# Patient Record
Sex: FEMALE | Race: WHITE | NOT HISPANIC OR LATINO | Employment: OTHER | URBAN - METROPOLITAN AREA
[De-identification: names, ages, dates, MRNs, and addresses within clinical notes are randomized per-mention and may not be internally consistent; named-entity substitution may affect disease eponyms.]

---

## 2018-02-25 ENCOUNTER — TRANSFERRED RECORDS (OUTPATIENT)
Dept: HEALTH INFORMATION MANAGEMENT | Facility: CLINIC | Age: 28
End: 2018-02-25

## 2018-02-26 ENCOUNTER — ANESTHESIA EVENT (OUTPATIENT)
Dept: SURGERY | Facility: CLINIC | Age: 28
End: 2018-02-26
Payer: COMMERCIAL

## 2018-02-26 ENCOUNTER — HOSPITAL ENCOUNTER (EMERGENCY)
Facility: CLINIC | Age: 28
Discharge: HOME | End: 2018-02-26
Attending: EMERGENCY MEDICINE | Admitting: SPECIALIST
Payer: COMMERCIAL

## 2018-02-26 ENCOUNTER — ANESTHESIA (OUTPATIENT)
Dept: SURGERY | Facility: CLINIC | Age: 28
End: 2018-02-26
Payer: COMMERCIAL

## 2018-02-26 ENCOUNTER — APPOINTMENT (OUTPATIENT)
Dept: ULTRASOUND IMAGING | Facility: CLINIC | Age: 28
End: 2018-02-26
Attending: EMERGENCY MEDICINE
Payer: COMMERCIAL

## 2018-02-26 VITALS
HEART RATE: 119 BPM | RESPIRATION RATE: 17 BRPM | OXYGEN SATURATION: 97 % | TEMPERATURE: 98.1 F | SYSTOLIC BLOOD PRESSURE: 140 MMHG | DIASTOLIC BLOOD PRESSURE: 99 MMHG | WEIGHT: 170 LBS

## 2018-02-26 DIAGNOSIS — G89.18 POST-OP PAIN: Primary | ICD-10-CM

## 2018-02-26 DIAGNOSIS — N76.4 LEFT GENITAL LABIAL ABSCESS: ICD-10-CM

## 2018-02-26 LAB
ANION GAP SERPL CALCULATED.3IONS-SCNC: 8 MMOL/L (ref 3–14)
B-HCG SERPL-ACNC: <1 IU/L (ref 0–5)
BASOPHILS # BLD AUTO: 0 10E9/L (ref 0–0.2)
BASOPHILS NFR BLD AUTO: 0.1 %
BUN SERPL-MCNC: 8 MG/DL (ref 7–30)
CALCIUM SERPL-MCNC: 9 MG/DL (ref 8.5–10.1)
CHLORIDE SERPL-SCNC: 103 MMOL/L (ref 94–109)
CO2 SERPL-SCNC: 27 MMOL/L (ref 20–32)
CREAT SERPL-MCNC: 0.77 MG/DL (ref 0.52–1.04)
DIFFERENTIAL METHOD BLD: ABNORMAL
EOSINOPHIL # BLD AUTO: 0.2 10E9/L (ref 0–0.7)
EOSINOPHIL NFR BLD AUTO: 1.4 %
ERYTHROCYTE [DISTWIDTH] IN BLOOD BY AUTOMATED COUNT: 13.8 % (ref 10–15)
GFR SERPL CREATININE-BSD FRML MDRD: 88 ML/MIN/1.7M2
GLUCOSE SERPL-MCNC: 108 MG/DL (ref 70–99)
GRAM STN SPEC: ABNORMAL
HCT VFR BLD AUTO: 40.1 % (ref 35–47)
HGB BLD-MCNC: 12.8 G/DL (ref 11.7–15.7)
IMM GRANULOCYTES # BLD: 0 10E9/L (ref 0–0.4)
IMM GRANULOCYTES NFR BLD: 0.2 %
LYMPHOCYTES # BLD AUTO: 1.3 10E9/L (ref 0.8–5.3)
LYMPHOCYTES NFR BLD AUTO: 7.9 %
MCH RBC QN AUTO: 29.2 PG (ref 26.5–33)
MCHC RBC AUTO-ENTMCNC: 31.9 G/DL (ref 31.5–36.5)
MCV RBC AUTO: 91 FL (ref 78–100)
MONOCYTES # BLD AUTO: 1 10E9/L (ref 0–1.3)
MONOCYTES NFR BLD AUTO: 6.4 %
NEUTROPHILS # BLD AUTO: 13.7 10E9/L (ref 1.6–8.3)
NEUTROPHILS NFR BLD AUTO: 84 %
PLATELET # BLD AUTO: 308 10E9/L (ref 150–450)
POTASSIUM SERPL-SCNC: 4 MMOL/L (ref 3.4–5.3)
RBC # BLD AUTO: 4.39 10E12/L (ref 3.8–5.2)
SODIUM SERPL-SCNC: 138 MMOL/L (ref 133–144)
SPECIMEN SOURCE: ABNORMAL
WBC # BLD AUTO: 16.3 10E9/L (ref 4–11)

## 2018-02-26 PROCEDURE — 36000058 ZZH SURGERY LEVEL 3 EA 15 ADDTL MIN: Performed by: SPECIALIST

## 2018-02-26 PROCEDURE — 45990 SURG DX EXAM ANORECTAL: CPT | Performed by: SPECIALIST

## 2018-02-26 PROCEDURE — 84702 CHORIONIC GONADOTROPIN TEST: CPT | Performed by: EMERGENCY MEDICINE

## 2018-02-26 PROCEDURE — 10060 I&D ABSCESS SIMPLE/SINGLE: CPT | Performed by: SPECIALIST

## 2018-02-26 PROCEDURE — 96361 HYDRATE IV INFUSION ADD-ON: CPT | Performed by: EMERGENCY MEDICINE

## 2018-02-26 PROCEDURE — 40000306 ZZH STATISTIC PRE PROC ASSESS II: Performed by: SPECIALIST

## 2018-02-26 PROCEDURE — 25000132 ZZH RX MED GY IP 250 OP 250 PS 637: Performed by: NURSE ANESTHETIST, CERTIFIED REGISTERED

## 2018-02-26 PROCEDURE — 96374 THER/PROPH/DIAG INJ IV PUSH: CPT | Performed by: EMERGENCY MEDICINE

## 2018-02-26 PROCEDURE — 87070 CULTURE OTHR SPECIMN AEROBIC: CPT | Performed by: SPECIALIST

## 2018-02-26 PROCEDURE — 25000132 ZZH RX MED GY IP 250 OP 250 PS 637: Performed by: SPECIALIST

## 2018-02-26 PROCEDURE — 71000015 ZZH RECOVERY PHASE 1 LEVEL 2 EA ADDTL HR: Performed by: SPECIALIST

## 2018-02-26 PROCEDURE — 25000128 H RX IP 250 OP 636: Performed by: EMERGENCY MEDICINE

## 2018-02-26 PROCEDURE — 87075 CULTR BACTERIA EXCEPT BLOOD: CPT | Performed by: SPECIALIST

## 2018-02-26 PROCEDURE — 25000128 H RX IP 250 OP 636: Performed by: NURSE ANESTHETIST, CERTIFIED REGISTERED

## 2018-02-26 PROCEDURE — 87076 CULTURE ANAEROBE IDENT EACH: CPT | Performed by: SPECIALIST

## 2018-02-26 PROCEDURE — 85025 COMPLETE CBC W/AUTO DIFF WBC: CPT | Performed by: EMERGENCY MEDICINE

## 2018-02-26 PROCEDURE — 25000125 ZZHC RX 250: Performed by: NURSE ANESTHETIST, CERTIFIED REGISTERED

## 2018-02-26 PROCEDURE — 37000009 ZZH ANESTHESIA TECHNICAL FEE, EACH ADDTL 15 MIN: Performed by: SPECIALIST

## 2018-02-26 PROCEDURE — 99285 EMERGENCY DEPT VISIT HI MDM: CPT | Mod: 25 | Performed by: EMERGENCY MEDICINE

## 2018-02-26 PROCEDURE — 37000008 ZZH ANESTHESIA TECHNICAL FEE, 1ST 30 MIN: Performed by: SPECIALIST

## 2018-02-26 PROCEDURE — 36000056 ZZH SURGERY LEVEL 3 1ST 30 MIN: Performed by: SPECIALIST

## 2018-02-26 PROCEDURE — 71000027 ZZH RECOVERY PHASE 2 EACH 15 MINS: Performed by: SPECIALIST

## 2018-02-26 PROCEDURE — 87185 SC STD ENZYME DETCJ PER NZM: CPT | Performed by: SPECIALIST

## 2018-02-26 PROCEDURE — 99253 IP/OBS CNSLTJ NEW/EST LOW 45: CPT | Mod: 57 | Performed by: SPECIALIST

## 2018-02-26 PROCEDURE — 87205 SMEAR GRAM STAIN: CPT | Performed by: SPECIALIST

## 2018-02-26 PROCEDURE — 25000125 ZZHC RX 250: Performed by: SPECIALIST

## 2018-02-26 PROCEDURE — 80048 BASIC METABOLIC PNL TOTAL CA: CPT | Performed by: EMERGENCY MEDICINE

## 2018-02-26 PROCEDURE — 87077 CULTURE AEROBIC IDENTIFY: CPT | Performed by: SPECIALIST

## 2018-02-26 PROCEDURE — 71000014 ZZH RECOVERY PHASE 1 LEVEL 2 FIRST HR: Performed by: SPECIALIST

## 2018-02-26 PROCEDURE — 25000566 ZZH SEVOFLURANE, EA 15 MIN: Performed by: SPECIALIST

## 2018-02-26 PROCEDURE — 76705 ECHO EXAM OF ABDOMEN: CPT

## 2018-02-26 PROCEDURE — 96375 TX/PRO/DX INJ NEW DRUG ADDON: CPT | Performed by: EMERGENCY MEDICINE

## 2018-02-26 RX ORDER — FENTANYL CITRATE 50 UG/ML
25-50 INJECTION, SOLUTION INTRAMUSCULAR; INTRAVENOUS
Status: DISCONTINUED | OUTPATIENT
Start: 2018-02-26 | End: 2018-02-26 | Stop reason: HOSPADM

## 2018-02-26 RX ORDER — MUPIROCIN 20 MG/G
OINTMENT TOPICAL 3 TIMES DAILY
COMMUNITY
End: 2019-04-29

## 2018-02-26 RX ORDER — LORAZEPAM 2 MG/ML
1 INJECTION INTRAMUSCULAR ONCE
Status: COMPLETED | OUTPATIENT
Start: 2018-02-26 | End: 2018-02-26

## 2018-02-26 RX ORDER — DIMENHYDRINATE 50 MG/ML
25 INJECTION, SOLUTION INTRAMUSCULAR; INTRAVENOUS
Status: DISCONTINUED | OUTPATIENT
Start: 2018-02-26 | End: 2018-02-26 | Stop reason: HOSPADM

## 2018-02-26 RX ORDER — SODIUM CHLORIDE 9 MG/ML
1000 INJECTION, SOLUTION INTRAVENOUS CONTINUOUS
Status: DISCONTINUED | OUTPATIENT
Start: 2018-02-26 | End: 2018-02-26 | Stop reason: HOSPADM

## 2018-02-26 RX ORDER — SODIUM CHLORIDE, SODIUM LACTATE, POTASSIUM CHLORIDE, CALCIUM CHLORIDE 600; 310; 30; 20 MG/100ML; MG/100ML; MG/100ML; MG/100ML
INJECTION, SOLUTION INTRAVENOUS CONTINUOUS
Status: DISCONTINUED | OUTPATIENT
Start: 2018-02-26 | End: 2018-02-26 | Stop reason: HOSPADM

## 2018-02-26 RX ORDER — MEPERIDINE HYDROCHLORIDE 25 MG/ML
12.5 INJECTION INTRAMUSCULAR; INTRAVENOUS; SUBCUTANEOUS
Status: DISCONTINUED | OUTPATIENT
Start: 2018-02-26 | End: 2018-02-26 | Stop reason: HOSPADM

## 2018-02-26 RX ORDER — ALBUTEROL SULFATE 90 UG/1
AEROSOL, METERED RESPIRATORY (INHALATION) PRN
Status: DISCONTINUED | OUTPATIENT
Start: 2018-02-26 | End: 2018-02-26

## 2018-02-26 RX ORDER — OXYCODONE AND ACETAMINOPHEN 5; 325 MG/1; MG/1
1 TABLET ORAL EVERY 6 HOURS PRN
COMMUNITY
End: 2019-03-12

## 2018-02-26 RX ORDER — DEXAMETHASONE SODIUM PHOSPHATE 10 MG/ML
INJECTION, SOLUTION INTRAMUSCULAR; INTRAVENOUS PRN
Status: DISCONTINUED | OUTPATIENT
Start: 2018-02-26 | End: 2018-02-26

## 2018-02-26 RX ORDER — ONDANSETRON 4 MG/1
4 TABLET, ORALLY DISINTEGRATING ORAL EVERY 30 MIN PRN
Status: DISCONTINUED | OUTPATIENT
Start: 2018-02-26 | End: 2018-02-26 | Stop reason: HOSPADM

## 2018-02-26 RX ORDER — OXYCODONE AND ACETAMINOPHEN 5; 325 MG/1; MG/1
2 TABLET ORAL
Status: COMPLETED | OUTPATIENT
Start: 2018-02-26 | End: 2018-02-26

## 2018-02-26 RX ORDER — LIDOCAINE HYDROCHLORIDE 20 MG/ML
INJECTION, SOLUTION INFILTRATION; PERINEURAL PRN
Status: DISCONTINUED | OUTPATIENT
Start: 2018-02-26 | End: 2018-02-26

## 2018-02-26 RX ORDER — ALBUTEROL SULFATE 0.83 MG/ML
2.5 SOLUTION RESPIRATORY (INHALATION) EVERY 6 HOURS PRN
Status: DISCONTINUED | OUTPATIENT
Start: 2018-02-26 | End: 2018-02-26 | Stop reason: HOSPADM

## 2018-02-26 RX ORDER — ONDANSETRON 2 MG/ML
4 INJECTION INTRAMUSCULAR; INTRAVENOUS EVERY 30 MIN PRN
Status: DISCONTINUED | OUTPATIENT
Start: 2018-02-26 | End: 2018-02-26 | Stop reason: HOSPADM

## 2018-02-26 RX ORDER — KETOROLAC TROMETHAMINE 30 MG/ML
30 INJECTION, SOLUTION INTRAMUSCULAR; INTRAVENOUS ONCE
Status: COMPLETED | OUTPATIENT
Start: 2018-02-26 | End: 2018-02-26

## 2018-02-26 RX ORDER — PROPOFOL 10 MG/ML
INJECTION, EMULSION INTRAVENOUS PRN
Status: DISCONTINUED | OUTPATIENT
Start: 2018-02-26 | End: 2018-02-26

## 2018-02-26 RX ORDER — BUPIVACAINE HYDROCHLORIDE 2.5 MG/ML
INJECTION, SOLUTION INFILTRATION; PERINEURAL PRN
Status: DISCONTINUED | OUTPATIENT
Start: 2018-02-26 | End: 2018-02-26 | Stop reason: HOSPADM

## 2018-02-26 RX ORDER — HALOPERIDOL 5 MG/ML
2 INJECTION INTRAMUSCULAR ONCE
Status: DISCONTINUED | OUTPATIENT
Start: 2018-02-26 | End: 2018-02-26 | Stop reason: CLARIF

## 2018-02-26 RX ORDER — CYANOCOBALAMIN 1000 UG/ML
1 INJECTION, SOLUTION INTRAMUSCULAR; SUBCUTANEOUS
COMMUNITY
End: 2019-09-27

## 2018-02-26 RX ORDER — LIDOCAINE 40 MG/G
CREAM TOPICAL
Status: DISCONTINUED | OUTPATIENT
Start: 2018-02-26 | End: 2018-02-26 | Stop reason: HOSPADM

## 2018-02-26 RX ORDER — NALOXONE HYDROCHLORIDE 0.4 MG/ML
.1-.4 INJECTION, SOLUTION INTRAMUSCULAR; INTRAVENOUS; SUBCUTANEOUS
Status: DISCONTINUED | OUTPATIENT
Start: 2018-02-26 | End: 2018-02-26 | Stop reason: HOSPADM

## 2018-02-26 RX ORDER — HYDROMORPHONE HYDROCHLORIDE 1 MG/ML
.3-.5 INJECTION, SOLUTION INTRAMUSCULAR; INTRAVENOUS; SUBCUTANEOUS EVERY 10 MIN PRN
Status: DISCONTINUED | OUTPATIENT
Start: 2018-02-26 | End: 2018-02-26 | Stop reason: HOSPADM

## 2018-02-26 RX ORDER — OXYCODONE AND ACETAMINOPHEN 5; 325 MG/1; MG/1
1-2 TABLET ORAL EVERY 6 HOURS PRN
Qty: 30 TABLET | Refills: 0 | Status: SHIPPED | OUTPATIENT
Start: 2018-02-26 | End: 2019-03-12

## 2018-02-26 RX ORDER — HYDROMORPHONE HYDROCHLORIDE 1 MG/ML
0.5 INJECTION, SOLUTION INTRAMUSCULAR; INTRAVENOUS; SUBCUTANEOUS
Status: DISCONTINUED | OUTPATIENT
Start: 2018-02-26 | End: 2018-02-26 | Stop reason: HOSPADM

## 2018-02-26 RX ADMIN — FENTANYL CITRATE 50 MCG: 50 INJECTION, SOLUTION INTRAMUSCULAR; INTRAVENOUS at 12:12

## 2018-02-26 RX ADMIN — HYDROMORPHONE HYDROCHLORIDE 0.5 MG: 1 INJECTION, SOLUTION INTRAMUSCULAR; INTRAVENOUS; SUBCUTANEOUS at 07:53

## 2018-02-26 RX ADMIN — OXYCODONE HYDROCHLORIDE AND ACETAMINOPHEN 2 TABLET: 5; 325 TABLET ORAL at 14:31

## 2018-02-26 RX ADMIN — KETOROLAC TROMETHAMINE 30 MG: 30 INJECTION, SOLUTION INTRAMUSCULAR at 07:52

## 2018-02-26 RX ADMIN — ALBUTEROL SULFATE 6 PUFF: 90 AEROSOL, METERED RESPIRATORY (INHALATION) at 12:53

## 2018-02-26 RX ADMIN — FENTANYL CITRATE 50 MCG: 50 INJECTION, SOLUTION INTRAMUSCULAR; INTRAVENOUS at 12:05

## 2018-02-26 RX ADMIN — PHENYLEPHRINE HYDROCHLORIDE 100 MCG: 10 INJECTION, SOLUTION INTRAMUSCULAR; INTRAVENOUS; SUBCUTANEOUS at 12:35

## 2018-02-26 RX ADMIN — PROCHLORPERAZINE EDISYLATE 5 MG: 5 INJECTION INTRAMUSCULAR; INTRAVENOUS at 15:29

## 2018-02-26 RX ADMIN — SODIUM CHLORIDE, POTASSIUM CHLORIDE, SODIUM LACTATE AND CALCIUM CHLORIDE: 600; 310; 30; 20 INJECTION, SOLUTION INTRAVENOUS at 11:58

## 2018-02-26 RX ADMIN — SODIUM CHLORIDE 1000 ML: 9 INJECTION, SOLUTION INTRAVENOUS at 08:03

## 2018-02-26 RX ADMIN — ALBUTEROL SULFATE 2.5 MG: 2.5 SOLUTION RESPIRATORY (INHALATION) at 13:18

## 2018-02-26 RX ADMIN — DEXAMETHASONE SODIUM PHOSPHATE 10 MG: 10 INJECTION, SOLUTION INTRAMUSCULAR; INTRAVENOUS at 12:28

## 2018-02-26 RX ADMIN — PROPOFOL 200 MG: 10 INJECTION, EMULSION INTRAVENOUS at 12:12

## 2018-02-26 RX ADMIN — ONDANSETRON 4 MG: 4 TABLET, ORALLY DISINTEGRATING ORAL at 14:35

## 2018-02-26 RX ADMIN — ONDANSETRON 4 MG: 2 INJECTION INTRAMUSCULAR; INTRAVENOUS at 12:28

## 2018-02-26 RX ADMIN — ALBUTEROL SULFATE 6 PUFF: 90 AEROSOL, METERED RESPIRATORY (INHALATION) at 12:58

## 2018-02-26 RX ADMIN — VANCOMYCIN HYDROCHLORIDE 1500 MG: 1 INJECTION, POWDER, LYOPHILIZED, FOR SOLUTION INTRAVENOUS at 08:21

## 2018-02-26 RX ADMIN — Medication 100 MG: at 12:12

## 2018-02-26 RX ADMIN — LORAZEPAM 1 MG: 2 INJECTION INTRAMUSCULAR; INTRAVENOUS at 07:58

## 2018-02-26 RX ADMIN — LIDOCAINE HYDROCHLORIDE 100 MG: 20 INJECTION, SOLUTION INFILTRATION; PERINEURAL at 12:12

## 2018-02-26 RX ADMIN — MIDAZOLAM 2 MG: 1 INJECTION INTRAMUSCULAR; INTRAVENOUS at 12:05

## 2018-02-26 ASSESSMENT — LIFESTYLE VARIABLES: TOBACCO_USE: 1

## 2018-02-26 NOTE — ANESTHESIA POSTPROCEDURE EVALUATION
Patient: Ayanna Davidson    Procedure(s):  INCISION AND DRAINAGE LABIAL ABSCESS - Wound Class: II-Clean Contaminated    Diagnosis:labial abcess  Diagnosis Additional Information: No value filed.    Anesthesia Type:  MAC    Note:  Anesthesia Post Evaluation    Patient location during evaluation: Phase 2 and Bedside  Patient participation: Able to fully participate in evaluation  Level of consciousness: awake and alert  Pain management: adequate  Airway patency: patent  Cardiovascular status: acceptable  Respiratory status: acceptable  Hydration status: acceptable  PONV: none     Anesthetic complications: None    Comments: Pt has a flat affect. Doesn't open eyes when talking to you. Words are inaudible at times. Hard to get a straight answer from her because of this. This is the same way she presented preop. VSS. Will DC to home with SO.        Last vitals:  Vitals:    02/26/18 1320 02/26/18 1325 02/26/18 1330   BP: 109/70 114/73 109/68   Pulse:      Resp: 21 22 21   Temp:      SpO2: 95% 94% 94%         Electronically Signed By: LEXX Rondon CRNA  February 26, 2018  2:00 PM

## 2018-02-26 NOTE — LETTER
February 26, 2018      To Whom It May Concern:      Ayanna Davidson was seen in our Surgery Department today 02/26/18.  She may return to work/school on Thursday 3/1/18 or sooner if she feels improved.    Sincerely,        Celeste Garsia RN

## 2018-02-26 NOTE — ANESTHESIA PREPROCEDURE EVALUATION
Anesthesia Evaluation     . Pt has had prior anesthetic. Type: General and MAC    No history of anesthetic complications          ROS/MED HX    ENT/Pulmonary:     (+)tobacco use, Current use , . .    Neurologic:  - neg neurologic ROS     Cardiovascular:     (+) ----. : . . . :. . No previous cardiac testing       METS/Exercise Tolerance:     Hematologic:  - neg hematologic  ROS       Musculoskeletal:  - neg musculoskeletal ROS       GI/Hepatic:     (+) GERD       Renal/Genitourinary:  - ROS Renal section negative       Endo:  - neg endo ROS       Psychiatric: Comment: Polysubstance abuse per last tox screen.    (+) psychiatric history anxiety and depression      Infectious Disease:  - neg infectious disease ROS       Malignancy:      - no malignancy   Other: Comment: - HCG   - neg other ROS                 Physical Exam  Normal systems: cardiovascular    Airway   Mallampati: II  TM distance: <3 FB  Neck ROM: full    Dental   (+) missing and chipped    Cardiovascular   Rhythm and rate: regular and normal      Pulmonary (+) decreased breath sounds and wheezes                       Anesthesia Plan      History & Physical Review  History and physical reviewed and following examination; no interval change.    ASA Status:  2 .    NPO Status:  > 6 hours    Plan for General, RSI and ETT with Intravenous and Propofol induction. Maintenance will be Balanced.    PONV prophylaxis:  Ondansetron (or other 5HT-3)  Additional equipment: Videolaryngoscope      Postoperative Care  Postoperative pain management:  IV analgesics and Oral pain medications.      Consents  Anesthetic plan, risks, benefits and alternatives discussed with:  Patient and Spouse.  Use of blood products discussed: No .   .                          .

## 2018-02-26 NOTE — ANESTHESIA CARE TRANSFER NOTE
Patient: Ayanna Davidson    Procedure(s):  INCISION AND DRAINAGE LABIAL ABSCESS - Wound Class: II-Clean Contaminated    Diagnosis: labial abcess  Diagnosis Additional Information: No value filed.    Anesthesia Type:   MAC     Note:  Airway :Face Mask  Patient transferred to:PACU  Handoff Report: Identifed the Patient, Identified the Reponsible Provider, Reviewed the pertinent medical history, Discussed the surgical course, Reviewed Intra-OP anesthesia mangement and issues during anesthesia, Set expectations for post-procedure period and Allowed opportunity for questions and acknowledgement of understanding      Vitals: (Last set prior to Anesthesia Care Transfer)    CRNA VITALS  2/26/2018 1240 - 2/26/2018 1310      2/26/2018             SpO2: 93 %                Electronically Signed By: LEXX Rondon CRNA  February 26, 2018  1:10 PM

## 2018-02-26 NOTE — ED PROVIDER NOTES
"  History     Chief Complaint   Patient presents with     labial abscess     The history is provided by the patient and medical records.     This is a 27-year-old female with history of MRSA and recurrent abscesses presenting with labial abscess.  Patient states that she has had about \"benign cysts\" on the left side of her labia/vulva and has had a number of different I&D and surgical procedures on them.  She notes that she has never been diagnosed with the Bartholin's gland cyst but that these have always been abscesses in a different area.  About 4 days ago she started having increasing swelling and was seen at an outside facility.  She was placed on clindamycin and given pain medications.  She was seen again yesterday at another outside facility and it was recommended that she follow-up with surgery.  She has been taking her antibiotics as prescribed.  She is also been trying some soaks.  She states that the pain got so bad overnight that to the medications are not helping.  She is feeling generalized ill no fevers.ness.  She is still able to urinate.  She denies vaginal discharge.  She has had normal bowel movements.  Her last surgery was 8/17 at [facility] and she was hospitalized for a day or 2 afterwards.  She had another I&D done on her right eyebrow 1/8/18 for an abscess.  She denies any other skin lesions at this point.  Patient has had a history of methamphetamine abuse.    Problem List:    There are no active problems to display for this patient.       Past Medical History:    History reviewed. No pertinent past medical history.    Past Surgical History:    History reviewed. No pertinent surgical history.    Family History:    History reviewed. No pertinent family history.    Social History:  Marital Status:  Single [1]  Social History   Substance Use Topics     Smoking status: Current Every Day Smoker     Packs/day: 0.50     Smokeless tobacco: Current User      Comment: uses E cig     Alcohol use " No        Medications:      No current outpatient prescriptions on file.      Review of Systems  All other ROS reviewed and are negative or non-contributory except as stated in HPI.      Physical Exam   BP: 117/76  Pulse: 119  Temp: 98.8  F (37.1  C)  Resp: 18  Weight: 77.1 kg (170 lb)  SpO2: 98 %      Physical Exam   Constitutional: She appears well-developed and well-nourished.   Uncomfortable appearing young female lying in the bed   HENT:   Head: Normocephalic.   Nose: Nose normal.   Mouth/Throat: Oropharynx is clear and moist.   Eyes: Conjunctivae and EOM are normal.   Neck: Normal range of motion. Neck supple.   Cardiovascular: Regular rhythm, normal heart sounds and intact distal pulses.    Tachycardia   Pulmonary/Chest: Effort normal and breath sounds normal.   Abdominal: Soft. There is no tenderness.   Genitourinary:         Musculoskeletal: Normal range of motion.   Neurological: She is alert.   Skin: Skin is warm and dry. She is not diaphoretic.   Psychiatric: Her behavior is normal.   Anxious   Vitals reviewed.      ED Course (with Medical Decision Making)    Pt seen and examined by me.  RN and EPIC notes reviewed.      Large left labial/vulvar abscess with surrounding cellulitis.  I am concerned about the complexity, depth, extent of the this abscess, especially in light of the fact that she had surgery on it a number of times previously.    IV was placed for labs, pain control, antibiotics.  I am going to give her vancomycin since she is already been on clindamycin.  I spoke with Dr. Dominguez, surgery.  He requests an ultrasound and he will take the patient to the operating room for I&D.    Ultrasound results as below.  Patient tolerating her pain medications and sleeping.  She is cleared for surgery.    Patient has no obvious contraindications to a surgical procedure with no history of cardiac disease, significant lung disease although she does smoke, and no history of adverse reactions to anesthesia.        Procedures  Results for orders placed or performed during the hospital encounter of 02/26/18   US Abdomen Limited    Narrative    ULTRASOUND ABDOMEN LIMITED   2/26/2018 8:58 AM     HISTORY: Vulvar abscess.    COMPARISON: None.    FINDINGS: There is a complex fluid collection in the left  labial/vulvar region, suspicious for abscess. This complex fluid  collection is located 0.2 cm below the skin surface and measures 3.8 x  2.9 x 4.5 cm.       Impression    IMPRESSION: Left labial/vulvar complex fluid collection measures up to  4.5 cm in greatest dimension and is suspicious for abscess.     PABLO BUSBY MD   Basic metabolic panel   Result Value Ref Range    Sodium 138 133 - 144 mmol/L    Potassium 4.0 3.4 - 5.3 mmol/L    Chloride 103 94 - 109 mmol/L    Carbon Dioxide 27 20 - 32 mmol/L    Anion Gap 8 3 - 14 mmol/L    Glucose 108 (H) 70 - 99 mg/dL    Urea Nitrogen 8 7 - 30 mg/dL    Creatinine 0.77 0.52 - 1.04 mg/dL    GFR Estimate 88 >60 mL/min/1.7m2    GFR Estimate If Black >90 >60 mL/min/1.7m2    Calcium 9.0 8.5 - 10.1 mg/dL   CBC with platelets differential   Result Value Ref Range    WBC 16.3 (H) 4.0 - 11.0 10e9/L    RBC Count 4.39 3.8 - 5.2 10e12/L    Hemoglobin 12.8 11.7 - 15.7 g/dL    Hematocrit 40.1 35.0 - 47.0 %    MCV 91 78 - 100 fl    MCH 29.2 26.5 - 33.0 pg    MCHC 31.9 31.5 - 36.5 g/dL    RDW 13.8 10.0 - 15.0 %    Platelet Count 308 150 - 450 10e9/L    Diff Method Automated Method     % Neutrophils 84.0 %    % Lymphocytes 7.9 %    % Monocytes 6.4 %    % Eosinophils 1.4 %    % Basophils 0.1 %    % Immature Granulocytes 0.2 %    Absolute Neutrophil 13.7 (H) 1.6 - 8.3 10e9/L    Absolute Lymphocytes 1.3 0.8 - 5.3 10e9/L    Absolute Monocytes 1.0 0.0 - 1.3 10e9/L    Absolute Eosinophils 0.2 0.0 - 0.7 10e9/L    Absolute Basophils 0.0 0.0 - 0.2 10e9/L    Abs Immature Granulocytes 0.0 0 - 0.4 10e9/L   HCG quantitative pregnancy (blood)   Result Value Ref Range    HCG Quantitative Serum <1 0 - 5 IU/L         Assessments & Plan     I have reviewed the findings, diagnosis, plan and need for follow up with the patient.  Current Discharge Medication List          Final diagnoses:   Left genital labial abscess     Disposition: Patient transferred to the operating room in stable condition.    Note: Chart documentation done in part with Dragon Voice Recognition software. Although reviewed after completion, some word and grammatical errors may remain.     2/26/2018   [facility] EMERGENCY DEPARTMENT     Monisha Rojas MD  02/26/18 3080

## 2018-02-26 NOTE — IP AVS SNAPSHOT
[facility] Post Anesthesia Care    [address] [place] [address]    Phone:  [phone]                                       After Visit Summary   2/26/2018    Ayanna Davidson    MRN: [id number]           After Visit Summary Signature Page     I have received my discharge instructions, and my questions have been answered. I have discussed any challenges I see with this plan with the nurse or doctor.    ..........................................................................................................................................  Patient/Patient Representative Signature      ..........................................................................................................................................  Patient Representative Print Name and Relationship to Patient    ..................................................               ................................................  Date                                            Time    ..........................................................................................................................................  Reviewed by Signature/Title    ...................................................              ..............................................  Date                                                            Time

## 2018-02-26 NOTE — BRIEF OP NOTE
[facility] Brief Operative Note    Pre-operative diagnosis: labial abcess   Post-operative diagnosis same   Procedure: 1) Incision drainage left labial abscess  2) Rectal exam under anesthesia   Surgeon: Jase Beach MD, FACS   Assistants(s): Marcelina Jiang MS3   Estimated blood loss: Less than 10 ml    Specimens: Cultures   Findings: Large abscess with 50cc of pus.  No obvious vaginal or rectal source.     Jase Beach MD, FACS    #[id number]

## 2018-02-26 NOTE — ED NOTES
"Pt reports abscess in vaginal area is \"killing me it hurts so bad I just want it to pop\", was seen yesterday and was supposed to follow up today with surgeon   "

## 2018-02-26 NOTE — IP AVS SNAPSHOT
MRN:[id number]                      After Visit Summary   2/26/2018    Ayanna Davidson    MRN: [id number]           Thank you!     Thank you for choosing [facility] for your care. Our goal is always to provide you with excellent care. Hearing back from our patients is one way we can continue to improve our services. Please take a few minutes to complete the written survey that you may receive in the mail after you visit with us. Thank you!        Patient Information     Date Of Birth          1990        About your hospital stay     You were admitted on:  N/A You last received care in the:  [facility]    You were discharged on:  February 26, 2018       Who to Call     For medical emergencies, please call 911.  For non-urgent questions about your medical care, please call your primary care provider or clinic, None  For questions related to your surgery, please call your surgery clinic        Attending Provider     Provider Specialty    Monisha Rojas MD Emergency Medicine       Primary Care Provider Fax #    Physician No Ref-Primary [phone]      After Care Instructions     Diet Instructions       Resume pre-procedure diet            Discharge Instructions       Patient to follow up with appointment in 7-10 days.            Dressing       Keep dressing clean and dry.  Dressing / incisional care as instructed by RN and or Surgeon            Ice to affected area       Ice to operative site PRN            No Alcohol       For 24 hours post procedure            No driving or operating machinery        until the day after procedure            Shower       No shower for 24 hours post procedure. May shower Postoperative Day (POD)  1.   Remove packing in AM in shower or bath.  Soak area daily in shower or bath then pack with gauze                  Your next 10 appointments already scheduled     Mar 05, 2018  2:45 PM CST   Return Visit with Jase Beach MD  "  [facility] ([facility]    [address], [place] [address]   [phone]              Pending Results     Date and Time Order Name Status Description    2018 1236 Gram stain Preliminary     2018 1234 Abscess Culture Aerobic Bacterial In process             Admission Information     Date & Time Department Dept. Phone    2018 [facility] [phone]      Your Vitals Were     Blood Pressure Pulse Temperature Respirations Weight Pulse Oximetry    117/89 119 98.1  F (36.7  C) (Axillary) 17 77.1 kg (170 lb) 98%      MyChart Information     [facility] lets you send messages to your doctor, view your test results, renew your prescriptions, schedule appointments and more. To sign up, go to [email or web address] . Click on \"Log in\" on the left side of the screen, which will take you to the Welcome page. Then click on \"Sign up Now\" on the right side of the page.     You will be asked to enter the access code listed below, as well as some personal information. Please follow the directions to create your username and password.     Your access code is: [email or web address]  Expires: 2018  1:39 PM     Your access code will  in 90 days. If you need help or a new code, please call your [facility] clinic or [phone].        Care EveryWhere ID     This is your Care EveryWhere ID. This could be used by other organizations to access your [facility] medical records  [id number]        Equal Access to Services     [facility] campusJESSY AH: Hadii aad ku hadasho Soomaali, waaxda luqadaha, qaybta kaalmada adeegyada, harlan driver . So [facility] [phone].    ATENCIÓN: Si habla español, tiene a calvert disposición servicios gratuitos de asistencia lingüística. Llame al [phone].    We comply with applicable federal civil rights laws and [place] laws. We do not discriminate on the basis of race, color, national origin, age, disability, " sex, sexual orientation, or gender identity.               Review of your medicines      CONTINUE these medicines which may have CHANGED, or have new prescriptions. If we are uncertain of the size of tablets/capsules you have at home, strength may be listed as something that might have changed.        Dose / Directions    * oxyCODONE-acetaminophen 5-325 MG per tablet   Commonly known as:  PERCOCET   This may have changed:  You were already taking a medication with the same name, and this prescription was added. Make sure you understand how and when to take each.   Used for:  Post-op pain        Dose:  1-2 tablet   Take 1-2 tablets by mouth every 6 hours as needed for pain (moderate to severe)   Quantity:  30 tablet   Refills:  0       * oxyCODONE-acetaminophen 5-325 MG per tablet   Commonly known as:  PERCOCET   This may have changed:  Another medication with the same name was added. Make sure you understand how and when to take each.        Dose:  1 tablet   Take 1 tablet by mouth every 6 hours as needed for moderate to severe pain   Refills:  0       * Notice:  This list has 2 medication(s) that are the same as other medications prescribed for you. Read the directions carefully, and ask your doctor or other care provider to review them with you.      CONTINUE these medicines which have NOT CHANGED        Dose / Directions    CLINDAMYCIN HCL PO        Dose:  150 mg   Take 150 mg by mouth   Refills:  0       cyanocobalamin 1000 MCG/ML injection   Commonly known as:  VITAMIN B12        Dose:  1 mL   Inject 1 mL into the muscle every 30 days   Refills:  0       mupirocin 2 % ointment   Commonly known as:  BACTROBAN        Apply topically 3 times daily   Refills:  0       norethindrone-ethinyl estradiol 1-35 MG-MCG per tablet   Commonly known as:  ORTHO-NOVUM 1-35 TAB,NORTREL 1-35 TAB        Dose:  1 tablet   Take 1 tablet by mouth daily   Refills:  0       OMEPRAZOLE PO        Dose:  20 mg   Take 20 mg by mouth    Refills:  0       OXYCODONE HCL PO        Dose:  5 mg   Take 5 mg by mouth every 4 hours as needed   Refills:  0       TRAMADOL HCL PO        Dose:  50 mg   Take 50 mg by mouth   Refills:  0       TYLENOL PO        Dose:  500 mg   Take 500 mg by mouth every 4 hours as needed for mild pain or fever   Refills:  0       WELLBUTRIN XL PO        Dose:  150 mg   Take 150 mg by mouth daily   Refills:  0       ZOFRAN ODT PO        Dose:  4 mg   Take 4 mg by mouth every 8 hours as needed for nausea   Refills:  0            Where to get your medicines      Some of these will need a paper prescription and others can be bought over the counter. Ask your nurse if you have questions.     Bring a paper prescription for each of these medications     oxyCODONE-acetaminophen 5-325 MG per tablet                Protect others around you: Learn how to safely use, store and throw away your medicines at www.disposemymeds.org.        ANTIBIOTIC INSTRUCTION     You've Been Prescribed an Antibiotic - Now What?  Your healthcare team thinks that you or your loved one might have an infection. Some infections can be treated with antibiotics, which are powerful, life-saving drugs. Like all medications, antibiotics have side effects and should only be used when necessary. There are some important things you should know about your antibiotic treatment.      Your healthcare team may run tests before you start taking an antibiotic.    Your team may take samples (e.g., from your blood, urine or other areas) to run tests to look for bacteria. These test can be important to determine if you need an antibiotic at all and, if you do, which antibiotic will work best.      Within a few days, your healthcare team might change or even stop your antibiotic.    Your team may start you on an antibiotic while they are working to find out what is making you sick.    Your team might change your antibiotic because test results show that a different antibiotic  would be better to treat your infection.    In some cases, once your team has more information, they learn that you do not need an antibiotic at all. They may find out that you don't have an infection, or that the antibiotic you're taking won't work against your infection. For example, an infection caused by a virus can't be treated with antibiotics. Staying on an antibiotic when you don't need it is more likely to be harmful than helpful.      You may experience side effects from your antibiotic.    Like all medications, antibiotics have side effects. Some of these can be serious.    Let you healthcare team know if you have any known allergies when you are admitted to the hospital.    One significant side effect of nearly all antibiotics is the risk of severe and sometimes deadly diarrhea caused by Clostridium difficile (C. Difficile). This occurs when a person takes antibiotics because some good germs are destroyed. Antibiotic use allows C. diificile to take over, putting patients at high risk for this serious infection.    As a patient or caregiver, it is important to understand your or your loved one's antibiotic treatment. It is especially important for caregivers to speak up when patients can't speak for themselves. Here are some important questions to ask your healthcare team.    What infection is this antibiotic treating and how do you know I have that infection?    What side effects might occur from this antibiotic?    How long will I need to take this antibiotic?    Is it safe to take this antibiotic with other medications or supplements (e.g., vitamins) that I am taking?     Are there any special directions I need to know about taking this antibiotic? For example, should I take it with food?    How will I be monitored to know whether my infection is responding to the antibiotic?    What tests may help to make sure the right antibiotic is prescribed for me?      Information provided  by:  www.cdc.gov/getsmart  U.S. Department of Health and Human Services  Centers for disease Control and Prevention  National Center for Emerging and Zoonotic Infectious Diseases  Division of Healthcare Quality Promotion        Information about OPIOIDS     PRESCRIPTION OPIOIDS: WHAT YOU NEED TO KNOW    Prescription opioids can be used to help relieve moderate to severe pain and are often prescribed following a surgery or injury, or for certain health conditions. These medications can be an important part of treatment but also come with serious risks. It is important to work with your health care provider to make sure you are getting the safest, most effective care.    WHAT ARE THE RISKS AND SIDE EFFECTS OF OPIOID USE?  Prescription opioids carry serious risks of addiction and overdose, especially with prolonged use. An opioid overdose, often marked by slowed breathing can cause sudden death. The use of prescription opioids can have a number of side effects as well, even when taken as directed:      Tolerance - meaning you might need to take more of a medication for the same pain relief    Physical dependence - meaning you have symptoms of withdrawal when a medication is stopped    Increased sensitivity to pain    Constipation    Nausea, vomiting, and dry mouth    Sleepiness and dizziness    Confusion    Depression    Low levels of testosterone that can result in lower sex drive, energy, and strength    Itching and sweating    RISKS ARE GREATER WITH:    History of drug misuse, substance use disorder, or overdose    Mental health conditions (such as depression or anxiety)    Sleep apnea    Older age (65 years or older)    Pregnancy    Avoid alcohol while taking prescription opioids.   Also, unless specifically advised by your health care provider, medications to avoid include:    Benzodiazepines (such as Xanax or Valium)    Muscle relaxants (such as Soma or Flexeril)    Hypnotics (such as Ambien or Lunesta)    Other  prescription opioids    KNOW YOUR OPTIONS:  Talk to your health care provider about ways to manage your pain that do not involve prescription opioids. Some of these options may actually work better and have fewer risks and side effects:    Pain relievers such as acetaminophen, ibuprofen, and naproxen    Some medications that are also used for depression or seizures    Physical therapy and exercise    Cognitive behavioral therapy, a psychological, goal-directed approach, in which patients learn how to modify physical, behavioral, and emotional triggers of pain and stress    IF YOU ARE PRESCRIBED OPIOIDS FOR PAIN:    Never take opioids in greater amounts or more often than prescribed    Follow up with your primary health care provider and work together to create a plan on how to manage your pain.    Talk about ways to help manage your pain that do not involve prescription opioids    Talk about all concerns and side effects    Help prevent misuse and abuse    Never sell or share prescription opioids    Never use another person's prescription opioids    Store prescription opioids in a secure place and out of reach of others (this may include visitors, children, friends, and family)    Visit www.cdc.gov/drugoverdose to learn about risks of opioid abuse and overdose    If you believe you may be struggling with addiction, tell your health care provider and ask for guidance or call [facility]'s National Helpline at 1-167-993-HELP    LEARN MORE / www.cdc.gov/drugoverdose/prescribing/guideline.html    Safely dispose of unused prescription opioids: Find your local drug take-back programs and more information about the importance of safe disposal at www.doseofreality.mn.gov             Medication List: This is a list of all your medications and when to take them. Check marks below indicate your daily home schedule. Keep this list as a reference.      Medications           Morning Afternoon Evening Bedtime As Needed    CLINDAMYCIN  HCL PO   Take 150 mg by mouth                                cyanocobalamin 1000 MCG/ML injection   Commonly known as:  VITAMIN B12   Inject 1 mL into the muscle every 30 days                                mupirocin 2 % ointment   Commonly known as:  BACTROBAN   Apply topically 3 times daily                                norethindrone-ethinyl estradiol 1-35 MG-MCG per tablet   Commonly known as:  ORTHO-NOVUM 1-35 TAB,NORTREL 1-35 TAB   Take 1 tablet by mouth daily                                OMEPRAZOLE PO   Take 20 mg by mouth                                OXYCODONE HCL PO   Take 5 mg by mouth every 4 hours as needed                                * oxyCODONE-acetaminophen 5-325 MG per tablet   Commonly known as:  PERCOCET   Take 1-2 tablets by mouth every 6 hours as needed for pain (moderate to severe)   Last time this was given:  2 tablets on 2/26/2018  2:31 PM                                * oxyCODONE-acetaminophen 5-325 MG per tablet   Commonly known as:  PERCOCET   Take 1 tablet by mouth every 6 hours as needed for moderate to severe pain   Last time this was given:  2 tablets on 2/26/2018  2:31 PM                                TRAMADOL HCL PO   Take 50 mg by mouth                                TYLENOL PO   Take 500 mg by mouth every 4 hours as needed for mild pain or fever                                WELLBUTRIN XL PO   Take 150 mg by mouth daily                                ZOFRAN ODT PO   Take 4 mg by mouth every 8 hours as needed for nausea   Last time this was given:  4 mg on 2/26/2018  2:35 PM                                * Notice:  This list has 2 medication(s) that are the same as other medications prescribed for you. Read the directions carefully, and ask your doctor or other care provider to review them with you.

## 2018-02-26 NOTE — ED NOTES
Surgery staff here to  pt. Pt voided-urine collected. Pre-op teaching and check list not complete at this time. LR sent with pt.

## 2018-02-26 NOTE — ANESTHESIA CARE TRANSFER NOTE
Patient: Ayanna Davidson    Procedure(s):  INCISION AND DRAINAGE LABIAL ABSCESS - Wound Class: II-Clean Contaminated    Diagnosis: labial abcess  Diagnosis Additional Information: No value filed.    Anesthesia Type:   MAC     Note:  Airway :Face Mask  Patient transferred to:PACU  Handoff Report: Identifed the Patient, Identified the Reponsible Provider, Reviewed the pertinent medical history, Discussed the surgical course, Reviewed Intra-OP anesthesia mangement and issues during anesthesia, Set expectations for post-procedure period and Allowed opportunity for questions and acknowledgement of understanding      Vitals: (Last set prior to Anesthesia Care Transfer)    CRNA VITALS  2/26/2018 1236 - 2/26/2018 1336      2/26/2018             SpO2: 93 %                Electronically Signed By: LEXX Rondon CRNA  February 26, 2018  2:09 PM

## 2018-02-26 NOTE — CONSULTS
[facility] Surgery Consult    Ayanna Davidson MRN# [id number]   Age: 27 year old YOB: 1990     Date of Admission:  2/26/2018    Home clinic: [facility]  Primary care provider: No Ref-Primary, Physician     Consult requested by Dr. Doshi    Reason for consultation - Left labial abscess           Impression and Plan:   Impression:   Left Labial Abscess,  There does not appear to be a rectal component.      Plan:   Pt with left labial abscess.  Will take to OR for I&D.  Does not appear to be rectal in origin.  The procedure, risks, benefits and alternatives were discussed and she agrees to proceed.             Chief Complaint:   Left Labial Pain x4 days    History is obtained from the patient          History of Present Illness:   This 27 year old WF who developed left labial pain 4 days ago.  The labia has been slowly enlarging since then.  Was seen at an outside ER and started on abx but has not improved.  Presented to our ER this AM.  On exam was found to have large left labial abscess for which I am asked to see her.  She has a hx of multiple cutaneous infections as well as labial infections.  Last I&d of left labia about 1 year ago in [place].  Has not seen a GYN.   I am now asked her for a left labial abscess.  Denies fevers, chills, vaginal DC or rectal pain. Has hx of MRSA.  I am now asked to see her for a left labial abscess.             Past Medical History:   History reviewed. No pertinent past medical history.         Past Surgical History:   Lap Band  Lap Yoon  Previous multiple I&Ds           Social History:     Social History   Substance Use Topics     Smoking status: Current Every Day Smoker     Packs/day: 0.50     Smokeless tobacco: Current User      Comment: uses E cig     Alcohol use No            Family History:   History reviewed. No pertinent family history.         Immunizations:     VACCINE/DOSE   Diptheria   DPT   DTAP   HBIG   Hepatitis A   Hepatitis B    HIB   Influenza   Measles   Meningococcal   MMR   Mumps   Pneumococcal   Polio   Rubella   Small Pox   TDAP   Varicella   Zoster            Allergies:   No Known Allergies         Medications:     Current Facility-Administered Medications   Medication     [Auto Hold] HYDROmorphone (PF) (DILAUDID) injection 0.5 mg     sodium chloride 0.9% infusion     lidocaine (LMX4) kit     sodium chloride (PF) 0.9% PF flush 10-20 mL     lactated ringers infusion     Patient RECEIVING antibiotic to treat a different condition and it provides ADEQUATE COVERAGE for this surgical procedure.     lactated ringers infusion     lactated ringers infusion     ondansetron (ZOFRAN-ODT) ODT tab 4 mg    Or     ondansetron (ZOFRAN) injection 4 mg     prochlorperazine (COMPAZINE) injection 10 mg     meperidine (DEMEROL) injection 12.5 mg     naloxone (NARCAN) injection 0.1-0.4 mg     fentaNYL (PF) (SUBLIMAZE) injection 25-50 mcg     dimenhyDRINATE (DRAMAMINE) injection 25 mg             Review of Systems:   The review of systems was positive for the following findings.  Left labia.  The remainder of the review of systems was unremarkable.          Physical Exam:   PE:  B/P: 117/76, T: 98.8, P: 119, R: 18  General: well developed, well nourished WF who appears their stated age  HEENT: NC/AT, EOMI, (-)icterus, (-)injection  Neck: Supple, No JVD  Chest: CTA  Heart: S1, S2, (-)m/r/g  Abd: Soft, non tender, non distended   - Large fluctuant left labia, tender.  Ext; Warm, no edema  Psych: AAOx3  Neuro: No focal deficits            Data:   All laboratory data reviewed  Results for orders placed or performed during the hospital encounter of 02/26/18   US Abdomen Limited    Narrative    ULTRASOUND ABDOMEN LIMITED   2/26/2018 8:58 AM     HISTORY: Vulvar abscess.    COMPARISON: None.    FINDINGS: There is a complex fluid collection in the left  labial/vulvar region, suspicious for abscess. This complex fluid  collection is located 0.2 cm below the skin  surface and measures 3.8 x  2.9 x 4.5 cm.       Impression    IMPRESSION: Left labial/vulvar complex fluid collection measures up to  4.5 cm in greatest dimension and is suspicious for abscess.     PABLO BUSBY MD   Basic metabolic panel   Result Value Ref Range    Sodium 138 133 - 144 mmol/L    Potassium 4.0 3.4 - 5.3 mmol/L    Chloride 103 94 - 109 mmol/L    Carbon Dioxide 27 20 - 32 mmol/L    Anion Gap 8 3 - 14 mmol/L    Glucose 108 (H) 70 - 99 mg/dL    Urea Nitrogen 8 7 - 30 mg/dL    Creatinine 0.77 0.52 - 1.04 mg/dL    GFR Estimate 88 >60 mL/min/1.7m2    GFR Estimate If Black >90 >60 mL/min/1.7m2    Calcium 9.0 8.5 - 10.1 mg/dL   CBC with platelets differential   Result Value Ref Range    WBC 16.3 (H) 4.0 - 11.0 10e9/L    RBC Count 4.39 3.8 - 5.2 10e12/L    Hemoglobin 12.8 11.7 - 15.7 g/dL    Hematocrit 40.1 35.0 - 47.0 %    MCV 91 78 - 100 fl    MCH 29.2 26.5 - 33.0 pg    MCHC 31.9 31.5 - 36.5 g/dL    RDW 13.8 10.0 - 15.0 %    Platelet Count 308 150 - 450 10e9/L    Diff Method Automated Method     % Neutrophils 84.0 %    % Lymphocytes 7.9 %    % Monocytes 6.4 %    % Eosinophils 1.4 %    % Basophils 0.1 %    % Immature Granulocytes 0.2 %    Absolute Neutrophil 13.7 (H) 1.6 - 8.3 10e9/L    Absolute Lymphocytes 1.3 0.8 - 5.3 10e9/L    Absolute Monocytes 1.0 0.0 - 1.3 10e9/L    Absolute Eosinophils 0.2 0.0 - 0.7 10e9/L    Absolute Basophils 0.0 0.0 - 0.2 10e9/L    Abs Immature Granulocytes 0.0 0 - 0.4 10e9/L   HCG quantitative pregnancy (blood)   Result Value Ref Range    HCG Quantitative Serum <1 0 - 5 IU/L     All imaging studies reviewed by me.     Jase Beach MD, FACS

## 2018-02-27 NOTE — ADDENDUM NOTE
Addendum  created 02/26/18 2027 by Caleb Banks APRN CRNA    Anesthesia Event deleted, Anesthesia Event edited, Procedure Event Log accessed, Sign clinical note

## 2018-02-28 ENCOUNTER — TELEPHONE (OUTPATIENT)
Dept: SURGERY | Facility: CLINIC | Age: 28
End: 2018-02-28

## 2018-02-28 LAB
BACTERIA SPEC CULT: ABNORMAL
Lab: ABNORMAL
SPECIMEN SOURCE: ABNORMAL

## 2018-02-28 NOTE — TELEPHONE ENCOUNTER
"Pt's Mom called, They were to take the wound packing out. Pt showered and sat in tub late last night. Mom removed the packing but states she only got approx 6 inches out and that there is more gauze in the wound that she cannot get out. Pt was \"screaming\" during the whole process. \"It is huge hole\"  I called Dr. RAYNE Beach. He states he put in 1 long piece of gauze more than 6 inches. If they really feel there is more in the wound they will need to come to the ED to be assessed.  I recalled Mom, Pt is sleeping. I instructed her to have pt take a bath, when she is out take a flashlight and look in wound. The wound will be bloody and that is normal, there may be some white color too. If she is positive that there is packing left in then pt needs to come to the ED to let them have a look. Mom states understanding and agrees. OLVIN Hickey     "

## 2018-03-01 ENCOUNTER — OFFICE VISIT (OUTPATIENT)
Dept: SURGERY | Facility: CLINIC | Age: 28
End: 2018-03-01
Payer: COMMERCIAL

## 2018-03-01 VITALS — WEIGHT: 182.6 LBS | HEART RATE: 93 BPM | OXYGEN SATURATION: 99 % | TEMPERATURE: 97.2 F

## 2018-03-01 DIAGNOSIS — Z98.890 POST-OPERATIVE STATE: Primary | ICD-10-CM

## 2018-03-01 PROCEDURE — 99024 POSTOP FOLLOW-UP VISIT: CPT | Performed by: SPECIALIST

## 2018-03-01 NOTE — PATIENT INSTRUCTIONS
SMOKING CESSATION  What's in cigarette smoke? - Cigarette smoke contains over 4,000 chemicals. Nicotine is one of the main ingredients which is an insecticide/herbicide. It is poisonous to our nervous system, increases blood clotting risk, and decreases the body's defenses to fight off infection. Another chemical is Carbon Monoxide is an asphyxiating gas that permanently binds to blood cells and blocks the transport of oxygen. This leads to tissue death and decreases your metabolism. Tar is a chemical that coats your lungs and trachea which impairs new oxygen coming in and carbon dioxide getting out of your body.   How does smoking impact surgery? - Smoking is particularly hazardous with regards to surgery. Surgery puts stress on the body and a smoker's body is already under strain from these chemicals. Putting the two together, especially for an elective surgery, could be a recipe for disaster. Smoking before and after surgery increases your risk of heart problems, slow wound healing, delayed bone healing, blood clots, wound infection and anesthesia complications.   What are the benefits of quitting? - In 20 minutes your blood pressure will drop back down to normal. In 8 hours the carbon monoxide (a toxic gas) levels in your blood stream will drop by half, and oxygen levels will return to normal. In 48 hours your chance of having a heart attack will have decreased. All nicotine will have left your body. Your sense of taste and smell will return to a normal level. In 72 hours your bronchial tubes will relax, and your energy levels will increase. In 2 weeks your circulation will increase, and it will continue to improve for the next 10 weeks.    Recommendations for elective surgery - Ideally, patients should quit smoking 8 weeks before and at least 2 weeks after elective surgery in order to avoid complications. Simply cutting back on the amount of cigarettes smoked per day does not offer any benefit or decrease the  risk of poor wound healing, heart problems, and infection. Smokers should also start taking Vitamin C and B for two weeks before surgery and two weeks after surgery.    Ways to Stop Smokin. Nicotine patches, lozenges, or gum  2. Support Groups  3. Medications (see below)    List of Medications:  1. Varenicline Tartrate (CHANTIX)   2. Bupropion HCL (WELLBUTRIN, ZYBAN) - note: make sure Wellbutrin is for smoking cessation and not other issues   3. Nicotine Patch (NICODERM)   4. Nicotine Inhaler (NICOTROL)   5. Nicotine Gum Nicotine Polacrilex   6. Nicotine Lozenge: Nicotine Polacrilex (COMMIT)   * [facility] offers a smoking support group as well!  Please visit: [email or web address]  If you are interested in these, ask about getting a prescription or talk to your primary care doctor about what may be the best way for you to quit.

## 2018-03-01 NOTE — MR AVS SNAPSHOT
After Visit Summary   3/1/2018    Ayanna Davidson    MRN: [id number]           Patient Information     Date Of Birth          1990        Visit Information        Provider Department      3/1/2018 11:00 AM Jase Beach MD [facility]        Today's Diagnoses     Post-operative state    -  1      Care Instructions    SMOKING CESSATION  What's in cigarette smoke? - Cigarette smoke contains over 4,000 chemicals. Nicotine is one of the main ingredients which is an insecticide/herbicide. It is poisonous to our nervous system, increases blood clotting risk, and decreases the body's defenses to fight off infection. Another chemical is Carbon Monoxide is an asphyxiating gas that permanently binds to blood cells and blocks the transport of oxygen. This leads to tissue death and decreases your metabolism. Tar is a chemical that coats your lungs and trachea which impairs new oxygen coming in and carbon dioxide getting out of your body.   How does smoking impact surgery? - Smoking is particularly hazardous with regards to surgery. Surgery puts stress on the body and a smoker's body is already under strain from these chemicals. Putting the two together, especially for an elective surgery, could be a recipe for disaster. Smoking before and after surgery increases your risk of heart problems, slow wound healing, delayed bone healing, blood clots, wound infection and anesthesia complications.   What are the benefits of quitting? - In 20 minutes your blood pressure will drop back down to normal. In 8 hours the carbon monoxide (a toxic gas) levels in your blood stream will drop by half, and oxygen levels will return to normal. In 48 hours your chance of having a heart attack will have decreased. All nicotine will have left your body. Your sense of taste and smell will return to a normal level. In 72 hours your bronchial tubes will relax, and your energy levels will increase. In 2 weeks your  circulation will increase, and it will continue to improve for the next 10 weeks.    Recommendations for elective surgery - Ideally, patients should quit smoking 8 weeks before and at least 2 weeks after elective surgery in order to avoid complications. Simply cutting back on the amount of cigarettes smoked per day does not offer any benefit or decrease the risk of poor wound healing, heart problems, and infection. Smokers should also start taking Vitamin C and B for two weeks before surgery and two weeks after surgery.    Ways to Stop Smokin. Nicotine patches, lozenges, or gum  2. Support Groups  3. Medications (see below)    List of Medications:  1. Varenicline Tartrate (CHANTIX)   2. Bupropion HCL (WELLBUTRIN, ZYBAN) - note: make sure Wellbutrin is for smoking cessation and not other issues   3. Nicotine Patch (NICODERM)   4. Nicotine Inhaler (NICOTROL)   5. Nicotine Gum Nicotine Polacrilex   6. Nicotine Lozenge: Nicotine Polacrilex (COMMIT)   * [facility] offers a smoking support group as well!  Please visit: [email or web address]  If you are interested in these, ask about getting a prescription or talk to your primary care doctor about what may be the best way for you to quit.                 Follow-ups after your visit        Your next 10 appointments already scheduled     Mar 05, 2018  2:45 PM CST   Return Visit with Jase Beach MD   [facility]    [address] [place] [id number] [phone]              Who to contact     If you have questions or need follow up information about today's clinic visit or your schedule please contact [facility] directly at [phone].  Normal or non-critical lab and imaging results will be communicated to you by MyChart, letter or phone within 4 business days after the clinic has received the results. If you do not hear from us within 7 days, please contact the clinic through  "[employer] or phone. If you have a critical or abnormal lab result, we will notify you by phone as soon as possible.  Submit refill requests through [employer] or call your pharmacy and they will forward the refill request to us. Please allow 3 business days for your refill to be completed.          Additional Information About Your Visit        [employer] Information     Loopt lets you send messages to your doctor, view your test results, renew your prescriptions, schedule appointments and more. To sign up, go to [email or web address] . Click on \"Log in\" on the left side of the screen, which will take you to the Welcome page. Then click on \"Sign up Now\" on the right side of the page.     You will be asked to enter the access code listed below, as well as some personal information. Please follow the directions to create your username and password.     Your access code is: [email or web address]  Expires: 2018  1:39 PM     Your access code will  in 90 days. If you need help or a new code, please call your [facility] clinic or [phone].        Care EveryWhere ID     This is your Care EveryWhere ID. This could be used by other organizations to access your [facility] medical records  [id number]        Your Vitals Were     Pulse Temperature Pulse Oximetry             93 97.2  F (36.2  C) (Temporal) 99%          Blood Pressure from Last 3 Encounters:   18 (!) 140/99    Weight from Last 3 Encounters:   18 82.8 kg (182 lb 9.6 oz)   18 77.1 kg (170 lb)              Today, you had the following     No orders found for display       Primary Care Provider Fax #    Physician No Ref-Primary [phone]       No address on file        Equal Access to Services     ERNIE SAGASTUME : Hadii claudia Barlow, wasebasda luqadaha, qaybta kaalmada adesyedayada, harlan thompson. So [facility] [phone].    ATENCIÓN: Si habla español, tiene a calvert disposición servicios gratuitos de asistencia lingüística. " Neli dunham [phone].    We comply with applicable federal civil rights laws and [place] laws. We do not discriminate on the basis of race, color, national origin, age, disability, sex, sexual orientation, or gender identity.            Thank you!     Thank you for choosing [facility]  for your care. Our goal is always to provide you with excellent care. Hearing back from our patients is one way we can continue to improve our services. Please take a few minutes to complete the written survey that you may receive in the mail after your visit with us. Thank you!             Your Updated Medication List - Protect others around you: Learn how to safely use, store and throw away your medicines at www.disposemymeds.org.          This list is accurate as of 3/1/18 11:16 AM.  Always use your most recent med list.                   Brand Name Dispense Instructions for use Diagnosis    CLINDAMYCIN HCL PO      Take 150 mg by mouth        cyanocobalamin 1000 MCG/ML injection    VITAMIN B12     Inject 1 mL into the muscle every 30 days        mupirocin 2 % ointment    BACTROBAN     Apply topically 3 times daily        norethindrone-ethinyl estradiol 1-35 MG-MCG per tablet    ORTHO-NOVUM 1-35 TAB,NORTREL 1-35 TAB     Take 1 tablet by mouth daily        OMEPRAZOLE PO      Take 20 mg by mouth        OXYCODONE HCL PO      Take 5 mg by mouth every 4 hours as needed        * oxyCODONE-acetaminophen 5-325 MG per tablet    PERCOCET    30 tablet    Take 1-2 tablets by mouth every 6 hours as needed for pain (moderate to severe)    Post-op pain       * oxyCODONE-acetaminophen 5-325 MG per tablet    PERCOCET     Take 1 tablet by mouth every 6 hours as needed for moderate to severe pain        TRAMADOL HCL PO      Take 50 mg by mouth        TYLENOL PO      Take 500 mg by mouth every 4 hours as needed for mild pain or fever        WELLBUTRIN XL PO      Take 150 mg by mouth daily        ZOFRAN ODT PO      Take 4 mg by mouth  every 8 hours as needed for nausea        * Notice:  This list has 2 medication(s) that are the same as other medications prescribed for you. Read the directions carefully, and ask your doctor or other care provider to review them with you.

## 2018-03-01 NOTE — LETTER
3/1/2018         RE: Ayanna Davidson  [id number]  [place] [address]        Dear Colleague,    Thank you for referring your patient, Ayanna Davidson, to the [facility]. Please see a copy of my visit note below.    F/U for labial abscess    Subjective:  Pt is s/p I&D of a large left labial abscess Monday.  Was supposed to removed packing Tuesday.  Mom Was unsure if all packing was removed.  Was supposed to goto  yesterday - did not go.  Showed up in clinic today.        Objective:  B/P: Data Unavailable, T: 97.2, P: 93, R: Data Unavailable   - I&D site clean.  All packing was removed Tuesday.  No residual packing      Assessment/Plan:  Pt s/p I&D of left labial abscess.  Wound very clean.  No residual packing.  Cont BID sitz baths.  F/U with me as scheduled.    Jase Beach MD, FACS    Again, thank you for allowing me to participate in the care of your patient.        Sincerely,        Jase Beach MD

## 2018-03-01 NOTE — NURSING NOTE
Chief Complaint   Patient presents with     Surgical Followup       Initial Pulse 93  Temp 97.2  F (36.2  C) (Temporal)  Wt 82.8 kg (182 lb 9.6 oz)  SpO2 99% There is no height or weight on file to calculate BMI.  Medication Reconciliation: complete

## 2018-03-01 NOTE — PROGRESS NOTES
F/U for labial abscess    Subjective:  Pt is s/p I&D of a large left labial abscess Monday.  Was supposed to removed packing Tuesday.  Mom Was unsure if all packing was removed.  Was supposed to goto ER yesterday - did not go.  Showed up in clinic today.        Objective:  B/P: Data Unavailable, T: 97.2, P: 93, R: Data Unavailable   - I&D site clean.  All packing was removed Tuesday.  No residual packing      Assessment/Plan:  Pt s/p I&D of left labial abscess.  Wound very clean.  No residual packing.  Cont BID sitz baths.  F/U with me as scheduled.    Jase Beach MD, FACS

## 2018-03-05 LAB
BACTERIA SPEC CULT: ABNORMAL
BACTERIA SPEC CULT: ABNORMAL
Lab: ABNORMAL
SPECIMEN SOURCE: ABNORMAL

## 2018-07-02 ENCOUNTER — HOSPITAL ENCOUNTER (EMERGENCY)
Facility: CLINIC | Age: 28
Discharge: HOME | End: 2018-07-02
Attending: EMERGENCY MEDICINE | Admitting: EMERGENCY MEDICINE
Payer: COMMERCIAL

## 2018-07-02 VITALS
HEIGHT: 66 IN | WEIGHT: 181 LBS | SYSTOLIC BLOOD PRESSURE: 135 MMHG | HEART RATE: 101 BPM | DIASTOLIC BLOOD PRESSURE: 87 MMHG | RESPIRATION RATE: 18 BRPM | OXYGEN SATURATION: 97 % | BODY MASS INDEX: 29.09 KG/M2 | TEMPERATURE: 98.7 F

## 2018-07-02 DIAGNOSIS — J34.89 INFECTED LESION IN NOSE: ICD-10-CM

## 2018-07-02 PROCEDURE — 99284 EMERGENCY DEPT VISIT MOD MDM: CPT | Mod: Z6 | Performed by: EMERGENCY MEDICINE

## 2018-07-02 PROCEDURE — 99282 EMERGENCY DEPT VISIT SF MDM: CPT | Performed by: EMERGENCY MEDICINE

## 2018-07-02 NOTE — DISCHARGE INSTRUCTIONS
Return if new or worsening symptoms.  Follow-up with primary care regarding your MRSA carrier state.

## 2018-07-02 NOTE — ED PROVIDER NOTES
History     Chief Complaint   Patient presents with     Wound Check     The history is provided by the patient.     Ayanna Davidson is a 27 year old female who presents to the emergency department for a wound check. Patient reports redness and tenderness in her left nostril for 2 days. She states she has a history of MRSA in that same nostril. Patient denies fever, chills, nausea or vomiting. Patient reports she has a history of cellulitis, staph and MRSA.    Problem List:    There are no active problems to display for this patient.       Past Medical History:    Past Medical History:   Diagnosis Date     Gastroesophageal reflux disease        Past Surgical History:    Past Surgical History:   Procedure Laterality Date     BREAST SURGERY       CHOLECYSTECTOMY       ENT SURGERY       INCISION AND DRAINAGE ABSCESS PELVIS, COMBINED N/A 2/26/2018    Procedure: COMBINED INCISION AND DRAINAGE ABSCESS PELVIS;  INCISION AND DRAINAGE LABIAL ABSCESS;  Surgeon: Jase Beach MD;  Location: [facility] OR     ORTHOPEDIC SURGERY         Family History:    No family history on file.    Social History:  Marital Status:  Single [1]  Social History   Substance Use Topics     Smoking status: Current Every Day Smoker     Packs/day: 0.50     Smokeless tobacco: Current User      Comment: uses E cig     Alcohol use No        Medications:      mupirocin (BACTROBAN NASAL) 2 % nasal ointment   Acetaminophen (TYLENOL PO)   BuPROPion HCl (WELLBUTRIN XL PO)   CLINDAMYCIN HCL PO   cyanocobalamin (VITAMIN B12) 1000 MCG/ML injection   mupirocin (BACTROBAN) 2 % ointment   norethindrone-ethinyl estradiol (ORTHO-NOVUM 1-35 TAB,NORTREL 1-35 TAB) 1-35 MG-MCG per tablet   OMEPRAZOLE PO   Ondansetron (ZOFRAN ODT PO)   OXYCODONE HCL PO   oxyCODONE-acetaminophen (PERCOCET) 5-325 MG per tablet   oxyCODONE-acetaminophen (PERCOCET) 5-325 MG per tablet   TRAMADOL HCL PO         Review of Systems   All other systems reviewed and are negative.      Physical Exam  "  BP: 135/87  Pulse: 101  Temp: 98.7  F (37.1  C)  Resp: 18  Height: 167.6 cm (5' 6\")  Weight: 82.1 kg (181 lb)  SpO2: 97 %      Physical Exam   Constitutional: She is oriented to person, place, and time. She appears well-developed and well-nourished. No distress.   HENT:   Head: Normocephalic and atraumatic.   Right Ear: External ear normal.   Erythematous left nare diffusely without any evidence of abscesses or fluid collection   Eyes: EOM are normal. Right eye exhibits no discharge. Left eye exhibits no discharge. No scleral icterus.   Neck: Normal range of motion. Neck supple.   Cardiovascular: Normal rate.    Neurological: She is alert and oriented to person, place, and time.   Skin: Skin is warm and dry. No rash noted. She is not diaphoretic. No erythema. No pallor.   Psychiatric: She has a normal mood and affect.       ED Course     ED Course     Procedures                   No results found for this or any previous visit (from the past 24 hour(s)).    Medications - No data to display    Assessments & Plan (with Medical Decision Making)  Probable mild infection of the left nare, will treat with mupirocin ointment.  Recommended follow-up with primary care for further evaluation and follow-up.  Return precautions discussed     I have reviewed the nursing notes.    I have reviewed the findings, diagnosis, plan and need for follow up with the patient.      Discharge Medication List as of 7/2/2018  6:34 PM      START taking these medications    Details   !! mupirocin (BACTROBAN NASAL) 2 % nasal ointment Apply 1 g into each nare 2 times daily for 5 daysDisp-10 g, [id number]       !! - Potential duplicate medications found. Please discuss with provider.          Final diagnoses:   Infected lesion in nose     This document serves as a record of services personally performed by Eduardo Nogueira MD. It was created on their behalf by Sherie Otto, a trained medical scribe. The creation of this record is based on the " provider's personal observations and the statements of the patient. This document has been checked and approved by the attending provider.    Note: Chart documentation done in part with Dragon Voice Recognition software. Although reviewed after completion, some word and grammatical errors may remain.    7/2/2018   [facility] EMERGENCY DEPARTMENT     Thierno Nogueira MD  07/02/18 [id number]

## 2018-07-02 NOTE — ED AVS SNAPSHOT
[facility] Emergency Department    [address] [place] [address]    Phone:  [phone]    Fax:  [phone]                                       Ayanna Davidson   MRN: [id number]    Department:  [facility] Emergency Department   Date of Visit:  7/2/2018           After Visit Summary Signature Page     I have received my discharge instructions, and my questions have been answered. I have discussed any challenges I see with this plan with the nurse or doctor.    ..........................................................................................................................................  Patient/Patient Representative Signature      ..........................................................................................................................................  Patient Representative Print Name and Relationship to Patient    ..................................................               ................................................  Date                                            Time    ..........................................................................................................................................  Reviewed by Signature/Title    ...................................................              ..............................................  Date                                                            Time

## 2018-07-02 NOTE — ED AVS SNAPSHOT
[facility] Emergency Department    [address] [place] [address]    Phone:  [phone]    Fax:  [phone]                                       Ayanna Davidson   MRN: [id number]    Department:  [facility] Emergency Department   Date of Visit:  7/2/2018           Patient Information     Date Of Birth          1990        Your diagnoses for this visit were:     Infected lesion in nose        You were seen by Thierno Nogueira MD.      Follow-up Information     Schedule an appointment as soon as possible for a visit with No Ref-Primary, Physician.    Why:  As planned        Discharge Instructions       Return if new or worsening symptoms.  Follow-up with primary care regarding your MRSA carrier state.    24 Hour Appointment Hotline       To make an appointment at any [facility] clinic, call [phone] ([phone]). If you don't have a family doctor or clinic, we will help you find one. [facility] clinics are conveniently located to serve the needs of you and your family.             Review of your medicines      START taking        Dose / Directions Last dose taken    mupirocin 2 % nasal ointment   Commonly known as:  BACTROBAN NASAL   Dose:  1 g   Quantity:  10 g        Apply 1 g into each nare 2 times daily for 5 days   Refills:  0          Our records show that you are taking the medicines listed below. If these are incorrect, please call your family doctor or clinic.        Dose / Directions Last dose taken    CLINDAMYCIN HCL PO   Dose:  150 mg        Take 150 mg by mouth   Refills:  0        cyanocobalamin 1000 MCG/ML injection   Commonly known as:  VITAMIN B12   Dose:  1 mL        Inject 1 mL into the muscle every 30 days   Refills:  0        mupirocin 2 % ointment   Commonly known as:  BACTROBAN        Apply topically 3 times daily   Refills:  0        norethindrone-ethinyl estradiol 1-35 MG-MCG per tablet   Commonly known as:  ORTHO-NOVUM 1-35 TAB,NORTREL 1-35  TAB   Dose:  1 tablet        Take 1 tablet by mouth daily   Refills:  0        OMEPRAZOLE PO   Dose:  20 mg        Take 20 mg by mouth   Refills:  0        OXYCODONE HCL PO   Dose:  5 mg        Take 5 mg by mouth every 4 hours as needed   Refills:  0        * oxyCODONE-acetaminophen 5-325 MG per tablet   Commonly known as:  PERCOCET   Dose:  1-2 tablet   Quantity:  30 tablet        Take 1-2 tablets by mouth every 6 hours as needed for pain (moderate to severe)   Refills:  0        * oxyCODONE-acetaminophen 5-325 MG per tablet   Commonly known as:  PERCOCET   Dose:  1 tablet        Take 1 tablet by mouth every 6 hours as needed for moderate to severe pain   Refills:  0        TRAMADOL HCL PO   Dose:  50 mg        Take 50 mg by mouth   Refills:  0        TYLENOL PO   Dose:  500 mg        Take 500 mg by mouth every 4 hours as needed for mild pain or fever   Refills:  0        WELLBUTRIN XL PO   Dose:  150 mg        Take 150 mg by mouth daily   Refills:  0        ZOFRAN ODT PO   Dose:  4 mg        Take 4 mg by mouth every 8 hours as needed for nausea   Refills:  0        * Notice:  This list has 2 medication(s) that are the same as other medications prescribed for you. Read the directions carefully, and ask your doctor or other care provider to review them with you.            Prescriptions were sent or printed at these locations (1 Prescription)                   [facility] [place] - [address] [facility]    [address] [place] [address]    Telephone:  [phone]   Fax:  [phone]   Hours:                  E-Prescribed (1 of 1)         mupirocin (BACTROBAN NASAL) 2 % nasal ointment                Orders Needing Specimen Collection     None      Pending Results     No orders found from 6/30/2018 to 7/3/2018.            Pending Culture Results     No orders found from 6/30/2018 to 7/3/2018.            Pending Results Instructions     If you had any lab results that were not finalized at the  "time of your Discharge, you can call the ED Lab Result RN at [phone]. You will be contacted by this team for any positive Lab results or changes in treatment. The nurses are available 7 days a week from 10A to 6:30P.  You can leave a message 24 hours per day and they will return your call.        Thank you for choosing [facility]       Thank you for choosing [facility] for your care. Our goal is always to provide you with excellent care. Hearing back from our patients is one way we can continue to improve our services. Please take a few minutes to complete the written survey that you may receive in the mail after you visit with us. Thank you!        [employer] Interactive Information     [employer] lets you send messages to your doctor, view your test results, renew your prescriptions, schedule appointments and more. To sign up, go to [email or web address] . Click on \"Log in\" on the left side of the screen, which will take you to the Welcome page. Then click on \"Sign up Now\" on the right side of the page.     You will be asked to enter the access code listed below, as well as some personal information. Please follow the directions to create your username and password.     Your access code is: [email or web address]  Expires: 2018  6:34 PM     Your access code will  in 90 days. If you need help or a new code, please call your [facility] clinic or [phone].        Care EveryWhere ID     This is your Care EveryWhere ID. This could be used by other organizations to access your [facility] medical records  [id number]        Equal Access to Services     ERNIE SAGASTUME : Hadanna Barlow, waaxda luqadaha, qaybta kaalmada eli, harlan thompson. So [facility] [phone].    ATENCIÓN: Si habla español, tiene a calvert disposición servicios gratuitos de asistencia lingüística. Llame al [phone].    We comply with applicable federal civil rights laws and [place] laws. We do not discriminate on the " basis of race, color, national origin, age, disability, sex, sexual orientation, or gender identity.            After Visit Summary       This is your record. Keep this with you and show to your community pharmacist(s) and doctor(s) at your next visit.

## 2018-07-02 NOTE — ED NOTES
Patient here stating she thinks her MRSA is back to her nares. She also has a whole stack of papers, because she wants to transfer her care to this facility. Given a list of Drs from the [facility] so she can research who she wants. No acute problem.

## 2018-07-24 PROBLEM — N76.4 VULVAR ABSCESS: Status: ACTIVE | Noted: 2017-08-16

## 2019-01-11 ENCOUNTER — TELEPHONE (OUTPATIENT)
Dept: FAMILY MEDICINE | Facility: CLINIC | Age: 29
End: 2019-01-11

## 2019-01-11 NOTE — TELEPHONE ENCOUNTER
I have never seen this patient.  She needs to be seen in clinic and establish care with a primary care provider for prescription refills

## 2019-01-11 NOTE — TELEPHONE ENCOUNTER
Reason for Call:  Medication or medication refill:    Do you use a [facility]?  Name of the pharmacy and phone number for the current request:  patient didnt specify     Name of the medication requested: headache medication, x2    Other request: Patient missed apt and is scheduled for 1/17/2019 but would like to know about getting a re-fill before then    Can we leave a detailed message on this number? YES    Phone number patient can be reached at: Cell number on file:    Telephone Information:   Mobile [phone]       Best Time:anytime     Call taken on 1/11/2019 at 11:25 AM by Lisa Little

## 2019-01-11 NOTE — TELEPHONE ENCOUNTER
Called Ayanna and relayed message.  Ayanna states understanding and had no other questions or concerns.

## 2019-03-05 ENCOUNTER — ANESTHESIA EVENT (OUTPATIENT)
Dept: SURGERY | Facility: CLINIC | Age: 29
End: 2019-03-05
Payer: COMMERCIAL

## 2019-03-05 ENCOUNTER — HOSPITAL ENCOUNTER (OUTPATIENT)
Facility: CLINIC | Age: 29
Discharge: HOME | End: 2019-03-05
Attending: EMERGENCY MEDICINE | Admitting: SPECIALIST
Payer: COMMERCIAL

## 2019-03-05 ENCOUNTER — ANESTHESIA (OUTPATIENT)
Dept: SURGERY | Facility: CLINIC | Age: 29
End: 2019-03-05
Payer: COMMERCIAL

## 2019-03-05 ENCOUNTER — APPOINTMENT (OUTPATIENT)
Dept: ULTRASOUND IMAGING | Facility: CLINIC | Age: 29
End: 2019-03-05
Attending: EMERGENCY MEDICINE
Payer: COMMERCIAL

## 2019-03-05 VITALS
TEMPERATURE: 97.9 F | OXYGEN SATURATION: 96 % | SYSTOLIC BLOOD PRESSURE: 98 MMHG | WEIGHT: 170 LBS | HEART RATE: 86 BPM | BODY MASS INDEX: 27.44 KG/M2 | RESPIRATION RATE: 16 BRPM | DIASTOLIC BLOOD PRESSURE: 66 MMHG

## 2019-03-05 DIAGNOSIS — G89.18 POST-OP PAIN: Primary | ICD-10-CM

## 2019-03-05 DIAGNOSIS — N76.4 LABIAL ABSCESS: ICD-10-CM

## 2019-03-05 LAB
ALBUMIN UR-MCNC: 30 MG/DL
APPEARANCE UR: ABNORMAL
BACTERIA #/AREA URNS HPF: ABNORMAL /HPF
BILIRUB UR QL STRIP: NEGATIVE
COLOR UR AUTO: ABNORMAL
GLUCOSE UR STRIP-MCNC: NEGATIVE MG/DL
GRAM STN SPEC: ABNORMAL
GRAM STN SPEC: ABNORMAL
HCG UR QL: NEGATIVE
HGB UR QL STRIP: NEGATIVE
KETONES UR STRIP-MCNC: NEGATIVE MG/DL
LEUKOCYTE ESTERASE UR QL STRIP: ABNORMAL
Lab: ABNORMAL
MUCOUS THREADS #/AREA URNS LPF: PRESENT /LPF
NITRATE UR QL: NEGATIVE
PH UR STRIP: 5 PH (ref 5–7)
RBC #/AREA URNS AUTO: 8 /HPF (ref 0–2)
SOURCE: ABNORMAL
SP GR UR STRIP: 1.03 (ref 1–1.03)
SPECIMEN SOURCE: ABNORMAL
SQUAMOUS #/AREA URNS AUTO: 35 /HPF (ref 0–1)
UROBILINOGEN UR STRIP-MCNC: 0 MG/DL (ref 0–2)
WBC #/AREA URNS AUTO: 11 /HPF (ref 0–5)

## 2019-03-05 PROCEDURE — 25000125 ZZHC RX 250: Performed by: NURSE ANESTHETIST, CERTIFIED REGISTERED

## 2019-03-05 PROCEDURE — 76857 US EXAM PELVIC LIMITED: CPT

## 2019-03-05 PROCEDURE — 56405 I&D VULVA/PERINEAL ABSCESS: CPT | Performed by: SPECIALIST

## 2019-03-05 PROCEDURE — 87076 CULTURE ANAEROBE IDENT EACH: CPT | Performed by: SPECIALIST

## 2019-03-05 PROCEDURE — 87070 CULTURE OTHR SPECIMN AEROBIC: CPT | Performed by: SPECIALIST

## 2019-03-05 PROCEDURE — 36000058 ZZH SURGERY LEVEL 3 EA 15 ADDTL MIN: Performed by: SPECIALIST

## 2019-03-05 PROCEDURE — 25000128 H RX IP 250 OP 636: Performed by: NURSE ANESTHETIST, CERTIFIED REGISTERED

## 2019-03-05 PROCEDURE — 99243 OFF/OP CNSLTJ NEW/EST LOW 30: CPT | Mod: 57 | Performed by: SPECIALIST

## 2019-03-05 PROCEDURE — 25800030 ZZH RX IP 258 OP 636: Performed by: EMERGENCY MEDICINE

## 2019-03-05 PROCEDURE — 96375 TX/PRO/DX INJ NEW DRUG ADDON: CPT | Mod: 59 | Performed by: EMERGENCY MEDICINE

## 2019-03-05 PROCEDURE — 25000128 H RX IP 250 OP 636: Performed by: EMERGENCY MEDICINE

## 2019-03-05 PROCEDURE — 87205 SMEAR GRAM STAIN: CPT | Performed by: SPECIALIST

## 2019-03-05 PROCEDURE — 37000009 ZZH ANESTHESIA TECHNICAL FEE, EACH ADDTL 15 MIN: Performed by: SPECIALIST

## 2019-03-05 PROCEDURE — 40000306 ZZH STATISTIC PRE PROC ASSESS II: Performed by: SPECIALIST

## 2019-03-05 PROCEDURE — 99285 EMERGENCY DEPT VISIT HI MDM: CPT | Mod: 25 | Performed by: EMERGENCY MEDICINE

## 2019-03-05 PROCEDURE — 96361 HYDRATE IV INFUSION ADD-ON: CPT | Performed by: EMERGENCY MEDICINE

## 2019-03-05 PROCEDURE — 87086 URINE CULTURE/COLONY COUNT: CPT | Performed by: EMERGENCY MEDICINE

## 2019-03-05 PROCEDURE — 96374 THER/PROPH/DIAG INJ IV PUSH: CPT | Performed by: EMERGENCY MEDICINE

## 2019-03-05 PROCEDURE — 25800030 ZZH RX IP 258 OP 636: Performed by: NURSE ANESTHETIST, CERTIFIED REGISTERED

## 2019-03-05 PROCEDURE — 25000132 ZZH RX MED GY IP 250 OP 250 PS 637: Performed by: SPECIALIST

## 2019-03-05 PROCEDURE — 96376 TX/PRO/DX INJ SAME DRUG ADON: CPT | Mod: 59 | Performed by: EMERGENCY MEDICINE

## 2019-03-05 PROCEDURE — 37000008 ZZH ANESTHESIA TECHNICAL FEE, 1ST 30 MIN: Performed by: SPECIALIST

## 2019-03-05 PROCEDURE — 81025 URINE PREGNANCY TEST: CPT | Performed by: EMERGENCY MEDICINE

## 2019-03-05 PROCEDURE — 99285 EMERGENCY DEPT VISIT HI MDM: CPT | Mod: Z6 | Performed by: EMERGENCY MEDICINE

## 2019-03-05 PROCEDURE — 81001 URINALYSIS AUTO W/SCOPE: CPT | Performed by: EMERGENCY MEDICINE

## 2019-03-05 PROCEDURE — 71000027 ZZH RECOVERY PHASE 2 EACH 15 MINS: Performed by: SPECIALIST

## 2019-03-05 PROCEDURE — 25000131 ZZH RX MED GY IP 250 OP 636 PS 637: Performed by: NURSE ANESTHETIST, CERTIFIED REGISTERED

## 2019-03-05 PROCEDURE — 25000128 H RX IP 250 OP 636: Performed by: SPECIALIST

## 2019-03-05 PROCEDURE — 36000056 ZZH SURGERY LEVEL 3 1ST 30 MIN: Performed by: SPECIALIST

## 2019-03-05 PROCEDURE — 87075 CULTR BACTERIA EXCEPT BLOOD: CPT | Performed by: SPECIALIST

## 2019-03-05 PROCEDURE — 25000125 ZZHC RX 250: Performed by: SPECIALIST

## 2019-03-05 RX ORDER — DIMENHYDRINATE 50 MG/ML
25 INJECTION, SOLUTION INTRAMUSCULAR; INTRAVENOUS
Status: DISCONTINUED | OUTPATIENT
Start: 2019-03-05 | End: 2019-03-05 | Stop reason: HOSPADM

## 2019-03-05 RX ORDER — SODIUM CHLORIDE 9 MG/ML
1000 INJECTION, SOLUTION INTRAVENOUS CONTINUOUS
Status: DISCONTINUED | OUTPATIENT
Start: 2019-03-05 | End: 2019-03-05 | Stop reason: HOSPADM

## 2019-03-05 RX ORDER — ONDANSETRON 2 MG/ML
4 INJECTION INTRAMUSCULAR; INTRAVENOUS EVERY 30 MIN PRN
Status: DISCONTINUED | OUTPATIENT
Start: 2019-03-05 | End: 2019-03-05 | Stop reason: HOSPADM

## 2019-03-05 RX ORDER — SODIUM CHLORIDE, SODIUM LACTATE, POTASSIUM CHLORIDE, CALCIUM CHLORIDE 600; 310; 30; 20 MG/100ML; MG/100ML; MG/100ML; MG/100ML
INJECTION, SOLUTION INTRAVENOUS CONTINUOUS
Status: DISCONTINUED | OUTPATIENT
Start: 2019-03-05 | End: 2019-03-05

## 2019-03-05 RX ORDER — FENTANYL CITRATE 50 UG/ML
INJECTION, SOLUTION INTRAMUSCULAR; INTRAVENOUS PRN
Status: DISCONTINUED | OUTPATIENT
Start: 2019-03-05 | End: 2019-03-05

## 2019-03-05 RX ORDER — LIDOCAINE HYDROCHLORIDE 20 MG/ML
INJECTION, SOLUTION INFILTRATION; PERINEURAL PRN
Status: DISCONTINUED | OUTPATIENT
Start: 2019-03-05 | End: 2019-03-05

## 2019-03-05 RX ORDER — PROPOFOL 10 MG/ML
INJECTION, EMULSION INTRAVENOUS CONTINUOUS PRN
Status: DISCONTINUED | OUTPATIENT
Start: 2019-03-05 | End: 2019-03-05

## 2019-03-05 RX ORDER — LIDOCAINE 40 MG/G
CREAM TOPICAL
Status: DISCONTINUED | OUTPATIENT
Start: 2019-03-05 | End: 2019-03-05 | Stop reason: HOSPADM

## 2019-03-05 RX ORDER — HYDROMORPHONE HYDROCHLORIDE 1 MG/ML
0.5 INJECTION, SOLUTION INTRAMUSCULAR; INTRAVENOUS; SUBCUTANEOUS
Status: DISCONTINUED | OUTPATIENT
Start: 2019-03-05 | End: 2019-03-05 | Stop reason: HOSPADM

## 2019-03-05 RX ORDER — OXYCODONE AND ACETAMINOPHEN 5; 325 MG/1; MG/1
2 TABLET ORAL
Status: COMPLETED | OUTPATIENT
Start: 2019-03-05 | End: 2019-03-05

## 2019-03-05 RX ORDER — OXYCODONE AND ACETAMINOPHEN 5; 325 MG/1; MG/1
1-2 TABLET ORAL EVERY 6 HOURS PRN
Qty: 20 TABLET | Refills: 0 | Status: SHIPPED | OUTPATIENT
Start: 2019-03-05 | End: 2019-03-20

## 2019-03-05 RX ORDER — SODIUM CHLORIDE, SODIUM LACTATE, POTASSIUM CHLORIDE, CALCIUM CHLORIDE 600; 310; 30; 20 MG/100ML; MG/100ML; MG/100ML; MG/100ML
INJECTION, SOLUTION INTRAVENOUS CONTINUOUS
Status: DISCONTINUED | OUTPATIENT
Start: 2019-03-05 | End: 2019-03-05 | Stop reason: HOSPADM

## 2019-03-05 RX ORDER — HYDROMORPHONE HYDROCHLORIDE 1 MG/ML
.3-.5 INJECTION, SOLUTION INTRAMUSCULAR; INTRAVENOUS; SUBCUTANEOUS EVERY 10 MIN PRN
Status: DISCONTINUED | OUTPATIENT
Start: 2019-03-05 | End: 2019-03-05 | Stop reason: HOSPADM

## 2019-03-05 RX ORDER — MEPERIDINE HYDROCHLORIDE 25 MG/ML
12.5 INJECTION INTRAMUSCULAR; INTRAVENOUS; SUBCUTANEOUS
Status: DISCONTINUED | OUTPATIENT
Start: 2019-03-05 | End: 2019-03-05 | Stop reason: HOSPADM

## 2019-03-05 RX ORDER — ONDANSETRON 4 MG/1
4 TABLET, ORALLY DISINTEGRATING ORAL EVERY 30 MIN PRN
Status: DISCONTINUED | OUTPATIENT
Start: 2019-03-05 | End: 2019-03-05 | Stop reason: HOSPADM

## 2019-03-05 RX ORDER — FENTANYL CITRATE 50 UG/ML
50 INJECTION, SOLUTION INTRAMUSCULAR; INTRAVENOUS ONCE
Status: COMPLETED | OUTPATIENT
Start: 2019-03-05 | End: 2019-03-05

## 2019-03-05 RX ORDER — NALOXONE HYDROCHLORIDE 0.4 MG/ML
.1-.4 INJECTION, SOLUTION INTRAMUSCULAR; INTRAVENOUS; SUBCUTANEOUS
Status: DISCONTINUED | OUTPATIENT
Start: 2019-03-05 | End: 2019-03-05 | Stop reason: HOSPADM

## 2019-03-05 RX ORDER — IPRATROPIUM BROMIDE AND ALBUTEROL SULFATE 2.5; .5 MG/3ML; MG/3ML
3 SOLUTION RESPIRATORY (INHALATION) ONCE
Status: COMPLETED | OUTPATIENT
Start: 2019-03-05 | End: 2019-03-05

## 2019-03-05 RX ORDER — ONDANSETRON 2 MG/ML
4 INJECTION INTRAMUSCULAR; INTRAVENOUS EVERY 30 MIN PRN
Status: COMPLETED | OUTPATIENT
Start: 2019-03-05 | End: 2019-03-05

## 2019-03-05 RX ORDER — DIPHENHYDRAMINE HYDROCHLORIDE 50 MG/ML
50 INJECTION INTRAMUSCULAR; INTRAVENOUS ONCE
Status: COMPLETED | OUTPATIENT
Start: 2019-03-05 | End: 2019-03-05

## 2019-03-05 RX ORDER — KETOROLAC TROMETHAMINE 30 MG/ML
30 INJECTION, SOLUTION INTRAMUSCULAR; INTRAVENOUS ONCE
Status: COMPLETED | OUTPATIENT
Start: 2019-03-05 | End: 2019-03-05

## 2019-03-05 RX ORDER — FENTANYL CITRATE 50 UG/ML
25-50 INJECTION, SOLUTION INTRAMUSCULAR; INTRAVENOUS
Status: CANCELLED | OUTPATIENT
Start: 2019-03-05

## 2019-03-05 RX ORDER — SERTRALINE HYDROCHLORIDE 25 MG/1
25 TABLET, FILM COATED ORAL DAILY
COMMUNITY
End: 2019-03-12

## 2019-03-05 RX ORDER — PROPOFOL 10 MG/ML
INJECTION, EMULSION INTRAVENOUS PRN
Status: DISCONTINUED | OUTPATIENT
Start: 2019-03-05 | End: 2019-03-05

## 2019-03-05 RX ORDER — ALBUTEROL SULFATE 0.83 MG/ML
2.5 SOLUTION RESPIRATORY (INHALATION) EVERY 4 HOURS PRN
Status: DISCONTINUED | OUTPATIENT
Start: 2019-03-05 | End: 2019-03-05 | Stop reason: HOSPADM

## 2019-03-05 RX ADMIN — FENTANYL CITRATE 50 MCG: 50 INJECTION INTRAMUSCULAR; INTRAVENOUS at 15:35

## 2019-03-05 RX ADMIN — OXYCODONE HYDROCHLORIDE AND ACETAMINOPHEN 2 TABLET: 5; 325 TABLET ORAL at 18:51

## 2019-03-05 RX ADMIN — MIDAZOLAM 2 MG: 1 INJECTION INTRAMUSCULAR; INTRAVENOUS at 16:57

## 2019-03-05 RX ADMIN — ONDANSETRON 4 MG: 2 INJECTION INTRAMUSCULAR; INTRAVENOUS at 11:29

## 2019-03-05 RX ADMIN — SODIUM CHLORIDE 1000 ML: 9 INJECTION, SOLUTION INTRAVENOUS at 11:29

## 2019-03-05 RX ADMIN — HYDROMORPHONE HYDROCHLORIDE 0.5 MG: 1 INJECTION, SOLUTION INTRAMUSCULAR; INTRAVENOUS; SUBCUTANEOUS at 11:32

## 2019-03-05 RX ADMIN — FENTANYL CITRATE 100 MCG: 50 INJECTION, SOLUTION INTRAMUSCULAR; INTRAVENOUS at 16:57

## 2019-03-05 RX ADMIN — PROPOFOL 150 MCG/KG/MIN: 10 INJECTION, EMULSION INTRAVENOUS at 17:04

## 2019-03-05 RX ADMIN — ONDANSETRON 4 MG: 2 INJECTION INTRAMUSCULAR; INTRAVENOUS at 09:36

## 2019-03-05 RX ADMIN — SODIUM CHLORIDE 1000 ML: 9 INJECTION, SOLUTION INTRAVENOUS at 09:36

## 2019-03-05 RX ADMIN — PROPOFOL 50 MG: 10 INJECTION, EMULSION INTRAVENOUS at 17:19

## 2019-03-05 RX ADMIN — SODIUM CHLORIDE, POTASSIUM CHLORIDE, SODIUM LACTATE AND CALCIUM CHLORIDE: 600; 310; 30; 20 INJECTION, SOLUTION INTRAVENOUS at 16:55

## 2019-03-05 RX ADMIN — HYDROMORPHONE HYDROCHLORIDE 0.5 MG: 1 INJECTION, SOLUTION INTRAMUSCULAR; INTRAVENOUS; SUBCUTANEOUS at 09:22

## 2019-03-05 RX ADMIN — DEXMEDETOMIDINE HYDROCHLORIDE 20 MCG: 100 INJECTION, SOLUTION INTRAVENOUS at 17:04

## 2019-03-05 RX ADMIN — PROPOFOL 50 MG: 10 INJECTION, EMULSION INTRAVENOUS at 17:04

## 2019-03-05 RX ADMIN — KETOROLAC TROMETHAMINE 30 MG: 30 INJECTION, SOLUTION INTRAMUSCULAR; INTRAVENOUS at 09:20

## 2019-03-05 RX ADMIN — LIDOCAINE HYDROCHLORIDE 60 MG: 20 INJECTION, SOLUTION INFILTRATION; PERINEURAL at 17:04

## 2019-03-05 RX ADMIN — SODIUM CHLORIDE, POTASSIUM CHLORIDE, SODIUM LACTATE AND CALCIUM CHLORIDE: 600; 310; 30; 20 INJECTION, SOLUTION INTRAVENOUS at 15:47

## 2019-03-05 RX ADMIN — IPRATROPIUM BROMIDE AND ALBUTEROL SULFATE 3 ML: .5; 3 SOLUTION RESPIRATORY (INHALATION) at 16:44

## 2019-03-05 RX ADMIN — ONDANSETRON 4 MG: 2 INJECTION INTRAMUSCULAR; INTRAVENOUS at 17:15

## 2019-03-05 RX ADMIN — ONDANSETRON 4 MG: 4 TABLET, ORALLY DISINTEGRATING ORAL at 18:55

## 2019-03-05 RX ADMIN — DIPHENHYDRAMINE HYDROCHLORIDE 50 MG: 50 INJECTION, SOLUTION INTRAMUSCULAR; INTRAVENOUS at 13:14

## 2019-03-05 ASSESSMENT — LIFESTYLE VARIABLES: TOBACCO_USE: 1

## 2019-03-05 NOTE — ANESTHESIA CARE TRANSFER NOTE
Patient: Ayanna Davidson    Procedure(s):  Incision and Drainage Right Labial Abscess    Diagnosis: labial abscess  Diagnosis Additional Information: No value filed.    Anesthesia Type:   MAC     Note:  Airway :Face Mask  Patient transferred to:Phase II  Handoff Report: Identifed the Patient, Identified the Reponsible Provider, Reviewed the pertinent medical history, Discussed the surgical course, Reviewed Intra-OP anesthesia mangement and issues during anesthesia, Set expectations for post-procedure period and Allowed opportunity for questions and acknowledgement of understanding      Vitals: (Last set prior to Anesthesia Care Transfer)    CRNA VITALS  3/5/2019 1703 - 3/5/2019 1741      3/5/2019             SpO2:  100 %                Electronically Signed By: LEXX Kebede CRNA  March 5, 2019  5:41 PM

## 2019-03-05 NOTE — ED PROVIDER NOTES
History     Chief Complaint   Patient presents with     Cyst     HPI  Ayanna Davidson is a 28 year old female who presents with concerns for a right labial cyst.  She had a similar presentation in February 2018 requiring surgical incision and drainage.  She states her last 3 days she has noticed a painful lump in the lower aspect of her labia.  It is moderately tender to palpation.  No redness over the skin.  No open sores or draining.  No vaginal discharge or bleeding.  She also has some flank pain on the right.  Denies dysuria or frequency.  No fever or chills.  Denies abdominal pain, nausea or vomiting.  No diarrhea.  No injury to the area.  Tetanus is up-to-date.  She is a daily smoker.  No treatment for the pain prior to arrival.    Allergies:  No Known Allergies    Problem List:    Patient Active Problem List    Diagnosis Date Noted     Vulvar abscess 08/16/2017     Priority: Medium     Drug-induced mental disorder (H) 09/20/2012     Priority: Medium     Overview:   ICD-10 Regulatory Update       Depressed 05/10/2012     Priority: Medium     Overdose of antipsychotic 05/09/2012     Priority: Medium        Past Medical History:    Past Medical History:   Diagnosis Date     Gastroesophageal reflux disease        Past Surgical History:    Past Surgical History:   Procedure Laterality Date     BREAST SURGERY       CHOLECYSTECTOMY       ENT SURGERY       INCISION AND DRAINAGE ABSCESS PELVIS, COMBINED N/A 2/26/2018    Procedure: COMBINED INCISION AND DRAINAGE ABSCESS PELVIS;  INCISION AND DRAINAGE LABIAL ABSCESS;  Surgeon: Jase Beach MD;  Location: [facility] OR     ORTHOPEDIC SURGERY         Family History:    History reviewed. No pertinent family history.    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        Medications:      Acetaminophen (TYLENOL PO)   BuPROPion HCl (WELLBUTRIN XL PO)   CLINDAMYCIN HCL PO   cyanocobalamin (VITAMIN B12) 1000 MCG/ML injection   mupirocin (BACTROBAN) 2 % ointment   norethindrone-ethinyl estradiol (ORTHO-NOVUM 1-35 TAB,NORTREL 1-35 TAB) 1-35 MG-MCG per tablet   OMEPRAZOLE PO   Ondansetron (ZOFRAN ODT PO)   OXYCODONE HCL PO   oxyCODONE-acetaminophen (PERCOCET) 5-325 MG per tablet   oxyCODONE-acetaminophen (PERCOCET) 5-325 MG per tablet   TRAMADOL HCL PO         Review of Systems all other systems reviewed and are negative.    Physical Exam   BP: (!) 144/97  Pulse: 123  Temp: 97.9  F (36.6  C)  Resp: 18  Weight: 77.1 kg (170 lb)  SpO2: 97 %      Physical Exam alert cooperative female in moderate distress.  No oral lesions or swelling.  Normal cardiac and respiratory auscultation. Examination of her back reveals lumbar back tenderness without CVA tenderness.  No perirectal or pilonidal cyst.  On examination of her genitalia there is a tender swollen area deep on the right labia majora.  Intact surgical scar on the left labia majora from previous procedure.  Lesion does not appear fluctuant.  No redness over the skin.  No vaginal discharge or draining.    ED Course        Procedures               Critical Care time:  none               Results for orders placed or performed during the hospital encounter of 03/05/19 (from the past 24 hour(s))   Routine UA with microscopic   Result Value Ref Range    Color Urine Joellen     Appearance Urine Cloudy     Glucose Urine Negative NEG^Negative mg/dL    Bilirubin Urine Negative NEG^Negative    Ketones Urine Negative NEG^Negative mg/dL    Specific Gravity Urine 1.032 1.003 - 1.035    Blood Urine Negative NEG^Negative    pH Urine 5.0 5.0 - 7.0 pH    Protein Albumin Urine 30 (A) NEG^Negative mg/dL    Urobilinogen mg/dL 0.0 0.0 - 2.0 mg/dL    Nitrite Urine Negative NEG^Negative     Leukocyte Esterase Urine Trace (A) NEG^Negative    Source Midstream Urine     WBC Urine 11 (H) 0 - 5 /HPF    RBC Urine 8 (H) 0 - 2 /HPF    Bacteria Urine Moderate (A) NEG^Negative /HPF    Squamous Epithelial /HPF Urine 35 (H) 0 - 1 /HPF    Mucous Urine Present (A) NEG^Negative /LPF   HCG qualitative urine   Result Value Ref Range    HCG Qual Urine Negative NEG^Negative   US Pelvic Limited    Narrative    ULTRASOUND PELVIS  3/5/2019 10:21 AM    HISTORY: Concern for deep RIGHT lower leg abscess.    COMPARISON: 2/26/2018    TECHNIQUE: Translabial imaging performed.      Impression    IMPRESSION: Complex multiloculated fluid collection is seen in the  RIGHT labia. This measures 3.4 x 1.8 x 2.5 cm and demonstrates  surrounding hyperemia. This is concerning for a small abscess.    JANAY COLEY MD       Medications   HYDROmorphone (PF) (DILAUDID) injection 0.5 mg (0.5 mg Intravenous Given 3/5/19 1132)   ondansetron (ZOFRAN) injection 4 mg (4 mg Intravenous Given 3/5/19 1129)   0.9% sodium chloride BOLUS (0 mLs Intravenous Stopped 3/5/19 1052)     Followed by   sodium chloride 0.9% infusion (1,000 mLs Intravenous New Bag 3/5/19 1129)   ketorolac (TORADOL) injection 30 mg (30 mg Intravenous Given 3/5/19 0920)   diphenhydrAMINE (BENADRYL) injection 50 mg (50 mg Intravenous Given 3/5/19 1314)     Established patient was given Toradol and Dilaudid.  An ultrasound was obtained to look for abscess.  Urinalysis and UPT was ordered.  Urine was positive as noted above but there is a lot of squamous cells suggesting contamination.  Urine culture is pending.  Ultrasound did show an multi loculated fluid collection as noted above.  I spoke to Dr. Beach from surgery.  He will plan to take her to the OR this afternoon for a I&D of the abscess.  Patient complained to the nursing staff that she was itchy.  She had no rash or hives.  No respiratory compromise.  Benadryl is ordered.  On recheck patient is sleeping soundly.  No hives are  present.  No respiratory distress.  Awaiting transfer to the OR for definitive treatment for the abscess.  Assessments & Plan (with Medical Decision Making)   Ayanna Davidson is a 28 year old female who presents with concerns for a right labial cyst.  She had a similar presentation in February 2018 requiring surgical incision and drainage.  She states her last 3 days she has noticed a painful lump in the lower aspect of her labia.  It is moderately tender to palpation.  No redness over the skin.  No open sores or draining.  No vaginal discharge or bleeding.  She also has some flank pain on the right.  Denies dysuria or frequency.  No fever or chills.  Denies abdominal pain, nausea or vomiting.  No diarrhea.  No injury to the area.  Tetanus is up-to-date.  She is a daily smoker.  No treatment for the pain prior to arrival.  Presentation she is afebrile and vitally stable.  She is in moderate pain.  Did have a tender firm area in the right labia majora.  No surrounding erythema or open draining lesions.  On exam of her back she had diffuse lumbar tenderness without CVA tenderness.  There is no pilonidal or perirectal abscess or cyst noted.  An IV established was given Dilaudid and Toradol for pain.  Zofran for nausea.  IV fluids.  Ultrasound of the area did show a complex fluid collection noted above.  Discussed these findings with Dr. Beach.  He plans to take her to the OR for drainage of the abscess.  Patient will be kept n.p.o.  IV fluids and pain meds.      Patient is cleared for surgery and  my note may be used for preop.      I have reviewed the nursing notes.    I have reviewed the findings, diagnosis, plan and need for follow up with the patient.          Medication List      There are no discharge medications for this visit.         Final diagnoses:   Labial abscess       3/5/2019   [facility] EMERGENCY DEPARTMENT     Riley House MD  03/05/19 1315       Riley House MD  03/05/19 7220

## 2019-03-05 NOTE — BRIEF OP NOTE
[facility] Brief Operative Note    Pre-operative diagnosis: Right labial abscess   Post-operative diagnosis Same   Procedure: Procedure(s):  Incision and Drainage Right Labial Abscess   Surgeon(s): Surgeon(s) and Role:     * Jase Beach MD - Primary   Estimated blood loss: * No values recorded between 3/5/2019  5:19 PM and 3/5/2019  5:28 PM *    Specimens: ID Type Source Tests Collected by Time Destination   1 : Right labial abscess aerobic culture, anaerobic culture and gram stain Abscess Vagina ABSCESS CULTURE AEROBIC BACTERIAL, ANAEROBIC BACTERIAL CULTURE, GRAM STAIN Jase Beach MD 3/5/2019  5:22 PM       Findings: Large abscess with 20 ml of pus       #[id number]

## 2019-03-05 NOTE — CONSULTS
[facility] Emergency Department Surgery Consult    Ayanna Davidson MRN# [id number]   Age: 28 year old YOB: 1990     Date of Admission:  3/5/2019    Reason for consult: labial abscess       Requesting physician: Dr. García       Level of consult: Consult, follow and place orders           Impression and Plan:   Impression:   labial abscess        Plan:   OR for incision and drainage.   The procedure, risks, benefits, and alternatives were discussed and the patient agrees to proceed.             Chief Complaint:   Labial abscess     History is obtained from the patient         History of Present Illness:   This 28 year old female (poor historian) is being well-known to me for a left labial abscess which I drained a year ago.  Over the past several days she began having increasing pain in the right labia and now presented to the ER today subsequent imaging in the ER revealed a complex fluid collection in the right labia consistent with a possible abscess which I am now asked to see her.  Currently she is complaining of pain in the right labia but denies any nausea vomiting fevers or chills.  It was recommended she follow-up with her gynecologist when I last saw her but she did not follow-up.           Past Medical History:     Past Medical History:   Diagnosis Date     Gastroesophageal reflux disease              Past Surgical History:     Past Surgical History:   Procedure Laterality Date     BREAST SURGERY       CHOLECYSTECTOMY       ENT SURGERY       INCISION AND DRAINAGE ABSCESS PELVIS, COMBINED N/A 2/26/2018    Procedure: COMBINED INCISION AND DRAINAGE ABSCESS PELVIS;  INCISION AND DRAINAGE LABIAL ABSCESS;  Surgeon: Jase Beach MD;  Location: [facility] OR     ORTHOPEDIC SURGERY               Social History:     Social History     Tobacco Use     Smoking status: Current Every Day Smoker     Packs/day: 0.25     Smokeless tobacco: Current User     Tobacco comment: uses E cig   Substance Use Topics      Alcohol use: No             Family History:   History reviewed. No pertinent family history.           Allergies:   No Known Allergies          Medications:     Current Facility-Administered Medications   Medication     HYDROmorphone (PF) (DILAUDID) injection 0.5 mg     ondansetron (ZOFRAN) injection 4 mg     sodium chloride 0.9% infusion     Current Outpatient Medications   Medication Sig     Acetaminophen (TYLENOL PO) Take 500 mg by mouth every 4 hours as needed for mild pain or fever     BuPROPion HCl (WELLBUTRIN XL PO) Take 150 mg by mouth daily     CLINDAMYCIN HCL PO Take 150 mg by mouth     cyanocobalamin (VITAMIN B12) 1000 MCG/ML injection Inject 1 mL into the muscle every 30 days     mupirocin (BACTROBAN) 2 % ointment Apply topically 3 times daily     norethindrone-ethinyl estradiol (ORTHO-NOVUM 1-35 TAB,NORTREL 1-35 TAB) 1-35 MG-MCG per tablet Take 1 tablet by mouth daily     OMEPRAZOLE PO Take 20 mg by mouth     Ondansetron (ZOFRAN ODT PO) Take 4 mg by mouth every 8 hours as needed for nausea     OXYCODONE HCL PO Take 5 mg by mouth every 4 hours as needed     oxyCODONE-acetaminophen (PERCOCET) 5-325 MG per tablet Take 1 tablet by mouth every 6 hours as needed for moderate to severe pain     oxyCODONE-acetaminophen (PERCOCET) 5-325 MG per tablet Take 1-2 tablets by mouth every 6 hours as needed for pain (moderate to severe)     TRAMADOL HCL PO Take 50 mg by mouth             Review of Systems:   The review of systems was positive for the following findings.  Right labia.  The remainder of the review of systems was unremarkable.          Physical Exam:   PE:  B/P: 132/86, T: 97.9, P: 80, R: 18  General: well developed, well nourished WF who appears her stated age  HEENT: NC/AT, EOMI, (-)icterus, (-)injection  Neck: Supple, No JVD  Chest: CTA  Heart: S1, S2, (-)m/r/g  Abd: Soft, non distended, non tender, no masses   - Tender right labia with 2x3x3 cm flucuant area inferior aspect  Ext; Warm, no  edema  Psych: AAOx3  Neuro: No focal deficits            Data:   All laboratory data reviewed  Results for orders placed or performed during the hospital encounter of 03/05/19 (from the past 24 hour(s))   Routine UA with microscopic   Result Value Ref Range    Color Urine Joellen     Appearance Urine Cloudy     Glucose Urine Negative NEG^Negative mg/dL    Bilirubin Urine Negative NEG^Negative    Ketones Urine Negative NEG^Negative mg/dL    Specific Gravity Urine 1.032 1.003 - 1.035    Blood Urine Negative NEG^Negative    pH Urine 5.0 5.0 - 7.0 pH    Protein Albumin Urine 30 (A) NEG^Negative mg/dL    Urobilinogen mg/dL 0.0 0.0 - 2.0 mg/dL    Nitrite Urine Negative NEG^Negative    Leukocyte Esterase Urine Trace (A) NEG^Negative    Source Midstream Urine     WBC Urine 11 (H) 0 - 5 /HPF    RBC Urine 8 (H) 0 - 2 /HPF    Bacteria Urine Moderate (A) NEG^Negative /HPF    Squamous Epithelial /HPF Urine 35 (H) 0 - 1 /HPF    Mucous Urine Present (A) NEG^Negative /LPF   HCG qualitative urine   Result Value Ref Range    HCG Qual Urine Negative NEG^Negative   US Pelvic Limited    Narrative    ULTRASOUND PELVIS  3/5/2019 10:21 AM    HISTORY: Concern for deep RIGHT lower leg abscess.    COMPARISON: 2/26/2018    TECHNIQUE: Translabial imaging performed.      Impression    IMPRESSION: Complex multiloculated fluid collection is seen in the  RIGHT labia. This measures 3.4 x 1.8 x 2.5 cm and demonstrates  surrounding hyperemia. This is concerning for a small abscess.    JANAY COLEY MD     All imaging studies reviewed by me.     Jase Beach MD, FACS

## 2019-03-05 NOTE — ANESTHESIA PREPROCEDURE EVALUATION
Anesthesia Evaluation     . Pt has had prior anesthetic. Type: General and MAC    No history of anesthetic complications          ROS/MED HX    ENT/Pulmonary:     (+)tobacco use, Current use , . .    Neurologic:  - neg neurologic ROS     Cardiovascular:     (+) ----. : . . . :. . No previous cardiac testing       METS/Exercise Tolerance:     Hematologic:  - neg hematologic  ROS       Musculoskeletal:  - neg musculoskeletal ROS       GI/Hepatic:     (+) GERD       Renal/Genitourinary:  - ROS Renal section negative       Endo:  - neg endo ROS       Psychiatric: Comment: Polysubstance abuse per last tox screen.    (+) psychiatric history anxiety and depression      Infectious Disease:  - neg infectious disease ROS       Malignancy:      - no malignancy   Other: Comment: - HCG   - neg other ROS                 Physical Exam  Normal systems: cardiovascular    Airway   Mallampati: II  TM distance: <3 FB  Neck ROM: full    Dental   (+) missing and chipped    Cardiovascular   Rhythm and rate: regular and normal      Pulmonary (+) decreased breath sounds and wheezes     PE comment: Neb given preoperatively.  No sputum or fevers recently                        Anesthesia Plan      History & Physical Review  History and physical reviewed and following examination; no interval change.    ASA Status:  2 .    NPO Status:  > 6 hours    Plan for MAC with Intravenous and Propofol induction. Maintenance will be TIVA.  Reason for MAC:  Deep or markedly invasive procedure (G8) and Extreme anxiety (QS)  PONV prophylaxis:  Ondansetron (or other 5HT-3)       Postoperative Care      Consents  Anesthetic plan, risks, benefits and alternatives discussed with:  Patient.  Use of blood products discussed: No .   .                          .

## 2019-03-05 NOTE — ED TRIAGE NOTES
Pt c/o r sided Barthian cyst.  Hx of these cyst.  States this is worse then before and travel up her back.

## 2019-03-06 LAB
BACTERIA SPEC CULT: NORMAL
SPECIMEN SOURCE: NORMAL

## 2019-03-06 NOTE — DISCHARGE INSTRUCTIONS
Care of Incision:   May bathe in the morning, remove packing.    Sitz baths two times a day.  See information attached for sitz bath.    Do not tough your incision touch or pick at your incision after removing the packing.    Leave open to air.  Cover incision only if it continues to drain.  A more-pad may work the best.  Change frequently to promote healing.    Follow up care:    Call [facility] [phone] in the morning to schedule a follow up appointment in 7- 10 days for wound check.    Cultures were taken of the drainage from your wound.  Dr. Beach will discuss culture results with you at the follow up appointment.     [facility] Same-Day [facility] Anesthesia   Adult Discharge Orders & Instructions     For 24 hours after surgery    1. Get plenty of rest.  A responsible adult must stay with you for at least 24 hours after you leave the hospital.   2. Do not drive or use heavy equipment.  If you have weakness or tingling, don't drive or use heavy equipment until this feeling goes away.  3. Do not drink alcohol.  4. Avoid strenuous or risky activities.  Ask for help when climbing stairs.   5. You may feel lightheaded.  If so, sit for a few minutes before standing.  Have someone help you get up.   6. You may have a slight fever. Call the doctor if your fever is over 100 F (37.7 C) (taken under the tongue) or lasts longer than 24 hours.  7. You may have a dry mouth, a sore throat, muscle aches or trouble sleeping.  These should go away after 24 hours.  8. Do not make important or legal decisions.  We don t expect you to have any problems from the surgery or treatment you had today. Just in case, here s what to do if you have pain, upset stomach (nausea), bleeding or infection:  Pain:  Take medicines your doctor has prescribed or over-the-counter medicine they have suggested. Resting and using ice packs can help, too. For surgery on an arm or leg, raise it on a pillow to ease swelling. Call your  doctor if these methods don t work.  Copyright Eli[employer] Jesus, Licensed under [employer]  Upset stomach (nausea):  Take anti-nausea medicine approved by your doctor. Drink clear liquids like apple juice, ginger ale, broth or 7-Up. Be sure to drink enough fluids. Rest can help, too. Move to normal foods when you re ready. Bleeding:  In the first 24 hours, you may see a little blood on your dressing, about the size of a quarter. You don t need to worry about this much blood, but if the blood spot keeps getting bigger:    Put pressure on the wound if you can, AND    Call your doctor.  Copyright [employer], Licensed under [employer]  Fever/Infection: Please call your doctor if you have any of these signs:    Redness    Swelling    Wound feels warm    Pain gets worse    Bad-smelling fluid leaks from wound    Fever or chills  Call your doctor for any of the followin.  It has been over 8 to 10 hours since surgery and you are still not able to urinate (pass water).    2.  Headache for over 24 hours.    Nurse advice line: [phone]

## 2019-03-06 NOTE — ANESTHESIA POSTPROCEDURE EVALUATION
Patient: Ayanna Davidson    Procedure(s):  Incision and Drainage Right Labial Abscess    Diagnosis:labial abscess  Diagnosis Additional Information: No value filed.    Anesthesia Type:  MAC    Note:  Anesthesia Post Evaluation    Patient location during evaluation: Phase 2  Patient participation: Able to fully participate in evaluation  Level of consciousness: awake  Pain management: adequate  Airway patency: patent  Cardiovascular status: acceptable and hemodynamically stable  Respiratory status: acceptable, room air and nonlabored ventilation  Hydration status: stable  PONV: none     Anesthetic complications: None    Comments: Patient was happy with the anesthesia care received and no anesthesia related complications were noted.  I will follow up with the patient again if it is needed.        Last vitals:  Vitals:    03/05/19 1535 03/05/19 1540 03/05/19 1648   BP:      Pulse:      Resp:      Temp:      SpO2: 100% 100% 100%         Electronically Signed By: LEXX Kebede CRNA  March 5, 2019  6:09 PM

## 2019-03-06 NOTE — OP NOTE
Procedure Date: 03/05/2019      PREOPERATIVE DIAGNOSIS:  Right labial abscess.         POSTOPERATIVE DIAGNOSIS:  Right labial abscess.         PROCEDURES PERFORMED:  Incision and drainage of right labial abscess.      SURGEON:  Jase Beach MD, FACVS       ANESTHESIA:  MAC.      INDICATIONS FOR PROCEDURE:   This is a 28-year-old lady known to me for a left labial abscess for which she underwent an incision and drainage a year ago.  She now returns with a right labial abscess confirmed by ultrasound.  Because of the abscess, I elected to take her to the operating room for drainage.      OPERATIVE FINDINGS:  Included a large abscess with 20 mL of pus.        DESCRIPTION OF PROCEDURE:  With cooperation of the patient in the preoperative holding the right side was marked, she was taken to the operating room after receiving some sedation,  she was placed in lithotomy position.  The perineal area was then prepped and draped in a sterile fashion.  Timeout was performed confirming the identity of the patient as well as the procedure to be performed.  The area around the abscess was then infiltrated the local anesthetic, was on the inferior aspect of the right labia.  After adequate analgesia was achieved, an incision was made over this.  The subcutaneous tissue was opened using cautery.  We then bluntly dissected into the abscess using a Christina clamp, draining approximately 20-30 mL of pus.  This was also sent for culture.  All loculations were broken down with the Yankauer suction.  There did not appear to be any communication with the vagina or rectum.  The area was then copiously irrigated.  All fluid was then suctioned out.  This incision was T'd so to allow the edges to retract.  After irrigating out, it was then packed.  Hemostasis was achieved and cautery.  It was then packed with Betadine-soaked Kerlix and a sterile dressing.       The patient was then taken from back to the recovery room in stable condition to  be sent home with instructions for wound care.         SHANTI SAUNDERS MD,FACS             D: 2019   T: 2019   MT: MOHINI      Name:     MARYURI ARTEAGA   MRN:      [id number]        Account:        [id number]   :      1990           Procedure Date: 2019      Document: [id number]

## 2019-03-09 LAB
BACTERIA SPEC CULT: ABNORMAL
Lab: ABNORMAL
SPECIMEN SOURCE: ABNORMAL

## 2019-03-12 ENCOUNTER — RESULT FOLLOW UP (OUTPATIENT)
Dept: FAMILY MEDICINE | Facility: CLINIC | Age: 29
End: 2019-03-12

## 2019-03-12 ENCOUNTER — OFFICE VISIT (OUTPATIENT)
Dept: FAMILY MEDICINE | Facility: CLINIC | Age: 29
End: 2019-03-12
Payer: COMMERCIAL

## 2019-03-12 VITALS
HEIGHT: 66 IN | HEART RATE: 110 BPM | OXYGEN SATURATION: 98 % | DIASTOLIC BLOOD PRESSURE: 80 MMHG | RESPIRATION RATE: 20 BRPM | WEIGHT: 183.7 LBS | SYSTOLIC BLOOD PRESSURE: 130 MMHG | BODY MASS INDEX: 29.52 KG/M2 | TEMPERATURE: 97.6 F

## 2019-03-12 DIAGNOSIS — G89.18 POST-OP PAIN: ICD-10-CM

## 2019-03-12 DIAGNOSIS — K21.9 GASTROESOPHAGEAL REFLUX DISEASE, ESOPHAGITIS PRESENCE NOT SPECIFIED: ICD-10-CM

## 2019-03-12 DIAGNOSIS — N76.0 BV (BACTERIAL VAGINOSIS): ICD-10-CM

## 2019-03-12 DIAGNOSIS — R87.810 CERVICAL HIGH RISK HPV (HUMAN PAPILLOMAVIRUS) TEST POSITIVE: ICD-10-CM

## 2019-03-12 DIAGNOSIS — B96.89 BV (BACTERIAL VAGINOSIS): ICD-10-CM

## 2019-03-12 DIAGNOSIS — Z00.00 WELL ADULT EXAM: Primary | ICD-10-CM

## 2019-03-12 DIAGNOSIS — G44.219 EPISODIC TENSION-TYPE HEADACHE, NOT INTRACTABLE: ICD-10-CM

## 2019-03-12 DIAGNOSIS — N76.4 VULVAR ABSCESS: ICD-10-CM

## 2019-03-12 DIAGNOSIS — Z11.3 SCREEN FOR STD (SEXUALLY TRANSMITTED DISEASE): ICD-10-CM

## 2019-03-12 LAB
BACTERIA SPEC CULT: ABNORMAL
Lab: ABNORMAL
SPECIMEN SOURCE: ABNORMAL
SPECIMEN SOURCE: ABNORMAL
VIT B12 SERPL-MCNC: 1166 PG/ML (ref 193–986)
WET PREP SPEC: ABNORMAL

## 2019-03-12 PROCEDURE — 99395 PREV VISIT EST AGE 18-39: CPT | Performed by: NURSE PRACTITIONER

## 2019-03-12 PROCEDURE — 0064U ANTB TP TOTAL&RPR IA QUAL: CPT | Performed by: NURSE PRACTITIONER

## 2019-03-12 PROCEDURE — 86803 HEPATITIS C AB TEST: CPT | Performed by: NURSE PRACTITIONER

## 2019-03-12 PROCEDURE — 87491 CHLMYD TRACH DNA AMP PROBE: CPT | Performed by: NURSE PRACTITIONER

## 2019-03-12 PROCEDURE — 86706 HEP B SURFACE ANTIBODY: CPT | Performed by: NURSE PRACTITIONER

## 2019-03-12 PROCEDURE — 87210 SMEAR WET MOUNT SALINE/INK: CPT | Performed by: NURSE PRACTITIONER

## 2019-03-12 PROCEDURE — 86696 HERPES SIMPLEX TYPE 2 TEST: CPT | Performed by: NURSE PRACTITIONER

## 2019-03-12 PROCEDURE — 99214 OFFICE O/P EST MOD 30 MIN: CPT | Mod: 25 | Performed by: NURSE PRACTITIONER

## 2019-03-12 PROCEDURE — 87591 N.GONORRHOEAE DNA AMP PROB: CPT | Performed by: NURSE PRACTITIONER

## 2019-03-12 PROCEDURE — 36415 COLL VENOUS BLD VENIPUNCTURE: CPT | Performed by: NURSE PRACTITIONER

## 2019-03-12 PROCEDURE — 86695 HERPES SIMPLEX TYPE 1 TEST: CPT | Performed by: NURSE PRACTITIONER

## 2019-03-12 PROCEDURE — 82607 VITAMIN B-12: CPT | Performed by: NURSE PRACTITIONER

## 2019-03-12 PROCEDURE — G0145 SCR C/V CYTO,THINLAYER,RESCR: HCPCS | Performed by: NURSE PRACTITIONER

## 2019-03-12 PROCEDURE — G0476 HPV COMBO ASSAY CA SCREEN: HCPCS | Performed by: NURSE PRACTITIONER

## 2019-03-12 PROCEDURE — 87389 HIV-1 AG W/HIV-1&-2 AB AG IA: CPT | Performed by: NURSE PRACTITIONER

## 2019-03-12 RX ORDER — BUTALBITAL, ACETAMINOPHEN AND CAFFEINE 50; 325; 40 MG/1; MG/1; MG/1
1 TABLET ORAL EVERY 4 HOURS PRN
Qty: 30 TABLET | Refills: 3 | Status: SHIPPED | OUTPATIENT
Start: 2019-03-12 | End: 2019-09-27

## 2019-03-12 RX ORDER — OMEPRAZOLE 20 MG/1
20 TABLET, DELAYED RELEASE ORAL DAILY
Qty: 30 TABLET | Refills: 3 | Status: SHIPPED | OUTPATIENT
Start: 2019-03-12 | End: 2021-06-22

## 2019-03-12 ASSESSMENT — ENCOUNTER SYMPTOMS
NAUSEA: 0
ABDOMINAL PAIN: 1
ARTHRALGIAS: 1
PALPITATIONS: 0
EYE PAIN: 0
FEVER: 0
HEMATOCHEZIA: 0
BREAST MASS: 0
NERVOUS/ANXIOUS: 1
JOINT SWELLING: 1
WEAKNESS: 1
DYSURIA: 1
COUGH: 1
DIZZINESS: 1
DIARRHEA: 0
HEARTBURN: 1
CHILLS: 0
SORE THROAT: 1
HEMATURIA: 0
FREQUENCY: 0
CONSTIPATION: 1
HEADACHES: 1
MYALGIAS: 1
PARESTHESIAS: 0
SHORTNESS OF BREATH: 0

## 2019-03-12 ASSESSMENT — MIFFLIN-ST. JEOR: SCORE: 1570.01

## 2019-03-12 ASSESSMENT — PAIN SCALES - GENERAL: PAINLEVEL: EXTREME PAIN (8)

## 2019-03-12 NOTE — PROGRESS NOTES
SUBJECTIVE:   CC: Ayanna Davidson is an 28 year old woman who presents for preventive health visit.     Physical   Annual:     Getting at least 3 servings of Calcium per day:  Yes    Bi-annual eye exam:  NO    Dental care twice a year:  Yes    Sleep apnea or symptoms of sleep apnea:  Daytime drowsiness    Diet:  Other    Frequency of exercise:  2-3 days/week    Duration of exercise:  N/A    Taking medications regularly:  Yes    Medication side effects:  Other    Additional concerns today:  Yes    PHQ-2 Total Score: 2          ED/UC Followup:    Facility:  [facility]  Date of visit: 3/5/19  Reason for visit: labial abcess  Current Status: still very sore, draining, asking to see gyn provider         Today's PHQ-2 Score:   PHQ-2 ( 1999 [employer]) 3/12/2019   Q1: Little interest or pleasure in doing things 0   Q2: Feeling down, depressed or hopeless 0   PHQ-2 Score 0   Q1: Little interest or pleasure in doing things Several days   Q2: Feeling down, depressed or hopeless Several days   PHQ-2 Score 2       Abuse: Current or Past(Physical, Sexual or Emotional)- No  Do you feel safe in your environment? Yes    Social History     Tobacco Use     Smoking status: Current Every Day Smoker     Packs/day: 0.25     Smokeless tobacco: Current User     Tobacco comment: uses E cig   Substance Use Topics     Alcohol use: No     Alcohol Use 3/12/2019   If you drink alcohol do you typically have greater than 3 drinks per day OR greater than 7 drinks per week? No       Reviewed orders with patient.  Reviewed health maintenance and updated orders accordingly - Yes  BP Readings from Last 3 Encounters:   03/12/19 130/80   03/05/19 98/66   07/02/18 135/87    Wt Readings from Last 3 Encounters:   03/12/19 83.3 kg (183 lb 11.2 oz)   03/05/19 77.1 kg (169 lb 16 oz)   07/02/18 82.1 kg (181 lb)                    Mammogram not appropriate for this patient based on age.    Pertinent mammograms are reviewed under the imaging tab.  History of abnormal  Pap smear: NO - age 30- 65 PAP every 3 years recommended     Reviewed and updated as needed this visit by clinical staff  Tobacco  Allergies  Meds  Med Hx  Surg Hx  Fam Hx  Soc Hx        Reviewed and updated as needed this visit by Provider        Past Medical History:   Diagnosis Date     Gastroesophageal reflux disease       Past Surgical History:   Procedure Laterality Date     BREAST SURGERY       CHOLECYSTECTOMY       ENT SURGERY       INCISION AND DRAINAGE ABSCESS PELVIS, COMBINED N/A 2/26/2018    Procedure: COMBINED INCISION AND DRAINAGE ABSCESS PELVIS;  INCISION AND DRAINAGE LABIAL ABSCESS;  Surgeon: Jase Beach MD;  Location: [facility] OR     INCISION AND DRAINAGE ABSCESS PELVIS, COMBINED N/A 3/5/2019    Procedure: Incision and Drainage Right Labial Abscess;  Surgeon: Jase Beach MD;  Location: [facility] OR     LEEP TX, CERVICAL       ORTHOPEDIC SURGERY       Obstetric History     No data available          Review of Systems   Constitutional: Negative for chills and fever.   HENT: Positive for congestion and sore throat. Negative for ear pain and hearing loss.    Eyes: Negative for pain and visual disturbance.   Respiratory: Positive for cough. Negative for shortness of breath.    Cardiovascular: Positive for peripheral edema. Negative for chest pain and palpitations.   Gastrointestinal: Positive for abdominal pain, constipation and heartburn. Negative for diarrhea, hematochezia and nausea.   Breasts:  Negative for tenderness, breast mass and discharge.   Genitourinary: Positive for dysuria, genital sores, pelvic pain, vaginal bleeding and vaginal discharge. Negative for frequency, hematuria and urgency.   Musculoskeletal: Positive for arthralgias, joint swelling and myalgias.   Skin: Positive for rash.   Neurological: Positive for dizziness, weakness and headaches. Negative for paresthesias.   Psychiatric/Behavioral: Positive for mood changes. The patient is nervous/anxious.      The patient reported  "the above review of systems.  Today in addition to her wellness exam, I did a recheck on her surgical site, on the left labia.  She has a history of recurrent labial abscess.  Recently this required surgical intervention for complete drainage.  She has not been running a fever.  She denies having any current drainage from the surgical site  We also discussed her B12.  She states she has a history of low B12 and gives herself B12 injections.  She states she has been out of the medication and would like a B12 shot today  That she sees a psychiatrist for management of mental health problems  She reports gastroesophageal reflux and is needing a refill on her omeprazole  She reports history of headaches, states she typically takes butalbital and is requesting a refill of that prescription  She would like a complete STD screen     OBJECTIVE:   /80   Pulse 110   Temp 97.6  F (36.4  C) (Temporal)   Resp 20   Ht 1.676 m (5' 6\")   Wt 83.3 kg (183 lb 11.2 oz)   LMP  (Within Months)   SpO2 98%   BMI 29.65 kg/m    Physical Exam  GENERAL: healthy, alert and no distress  EYES: Eyes grossly normal to inspection, PERRL and conjunctivae and sclerae normal  HENT: ear canals and TM's normal, nose and mouth without ulcers or lesions  NECK: no adenopathy, no asymmetry, masses, or scars and thyroid normal to palpation  RESP: lungs clear to auscultation - no rales, rhonchi or wheezes  BREAST: normal without masses, tenderness or nipple discharge and no palpable axillary masses or adenopathy  CV: regular rate and rhythm, normal S1 S2, no S3 or S4, no murmur, click or rub, no peripheral edema and peripheral pulses strong  ABDOMEN: soft, nontender, no hepatosplenomegaly, no masses and bowel sounds normal   (female): Excision site of the labial abscess on the right appears to be healing without evidence of infection.  There is no fluctuance or erythema.  No discharge.  Normal appearing granulation tissue at the site.  Vaginal " mucosa appears normal, moderate physiologic discharge.  Cervix is smooth and round without polyp or lesion.  No friability.  No cervical motion tenderness  MS: no gross musculoskeletal defects noted, no edema  SKIN: no suspicious lesions or rashes  NEURO: Normal strength and tone, mentation intact and speech normal  PSYCH: mentation appears normal, affect normal/bright    Results for orders placed or performed in visit on 03/12/19   HIV Antigen Antibody Combo   Result Value Ref Range    HIV Antigen Antibody Combo Nonreactive NR^Nonreactive       Hepatitis B Surface Antibody   Result Value Ref Range    Hepatitis B Surface Antibody 5.35 <8.00 m[IU]/mL   Hepatitis C antibody   Result Value Ref Range    Hepatitis C Antibody Nonreactive NR^Nonreactive   Treponema Abs w Reflex to RPR and Titer   Result Value Ref Range    Treponema Antibodies Nonreactive NR^Nonreactive   Herpes Simplex Virus 1 and 2 IgG   Result Value Ref Range    Herpes Simplex Virus Type 1 IgG >8.0 (H) 0.0 - 0.8 AI    Herpes Simplex Virus Type 2 IgG 0.9 (H) 0.0 - 0.8 AI   Vitamin B12   Result Value Ref Range    Vitamin B12 1,166 (H) 193 - 986 pg/mL   Wet prep   Result Value Ref Range    Specimen Description Vagina     Wet Prep Few  PMNs seen       Wet Prep Moderate  Clue cells seen   (A)     Wet Prep No Trichomonas seen     Wet Prep No yeast seen    NEISSERIA GONORRHOEA PCR   Result Value Ref Range    Specimen Descrip Cervix     N Gonorrhea PCR Negative NEG^Negative   CHLAMYDIA TRACHOMATIS PCR   Result Value Ref Range    Specimen Description Cervix     Chlamydia Trachomatis PCR Negative NEG^Negative         ASSESSMENT/PLAN:       ICD-10-CM    1. Well adult exam Z00.00 Pap imaged thin layer screen with HPV - recommended age 30 - 65 years (select HPV order below)     Vitamin B12   2. Post-op pain G89.18    3. Vulvar abscess N76.4 OB/GYN REFERRAL   4. Episodic tension-type headache, not intractable G44.219 butalbital-acetaminophen-caffeine (FIORICET/ESGIC)  "-40 MG tablet   5. Gastroesophageal reflux disease, esophagitis presence not specified K21.9 omeprazole (PRILOSEC OTC) 20 MG EC tablet   6. Screen for STD (sexually transmitted disease) Z11.3 HPV High Risk Types DNA Cervical     Wet prep     NEISSERIA GONORRHOEA PCR     CHLAMYDIA TRACHOMATIS PCR     HIV Antigen Antibody Combo     Hepatitis B Surface Antibody     Hepatitis C antibody     Treponema Abs w Reflex to RPR and Titer     Herpes Simplex Virus 1 and 2 IgG   7. BV (bacterial vaginosis) N76.0 metroNIDAZOLE (FLAGYL) 500 MG tablet    B96.89      Patient is reassured the abscessed area is healing nicely without evidence of infection.  She requested a refill of her pain pills.  However, this was performed a week ago, I suggested she use ibuprofen for pain management.  Her B12 level is plenty high, she does not need an injection at this time.  We will recheck her B12 level annually  I did refill her butalbital and omeprazole.  She states she intends to establish care at this clinic.  I requested she obtain her medical records from her previous clinic and have them transferred in order to update this electronic medical record and verify all prescriptions.  Wet prep is positive for bacterial vaginosis.  She is treated for that, the rest of her STD screen at this point is negative.  We will await the last results and inform her of the complete screen when available.    She has requested a referral to gynecology to further discuss her issues with recurrent vulvar abscesses.    COUNSELING:  Reviewed preventive health counseling, as reflected in patient instructions       Regular exercise       Healthy diet/nutrition       Safe sex practices/STD prevention    BP Readings from Last 1 Encounters:   03/12/19 130/80     Estimated body mass index is 29.65 kg/m  as calculated from the following:    Height as of this encounter: 1.676 m (5' 6\").    Weight as of this encounter: 83.3 kg (183 lb 11.2 oz).           reports " that she has been smoking.  She has been smoking about 0.25 packs per day. She uses smokeless tobacco.      Counseling Resources:  ATP IV Guidelines  Pooled Cohorts Equation Calculator  Breast Cancer Risk Calculator  FRAX Risk Assessment  ICSI Preventive Guidelines  Dietary Guidelines for Americans, 2010  USDA's MyPlate  ASA Prophylaxis  Lung CA Screening    LEXX Hussein CNP  [facility]

## 2019-03-13 ENCOUNTER — TELEPHONE (OUTPATIENT)
Dept: FAMILY MEDICINE | Facility: CLINIC | Age: 29
End: 2019-03-13

## 2019-03-13 LAB
C TRACH DNA SPEC QL NAA+PROBE: NEGATIVE
HBV SURFACE AB SERPL IA-ACNC: 5.35 M[IU]/ML
HCV AB SERPL QL IA: NONREACTIVE
HIV 1+2 AB+HIV1 P24 AG SERPL QL IA: NONREACTIVE
HSV1 IGG SERPL QL IA: >8 AI (ref 0–0.8)
HSV2 IGG SERPL QL IA: 0.9 AI (ref 0–0.8)
N GONORRHOEA DNA SPEC QL NAA+PROBE: NEGATIVE
SPECIMEN SOURCE: NORMAL
SPECIMEN SOURCE: NORMAL
T PALLIDUM AB SER QL: NONREACTIVE

## 2019-03-13 RX ORDER — METRONIDAZOLE 500 MG/1
500 TABLET ORAL 2 TIMES DAILY
Qty: 14 TABLET | Refills: 0 | Status: SHIPPED | OUTPATIENT
Start: 2019-03-13 | End: 2019-03-20

## 2019-03-13 NOTE — TELEPHONE ENCOUNTER
----- Message from LEXX Hussein CNP sent at 3/13/2019  3:01 PM CDT -----  Please let the patient know the wet prep is positive for bacterial vaginosis.  Medication prescription will be sent to the [facility] pharmacy.  Cautioned her against drinking while taking this medicine as it would make her very ill.  Her vitamin B12 level is above the normal limit.  She does not need any injectable B12 at this time.  We can recheck this with an annual physical next year.  Her STD screen for chlamydia, gonorrhea, HIV, hepatitis B and C are all negative.  I do not yet have the results for the screening on except less and herpes.  She will be contacted when I do have those results

## 2019-03-13 NOTE — LETTER
March 14, 2019      Ayanna Davidson  [address]  [place] [address]        Dear ,    We are writing to inform you of your test results. We tried to reach you by phone.    Please let the patient know the wet prep is positive for bacterial vaginosis.  Medication prescription will be sent to the [facility] pharmacy.  Cautioned her against drinking while taking this medicine as it would make her very ill.  Her vitamin B12 level is above the normal limit.  She does not need any injectable B12 at this time.  We can recheck this with an annual physical next year.  Her STD screen for chlamydia, gonorrhea, HIV, hepatitis B and C are all negative.  I do not yet have the results for the screening on except less and herpes.  She will be contacted when I do have those results       Resulted Orders   Wet prep   Result Value Ref Range    Specimen Description Vagina     Wet Prep Few  PMNs seen       Wet Prep Moderate  Clue cells seen   (A)     Wet Prep No Trichomonas seen     Wet Prep No yeast seen    NEISSERIA GONORRHOEA PCR   Result Value Ref Range    Specimen Descrip Cervix     N Gonorrhea PCR Negative NEG^Negative      Comment:      Negative for N. gonorrhoeae rRNA by transcription mediated amplification.  A negative result by transcription mediated amplification does not preclude   the presence of N. gonorrhoeae infection because results are dependent on   proper and adequate collection, absence of inhibitors, and sufficient rRNA to   be detected.     CHLAMYDIA TRACHOMATIS PCR   Result Value Ref Range    Specimen Description Cervix     Chlamydia Trachomatis PCR Negative NEG^Negative      Comment:      Negative for C. trachomatis rRNA by transcription mediated amplification.  A negative result by transcription mediated amplification does not preclude   the presence of C. trachomatis infection because results are dependent on   proper and adequate collection, absence of inhibitors, and sufficient rRNA to    be detected.     HIV Antigen Antibody Combo   Result Value Ref Range    HIV Antigen Antibody Combo Nonreactive NR^Nonreactive          Comment:      HIV-1 p24 Ag & HIV-1/HIV-2 Ab Not Detected   Hepatitis B Surface Antibody   Result Value Ref Range    Hepatitis B Surface Antibody 5.35 <8.00 m[IU]/mL      Comment:      Nonreactive, No antibody detected when the value is less than 8.00 m[IU]/mL.   Hepatitis C antibody   Result Value Ref Range    Hepatitis C Antibody Nonreactive NR^Nonreactive      Comment:      Assay performance characteristics have not been established for newborns,   infants, and children     Treponema Abs w Reflex to RPR and Titer   Result Value Ref Range    Treponema Antibodies Nonreactive NR^Nonreactive   Herpes Simplex Virus 1 and 2 IgG   Result Value Ref Range    Herpes Simplex Virus Type 1 IgG >8.0 (H) 0.0 - 0.8 AI      Comment:      Positive.  IgG antibody to HSV-1 detected.  Antibody index (AI) values reflect qualitative changes in antibody   concentration that cannot be directly associated with clinical condition or   disease state.      Herpes Simplex Virus Type 2 IgG 0.9 (H) 0.0 - 0.8 AI      Comment:      Equivocal, please recollect.  Antibody index (AI) values reflect qualitative changes in antibody   concentration that cannot be directly associated with clinical condition or   disease state.     Vitamin B12   Result Value Ref Range    Vitamin B12 1,166 (H) 193 - 986 pg/mL       If you have any questions or concerns, please call the clinic at the number listed above.       Sincerely,        LEXX Hussein CNP

## 2019-03-14 ENCOUNTER — TELEPHONE (OUTPATIENT)
Dept: SURGERY | Facility: OTHER | Age: 29
End: 2019-03-14

## 2019-03-14 NOTE — TELEPHONE ENCOUNTER
Reason for Call:  Other appointment    Detailed comments: Patient needed to come later for her appt today. When that wasn't available she asked if she could just be called with her results. She hasn't been seen since surgery. Will we be able to call her with results?     Phone Number Patient can be reached at: Home number on file [phone] (home)    Best Time: any    Can we leave a detailed message on this number? YES    Call taken on 3/14/2019 at 10:20 AM by Radha Devi

## 2019-03-15 LAB
COPATH REPORT: NORMAL
PAP: NORMAL

## 2019-03-15 NOTE — TELEPHONE ENCOUNTER
Patient called back and was given the message.    Please call her with her Hepatitis results.    Thank you,  Nitza JONES

## 2019-03-19 ENCOUNTER — OFFICE VISIT (OUTPATIENT)
Dept: OBGYN | Facility: CLINIC | Age: 29
End: 2019-03-19
Payer: COMMERCIAL

## 2019-03-19 VITALS
HEART RATE: 86 BPM | WEIGHT: 185.6 LBS | BODY MASS INDEX: 29.96 KG/M2 | DIASTOLIC BLOOD PRESSURE: 78 MMHG | SYSTOLIC BLOOD PRESSURE: 110 MMHG

## 2019-03-19 DIAGNOSIS — N75.1 BARTHOLIN'S GLAND ABSCESS: Primary | ICD-10-CM

## 2019-03-19 LAB
FINAL DIAGNOSIS: ABNORMAL
HPV HR 12 DNA CVX QL NAA+PROBE: POSITIVE
HPV16 DNA SPEC QL NAA+PROBE: NEGATIVE
HPV18 DNA SPEC QL NAA+PROBE: NEGATIVE
SPECIMEN DESCRIPTION: ABNORMAL
SPECIMEN SOURCE CVX/VAG CYTO: ABNORMAL

## 2019-03-19 PROCEDURE — 99203 OFFICE O/P NEW LOW 30 MIN: CPT | Performed by: OBSTETRICS & GYNECOLOGY

## 2019-03-19 NOTE — TELEPHONE ENCOUNTER
Patient called back and information was given again.  Thank you,  Kate Navarro   for [facility]

## 2019-03-19 NOTE — PROGRESS NOTES
4/2016 NIL pap. No prior HPV test  3/12/19 NIL pap, + HR HPV (not 16 or 18). Plan: cotest in 1 yr  3/19/19 Pt. Notified.  1/29/20 NIL pap, + HR HPV (not 16 or 18). Plan: [place]  2/11/20 [place] bx: neg. Plan: Cot[facility] in 1 yr.

## 2019-03-20 NOTE — PROGRESS NOTES
Clinic Note    CC:    Chief Complaint   Patient presents with     Vaginal Problem     vulvar abscess      HPI:  Ayanna Davidson is a 28 year old P0 who presents for follow up (new patient to me) regarding recurrent Bartholin's gland abscesses, with recent I&D of a right Bartholin's gland abscess on 3/5/19. Of note, they occur on both sides. Her labia is healing and she does not have current symptoms, though she is worried the abscess will just return. 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   She does have chronic intermittent labial and vaginal irritation. She shaves, uses a antibacterial wipe in her introitus, infrequently changes clothing, and take frequent bubble baths.   She is complicated by smoking as well as daily methamphetamine use. She reports no desire to quit either, and she denies IVDU. She is not sexually active at the moment, last partner was cheating on her, and she just underwent thorough STI testing this month with only notable positive HSV.   Recent PAP NIL, HPV+, last PAP NIL (2016) with remote hx of LEEP. She states she has received the HPV vaccinations, though after initiation of sexual activity. She is aware of the link between smoking and cervical cancer.   She has never been pregnant, has been on birth control since age 13, though recently stopped. Does not desire pregnancy, though doesn't believe she is able to become pregnant.   No specific concerns today, just presents to discuss her recurrent abscesses.     PMH:   Past Medical History:   Diagnosis Date     Cervical high risk HPV (human papillomavirus) test positive 03/12/2019    last PAP NIL (4/16), remote hx of LEEP     Gastroesophageal reflux disease      Methamphetamine abuse (H)     reports daily use     recurrent Bartholin's cyst      SurgHx:   Past Surgical History:   Procedure Laterality Date     CHOLECYSTECTOMY       ENT SURGERY       INCISION AND DRAINAGE ABSCESS PELVIS, COMBINED N/A 2/26/2018    Procedure:  COMBINED INCISION AND DRAINAGE ABSCESS PELVIS;  INCISION AND DRAINAGE LABIAL ABSCESS;  Surgeon: Jase Beach MD;  Location: [facility]     INCISION AND DRAINAGE ABSCESS PELVIS, COMBINED N/A 3/5/2019    Procedure: Incision and Drainage Right Labial Abscess;  Surgeon: Jase Beach MD;  Location: [facility]     LAP ADJUSTABLE GASTRIC BAND       LEEP TX, CERVICAL       MAMMOPLASTY REDUCTION BILATERAL       ORTHOPEDIC SURGERY       FamHx:   History reviewed. No pertinent family history.    SocHx:   Social History     Socioeconomic History     Marital status: Single     Spouse name: None     Number of children: None     Years of education: None     Highest education level: None   Occupational History     None   Social Needs     Financial resource strain: None     Food insecurity:     Worry: None     Inability: None     Transportation needs:     Medical: None     Non-medical: None   Tobacco Use     Smoking status: Current Every Day Smoker     Packs/day: 0.25     Smokeless tobacco: Current User     Tobacco comment: uses E cig   Substance and Sexual Activity     Alcohol use: No     Drug use: Yes     Types: Marijuana, Methamphetamines     Comment: In treatment for meth.     Sexual activity: Yes     Partners: Male     Birth control/protection: None   Lifestyle     Physical activity:     Days per week: None     Minutes per session: None     Stress: None   Relationships     Social connections:     Talks on phone: None     Gets together: None     Attends [employer]ism service: None     Active member of club or organization: None     Attends meetings of clubs or organizations: None     Relationship status: None     Intimate partner violence:     Fear of current or ex partner: None     Emotionally abused: None     Physically abused: None     Forced sexual activity: None   Other Topics Concern     None   Social History Narrative     None     Allergies:   Patient has no known allergies.    Current Medications:   Current Outpatient  Medications   Medication Sig Dispense Refill     butalbital-acetaminophen-caffeine (FIORICET/ESGIC) -40 MG tablet Take 1 tablet by mouth every 4 hours as needed for headaches 30 tablet 3     omeprazole (PRILOSEC OTC) 20 MG EC tablet Take 1 tablet (20 mg) by mouth daily 30 tablet 3     Ondansetron (ZOFRAN ODT PO) Take 4 mg by mouth every 8 hours as needed for nausea       sertraline (ZOLOFT) 50 MG tablet Take 50 mg by mouth daily       Acetaminophen (TYLENOL PO) Take 500 mg by mouth every 4 hours as needed for mild pain or fever       BuPROPion HCl (WELLBUTRIN XL PO) Take 150 mg by mouth daily       CLINDAMYCIN HCL PO Take 150 mg by mouth       cyanocobalamin (VITAMIN B12) 1000 MCG/ML injection Inject 1 mL into the muscle every 30 days       mupirocin (BACTROBAN) 2 % ointment Apply topically 3 times daily       ROS: As described in HPI, otherwise negative for fever/chills, fatigue, dizziness, weight changes, SOB/CP, nausea/vomit, GI distress, dysuria, abnormal vaginal discharge, constipation/diarrhea, or other systemic complaints    EXAM:  Vitals:    03/19/19 1337   BP: 110/78   BP Location: Right arm   Patient Position: Chair   Cuff Size: Adult Regular   Pulse: 86   Weight: 84.2 kg (185 lb 9.6 oz)    Body mass index is 29.96 kg/m .    Gen: Alert, oriented, appropriately interactive, NAD  CV: RRR, 2+ peripheral pulses  Resp: CTAB, good effort without distress   Abdomen: soft, non tender, non distended, no masses, no hernias. No inguinal lymphadenopathy.   Perineum: no lesions; normal appearing external genitalia, right bartholins gland s/p I&D healing open  Lower extremities: non-tender, no edema  Skin: no lesions or rashes    Labs & Imaging:  PAP NIL, HPV+  Trep neg  HIV neg  HBSA neg  Hep C neg  HSV 1 & 2 positive  GC/Chlam neg    ASSESSMENT/PLAN: Ayanna Davidson is a 28 year old P0 who presents for follow up (new patient to me) regarding recurrent Bartholin's gland abscesses, with recent I&D of a right  Bartholin's gland abscess on 3/5/19.     Bartholin's abscesses, recurrent: advised to return with recurrence, can offer marsupialization vs resection     Labial health: reviewed labia and vaginal cares, recommend discontinuing any douching, internal products, bubble baths, recommend clean cotton clothing, natural soaps without dyes nor coloring     Smoking/daily methamphetamine use: advised that these substances are toxic, very likely contributing to her recurrent infections, increasing her risk for cervical cancer. Reports no desire to quit either, and she denies IVDU.     Recent thorough STI testing as noted above.    Not using contraception, does not desire pregnancy: we discussed all options including sterilization, she is unsure at present, will return    Recent PAP NIL, HPV+, last PAP NIL (2016) with remote hx of LEEP: Recommending repeat co-testing in 12 months, recommending stopping smoking.    Froylan Schwarz MD  3/20/2019 1:07 AM

## 2019-03-29 ENCOUNTER — NURSE TRIAGE (OUTPATIENT)
Dept: NURSING | Facility: CLINIC | Age: 29
End: 2019-03-29

## 2019-03-29 NOTE — TELEPHONE ENCOUNTER
Caller reports recurrence of Bartholin's cyst; states OB provider told her to call  If   she got another  One and he would do surgery   Caller advised clinic closed and to be seen in ED for emergent care if  She cannot wait   understands and will go to ED   Neva aCrmona RN  FNA    Reason for Disposition    Genital area looks infected (e.g., draining sore, spreading redness)    Protocols used: VULVAR SYMPTOMS-ADULT-AH

## 2019-03-31 ENCOUNTER — NURSE TRIAGE (OUTPATIENT)
Dept: NURSING | Facility: CLINIC | Age: 29
End: 2019-03-31

## 2019-03-31 NOTE — TELEPHONE ENCOUNTER
"Ayanna has what is probably another Bartholin's' gland abscess and is in a lot of pain.    She saw Dr Schwarz on 3/19/2019 for this and per Ayanna's mom, Oneyda he instructed that if this occurred again he be called.    \"Bartholin's abscesses, recurrent: advised to return with recurrence, can offer marsupialization vs resection\".    Oneyda will take Ayanna to [facility] ER.    Kate ZHU RN [facility]     "

## 2019-04-29 ENCOUNTER — ANESTHESIA (OUTPATIENT)
Dept: SURGERY | Facility: CLINIC | Age: 29
End: 2019-04-29
Payer: COMMERCIAL

## 2019-04-29 ENCOUNTER — OFFICE VISIT (OUTPATIENT)
Dept: OBGYN | Facility: OTHER | Age: 29
End: 2019-04-29
Payer: COMMERCIAL

## 2019-04-29 ENCOUNTER — TELEPHONE (OUTPATIENT)
Dept: FAMILY MEDICINE | Facility: OTHER | Age: 29
End: 2019-04-29

## 2019-04-29 ENCOUNTER — APPOINTMENT (OUTPATIENT)
Dept: GENERAL RADIOLOGY | Facility: CLINIC | Age: 29
End: 2019-04-29
Attending: OBSTETRICS & GYNECOLOGY
Payer: COMMERCIAL

## 2019-04-29 ENCOUNTER — ANESTHESIA EVENT (OUTPATIENT)
Dept: SURGERY | Facility: CLINIC | Age: 29
End: 2019-04-29
Payer: COMMERCIAL

## 2019-04-29 ENCOUNTER — HOSPITAL ENCOUNTER (OUTPATIENT)
Facility: CLINIC | Age: 29
Discharge: HOME | End: 2019-04-29
Attending: OBSTETRICS & GYNECOLOGY | Admitting: OBSTETRICS & GYNECOLOGY
Payer: COMMERCIAL

## 2019-04-29 VITALS
RESPIRATION RATE: 20 BRPM | SYSTOLIC BLOOD PRESSURE: 90 MMHG | OXYGEN SATURATION: 97 % | TEMPERATURE: 98.4 F | HEART RATE: 78 BPM | DIASTOLIC BLOOD PRESSURE: 56 MMHG

## 2019-04-29 VITALS
WEIGHT: 185.6 LBS | HEART RATE: 96 BPM | DIASTOLIC BLOOD PRESSURE: 74 MMHG | SYSTOLIC BLOOD PRESSURE: 102 MMHG | BODY MASS INDEX: 29.96 KG/M2

## 2019-04-29 DIAGNOSIS — N76.4 VULVAR ABSCESS: Primary | ICD-10-CM

## 2019-04-29 DIAGNOSIS — Z01.818 PREOP GENERAL PHYSICAL EXAM: ICD-10-CM

## 2019-04-29 DIAGNOSIS — N75.1 BARTHOLIN'S GLAND ABSCESS: Primary | ICD-10-CM

## 2019-04-29 LAB
ALBUMIN SERPL-MCNC: 3.8 G/DL (ref 3.4–5)
ALP SERPL-CCNC: 69 U/L (ref 40–150)
ALT SERPL W P-5'-P-CCNC: 30 U/L (ref 0–50)
ANION GAP SERPL CALCULATED.3IONS-SCNC: 5 MMOL/L (ref 3–14)
AST SERPL W P-5'-P-CCNC: 24 U/L (ref 0–45)
BASOPHILS # BLD AUTO: 0 10E9/L (ref 0–0.2)
BASOPHILS NFR BLD AUTO: 0.1 %
BILIRUB SERPL-MCNC: 0.3 MG/DL (ref 0.2–1.3)
BUN SERPL-MCNC: 8 MG/DL (ref 7–30)
CALCIUM SERPL-MCNC: 10.3 MG/DL (ref 8.5–10.1)
CHLORIDE SERPL-SCNC: 104 MMOL/L (ref 94–109)
CO2 SERPL-SCNC: 29 MMOL/L (ref 20–32)
CREAT SERPL-MCNC: 0.73 MG/DL (ref 0.52–1.04)
DIFFERENTIAL METHOD BLD: ABNORMAL
EOSINOPHIL # BLD AUTO: 0.2 10E9/L (ref 0–0.7)
EOSINOPHIL NFR BLD AUTO: 1.4 %
ERYTHROCYTE [DISTWIDTH] IN BLOOD BY AUTOMATED COUNT: 14 % (ref 10–15)
GFR SERPL CREATININE-BSD FRML MDRD: >90 ML/MIN/{1.73_M2}
GLUCOSE SERPL-MCNC: 75 MG/DL (ref 70–99)
HCG UR QL: NEGATIVE
HCT VFR BLD AUTO: 46.5 % (ref 35–47)
HGB BLD-MCNC: 15.4 G/DL (ref 11.7–15.7)
LYMPHOCYTES # BLD AUTO: 1.8 10E9/L (ref 0.8–5.3)
LYMPHOCYTES NFR BLD AUTO: 11.4 %
MCH RBC QN AUTO: 30 PG (ref 26.5–33)
MCHC RBC AUTO-ENTMCNC: 33.1 G/DL (ref 31.5–36.5)
MCV RBC AUTO: 91 FL (ref 78–100)
MONOCYTES # BLD AUTO: 1 10E9/L (ref 0–1.3)
MONOCYTES NFR BLD AUTO: 6.3 %
NEUTROPHILS # BLD AUTO: 12.4 10E9/L (ref 1.6–8.3)
NEUTROPHILS NFR BLD AUTO: 80.8 %
PLATELET # BLD AUTO: 289 10E9/L (ref 150–450)
POTASSIUM SERPL-SCNC: 4.6 MMOL/L (ref 3.4–5.3)
PROT SERPL-MCNC: 7.4 G/DL (ref 6.8–8.8)
RBC # BLD AUTO: 5.13 10E12/L (ref 3.8–5.2)
SODIUM SERPL-SCNC: 138 MMOL/L (ref 133–144)
WBC # BLD AUTO: 15.4 10E9/L (ref 4–11)

## 2019-04-29 PROCEDURE — 25800030 ZZH RX IP 258 OP 636: Performed by: NURSE ANESTHETIST, CERTIFIED REGISTERED

## 2019-04-29 PROCEDURE — 36415 COLL VENOUS BLD VENIPUNCTURE: CPT | Performed by: OBSTETRICS & GYNECOLOGY

## 2019-04-29 PROCEDURE — 25000125 ZZHC RX 250: Performed by: NURSE ANESTHETIST, CERTIFIED REGISTERED

## 2019-04-29 PROCEDURE — 25000566 ZZH SEVOFLURANE, EA 15 MIN: Performed by: OBSTETRICS & GYNECOLOGY

## 2019-04-29 PROCEDURE — 25000125 ZZHC RX 250: Performed by: OBSTETRICS & GYNECOLOGY

## 2019-04-29 PROCEDURE — 56740 EXC BARTHOLINS GLAND/CYST: CPT | Performed by: OBSTETRICS & GYNECOLOGY

## 2019-04-29 PROCEDURE — 81025 URINE PREGNANCY TEST: CPT | Performed by: OBSTETRICS & GYNECOLOGY

## 2019-04-29 PROCEDURE — 25000128 H RX IP 250 OP 636: Performed by: NURSE ANESTHETIST, CERTIFIED REGISTERED

## 2019-04-29 PROCEDURE — 37000009 ZZH ANESTHESIA TECHNICAL FEE, EACH ADDTL 15 MIN: Performed by: OBSTETRICS & GYNECOLOGY

## 2019-04-29 PROCEDURE — 36000052 ZZH SURGERY LEVEL 2 EA 15 ADDTL MIN: Performed by: OBSTETRICS & GYNECOLOGY

## 2019-04-29 PROCEDURE — 25000132 ZZH RX MED GY IP 250 OP 250 PS 637: Performed by: OBSTETRICS & GYNECOLOGY

## 2019-04-29 PROCEDURE — 80053 COMPREHEN METABOLIC PANEL: CPT | Performed by: OBSTETRICS & GYNECOLOGY

## 2019-04-29 PROCEDURE — 86901 BLOOD TYPING SEROLOGIC RH(D): CPT | Performed by: OBSTETRICS & GYNECOLOGY

## 2019-04-29 PROCEDURE — 36000050 ZZH SURGERY LEVEL 2 1ST 30 MIN: Performed by: OBSTETRICS & GYNECOLOGY

## 2019-04-29 PROCEDURE — 37000008 ZZH ANESTHESIA TECHNICAL FEE, 1ST 30 MIN: Performed by: OBSTETRICS & GYNECOLOGY

## 2019-04-29 PROCEDURE — 27210794 ZZH OR GENERAL SUPPLY STERILE: Performed by: OBSTETRICS & GYNECOLOGY

## 2019-04-29 PROCEDURE — 40000306 ZZH STATISTIC PRE PROC ASSESS II: Performed by: OBSTETRICS & GYNECOLOGY

## 2019-04-29 PROCEDURE — 71045 X-RAY EXAM CHEST 1 VIEW: CPT | Mod: TC

## 2019-04-29 PROCEDURE — 71000027 ZZH RECOVERY PHASE 2 EACH 15 MINS: Performed by: OBSTETRICS & GYNECOLOGY

## 2019-04-29 PROCEDURE — 86900 BLOOD TYPING SEROLOGIC ABO: CPT | Performed by: OBSTETRICS & GYNECOLOGY

## 2019-04-29 PROCEDURE — 86850 RBC ANTIBODY SCREEN: CPT | Performed by: OBSTETRICS & GYNECOLOGY

## 2019-04-29 PROCEDURE — 99213 OFFICE O/P EST LOW 20 MIN: CPT | Performed by: OBSTETRICS & GYNECOLOGY

## 2019-04-29 PROCEDURE — 85025 COMPLETE CBC W/AUTO DIFF WBC: CPT | Performed by: OBSTETRICS & GYNECOLOGY

## 2019-04-29 PROCEDURE — 25000128 H RX IP 250 OP 636: Performed by: OBSTETRICS & GYNECOLOGY

## 2019-04-29 PROCEDURE — 71000014 ZZH RECOVERY PHASE 1 LEVEL 2 FIRST HR: Performed by: OBSTETRICS & GYNECOLOGY

## 2019-04-29 RX ORDER — CEFAZOLIN SODIUM 2 G/100ML
2 INJECTION, SOLUTION INTRAVENOUS
Status: COMPLETED | OUTPATIENT
Start: 2019-04-29 | End: 2019-04-29

## 2019-04-29 RX ORDER — MEPERIDINE HYDROCHLORIDE 25 MG/ML
12.5 INJECTION INTRAMUSCULAR; INTRAVENOUS; SUBCUTANEOUS
Status: DISCONTINUED | OUTPATIENT
Start: 2019-04-29 | End: 2019-04-29 | Stop reason: HOSPADM

## 2019-04-29 RX ORDER — SODIUM CHLORIDE, SODIUM LACTATE, POTASSIUM CHLORIDE, CALCIUM CHLORIDE 600; 310; 30; 20 MG/100ML; MG/100ML; MG/100ML; MG/100ML
INJECTION, SOLUTION INTRAVENOUS CONTINUOUS
Status: DISCONTINUED | OUTPATIENT
Start: 2019-04-29 | End: 2019-04-29 | Stop reason: HOSPADM

## 2019-04-29 RX ORDER — KETOROLAC TROMETHAMINE 30 MG/ML
INJECTION, SOLUTION INTRAMUSCULAR; INTRAVENOUS PRN
Status: DISCONTINUED | OUTPATIENT
Start: 2019-04-29 | End: 2019-04-29

## 2019-04-29 RX ORDER — CEFAZOLIN SODIUM 1 G/3ML
1 INJECTION, POWDER, FOR SOLUTION INTRAMUSCULAR; INTRAVENOUS SEE ADMIN INSTRUCTIONS
Status: DISCONTINUED | OUTPATIENT
Start: 2019-04-29 | End: 2019-04-29 | Stop reason: HOSPADM

## 2019-04-29 RX ORDER — LIDOCAINE 40 MG/G
CREAM TOPICAL
Status: DISCONTINUED | OUTPATIENT
Start: 2019-04-29 | End: 2019-04-29 | Stop reason: HOSPADM

## 2019-04-29 RX ORDER — FENTANYL CITRATE 50 UG/ML
25-50 INJECTION, SOLUTION INTRAMUSCULAR; INTRAVENOUS
Status: CANCELLED | OUTPATIENT
Start: 2019-04-29

## 2019-04-29 RX ORDER — FENTANYL CITRATE 50 UG/ML
INJECTION, SOLUTION INTRAMUSCULAR; INTRAVENOUS PRN
Status: DISCONTINUED | OUTPATIENT
Start: 2019-04-29 | End: 2019-04-29

## 2019-04-29 RX ORDER — ONDANSETRON 4 MG/1
4 TABLET, ORALLY DISINTEGRATING ORAL EVERY 30 MIN PRN
Status: DISCONTINUED | OUTPATIENT
Start: 2019-04-29 | End: 2019-04-29 | Stop reason: HOSPADM

## 2019-04-29 RX ORDER — ESMOLOL HYDROCHLORIDE 10 MG/ML
INJECTION INTRAVENOUS PRN
Status: DISCONTINUED | OUTPATIENT
Start: 2019-04-29 | End: 2019-04-29

## 2019-04-29 RX ORDER — NALOXONE HYDROCHLORIDE 0.4 MG/ML
.1-.4 INJECTION, SOLUTION INTRAMUSCULAR; INTRAVENOUS; SUBCUTANEOUS
Status: DISCONTINUED | OUTPATIENT
Start: 2019-04-29 | End: 2019-04-29 | Stop reason: HOSPADM

## 2019-04-29 RX ORDER — METOCLOPRAMIDE 5 MG/1
10 TABLET ORAL EVERY 6 HOURS PRN
Status: DISCONTINUED | OUTPATIENT
Start: 2019-04-29 | End: 2019-04-29 | Stop reason: HOSPADM

## 2019-04-29 RX ORDER — HYDROMORPHONE HYDROCHLORIDE 1 MG/ML
.3-.5 INJECTION, SOLUTION INTRAMUSCULAR; INTRAVENOUS; SUBCUTANEOUS EVERY 10 MIN PRN
Status: DISCONTINUED | OUTPATIENT
Start: 2019-04-29 | End: 2019-04-29 | Stop reason: HOSPADM

## 2019-04-29 RX ORDER — BUPIVACAINE HYDROCHLORIDE AND EPINEPHRINE 5; 5 MG/ML; UG/ML
30 INJECTION, SOLUTION PERINEURAL ONCE
Status: COMPLETED | OUTPATIENT
Start: 2019-04-29 | End: 2019-04-29

## 2019-04-29 RX ORDER — METOCLOPRAMIDE HYDROCHLORIDE 5 MG/ML
10 INJECTION INTRAMUSCULAR; INTRAVENOUS
Status: DISCONTINUED | OUTPATIENT
Start: 2019-04-29 | End: 2019-04-29 | Stop reason: HOSPADM

## 2019-04-29 RX ORDER — ONDANSETRON 4 MG/1
4-8 TABLET, ORALLY DISINTEGRATING ORAL EVERY 8 HOURS PRN
Qty: 4 TABLET | Refills: 0 | Status: SHIPPED | OUTPATIENT
Start: 2019-04-29 | End: 2019-05-07

## 2019-04-29 RX ORDER — OXYCODONE HYDROCHLORIDE 5 MG/1
10 TABLET ORAL
Status: COMPLETED | OUTPATIENT
Start: 2019-04-29 | End: 2019-04-29

## 2019-04-29 RX ORDER — FENTANYL CITRATE 50 UG/ML
25-50 INJECTION, SOLUTION INTRAMUSCULAR; INTRAVENOUS
Status: DISCONTINUED | OUTPATIENT
Start: 2019-04-29 | End: 2019-04-29 | Stop reason: HOSPADM

## 2019-04-29 RX ORDER — ONDANSETRON 4 MG/1
4 TABLET, ORALLY DISINTEGRATING ORAL
Status: DISCONTINUED | OUTPATIENT
Start: 2019-04-29 | End: 2019-04-29 | Stop reason: HOSPADM

## 2019-04-29 RX ORDER — ONDANSETRON 2 MG/ML
4 INJECTION INTRAMUSCULAR; INTRAVENOUS EVERY 30 MIN PRN
Status: DISCONTINUED | OUTPATIENT
Start: 2019-04-29 | End: 2019-04-29 | Stop reason: HOSPADM

## 2019-04-29 RX ORDER — LIDOCAINE HYDROCHLORIDE 20 MG/ML
INJECTION, SOLUTION INFILTRATION; PERINEURAL PRN
Status: DISCONTINUED | OUTPATIENT
Start: 2019-04-29 | End: 2019-04-29

## 2019-04-29 RX ORDER — ACETAMINOPHEN 325 MG/1
650 TABLET ORAL
Status: DISCONTINUED | OUTPATIENT
Start: 2019-04-29 | End: 2019-04-29 | Stop reason: HOSPADM

## 2019-04-29 RX ORDER — PROPOFOL 10 MG/ML
INJECTION, EMULSION INTRAVENOUS CONTINUOUS PRN
Status: DISCONTINUED | OUTPATIENT
Start: 2019-04-29 | End: 2019-04-29

## 2019-04-29 RX ORDER — PROPOFOL 10 MG/ML
INJECTION, EMULSION INTRAVENOUS PRN
Status: DISCONTINUED | OUTPATIENT
Start: 2019-04-29 | End: 2019-04-29

## 2019-04-29 RX ORDER — DIMENHYDRINATE 50 MG/ML
25 INJECTION, SOLUTION INTRAMUSCULAR; INTRAVENOUS
Status: DISCONTINUED | OUTPATIENT
Start: 2019-04-29 | End: 2019-04-29 | Stop reason: HOSPADM

## 2019-04-29 RX ADMIN — PROPOFOL 150 MCG/KG/MIN: 10 INJECTION, EMULSION INTRAVENOUS at 14:34

## 2019-04-29 RX ADMIN — ESMOLOL HYDROCHLORIDE 30 MG: 10 INJECTION, SOLUTION INTRAVENOUS at 14:59

## 2019-04-29 RX ADMIN — PROPOFOL 100 MG: 10 INJECTION, EMULSION INTRAVENOUS at 14:50

## 2019-04-29 RX ADMIN — SODIUM CHLORIDE, POTASSIUM CHLORIDE, SODIUM LACTATE AND CALCIUM CHLORIDE: 600; 310; 30; 20 INJECTION, SOLUTION INTRAVENOUS at 12:49

## 2019-04-29 RX ADMIN — SODIUM CHLORIDE, POTASSIUM CHLORIDE, SODIUM LACTATE AND CALCIUM CHLORIDE: 600; 310; 30; 20 INJECTION, SOLUTION INTRAVENOUS at 18:53

## 2019-04-29 RX ADMIN — ROCURONIUM BROMIDE 10 MG: 10 INJECTION INTRAVENOUS at 15:21

## 2019-04-29 RX ADMIN — FENTANYL CITRATE 100 MCG: 50 INJECTION, SOLUTION INTRAMUSCULAR; INTRAVENOUS at 14:22

## 2019-04-29 RX ADMIN — PROPOFOL 100 MG: 10 INJECTION, EMULSION INTRAVENOUS at 14:34

## 2019-04-29 RX ADMIN — ROCURONIUM BROMIDE 20 MG: 10 INJECTION INTRAVENOUS at 14:56

## 2019-04-29 RX ADMIN — DEXMEDETOMIDINE HYDROCHLORIDE 50 MCG: 100 INJECTION, SOLUTION INTRAVENOUS at 14:28

## 2019-04-29 RX ADMIN — LIDOCAINE HYDROCHLORIDE 100 MG: 20 INJECTION, SOLUTION INFILTRATION; PERINEURAL at 14:34

## 2019-04-29 RX ADMIN — SODIUM CHLORIDE, POTASSIUM CHLORIDE, SODIUM LACTATE AND CALCIUM CHLORIDE: 600; 310; 30; 20 INJECTION, SOLUTION INTRAVENOUS at 14:45

## 2019-04-29 RX ADMIN — MIDAZOLAM 1 MG: 1 INJECTION INTRAMUSCULAR; INTRAVENOUS at 14:27

## 2019-04-29 RX ADMIN — Medication 100 MG: at 14:42

## 2019-04-29 RX ADMIN — ESMOLOL HYDROCHLORIDE 20 MG: 10 INJECTION, SOLUTION INTRAVENOUS at 15:38

## 2019-04-29 RX ADMIN — HYDROMORPHONE HYDROCHLORIDE 0.5 MG: 1 INJECTION, SOLUTION INTRAMUSCULAR; INTRAVENOUS; SUBCUTANEOUS at 15:49

## 2019-04-29 RX ADMIN — CEFAZOLIN SODIUM 2 G: 2 INJECTION, SOLUTION INTRAVENOUS at 14:32

## 2019-04-29 RX ADMIN — OXYCODONE HYDROCHLORIDE 10 MG: 5 TABLET ORAL at 17:09

## 2019-04-29 RX ADMIN — SUGAMMADEX 200 MG: 100 INJECTION, SOLUTION INTRAVENOUS at 15:43

## 2019-04-29 RX ADMIN — LIDOCAINE HYDROCHLORIDE 1 ML: 10 INJECTION, SOLUTION EPIDURAL; INFILTRATION; INTRACAUDAL; PERINEURAL at 12:48

## 2019-04-29 RX ADMIN — KETOROLAC TROMETHAMINE 30 MG: 30 INJECTION, SOLUTION INTRAMUSCULAR at 15:35

## 2019-04-29 RX ADMIN — ACETAMINOPHEN 650 MG: 325 TABLET ORAL at 17:10

## 2019-04-29 ASSESSMENT — LIFESTYLE VARIABLES: TOBACCO_USE: 1

## 2019-04-29 NOTE — PROGRESS NOTES
Preoperative H&P     HPI:  Ayanna Davidson is a 28 year old P0 who presents for acute recurrent Bartholin's gland abscess in right labia, with severe pain, difficult to walk or sit. She had a recent I&D of a right Bartholin's gland abscess on 3/5/19. Of note, she gets them on both sides. 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   She was seen on 3/19 for the recurrent Bartholin's abscesses, as well chronic intermittent labial and vaginal irritation. She is working on improving vulvar cares, also no longer taking bubble baths or using wipes in her vaginal.   She has a hx smoking as well as daily methamphetamine use. She reports has quit Meth, has not used in the past two weeks. She denies IVDU. She just underwent thorough STI testing with only notable positive HSV.   She is not in a relationship though has a friend who she regularly has sex with.   Recent PAP NIL, HPV+, last PAP NIL (2016) with remote hx of LEEP. She states she has received the HPV vaccinations, though after initiation of sexual activity. She is aware of the link between smoking and cervical cancer.     PMH:   Past Medical History:   Diagnosis Date     Cervical high risk HPV (human papillomavirus) test positive 03/12/2019    last PAP NIL (4/16), remote hx of LEEP     Gastroesophageal reflux disease      Methamphetamine abuse (H)     reports daily use     recurrent Bartholin's cyst      SurgHx:   Past Surgical History:   Procedure Laterality Date     CHOLECYSTECTOMY       ENT SURGERY       INCISION AND DRAINAGE ABSCESS PELVIS, COMBINED N/A 2/26/2018    Procedure: COMBINED INCISION AND DRAINAGE ABSCESS PELVIS;  INCISION AND DRAINAGE LABIAL ABSCESS;  Surgeon: Jase Beach MD;  [facility]     INCISION AND DRAINAGE ABSCESS PELVIS, COMBINED N/A 3/5/2019    Procedure: Incision and Drainage Right Labial Abscess;  Surgeon: Jase Beach MD;  Location: [facility]     LAP ADJUSTABLE GASTRIC BAND       LEEP TX, CERVICAL       MAMMOPLASTY  REDUCTION BILATERAL       ORTHOPEDIC SURGERY       FamHx:   History reviewed. No pertinent family history.    SocHx:   Social History     Socioeconomic History     Marital status: Single     Spouse name: None     Number of children: None     Years of education: None     Highest education level: None   Occupational History     None   Social Needs     Financial resource strain: None     Food insecurity:     Worry: None     Inability: None     Transportation needs:     Medical: None     Non-medical: None   Tobacco Use     Smoking status: Current Every Day Smoker     Packs/day: 0.25     Smokeless tobacco: Current User     Tobacco comment: uses E cig   Substance and Sexual Activity     Alcohol use: No     Drug use: Not Currently     Comment: In treatment for meth.     Sexual activity: Yes     Partners: Male     Birth control/protection: None   Lifestyle     Physical activity:     Days per week: None     Minutes per session: None     Stress: None   Relationships     Social connections:     Talks on phone: None     Gets together: None     Attends Pentecostalism service: None     Active member of club or organization: None     Attends meetings of clubs or organizations: None     Relationship status: None     Intimate partner violence:     Fear of current or ex partner: None     Emotionally abused: None     Physically abused: None     Forced sexual activity: None   Other Topics Concern     None   Social History Narrative     None     Allergies:   Patient has no known allergies.    Current Medications:   Current Outpatient Medications   Medication Sig Dispense Refill     Acetaminophen (TYLENOL PO) Take 500 mg by mouth every 4 hours as needed for mild pain or fever       BuPROPion HCl (WELLBUTRIN XL PO) Take 150 mg by mouth daily       butalbital-acetaminophen-caffeine (FIORICET/ESGIC) -40 MG tablet Take 1 tablet by mouth every 4 hours as needed for headaches 30 tablet 3     omeprazole (PRILOSEC OTC) 20 MG EC tablet Take 1  tablet (20 mg) by mouth daily 30 tablet 3     sertraline (ZOLOFT) 50 MG tablet Take 50 mg by mouth daily       cyanocobalamin (VITAMIN B12) 1000 MCG/ML injection Inject 1 mL into the muscle every 30 days       Ondansetron (ZOFRAN ODT PO) Take 4 mg by mouth every 8 hours as needed for nausea       ROS: As described in HPI, otherwise negative for fever/chills, cough/SOB/CP, nausea/vomit, GI distress, dysuria, abnormal vaginal discharge, constipation/diarrhea, or other systemic complaints    EXAM:  Vitals:    04/29/19 1000   BP: 102/74   BP Location: Right arm   Cuff Size: Adult Regular   Pulse: 96   Weight: 84.2 kg (185 lb 9.6 oz)    Body mass index is 29.96 kg/m .    Gen: Alert, oriented, appropriately interactive, NAD  CV: RRR, 2+ peripheral pulses  Resp: CTAB, good effort without distress   Abdomen: soft, non tender, non distended, no masses, no hernias. No inguinal lymphadenopathy.   Perineum: Swollen tender right bartholins gland appx 15 mm, otherwise no lesions; normal appearing external genitalia  Lower extremities: non-tender, no edema  Skin: no lesions or rashes    Labs & Imaging:  Results for orders placed or performed in visit on 04/29/19 (from the past 24 hour(s))   HCG qualitative urine   Result Value Ref Range    HCG Qual Urine Negative NEG^Negative     PAP NIL, HPV+  Trep neg  HIV neg  HBSA neg  Hep C neg  HSV 1 & 2 positive  GC/Chlam neg    ASSESSMENT/PLAN: Hollie Davidson is a 28 year old P0 who presents for acute recurrent Bartholin's gland abscess in right labia, with severe pain, difficult to walk or sit.    1. Bartholin's gland abscess  - Discussed in office I&D, given recurrent nature, severe pain, and failure of recent marsupialization on the same side, she is requesting that the cyst be removed entirely in the OR. This is reasonable, I was able to add her on to the OR schedule for this afternoon. Reviewed plan sitz baths, no sex until healed.   - Noemi-Operative Worksheet    2. Preop general  physical exam  - Procedure is low risk, superficial cyst dissection, patient is young 29 yo, no CV disease hx, no bleeding nor clotting hx, no physical limitations, she is cleared for surgery.   - CBC with platelets and differential  - ABO/Rh type and screen  - Comprehensive metabolic panel (BMP + Alb, Alk Phos, ALT, AST, Total. Bili, TP)  - HCG qualitative urine    Froylan Schwarz MD  4/29/2019 10:38 AM

## 2019-04-29 NOTE — OP NOTE
Operative Note   Name: Ayanna Davidson  MRN: [id number]  : 1990  Date of Surgery: 2019    Pre-operative Diagnosis: recurrent Bartholin's abscess, right  Post-operative Diagnosis: Same  Procedure(s): Excision of right Bartholin abscess     Surgeon: Froylan Schwarz MD    Anesthesia: MAC converted to GETA  EBL: 50 mL     Specimens: none  Complications: None apparent.  Findings: appx 15 mm right Bartholin abscess, purulent discharge     Indications: Ayanna Davidson is a 28 year old P0 with an acute recurrent Bartholin's gland abscess in right labia, with severe pain, difficult to walk or sit.  Discussed in office I&D, given recurrent nature, severe pain, and failure of recent marsupialization on the same side, she is requesting that the cyst be removed entirely in the OR. Offering operative excision of her right Bartholin cyst, reviewed risks of infection, bleeding, injury to surrounding structures. She wishes to proceed. Reviewed postoperative plan sitz baths, no sex until healed.     Procedure:  The patient was brought to the operating room, where she was placed in the dorsal lithotomy position in yellowfin stirups, underwent MAC, converted to general endotracheal anesthesia. Morven protocol was carried out. Right labia injected with 0.5% marcaine with epi. Two Allis clamps were placed on the right labia and used for traction. A vertical incision was made at the introitus and the Allis clamps were repositioned to the edge of the vaginal mucosa which the scalpel was used to dissect down to the abscess wall. The abscess was inadvertently ruptured, and two more Allis clamps were placed on the cyst wall. A [place] retractor was then placed retracting the vaginal mucosa bilaterally. A Christina forceps was used to bluntly dissect out the cyst wall circumferentially. The cyst wall became fragmented and was removed in segments and discarded. The defect was copiously irrigated. The defect was closed in layers with  interrupted 3-0 vicryl. The vaginal mucosa was closed with running 4-0 vicryl.   The patient was extubated, awakened in the OR and taken to recovery in stable condition. Sponge, lap and needle counts were correct x 2. The patient received 2 grams of ancef prior to the procedure.     Froylan Schwarz MD  04/29/2019, 4:45 PM

## 2019-04-29 NOTE — ANESTHESIA PREPROCEDURE EVALUATION
Anesthesia Pre-Procedure Evaluation    Patient: Ayanna Davidson   MRN: [id number] : 1990          Preoperative Diagnosis: Bartholin abscess, recurrent    Procedure(s):  Bartholin's Cyst removal    Past Medical History:   Diagnosis Date     Cervical high risk HPV (human papillomavirus) test positive 2019    last PAP NIL (), remote hx of LEEP     Gastroesophageal reflux disease      Methamphetamine abuse (H)     reports daily use     recurrent Bartholin's cyst      Past Surgical History:   Procedure Laterality Date     CHOLECYSTECTOMY       ENT SURGERY       INCISION AND DRAINAGE ABSCESS PELVIS, COMBINED N/A 2018    Procedure: COMBINED INCISION AND DRAINAGE ABSCESS PELVIS;  INCISION AND DRAINAGE LABIAL ABSCESS;  Surgeon: Jase Beach MD;  [facility]     INCISION AND DRAINAGE ABSCESS PELVIS, COMBINED N/A 3/5/2019    Procedure: Incision and Drainage Right Labial Abscess;  Surgeon: Jase Beach MD;  Location: [facility]     LAP ADJUSTABLE GASTRIC BAND       LEEP TX, CERVICAL       MAMMOPLASTY REDUCTION BILATERAL       ORTHOPEDIC SURGERY         Anesthesia Evaluation     . Pt has had prior anesthetic. Type: General and MAC    No history of anesthetic complications          ROS/MED HX    ENT/Pulmonary:     (+)tobacco use, Current use 0.25 ppd packs/day  , . .    Neurologic:  - neg neurologic ROS     Cardiovascular:     (+) ----. : . . . :. . No previous cardiac testing       METS/Exercise Tolerance:     Hematologic:  - neg hematologic  ROS       Musculoskeletal:  - neg musculoskeletal ROS       GI/Hepatic:     (+) GERD       Renal/Genitourinary:  - ROS Renal section negative   (+) Other Renal/ Genitourinary, bartholins cyst - R labia - presents for I & D      Endo:  - neg endo ROS       Psychiatric: Comment: Polysubstance abuse per last tox screen.    (+) psychiatric history anxiety and depression      Infectious Disease:  - neg infectious disease ROS       Malignancy:      - no malignancy  "  Other: Comment: - HCG   - neg other ROS                      Physical Exam  Normal systems: cardiovascular    Airway   Mallampati: II  TM distance: <3 FB  Neck ROM: full    Dental   (+) missing and chipped    Cardiovascular   Rhythm and rate: regular and normal  (-) no murmur    Pulmonary (+) decreased breath sounds               Lab Results   Component Value Date    WBC 16.3 (H) 02/26/2018    HGB 12.8 02/26/2018    HCT 40.1 02/26/2018     02/26/2018     02/26/2018    POTASSIUM 4.0 02/26/2018    CHLORIDE 103 02/26/2018    CO2 27 02/26/2018    BUN 8 02/26/2018    CR 0.77 02/26/2018     (H) 02/26/2018    ANUM 9.0 02/26/2018    ALBUMIN 4.7 (H) 11/21/2007    PROTTOTAL 7.4 11/21/2007    ALT 23 11/21/2007    AST 27 11/21/2007    ALKPHOS 53 11/21/2007    BILITOTAL 0.5 11/21/2007    LIPASE 86 11/21/2007    AMYLASE 58 11/21/2007    HCG Negative 04/29/2019       Preop Vitals  BP Readings from Last 3 Encounters:   04/29/19 102/74   03/19/19 110/78   03/12/19 130/80    Pulse Readings from Last 3 Encounters:   04/29/19 96   03/19/19 86   03/12/19 110      Resp Readings from Last 3 Encounters:   03/12/19 20   03/05/19 16   07/02/18 18    SpO2 Readings from Last 3 Encounters:   03/12/19 98%   03/05/19 96%   07/02/18 97%      Temp Readings from Last 1 Encounters:   03/12/19 97.6  F (36.4  C) (Temporal)    Ht Readings from Last 1 Encounters:   03/12/19 1.676 m (5' 6\")      Wt Readings from Last 1 Encounters:   04/29/19 84.2 kg (185 lb 9.6 oz)    Estimated body mass index is 29.96 kg/m  as calculated from the following:    Height as of 3/12/19: 1.676 m (5' 6\").    Weight as of an earlier encounter on 4/29/19: 84.2 kg (185 lb 9.6 oz).       Anesthesia Plan      History & Physical Review  History and physical reviewed and following examination; no interval change.    ASA Status:  2 .    NPO Status:  > 6 hours    Plan for MAC with Intravenous and Propofol induction. Maintenance will be TIVA.  Reason for MAC:  Deep " "or markedly invasive procedure (G8) and Extreme anxiety (QS)  PONV prophylaxis:  Ondansetron (or other 5HT-3) and Dexamethasone or Solumedrol  Pt very anxious and very uncomfortable and demanding pain medication as she \"has never experienced such pain in her life!\"      Postoperative Care  Postoperative pain management:  IV analgesics.      Consents  Anesthetic plan, risks, benefits and alternatives discussed with:  Patient and Other (See Comment) (Grandmother).  Use of blood products discussed: No .   .                 LEXX Dimas CRNA  "

## 2019-04-29 NOTE — OR NURSING
"Pt moved to phase 2 recovery. Groggy but beginning to wake up more. Pt offers c/o chest heaviness \"like my acid reflux pain\".  Fair cough infrequent, mild congested and non-productive. Pt is a smoker and states this is how her cough normally does sound. Lungs clear with adequate air exchange t/o posteriorly. RR 18/min.non-labored. O2 sat on RA stable mid 90's. Portable CXR results pending Radiologist reading. Will instruct pt on use of IS for pulmonary toilet. Done. Pt's TV's 2750 ml. Congested cough produced sm.amt.bloody flecks in clear saliva type sputum.   "

## 2019-04-29 NOTE — TELEPHONE ENCOUNTER
Pt was seen by Dr. Schwarz today for a bartholin cyst.  She is in a lot of vaginal pain.  Dr. Schwarz is going to remove the bartholin cyst under anesthesia this afternoon in the OR.  Pt called in to see if she can go to the hospital sooner to get some pain control prior to her surgery.  Dr. Schwarz is in clinic in [place] and isn't able to see her up in [place] any sooner than her scheduled surgery time.  Advised pt to take over the counter Ibuprofen or Tylenol with small sips of water to help with pain control.  I did let her know that if after taking Ibuprofen or Tylenol and she still was in a lot of pain, she can be seen in the emergency room in [place] for pain management before her surgery but she needed to let them know she had surgery this afternoon.    Pt verbalized understanding and agreed to plan.    Pam Patiño RN

## 2019-04-29 NOTE — DISCHARGE INSTRUCTIONS
[facility] Same-Day Surgery   Adult Discharge Orders & Instructions     For 24 hours after surgery    1. Get plenty of rest.  A responsible adult must stay with you for at least 24 hours after you leave the hospital.   2. Do not drive or use heavy equipment.  If you have weakness or tingling, don't drive or use heavy equipment until this feeling goes away.  3. Do not drink alcohol.  4. Avoid strenuous or risky activities.  Ask for help when climbing stairs.   5. You may feel lightheaded.  If so, sit for a few minutes before standing.  Have someone help you get up.   6. You may have a slight fever. Call the doctor if your fever is over 100 F (37.7 C) (taken under the tongue) or lasts longer than 24 hours.  7. You may have a dry mouth, a sore throat, muscle aches or trouble sleeping.  These should go away after 24 hours.  8. Do not make important or legal decisions.  We don t expect you to have any problems from the surgery or treatment you had today. Just in case, here s what to do if you have pain, upset stomach (nausea), bleeding or infection:  Pain:  Take medicines your doctor has prescribed or over-the-counter medicine they have suggested. Resting and using ice packs can help, too. For surgery on an arm or leg, raise it on a pillow to ease swelling. Call your doctor if these methods don t work.  Copyright [employer], Licensed under [employer]  Upset stomach (nausea):  Take anti-nausea medicine approved by your doctor. Drink clear liquids like apple juice, ginger ale, broth or 7-Up. Be sure to drink enough fluids. Rest can help, too. Move to normal foods when you re ready. Bleeding:  In the first 24 hours, you may see a little blood on your dressing, about the size of a quarter. You don t need to worry about this much blood, but if the blood spot keeps getting bigger:    Put pressure on the wound if you can, AND    Call your doctor.  Copyright [employer], Licensed under [employer]  International  Fever/Infection: Please call your doctor if you have any of these signs:    Redness    Swelling    Wound feels warm    Pain gets worse    Bad-smelling fluid leaks from wound    Fever or chills    Increased cough with or without production of sputum; shortness of breath  Call your doctor for any of the followin.  It has been over 8 to 10 hours since surgery and you are still not able to urinate (pass water).    2.  Headache for over 24 hours.     [facility] Nurse advice line: [phone] (24 hour available no.)

## 2019-04-29 NOTE — BRIEF OP NOTE
[facility]    Brief Operative Note    Pre-operative diagnosis: Bartholin abscess, recurrent  Post-operative diagnosis * No post-op diagnosis entered *  Procedure: Procedure(s):  Bartholin's Cyst removal  Surgeon: Surgeon(s) and Role:     * Froylan Schwarz MD - Primary  Anesthesia: Combined MAC with Local   Estimated blood loss: Less than 50 ml  Drains: None  Specimens: None (cyst wall was fragmented)  Findings: appx 15 mm right Bartholin abscess, purulent discharge   Complications: None  Implants:  * No implants in log *     Froylan Schwarz MD  OB/GYN  April 29, 2019, 3:48 PM

## 2019-04-29 NOTE — ANESTHESIA CARE TRANSFER NOTE
Patient: Ayanna Davidson    Procedure(s):  Bartholin's Cyst removal    Diagnosis: Bartholin abscess, recurrent  Diagnosis Additional Information: No value filed.    Anesthesia Type:   MAC     Note:  Airway :Face Mask  Patient transferred to:PACU  Handoff Report: Identifed the Patient, Identified the Reponsible Provider, Reviewed the pertinent medical history, Discussed the surgical course, Reviewed Intra-OP anesthesia mangement and issues during anesthesia, Set expectations for post-procedure period and Allowed opportunity for questions and acknowledgement of understanding      Vitals: (Last set prior to Anesthesia Care Transfer)    CRNA VITALS  4/29/2019 1524 - 4/29/2019 1618      4/29/2019             SpO2:  98 %                Electronically Signed By: LEXX Rondon CRNA  April 29, 2019  4:18 PM

## 2019-04-29 NOTE — ANESTHESIA POSTPROCEDURE EVALUATION
Patient: Ayanna Davidson    Procedure(s):  Bartholin's Cyst removal    Diagnosis:Bartholin abscess, recurrent  Diagnosis Additional Information: No value filed.    Anesthesia Type:  MAC    Note:  Anesthesia Post Evaluation    Patient location during evaluation: Phase 2 and Bedside  Patient participation: Able to fully participate in evaluation  Level of consciousness: awake and alert  Pain management: adequate  Airway patency: patent  Cardiovascular status: acceptable  Respiratory status: acceptable  Hydration status: acceptable  PONV: none     Anesthetic complications: None    Comments: Patient's respiratory status is stable after a possible aspiration event in the OR. CXR taken in PACU was normal. VSS. Talked with patient and grandmother about signs of possible aspiration pneumonia and if patient starts to exhibit these symptoms go to the ER. They stated understanding. Patient instructed on use of IS that is being sent home with her. Will follow as needed.        Last vitals:  Vitals:    04/29/19 1720 04/29/19 1725 04/29/19 1730   BP: 97/48  (!) 85/50   Pulse: 81  82   Resp: 18  20   Temp:      SpO2: 95% 94% 95%         Electronically Signed By: LEXX Rondon CRNA  April 29, 2019  6:18 PM

## 2019-04-30 ENCOUNTER — NURSE TRIAGE (OUTPATIENT)
Dept: FAMILY MEDICINE | Facility: CLINIC | Age: 29
End: 2019-04-30

## 2019-04-30 LAB
ABO + RH BLD: NORMAL
ABO + RH BLD: NORMAL
BLD GP AB SCN SERPL QL: NORMAL
BLOOD BANK CMNT PATIENT-IMP: NORMAL
SPECIMEN EXP DATE BLD: NORMAL

## 2019-04-30 NOTE — OR NURSING
"Pt's BP stable in 90's/50's. (Pre-op 122/73). HR 70's-80's. Pt reports \"sm.amt.dizzy but I feel ok.\" Ambulates with steady gait. Able to void. Had yogurt and jello. Denies c/o nausea. CXR result normal. Pt given verbal and written instructions for discharge that if her cough worsens &/or fever develops, to notify .   "

## 2019-04-30 NOTE — TELEPHONE ENCOUNTER
"    Reason for Disposition    Shortness of breath after choking spell    Additional Information    Negative: [1] Choking or struggling to breathe now AND [2] lasts > 60 seconds    Negative: Can't cough, speak, or make any noise now (i.e., stops breathing)    Negative: Has passed out or is limp.    Negative: Bluish lips, tongue, or face now    Negative: Sounds like a life-threatening emergency to the triager    Negative: [1] Recovered from choking AND [2] may have swallowed a FB (foreign body)    Negative: [1] Patient cleared the FB spontaneously BUT [2] continues to have coughing, hoarseness, or wheezing > 30 minutes    Negative: Coughed up blood  (Exception:  blood-streaked sputum and once only)    Negative: [1] Patient cleared the FB spontaneously BUT [2] difficulty swallowing or gagging persist    Negative: Symptoms of FB stuck in esophagus (e.g., continued pain in throat or chest, FB sensation, gagging)    Negative: [1] Patient required Heimlich maneuver to remove solid FB AND [2] now has no symptoms    Negative: Coughing or other airway symptoms return    Answer Assessment - Initial Assessment Questions  1. SUBSTANCE: \"What did the patient choke on?\"       Vomit  2. SIZE: If the object was solid, ask: \"How big was it?\"       NA  3. ONSET: \"When did it begin?\" (In minutes)       5  4. RESPIRATORY DISTRESS: \"Describe the patient's breathing.\", \"Is he/she able to talk?\"      Yes, just has sore throat and painful breathing  5. PREGNANCY: \"Is there any chance you are pregnant?\" \"When was your last menstrual period?\"      no    Protocols used: CHOKING - INHALED FOREIGN BODY-ADULT-AH      "

## 2019-05-01 ENCOUNTER — HOSPITAL ENCOUNTER (EMERGENCY)
Facility: CLINIC | Age: 29
Discharge: HOME | End: 2019-05-01
Attending: EMERGENCY MEDICINE | Admitting: EMERGENCY MEDICINE
Payer: COMMERCIAL

## 2019-05-01 ENCOUNTER — TELEPHONE (OUTPATIENT)
Dept: OBGYN | Facility: OTHER | Age: 29
End: 2019-05-01

## 2019-05-01 VITALS
BODY MASS INDEX: 30.18 KG/M2 | WEIGHT: 187 LBS | RESPIRATION RATE: 14 BRPM | OXYGEN SATURATION: 100 % | SYSTOLIC BLOOD PRESSURE: 114 MMHG | TEMPERATURE: 98 F | DIASTOLIC BLOOD PRESSURE: 77 MMHG

## 2019-05-01 DIAGNOSIS — G89.18 ACUTE POST-OPERATIVE PAIN: ICD-10-CM

## 2019-05-01 PROCEDURE — 99283 EMERGENCY DEPT VISIT LOW MDM: CPT | Performed by: EMERGENCY MEDICINE

## 2019-05-01 PROCEDURE — 99284 EMERGENCY DEPT VISIT MOD MDM: CPT | Mod: Z6 | Performed by: EMERGENCY MEDICINE

## 2019-05-01 RX ORDER — TRAMADOL HYDROCHLORIDE 50 MG/1
50 TABLET ORAL EVERY 6 HOURS PRN
Qty: 20 TABLET | Refills: 0 | Status: ON HOLD | OUTPATIENT
Start: 2019-05-01 | End: 2019-09-29

## 2019-05-01 NOTE — DISCHARGE INSTRUCTIONS
There was no packing material left after the surgery.  If your vaginal discharge or vaginal bleeding becomes worse or you develop lightheadedness or dizziness or shortness of breath or worsening chest pain return to the emergency department.  Otherwise follow-up with your doctor in the clinic.

## 2019-05-01 NOTE — ED TRIAGE NOTES
Presents with pain following labial cyst removal. States she also feels bloated in her chest. Unsure how long she is suppose to leave the pacing in place.

## 2019-05-01 NOTE — ED AVS SNAPSHOT
[facility] Emergency Department  [address] [place] [address]  Phone:  [phone]  Fax:  [phone]                                    Ayanna Davidson   MRN: [id number]    Department:  [facility] Emergency Department   Date of Visit:  5/1/2019           After Visit Summary Signature Page    I have received my discharge instructions, and my questions have been answered. I have discussed any challenges I see with this plan with the nurse or doctor.    ..........................................................................................................................................  Patient/Patient Representative Signature      ..........................................................................................................................................  Patient Representative Print Name and Relationship to Patient    ..................................................               ................................................  Date                                   Time    ..........................................................................................................................................  Reviewed by Signature/Title    ...................................................              ..............................................  Date                                               Time          22EPIC Rev 08/18

## 2019-05-01 NOTE — OR NURSING
[facility] Same Day Surgery  Discharge Call Back  Ayanna Davidson  1990  MRN: [id number]  Home: [phone] (home)   PCP: No Ref-Primary, Physician    We are calling to see how you're doing since your surgery/procedure with us?   Comments: Talked with patient's mother, she is stating that pt is having post op issues and mother is currently on hold with the clinic to get a message to Dr. Schwarz  Clinical Questions  1. Have you had time to look at your discharge instructions? Do you have any questions in regards to the instructions?   Comment:   2. Do you feel your pain is being controlled with the regimen the surgeon sent you home on? (ie: prescription medications, over the counter pain medications, ice packs)   Comments:   3. Have you noticed any drainage on your dressing? Do you know what to do if you have bleeding as a result of your procedure?   Comments:   4. Have you had any nausea/vomiting? Do you know how to treat this?   Comment:   5. Have you had any signs/symptoms of infection? (ie: fever, swelling, heat, drainage or redness) Do you know what to do if you have?   Comment:   6. Do you have a follow up appointment made with your surgeon? Do you have a number to contact them at if you need it?   Comment:   Retained Foreign Object (OZIEL, Hemovac, Penrose, Wound Packing, Vaginal Packing, Nasal Splints, Urethral Stents, Mooney Catheter)  1. Do you still have  in place?   2. If the item is still in place, can you review the plan for removal with me?       You may be randomly selected to fill out a [facility] survey. We would appreciate you taking the time to fill this out. It is important to us if you would answer all of the questions on the survey.

## 2019-05-01 NOTE — TELEPHONE ENCOUNTER
"Patient direct called from an unknown number to triage line. She states that she had procedure for a Bartholin's cyst and \"believes a piece of gauze is stuck up there and it hurts\" referring to her pelvis. She has been in pain since she got home from her procedure on 4/29, rating it 7-8/10 on a 0-10 scale. She is moaning and groaning while asking her questions. Patient is complaining that she has chest pain, that it's hard to swallow, like I \"aspirated something\". Patient was directed to go to ED for further evaluation. Patient verbalized understanding and agreed to plan. Patient was given the opportunity to ask additional questions and have them answered. Patient had no further questions.     Joellen Cortez RN on 5/1/2019 at 12:33 PM    "

## 2019-05-07 ENCOUNTER — HOSPITAL ENCOUNTER (EMERGENCY)
Facility: CLINIC | Age: 29
End: 2019-05-07
Payer: COMMERCIAL

## 2019-05-07 ENCOUNTER — HOSPITAL ENCOUNTER (EMERGENCY)
Facility: CLINIC | Age: 29
Discharge: HOME | End: 2019-05-07
Attending: EMERGENCY MEDICINE | Admitting: EMERGENCY MEDICINE
Payer: COMMERCIAL

## 2019-05-07 ENCOUNTER — OFFICE VISIT (OUTPATIENT)
Dept: OBGYN | Facility: CLINIC | Age: 29
End: 2019-05-07
Payer: COMMERCIAL

## 2019-05-07 VITALS
SYSTOLIC BLOOD PRESSURE: 119 MMHG | WEIGHT: 185 LBS | HEART RATE: 104 BPM | DIASTOLIC BLOOD PRESSURE: 80 MMHG | BODY MASS INDEX: 29.73 KG/M2 | RESPIRATION RATE: 16 BRPM | OXYGEN SATURATION: 98 % | HEIGHT: 66 IN | TEMPERATURE: 98.1 F

## 2019-05-07 VITALS
BODY MASS INDEX: 30.02 KG/M2 | HEART RATE: 76 BPM | WEIGHT: 186 LBS | DIASTOLIC BLOOD PRESSURE: 84 MMHG | SYSTOLIC BLOOD PRESSURE: 124 MMHG
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
## 2019-05-07 DIAGNOSIS — Z98.890 POSTOPERATIVE STATE: Primary | ICD-10-CM

## 2019-05-07 PROCEDURE — 25000125 ZZHC RX 250: Performed by: EMERGENCY MEDICINE

## 2019-05-07 PROCEDURE — 90375 RABIES IG IM/SC: CPT | Performed by: EMERGENCY MEDICINE

## 2019-05-07 PROCEDURE — 99212 OFFICE O/P EST SF 10 MIN: CPT | Performed by: OBSTETRICS & GYNECOLOGY

## 2019-05-07 PROCEDURE — 90675 RABIES VACCINE IM: CPT | Performed by: EMERGENCY MEDICINE

## 2019-05-07 PROCEDURE — 12002 RPR S/N/AX/GEN/TRNK2.6-7.5CM: CPT | Mod: Z6 | Performed by: EMERGENCY MEDICINE

## 2019-05-07 PROCEDURE — 90471 IMMUNIZATION ADMIN: CPT | Performed by: EMERGENCY MEDICINE

## 2019-05-07 PROCEDURE — 12002 RPR S/N/AX/GEN/TRNK2.6-7.5CM: CPT | Performed by: EMERGENCY MEDICINE

## 2019-05-07 PROCEDURE — 99284 EMERGENCY DEPT VISIT MOD MDM: CPT | Mod: 25 | Performed by: EMERGENCY MEDICINE

## 2019-05-07 PROCEDURE — 25000128 H RX IP 250 OP 636: Performed by: EMERGENCY MEDICINE

## 2019-05-07 RX ORDER — TRAMADOL HYDROCHLORIDE 50 MG/1
50 TABLET ORAL EVERY 6 HOURS PRN
COMMUNITY
End: 2019-09-27

## 2019-05-07 RX ORDER — ONDANSETRON 4 MG/1
4 TABLET, ORALLY DISINTEGRATING ORAL EVERY 6 HOURS PRN
Qty: 15 TABLET | Refills: 0 | Status: ON HOLD | OUTPATIENT
Start: 2019-05-07 | End: 2019-09-29

## 2019-05-07 RX ADMIN — Medication 1 ML: at 13:35

## 2019-05-07 RX ADMIN — RABIES IMMUNE GLOBULIN (HUMAN) 1680 UNITS: 300 INJECTION, SOLUTION INFILTRATION; INTRAMUSCULAR at 13:34

## 2019-05-07 RX ADMIN — LIDOCAINE HYDROCHLORIDE 5 ML: 10 INJECTION, SOLUTION INFILTRATION; PERINEURAL at 13:48

## 2019-05-07 SDOH — HEALTH STABILITY: MENTAL HEALTH: HOW OFTEN DO YOU HAVE A DRINK CONTAINING ALCOHOL?: MONTHLY OR LESS

## 2019-05-07 ASSESSMENT — MIFFLIN-ST. JEOR: SCORE: 1575.9

## 2019-05-07 NOTE — ED TRIAGE NOTES
Patient presents with dog bite to R) hand. She reports it is her dog and is the 2nd time in a week that this has happened. She states she has 2 dogs that were fighting and she tried to break them up. She says neither dog is UTD on immunizations. Blanca Chang RN

## 2019-05-07 NOTE — ED NOTES
"Registration Staff came to the desk and said they brought a visitor back to her room and when that person arrived, she called the registration person \"Stupid\" and said she had an appointment at specialty and she per his quote she stormed out. I never saw the Pt or assessed her.   "

## 2019-05-07 NOTE — PROGRESS NOTES
Clinic Note    HPI:  Ayanna Davidson is a 28 year old  who is s/p excision of right Bartholin's abscess on , presents for f/u, concerned with discomfort and odor. She was seen in the ED on  for same with normal pelvic exam at that visit. Of note, she presented to the ED again today just prior to her appt for a dog bite this morning. She left the ED for this appt, holding swollen hand in air with open wounds. She reports she has been taking sitz baths. When asked about substance use, she said she had a shot this morning, planning to smoke crack later today. We were not sure if she was kidding or not. She did report daily methamphetamine use at her prior appt.     PMH:   Past Medical History:   Diagnosis Date     Cervical high risk HPV (human papillomavirus) test positive 2019    last PAP NIL (), remote hx of LEEP     Gastroesophageal reflux disease      Methamphetamine abuse (H)     reports daily use     recurrent Bartholin's cyst      SurgHx:   Past Surgical History:   Procedure Laterality Date     CHOLECYSTECTOMY       ENT SURGERY       INCISION AND DRAINAGE ABSCESS PELVIS, COMBINED N/A 2018    Procedure: COMBINED INCISION AND DRAINAGE ABSCESS PELVIS;  INCISION AND DRAINAGE LABIAL ABSCESS;  Surgeon: Jase Beach MD;  Location: [facility] OR     INCISION AND DRAINAGE ABSCESS PELVIS, COMBINED N/A 3/5/2019    Procedure: Incision and Drainage Right Labial Abscess;  Surgeon: Jase Beach MD;  Location: [facility] OR     LAP ADJUSTABLE GASTRIC BAND       LEEP TX, CERVICAL       MAMMOPLASTY REDUCTION BILATERAL       MARSUPIALIZATION BARTHOLIN CYST N/A 2019    Procedure: Bartholin's Cyst removal;  Surgeon: Froylan Schwarz MD;  Location: [facility] OR     ORTHOPEDIC SURGERY       FamHx:   History reviewed. No pertinent family history.    SocHx:   Social History     Socioeconomic History     Marital status: Single     Spouse name: None     Number of children: None     Years of education: None      Highest education level: None   Occupational History     None   Social Needs     Financial resource strain: None     Food insecurity:     Worry: None     Inability: None     Transportation needs:     Medical: None     Non-medical: None   Tobacco Use     Smoking status: Current Every Day Smoker     Packs/day: 0.25     Smokeless tobacco: Current User     Tobacco comment: uses E cig   Substance and Sexual Activity     Alcohol use: Yes     Frequency: Monthly or less     Drug use: Not Currently     Comment: In treatment for meth.     Sexual activity: Not Currently     Partners: Male     Birth control/protection: None   Lifestyle     Physical activity:     Days per week: None     Minutes per session: None     Stress: None   Relationships     Social connections:     Talks on phone: None     Gets together: None     Attends Hoahaoism service: None     Active member of club or organization: None     Attends meetings of clubs or organizations: None     Relationship status: None     Intimate partner violence:     Fear of current or ex partner: None     Emotionally abused: None     Physically abused: None     Forced sexual activity: None   Other Topics Concern     None   Social History Narrative     None     Allergies:   Patient has no known allergies.    Current Medications:   Current Outpatient Medications   Medication Sig Dispense Refill     Acetaminophen (TYLENOL PO) Take 500 mg by mouth every 4 hours as needed for mild pain or fever       BuPROPion HCl (WELLBUTRIN XL PO) Take 150 mg by mouth daily       butalbital-acetaminophen-caffeine (FIORICET/ESGIC) -40 MG tablet Take 1 tablet by mouth every 4 hours as needed for headaches 30 tablet 3     omeprazole (PRILOSEC OTC) 20 MG EC tablet Take 1 tablet (20 mg) by mouth daily 30 tablet 3     ondansetron (ZOFRAN-ODT) 4 MG ODT tab Take 1-2 tablets (4-8 mg) by mouth every 8 hours as needed for nausea 4 tablet 0     sertraline (ZOLOFT) 50 MG tablet Take 50 mg by mouth daily        traMADol (ULTRAM) 50 MG tablet Take 50 mg by mouth every 6 hours as needed for severe pain       cyanocobalamin (VITAMIN B12) 1000 MCG/ML injection Inject 1 mL into the muscle every 30 days       ROS: As described in HPI    EXAM:  Vitals:    19 1146   BP: 124/84   BP Location: Right arm   Cuff Size: Adult Regular   Pulse: 76   Weight: 84.4 kg (186 lb)    Body mass index is 30.02 kg/m .    Gen: Alert, oriented, appropriately interactive, NAD  Perineum: stitches intact, scant superficial white purulence and moderate tenderness at wound closure without redness, heat, induration, or fluctuance. Notable unclean on exam.     ASSESSMENT/PLAN: Ayanna Davidson is a 28 year old  who is s/p excision of right Bartholin's abscess on , presents for f/u, concerned with discomfort and odor. No [place] infection at surgical site, though concern for possible evolving superficial infection. Reviewed instructions for keeping this area clean, as well as daily sitz baths, reviewed that tenderness and mild edema is normal postop. Plan antibiotics and follow up in one week. However, dog bite is urgent. I accompanied the patient back to the ED, briefly spoke with ED staff. She will be placed on Augmentin for the bite and this will cover her labia. Plan f/u one week.     Froylan Schwarz MD  2019 12:41 PM

## 2019-05-07 NOTE — ED AVS SNAPSHOT
[facility] Emergency Department  [address] [place] [address]  Phone:  [phone]  Fax:  [phone]                                    Ayanna Davidson   MRN: [id number]    Department:  [facility] Emergency Department   Date of Visit:  5/7/2019           After Visit Summary Signature Page    I have received my discharge instructions, and my questions have been answered. I have discussed any challenges I see with this plan with the nurse or doctor.    ..........................................................................................................................................  Patient/Patient Representative Signature      ..........................................................................................................................................  Patient Representative Print Name and Relationship to Patient    ..................................................               ................................................  Date                                   Time    ..........................................................................................................................................  Reviewed by Signature/Title    ...................................................              ..............................................  Date                                               Time          22EPIC Rev 08/18

## 2019-05-07 NOTE — ED NOTES
Pt returned to ED from the clinic. Will undo her LWBS discharge and have her be seen by MD in ER.

## 2019-05-08 NOTE — ED PROVIDER NOTES
History     Chief Complaint   Patient presents with     Dog Bite     HPI  Ayanna Davidson is a 28 year old female who presents to the emergency department complaining of a laceration right thumb from a dog bite.  She has 2 female dogs were fighting and she tried to break it up and she got bit in her right thumb.  She is not at the dog is vaccinated.  They are not acting abnormal.  She does not think they have rabies.  She is never been vaccinated for rabies but is up-to-date on her tetanus vaccination.  She had full range of motion of her thumb without apparent tendon injury.    Allergies:  No Known Allergies    Problem List:    Patient Active Problem List    Diagnosis Date Noted     Dog bite, initial encounter 05/07/2019     Priority: Medium     Cervical high risk HPV (human papillomavirus) test positive 03/12/2019     Priority: Medium     4/2016 NIL pap. No prior HPV testing.   3/12/19 NIL pap, + HR HPV (not 16 or 18). Plan: cotest in 1 yr       Vulvar abscess 08/16/2017     Priority: Medium     Drug-induced mental disorder (H) 09/20/2012     Priority: Medium     Overview:   ICD-10 Regulatory Update       Depressed 05/10/2012     Priority: Medium     Overdose of antipsychotic 05/09/2012     Priority: Medium        Past Medical History:    Past Medical History:   Diagnosis Date     Cervical high risk HPV (human papillomavirus) test positive 03/12/2019     Gastroesophageal reflux disease      Methamphetamine abuse (H)      recurrent Bartholin's cyst        Past Surgical History:    Past Surgical History:   Procedure Laterality Date     CHOLECYSTECTOMY       ENT SURGERY       INCISION AND DRAINAGE ABSCESS PELVIS, COMBINED N/A 2/26/2018    Procedure: COMBINED INCISION AND DRAINAGE ABSCESS PELVIS;  INCISION AND DRAINAGE LABIAL ABSCESS;  Surgeon: Jase Beach MD;  [facility]     INCISION AND DRAINAGE ABSCESS PELVIS, COMBINED N/A 3/5/2019    Procedure: Incision and Drainage Right Labial Abscess;  Surgeon:  "Jase Beach MD;  Location: [facility]     LAP ADJUSTABLE GASTRIC BAND       LEEP TX, CERVICAL       MAMMOPLASTY REDUCTION BILATERAL       MARSUPIALIZATION BARTHOLIN CYST N/A 4/29/2019    Procedure: Bartholin's Cyst removal;  Surgeon: Froylan Schwarz MD;  Location: [facility]     ORTHOPEDIC SURGERY         Family History:    No family history on file.    Social History:  Marital Status:  Single [1]  Social History     Tobacco Use     Smoking status: Current Every Day Smoker     Packs/day: 0.25     Smokeless tobacco: Current User     Tobacco comment: uses E cig   Substance Use Topics     Alcohol use: Yes     Frequency: Monthly or less     Drug use: Not Currently     Comment: In treatment for meth.        Medications:      amoxicillin-clavulanate (AUGMENTIN) 875-125 MG tablet   BuPROPion HCl (WELLBUTRIN XL PO)   butalbital-acetaminophen-caffeine (FIORICET/ESGIC) -40 MG tablet   omeprazole (PRILOSEC OTC) 20 MG EC tablet   ondansetron (ZOFRAN ODT) 4 MG ODT tab   sertraline (ZOLOFT) 50 MG tablet   traMADol (ULTRAM) 50 MG tablet   Acetaminophen (TYLENOL PO)   cyanocobalamin (VITAMIN B12) 1000 MCG/ML injection         Review of Systems   All other systems reviewed and are negative.      Physical Exam   BP: 119/80  Pulse: 90  Temp: 98.1  F (36.7  C)  Resp: 16  Height: 167.6 cm (5' 6\")  Weight: 83.9 kg (185 lb)  SpO2: 98 %      Physical Exam   Constitutional: She is oriented to person, place, and time. She appears well-developed and well-nourished. No distress.   HENT:   Head: Normocephalic and atraumatic.   Eyes: No scleral icterus.   Neck: Normal range of motion. Neck supple.   Musculoskeletal: Normal range of motion. She exhibits tenderness. She exhibits no edema or deformity.   Neurological: She is alert and oriented to person, place, and time.   Skin: Skin is warm and dry. No rash noted. She is not diaphoretic. No erythema. No pallor.   2 linear jagged lacerations over right thumb.  Approximately 2 cm each.  " Partial-thickness.   Nursing note and vitals reviewed.      ED Course        Procedures          [facility] Procedure Note        Laceration Repair:    Performed by: Thierno Nogueira  Authorized by: Thierno Nogueira  Consent given by: Patient who states understanding of the procedure being performed after discussing the risks, benefits and alternatives.    Preparation: Patient was prepped and draped in usual sterile fashion.  Irrigation solution: saline    Body area:right thumb  Laceration length: 2cm, 2 cm  Contamination: The wound is not contaminated.  Foreign bodies:none  Tendon involvement: none  Anesthesia: Digital block  Local anesthetic: Lidocaine     1%  Anesthetic total: 3ml    Debridement: mild debridement  Skin closure: Closed with  4.0 Ethilon -I did not count the number of stitches.  Technique: interrupted  Approximation: close  Approximation difficulty: simple    Patient tolerance: Patient tolerated the procedure well with no immediate complications.         No results found for this or any previous visit (from the past 24 hour(s)).    Medications   rabies immune globulin 300 units/mL (HYPERAB) injection 1,680 Units (1,680 Units Infiltration Given by Other Clinician 5/7/19 9922)   rabies vaccine,human diploid (IMOVAX) vaccine 1 mL (1 mL Intramuscular Given 5/7/19 0519)   lidocaine 1 % 5 mL (5 mLs Subcutaneous Given by Other 5/7/19 7963)       Assessments & Plan (with Medical Decision Making)  28-year-old female with a dog bite.  The dogs have not been vaccinated for rabies.  I told her the option was to empirically treat her with rabies immunoglobulin and rabies vaccine or have her dogs monitored at the vet for 72 hours.  She said she could not afford the that I would rather just get the rabies series.  I injected the rabies immunoglobulin around the wound on her thumb.  She was also given the IM vaccination.  She has a minor infection of her Bartholin cyst area that was removed by OB/GYN and they  are requesting antibiotics for that in addition to her dog bite.  The patient was given Augmentin.  I advised her to take probiotics while she was taking Augmentin to avoid other infections.  She was advised to return for suture removal and wound care in the clinic in 7 days. The differential diagnosis, treatment options, risks and follow up discussed with a competent patient and/or competent family member who agrees with the plan.she is gets nauseated with antibiotics of gave her Zofran.     I have reviewed the nursing notes.    I have reviewed the findings, diagnosis, plan and need for follow up with the patient.         Medication List      Started    amoxicillin-clavulanate 875-125 MG tablet  Commonly known as:  AUGMENTIN  1 tablet, Oral, 2 TIMES DAILY        Modified    ondansetron 4 MG ODT tab  Commonly known as:  ZOFRAN ODT  4 mg, Oral, EVERY 6 HOURS PRN  What changed:      how much to take    when to take this    reasons to take this            Final diagnoses:   Dog bite, initial encounter       5/7/2019   [facility] EMERGENCY DEPARTMENT     Thierno Nogueira MD  05/07/19 1778

## 2019-05-10 ENCOUNTER — ALLIED HEALTH/NURSE VISIT (OUTPATIENT)
Dept: FAMILY MEDICINE | Facility: CLINIC | Age: 29
End: 2019-05-10
Payer: COMMERCIAL
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
## 2019-05-10 PROCEDURE — 90675 RABIES VACCINE IM: CPT

## 2019-05-10 PROCEDURE — 90471 IMMUNIZATION ADMIN: CPT

## 2019-05-10 NOTE — NURSING NOTE
Prior to injection, verified patient identity using patient's name and date of birth.  Due to injection administration, patient instructed to remain in clinic for 15 minutes  afterwards, and to report any adverse reaction to me immediately.    2nd rabies vaccine given.  Devin Castillo, CMA

## 2019-05-10 NOTE — PROGRESS NOTES
"At about 5:30 PM the pharmacist came walking back into the clinic area looking for a nurse who might have administered rabies vaccine to this patient earlier.  The patient did go to the pharmacy stating that she had knocked on the clinic door and no one answered and that she was experiencing nausea and a gritty sensation in her mouth.  I went with the pharmacist to speak with the patient and at that time she said she had been nauseated since she received her animal bites and had been given her first initial doses of rabies vaccine in the ER and that today was her first booster dose.  The nausea that she has had was intensified since getting the vaccine and she had a sensation of just grittiness in her mouth.  She is not complaining of swelling, shortness of breath and had no sign of rash.  I had asked if she had a  with her and she indicated yes and I also mentioned that I wanted to evaluate and see whether it would be necessary for her to go to the emergency room.  Her response was \"I am just curious, I do not think I need to go to the ER.\" I informed her I was going to take a minute and go look up the adverse reactions associated with rabies vaccine and upon returning to the waiting room area where I had left her 5 minutes earlier she was no longer present.  There was an environmental services staff person that said the patient just got up and had walked out.  Although I did not do any physical assessment of her in a formal way, she did not appear to have any respiratory compromise, was able to speak easily without evidence of stridor or wheeze, and she did stick her tongue out for me to look at and there was no evidence of swelling or inflammation.  This event should be considered prior to administration of her next vaccine. An attempt to call her cell phone was unsuccessful due to her phone having restrictions.    Electronically signed by Greg Schoen, MD    "

## 2019-05-14 ENCOUNTER — ALLIED HEALTH/NURSE VISIT (OUTPATIENT)
Dept: FAMILY MEDICINE | Facility: CLINIC | Age: 29
End: 2019-05-14
Payer: COMMERCIAL
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
## 2019-05-14 PROCEDURE — 90675 RABIES VACCINE IM: CPT

## 2019-05-14 PROCEDURE — 90471 IMMUNIZATION ADMIN: CPT

## 2019-05-14 NOTE — NURSING NOTE
Prior to injection, verified patient identity using patient's name and date of birth.  Due to injection administration, patient instructed to remain in clinic for 15 minutes  afterwards, and to report any adverse reaction to me immediately.    Rabies vaccine    Drug Amount Wasted:  None.  Vial/Syringe: Single dose vial  Expiration Date:  09/25/20    Devin Castillo CMA    Huddled with Dr. Schoen and he said it should be fine to give her the vaccine even after the effects she stated she had after her last injection on 05/10/19 (see encounter).  Patient advised to stay in clinic for 30 minutes to watch for any reactions.  Devin Castillo CMA

## 2019-05-24 ENCOUNTER — TELEPHONE (OUTPATIENT)
Dept: FAMILY MEDICINE | Facility: CLINIC | Age: 29
End: 2019-05-24

## 2019-05-24 NOTE — TELEPHONE ENCOUNTER
Per Rosa from [place] [facility], If patient's dog is still alive and healthy she does not need to finish the rabies  Injections. Patient's second option since she is not on schedule would be to get her injection today, return on the 31st of May and then again on June 14th for her final injection.     Patient has chosen to opt out of finishing the rabies injections.      Neelima Lentz, [facility]

## 2019-08-11 ENCOUNTER — ANESTHESIA (OUTPATIENT)
Dept: SURGERY | Facility: CLINIC | Age: 29
End: 2019-08-11
Payer: COMMERCIAL

## 2019-08-11 ENCOUNTER — HOSPITAL ENCOUNTER (OUTPATIENT)
Facility: CLINIC | Age: 29
Discharge: HOME | End: 2019-08-11
Attending: EMERGENCY MEDICINE | Admitting: ORTHOPAEDIC SURGERY
Payer: COMMERCIAL

## 2019-08-11 ENCOUNTER — ANESTHESIA EVENT (OUTPATIENT)
Dept: SURGERY | Facility: CLINIC | Age: 29
End: 2019-08-11
Payer: COMMERCIAL

## 2019-08-11 VITALS
BODY MASS INDEX: 25.82 KG/M2 | HEART RATE: 79 BPM | TEMPERATURE: 98 F | OXYGEN SATURATION: 95 % | RESPIRATION RATE: 13 BRPM | SYSTOLIC BLOOD PRESSURE: 96 MMHG | WEIGHT: 160 LBS | DIASTOLIC BLOOD PRESSURE: 66 MMHG
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
## 2019-08-11 DIAGNOSIS — T14.8XXA ANIMAL BITE: ICD-10-CM

## 2019-08-11 LAB
ANION GAP SERPL CALCULATED.3IONS-SCNC: 9 MMOL/L (ref 3–14)
BASOPHILS # BLD AUTO: 0 10E9/L (ref 0–0.2)
BASOPHILS NFR BLD AUTO: 0.3 %
BUN SERPL-MCNC: 12 MG/DL (ref 7–30)
CALCIUM SERPL-MCNC: 9.1 MG/DL (ref 8.5–10.1)
CHLORIDE SERPL-SCNC: 105 MMOL/L (ref 94–109)
CO2 SERPL-SCNC: 27 MMOL/L (ref 20–32)
CREAT SERPL-MCNC: 0.76 MG/DL (ref 0.52–1.04)
DIFFERENTIAL METHOD BLD: NORMAL
EOSINOPHIL NFR BLD AUTO: 1.8 %
ERYTHROCYTE [DISTWIDTH] IN BLOOD BY AUTOMATED COUNT: 14.3 % (ref 10–15)
GFR SERPL CREATININE-BSD FRML MDRD: >90 ML/MIN/{1.73_M2}
GLUCOSE SERPL-MCNC: 104 MG/DL (ref 70–99)
HCG UR QL: NEGATIVE
HCT VFR BLD AUTO: 37.3 % (ref 35–47)
HGB BLD-MCNC: 12.5 G/DL (ref 11.7–15.7)
IMM GRANULOCYTES # BLD: 0.1 10E9/L (ref 0–0.4)
IMM GRANULOCYTES NFR BLD: 0.8 %
LYMPHOCYTES # BLD AUTO: 1.5 10E9/L (ref 0.8–5.3)
LYMPHOCYTES NFR BLD AUTO: 15.3 %
MCH RBC QN AUTO: 29.5 PG (ref 26.5–33)
MCHC RBC AUTO-ENTMCNC: 33.5 G/DL (ref 31.5–36.5)
MCV RBC AUTO: 88 FL (ref 78–100)
MONOCYTES # BLD AUTO: 0.6 10E9/L (ref 0–1.3)
MONOCYTES NFR BLD AUTO: 6.1 %
NEUTROPHILS # BLD AUTO: 7.2 10E9/L (ref 1.6–8.3)
NEUTROPHILS NFR BLD AUTO: 75.7 %
NRBC # BLD AUTO: 0 10*3/UL
NRBC BLD AUTO-RTO: 0 /100
PLATELET # BLD AUTO: 314 10E9/L (ref 150–450)
POTASSIUM SERPL-SCNC: 3.8 MMOL/L (ref 3.4–5.3)
RBC # BLD AUTO: 4.24 10E12/L (ref 3.8–5.2)
SODIUM SERPL-SCNC: 141 MMOL/L (ref 133–144)
WBC # BLD AUTO: 9.5 10E9/L (ref 4–11)

## 2019-08-11 PROCEDURE — 81025 URINE PREGNANCY TEST: CPT | Performed by: EMERGENCY MEDICINE

## 2019-08-11 PROCEDURE — 96361 HYDRATE IV INFUSION ADD-ON: CPT | Performed by: EMERGENCY MEDICINE

## 2019-08-11 PROCEDURE — 96372 THER/PROPH/DIAG INJ SC/IM: CPT | Performed by: EMERGENCY MEDICINE

## 2019-08-11 PROCEDURE — 25000132 ZZH RX MED GY IP 250 OP 250 PS 637: Performed by: ORTHOPAEDIC SURGERY

## 2019-08-11 PROCEDURE — 80048 BASIC METABOLIC PNL TOTAL CA: CPT | Performed by: EMERGENCY MEDICINE

## 2019-08-11 PROCEDURE — 25800030 ZZH RX IP 258 OP 636: Performed by: EMERGENCY MEDICINE

## 2019-08-11 PROCEDURE — 96375 TX/PRO/DX INJ NEW DRUG ADDON: CPT | Performed by: EMERGENCY MEDICINE

## 2019-08-11 PROCEDURE — 36000052 ZZH SURGERY LEVEL 2 EA 15 ADDTL MIN: Performed by: ORTHOPAEDIC SURGERY

## 2019-08-11 PROCEDURE — 25000125 ZZHC RX 250: Performed by: NURSE ANESTHETIST, CERTIFIED REGISTERED

## 2019-08-11 PROCEDURE — 99285 EMERGENCY DEPT VISIT HI MDM: CPT | Mod: Z6 | Performed by: EMERGENCY MEDICINE

## 2019-08-11 PROCEDURE — 85025 COMPLETE CBC W/AUTO DIFF WBC: CPT | Performed by: EMERGENCY MEDICINE

## 2019-08-11 PROCEDURE — 27603 DRAIN LOWER LEG LESION: CPT | Mod: RT | Performed by: ORTHOPAEDIC SURGERY

## 2019-08-11 PROCEDURE — 99202 OFFICE O/P NEW SF 15 MIN: CPT | Mod: 57 | Performed by: ORTHOPAEDIC SURGERY

## 2019-08-11 PROCEDURE — 25800025 ZZH RX 258: Performed by: ORTHOPAEDIC SURGERY

## 2019-08-11 PROCEDURE — 37000008 ZZH ANESTHESIA TECHNICAL FEE, 1ST 30 MIN: Performed by: ORTHOPAEDIC SURGERY

## 2019-08-11 PROCEDURE — 90471 IMMUNIZATION ADMIN: CPT | Performed by: EMERGENCY MEDICINE

## 2019-08-11 PROCEDURE — 25800030 ZZH RX IP 258 OP 636: Performed by: NURSE ANESTHETIST, CERTIFIED REGISTERED

## 2019-08-11 PROCEDURE — 25000128 H RX IP 250 OP 636: Performed by: EMERGENCY MEDICINE

## 2019-08-11 PROCEDURE — 25000128 H RX IP 250 OP 636: Performed by: NURSE ANESTHETIST, CERTIFIED REGISTERED

## 2019-08-11 PROCEDURE — 96365 THER/PROPH/DIAG IV INF INIT: CPT | Mod: 59 | Performed by: EMERGENCY MEDICINE

## 2019-08-11 PROCEDURE — 36000050 ZZH SURGERY LEVEL 2 1ST 30 MIN: Performed by: ORTHOPAEDIC SURGERY

## 2019-08-11 PROCEDURE — 25000128 H RX IP 250 OP 636: Performed by: ORTHOPAEDIC SURGERY

## 2019-08-11 PROCEDURE — 27210794 ZZH OR GENERAL SUPPLY STERILE: Performed by: ORTHOPAEDIC SURGERY

## 2019-08-11 PROCEDURE — 71000027 ZZH RECOVERY PHASE 2 EACH 15 MINS: Performed by: ORTHOPAEDIC SURGERY

## 2019-08-11 PROCEDURE — 37000009 ZZH ANESTHESIA TECHNICAL FEE, EACH ADDTL 15 MIN: Performed by: ORTHOPAEDIC SURGERY

## 2019-08-11 PROCEDURE — 99285 EMERGENCY DEPT VISIT HI MDM: CPT | Mod: 25 | Performed by: EMERGENCY MEDICINE

## 2019-08-11 PROCEDURE — 90675 RABIES VACCINE IM: CPT | Performed by: EMERGENCY MEDICINE

## 2019-08-11 PROCEDURE — 71000014 ZZH RECOVERY PHASE 1 LEVEL 2 FIRST HR: Performed by: ORTHOPAEDIC SURGERY

## 2019-08-11 PROCEDURE — 25000132 ZZH RX MED GY IP 250 OP 250 PS 637: Performed by: NURSE ANESTHETIST, CERTIFIED REGISTERED

## 2019-08-11 RX ORDER — ONDANSETRON 2 MG/ML
4 INJECTION INTRAMUSCULAR; INTRAVENOUS EVERY 30 MIN PRN
Status: DISCONTINUED | OUTPATIENT
Start: 2019-08-11 | End: 2019-08-13 | Stop reason: HOSPADM

## 2019-08-11 RX ORDER — PROPOFOL 10 MG/ML
INJECTION, EMULSION INTRAVENOUS PRN
Status: DISCONTINUED | OUTPATIENT
Start: 2019-08-11 | End: 2019-08-11

## 2019-08-11 RX ORDER — FENTANYL CITRATE 50 UG/ML
25-50 INJECTION, SOLUTION INTRAMUSCULAR; INTRAVENOUS
Status: DISCONTINUED | OUTPATIENT
Start: 2019-08-11 | End: 2019-08-13 | Stop reason: HOSPADM

## 2019-08-11 RX ORDER — OXYCODONE HYDROCHLORIDE 5 MG/1
10 TABLET ORAL EVERY 4 HOURS PRN
Status: CANCELLED | OUTPATIENT
Start: 2019-08-11

## 2019-08-11 RX ORDER — HYDROCODONE BITARTRATE AND ACETAMINOPHEN 5; 325 MG/1; MG/1
1-2 TABLET ORAL EVERY 4 HOURS PRN
Status: DISCONTINUED | OUTPATIENT
Start: 2019-08-11 | End: 2019-08-13 | Stop reason: HOSPADM

## 2019-08-11 RX ORDER — DIPHENHYDRAMINE HCL 25 MG
25 CAPSULE ORAL EVERY 6 HOURS PRN
Status: DISCONTINUED | OUTPATIENT
Start: 2019-08-11 | End: 2019-08-13 | Stop reason: HOSPADM

## 2019-08-11 RX ORDER — LORAZEPAM 2 MG/ML
0.5 INJECTION INTRAMUSCULAR ONCE
Status: COMPLETED | OUTPATIENT
Start: 2019-08-11 | End: 2019-08-11

## 2019-08-11 RX ORDER — PROPOFOL 10 MG/ML
INJECTION, EMULSION INTRAVENOUS CONTINUOUS PRN
Status: DISCONTINUED | OUTPATIENT
Start: 2019-08-11 | End: 2019-08-11

## 2019-08-11 RX ORDER — CEPHALEXIN 500 MG/1
500 CAPSULE ORAL 4 TIMES DAILY
Qty: 20 CAPSULE | Refills: 0 | Status: ON HOLD | OUTPATIENT
Start: 2019-08-11 | End: 2019-09-29

## 2019-08-11 RX ORDER — ALBUTEROL SULFATE 0.83 MG/ML
2.5 SOLUTION RESPIRATORY (INHALATION) EVERY 4 HOURS PRN
Status: DISCONTINUED | OUTPATIENT
Start: 2019-08-11 | End: 2019-08-11 | Stop reason: HOSPADM

## 2019-08-11 RX ORDER — MEPERIDINE HYDROCHLORIDE 25 MG/ML
12.5 INJECTION INTRAMUSCULAR; INTRAVENOUS; SUBCUTANEOUS
Status: DISCONTINUED | OUTPATIENT
Start: 2019-08-11 | End: 2019-08-13 | Stop reason: HOSPADM

## 2019-08-11 RX ORDER — ONDANSETRON 2 MG/ML
4 INJECTION INTRAMUSCULAR; INTRAVENOUS
Status: COMPLETED | OUTPATIENT
Start: 2019-08-11 | End: 2019-08-11

## 2019-08-11 RX ORDER — LIDOCAINE HYDROCHLORIDE 20 MG/ML
INJECTION, SOLUTION INFILTRATION; PERINEURAL PRN
Status: DISCONTINUED | OUTPATIENT
Start: 2019-08-11 | End: 2019-08-11

## 2019-08-11 RX ORDER — FENTANYL CITRATE 50 UG/ML
25-50 INJECTION, SOLUTION INTRAMUSCULAR; INTRAVENOUS
Status: DISCONTINUED | OUTPATIENT
Start: 2019-08-11 | End: 2019-08-11 | Stop reason: HOSPADM

## 2019-08-11 RX ORDER — HYDROMORPHONE HYDROCHLORIDE 1 MG/ML
0.5 INJECTION, SOLUTION INTRAMUSCULAR; INTRAVENOUS; SUBCUTANEOUS
Status: DISCONTINUED | OUTPATIENT
Start: 2019-08-11 | End: 2019-08-13 | Stop reason: HOSPADM

## 2019-08-11 RX ORDER — DEXAMETHASONE SODIUM PHOSPHATE 10 MG/ML
INJECTION, SOLUTION INTRAMUSCULAR; INTRAVENOUS PRN
Status: DISCONTINUED | OUTPATIENT
Start: 2019-08-11 | End: 2019-08-11

## 2019-08-11 RX ORDER — CEFAZOLIN SODIUM 2 G/100ML
2 INJECTION, SOLUTION INTRAVENOUS
Status: COMPLETED | OUTPATIENT
Start: 2019-08-11 | End: 2019-08-11

## 2019-08-11 RX ORDER — ONDANSETRON 4 MG/1
4 TABLET, ORALLY DISINTEGRATING ORAL EVERY 30 MIN PRN
Status: DISCONTINUED | OUTPATIENT
Start: 2019-08-11 | End: 2019-08-13 | Stop reason: HOSPADM

## 2019-08-11 RX ORDER — HYDROCODONE BITARTRATE AND ACETAMINOPHEN 5; 325 MG/1; MG/1
1-2 TABLET ORAL EVERY 4 HOURS PRN
Qty: 20 TABLET | Refills: 0 | Status: SHIPPED | OUTPATIENT
Start: 2019-08-11 | End: 2019-09-27

## 2019-08-11 RX ORDER — SODIUM CHLORIDE, SODIUM LACTATE, POTASSIUM CHLORIDE, CALCIUM CHLORIDE 600; 310; 30; 20 MG/100ML; MG/100ML; MG/100ML; MG/100ML
INJECTION, SOLUTION INTRAVENOUS CONTINUOUS
Status: DISCONTINUED | OUTPATIENT
Start: 2019-08-11 | End: 2019-08-13 | Stop reason: HOSPADM

## 2019-08-11 RX ORDER — ONDANSETRON 2 MG/ML
INJECTION INTRAMUSCULAR; INTRAVENOUS PRN
Status: DISCONTINUED | OUTPATIENT
Start: 2019-08-11 | End: 2019-08-11

## 2019-08-11 RX ORDER — ALBUTEROL SULFATE 90 UG/1
AEROSOL, METERED RESPIRATORY (INHALATION) PRN
Status: DISCONTINUED | OUTPATIENT
Start: 2019-08-11 | End: 2019-08-11

## 2019-08-11 RX ORDER — CEFAZOLIN SODIUM 1 G/3ML
1 INJECTION, POWDER, FOR SOLUTION INTRAMUSCULAR; INTRAVENOUS SEE ADMIN INSTRUCTIONS
Status: DISCONTINUED | OUTPATIENT
Start: 2019-08-11 | End: 2019-08-13 | Stop reason: HOSPADM

## 2019-08-11 RX ORDER — SODIUM CHLORIDE 9 MG/ML
1000 INJECTION, SOLUTION INTRAVENOUS CONTINUOUS
Status: DISCONTINUED | OUTPATIENT
Start: 2019-08-11 | End: 2019-08-13 | Stop reason: HOSPADM

## 2019-08-11 RX ORDER — FENTANYL CITRATE 50 UG/ML
INJECTION, SOLUTION INTRAMUSCULAR; INTRAVENOUS PRN
Status: DISCONTINUED | OUTPATIENT
Start: 2019-08-11 | End: 2019-08-11

## 2019-08-11 RX ORDER — NALOXONE HYDROCHLORIDE 0.4 MG/ML
.1-.4 INJECTION, SOLUTION INTRAMUSCULAR; INTRAVENOUS; SUBCUTANEOUS
Status: DISCONTINUED | OUTPATIENT
Start: 2019-08-11 | End: 2019-08-12 | Stop reason: HOSPADM

## 2019-08-11 RX ORDER — HYDROMORPHONE HYDROCHLORIDE 1 MG/ML
.3-.5 INJECTION, SOLUTION INTRAMUSCULAR; INTRAVENOUS; SUBCUTANEOUS EVERY 10 MIN PRN
Status: DISCONTINUED | OUTPATIENT
Start: 2019-08-11 | End: 2019-08-13 | Stop reason: HOSPADM

## 2019-08-11 RX ADMIN — HYDROMORPHONE HYDROCHLORIDE 0.5 MG: 1 INJECTION, SOLUTION INTRAMUSCULAR; INTRAVENOUS; SUBCUTANEOUS at 16:21

## 2019-08-11 RX ADMIN — SODIUM CHLORIDE 1000 ML: 9 INJECTION, SOLUTION INTRAVENOUS at 11:49

## 2019-08-11 RX ADMIN — DEXMEDETOMIDINE HYDROCHLORIDE 30 MCG: 100 INJECTION, SOLUTION INTRAVENOUS at 14:44

## 2019-08-11 RX ADMIN — CEFAZOLIN SODIUM 2 G: 2 INJECTION, SOLUTION INTRAVENOUS at 14:28

## 2019-08-11 RX ADMIN — ALBUTEROL SULFATE 2 PUFF: 90 AEROSOL, METERED RESPIRATORY (INHALATION) at 15:11

## 2019-08-11 RX ADMIN — Medication 1 ML: at 12:36

## 2019-08-11 RX ADMIN — FENTANYL CITRATE 50 MCG: 50 INJECTION, SOLUTION INTRAMUSCULAR; INTRAVENOUS at 14:09

## 2019-08-11 RX ADMIN — ROCURONIUM BROMIDE 40 MG: 10 INJECTION INTRAVENOUS at 14:28

## 2019-08-11 RX ADMIN — HYDROMORPHONE HYDROCHLORIDE 0.5 MG: 1 INJECTION, SOLUTION INTRAMUSCULAR; INTRAVENOUS; SUBCUTANEOUS at 12:26

## 2019-08-11 RX ADMIN — SODIUM CHLORIDE 1000 ML: 9 INJECTION, SOLUTION INTRAVENOUS at 13:34

## 2019-08-11 RX ADMIN — ALBUTEROL SULFATE 2 PUFF: 90 AEROSOL, METERED RESPIRATORY (INHALATION) at 15:15

## 2019-08-11 RX ADMIN — ONDANSETRON 4 MG: 2 INJECTION INTRAMUSCULAR; INTRAVENOUS at 14:51

## 2019-08-11 RX ADMIN — DEXAMETHASONE SODIUM PHOSPHATE 10 MG: 10 INJECTION, SOLUTION INTRAMUSCULAR; INTRAVENOUS at 14:51

## 2019-08-11 RX ADMIN — Medication 100 MG: at 14:23

## 2019-08-11 RX ADMIN — PROPOFOL 200 MCG/KG/MIN: 10 INJECTION, EMULSION INTRAVENOUS at 14:23

## 2019-08-11 RX ADMIN — PROPOFOL 200 MG: 10 INJECTION, EMULSION INTRAVENOUS at 14:23

## 2019-08-11 RX ADMIN — ONDANSETRON 4 MG: 2 INJECTION INTRAMUSCULAR; INTRAVENOUS at 12:46

## 2019-08-11 RX ADMIN — HYDROCODONE BITARTRATE AND ACETAMINOPHEN 2 TABLET: 5; 325 TABLET ORAL at 17:02

## 2019-08-11 RX ADMIN — TAZOBACTAM SODIUM AND PIPERACILLIN SODIUM 3.38 G: 375; 3 INJECTION, SOLUTION INTRAVENOUS at 11:49

## 2019-08-11 RX ADMIN — FENTANYL CITRATE 50 MCG: 50 INJECTION INTRAMUSCULAR; INTRAVENOUS at 16:01

## 2019-08-11 RX ADMIN — ALBUTEROL SULFATE 2 PUFF: 90 AEROSOL, METERED RESPIRATORY (INHALATION) at 15:14

## 2019-08-11 RX ADMIN — LIDOCAINE HYDROCHLORIDE 100 MG: 20 INJECTION, SOLUTION INFILTRATION; PERINEURAL at 14:23

## 2019-08-11 RX ADMIN — FENTANYL CITRATE 50 MCG: 50 INJECTION, SOLUTION INTRAMUSCULAR; INTRAVENOUS at 14:04

## 2019-08-11 RX ADMIN — ROCURONIUM BROMIDE 10 MG: 10 INJECTION INTRAVENOUS at 14:22

## 2019-08-11 RX ADMIN — LORAZEPAM 0.5 MG: 2 INJECTION INTRAMUSCULAR; INTRAVENOUS at 12:34

## 2019-08-11 ASSESSMENT — LIFESTYLE VARIABLES: TOBACCO_USE: 1

## 2019-08-11 NOTE — BRIEF OP NOTE
[facility]    Brief Operative Note    Pre-operative diagnosis: dog bite right foot and ankle  Post-operative diagnosis same  Procedure: Procedure(s):  irrigation and debridement right leg dog bite  Surgeon: Surgeon(s) and Role:     * Rommel Pérez MD - Primary  Anesthesia: General   Estimated blood loss: Minimal  Drains: None  Specimens: * No specimens in log *  Findings:   4 lacerations to the leg.  2 penetrated into Achilles tendon sheath, one penetrated into posterior tibial tendon sheath.  None damaged tendons or nerves.  Sural nerve was exposed by the laceration.  Complications: None.  Implants:  * No implants in log *

## 2019-08-11 NOTE — ED PROVIDER NOTES
History     Chief Complaint   Patient presents with     Dog Bite     The history is provided by the patient and a significant other.     Ayanna Davidson is a 29 year old female who is presenting to the emergency department with complaints of a dog bite. The patient says she was bit two hours prior to coming into the ED. She says she thinks it was a type of [place] mccabe, but also thinks it looked like a coyote or bui. She notes having never seen the animal before.The patient says it bit her right leg and ankle, as well as a small bite on her right hand. She says [place]. The patient says she has had a rabies and tetanus shot four months ago when she came in for a different dog bite on her thumb and shoulder, from her own dogs. She notes not being able to feel her pinky toe on her right foot. The patient's boyfriend mentions thinking he got bit on his right thumb when he tried to pull the animal off of her.    Allergies:  No Known Allergies    Problem List:    Patient Active Problem List    Diagnosis Date Noted     Dog bite, initial encounter 05/07/2019     Priority: Medium     Cervical high risk HPV (human papillomavirus) test positive 03/12/2019     Priority: Medium     4/2016 NIL pap. No prior HPV testing.   3/12/19 NIL pap, + HR HPV (not 16 or 18). Plan: cotest in 1 yr       Vulvar abscess 08/16/2017     Priority: Medium     Drug-induced mental disorder (H) 09/20/2012     Priority: Medium     Overview:   ICD-10 Regulatory Update       Depressed 05/10/2012     Priority: Medium     Overdose of antipsychotic 05/09/2012     Priority: Medium        Past Medical History:    Past Medical History:   Diagnosis Date     Cervical high risk HPV (human papillomavirus) test positive 03/12/2019     Gastroesophageal reflux disease      Methamphetamine abuse (H)      recurrent Bartholin's cyst        Past Surgical History:    Past Surgical  History:   Procedure Laterality Date     CHOLECYSTECTOMY       ENT SURGERY       INCISION AND DRAINAGE ABSCESS PELVIS, COMBINED N/A 2/26/2018    Procedure: COMBINED INCISION AND DRAINAGE ABSCESS PELVIS;  INCISION AND DRAINAGE LABIAL ABSCESS;  Surgeon: Jase Beach MD;  Location: [facility]     INCISION AND DRAINAGE ABSCESS PELVIS, COMBINED N/A 3/5/2019    Procedure: Incision and Drainage Right Labial Abscess;  Surgeon: Jase Beach MD;  Location: [facility]     LAP ADJUSTABLE GASTRIC BAND       LEEP TX, CERVICAL       MAMMOPLASTY REDUCTION BILATERAL       MARSUPIALIZATION BARTHOLIN CYST N/A 4/29/2019    Procedure: Bartholin's Cyst removal;  Surgeon: Froylan Schwarz MD;  Location: [facility] OR     ORTHOPEDIC SURGERY         Family History:    No family history on file.    Social History:  Marital Status:  Single [1]  Social History     Tobacco Use     Smoking status: Current Every Day Smoker     Packs/day: 0.25     Smokeless tobacco: Current User     Tobacco comment: uses E cig   Substance Use Topics     Alcohol use: Yes     Frequency: Monthly or less     Drug use: Not Currently     Comment: In treatment for meth.        Medications:      cephALEXin (KEFLEX) 500 MG capsule   HYDROcodone-acetaminophen (NORCO) 5-325 MG tablet         Review of Systems   All other systems reviewed and are negative.      Physical Exam   BP: (!) 124/102  Pulse: 102  Temp: 98  F (36.7  C)  Resp: 18  Weight: 72.6 kg (160 lb)  SpO2: 97 %      Physical Exam   Constitutional: She is oriented to person, place, and time. She appears well-developed and well-nourished.   HENT:   Head: Normocephalic and atraumatic.   Mouth/Throat: No oropharyngeal exudate.   Eyes: Pupils are equal, round, and reactive to light. Conjunctivae and EOM are normal. No scleral icterus.   Neck: Normal range of motion. Neck supple.   Cardiovascular: Normal rate, regular rhythm, normal heart sounds and intact distal pulses.   No murmur heard.  Pulmonary/Chest: Effort normal  and breath sounds normal. No respiratory distress.   Abdominal: Soft. Bowel sounds are normal. There is no tenderness.   Musculoskeletal: She exhibits tenderness.        Right ankle: Tenderness.        Right foot: There is decreased range of motion.        Left foot: There is normal range of motion.   Tenderness around achilles but appears grossly intact.   Neurological: She is alert and oriented to person, place, and time. No cranial nerve deficit.   Skin: Skin is warm and dry. Abrasion and laceration noted. She is not diaphoretic. No pallor.   Two deep lacerations laterally and medially proximal to both malleoli on the right ankle. Abrasion to right middle finger.       ED Course        Procedures               Critical Care time:  none               Results for orders placed or performed during the hospital encounter of 08/11/19 (from the past 24 hour(s))   CBC with platelets differential   Result Value Ref Range    WBC 9.5 4.0 - 11.0 10e9/L    RBC Count 4.24 3.8 - 5.2 10e12/L    Hemoglobin 12.5 11.7 - 15.7 g/dL    Hematocrit 37.3 35.0 - 47.0 %    MCV 88 78 - 100 fl    MCH 29.5 26.5 - 33.0 pg    MCHC 33.5 31.5 - 36.5 g/dL    RDW 14.3 10.0 - 15.0 %    Platelet Count 314 150 - 450 10e9/L    Diff Method Automated Method     % Neutrophils 75.7 %    % Lymphocytes 15.3 %    % Monocytes 6.1 %    % Eosinophils 1.8 %    % Basophils 0.3 %    % Immature Granulocytes 0.8 %    Nucleated RBCs 0 0 /100    Absolute Neutrophil 7.2 1.6 - 8.3 10e9/L    Absolute Lymphocytes 1.5 0.8 - 5.3 10e9/L    Absolute Monocytes 0.6 0.0 - 1.3 10e9/L    Absolute Basophils 0.0 0.0 - 0.2 10e9/L    Abs Immature Granulocytes 0.1 0 - 0.4 10e9/L    Absolute Nucleated RBC 0.0    Basic metabolic panel   Result Value Ref Range    Sodium 141 133 - 144 mmol/L    Potassium 3.8 3.4 - 5.3 mmol/L    Chloride 105 94 - 109 mmol/L    Carbon Dioxide 27 20 - 32 mmol/L    Anion Gap 9 3 - 14 mmol/L    Glucose 104 (H) 70 - 99 mg/dL    Urea Nitrogen 12 7 - 30 mg/dL     Creatinine 0.76 0.52 - 1.04 mg/dL    GFR Estimate >90 >60 mL/min/[1.73_m2]    GFR Estimate If Black >90 >60 mL/min/[1.73_m2]    Calcium 9.1 8.5 - 10.1 mg/dL   HCG qualitative urine   Result Value Ref Range    HCG Qual Urine Negative NEG^Negative       Medications   0.9% sodium chloride BOLUS (0 mLs Intravenous Stopped 8/11/19 1333)     Followed by   sodium chloride 0.9% infusion ( Intravenous Anesthesia Volume Adjustment 8/11/19 1418)   HYDROmorphone (PF) (DILAUDID) injection 0.5 mg ( Intravenous Auto Hold 8/11/19 1540)   ceFAZolin (ANCEF) 1 g vial to attach to  ml bag for ADULT or 50 ml bag for PEDS (has no administration in time range)   sodium chloride 0.9% (bag) irrigation (3,000 mLs Irrigation Given 8/11/19 1453)   piperacillin-tazobactam (ZOSYN) infusion 3.375 g (0 g Intravenous Stopped 8/11/19 1219)   LORazepam (ATIVAN) injection 0.5 mg (0.5 mg Intravenous Given 8/11/19 1234)   ceFAZolin (ANCEF) intermittent infusion 2 g in 100 mL dextrose PRE-MIX (2 g Intravenous Given 8/11/19 1428)   rabies vaccine,human diploid (IMOVAX) vaccine 1 mL (1 mL Intramuscular Given 8/11/19 1236)   ondansetron (ZOFRAN) injection 4 mg (4 mg Intravenous Given 8/11/19 1246)       Assessments & Plan (with Medical Decision Making)  29-year-old female with significant animal bites to the right ankle with deep tissue exposure.  She was unable to move her foot well.  Concern would be for tendon and/or nerve injury.  A consult with orthopedics was obtained.  Dr. Pérez took her to the operative room for irrigation and debridement as well as exploration.  She was given IV Zosyn in the emergency department.  Her tetanus status is up-to-date.    Her rabies status is much more complicated.  On May 7 she had another animal bite from an animal with unknown rabies vaccination status.  She was given rabies immunoglobulin that day as well as a rabies vaccine.  Rabies vaccine was repeated on May 10 and May 17.  After that the dog was still  alive and healthy in the department health give the clinic the option to give her to not continue the vaccines.  She chose to opt out of finishing the rabies injections.  Today I discussed the case with the [facility].  They stated she did not need immunoglobulin after consultation with the state .  They did recommend administering the rabies vaccine today as day 0 and in 3 days.  They also recommended on day 3 she have a titer drawn to be sent to [facility], phone number [phone].  The Dept. Of health said this can be sent through [employer] as a rabies vaccine endpoint titer, order #[id number] they stated.  The patient would like to follow-up in the local [facility] clinic where she had the previous rabies vaccinations.  Recommendation was for follow-up in 3 days which I explained to her boyfriend and herself as well as the surgeon that would be discharging her.    (Of note, the boyfriend in the room also showed me where he was bitten on a finger.  I recommended he get checked in and would likely need a rabies series and further treatments as well.  He never checked in here as of 7 p.m.)       I have reviewed the nursing notes.    I have reviewed the findings, diagnosis, plan and need for follow up with the patient.       Current Discharge Medication List          Final diagnoses:   Animal bite     This document serves as a record of services personally performed by Leandro Barrett MD. It was created on their behalf by Tatiana Russell, a trained medical scribe. The creation of this record is based on the provider's personal observations and the statements of the patient. This document has been checked and approved by the attending provider.  Note: Chart documentation done in part with Dragon Voice Recognition software. Although reviewed after completion, some word and grammatical errors may remain.  8/11/2019   [facility] EMERGENCY DEPARTMENT      Leandro Barrett MD  08/11/19 6457

## 2019-08-11 NOTE — ANESTHESIA POSTPROCEDURE EVALUATION
Patient: Ayanna Davidson    Procedure(s):  irrigation and debridement right leg dog bite    Diagnosis:dog bite right foot and ankle  Diagnosis Additional Information: No value filed.    Anesthesia Type:  General, RSI, ETT    Note:  Anesthesia Post Evaluation    Patient location during evaluation: PACU  Patient participation: Able to fully participate in evaluation  Level of consciousness: awake  Pain management: satisfactory to patient  Airway patency: patent  Cardiovascular status: stable  Respiratory status: room air and spontaneous ventilation  Hydration status: stable  PONV: none     Anesthetic complications: None    Comments: Appear to tolerate Gen well without anesthesia related problems / complications noted.  Pain level satisfactory per patient. No N  /  V .  No complaints per patient.  Will follow as needed.        Last vitals:  Vitals:    08/11/19 1540 08/11/19 1545 08/11/19 1550   BP: 95/64 108/73    Pulse: 87 93    Resp: 18 16 19   Temp:      SpO2: 94% 97% 99%         Electronically Signed By: LEXX Dimas CRNA  August 11, 2019  4:19 PM

## 2019-08-11 NOTE — ED NOTES
The patient in the room next door to this patient had their call light on to report this patient, Ayanna Davidson, was pounding on the walls and screaming. When this RN entered patient's room she reported she was upset that her SO left with her phone. Patient was given ED portable phone to call her SO and he returned to her room. Patient immediately began hollering and swearing at him. Waiting for surgeon and they are both aware.

## 2019-08-11 NOTE — ANESTHESIA CARE TRANSFER NOTE
Patient: Ayanna Davidson    Procedure(s):  irrigation and debridement right leg dog bite    Diagnosis: dog bite right foot and ankle  Diagnosis Additional Information: No value filed.    Anesthesia Type:   General, RSI, ETT     Note:  Airway :Face Mask  Patient transferred to:PACU  Handoff Report: Identifed the Patient, Identified the Reponsible Provider, Reviewed the pertinent medical history, Discussed the surgical course, Reviewed Intra-OP anesthesia mangement and issues during anesthesia, Set expectations for post-procedure period and Allowed opportunity for questions and acknowledgement of understanding      Vitals: (Last set prior to Anesthesia Care Transfer)    CRNA VITALS  8/11/2019 1505 - 8/11/2019 1547      8/11/2019             Resp Rate (observed):  4  (Abnormal)                 Electronically Signed By: LEXX Dimas CRNA  August 11, 2019  3:47 PM

## 2019-08-11 NOTE — CONSULTS
[facility] Orthopedic Consultation    Ayanna Davidson MRN# [id number]   Age: 29 year old YOB: 1990     Date of Admission:  8/11/2019    Reason for consult:  Dog bite to right ankle       Requesting physician:  Dr. Barrett       Level of consult: Consult, follow and place orders           Assessment and Plan:   Assessment:  Four deep dog bites to the right ankle.  These are grossly contaminated with dog fur.  She is not willing to move the muscles of the leg.  She has just started to move the toes.  Sensation and circulation appear intact       Plan:   We will perform irrigation and debridement of the wounds.  Because of their width we will plan partial closure, but this will be loose and I have warned her that there will be scarring.  We will plan oral antibiotics afterwards.  She will also have rabies treatment at the emergency room and follow-up in 3 days for continued treatment.  We will see the wound this week.            Chief Complaint:   {                 Dog bites to right ankle and leg.          History is obtained from the patient         History of Present Illness:   This patient is a 29 year old female who presents with the following condition requiring a hospital admission:      Dog bites to right ankle.  This was from an unknown dog.  They believe 2 dogs were dropped off and abandoned.  One dog acting frightened approach them from behind.  She went to pet the dog and it bit her.           Past Medical History:     Past Medical History:   Diagnosis Date     Cervical high risk HPV (human papillomavirus) test positive 03/12/2019    last PAP NIL (4/16), remote hx of LEEP     Gastroesophageal reflux disease      Methamphetamine abuse (H)     reports daily use     recurrent Bartholin's cyst              Past Surgical History:     Past Surgical History:   Procedure Laterality Date     CHOLECYSTECTOMY       ENT SURGERY       INCISION AND DRAINAGE ABSCESS PELVIS, COMBINED N/A  2018    Procedure: COMBINED INCISION AND DRAINAGE ABSCESS PELVIS;  INCISION AND DRAINAGE LABIAL ABSCESS;  Surgeon: Jase Beach MD;  Location: [facility] OR     INCISION AND DRAINAGE ABSCESS PELVIS, COMBINED N/A 3/5/2019    Procedure: Incision and Drainage Right Labial Abscess;  Surgeon: Jase Beach MD;  Location: [facility] OR     LAP ADJUSTABLE GASTRIC BAND       LEEP TX, CERVICAL       MAMMOPLASTY REDUCTION BILATERAL       MARSUPIALIZATION BARTHOLIN CYST N/A 2019    Procedure: Bartholin's Cyst removal;  Surgeon: Froylan Schwarz MD;  Location: [facility] OR     ORTHOPEDIC SURGERY               Social History:     Social History     Tobacco Use     Smoking status: Current Every Day Smoker     Packs/day: 0.25     Smokeless tobacco: Current User     Tobacco comment: uses E cig   Substance Use Topics     Alcohol use: Yes     Frequency: Monthly or less             Family History:   No family history on file.  Family history reviewed          Immunizations:     Immunization History   Administered Date(s) Administered     DTAP (<7y) 10/02/1999, 10/26/1999     HPV Quadrivalent 10/18/2006, 01/10/2007, 2012     Hep B, Peds or Adolescent 1988, 10/02/1999, 2000     HepB, Unspecified 1999     HepB-Adult 1990, 10/02/1999, 2000     MMR 1993, 2000     Rabies - IM Diploid Cell Culture 2019, 05/10/2019, 2019, 2019     TDAP Vaccine (Adacel) 11/15/2013, 2017     Td (Adult), Adsorbed 1988, 10/24/2007, 2009     Tdap (Adult) Unspecified Formulation 10/24/2007, 11/15/2013             Allergies:   No Known Allergies          Medications:     Medications Prior to Admission   Medication Sig Dispense Refill Last Dose     Acetaminophen (TYLENOL PO) Take 500 mg by mouth every 4 hours as needed for mild pain or fever   Taking     [] acetaminophen-codeine (TYLENOL #3) 300-30 MG tablet Take 1 tablet by mouth every 6 hours as needed for severe pain 10  tablet 0      [] amoxicillin-clavulanate (AUGMENTIN) 875-125 MG tablet Take 1 tablet by mouth 2 times daily for 7 days 14 tablet 0      BuPROPion HCl (WELLBUTRIN XL PO) Take 150 mg by mouth daily   Taking     butalbital-acetaminophen-caffeine (FIORICET/ESGIC) -40 MG tablet Take 1 tablet by mouth every 4 hours as needed for headaches 30 tablet 3 Taking     cyanocobalamin (VITAMIN B12) 1000 MCG/ML injection Inject 1 mL into the muscle every 30 days   Not Taking     omeprazole (PRILOSEC OTC) 20 MG EC tablet Take 1 tablet (20 mg) by mouth daily 30 tablet 3 Taking     [] ondansetron (ZOFRAN ODT) 4 MG ODT tab Take 1 tablet (4 mg) by mouth every 6 hours as needed 15 tablet 0      sertraline (ZOLOFT) 50 MG tablet Take 50 mg by mouth daily   Taking     traMADol (ULTRAM) 50 MG tablet Take 50 mg by mouth every 6 hours as needed for severe pain   Taking     [] traMADol (ULTRAM) 50 MG tablet Take 1 tablet (50 mg) by mouth every 6 hours as needed for severe pain 20 tablet 0              Review of Systems:   The Review of Systems is negative other than noted in the HPI          Physical Exam:   Temp: 98  F (36.7  C) Temp src: Oral BP: 98/45 Pulse: 90 Heart Rate: 89 Resp: 13 SpO2: 95 % O2 Device: None (Room air)    All vitals have been reviewed  Musculoskeletal:   Several lacerations to the right lower leg and ankle.  One is lateral, 3 are medial.  There is dog for contaminating the wounds.  She is concerned that her calf muscle seemed soft.  She does have active dorsiflexion of the ankle and has some posterior calf pain with this.  She is able to wiggle her toes, but is been hesitant to do that until just now.  She does appear to have intact sensation.              Data:     Lab Results   Component Value Date    WBC 9.5 2019    HGB 12.5 2019    HCT 37.3 2019     2019     2019    POTASSIUM 3.8 2019    CHLORIDE 105 2019    CO2 27 2019    BUN 12  08/11/2019    CR 0.76 08/11/2019     (H) 08/11/2019    AST 24 04/29/2019    ALT 30 04/29/2019    ALKPHOS 69 04/29/2019    BILITOTAL 0.3 04/29/2019        Attestation:  Amount of time performed on this consult: 30 minutes.    Rommel Pérez MD

## 2019-08-11 NOTE — ED TRIAGE NOTES
"Presents to ED with dog bite to the right medial ankle[place] Happened about 45 minutes ago. Bleeding controlled. Pt unsure if it was a stray dog or coyote.  States, \"I've already had rabies shots once.\"   "

## 2019-08-12 NOTE — OR NURSING
"S:  \"got dressing wet so changed it.\"  B:  Post-op day 1 for I&D right ankle/foot per . Talked with pt's boyfriend, Tyson, since pt currently sleeping and unavailable for courtesy post-op call back. Tyson reports pt's incisions look clean, no redness.   Pt was washing her dog when she accidentally sprayed her dressing and it got soaked. Removed right away and ace wrap reapplied. Tyson stated he will go to store to get gauze dressings to replace original one and then re-wrap with ace wrap. Reminded that pt should then leave on until follow up appointment with  this Thurs (appt info given).. Tyson reports he understands instructions provided. Tyson also asked about if it is alright for pt to be walking around without crutches. \"She's stubborn and doesn't like to use them.\"  Instructed for pt to use crutches with weight bear as tolerated to allow leg to rest and not overdo.   A:  Compliance issues with post-op care instructions.  R:  Encouraged for pt to not over do so her incisions can heal. SDS ph.# given so pt may call us back with further ?s/concerns. A nurse will attempt to call pt again tomorrow for a post-op call.  "

## 2019-08-12 NOTE — OP NOTE
Procedure Date: 08/11/2019      PREOPERATIVE DIAGNOSIS:  Right leg dog bite at the ankle.      POSTOPERATIVE DIAGNOSIS:  Right leg dog bite at the ankle.      PROCEDURE:  Irrigation and debridement with nonexcisional debridement, right ankle wounds.      SURGEON:  Rommel Pérez MD.        HISTORY:  This is a 29-year-old female who sustained severe dog bites on the right ankle from an unknown stray dog.  They think the dogs were abandoned.  The dog approached them looking scared.  She went to pet the dog and it bit her and clamped on.  She sustained deep lacerations to both sides of the ankle and presents now for irrigation and debridement.      DESCRIPTION OF PROCEDURE:  After smooth general anesthetic, the patient's right leg was prepped and draped in sterile fashion.  Pause was performed for patient verification.  The leg was exsanguinated and tourniquet inflated to 300 mmHg about the thigh.  The wounds were initially irrigated with jet lavage.  There was one transverse wound laterally just above the ankle, 3 transverse wounds medially just above the ankle.  There were several smaller abrasions and scratches.  Once I had irrigated gross tissue away, as there had been dog fur in the wounds, I examined the wounds deeper.  The lateral wound penetrated down to the fascia over the soleus and penetrated the fascia, but did not damage underlying tissue.  It exposed the sural nerve, but I could follow the sural nerve in its entirety continuous through the laceration area.  Medially, there were 3 wounds.  The most superior one was fairly posterior and again penetrated the fascia of the soleus, but did not damage muscle beneath this.  The next larger incision at the mid portion did penetrate the fascia over the posterior tibial tendon, but did not extend posteriorly enough to approach the Achilles.  The most inferior wound was again more posterior and did penetrate fascia of the Achilles, but the Achilles was intact  beneath this.  In all wounds, I noted intact tendons, but several areas where the fascia had been torn.  The wounds were all irrigated.  Skin edges on 2 of the wounds were trimmed as it appeared dusky.  The rest of the skin edges were intact with good vascularity.  Some of the wounds were skiving wounds and it was the overlapping thin portion which I excised.  After thoroughly irrigating the wounds with 3 liters of antibiotic irrigation, I loosely closed some of the subcutaneous tissue with interrupted 2-0 Vicryl suture.  I did this just to take tension off the skin closure.  I then placed several stitches of 4-0 nylon as simple and vertical mattress sutures.  I spaced these fairly widely to allow drainage from the wound, but close enough to reasonably approximate the wound edges.  I placed about half the number of stitches I would if I were closing a surgically made an incision.  Prior to closure, I did deflate the tourniquet.  Bleeding was minimal.  Once the skin edges were closed, sterile dressings were applied.  Compressive wrap was applied.  She will be discharged to home to return to clinic next week for wound check.  She should see emergency room in 3 days for rabies shot.         LEONOR ROMERO MD             D: 2019   T: 2019   MT: PHOEBE      Name:     MARYURI ARTEAGA   MRN:      [id number]        Account:        [id number]   :      1990           Procedure Date: 2019      Document: [id number]

## 2019-08-15 ENCOUNTER — OFFICE VISIT (OUTPATIENT)
Dept: ORTHOPEDICS | Facility: OTHER | Age: 29
End: 2019-08-15
Payer: COMMERCIAL

## 2019-08-15 ENCOUNTER — ALLIED HEALTH/NURSE VISIT (OUTPATIENT)
Dept: FAMILY MEDICINE | Facility: CLINIC | Age: 29
End: 2019-08-15
Payer: COMMERCIAL

## 2019-08-15 VITALS
SYSTOLIC BLOOD PRESSURE: 123 MMHG | RESPIRATION RATE: 16 BRPM | DIASTOLIC BLOOD PRESSURE: 75 MMHG | HEART RATE: 102 BPM | BODY MASS INDEX: 25.71 KG/M2 | WEIGHT: 160 LBS | HEIGHT: 66 IN
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
## 2019-08-15 PROCEDURE — 90471 IMMUNIZATION ADMIN: CPT

## 2019-08-15 PROCEDURE — 99024 POSTOP FOLLOW-UP VISIT: CPT | Performed by: ORTHOPAEDIC SURGERY

## 2019-08-15 PROCEDURE — 90675 RABIES VACCINE IM: CPT

## 2019-08-15 RX ORDER — TRAMADOL HYDROCHLORIDE 50 MG/1
50 TABLET ORAL EVERY 6 HOURS PRN
Qty: 30 TABLET | Refills: 1 | Status: SHIPPED | OUTPATIENT
Start: 2019-08-15 | End: 2019-09-27

## 2019-08-15 ASSESSMENT — MIFFLIN-ST. JEOR: SCORE: 1457.51

## 2019-08-15 NOTE — PROGRESS NOTES
The CDC was contacted to discover if we could proceed with the rabies vaccine schedule as the patient was late for this 2nd vaccine and had an unfinished series prior. We were given the green light to proceed.       Patient refused to wait the 20 min observation time. She had somewhere to be. Patient was instructed to make her next nurse visit 15 day from today. Patient walked to the  to make this appointment.       Afsaneh Massey on 8/15/2019 at 11:49 AM

## 2019-08-15 NOTE — PROGRESS NOTES
Follow up right leg dog bites on 8/11/19.  She has removed the dressings and shaved her leg.  She has cut one stitch completely away and another has the ends cut off.  The one stitch came off in the dressing.  She has not been back to emergency room for her rabies follow up.  There is mild seepage from the wounds, especially where the stitch has been cut away.  No sign of infection.    Assessment:  Dog bites to right leg with no active infection, but persistent seepage and threatened wound.  Plan:  Dressings applied:  Adaptic, gauge, kerlix and Ace wrap.  Return to clinic 1 week for another wound check.  Go to emergency room today for rabies follow up.

## 2019-08-15 NOTE — LETTER
8/15/2019         RE: Ayanna Davidson  [address]  [place] [address]        Dear Colleague,    Thank you for referring your patient, Ayanna Davidson, to the [facility]. Please see a copy of my visit note below.    Follow up right leg dog bites on 8/11/19.  She has removed the dressings and shaved her leg.  She has cut one stitch completely away and another has the ends cut off.  The one stitch came off in the dressing.  She has not been back to emergency room for her rabies follow up.  There is mild seepage from the wounds, especially where the stitch has been cut away.  No sign of infection.    Assessment:  Dog bites to right leg with no active infection, but persistent seepage and threatened wound.  Plan:  Dressings applied.  Return to clinic 1 week for another wound check.  Go to emergency room today for rabies follow up.    Again, thank you for allowing me to participate in the care of your patient.        Sincerely,        oRmmel Pérez MD

## 2019-08-30 ENCOUNTER — ALLIED HEALTH/NURSE VISIT (OUTPATIENT)
Dept: FAMILY MEDICINE | Facility: CLINIC | Age: 29
End: 2019-08-30
Payer: COMMERCIAL
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
## 2019-08-30 PROCEDURE — 86382 NEUTRALIZATION TEST VIRAL: CPT | Mod: 90 | Performed by: FAMILY MEDICINE

## 2019-08-30 PROCEDURE — 90471 IMMUNIZATION ADMIN: CPT

## 2019-08-30 PROCEDURE — 90675 RABIES VACCINE IM: CPT

## 2019-08-30 PROCEDURE — 99000 SPECIMEN HANDLING OFFICE-LAB: CPT | Performed by: FAMILY MEDICINE

## 2019-08-30 NOTE — NURSING NOTE
[place] department of [facility] contacted.  Notified of patient and they had records from last phone call on 08/15/19.  Said to give the rabies vaccine today 08/30/19 but then to get a titer to determine if she needs another rabies vaccine after today's.  Patient informed of this.    I huddled with Dr. Mock and he okay 'd the rabies titer order.  Rabies vaccine response endpoint titer-lab order Order number [id number] is what the [facility] gave us to order. Goes to [facility].    Devin Castillo, [facility]

## 2019-08-30 NOTE — NURSING NOTE
Verified patient's name and .  Prior to immunization administration, verified patients identity using patient s name and date of birth. Please see Immunization Activity for additional information.     Screening Questionnaire for Adult Immunization    Are you sick today?   No   Do you have allergies to medications, food, a vaccine component or latex?   No   Have you ever had a serious reaction after receiving a vaccination?   No   Do you have a long-term health problem with heart disease, lung disease, asthma, kidney disease, metabolic disease (e.g. diabetes), anemia, or other blood disorder?   No   Do you have cancer, leukemia, HIV/AIDS, or any other immune system problem?   No   In the past 3 months, have you taken medications that affect  your immune system, such as prednisone, other steroids, or anticancer drugs; drugs for the treatment of rheumatoid arthritis, Crohn s disease, or psoriasis; or have you had radiation treatments?   No   Have you had a seizure, or a brain or other nervous system problem?   No   During the past year, have you received a transfusion of blood or blood     products, or been given immune (gamma) globulin or antiviral drug?   No   For women: Are you pregnant or is there a chance you could become        pregnant during the next month?   No   Have you received any vaccinations in the past 4 weeks?   No     Immunization questionnaire answers were all negative.        Injection of Rabies vaccine given by Devin Castillo CMA. Patient instructed to remain in clinic for 15 minutes afterwards, and to report any adverse reaction to me immediately.       Screening performed by Devin Castillo CMA on 2019 at 4:12 PM.

## 2019-09-27 ENCOUNTER — HOSPITAL ENCOUNTER (INPATIENT)
Facility: CLINIC | Age: 29
LOS: 1 days | Discharge: HOME | End: 2019-09-29
Attending: PHYSICIAN ASSISTANT | Admitting: FAMILY MEDICINE
Payer: COMMERCIAL

## 2019-09-27 DIAGNOSIS — L03.114 CELLULITIS OF LEFT FOREARM: ICD-10-CM

## 2019-09-27 DIAGNOSIS — S81.811A: ICD-10-CM

## 2019-09-27 DIAGNOSIS — S41.152D DOG BITE OF LEFT UPPER EXTREMITY, SUBSEQUENT ENCOUNTER: Primary | ICD-10-CM

## 2019-09-27 DIAGNOSIS — S81.851D DOG BITE OF RIGHT LOWER LEG, SUBSEQUENT ENCOUNTER: ICD-10-CM

## 2019-09-27 DIAGNOSIS — L03.114 CELLULITIS OF LEFT UPPER EXTREMITY: ICD-10-CM
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
## 2019-09-27 PROBLEM — L08.9 SKIN INFECTION: Status: ACTIVE | Noted: 2019-09-27

## 2019-09-27 PROBLEM — K21.9 GASTROESOPHAGEAL REFLUX DISEASE WITHOUT ESOPHAGITIS: Status: ACTIVE | Noted: 2019-09-27

## 2019-09-27 PROBLEM — S41.152A DOG BITE OF LEFT UPPER EXTREMITY: Status: ACTIVE | Noted: 2019-05-07

## 2019-09-27 PROBLEM — S81.851A DOG BITE OF RIGHT LOWER LEG: Status: ACTIVE | Noted: 2019-09-27

## 2019-09-27 PROBLEM — F17.200 CURRENT EVERY DAY SMOKER: Status: ACTIVE | Noted: 2019-09-27

## 2019-09-27 LAB
ANION GAP SERPL CALCULATED.3IONS-SCNC: 9 MMOL/L (ref 3–14)
BASOPHILS # BLD AUTO: 0.1 10E9/L (ref 0–0.2)
BASOPHILS NFR BLD AUTO: 0.5 %
BUN SERPL-MCNC: 7 MG/DL (ref 7–30)
CALCIUM SERPL-MCNC: 8.4 MG/DL (ref 8.5–10.1)
CHLORIDE SERPL-SCNC: 106 MMOL/L (ref 94–109)
CO2 SERPL-SCNC: 27 MMOL/L (ref 20–32)
CREAT SERPL-MCNC: 0.65 MG/DL (ref 0.52–1.04)
CRP SERPL-MCNC: 19 MG/L (ref 0–8)
DIFFERENTIAL METHOD BLD: ABNORMAL
EOSINOPHIL NFR BLD AUTO: 3.2 %
ERYTHROCYTE [DISTWIDTH] IN BLOOD BY AUTOMATED COUNT: 14.1 % (ref 10–15)
GFR SERPL CREATININE-BSD FRML MDRD: >90 ML/MIN/{1.73_M2}
GLUCOSE SERPL-MCNC: 81 MG/DL (ref 70–99)
HCT VFR BLD AUTO: 39.2 % (ref 35–47)
HGB BLD-MCNC: 13 G/DL (ref 11.7–15.7)
IMM GRANULOCYTES # BLD: 0.1 10E9/L (ref 0–0.4)
IMM GRANULOCYTES NFR BLD: 0.4 %
LAB SCANNED RESULT: NORMAL
LACTATE BLD-SCNC: 1.2 MMOL/L (ref 0.7–2)
LYMPHOCYTES # BLD AUTO: 1.7 10E9/L (ref 0.8–5.3)
LYMPHOCYTES NFR BLD AUTO: 15.2 %
MCH RBC QN AUTO: 29.8 PG (ref 26.5–33)
MCHC RBC AUTO-ENTMCNC: 33.2 G/DL (ref 31.5–36.5)
MCV RBC AUTO: 90 FL (ref 78–100)
MONOCYTES # BLD AUTO: 0.7 10E9/L (ref 0–1.3)
MONOCYTES NFR BLD AUTO: 6.4 %
NEUTROPHILS # BLD AUTO: 8.4 10E9/L (ref 1.6–8.3)
NEUTROPHILS NFR BLD AUTO: 74.3 %
NRBC # BLD AUTO: 0 10*3/UL
NRBC BLD AUTO-RTO: 0 /100
PLATELET # BLD AUTO: 308 10E9/L (ref 150–450)
POTASSIUM SERPL-SCNC: 3.2 MMOL/L (ref 3.4–5.3)
RBC # BLD AUTO: 4.36 10E12/L (ref 3.8–5.2)
SODIUM SERPL-SCNC: 142 MMOL/L (ref 133–144)
WBC # BLD AUTO: 11.3 10E9/L (ref 4–11)

## 2019-09-27 PROCEDURE — 85025 COMPLETE CBC W/AUTO DIFF WBC: CPT | Performed by: PHYSICIAN ASSISTANT

## 2019-09-27 PROCEDURE — 25000132 ZZH RX MED GY IP 250 OP 250 PS 637: Performed by: FAMILY MEDICINE

## 2019-09-27 PROCEDURE — 80048 BASIC METABOLIC PNL TOTAL CA: CPT | Performed by: PHYSICIAN ASSISTANT

## 2019-09-27 PROCEDURE — 99285 EMERGENCY DEPT VISIT HI MDM: CPT | Mod: 25

## 2019-09-27 PROCEDURE — 96376 TX/PRO/DX INJ SAME DRUG ADON: CPT

## 2019-09-27 PROCEDURE — 99285 EMERGENCY DEPT VISIT HI MDM: CPT | Mod: 25 | Performed by: FAMILY MEDICINE

## 2019-09-27 PROCEDURE — 99219 ZZC INITIAL OBSERVATION CARE,LEVL II: CPT | Performed by: FAMILY MEDICINE

## 2019-09-27 PROCEDURE — G0378 HOSPITAL OBSERVATION PER HR: HCPCS

## 2019-09-27 PROCEDURE — 25000128 H RX IP 250 OP 636: Performed by: PHYSICIAN ASSISTANT

## 2019-09-27 PROCEDURE — 96375 TX/PRO/DX INJ NEW DRUG ADDON: CPT

## 2019-09-27 PROCEDURE — 86140 C-REACTIVE PROTEIN: CPT | Performed by: PHYSICIAN ASSISTANT

## 2019-09-27 PROCEDURE — 96365 THER/PROPH/DIAG IV INF INIT: CPT

## 2019-09-27 PROCEDURE — 83605 ASSAY OF LACTIC ACID: CPT | Performed by: PHYSICIAN ASSISTANT

## 2019-09-27 RX ORDER — NICOTINE 21 MG/24HR
1 PATCH, TRANSDERMAL 24 HOURS TRANSDERMAL DAILY
Status: DISCONTINUED | OUTPATIENT
Start: 2019-09-27 | End: 2019-09-29 | Stop reason: HOSPADM

## 2019-09-27 RX ORDER — LIDOCAINE 40 MG/G
CREAM TOPICAL
Status: DISCONTINUED | OUTPATIENT
Start: 2019-09-27 | End: 2019-09-29 | Stop reason: HOSPADM

## 2019-09-27 RX ORDER — POTASSIUM CHLORIDE 1500 MG/1
20-40 TABLET, EXTENDED RELEASE ORAL
Status: DISCONTINUED | OUTPATIENT
Start: 2019-09-27 | End: 2019-09-29 | Stop reason: HOSPADM

## 2019-09-27 RX ORDER — ONDANSETRON 4 MG/1
4 TABLET, ORALLY DISINTEGRATING ORAL EVERY 6 HOURS PRN
Status: DISCONTINUED | OUTPATIENT
Start: 2019-09-27 | End: 2019-09-29 | Stop reason: HOSPADM

## 2019-09-27 RX ORDER — POTASSIUM CL/LIDO/0.9 % NACL 10MEQ/0.1L
10 INTRAVENOUS SOLUTION, PIGGYBACK (ML) INTRAVENOUS
Status: DISCONTINUED | OUTPATIENT
Start: 2019-09-27 | End: 2019-09-29 | Stop reason: HOSPADM

## 2019-09-27 RX ORDER — ONDANSETRON 2 MG/ML
4 INJECTION INTRAMUSCULAR; INTRAVENOUS EVERY 6 HOURS PRN
Status: DISCONTINUED | OUTPATIENT
Start: 2019-09-27 | End: 2019-09-29 | Stop reason: HOSPADM

## 2019-09-27 RX ORDER — IBUPROFEN 200 MG
600 TABLET ORAL EVERY 4 HOURS PRN
COMMUNITY
End: 2020-10-27

## 2019-09-27 RX ORDER — OXYCODONE HYDROCHLORIDE 5 MG/1
10 TABLET ORAL EVERY 4 HOURS PRN
Status: DISCONTINUED | OUTPATIENT
Start: 2019-09-27 | End: 2019-09-29

## 2019-09-27 RX ORDER — POTASSIUM CHLORIDE 7.45 MG/ML
10 INJECTION INTRAVENOUS
Status: DISCONTINUED | OUTPATIENT
Start: 2019-09-27 | End: 2019-09-29 | Stop reason: HOSPADM

## 2019-09-27 RX ORDER — KETOROLAC TROMETHAMINE 10 MG/1
10 TABLET, FILM COATED ORAL
Status: ON HOLD | COMMUNITY
End: 2019-09-29

## 2019-09-27 RX ORDER — HYDROCODONE BITARTRATE AND ACETAMINOPHEN 5; 325 MG/1; MG/1
1-2 TABLET ORAL EVERY 4 HOURS PRN
Status: DISCONTINUED | OUTPATIENT
Start: 2019-09-27 | End: 2019-09-29 | Stop reason: HOSPADM

## 2019-09-27 RX ORDER — ACETAMINOPHEN 325 MG/1
650 TABLET ORAL EVERY 4 HOURS PRN
Status: DISCONTINUED | OUTPATIENT
Start: 2019-09-27 | End: 2019-09-29 | Stop reason: HOSPADM

## 2019-09-27 RX ORDER — HYDROMORPHONE HYDROCHLORIDE 1 MG/ML
.3-.5 INJECTION, SOLUTION INTRAMUSCULAR; INTRAVENOUS; SUBCUTANEOUS
Status: DISCONTINUED | OUTPATIENT
Start: 2019-09-27 | End: 2019-09-29

## 2019-09-27 RX ORDER — POTASSIUM CHLORIDE 29.8 MG/ML
20 INJECTION INTRAVENOUS
Status: DISCONTINUED | OUTPATIENT
Start: 2019-09-27 | End: 2019-09-27

## 2019-09-27 RX ORDER — NALOXONE HYDROCHLORIDE 0.4 MG/ML
.1-.4 INJECTION, SOLUTION INTRAMUSCULAR; INTRAVENOUS; SUBCUTANEOUS
Status: DISCONTINUED | OUTPATIENT
Start: 2019-09-27 | End: 2019-09-29 | Stop reason: HOSPADM

## 2019-09-27 RX ORDER — ONDANSETRON 2 MG/ML
4 INJECTION INTRAMUSCULAR; INTRAVENOUS EVERY 30 MIN PRN
Status: DISCONTINUED | OUTPATIENT
Start: 2019-09-27 | End: 2019-09-27

## 2019-09-27 RX ORDER — FLUCONAZOLE 150 MG/1
150 TABLET ORAL ONCE
Status: ON HOLD | COMMUNITY
End: 2019-09-29

## 2019-09-27 RX ORDER — HYDROMORPHONE HYDROCHLORIDE 1 MG/ML
0.5 INJECTION, SOLUTION INTRAMUSCULAR; INTRAVENOUS; SUBCUTANEOUS
Status: COMPLETED | OUTPATIENT
Start: 2019-09-27 | End: 2019-09-27

## 2019-09-27 RX ORDER — POTASSIUM CHLORIDE 1.5 G/1.58G
20-40 POWDER, FOR SOLUTION ORAL
Status: DISCONTINUED | OUTPATIENT
Start: 2019-09-27 | End: 2019-09-29 | Stop reason: HOSPADM

## 2019-09-27 RX ORDER — IBUPROFEN 600 MG/1
600 TABLET, FILM COATED ORAL EVERY 6 HOURS PRN
Status: DISCONTINUED | OUTPATIENT
Start: 2019-09-27 | End: 2019-09-29 | Stop reason: HOSPADM

## 2019-09-27 RX ADMIN — POTASSIUM CHLORIDE 40 MEQ: 1500 TABLET, EXTENDED RELEASE ORAL at 22:26

## 2019-09-27 RX ADMIN — HYDROMORPHONE HYDROCHLORIDE 0.5 MG: 1 INJECTION, SOLUTION INTRAMUSCULAR; INTRAVENOUS; SUBCUTANEOUS at 19:42

## 2019-09-27 RX ADMIN — HYDROMORPHONE HYDROCHLORIDE 0.5 MG: 1 INJECTION, SOLUTION INTRAMUSCULAR; INTRAVENOUS; SUBCUTANEOUS at 20:43

## 2019-09-27 RX ADMIN — NICOTINE 1 PATCH: 14 PATCH TRANSDERMAL at 22:25

## 2019-09-27 RX ADMIN — HYDROCODONE BITARTRATE AND ACETAMINOPHEN 1 TABLET: 5; 325 TABLET ORAL at 22:26

## 2019-09-27 RX ADMIN — ONDANSETRON 4 MG: 2 INJECTION INTRAMUSCULAR; INTRAVENOUS at 19:42

## 2019-09-27 RX ADMIN — HYDROMORPHONE HYDROCHLORIDE 0.5 MG: 1 INJECTION, SOLUTION INTRAMUSCULAR; INTRAVENOUS; SUBCUTANEOUS at 19:56

## 2019-09-27 RX ADMIN — TAZOBACTAM SODIUM AND PIPERACILLIN SODIUM 3.38 G: 375; 3 INJECTION, SOLUTION INTRAVENOUS at 19:43

## 2019-09-27 ASSESSMENT — ENCOUNTER SYMPTOMS
MYALGIAS: 1
VOMITING: 1
FEVER: 0
DIFFICULTY URINATING: 0
ARTHRALGIAS: 0
NECK STIFFNESS: 0
HEADACHES: 0
ABDOMINAL PAIN: 0
CHILLS: 0
COLOR CHANGE: 0
CONFUSION: 0
EYE REDNESS: 0
SHORTNESS OF BREATH: 0

## 2019-09-27 ASSESSMENT — MIFFLIN-ST. JEOR: SCORE: 1547.77

## 2019-09-27 NOTE — ED TRIAGE NOTES
Presents to ED for dog bite to the right lower calf and the left wrist. Patient was bit last night by her own dog.  Reports the dog is a female boxer mix. Patient reports the dog is up to date on shots.  Subcutaneous tissue visible on left ankle and multiple punctures to the left wrist. Patient was seen at [facility] today.

## 2019-09-27 NOTE — ED PROVIDER NOTES
"  History     Chief Complaint   Patient presents with     Dog Bite     The history is provided by the patient.     Ayanna Davidson is a 29 year old female who presents to the ED complaining of dog bite on her left wrist/ arm and her right calf that occurred around 6pm last evening. She stated she has two females dogs that started fighting and she tried to break them apart and got bit in the process, she did not clean the wounds. Patient's dogs are UTD on their vaccines and patient's last TD was 8/2017. She was evaluated at [facility] earlier today(see note below) and came to our ED due to an increase in her left wrist and forearm pain, her wrist is reddened and warm to touch. Patient did not rate her pain on the pain scale but did state it is \"unbelievable\" especially on the outside aspect where there are a few small bite marks located. (see photos of the wounds below).  She stated she did vomit today but denies fever and chills.     Excerpt from her Urgent Care visit from earlier today:    History:  Chief Complaint   Patient presents with     Dog Bite     HPI     Patient presents with multiple dog bites to her left wrist and lower right leg. These are her personal dogs and she is not concerned about rabies. She reports that she has been bit multiple times in the past by her dogs as they tend to fight and she steps in. She did unfortunately put one of her dogs up for adoption after this last incident as it was no longer safe for her or the dogs. She reports that her left forearm is sore.    Denies fever/chills. Denies any other injuries.     Physical Exam    Constitutional: Well appearing, NAD   Respiratory: Even, non-labored  Musculoskeletal: Tenderness along forearm, swelling noted. Full AROM intact. CMS intact. No exposed tendon or bone.   Skin: Multiple puncture wounds noted to left wrist. Large abrasion noted to right lower leg, does not approximate. Multiple scars noted to arms and legs from prior bites as " well as a recently repaired, well healing, laceration on her right lower leg that had loosely been closed.   Psych: Emotionally labile. Aggressive at times.     Imaging:  Xr Forearm Routine Lt 2 Views    Result Date: 9/27/2019  EXAM: XR FOREARM ROUTINE LT 2 VIEWS INDICATION: injury COMPARISON: None. FINDINGS: AP and lateral left forearm. By report, patient has a dog bite injury. Triangular shaped sclerosis of the distal radius metaphysis favors old trauma, possibly from prior puncture wound. No acute fracture or suspicious osseous lesion. No radiopaque foreign body. IMPRESSION: 1. No acute bony abnormality left forearm. 2. Old traumatic changes of the distal radius metaphysis.     Labs: Not indicated     Course:    Patient was seen by provider. HPI and physical assessment was performed. Appropriate labs and imaging were ordered and interpreted as necessary. X-ray ordered; no acute injury. Attempted to irrigate wounds, patient did not tolerate well, LET applied and re-attempted with success. Toradol given for discomfort. During visit patient became increasingly rude and disrespectful and stated that she needed stronger pain medications. Discussed with her that I will not be prescribing narcotic pain medication for her dog bites. Patient became agitated at this response. Patient did not want to take Augmentin as this leads to yeast infections, discussed with patient that this is the best option and will prescribe diflucan for her. Patient continued to be rude and I asked her what the issue was, patient reports that she has a rough relationship right now and isn't sure where her car is or what's going on, patient did apologize to me. Results and treatment plan were discussed with patient. Discharged home with no further questions or concerns.     Diagnosis:  ICD-10-CM   1. Dog bite, initial encounter W54.0XXA ketorolac tromethamine (AKA: TORADOL) 10 mg oral Tablet   amoxicillin-pot clavulanate (AKA: AUGMENTIN) 466-779  mg oral Tablet   fluconazole (AKA: DIFLUCAN) 150 mg oral Tablet   DISCONTINUED: amoxicillin-pot clavulanate (AKA: AUGMENTIN) 875-125 mg oral Tablet   DISCONTINUED: ketorolac tromethamine (AKA: TORADOL) 10 mg oral Tablet     Plan:    Plan as noted below.    Patient agrees with and understands plan. No further questions at this time.     Discharge Instructions     Your imaging was normal and did not show any signs of fracture.     Your wounds were cleansed and dressed. You will want to change these dressings every 24-36 hours, or if they become saturated.    You were given an injection of Rocephin, an antibiotic. You were prescribed a ten day course of Augmentin. Take as directed.     Toradol for pain.     Follow-up as needed.         Medication List     New   amoxicillin-pot clavulanate 875-125 mg Tablet  Quantity: 20 tablet  Signed by: EVARISTO Vasquez  Commonly known as: aka: AUGMENTIN  875 mg, oral, BID    fluconazole 150 mg Tablet  Quantity: 2 tablet  Signed by: EVARISTO Vasquez  Commonly known as: aka: DIFLUCAN  Take 1 tablet (150 mg) today; may repeat 1 tablet (150 mg) in 3 days if needed.    ketorolac tromethamine 10 mg Tablet  Quantity: 15 tablet  Signed by: EVARISTO Vasquez  Commonly known as: aka: TORADOL  10 mg, oral, TID PRN        Follow-up with:  No follow-up provider specified.     Electronically signed by Tanner Aiken PAC at 09/27/2019 3:33 PM CDT            Allergies:  No Known Allergies    Problem List:    Patient Active Problem List    Diagnosis Date Noted     Dog bite of right lower leg 09/27/2019     Priority: Medium     Dog bite of left upper extremity 05/07/2019     Priority: Medium     Cervical high risk HPV (human papillomavirus) test positive 03/12/2019     Priority: Medium     4/2016 NIL pap. No prior HPV testing.   3/12/19 NIL pap, + HR HPV (not 16 or 18). Plan: cotest in 1 yr       Vulvar abscess 08/16/2017     Priority: Medium     Drug-induced mental  disorder (H) 09/20/2012     Priority: Medium     Overview:   ICD-10 Regulatory Update       Depressed 05/10/2012     Priority: Medium     Overdose of antipsychotic 05/09/2012     Priority: Medium        Past Medical History:    Past Medical History:   Diagnosis Date     Cervical high risk HPV (human papillomavirus) test positive 03/12/2019     Gastroesophageal reflux disease      Methamphetamine abuse (H)      recurrent Bartholin's cyst        Past Surgical History:    Past Surgical History:   Procedure Laterality Date     CHOLECYSTECTOMY       ENT SURGERY       INCISION AND DRAINAGE ABSCESS PELVIS, COMBINED N/A 2/26/2018    Procedure: COMBINED INCISION AND DRAINAGE ABSCESS PELVIS;  INCISION AND DRAINAGE LABIAL ABSCESS;  Surgeon: Jase Beach MD;  Location: [facility] OR     INCISION AND DRAINAGE ABSCESS PELVIS, COMBINED N/A 3/5/2019    Procedure: Incision and Drainage Right Labial Abscess;  Surgeon: Jase Beach MD;  Location: [facility] OR     INCISION AND DRAINAGE LOWER EXTREMITY, COMBINED Right 8/11/2019    Procedure: irrigation and debridement right leg dog bite;  Surgeon: Rommel Pérez MD;  Location: [facility] OR     LAP ADJUSTABLE GASTRIC BAND       LEEP TX, CERVICAL       MAMMOPLASTY REDUCTION BILATERAL       MARSUPIALIZATION BARTHOLIN CYST N/A 4/29/2019    Procedure: Bartholin's Cyst removal;  Surgeon: Froylan Schwarz MD;  Location: [facility] OR     ORTHOPEDIC SURGERY         Family History:    No family history on file.    Social History:  Marital Status:  Single [1]  Social History     Tobacco Use     Smoking status: Current Every Day Smoker     Packs/day: 0.25     Smokeless tobacco: Current User     Tobacco comment: uses E cig   Substance Use Topics     Alcohol use: Yes     Frequency: Monthly or less     Drug use: Not Currently     Comment: In treatment for meth.        Medications:    Acetaminophen (TYLENOL PO)  BuPROPion HCl (WELLBUTRIN XL PO)  ibuprofen (ADVIL/MOTRIN) 200 MG tablet  omeprazole (PRILOSEC  OTC) 20 MG EC tablet          Review of Systems   Constitutional: Negative for chills and fever.   HENT: Negative for congestion.    Eyes: Negative for redness.   Respiratory: Negative for shortness of breath.    Cardiovascular: Negative for chest pain.   Gastrointestinal: Positive for vomiting. Negative for abdominal pain.   Genitourinary: Negative for difficulty urinating.   Musculoskeletal: Positive for myalgias. Negative for arthralgias and neck stiffness.   Skin: Negative for color change.   Neurological: Negative for headaches.   Psychiatric/Behavioral: Negative for confusion.   All other systems reviewed and are negative.      Physical Exam   BP: 130/81  Pulse: 104  Temp: 98.1  F (36.7  C)  Resp: 18  Weight: 77.1 kg (170 lb)  SpO2: 100 %                    Physical Exam  Vitals signs and nursing note reviewed.   Constitutional:       General: She is not in acute distress.     Appearance: She is well-developed. She is not diaphoretic.   HENT:      Head: Normocephalic and atraumatic.      Right Ear: External ear normal.      Left Ear: External ear normal.      Nose: Nose normal.      Mouth/Throat:      Pharynx: No oropharyngeal exudate.   Eyes:      General:         Right eye: No discharge.         Left eye: No discharge.      Conjunctiva/sclera: Conjunctivae normal.      Pupils: Pupils are equal, round, and reactive to light.   Neck:      Musculoskeletal: Normal range of motion and neck supple.      Thyroid: No thyromegaly.   Cardiovascular:      Rate and Rhythm: Normal rate and regular rhythm.      Heart sounds: Normal heart sounds. No murmur.   Pulmonary:      Effort: Pulmonary effort is normal. No respiratory distress.      Breath sounds: Normal breath sounds. No wheezing or rales.   Chest:      Chest wall: No tenderness.   Abdominal:      General: Bowel sounds are normal. There is no distension.      Palpations: Abdomen is soft. There is no mass.      Tenderness: There is no tenderness. There is no  guarding or rebound.      Hernia: No hernia is present.   Musculoskeletal: Normal range of motion.         General: No deformity.   Lymphadenopathy:      Cervical: No cervical adenopathy.   Skin:     General: Skin is warm.      Capillary Refill: Capillary refill takes less than 2 seconds.      Findings: Erythema (3 cm laceration on the right medial mid calf area with fat exposed.  She has a dressing around it.) present.      Comments: Multiple puncture wounds on the radial and ulnar aspect of the wrist and distal forearm.  Significant swelling of the distal forearm and hand causing decreased range of motion of her wrist.  Very tender to palpation.  No fluctuance or induration.  Erythematous border extends up into the mid forearm and borders were marked.   Neurological:      Mental Status: She is alert and oriented to person, place, and time.      Cranial Nerves: No cranial nerve deficit.         ED Course        Procedures               Critical Care time:  none               Results for orders placed or performed during the hospital encounter of 09/27/19 (from the past 24 hour(s))   Basic metabolic panel   Result Value Ref Range    Sodium 142 133 - 144 mmol/L    Potassium 3.2 (L) 3.4 - 5.3 mmol/L    Chloride 106 94 - 109 mmol/L    Carbon Dioxide 27 20 - 32 mmol/L    Anion Gap 9 3 - 14 mmol/L    Glucose 81 70 - 99 mg/dL    Urea Nitrogen 7 7 - 30 mg/dL    Creatinine 0.65 0.52 - 1.04 mg/dL    GFR Estimate >90 >60 mL/min/[1.73_m2]    GFR Estimate If Black >90 >60 mL/min/[1.73_m2]    Calcium 8.4 (L) 8.5 - 10.1 mg/dL   CBC with platelets differential   Result Value Ref Range    WBC 11.3 (H) 4.0 - 11.0 10e9/L    RBC Count 4.36 3.8 - 5.2 10e12/L    Hemoglobin 13.0 11.7 - 15.7 g/dL    Hematocrit 39.2 35.0 - 47.0 %    MCV 90 78 - 100 fl    MCH 29.8 26.5 - 33.0 pg    MCHC 33.2 31.5 - 36.5 g/dL    RDW 14.1 10.0 - 15.0 %    Platelet Count 308 150 - 450 10e9/L    Diff Method Automated Method     % Neutrophils 74.3 %    %  Lymphocytes 15.2 %    % Monocytes 6.4 %    % Eosinophils 3.2 %    % Basophils 0.5 %    % Immature Granulocytes 0.4 %    Nucleated RBCs 0 0 /100    Absolute Neutrophil 8.4 (H) 1.6 - 8.3 10e9/L    Absolute Lymphocytes 1.7 0.8 - 5.3 10e9/L    Absolute Monocytes 0.7 0.0 - 1.3 10e9/L    Absolute Basophils 0.1 0.0 - 0.2 10e9/L    Abs Immature Granulocytes 0.1 0 - 0.4 10e9/L    Absolute Nucleated RBC 0.0    CRP inflammation   Result Value Ref Range    CRP Inflammation 19.0 (H) 0.0 - 8.0 mg/L   Lactic acid whole blood   Result Value Ref Range    Lactic Acid 1.2 0.7 - 2.0 mmol/L       Medications   ondansetron (ZOFRAN) injection 4 mg (4 mg Intravenous Given 9/27/19 1942)   HYDROmorphone (PF) (DILAUDID) injection 0.5 mg (0.5 mg Intravenous Given 9/27/19 2043)   piperacillin-tazobactam (ZOSYN) infusion 3.375 g (3.375 g Intravenous New Bag 9/27/19 1943)       Assessments & Plan (with Medical Decision Making)     Dog bite  Cellulitis of left forearm  Laceration of right lower leg with complication     29 year old female presents for evaluation of a dog bite to her right calf and left wrist that occurred at 6 PM last evening, 14 hours from now.  She states that she washed her wounds and put bandages on but decided not to come in for evaluation.  She broke up a dog fight between her two dogs.  She states that they are up-to-date on all of their immunizations including the rabies series.  Patient states that she also has had the rabies vaccination series in the past.  She reports that she has had increasing swelling and redness of the left wrist causing significant pain and decreased range of motion this afternoon.  She was evaluated in an outside urgent care at 2 PM this afternoon and had a negative forearm x-ray.  Was given a shot of IM Rocephin and sent home with Augmentin.  She did not take the Augmentin, as she states it was sent to the wrong pharmacy.  She comes to our facility given her concern of increasing pain and  swelling.  On exam blood pressure 130/81, pulse 104, temperature 98.1, oxygen saturation 100% on room air.  Patient has significant swelling of her left wrist and hand extending up in the forearm with an erythematous border and significant tenderness to palpation.  No induration or fluctuance.  Borders were marked.  Multiple puncture wounds on the radial and ulnar aspect of the wrist.  She has a larger open laceration on the right medial calf that cannot be closed with sutures given the delayed presentation of 14 hours.  This also would increase the infection risk.  We discussed that this will scar and heal by secondary intention.  Wounds were cleansed with soapy water and then bacitracin ointment and nonstick bandages were applied with gauze.  IV was established.  Labs display an elevated white blood cell count of 11,300 with stable hemoglobin of 13.  CRP elevated at 19.  These findings are suggestive of worsening infection of her left wrist.  Lactic acid level is 1.2 and normal.  Basic metabolic panel all within normal limits other than a mildly low potassium at 3.2.  She was given an IV dose of Zosyn here in the ED.  Pain controlled with Dilaudid and she was given Zofran as well.  I spoke with Dr. Johnston, inpatient hospitalist, regarding the patient and he agreed to accept her care for IV antibiotic therapy given the concern of worsening infection in the hand/wrist due to a dog bite.  Dr. Smith, ED MD, was involved with her care as well.     I have reviewed the nursing notes.    I have reviewed the findings, diagnosis, plan and need for follow up with the patient.       New Prescriptions    No medications on file       Final diagnoses:   Dog bite   Cellulitis of left forearm   Laceration of right lower leg with complication   This document serves as a record of services personally performed by Francesco Prakash PA.  It was created on their behalf by Lakeshia Winters, a trained medical scribe. The creation of this record  is based on the provider's personal observations and the statements of the patient. This document has been checked and approved by the attending provider.    Disclaimer : This note consists of symbols derived from keyboarding, dictation and/or voice recognition software. As a result, there may be errors in the script that have gone undetected. Please consider this when interpreting information found in this chart.      9/27/2019   Francesco Prakash PA-C   [facility] EMERGENCY DEPARTMENT     Francesco Prakash PA-C  09/27/19 [id number]

## 2019-09-28 ENCOUNTER — APPOINTMENT (OUTPATIENT)
Dept: MRI IMAGING | Facility: CLINIC | Age: 29
End: 2019-09-28
Attending: NURSE PRACTITIONER
Payer: COMMERCIAL

## 2019-09-28 ENCOUNTER — APPOINTMENT (OUTPATIENT)
Dept: GENERAL RADIOLOGY | Facility: CLINIC | Age: 29
End: 2019-09-28
Attending: HOSPITALIST
Payer: COMMERCIAL

## 2019-09-28 ENCOUNTER — NURSE TRIAGE (OUTPATIENT)
Dept: NURSING | Facility: CLINIC | Age: 29
End: 2019-09-28

## 2019-09-28 LAB
ANION GAP SERPL CALCULATED.3IONS-SCNC: 7 MMOL/L (ref 3–14)
BUN SERPL-MCNC: 7 MG/DL (ref 7–30)
CALCIUM SERPL-MCNC: 8.3 MG/DL (ref 8.5–10.1)
CHLORIDE SERPL-SCNC: 110 MMOL/L (ref 94–109)
CO2 SERPL-SCNC: 26 MMOL/L (ref 20–32)
CREAT SERPL-MCNC: 0.78 MG/DL (ref 0.52–1.04)
ERYTHROCYTE [DISTWIDTH] IN BLOOD BY AUTOMATED COUNT: 14.2 % (ref 10–15)
GFR SERPL CREATININE-BSD FRML MDRD: >90 ML/MIN/{1.73_M2}
GLUCOSE SERPL-MCNC: 96 MG/DL (ref 70–99)
HCT VFR BLD AUTO: 37.7 % (ref 35–47)
HGB BLD-MCNC: 12.4 G/DL (ref 11.7–15.7)
MCH RBC QN AUTO: 29.9 PG (ref 26.5–33)
MCHC RBC AUTO-ENTMCNC: 32.9 G/DL (ref 31.5–36.5)
MCV RBC AUTO: 91 FL (ref 78–100)
PLATELET # BLD AUTO: 249 10E9/L (ref 150–450)
POTASSIUM SERPL-SCNC: 4.2 MMOL/L (ref 3.4–5.3)
RBC # BLD AUTO: 4.15 10E12/L (ref 3.8–5.2)
SODIUM SERPL-SCNC: 143 MMOL/L (ref 133–144)
WBC # BLD AUTO: 7.1 10E9/L (ref 4–11)

## 2019-09-28 PROCEDURE — 99207 ZZC CDG-CHARGE REQUIRED MANUAL ENTRY: CPT | Mod: 59 | Performed by: HOSPITALIST

## 2019-09-28 PROCEDURE — 73090 X-RAY EXAM OF FOREARM: CPT | Mod: TC,LT

## 2019-09-28 PROCEDURE — 96376 TX/PRO/DX INJ SAME DRUG ADON: CPT

## 2019-09-28 PROCEDURE — 25000128 H RX IP 250 OP 636: Performed by: FAMILY MEDICINE

## 2019-09-28 PROCEDURE — 99233 SBSQ HOSP IP/OBS HIGH 50: CPT | Performed by: NURSE PRACTITIONER

## 2019-09-28 PROCEDURE — 25800030 ZZH RX IP 258 OP 636: Performed by: FAMILY MEDICINE

## 2019-09-28 PROCEDURE — 25000125 ZZHC RX 250: Performed by: NURSE PRACTITIONER

## 2019-09-28 PROCEDURE — 12000000 ZZH R&B MED SURG/OB

## 2019-09-28 PROCEDURE — 80048 BASIC METABOLIC PNL TOTAL CA: CPT | Performed by: FAMILY MEDICINE

## 2019-09-28 PROCEDURE — 25000132 ZZH RX MED GY IP 250 OP 250 PS 637: Performed by: FAMILY MEDICINE

## 2019-09-28 PROCEDURE — 73590 X-RAY EXAM OF LOWER LEG: CPT | Mod: TC,RT

## 2019-09-28 PROCEDURE — 36415 COLL VENOUS BLD VENIPUNCTURE: CPT | Performed by: FAMILY MEDICINE

## 2019-09-28 PROCEDURE — G0378 HOSPITAL OBSERVATION PER HR: HCPCS

## 2019-09-28 PROCEDURE — 73218 MRI UPPER EXTREMITY W/O DYE: CPT | Mod: LT

## 2019-09-28 PROCEDURE — 99207 ZZC APP CREDIT; MD BILLING SHARED VISIT: CPT | Performed by: NURSE PRACTITIONER

## 2019-09-28 PROCEDURE — 85027 COMPLETE CBC AUTOMATED: CPT | Performed by: FAMILY MEDICINE

## 2019-09-28 RX ORDER — GINSENG 100 MG
CAPSULE ORAL 2 TIMES DAILY
Status: DISCONTINUED | OUTPATIENT
Start: 2019-09-28 | End: 2019-09-29 | Stop reason: HOSPADM

## 2019-09-28 RX ORDER — CEFAZOLIN SODIUM 1 G/50ML
1250 SOLUTION INTRAVENOUS EVERY 12 HOURS
Status: DISCONTINUED | OUTPATIENT
Start: 2019-09-28 | End: 2019-09-29

## 2019-09-28 RX ADMIN — HYDROMORPHONE HYDROCHLORIDE 0.3 MG: 1 INJECTION, SOLUTION INTRAMUSCULAR; INTRAVENOUS; SUBCUTANEOUS at 00:18

## 2019-09-28 RX ADMIN — BACITRACIN: 500 OINTMENT TOPICAL at 14:12

## 2019-09-28 RX ADMIN — TAZOBACTAM SODIUM AND PIPERACILLIN SODIUM 3.38 G: 375; 3 INJECTION, SOLUTION INTRAVENOUS at 19:54

## 2019-09-28 RX ADMIN — BACITRACIN: 500 OINTMENT TOPICAL at 21:25

## 2019-09-28 RX ADMIN — TAZOBACTAM SODIUM AND PIPERACILLIN SODIUM 3.38 G: 375; 3 INJECTION, SOLUTION INTRAVENOUS at 01:41

## 2019-09-28 RX ADMIN — HYDROMORPHONE HYDROCHLORIDE 0.5 MG: 1 INJECTION, SOLUTION INTRAMUSCULAR; INTRAVENOUS; SUBCUTANEOUS at 09:57

## 2019-09-28 RX ADMIN — POTASSIUM CHLORIDE 20 MEQ: 1500 TABLET, EXTENDED RELEASE ORAL at 00:18

## 2019-09-28 RX ADMIN — OMEPRAZOLE 20 MG: 20 CAPSULE, DELAYED RELEASE ORAL at 08:05

## 2019-09-28 RX ADMIN — OMEPRAZOLE 20 MG: 20 CAPSULE, DELAYED RELEASE ORAL at 17:12

## 2019-09-28 RX ADMIN — HYDROCODONE BITARTRATE AND ACETAMINOPHEN 2 TABLET: 5; 325 TABLET ORAL at 20:06

## 2019-09-28 RX ADMIN — VANCOMYCIN HYDROCHLORIDE 1250 MG: 1 INJECTION, POWDER, LYOPHILIZED, FOR SOLUTION INTRAVENOUS at 20:34

## 2019-09-28 RX ADMIN — HYDROCODONE BITARTRATE AND ACETAMINOPHEN 2 TABLET: 5; 325 TABLET ORAL at 11:00

## 2019-09-28 RX ADMIN — HYDROCODONE BITARTRATE AND ACETAMINOPHEN 2 TABLET: 5; 325 TABLET ORAL at 05:11

## 2019-09-28 RX ADMIN — ONDANSETRON 4 MG: 2 INJECTION INTRAMUSCULAR; INTRAVENOUS at 21:41

## 2019-09-28 RX ADMIN — TAZOBACTAM SODIUM AND PIPERACILLIN SODIUM 3.38 G: 375; 3 INJECTION, SOLUTION INTRAVENOUS at 08:11

## 2019-09-28 RX ADMIN — TAZOBACTAM SODIUM AND PIPERACILLIN SODIUM 3.38 G: 375; 3 INJECTION, SOLUTION INTRAVENOUS at 13:50

## 2019-09-28 ASSESSMENT — ACTIVITIES OF DAILY LIVING (ADL)
ADLS_ACUITY_SCORE: 11
ADLS_ACUITY_SCORE: 10

## 2019-09-28 NOTE — ED NOTES
Difficult IV start- Tech Hussein with two unsuccessful attempts.  Writer unable to place IV after 1 attempt. Dilaudid and Taz passed to Rose CALDERÓN RN to give pt when IV placed.

## 2019-09-28 NOTE — RESULT ENCOUNTER NOTE
Based on my reading of report, she has obtained immunity protection from rabies. Please let her know.  State >/= 13 which is above minimum in explanation needed to be protected

## 2019-09-28 NOTE — ED NOTES
Pt with several dog bites on her hands, arms, and LE's.  Left UE has several small bites with pain, swelling, and inflammation., RLE has L-shaped lac with both bites from yesterday at 6pm?  Pt was not seen until 2pm today and is not on abx.  IV abx has been completed.  Pt c/o LUE pain.  Meds given.

## 2019-09-28 NOTE — PLAN OF CARE
"VSS. Pt complained of throbbing pain mostly in her left forearm radiating through her entire left arm. Norco given twice, Dilaudid given once helped to relieve most pain. Pt has open wound on right leg covered with dressing, clean, dry and intact. CMS intact. Left arm elevated on pillows, ice pack applied. Ambulating independently in room. Nicotine patch applied to right arm. Significant other, Tyson, slept at bedside. IV Zosyn given during the night. Will continue to monitor according to care plan.     /68 (BP Location: Right arm)   Pulse 77   Temp 98.1  F (36.7  C) (Oral)   Resp 16   Ht 1.676 m (5' 6\")   Wt 81.6 kg (179 lb 14.4 oz)   SpO2 99%   BMI 29.04 kg/m      "

## 2019-09-28 NOTE — PROGRESS NOTES
S-(situation): Patient registered to Observation. Patient arrived to room 255 via wheelchair from ED.    B-(background): Dog bite to right lower and left upper extremities    A-(assessment): VSS. Pt in pain, mostly in left forearm. Bites on right lower and left upper extremities. Pt tolerating regular meals well. Pt requested to shower. Arm elevated on pillow, ice applied to arm.     R-(recommendations): Orders and observation goals reviewed with pt and significant other, Tyson.    Nursing Observation criteria listed below was met:    Skin issues/needs documented:Yes. Dog bites charted in flowsheets  Isolation needs addressed, if appropriate: NA  Fall Prevention: Education given and documented: ELIJAH  Education Assessment documented:Yes  Education Documented (Pre-existing chronic infection such as, MRSA/VRE need education on admission): NA  OBS video/handout Reviewed & DocumentedYes  Medication Reconciliation Complete: Yes  New medication patient education completed and documented (Possible Side Effects of Common Medications handout): Yes  Home medications if not able to send immediately home with family stored here: Yes  Reminder note placed in discharge instructions: Yes  Patient has discharge needs (If yes, please explain): No

## 2019-09-28 NOTE — PROGRESS NOTES
Return home medications to patient at discharge. Medications are currently stored in pt has Fluconazole, ketorolac, and A.mox Clav. in bin in pharm.

## 2019-09-28 NOTE — PROGRESS NOTES
"[facility]    Medicine Progress Note - Hospitalist Service       Date of Admission:  9/27/2019  Assessment & Plan   Ayanna Davidson is a 29 year old female admitted on 9/27/2019. She presents with very sustained when she was caught between 2 dogs last evening.  She sustained bite marks to the left forearm and wrist area as well as an open wound to the right lower leg.  She was seen at urgent care today with imaging being negative and was sent home on oral Augmentin.  She is dissatisfied with treatment there and presents to the ED.  She denies fever or chills.  Has been nauseated somewhat.  She is up-to-date her immunizations, dogs have been vaccinated against rabies.  She is had increasing pain with some swelling in the area of the left arm wounds and concern of some underlying cellulitis.  Patient will be registered observation and brought in for treatment with IV antibiotics, Zosyn has been started.  Will treat for pain with anti-inflammatories and narcotics.      Note from August 2019  \"29-year-old female with significant animal bites to the right ankle with deep tissue exposure.  She was unable to move her foot well.  Concern would be for tendon and/or nerve injury.  A consult with orthopedics was obtained.  Dr. Pérez took her to the operative room for irrigation and debridement as well as exploration.  She was given IV Zosyn in the emergency department.  Her tetanus status is up-to-date.    Her rabies status is much more complicated.  On May 7 she had another animal bite from an animal with unknown rabies vaccination status.  She was given rabies immunoglobulin that day as well as a rabies vaccine.  Rabies vaccine was repeated on May 10 and May 17.  After that the dog was still alive and healthy in the department health give the clinic the option to give her to not continue the vaccines.  She chose to opt out of finishing the rabies injections.  Today I discussed the case with " "the [place] Department of [facility].  They stated she did not need immunoglobulin after consultation with the state .  They did recommend administering the rabies vaccine today as day 0 and in 3 days.  They also recommended on day 3 she have a titer drawn to be sent to [facility], phone number [phone].  The Dept. Of health said this can be sent through [employer] as a rabies vaccine endpoint titer, order #[id number] they stated.  The patient would like to follow-up in the local [facility] clinic where she had the previous rabies vaccinations.  Recommendation was for follow-up in 3 days which I explained to her boyfriend and herself as well as the surgeon that would be discharging her.\"    Cellulitis of left upper extremity  Assessment:  Patient was bit by dog 9/26 with fang marks in the left wrist and forearm area, patient presented 9/27 with worsening pain, mild redness, but no fevers.  White blood cell count mildly elevated. Overnight patient has had very poor pain management, she will fall asleep with pain medications but awake in severe pain. Patient has limit movement and states \" just cut if off\". Of note patient has a history of dog bite, in August and in May 2019.  Attempted to obtain an MRI and she was not able to tolerate the scan. Patient is not doing well today, she is having more pain, nausea, and limited movement in her left arm. Patient is not able to transition to oral medication for pain or oral antibiotics.   Plan: Will continue pain management and Zosyn IV. Will need to admit to IP status, patient continues to need IV pain medication for pain mangement and will need an orthopedic consult. Patient will need to have MRI of left arm. Patient will need to continue IV Zosyn.     Dog bite of left upper extremity  Assessment: Caught between 2 dogs fighting last evening with fang marks on the left arm and wrist area. X-ray was negative. Patient with severe pain and " immobility. Attempted MRI but unable due to pain.   Plan: As above.     Dog bite of right lower leg  Assessment: Open wound of the right lower leg, about 3 x 2 cm  Plan: Since the wound is already 24 hours old, not really able to suture. Local cares, dressed with bacitracin and gauze. Ortho consult for tomorrow.     Gastroesophageal reflux disease without esophagitis  Assessment: Chronic and stable, taking Prilosec daily  Plan: Continue Prilosec    Current every day smoker  Assessment: Smoking 7 or 8 cigarettes daily  Plan: Nicotine replacement ordered    Diet: Regular Diet Adult  Room Service    DVT Prophylaxis: Pneumatic Compression Device - left, Ambulation  Mooney Catheter: not present  Code Status: Full Code      Disposition Plan   Expected discharge: 2 - 3 days, recommended to prior living arrangement once adequate pain management/ tolerating PO medications, antibiotic plan established and hemodynamically stable, medically ready for discharge. .  Entered: Melonie March CNP 09/28/2019, 12:25 PM       The patient's care was discussed with the Attending Physician, Dr. Piña, Bedside Nurse, Patient and Patient's Family.    Melonie March CNP  Hospitalist Service  [facility]    ______________________________________________________________________    Interval History   Patient is not feeling well, seen while in bed. Patient did not open her eyes. Patient was very frustrated with the pain control, she will fall asleep after the medication but then awake in severe pain. Patient is not able to eat or drink, she is nauseated. Patient states she feels this is from the pain. Patient is voiding. Patient tolerating the IV antibiotics and the IV fluids. Patient has been afebrile, hemodynamically stable.  10 point ROS neg other than the symptoms noted above     Data reviewed today: I reviewed all medications, new labs and imaging results over the last 24 hours.      Physical Exam    Vital Signs: Temp: 96.5  F (35.8  C) Temp src: Oral BP: 101/66 Pulse: 81   Resp: 16 SpO2: 98 % O2 Device: None (Room air)    Weight: 179 lbs 14.4 oz    Constitutional: awake, alert, upset, appears to be in pain, lethargic at times  Eyes: Lids and lashes normal, pupils equal, round and reactive to light, extra ocular muscles intact, sclera clear, conjunctiva normal  Respiratory: No increased work of breathing, good air exchange, clear to auscultation bilaterally, no crackles or wheezing  Cardiovascular: regular rate and rhythm  GI: normal bowel sounds, soft and non-tender  Skin: 3 x 4 cm laceration of the right lower leg gaping into the subcu fat.  Nurse dressed it with bacitracin and gauze.  No surrounding redness.  She has fang marks on the left wrist and left forearm with some surrounding redness extending up to the antecubital area.  She has an old wound involving her right ankle area which she stated was a previous dog bite which seems to be healing well with no signs of infection.  Musculoskeletal: Pain with palpation over the extensor tendon of the left thumb.  Pain with movement of the left arm/wrist. .  Neurologic: Awake, alert, oriented to name, place and time.      Data   Recent Labs   Lab 09/28/19  0443 09/27/19  1939   WBC 7.1 11.3*   HGB 12.4 13.0   MCV 91 90    308    142   POTASSIUM 4.2 3.2*   CHLORIDE 110* 106   CO2 26 27   BUN 7 7   CR 0.78 0.65   ANIONGAP 7 9   ANUM 8.3* 8.4*   GLC 96 81     Recent Results (from the past 24 hour(s))   XR Tibia & Fibula Right 2 Views    Narrative    RIGHT TIBIA-FIBULA TWO VIEWS 9/28/2019 8:24 AM     HISTORY: Open wound 3 x 2 cm. Dog bite. Cellulitis.    COMPARISON: None.      Impression    IMPRESSION: No bony abnormality. Minimal soft tissue swelling in the  medial calf.    DAVID BURCH MD   X-ray lt Radius ulna AP and lateral*    Narrative    LEFT FOREARM TWO VIEWS   9/28/2019 8:24 AM     HISTORY: Dog bite, cellulitis.    COMPARISON: None.       Impression    IMPRESSION: Endosteal and cortical deformity in the lateral aspect of  the distal radial shaft that appears chronic. Otherwise normal.    DAVID BURCH MD   MR Forearm Left w/o Contrast    Narrative    MR FOREARM LEFT W/O CONTRAST 9/28/2019 12:02 PM    HISTORY:  Soft tissue mass, forearm, nonspecific clinically; dog bite  and severe swelling    TECHNIQUE: The study was aborted due to the patient being unable to  tolerate the exam. Axial T1 fat-sat sequence was obtained.    COMPARISON: Radiographs from 9/28/2019      Impression    IMPRESSION:  1.  Aborted study with one sequence obtained.  2.  There is a skin marker along the medial aspect of the distal ulnar  diaphysis. There is subjacent soft tissue edema which extends around  the wrist. This is nonspecific and cannot exclude cellulitis in the  appropriate clinical setting. No definite fluid collection.  3.  No evidence of acute osseous abnormality.    LU MATHEWS MD

## 2019-09-28 NOTE — PROGRESS NOTES
S-(situation): Patient changed to inpatient status    B-(background): Patient status change due to observation goals not being met.     A-(assessment): Dog bites to R calf and left forearm. More pain in L forearm, but one dose of IV Dilaudid after pt showered was helpful before returning to [place]. Up independently and afebrile. Voiding and taking regular diet.    R-(recommendations): Will monitor patient per MD orders.       Inpatient nursing criteria listed below were met:    Adult Profile completedYes  Health care directives status obtained and documented: Yes  VTE prophylaxis orders: SCD's  (FYI: Asprin is not an approved anticoagulation for DVT prophylaxis)  SCD's Documented: Yes  Vaccine assessment done and vaccine ordered if needed. Yes, pt refused  MRSA swab completed for patient 55 years and older (exclude ALIN and TKA): NA  My Chart patient sign up addressed and documented: Yes  Care Plan initiated: Yes  Discharge planning review completed (admission navigator) Yes

## 2019-09-28 NOTE — H&P
[facility]    History and Physical - Hospitalist Service       Date of Admission:  9/27/2019    Assessment & Plan   Ayanna Davidson is a 29 year old female admitted on 9/27/2019. She presents with very sustained when she was caught between 2 dogs last evening.  She sustained bite marks to the left forearm and wrist area as well as an open wound to the right lower leg.  She was seen at urgent care today with imaging being negative and was sent home on oral Augmentin.  She is dissatisfied with treatment there and presents to the ED.  She denies fever or chills.  Has been nauseated somewhat.  She is up-to-date her immunizations, dogs have been vaccinated against rabies.  She is had increasing pain with some swelling in the area of the left arm wounds and concern of some underlying cellulitis.  Patient will be registered observation and brought in for treatment with IV antibiotics, Zosyn has been started.  Will treat for pain with anti-inflammatories and narcotics.  If feeling better, expect she can be discharged home tomorrow with ongoing antibiotic treatment    Cellulitis of left upper extremity  Bit by dog yesterday with fang marks in the left wrist and forearm area  Today with worsening pain, mild redness, but no fevers.  White blood cell count mildly elevated  In light of her pain, we will register observation and treat with IV antibiotics overnight.  Expect tomorrow can probably be discharged home on oral antibiotics, would recommend Augmentin, although patient is reluctant to take it due to previous side effects.  Tonight, pain control    Dog bite of left upper extremity  Caught between 2 dogs last evening with fang marks on the left arm and wrist area  X-ray was negative.  Concern for mild cellulitis  As above    Dog bite of right lower leg  Open gash-like wound of the right lower leg, about 3 x 2 cm  Since the wound is already 24 hours old, not really able to suture.  Local  cares, dressed with bacitracin and gauze    Gastroesophageal reflux disease without esophagitis  Chronic issue to, taking Prilosec daily  Continue Prilosec    Current every day smoker  Smoking 7 or 8 cigarettes daily  Nicotine replacement ordered         Diet: regular  DVT Prophylaxis: Low Risk/Ambulatory with no VTE prophylaxis indicated  Mooney Catheter: not present  Code Status: full code    Disposition Plan   Expected discharge: Tomorrow, recommended to prior living arrangement once antibiotic plan established.  Entered: Ward Johnston MD 09/27/2019, 9:33 PM     The patient's care was discussed with the Patient.    Ward Johnston MD  [facility]    ______________________________________________________________________    Chief Complaint   29-year-old female with dog bite to arm and leg yesterday coming in with increased pain    History is obtained from the patient, electronic health record and emergency department physician    History of Present Illness   Ayanna Davidson is a 29 year old female who presents with injuries after being caught between 2 of her dogs last evening.  She has an open wound on her right lower leg as well as tooth marks on her left wrist and forearm, sustained when she was pulling the dogs apart.  These were her dogs, up-to-date in immunizations.  She was seen at urgent care today and was imaged with no sign of fracture and was sent home on Augmentin and Toradol.  She did not stop and  the medications and came straight to the ER here because she is dissatisfied with her care.  She specifically is not happy about Augmentin and that she has had upset stomach from that medicine in the past and yeast infections.  Complains of some slight nausea at this time.  She complains of increasing pain in her left forearm, but denies any fever, chills.  She has had minimal discomfort involving her right lower leg.  That wound is open and is not been  treated.  She said history of dog bites related to her dogs in the past.  She is a smoker, history of some mental health issues, not currently taking any medications for this and history of GERD, taking twice daily Prilosec.    Review of Systems    The 10 point Review of Systems is negative other than noted in the HPI or here.     Past Medical History    I have reviewed this patient's medical history and updated it with pertinent information if needed.   Past Medical History:   Diagnosis Date     Cervical high risk HPV (human papillomavirus) test positive 03/12/2019    last PAP NIL (4/16), remote hx of LEEP     Gastroesophageal reflux disease      Methamphetamine abuse (H)     reports daily use     recurrent Bartholin's cyst        Past Surgical History   I have reviewed this patient's surgical history and updated it with pertinent information if needed.  Past Surgical History:   Procedure Laterality Date     CHOLECYSTECTOMY       ENT SURGERY       INCISION AND DRAINAGE ABSCESS PELVIS, COMBINED N/A 2/26/2018    Procedure: COMBINED INCISION AND DRAINAGE ABSCESS PELVIS;  INCISION AND DRAINAGE LABIAL ABSCESS;  Surgeon: Jase Beach MD;  Location: [facility] OR     INCISION AND DRAINAGE ABSCESS PELVIS, COMBINED N/A 3/5/2019    Procedure: Incision and Drainage Right Labial Abscess;  Surgeon: Jase Beach MD;  Location: [facility] OR     INCISION AND DRAINAGE LOWER EXTREMITY, COMBINED Right 8/11/2019    Procedure: irrigation and debridement right leg dog bite;  Surgeon: Rommel Pérez MD;  Location: [facility] OR     LAP ADJUSTABLE GASTRIC BAND       LEEP TX, CERVICAL       MAMMOPLASTY REDUCTION BILATERAL       MARSUPIALIZATION BARTHOLIN CYST N/A 4/29/2019    Procedure: Bartholin's Cyst removal;  Surgeon: Froylan Schwarz MD;  Location: [facility] OR     ORTHOPEDIC SURGERY         Social History   I have reviewed this patient's social history and updated it with pertinent information if needed.  Social History     Tobacco Use      Smoking status: Current Every Day Smoker     Packs/day: 0.25     Smokeless tobacco: Current User     Tobacco comment: uses E cig   Substance Use Topics     Alcohol use: Yes     Frequency: Monthly or less     Drug use: Not Currently     Comment: In treatment for meth.       Family History   I have reviewed this patient's family history and updated it with pertinent information if needed.   Family History   Problem Relation Age of Onset     Heart Disease Father      Diabetes Maternal Grandmother      Breast Cancer Paternal Grandmother      Testicular cancer Paternal Grandfather        Prior to Admission Medications   Prior to Admission Medications   Prescriptions Last Dose Informant Patient Reported? Taking?   Acetaminophen (TYLENOL PO) Past Month at Unknown time  Yes Yes   Sig: Take 500 mg by mouth every 4 hours as needed for mild pain or fever   acetaminophen-codeine (TYLENOL #3) 300-30 MG tablet   No No   Sig: Take 1 tablet by mouth every 6 hours as needed for severe pain   amoxicillin-clavulanate (AUGMENTIN) 875-125 MG tablet   No No   Sig: Take 1 tablet by mouth 2 times daily for 7 days   cephALEXin (KEFLEX) 500 MG capsule   No No   Sig: Take 1 capsule (500 mg) by mouth 4 times daily for 5 days   ibuprofen (ADVIL/MOTRIN) 200 MG tablet 9/27/2019 at 1200  Yes Yes   Sig: Take 600 mg by mouth every 4 hours as needed for mild pain   omeprazole (PRILOSEC OTC) 20 MG EC tablet 9/27/2019 at 1400  No Yes   Sig: Take 1 tablet (20 mg) by mouth daily   ondansetron (ZOFRAN ODT) 4 MG ODT tab   No No   Sig: Take 1 tablet (4 mg) by mouth every 6 hours as needed   traMADol (ULTRAM) 50 MG tablet   No No   Sig: Take 1 tablet (50 mg) by mouth every 6 hours as needed for severe pain      Facility-Administered Medications: None     Allergies   No Known Allergies    Physical Exam   Vital Signs: Temp: 98.1  F (36.7  C) Temp src: Oral BP: 122/74 Pulse: 101   Resp: 18 SpO2: 100 % O2 Device: None (Room air)    Weight: 170 lbs 0  oz    Constitutional: awake, alert, cooperative, no apparent distress, and appears stated age  Eyes: Lids and lashes normal, pupils equal, round and reactive to light, extra ocular muscles intact, sclera clear, conjunctiva normal  ENT: Normocephalic, without obvious abnormality, atraumatic, sinuses nontender on palpation, external ears without lesions, oral pharynx with moist mucous membranes, tonsils without erythema or exudates, gums normal and good dentition.  Hematologic / Lymphatic: no cervical lymphadenopathy and no supraclavicular lymphadenopathy  Respiratory: No increased work of breathing, good air exchange, clear to auscultation bilaterally, no crackles or wheezing  Cardiovascular: regular rate and rhythm  GI: normal bowel sounds, soft and non-tender  Skin: 3 x 4 cm laceration of the right lower leg gaping into the subcu fat.  Nurse dressed it with bacitracin and gauze.  No surrounding redness.  She has fang marks on the left wrist and left forearm with some surrounding redness extending up to the antecubital area.  She has an old wound involving her right ankle area which she stated was a previous dog bite which seems to be healing well with no signs of infection.  Musculoskeletal: Pain with palpation over the extensor tendon of the left thumb.  Pain with movement of the left wrist.  Elbows unremarkable  Neurologic: Awake, alert, oriented to name, place and time.  Cranial nerves II-XII are grossly intact.  Motor is 5 out of 5 bilaterally.   Data   Data reviewed today: I reviewed all medications, new labs and imaging results over the last 24 hours. I personally reviewed no images or EKG's today.    Results for orders placed or performed during the hospital encounter of 09/27/19   Basic metabolic panel   Result Value Ref Range    Sodium 142 133 - 144 mmol/L    Potassium 3.2 (L) 3.4 - 5.3 mmol/L    Chloride 106 94 - 109 mmol/L    Carbon Dioxide 27 20 - 32 mmol/L    Anion Gap 9 3 - 14 mmol/L    Glucose 81 70  - 99 mg/dL    Urea Nitrogen 7 7 - 30 mg/dL    Creatinine 0.65 0.52 - 1.04 mg/dL    GFR Estimate >90 >60 mL/min/[1.73_m2]    GFR Estimate If Black >90 >60 mL/min/[1.73_m2]    Calcium 8.4 (L) 8.5 - 10.1 mg/dL   CBC with platelets differential   Result Value Ref Range    WBC 11.3 (H) 4.0 - 11.0 10e9/L    RBC Count 4.36 3.8 - 5.2 10e12/L    Hemoglobin 13.0 11.7 - 15.7 g/dL    Hematocrit 39.2 35.0 - 47.0 %    MCV 90 78 - 100 fl    MCH 29.8 26.5 - 33.0 pg    MCHC 33.2 31.5 - 36.5 g/dL    RDW 14.1 10.0 - 15.0 %    Platelet Count 308 150 - 450 10e9/L    Diff Method Automated Method     % Neutrophils 74.3 %    % Lymphocytes 15.2 %    % Monocytes 6.4 %    % Eosinophils 3.2 %    % Basophils 0.5 %    % Immature Granulocytes 0.4 %    Nucleated RBCs 0 0 /100    Absolute Neutrophil 8.4 (H) 1.6 - 8.3 10e9/L    Absolute Lymphocytes 1.7 0.8 - 5.3 10e9/L    Absolute Monocytes 0.7 0.0 - 1.3 10e9/L    Absolute Basophils 0.1 0.0 - 0.2 10e9/L    Abs Immature Granulocytes 0.1 0 - 0.4 10e9/L    Absolute Nucleated RBC 0.0    CRP inflammation   Result Value Ref Range    CRP Inflammation 19.0 (H) 0.0 - 8.0 mg/L   Lactic acid whole blood   Result Value Ref Range    Lactic Acid 1.2 0.7 - 2.0 mmol/L

## 2019-09-28 NOTE — ASSESSMENT & PLAN NOTE
Bit by dog yesterday with fang marks in the left wrist and forearm area  Today with worsening pain, mild redness, but no fevers.  White blood cell count mildly elevated  In light of her pain, we will register observation and treat with IV antibiotics overnight.  Expect tomorrow can probably be discharged home on oral antibiotics, would recommend Augmentin, although patient is reluctant to take it due to previous side effects.  Tonight, pain control

## 2019-09-28 NOTE — TELEPHONE ENCOUNTER
Mom calling wanting to leave numbers for hospital staff to call her if needed. States Ayanna does not drive and will probably need to be picked up. Mom Cell [phone] home phone [phone]

## 2019-09-28 NOTE — ASSESSMENT & PLAN NOTE
Open gash-like wound of the right lower leg, about 3 x 2 cm  Since the wound is already 24 hours old, not really able to suture.  Local cares, dressed with bacitracin and gauze

## 2019-09-28 NOTE — ASSESSMENT & PLAN NOTE
Caught between 2 dogs last evening with fang marks on the left arm and wrist area  X-ray was negative.  Concern for mild cellulitis  As above

## 2019-09-28 NOTE — PLAN OF CARE
Less redness to left arm, but swelling remains. Has had arm elevated on pillows and using ice pack off and on. Serosanguinous drainage on dressing to right calf. Has ordered meal trays but then fell asleep and just picked a bit at the cold food later. Showered at length this morning before writer could get in to cover saline lock. Was moaning loudly and hyperventilating when she returned to bed; requested IV Dilaudid for pain rating of 10/10. Administered and pt had some relief. In order to get pt back on oral analgesics, administered Norco an hour later when pt stated she was still having a fair amount of pain. Pt has been sleeping or falling asleep when sitting up since that time, so bed alarm was put on and pt aware and agreed that she was a bit woozy. Pt bounded out of bed to the bathroom on her own when the bed alarm went off. Was reminded to call for help and to stay put if she forgets and the bed alarm goes off. Half-heartedly agreed as she fell back to sleep. VSS Potassium was 4.2 this morning and WBC was down to 7.1. Bed alarm on. Will continue to monitor wounds, swelling, pain, safety, labs.

## 2019-09-29 VITALS
SYSTOLIC BLOOD PRESSURE: 117 MMHG | DIASTOLIC BLOOD PRESSURE: 75 MMHG | RESPIRATION RATE: 14 BRPM | TEMPERATURE: 98.4 F | HEIGHT: 66 IN | OXYGEN SATURATION: 96 % | HEART RATE: 73 BPM | WEIGHT: 179.9 LBS | BODY MASS INDEX: 28.91 KG/M2

## 2019-09-29 LAB
AMPHETAMINES UR QL: ABNORMAL NG/ML
BARBITURATES UR QL SCN: NOT DETECTED NG/ML
BENZODIAZ UR QL SCN: NOT DETECTED NG/ML
BUPRENORPHINE UR QL: NOT DETECTED NG/ML
CANNABINOIDS UR QL: ABNORMAL NG/ML
COCAINE UR QL SCN: NOT DETECTED NG/ML
D-METHAMPHET UR QL: ABNORMAL NG/ML
METHADONE UR QL SCN: NOT DETECTED NG/ML
OPIATES UR QL SCN: ABNORMAL NG/ML
OXYCODONE UR QL SCN: NOT DETECTED NG/ML
PCP UR QL SCN: NOT DETECTED NG/ML
PROPOXYPH UR QL: NOT DETECTED NG/ML
TRICYCLICS UR QL SCN: NOT DETECTED NG/ML

## 2019-09-29 PROCEDURE — 25000132 ZZH RX MED GY IP 250 OP 250 PS 637: Performed by: NURSE PRACTITIONER

## 2019-09-29 PROCEDURE — 80306 DRUG TEST PRSMV INSTRMNT: CPT | Performed by: HOSPITALIST

## 2019-09-29 PROCEDURE — 25000128 H RX IP 250 OP 636: Performed by: FAMILY MEDICINE

## 2019-09-29 PROCEDURE — 99239 HOSP IP/OBS DSCHRG MGMT >30: CPT | Performed by: HOSPITALIST

## 2019-09-29 PROCEDURE — 25000132 ZZH RX MED GY IP 250 OP 250 PS 637: Performed by: FAMILY MEDICINE

## 2019-09-29 PROCEDURE — 99252 IP/OBS CONSLTJ NEW/EST SF 35: CPT | Performed by: ORTHOPAEDIC SURGERY

## 2019-09-29 PROCEDURE — 25800030 ZZH RX IP 258 OP 636: Performed by: FAMILY MEDICINE

## 2019-09-29 RX ORDER — FLUCONAZOLE 150 MG/1
150 TABLET ORAL ONCE
Qty: 2 TABLET | Refills: 0 | Status: SHIPPED | OUTPATIENT
Start: 2019-09-29 | End: 2019-11-04

## 2019-09-29 RX ORDER — ONDANSETRON 4 MG/1
4 TABLET, ORALLY DISINTEGRATING ORAL EVERY 6 HOURS PRN
Qty: 15 TABLET | Refills: 0 | Status: SHIPPED | OUTPATIENT
Start: 2019-09-29 | End: 2019-10-11

## 2019-09-29 RX ORDER — METRONIDAZOLE 500 MG/1
500 TABLET ORAL 3 TIMES DAILY
Status: DISCONTINUED | OUTPATIENT
Start: 2019-09-29 | End: 2019-09-29 | Stop reason: HOSPADM

## 2019-09-29 RX ORDER — LACTOBACILLUS RHAMNOSUS GG 10B CELL
1 CAPSULE ORAL 2 TIMES DAILY
Qty: 28 CAPSULE | Refills: 0 | Status: SHIPPED | OUTPATIENT
Start: 2019-09-29 | End: 2019-10-11

## 2019-09-29 RX ORDER — DOXYCYCLINE HYCLATE 50 MG/1
100 CAPSULE ORAL EVERY 12 HOURS SCHEDULED
Status: DISCONTINUED | OUTPATIENT
Start: 2019-09-29 | End: 2019-09-29 | Stop reason: HOSPADM

## 2019-09-29 RX ORDER — HYDROCODONE BITARTRATE AND ACETAMINOPHEN 5; 325 MG/1; MG/1
1-2 TABLET ORAL EVERY 4 HOURS PRN
Qty: 12 TABLET | Refills: 0 | Status: SHIPPED | OUTPATIENT
Start: 2019-09-29 | End: 2019-10-11

## 2019-09-29 RX ORDER — LACTOBACILLUS RHAMNOSUS GG 10B CELL
1 CAPSULE ORAL 2 TIMES DAILY
Status: DISCONTINUED | OUTPATIENT
Start: 2019-09-29 | End: 2019-09-29 | Stop reason: HOSPADM

## 2019-09-29 RX ADMIN — BACITRACIN: 500 OINTMENT TOPICAL at 10:00

## 2019-09-29 RX ADMIN — Medication 1 CAPSULE: at 11:52

## 2019-09-29 RX ADMIN — OMEPRAZOLE 20 MG: 20 CAPSULE, DELAYED RELEASE ORAL at 10:00

## 2019-09-29 RX ADMIN — IBUPROFEN 600 MG: 600 TABLET ORAL at 04:54

## 2019-09-29 RX ADMIN — HYDROCODONE BITARTRATE AND ACETAMINOPHEN 2 TABLET: 5; 325 TABLET ORAL at 12:27

## 2019-09-29 RX ADMIN — DOXYCYCLINE HYCLATE 100 MG: 50 CAPSULE ORAL at 11:52

## 2019-09-29 RX ADMIN — TAZOBACTAM SODIUM AND PIPERACILLIN SODIUM 3.38 G: 375; 3 INJECTION, SOLUTION INTRAVENOUS at 01:08

## 2019-09-29 RX ADMIN — TAZOBACTAM SODIUM AND PIPERACILLIN SODIUM 3.38 G: 375; 3 INJECTION, SOLUTION INTRAVENOUS at 07:45

## 2019-09-29 RX ADMIN — VANCOMYCIN HYDROCHLORIDE 1250 MG: 1 INJECTION, POWDER, LYOPHILIZED, FOR SOLUTION INTRAVENOUS at 08:23

## 2019-09-29 ASSESSMENT — ACTIVITIES OF DAILY LIVING (ADL)
ADLS_ACUITY_SCORE: 11

## 2019-09-29 NOTE — PLAN OF CARE
Vss, afebrile. Norco given last evening for L arm and hand pain. Pt did not request for pain meds at any time during the night. Encouraged pt to take Ibuprofen and given later in night as pt was having pain to LUE. Pt has had LUE elevated on two pillows t/o night. CMS intact. Pt is very irritable with any cares or encounters with staff, has been irritable when talking in the phone, constant swearing, yelling, and shutting people out. Has been cooperative when needing to given IV abx but is irritable and agitated. Pt is oriented but is sleepy most of the time, nods off in bed and is asleep soon after yelling at staff. Pt refused lab draw this morning, Dr. Johnston notified, lab plans to reattempt lab draw between . Will continue to monitor per plan of care, cellulitis area, pain, labs, vs, IV abx.
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
## 2019-09-29 NOTE — PHARMACY-VANCOMYCIN DOSING SERVICE
Pharmacy Vancomycin Initial Note  Date of Service 2019  Patient's  1990  29 year old, female    Indication: Skin and Soft Tissue Infection    Current estimated CrCl = Estimated Creatinine Clearance: 112.5 mL/min (based on SCr of 0.78 mg/dL).    Creatinine for last 3 days  2019:  7:39 PM Creatinine 0.65 mg/dL  2019:  4:43 AM Creatinine 0.78 mg/dL    Recent Vancomycin Level(s) for last 3 days  No results found for requested labs within last 72 hours.      Vancomycin IV Administrations (past 72 hours)      No vancomycin orders with administrations in past 72 hours.                Nephrotoxins and other renal medications (From now, onward)    Start     Dose/Rate Route Frequency Ordered Stop    19  vancomycin (VANCOCIN) 1,250 mg in sodium chloride 0.9 % 250 mL intermittent infusion      1,250 mg  over 90 Minutes Intravenous EVERY 12 HOURS 19 1937      19 0200  piperacillin-tazobactam (ZOSYN) infusion 3.375 g      3.375 g  100 mL/hr over 30 Minutes Intravenous EVERY 6 HOURS 19  ibuprofen (ADVIL/MOTRIN) tablet 600 mg      600 mg Oral EVERY 6 HOURS PRN 19 213            Contrast Orders - past 72 hours (72h ago, onward)    None                Plan:  1.  Start vancomycin  1250 mg IV q12h.   2.  Goal Trough Level: 10-15 mg/L   3.  Pharmacy will check trough levels as appropriate in 1-3 Days.    4. Serum creatinine levels will be ordered daily for the first week of therapy and at least twice weekly for subsequent weeks.    5. Cromona method utilized to dose vancomycin therapy: Method 1    Mk Thornton [facility] [place]

## 2019-09-29 NOTE — DISCHARGE SUMMARY
"[facility]  Hospitalist Discharge Summary       Date of Admission:  9/27/2019  Date of Discharge:  9/29/2019  1:00 PM  Discharging Provider: Melonie March CNP      Discharge Diagnoses   Principal Problem:    Cellulitis of left upper extremity  Active Problems:    Dog bite of left upper extremity    Dog bite of right lower leg    Current every day smoker    Gastroesophageal reflux disease without esophagitis    Skin infection    Follow-ups Needed After Discharge   Follow-up Appointments     Follow-up and recommended labs and tests       Follow up with primary care provider, Physician No Ref-Primary, within 7   days for hospital follow- up.  No follow up labs or test are needed.             Discharge Disposition   Discharged to home  Condition at discharge: Stable    Hospital Course   Ayanna Davidson is a 29 year old female admitted on 9/27/2019. She presents with very sustained when she was caught between 2 dogs last evening.  She sustained bite marks to the left forearm and wrist area as well as an open wound to the right lower leg.  She was seen at urgent care today with imaging being negative and was sent home on oral Augmentin.  She is dissatisfied with treatment there and presents to the ED.  She denies fever or chills.  Has been nauseated somewhat.  She is up-to-date her immunizations, dogs have been vaccinated against rabies.  She is had increasing pain with some swelling in the area of the left arm wounds and concern of some underlying cellulitis.  Patient will be registered observation and brought in for treatment with IV antibiotics, Zosyn has been started.  Will treat for pain with anti-inflammatories and narcotics.      Note from August 2019  \"29-year-old female with significant animal bites to the right ankle with deep tissue exposure.  She was unable to move her foot well.  Concern would be for tendon and/or nerve injury.  A consult with orthopedics was obtained.  " "Dr. Pérez took her to the operative room for irrigation and debridement as well as exploration.  She was given IV Zosyn in the emergency department.  Her tetanus status is up-to-date.    Her rabies status is much more complicated.  On May 7 she had another animal bite from an animal with unknown rabies vaccination status.  She was given rabies immunoglobulin that day as well as a rabies vaccine.  Rabies vaccine was repeated on May 10 and May 17.  After that the dog was still alive and healthy in the department health give the clinic the option to give her to not continue the vaccines.  She chose to opt out of finishing the rabies injections.  Today I discussed the case with the [facility].  They stated she did not need immunoglobulin after consultation with the state .  They did recommend administering the rabies vaccine today as day 0 and in 3 days.  They also recommended on day 3 she have a titer drawn to be sent to [facility], phone number [phone].  The Dept. Of health said this can be sent through [facility] as a rabies vaccine endpoint titer, order #[id number] they stated.  The patient would like to follow-up in the local [facility] clinic where she had the previous rabies vaccinations.  Recommendation was for follow-up in 3 days which I explained to her boyfriend and herself as well as the surgeon that would be discharging her.\"    Cellulitis of left upper extremity  Assessment:  Patient was bit by dog 9/26 with fang marks in the left wrist and forearm area, patient presented 9/27 with worsening pain, mild redness, but no fevers.  White blood cell count mildly elevated. Overnight patient has had very poor pain management, she will fall asleep with pain medications but awake in severe pain. Patient has limit movement and states \" just cut if off\". Of note patient has a history of dog bite, in August and in May 2019.  Attempted to obtain an MRI and " she was not able to tolerate the scan fully. Patient was seen by Dr Pérez, orthopedics. No recommendations for surgery. Patient will need to complete antibiotics and follow up. Patient left arm is less swollen, no redness but remains very painful. Patient right calf wound is healing and appears to have improved. Patient was transitioned to oral mediations, initially she refused to start the Augmentin and took Flagyl and Doxycycline. Patient worried about a yeast infection and nausea. Discussed that we can give her medication for both of these.  Patient mom arrived and patient then agreed to use Augmentin. Patient initially was very upset, swearing and threatening violence. Discussed with patient that she will not be getting narcotics unless she has supervision on discharge and can remain calm. If she has altered mental state it is not safe to take narcotics. Patient also slept for 16 hours after her last dose of Vicodin and discussed my concern about her sleeping and taking too many narcotics. Patient mom states she is a night owl and sleeping for long periods of time is normal for her. Patient UTox positive for several drugs, including Methamphetamine and Cannabis. Patient mom states she will stay with the patient and monitor her use of narcotics on discharge. Patient discharged in stable condition and in agreement to take Augmentin. Patient mom agrees to monitor patient while taking narcotics.      Dog bite of left upper extremity  Caught between her 2 dogs fighting last evening with fang marks on the left arm and wrist area. X-ray was negative. Patient with severe pain and immobility. Attempted MRI but unable to complete due to pain.      Dog bite of right lower leg  Assessment: Open wound of the right lower leg, about 3 x 2 cm. Since the wound is already 24 hours old, not really able to suture. Local cares, dressed with bacitracin and gauze.      Gastroesophageal reflux disease without esophagitis  Chronic  and stable, taking Prilosec daily    Current every day smoker  Smoking 7 or 8 cigarettes daily    Diet: Regular Diet Adult  Room Service    DVT Prophylaxis: Pneumatic Compression Device - left, Ambulation  Mooney Catheter: not present  Code Status: Full Code      Consultations This Hospital Stay   ORTHOPEDIC SURGERY IP CONSULT  PHARMACY TO DOSE VANCO    Code Status   Full Code    Time Spent on this Encounter   I, Melonie March CNP, personally saw the patient today and spent greater than 30 minutes discharging this patient.       Melonie March CNP  [facility]  ______________________________________________________________________    Physical Exam   Vital Signs: Temp: 98.4  F (36.9  C) Temp src: Oral BP: 117/75 Pulse: 73   Resp: 14 SpO2: 96 % O2 Device: None (Room air)    Weight: 179 lbs 14.4 oz  Constitutional: awake, alert, upset, appears to be in pain, lethargic at times  Eyes: Lids and lashes normal, pupils equal, round and reactive to light, extra ocular muscles intact, sclera clear, conjunctiva normal  Respiratory: No increased work of breathing, good air exchange, clear to auscultation bilaterally, no crackles or wheezing  Cardiovascular: regular rate and rhythm  GI: normal bowel sounds, soft and non-tender  Skin: 3 x 4 cm laceration of the right lower leg gaping into the subcu fat.  Nurse dressed it with bacitracin and gauze.  No surrounding redness.  She has fang marks on the left wrist and left forearm with some surrounding redness extending up to the antecubital area.  She has an old wound involving her right ankle area which she stated was a previous dog bite which seems to be healing well with no signs of infection.  Musculoskeletal: Pain with palpation over the extensor tendon of the left thumb.  Pain with movement of the left arm/wrist. .  Neurologic: Awake, alert, oriented to name, place and time.       Primary Care Physician   Physician No  Ref-Primary    Discharge Orders      Reason for your hospital stay    You were in the hospital for a dog bite.     Follow-up and recommended labs and tests     Follow up with primary care provider, Physician No Ref-Primary, within 7 days for hospital follow- up.  No follow up labs or test are needed.     Activity    Your activity upon discharge: activity as tolerated     Diet    Follow this diet upon discharge: Orders Placed This Encounter      Room Service      Regular Diet Adult       Significant Results and Procedures   Most Recent 3 CBC's:  Recent Labs   Lab Test 09/28/19 0443 09/27/19 1939 08/11/19  1140   WBC 7.1 11.3* 9.5   HGB 12.4 13.0 12.5   MCV 91 90 88    308 314     Most Recent 3 BMP's:  Recent Labs   Lab Test 09/28/19 0443 09/27/19 1939 08/11/19  1140    142 141   POTASSIUM 4.2 3.2* 3.8   CHLORIDE 110* 106 105   CO2 26 27 27   BUN 7 7 12   CR 0.78 0.65 0.76   ANIONGAP 7 9 9   ANUM 8.3* 8.4* 9.1   GLC 96 81 104*   ,   Results for orders placed or performed during the hospital encounter of 09/27/19   X-ray lt Radius ulna AP and lateral*    Narrative    LEFT FOREARM TWO VIEWS   9/28/2019 8:24 AM     HISTORY: Dog bite, cellulitis.    COMPARISON: None.      Impression    IMPRESSION: Endosteal and cortical deformity in the lateral aspect of  the distal radial shaft that appears chronic. Otherwise normal.    DAVID BURCH MD   XR Tibia & Fibula Right 2 Views    Narrative    RIGHT TIBIA-FIBULA TWO VIEWS 9/28/2019 8:24 AM     HISTORY: Open wound 3 x 2 cm. Dog bite. Cellulitis.    COMPARISON: None.      Impression    IMPRESSION: No bony abnormality. Minimal soft tissue swelling in the  medial calf.    DAVID BURCH MD   MR Forearm Left w/o Contrast    Narrative    MR FOREARM LEFT W/O CONTRAST 9/28/2019 12:02 PM    HISTORY:  Soft tissue mass, forearm, nonspecific clinically; dog bite  and severe swelling    TECHNIQUE: The study was aborted due to the patient being unable to  tolerate the exam.  Axial T1 fat-sat sequence was obtained.    COMPARISON: Radiographs from 9/28/2019      Impression    IMPRESSION:  1.  Aborted study with one sequence obtained.  2.  There is a skin marker along the medial aspect of the distal ulnar  diaphysis. There is subjacent soft tissue edema which extends around  the wrist. This is nonspecific and cannot exclude cellulitis in the  appropriate clinical setting. No definite fluid collection.  3.  No evidence of acute osseous abnormality.    LU MATHEWS MD       Discharge Medications   Current Discharge Medication List      START taking these medications    Details   HYDROcodone-acetaminophen (NORCO) 5-325 MG tablet Take 1-2 tablets by mouth every 4 hours as needed for severe pain  Qty: 12 tablet, Refills: 0    Associated Diagnoses: Dog bite of left upper extremity, subsequent encounter; Dog bite of right lower leg, subsequent encounter      lactobacillus rhamnosus, GG, (CULTURELL) capsule Take 1 capsule by mouth 2 times daily for 14 days  Qty: 28 capsule, Refills: 0    Associated Diagnoses: Dog bite of left upper extremity, subsequent encounter; Dog bite of right lower leg, subsequent encounter; Cellulitis of left upper extremity         CONTINUE these medications which have CHANGED    Details   amoxicillin-clavulanate (AUGMENTIN) 875-125 MG tablet Take 1 tablet by mouth 2 times daily for 14 days  Qty: 28 tablet, Refills: 0    Associated Diagnoses: Dog bite of left upper extremity, subsequent encounter; Dog bite of right lower leg, subsequent encounter; Cellulitis of left upper extremity      fluconazole (DIFLUCAN) 150 MG tablet Take 1 tablet (150 mg) by mouth once for 1 dose May repeat x 1 in 3 days if needed.  Qty: 2 tablet, Refills: 0    Associated Diagnoses: Cellulitis of left upper extremity      ondansetron (ZOFRAN ODT) 4 MG ODT tab Take 1 tablet (4 mg) by mouth every 6 hours as needed for nausea  Qty: 15 tablet, Refills: 0    Associated Diagnoses: Cellulitis of left  upper extremity         CONTINUE these medications which have NOT CHANGED    Details   Acetaminophen (TYLENOL PO) Take 500 mg by mouth every 4 hours as needed for mild pain or fever      ibuprofen (ADVIL/MOTRIN) 200 MG tablet Take 600 mg by mouth every 4 hours as needed for mild pain      omeprazole (PRILOSEC OTC) 20 MG EC tablet Take 1 tablet (20 mg) by mouth daily  Qty: 30 tablet, Refills: 3    Associated Diagnoses: Gastroesophageal reflux disease, esophagitis presence not specified         STOP taking these medications       acetaminophen-codeine (TYLENOL #3) 300-30 MG tablet Comments:   Reason for Stopping:         cephALEXin (KEFLEX) 500 MG capsule Comments:   Reason for Stopping:         ketorolac (TORADOL) 10 MG tablet Comments:   Reason for Stopping:         traMADol (ULTRAM) 50 MG tablet Comments:   Reason for Stopping:             Allergies   No Known Allergies

## 2019-09-29 NOTE — CONSULTS
[facility] Orthopedic Consultation    Ayanna Davidson MRN# [id number]   Age: 29 year old YOB: 1990     Date of Admission:  9/27/2019    Reason for consult: Dog bites to right leg and left arm.       Requesting physician: Melonie March       Level of consult: Consult, follow and place orders           Assessment and Plan:   Assessment:   Dog bites to left arm and right leg 3 days ago.  She has had multiple episodes of dog bites previously.  Prior I+D of right leg dog bites on 8/11/19 healed well.  She took out her own stitches. Now has superficial skin flap laceration on right inner calf and puncture wounds on left wrist and forearm.  She does not appear to have tenosynovitis, but rather cellulitis of left forearm. Thus, elevation and antibiotics are indicated without surgery. Leg wound requires only local wound care.      Plan:  elevation of arm.  IV antibiotics now, then transition to oral.  Probable augmentin unless ID has any other recommendations.  For the leg wound, I would recommend cleansing with hydrogen peroxide twice daily, then cover with bacitracin and sterile dressing.  This should slowly epithelialize.  I would limit opioids.             Chief Complaint:   Dog bites to right leg and left arm.       History is obtained from the patient and chart.         History of Present Illness:   This patient is a 29 year old female who presents with the following condition requiring a hospital admission:      Dog bites to right leg and left arm 3 days ago.  These were from her dogs.  The dogs fought and she  them.  She complains of significant left forearm pain. No complaints about right leg.  She was seen 9/27/19 at [facility] and given Augmentin.  They refused opioid treatment.  She then came to [facility] emergency room, where she was admitted.           Past Medical History:     Past Medical History:   Diagnosis Date     Cervical high risk HPV (human papillomavirus) test  positive 03/12/2019    last PAP NIL (4/16), remote hx of LEEP     Gastroesophageal reflux disease      Methamphetamine abuse (H)     reports daily use     recurrent Bartholin's cyst              Past Surgical History:     Past Surgical History:   Procedure Laterality Date     CHOLECYSTECTOMY       ENT SURGERY       INCISION AND DRAINAGE ABSCESS PELVIS, COMBINED N/A 2/26/2018    Procedure: COMBINED INCISION AND DRAINAGE ABSCESS PELVIS;  INCISION AND DRAINAGE LABIAL ABSCESS;  Surgeon: Jase Beach MD;  [facility]     INCISION AND DRAINAGE ABSCESS PELVIS, COMBINED N/A 3/5/2019    Procedure: Incision and Drainage Right Labial Abscess;  Surgeon: Jase Beach MD;  Location: [facility] OR     INCISION AND DRAINAGE LOWER EXTREMITY, COMBINED Right 8/11/2019    Procedure: irrigation and debridement right leg dog bite;  Surgeon: Rommel Pérez MD;  Location: [facility] OR     LAP ADJUSTABLE GASTRIC BAND       LEEP TX, CERVICAL       MAMMOPLASTY REDUCTION BILATERAL       MARSUPIALIZATION BARTHOLIN CYST N/A 4/29/2019    Procedure: Bartholin's Cyst removal;  Surgeon: Froylan Schwarz MD;  Location: [facility] OR     ORTHOPEDIC SURGERY               Social History:     Social History     Tobacco Use     Smoking status: Current Every Day Smoker     Packs/day: 0.25     Smokeless tobacco: Current User     Tobacco comment: uses E cig   Substance Use Topics     Alcohol use: Yes     Frequency: Monthly or less             Family History:     Family History   Problem Relation Age of Onset     Heart Disease Father      Diabetes Maternal Grandmother      Breast Cancer Paternal Grandmother      Testicular cancer Paternal Grandfather      Family history reviewed          Immunizations:     Immunization History   Administered Date(s) Administered     DTAP (<7y) 10/02/1999, 10/26/1999     HPV Quadrivalent 10/18/2006, 01/10/2007, 09/06/2012     Hep B, Peds or Adolescent 1990, 10/02/1999, 03/30/2000     HepB, Unspecified 12/02/1999      HepB-Adult 1990, 10/02/1999, 03/30/2000     MMR 03/17/1993, 09/06/2000     Rabies - IM Diploid Cell Culture 05/07/2019, 05/10/2019, 05/14/2019, 08/11/2019, 08/15/2019, 08/30/2019     TDAP Vaccine (Adacel) 11/15/2013, 08/22/2017     Td (Adult), Adsorbed 1990, 10/24/2007, 03/13/2009     Tdap (Adult) Unspecified Formulation 10/24/2007, 11/15/2013             Allergies:   No Known Allergies          Medications:     Current Facility-Administered Medications   Medication     acetaminophen (TYLENOL) tablet 650 mg     bacitracin ointment     HYDROcodone-acetaminophen (NORCO) 5-325 MG per tablet 1-2 tablet     HYDROmorphone (PF) (DILAUDID) injection 0.3-0.5 mg     ibuprofen (ADVIL/MOTRIN) tablet 600 mg     lidocaine (LMX4) kit     lidocaine 1 % 0.1-1 mL     naloxone (NARCAN) injection 0.1-0.4 mg     nicotine (NICODERM CQ) 14 MG/24HR 24 hr patch 1 patch     nicotine Patch in Place     nicotine patch REMOVAL     omeprazole (priLOSEC) CR capsule 20 mg     ondansetron (ZOFRAN-ODT) ODT tab 4 mg    Or     ondansetron (ZOFRAN) injection 4 mg     oxyCODONE (ROXICODONE) tablet 10 mg     piperacillin-tazobactam (ZOSYN) infusion 3.375 g     potassium chloride (KLOR-CON) Packet 20-40 mEq     potassium chloride 10 mEq in 100 mL intermittent infusion with 10 mg lidocaine     potassium chloride 10 mEq in 100 mL sterile water intermittent infusion (premix)     potassium chloride ER (K-DUR/KLOR-CON M) CR tablet 20-40 mEq     sodium chloride (PF) 0.9% PF flush 3 mL     sodium chloride (PF) 0.9% PF flush 3 mL     vancomycin (VANCOCIN) 1,250 mg in sodium chloride 0.9 % 250 mL intermittent infusion             Review of Systems:   The Review of Systems is negative other than noted in the HPI          Physical Exam:   Temp: 98.4  F (36.9  C) Temp src: Oral BP: 117/75 Pulse: 73   Resp: 14 SpO2: 96 % O2 Device: None (Room air)    All vitals have been reviewed  Musculoskeletal:   Several small puncture wounds on left forearm just above  wrist. No seepage.   Mild erythema near punctures.  No spreading erythema.  Full range of motion noted of fingers of left hand without pain.  She has tenderness with palpation of left forearm.  Appears to have swelling of forearm.  She especially has tenderness over volar left forearm just above the wrist, but no pain here from finger motion.  full range of motion of elbow without pain , but wrist is sore with range of motion.  Right leg has well healed scars from previous dog bites.  She had removed some sutures by shaving her legs 2 days postoperative.  She reports she removed the remaining stitches.  She has a superficial flap of skin pulled back just proximal to prior lacerations.  This has seepage, but appears to have clean base.  full range of motion of foot and ankle without pain.             Data:   All laboratory data reviewed  Lab Results   Component Value Date    WBC 7.1 09/28/2019    WBC 11.3 (H) 09/27/2019    WBC 9.5 08/11/2019    HGB 12.4 09/28/2019    HGB 13.0 09/27/2019    HGB 12.5 08/11/2019    HCT 37.7 09/28/2019    HCT 39.2 09/27/2019    HCT 37.3 08/11/2019     09/28/2019     09/27/2019     08/11/2019     09/28/2019     09/27/2019     08/11/2019    POTASSIUM 4.2 09/28/2019    POTASSIUM 3.2 (L) 09/27/2019    POTASSIUM 3.8 08/11/2019    CHLORIDE 110 (H) 09/28/2019    CHLORIDE 106 09/27/2019    CHLORIDE 105 08/11/2019    CO2 26 09/28/2019    CO2 27 09/27/2019    CO2 27 08/11/2019    BUN 7 09/28/2019    BUN 7 09/27/2019    BUN 12 08/11/2019    CR 0.78 09/28/2019    CR 0.65 09/27/2019    CR 0.76 08/11/2019    GLC 96 09/28/2019    GLC 81 09/27/2019     (H) 08/11/2019    AST 24 04/29/2019    AST 27 11/21/2007    ALT 30 04/29/2019    ALT 23 11/21/2007    ALKPHOS 69 04/29/2019    ALKPHOS 53 11/21/2007    BILITOTAL 0.3 04/29/2019    BILITOTAL 0.5 11/21/2007     I personally reviewed these imaging films.    MR FOREARM LEFT W/O CONTRAST 9/28/2019 12:02  PM     HISTORY:  Soft tissue mass, forearm, nonspecific clinically; dog bite  and severe swelling     TECHNIQUE: The study was aborted due to the patient being unable to  tolerate the exam. Axial T1 fat-sat sequence was obtained.     COMPARISON: Radiographs from 9/28/2019                                                                      IMPRESSION:  1.  Aborted study with one sequence obtained.  2.  There is a skin marker along the medial aspect of the distal ulnar  diaphysis. There is subjacent soft tissue edema which extends around  the wrist. This is nonspecific and cannot exclude cellulitis in the  appropriate clinical setting. No definite fluid collection.  3.  No evidence of acute osseous abnormality.     LU MATHEWS MD     Attestation:  I have reviewed today's vital signs, notes, medications, labs and imaging.  Amount of time performed on this consult: 40 minutes.    Rommel Pérez MD

## 2019-09-30 ENCOUNTER — TELEPHONE (OUTPATIENT)
Dept: FAMILY MEDICINE | Facility: CLINIC | Age: 29
End: 2019-09-30

## 2019-09-30 NOTE — TELEPHONE ENCOUNTER
Patient called to schedule an appointment for a hospital follow-up or appeared on a report showing that they were recently discharged from the hospital.    Patient was admitted to :  [facility]  Discharged date: 9/29/19  Reason for hospital admission:  Dog Bite, Dog Bite Of Left Upper Extremity,  Does patient have future appointment scheduled with provider? Yes Dr Pulido  Date of future appointment:  10/04/19      This information will be used to help the care team plan for the patients upcoming visit.  The triage RN may determine that a follow up call is necessary and reach out to the patient via the phone number listed in the chart.     Please route this message on routine priority to the Triage RN pool.

## 2019-09-30 NOTE — TELEPHONE ENCOUNTER
"Hospital/TCU/ED for chronic condition Discharge Protocol    \"Hi, my name is Merced Nelson RN, a registered nurse, and I am calling from [facility].  I am calling to follow up and see how things are going for you after your recent emergency visit/hospital/TCU stay.\"    Tell me how you are doing now that you are home?\" I am in less pain but my arm is still swollen. Not any more than when I left the hospital.      Discharge Instructions    \"Let's review your discharge instructions.  What is/are the follow-up recommendations?  Pt. Response: none    \"Has an appointment with your primary care provider been scheduled?\"   Yes. (confirm)    \"When you see the provider, I would recommend that you bring your medications with you.\"    Medications    \"Tell me what changed about your medicines when you discharged?\"    Changes to chronic meds?    0-1    \"What questions do you have about your medications?\"    None     New diagnoses of heart failure, COPD, diabetes, or MI?    No              Post Discharge Medication Reconciliation Status: discharge medications reconciled, continue medications without change.    Was MTM referral placed (*Make sure to put transitions as reason for referral)?   No    Call Summary    \"What questions or concerns do you have about your recent visit and your follow-up care?\"     none    \"If you have questions or things don't continue to improve, we encourage you contact us through the main clinic number (give number).  Even if the clinic is not open, triage nurses are available 24/7 to help you.     We would like you to know that our clinic has extended hours (provide information).  We also have urgent care (provide details on closest location and hours/contact info)\"      \"Thank you for your time and take care!\"             "

## 2019-10-10 ENCOUNTER — TELEPHONE (OUTPATIENT)
Dept: ORTHOPEDICS | Facility: CLINIC | Age: 29
End: 2019-10-10

## 2019-10-10 NOTE — TELEPHONE ENCOUNTER
PT called and refused to see Elisa in [place] today at 11. PT stated that it was to far to drive and wanted to be seen in [place]. After talking with a couple of PA's They stated that she does need to stick with Elisa for this apt and that medical recommendations are to return to the original surgeon. By not following up with Elisa the PT is going against medical advice by not following up.   Pt used abusive language and agree to make an apt with Elisa for next week. Pt also stated that she will probably cancel that apt also.

## 2019-10-11 ENCOUNTER — OFFICE VISIT (OUTPATIENT)
Dept: FAMILY MEDICINE | Facility: OTHER | Age: 29
End: 2019-10-11
Payer: COMMERCIAL

## 2019-10-11 VITALS
HEART RATE: 108 BPM | RESPIRATION RATE: 16 BRPM | SYSTOLIC BLOOD PRESSURE: 108 MMHG | OXYGEN SATURATION: 100 % | BODY MASS INDEX: 29.13 KG/M2 | DIASTOLIC BLOOD PRESSURE: 86 MMHG | TEMPERATURE: 97.1 F | WEIGHT: 180.5 LBS

## 2019-10-11 DIAGNOSIS — F12.10 MARIJUANA ABUSE: ICD-10-CM
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
## 2019-10-11 DIAGNOSIS — S81.851D DOG BITE OF RIGHT LOWER LEG, SUBSEQUENT ENCOUNTER: ICD-10-CM

## 2019-10-11 DIAGNOSIS — F15.10 METHAMPHETAMINE ABUSE (H): ICD-10-CM

## 2019-10-11 DIAGNOSIS — R11.0 NAUSEA: Primary | ICD-10-CM

## 2019-10-11 PROCEDURE — 99495 TRANSJ CARE MGMT MOD F2F 14D: CPT | Performed by: INTERNAL MEDICINE

## 2019-10-11 RX ORDER — ONDANSETRON 4 MG/1
4 TABLET, ORALLY DISINTEGRATING ORAL EVERY 6 HOURS PRN
Qty: 15 TABLET | Refills: 0 | Status: SHIPPED | OUTPATIENT
Start: 2019-10-11 | End: 2020-01-27

## 2019-10-11 RX ORDER — SULFAMETHOXAZOLE/TRIMETHOPRIM 800-160 MG
1 TABLET ORAL 2 TIMES DAILY
Qty: 28 TABLET | Refills: 0 | Status: SHIPPED | OUTPATIENT
Start: 2019-10-11 | End: 2020-02-18

## 2019-10-11 ASSESSMENT — PATIENT HEALTH QUESTIONNAIRE - PHQ9: SUM OF ALL RESPONSES TO PHQ QUESTIONS 1-9: 14

## 2019-10-11 ASSESSMENT — PAIN SCALES - GENERAL: PAINLEVEL: MILD PAIN (2)

## 2019-10-11 NOTE — PROGRESS NOTES
Subjective     Ayanna Davidson is a 29 year old female who presents to clinic today for the following health issues:    [facility]       Hospital Follow-up Visit:    Hospital/Nursing Home/IP Rehab Facility: [facility]  Date of Admission: 09/27/2019  Date of Discharge: 09/29/2019  Reason(s) for Admission: Dog bite             Problems taking medications regularly:  None       Medication changes since discharge: None       Problems adhering to non-medication therapy:  None    Concern: Wondering if can do Urine Drug Screen, Wondering about MRI results and labs.     Summary of hospitalization:  [facility] discharge summary reviewed  Diagnostic Tests/Treatments reviewed.  Follow up needed: none  Other Healthcare Providers Involved in Patient s Care:         None  Update since discharge: improved.     Post Discharge Medication Reconciliation: discharge medications reconciled, continue medications without change.  Plan of care communicated with patient     Coding guidelines for this visit:  Type of Medical   Decision Making Face-to-Face Visit       within 7 Days of discharge Face-to-Face Visit        within 14 days of discharge   Moderate Complexity [id number]   High Complexity [id number]            -------------------------------------    Patient Active Problem List   Diagnosis     Depressed     Drug-induced mental disorder (H)     Overdose of antipsychotic     Vulvar abscess     Cervical high risk HPV (human papillomavirus) test positive     Dog bite of left upper extremity     Dog bite of right lower leg     Cellulitis of left upper extremity     Current every day smoker     Gastroesophageal reflux disease without esophagitis     Skin infection     Past Surgical History:   Procedure Laterality Date     CHOLECYSTECTOMY       ENT SURGERY       INCISION AND DRAINAGE ABSCESS PELVIS, COMBINED N/A 2/26/2018    Procedure: COMBINED INCISION AND DRAINAGE ABSCESS PELVIS;  INCISION AND DRAINAGE LABIAL ABSCESS;   Surgeon: Jase Beach MD;  [facility] OR     INCISION AND DRAINAGE ABSCESS PELVIS, COMBINED N/A 3/5/2019    Procedure: Incision and Drainage Right Labial Abscess;  Surgeon: Jase Beach MD;  Location: [facility] OR     INCISION AND DRAINAGE LOWER EXTREMITY, COMBINED Right 8/11/2019    Procedure: irrigation and debridement right leg dog bite;  Surgeon: Rommel Pérez MD;  Location: [facility] OR     LAP ADJUSTABLE GASTRIC BAND       LEEP TX, CERVICAL       MAMMOPLASTY REDUCTION BILATERAL       MARSUPIALIZATION BARTHOLIN CYST N/A 4/29/2019    Procedure: Bartholin's Cyst removal;  Surgeon: Froylan Schwarz MD;  Location: [facility] OR     ORTHOPEDIC SURGERY         Social History     Tobacco Use     Smoking status: Current Every Day Smoker     Packs/day: 0.25     Smokeless tobacco: Current User     Tobacco comment: uses E cig   Substance Use Topics     Alcohol use: Yes     Frequency: Monthly or less     Family History   Problem Relation Age of Onset     Heart Disease Father      Diabetes Maternal Grandmother      Breast Cancer Paternal Grandmother      Testicular cancer Paternal Grandfather          Current Outpatient Medications   Medication Sig Dispense Refill     Acetaminophen (TYLENOL PO) Take 500 mg by mouth every 4 hours as needed for mild pain or fever       omeprazole (PRILOSEC OTC) 20 MG EC tablet Take 1 tablet (20 mg) by mouth daily 30 tablet 3     ondansetron (ZOFRAN ODT) 4 MG ODT tab Take 1 tablet (4 mg) by mouth every 6 hours as needed for nausea 15 tablet 0     sulfamethoxazole-trimethoprim (BACTRIM DS/SEPTRA DS) 800-160 MG tablet Take 1 tablet by mouth 2 times daily 28 tablet 0     ibuprofen (ADVIL/MOTRIN) 200 MG tablet Take 600 mg by mouth every 4 hours as needed for mild pain       No Known Allergies  Recent Labs   Lab Test 09/28/19  0443 09/27/19  1939  04/29/19  1029   ALT  --   --   --  30   CR 0.78 0.65   < > 0.73   GFRESTIMATED >90 >90   < > >90   GFRESTBLACK >90 >90   < > >90   POTASSIUM 4.2  3.2*   < > 4.6    < > = values in this interval not displayed.        -------------------------------------  Reviewed and updated as needed this visit by Provider         Review of Systems   ROS COMP: CONSTITUTIONAL: NEGATIVE for fever, chills, change in weight  INTEGUMENTARY/SKIN: Healing laceration on the lower right leg is noted.  No signs of significant surrounding erythema present.  Minimal discharge is noted.  The laceration is a hockey-stick and measures approximately 1-1/2 cm on each wing.  She notes this is a result of putting her foot between 2 fighting dogs.  Review of her chart suggests that she is done this several times and has scarring consistent with that history.  EYES: NEGATIVE for vision changes or irritation      Objective    /86 (BP Location: Left arm, Patient Position: Chair, Cuff Size: Adult Regular)   Pulse 108   Temp 97.1  F (36.2  C) (Temporal)   Resp 16   Wt 81.9 kg (180 lb 8 oz)   LMP 10/11/2019   SpO2 100%   BMI 29.13 kg/m    Body mass index is 29.13 kg/m .  Physical Exam   GENERAL: healthy, alert and no distress  MS: no gross musculoskeletal defects noted, no edema  SKIN: The laceration on the leg is as described.  There is no residual inflammation or erythema in the left upper extremity.  NEURO: Normal strength and tone, mentation intact and speech normal.  No hand weakness is noted.  PSYCH: mentation appears normal, affect normal/bright    Diagnostic Test Results:  Labs reviewed in Epic        Assessment & Plan       ICD-10-CM    1. Nausea R11.0 sulfamethoxazole-trimethoprim (BACTRIM DS/SEPTRA DS) 800-160 MG tablet   2. Dog bite of right lower leg, subsequent encounter S81.851D     W54.0XXD    3. Marijuana abuse F12.10    4. Methamphetamine abuse (H) F15.10    Will continue on the sulfa for an additional 2 weeks to avoid any recurrence of the cellulitis of her lower leg while the open lesion is healing.  The cellulitis of her upper extremity has resolved.  She notes that  "she did have severe claustrophobia at the time of the radiographic examination and this did limit the accuracy but no significant pathology is noted at this time.     Tobacco Cessation:   reports that she has been smoking. She has been smoking about 0.25 packs per day. She uses smokeless tobacco.  Tobacco Cessation Action Plan: Information offered: Patient not interested at this time      BMI:   Estimated body mass index is 29.13 kg/m  as calculated from the following:    Height as of 9/27/19: 1.676 m (5' 6\").    Weight as of this encounter: 81.9 kg (180 lb 8 oz).   Weight management plan: Discussed healthy diet and exercise guidelines                                 FOLLOW UP   I have asked the patient to make an appointment for followup with me as needed.  She will locate a primary care provider in her area and follow-up with that individual.      Nicholas Pulido, DO  [facility]      "

## 2019-10-16 ENCOUNTER — MYC REFILL (OUTPATIENT)
Dept: FAMILY MEDICINE | Facility: OTHER | Age: 29
End: 2019-10-16

## 2019-10-16 NOTE — TELEPHONE ENCOUNTER
Routing refill request to provider for review/approval because:  Needs diagnosis code    CHIARA FayN, RN  [facility]

## 2019-10-16 NOTE — TELEPHONE ENCOUNTER
"Requested Prescriptions   Pending Prescriptions Disp Refills     ondansetron (ZOFRAN ODT) 4 MG ODT tab 15 tablet 0     Sig: Take 1 tablet (4 mg) by mouth every 6 hours as needed for nausea   Last Written Prescription Date:  10/11/19  Last Fill Quantity: 15,  # refills: 0   Last office visit: 10/11/2019 with prescribing provider:  10/11/19   Future Office Visit:   Next 5 appointments (look out 90 days)    Oct 17, 2019 10:15 AM CDT  [facility] [address] [place] [address] [address]  [facility] [address]  [phone]            Antivertigo/Antiemetic Agents Passed - 10/16/2019  3:26 PM        Passed - Recent (12 mo) or future (30 days) visit within the authorizing provider's specialty     Patient has had an office visit with the authorizing provider or a provider within the authorizing providers department within the previous 12 mos or has a future within next 30 days. See \"Patient Info\" tab in inbasket, or \"Choose Columns\" in Meds & Orders section of the refill encounter.              Passed - Medication is active on med list        Passed - Patient is 18 years of age or older        "

## 2019-10-17 ENCOUNTER — OFFICE VISIT (OUTPATIENT)
Dept: ORTHOPEDICS | Facility: OTHER | Age: 29
End: 2019-10-17
Payer: COMMERCIAL

## 2019-10-17 VITALS
RESPIRATION RATE: 16 BRPM | SYSTOLIC BLOOD PRESSURE: 117 MMHG | DIASTOLIC BLOOD PRESSURE: 88 MMHG | BODY MASS INDEX: 28.93 KG/M2 | WEIGHT: 180 LBS | HEIGHT: 66 IN

## 2019-10-17 DIAGNOSIS — S81.851D DOG BITE OF RIGHT LOWER LEG, SUBSEQUENT ENCOUNTER: Primary | ICD-10-CM
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
## 2019-10-17 PROCEDURE — 99213 OFFICE O/P EST LOW 20 MIN: CPT | Mod: 79 | Performed by: ORTHOPAEDIC SURGERY

## 2019-10-17 RX ORDER — ONDANSETRON 4 MG/1
4 TABLET, ORALLY DISINTEGRATING ORAL EVERY 6 HOURS PRN
Qty: 15 TABLET | Refills: 0 | OUTPATIENT
Start: 2019-10-17

## 2019-10-17 ASSESSMENT — MIFFLIN-ST. JEOR: SCORE: 1548.22

## 2019-10-17 NOTE — PROGRESS NOTES
Follow up right leg dog bites on 8/11/19.  These have healed well.  She then sustained new dog bites on right leg and left arm on 9/26/19.  She was hospitalized from 9/27-9/29/19.  The wounds were left open.  She was discharged on Augmentin.  The old bites are well healed.   There is seepage from the right leg wound at the calf.  There is local erythema, but no deep fluid collection.  No sign of deep infection.  The left arm wounds have healed, but she has diffuse tenderness in arm     Assessment:  Dog bites to right leg with no active infection, but persistent seepage.    Plan:  Dressings applied.  Continue wound cleaning and dressings.  Return to clinic 2-3 weeks for clinical check  I gave her note excusing a missed court appearance 2 days after hospital, due to her still being on pain medications.

## 2019-10-17 NOTE — LETTER
10/17/2019         RE: Ayanna Davidson  [address]  [place] [address]        Dear Colleague,    Thank you for referring your patient, Ayanna Davidson, to the [facility]. Please see a copy of my visit note below.    Follow up right leg dog bites on 8/11/19.  These have healed well.  She then sustained new dog bites on right leg and left arm on 9/26/19.  She was hospitalized from 9/27-9/29/19.  The wounds were left open.  She was discharged on Augmentin.  The old bites are well healed.   There is seepage from the right leg wound at the calf.  There is local erythema, but no deep fluid collection.  No sign of deep infection.  The left arm wounds have healed, but she has diffuse tenderness in arm     Assessment:  Dog bites to right leg with no active infection, but persistent seepage.    Plan:  Dressings applied.  Continue wound cleaning and dressings.  Return to clinic 2-3 weeks for clinical check  I gave her note excusing a missed court appearance 2 days after hospital, due to her still being on pain medications.    Again, thank you for allowing me to participate in the care of your patient.        Sincerely,        Rommel Pérez MD

## 2019-10-17 NOTE — LETTER
[facility]  [address]  [address]  [place] [address]  [phone]      2019    Ayanna Davidson  [address] [address] [phone] (home)     : 1990      To Whom it may concern:    Ayanna Davidson was seen in our clinic today, 2019 for a doctors visit. Please let it be known that she was hospitalized for dog bites with infection from 19 to 19.  She was under treatment for these bites and infection and was taking pain medications and antibiotics. Please excuse court absences for 5 days after her hospitalization.         Sincerely,          Rommel Pérez MD

## 2019-10-22 ENCOUNTER — OFFICE VISIT (OUTPATIENT)
Dept: OBGYN | Facility: CLINIC | Age: 29
End: 2019-10-22
Payer: COMMERCIAL

## 2019-10-22 VITALS
HEART RATE: 64 BPM | BODY MASS INDEX: 28.88 KG/M2 | WEIGHT: 178.9 LBS | SYSTOLIC BLOOD PRESSURE: 110 MMHG | DIASTOLIC BLOOD PRESSURE: 70 MMHG

## 2019-10-22 DIAGNOSIS — R30.0 DYSURIA: ICD-10-CM

## 2019-10-22 DIAGNOSIS — Z11.3 ROUTINE SCREENING FOR STI (SEXUALLY TRANSMITTED INFECTION): Primary | ICD-10-CM

## 2019-10-22 DIAGNOSIS — Z32.00 PREGNANCY EXAMINATION OR TEST, PREGNANCY UNCONFIRMED: ICD-10-CM

## 2019-10-22 LAB
ALBUMIN UR-MCNC: NEGATIVE MG/DL
APPEARANCE UR: ABNORMAL
BILIRUB UR QL STRIP: NEGATIVE
COLOR UR AUTO: YELLOW
GLUCOSE UR STRIP-MCNC: NEGATIVE MG/DL
HCG UR QL: NEGATIVE
HGB UR QL STRIP: NEGATIVE
KETONES UR STRIP-MCNC: NEGATIVE MG/DL
LEUKOCYTE ESTERASE UR QL STRIP: ABNORMAL
MUCOUS THREADS #/AREA URNS LPF: PRESENT /LPF
NITRATE UR QL: NEGATIVE
PH UR STRIP: 5 PH (ref 5–7)
RBC #/AREA URNS AUTO: 2 /HPF (ref 0–2)
SOURCE: ABNORMAL
SP GR UR STRIP: 1.02 (ref 1–1.03)
SQUAMOUS #/AREA URNS AUTO: 29 /HPF (ref 0–1)
UROBILINOGEN UR STRIP-MCNC: 0 MG/DL (ref 0–2)
WBC #/AREA URNS AUTO: 20 /HPF (ref 0–5)

## 2019-10-22 PROCEDURE — 81001 URINALYSIS AUTO W/SCOPE: CPT | Performed by: OBSTETRICS & GYNECOLOGY

## 2019-10-22 PROCEDURE — 99214 OFFICE O/P EST MOD 30 MIN: CPT | Performed by: OBSTETRICS & GYNECOLOGY

## 2019-10-22 PROCEDURE — 86780 TREPONEMA PALLIDUM: CPT | Performed by: OBSTETRICS & GYNECOLOGY

## 2019-10-22 PROCEDURE — 87389 HIV-1 AG W/HIV-1&-2 AB AG IA: CPT | Performed by: OBSTETRICS & GYNECOLOGY

## 2019-10-22 PROCEDURE — 86803 HEPATITIS C AB TEST: CPT | Performed by: OBSTETRICS & GYNECOLOGY

## 2019-10-22 PROCEDURE — 81025 URINE PREGNANCY TEST: CPT | Performed by: OBSTETRICS & GYNECOLOGY

## 2019-10-22 PROCEDURE — 36415 COLL VENOUS BLD VENIPUNCTURE: CPT | Performed by: OBSTETRICS & GYNECOLOGY

## 2019-10-22 PROCEDURE — 86696 HERPES SIMPLEX TYPE 2 TEST: CPT | Performed by: OBSTETRICS & GYNECOLOGY

## 2019-10-22 PROCEDURE — 87591 N.GONORRHOEAE DNA AMP PROB: CPT | Performed by: OBSTETRICS & GYNECOLOGY

## 2019-10-22 PROCEDURE — 86695 HERPES SIMPLEX TYPE 1 TEST: CPT | Performed by: OBSTETRICS & GYNECOLOGY

## 2019-10-22 PROCEDURE — 87491 CHLMYD TRACH DNA AMP PROBE: CPT | Performed by: OBSTETRICS & GYNECOLOGY

## 2019-10-22 PROCEDURE — 87086 URINE CULTURE/COLONY COUNT: CPT | Performed by: OBSTETRICS & GYNECOLOGY

## 2019-10-22 NOTE — NURSING NOTE
"Chief Complaint   Patient presents with     Contraception     sti screening       Initial /70 (BP Location: Right arm, Patient Position: Chair, Cuff Size: Adult Regular)   Pulse 64   Wt 81.1 kg (178 lb 14.4 oz)   LMP 10/11/2019   BMI 28.88 kg/m   Estimated body mass index is 28.88 kg/m  as calculated from the following:    Height as of 10/17/19: 1.676 m (5' 6\").    Weight as of this encounter: 81.1 kg (178 lb 14.4 oz).  BP completed using cuff size: regular        The following HM Due: NONE      The following patient reported/Care Every where data was sent to:  P ABSTRACT QUALITY INITIATIVES [[id number]]       Radha Pérez, [facility]  2019           "

## 2019-10-22 NOTE — PROGRESS NOTES
Clinic Note    CC:    Chief Complaint   Patient presents with     Contraception     sti screening      HPI:  Ayanna Davidson is a 29 year old  who presents for a discussion of contraception as well as STI screening.   She denies current pelvic symptoms. She does have multiple recent dog bites, she shows me wounds on her legs, arms, neck, these are from her dogs.   Of note, I performed an excision of a right Bartholin abscess on 4/29/19, and she reports that this area healed well with no recurrent infections.   She is not using contraception, all options reviewed at visit earlier this year and declined at that time. She doesn't believe she can get pregnant.   She is a current meth user, denies any personal hx of IVDU. Though two sexual partners this year, both are IVDUs, non protected sex.     PMH:   Past Medical History:   Diagnosis Date     Cervical high risk HPV (human papillomavirus) test positive 03/12/2019    last PAP NIL (4/16), remote hx of LEEP     Gastroesophageal reflux disease      HSV (herpes simplex virus) infection     1 & 2     Methamphetamine abuse (H)     reports daily use     recurrent Bartholin's cyst      SurgHx:   Past Surgical History:   Procedure Laterality Date     CHOLECYSTECTOMY       ENT SURGERY       INCISION AND DRAINAGE ABSCESS PELVIS, COMBINED N/A 2/26/2018    Procedure: COMBINED INCISION AND DRAINAGE ABSCESS PELVIS;  INCISION AND DRAINAGE LABIAL ABSCESS;  Surgeon: Jase Beach MD;  [facility]     INCISION AND DRAINAGE ABSCESS PELVIS, COMBINED N/A 3/5/2019    Procedure: Incision and Drainage Right Labial Abscess;  Surgeon: Jase Beach MD;  [facility]     INCISION AND DRAINAGE LOWER EXTREMITY, COMBINED Right 8/11/2019    Procedure: irrigation and debridement right leg dog bite;  Surgeon: Rommel Pérez MD;  Location[facility]     LAP ADJUSTABLE GASTRIC BAND       LEEP TX, CERVICAL       MAMMOPLASTY REDUCTION BILATERAL       MARSUPIALIZATION BARTHOLIN CYST  N/A 4/29/2019    Procedure: Bartholin's Cyst removal;  Surgeon: Froylan Schwarz MD;  Location: [facility]     ORTHOPEDIC SURGERY       FamHx:   Family History   Problem Relation Age of Onset     Heart Disease Father      Diabetes Maternal Grandmother      Breast Cancer Paternal Grandmother      Testicular cancer Paternal Grandfather      SocHx:   Social History     Socioeconomic History     Marital status: Single     Spouse name: None     Number of children: None     Years of education: None     Highest education level: None   Occupational History     None   Social Needs     Financial resource strain: None     Food insecurity:     Worry: None     Inability: None     Transportation needs:     Medical: None     Non-medical: None   Tobacco Use     Smoking status: Current Every Day Smoker     Packs/day: 0.25     Smokeless tobacco: Current User     Tobacco comment: uses E cig   Substance and Sexual Activity     Alcohol use: Yes     Frequency: Monthly or less     Drug use: Not Currently     Comment: In treatment for meth.     Sexual activity: Yes     Partners: Male     Birth control/protection: None   Lifestyle     Physical activity:     Days per week: None     Minutes per session: None     Stress: None   Relationships     Social connections:     Talks on phone: None     Gets together: None     Attends [employer] service: None     Active member of club or organization: None     Attends meetings of clubs or organizations: None     Relationship status: None     Intimate partner violence:     Fear of current or ex partner: None     Emotionally abused: None     Physically abused: None     Forced sexual activity: None   Other Topics Concern     None   Social History Narrative     None     Allergies:   Patient has no known allergies.  Current Medications:   Current Outpatient Medications   Medication Sig Dispense Refill     Acetaminophen (TYLENOL PO) Take 500 mg by mouth every 4 hours as needed for mild pain or fever        omeprazole (PRILOSEC OTC) 20 MG EC tablet Take 1 tablet (20 mg) by mouth daily 30 tablet 3     ondansetron (ZOFRAN ODT) 4 MG ODT tab Take 1 tablet (4 mg) by mouth every 6 hours as needed for nausea 15 tablet 0     sulfamethoxazole-trimethoprim (BACTRIM DS/SEPTRA DS) 800-160 MG tablet Take 1 tablet by mouth 2 times daily 28 tablet 0     fluconazole (DIFLUCAN) 150 MG tablet Take 1 tablet (150 mg) by mouth once for 1 dose May repeat x 1 in 3 days if needed. (Patient not taking: Reported on 10/22/2019) 2 tablet 0     ibuprofen (ADVIL/MOTRIN) 200 MG tablet Take 600 mg by mouth every 4 hours as needed for mild pain       ROS: 10 point ROS negative other than as noted in the HPI    EXAM:  Vitals:    10/22/19 1227   BP: 110/70   BP Location: Right arm   Patient Position: Chair   Cuff Size: Adult Regular   Pulse: 64   Weight: 81.1 kg (178 lb 14.4 oz)    Body mass index is 28.88 kg/m .    Gen: Alert, oriented, appropriately interactive, NAD  Abdomen: soft, non tender, non distended, no masses, no hernias. No inguinal lymphadenopathy.   Perineum: no lesions; normal appearing external genitalia, bartholins glands, urethra, skenes glands  Vagina: no masses or lesions or discharge, normally rugated.  Cervix: no masses or lesions or discharge   Bimanual exam:   Nontender pelvic floor muscles  Uterus: midline, anteverted, small, mobile  no masses, non-tender  Adnexa: no masses or tenderness appreciated   No cervical motion tenderness  MSK: normal gait, symmetric movements UE & LE  Lower extremities: non-tender, no edema    Labs & Imaging:  Results for orders placed or performed in visit on 10/22/19 (from the past 24 hour(s))   *UA reflex to Microscopic and Culture ([place]; [facility]; [facility]; [facility]; [facility]; [facility]; [place]; [place])   Result Value Ref [place]    Color Urine Yellow     Appearance Urine Cloudy     Glucose Urine Negative NEG^Negative mg/dL    Bilirubin Urine Negative NEG^Negative     Ketones Urine Negative NEG^Negative mg/dL    Specific Gravity Urine 1.021 1.003 - 1.035    Blood Urine Negative NEG^Negative    pH Urine 5.0 5.0 - 7.0 pH    Protein Albumin Urine Negative NEG^Negative mg/dL    Urobilinogen mg/dL 0.0 0.0 - 2.0 mg/dL    Nitrite Urine Negative NEG^Negative    Leukocyte Esterase Urine Small (A) NEG^Negative    Source Midstream Urine     RBC Urine 2 0 - 2 /HPF    WBC Urine 20 (H) 0 - 5 /HPF    Squamous Epithelial /HPF Urine 29 (H) 0 - 1 /HPF    Mucous Urine Present (A) NEG^Negative /LPF   HCG qualitative urine   Result Value Ref [place]    HCG Qual Urine Negative NEG^Negative     Lab Results   Component Value Date    PAP NIL 2019     ASSESSMENT/PLAN: Ayanna Davidson is a 29 year old  who presents for STI testing, to discuss contraception.     We discussed safe sex, discussed risks of unprotected sex, multiple partners, partners who are IVDUs, no contraception. We again reviewed all contraception options, and she again declines to choose one.   I am asking her to return in March for a repeat PAP.     1. Routine screening for STI (sexually transmitted infection)  - Reviewed prior lab showing HSV 1&2 positive, she reports was not given this result, is very upset, denies any hx of lesion, requesting repeat lab even though I have explained that it will be positive.   - HIV Antigen Antibody Combo  - Treponema Abs w Reflex to RPR and Titer  - Neisseria gonorrhoeae PCR  - Chlamydia trachomatis PCR  - Hepatitis C antibody  - HSV 1 and 2 DNA by PCR  - Urine Culture Aerobic Bacterial    2. Dysuria  - Exam and UA wnl  - *UA reflex to Microscopic and Culture ([place]; [facility]; [facility]; [facility]; [facility]; [place]; [place])    3. Pregnancy examination or test, pregnancy unconfirmed  - negative  - HCG qualitative urine    Froylan Schwarz MD   10/22/2019 2:46 PM

## 2019-10-23 LAB
C TRACH DNA SPEC QL NAA+PROBE: NEGATIVE
HCV AB SERPL QL IA: NONREACTIVE
HIV 1+2 AB+HIV1 P24 AG SERPL QL IA: NONREACTIVE
N GONORRHOEA DNA SPEC QL NAA+PROBE: NEGATIVE
SPECIMEN SOURCE: NORMAL
SPECIMEN SOURCE: NORMAL

## 2019-10-24 LAB
BACTERIA SPEC CULT: NORMAL
Lab: NORMAL
SPECIMEN SOURCE: NORMAL

## 2019-10-25 LAB
HSV1 IGG SERPL QL IA: >8 AI (ref 0–0.8)
HSV2 IGG SERPL QL IA: >8 AI (ref 0–0.8)
T PALLIDUM AB SER QL: NONREACTIVE

## 2019-11-01 ENCOUNTER — TELEPHONE (OUTPATIENT)
Dept: FAMILY MEDICINE | Facility: CLINIC | Age: 29
End: 2019-11-01

## 2019-11-01 NOTE — TELEPHONE ENCOUNTER
Patient notified that we need to reschedule her appointment to Monday, 11/4 due to Magda being out of office on Tuesday, 11/5. Patient is scheduled on Monday, 11/4 at 12:20. Mayra Stratton LPN

## 2019-11-04 ENCOUNTER — OFFICE VISIT (OUTPATIENT)
Dept: FAMILY MEDICINE | Facility: CLINIC | Age: 29
End: 2019-11-04
Payer: COMMERCIAL

## 2019-11-04 VITALS
BODY MASS INDEX: 28.68 KG/M2 | DIASTOLIC BLOOD PRESSURE: 70 MMHG | SYSTOLIC BLOOD PRESSURE: 102 MMHG | TEMPERATURE: 96.5 F | OXYGEN SATURATION: 100 % | HEART RATE: 92 BPM | WEIGHT: 177.7 LBS | RESPIRATION RATE: 14 BRPM

## 2019-11-04 DIAGNOSIS — H92.01 OTALGIA, RIGHT: ICD-10-CM

## 2019-11-04 DIAGNOSIS — Q15.9 EYE ABNORMALITY: ICD-10-CM
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
## 2019-11-04 PROCEDURE — 99214 OFFICE O/P EST MOD 30 MIN: CPT | Performed by: OBSTETRICS & GYNECOLOGY

## 2019-11-04 RX ORDER — SULFAMETHOXAZOLE/TRIMETHOPRIM 800-160 MG
1 TABLET ORAL 2 TIMES DAILY
Qty: 20 TABLET | Refills: 0 | Status: ON HOLD | OUTPATIENT
Start: 2019-11-04 | End: 2020-03-11

## 2019-11-04 ASSESSMENT — PAIN SCALES - GENERAL: PAINLEVEL: MODERATE PAIN (5)

## 2019-11-04 NOTE — PROGRESS NOTES
Subjective: She has 3 concerns:    1.  She was bitten by a dog on the right lower leg and was hospitalized and then treated with Bactrim DS for 2 weeks and she just finished it now.  She wanted me to look at the wound and determine if she needs more antibiotics.  She has tolerated it well.  No fevers.  The pain has resolved in the leg.  She told me that her tetanus shot is up-to-date.    2.  She has a right earache for the past few days.  Once that examined.    3.  She has blurry vision over the past year.  Her eyes feel grainy as if there may be sand in them and they are itchy.  She would like an exam by an ophthalmologist.  She was determined to be positive for herpes simplex 1 and 2 based on blood test of antibodies.  She wonders if this is a concern for her eyes.  However the eyes have not turned red and there has been no discharge.      The past medical history, social history, past surgical history and family history as shown below have been reviewed by me today.    Past Medical History:   Diagnosis Date     Cervical high risk HPV (human papillomavirus) test positive 03/12/2019    last PAP NIL (4/16), remote hx of LEEP     Gastroesophageal reflux disease      HSV (herpes simplex virus) infection     1 & 2     Methamphetamine abuse (H)     reports daily use     recurrent Bartholin's cyst       No Known Allergies  Current Outpatient Medications   Medication Sig Dispense Refill     Acetaminophen (TYLENOL PO) Take 500 mg by mouth every 4 hours as needed for mild pain or fever       ibuprofen (ADVIL/MOTRIN) 200 MG tablet Take 600 mg by mouth every 4 hours as needed for mild pain       omeprazole (PRILOSEC OTC) 20 MG EC tablet Take 1 tablet (20 mg) by mouth daily 30 tablet 3     ondansetron (ZOFRAN ODT) 4 MG ODT tab Take 1 tablet (4 mg) by mouth every 6 hours as needed for nausea 15 tablet 0     sulfamethoxazole-trimethoprim (BACTRIM DS/SEPTRA DS) 800-160 MG tablet Take 1 tablet by mouth 2 times daily 28 tablet 0      Past Surgical History:   Procedure Laterality Date     CHOLECYSTECTOMY       ENT SURGERY       INCISION AND DRAINAGE ABSCESS PELVIS, COMBINED N/A 2/26/2018    Procedure: COMBINED INCISION AND DRAINAGE ABSCESS PELVIS;  INCISION AND DRAINAGE LABIAL ABSCESS;  Surgeon: Jase Beach MD;  [facility]     INCISION AND DRAINAGE ABSCESS PELVIS, COMBINED N/A 3/5/2019    Procedure: Incision and Drainage Right Labial Abscess;  Surgeon: Jase Beach MD;  [facility]     INCISION AND DRAINAGE LOWER EXTREMITY, COMBINED Right 8/11/2019    Procedure: irrigation and debridement right leg dog bite;  Surgeon: Rommel Pérez MD;  [facility]     LAP ADJUSTABLE GASTRIC BAND       LEEP TX, CERVICAL       MAMMOPLASTY REDUCTION BILATERAL       MARSUPIALIZATION BARTHOLIN CYST N/A 4/29/2019    Procedure: Bartholin's Cyst removal;  Surgeon: Froylan Schwarz MD;  [facility]     ORTHOPEDIC SURGERY       Social History     Socioeconomic History     Marital status: Single     Spouse name: None     Number of children: None     Years of education: None     Highest education level: None   Occupational History     None   Social Needs     Financial resource strain: None     Food insecurity:     Worry: None     Inability: None     Transportation needs:     Medical: None     Non-medical: None   Tobacco Use     Smoking status: Current Every Day Smoker     Packs/day: 0.25     Smokeless tobacco: Current User     Tobacco comment: uses E cig   Substance and Sexual Activity     Alcohol use: Yes     Frequency: Monthly or less     Drug use: Not Currently     Comment: In treatment for meth.     Sexual activity: Yes     Partners: Male     Birth control/protection: None   Lifestyle     Physical activity:     Days per week: None     Minutes per session: None     Stress: None   Relationships     Social connections:     Talks on phone: None     Gets together: None     Attends [employer] service: None     Active member of club  or organization: None     Attends meetings of clubs or organizations: None     Relationship status: None     Intimate partner violence:     Fear of current or ex partner: None     Emotionally abused: None     Physically abused: None     Forced sexual activity: None   Other Topics Concern     None   Social History Narrative     None     Family History   Problem Relation Age of Onset     Heart Disease Father      Diabetes Maternal Grandmother      Breast Cancer Paternal Grandmother      Testicular cancer Paternal Grandfather        ROS: A 12 point review of systems was done. Except for what is listed above in the HPI, the systems review is negative .      Objective: Vital signs: Blood pressure 102/70, pulse 92, temperature 96.5  F (35.8  C), temperature source Temporal, resp. rate 14, weight 80.6 kg (177 lb 11.2 oz), last menstrual period 10/11/2019, SpO2 100 %, not currently breastfeeding.  Both sclera and conjunctiva look normal.  There is no discharge in her eyes.  No redness.  The eyes are not watering.    Ear canals and TMs look normal.  Throat is unremarkable.Neck is supple, mobile, no adenopathy or masses palpable. The thyroid feels normal.   Normal range of motion noted.  Chest is clear to auscultation.  No wheezes, rales or rhonchi heard.  Cardiac exam is normal with s1, s2, no murmurs or adventitious sounds.Normal rate and rhythm is heard.     Exam of the right lower leg reveals some abrasions where the dog scratched her and also one puncture wound which appears to be healing well but is not completely closed.  Therefore I advised her to stay on the Bactrim for up to 10 more days until that closes off completely.  I reapplied a Band-Aid.  It is not red and there is no discharge.  Nothing to suggest an untreated infection however she stated that before she started the Bactrim it looked very ugly.  Therefore I do not feel comfortable stopping the antibiotic at this point.        Assessment/Plan:      1.  Eye  abnormality-symptoms of itchy eyes but no obvious conjunctivitis based on exam today.  She is worried about ongoing risk from being positive for herpes simplex 1 and 2 she is asking for ophthalmology referral.  This will be done.  Think it would be reasonable for her to have a complete exam but I do not see any outward clues that she has an ongoing infection based on today's exam.    2.  Otalgia:Sore right ear- exam is normal.  I reassured her about this.    3.  Dog bite right lower leg.See rx for refill of Bactrim for up to 10 days of use.. I explained that antibiotics can cause a rash or allergic reaction to develop and so the medication should be stopped if this occurs. Also there is a risk of diarrhea or clostridium difficile pseudomembranous enterocolitis with any antibiotic use so it should be stopped if diarrhea develops and then the clinic should be called so that we have a followup evaluation.  Stop the antibiotic when the wound has healed over and stop the antibiotic if she develops 1 of those complications such as a rash or diarrhea.         The above information was dictating using Dragon voice software and as a result there may be some irregularities that were not detected in my review of this note.    JESSY Man MD

## 2019-11-07 ENCOUNTER — TELEPHONE (OUTPATIENT)
Dept: FAMILY MEDICINE | Facility: OTHER | Age: 29
End: 2019-11-07

## 2019-11-07 NOTE — TELEPHONE ENCOUNTER
Pt last saw Dr. Schwarz on 10/22/19 for STI screening.  Pt also saw Dr. Schwarz on 5/7/19 for a post op visit after he did a bartholin's cyst removal in the OR on 4/29/19.  Spoke with pt and she states she is now developing a painful Bartholin cyst on the left side.  She states that Dr. Schwarz removed a Bartholin Cyst on the right side back in April.  She does have a history of recurrent Bartholin's Cysts.  Pt states she started noticing this painful cyst today.  She states it isn't big yet but it is very painful.  She wants to know if she needs to see Dr. Schwarz in clinic first or if he can just place orders to have this one removed in the OR like he did the last one.  Will route to Dr. Schwarz for further recommendations.    Pam Patiño RN

## 2019-11-07 NOTE — TELEPHONE ENCOUNTER
Reason for Call:  Other call back    Detailed comments: Pt stated that she has the cyst that came on the left side and is wondering if he should schedule the surgery to get it removed. Please call pt and let her know.     Phone Number Patient can be reached at: Home number on file [phone] (home)    Best Time: NA    Can we leave a detailed message on this number? YES    Call taken on 11/7/2019 at 11:22 AM by Vicky Russell

## 2019-11-08 ENCOUNTER — HOSPITAL ENCOUNTER (EMERGENCY)
Facility: CLINIC | Age: 29
Discharge: HOME | End: 2019-11-08
Attending: FAMILY MEDICINE | Admitting: FAMILY MEDICINE
Payer: COMMERCIAL

## 2019-11-08 ENCOUNTER — HEALTH MAINTENANCE LETTER (OUTPATIENT)
Age: 29
End: 2019-11-08

## 2019-11-08 VITALS
RESPIRATION RATE: 18 BRPM | DIASTOLIC BLOOD PRESSURE: 88 MMHG | HEART RATE: 107 BPM | TEMPERATURE: 98.1 F | SYSTOLIC BLOOD PRESSURE: 136 MMHG | OXYGEN SATURATION: 98 %

## 2019-11-08 DIAGNOSIS — N75.0 BARTHOLIN CYST: ICD-10-CM

## 2019-11-08 PROCEDURE — 99284 EMERGENCY DEPT VISIT MOD MDM: CPT | Mod: Z6 | Performed by: FAMILY MEDICINE

## 2019-11-08 PROCEDURE — 99282 EMERGENCY DEPT VISIT SF MDM: CPT | Performed by: FAMILY MEDICINE

## 2019-11-08 RX ORDER — HYDROCODONE BITARTRATE AND ACETAMINOPHEN 5; 325 MG/1; MG/1
1 TABLET ORAL EVERY 6 HOURS PRN
Qty: 10 TABLET | Refills: 0 | Status: SHIPPED | OUTPATIENT
Start: 2019-11-08 | End: 2020-01-28

## 2019-11-08 ASSESSMENT — ENCOUNTER SYMPTOMS
CONFUSION: 0
ARTHRALGIAS: 0
HEADACHES: 0
DIFFICULTY URINATING: 0
ABDOMINAL PAIN: 0
SHORTNESS OF BREATH: 0
NECK STIFFNESS: 0
EYE REDNESS: 0
FEVER: 0
COLOR CHANGE: 0

## 2019-11-08 NOTE — ED AVS SNAPSHOT
[facility] Emergency Department  [address] [place] [address]  Phone:  [phone]  Fax:  [phone]                                    Ayanna Davidson   MRN: [id number]    Department:  [facility] Emergency Department   Date of Visit:  11/8/2019           After Visit Summary Signature Page    I have received my discharge instructions, and my questions have been answered. I have discussed any challenges I see with this plan with the nurse or doctor.    ..........................................................................................................................................  Patient/Patient Representative Signature      ..........................................................................................................................................  Patient Representative Print Name and Relationship to Patient    ..................................................               ................................................  Date                                   Time    ..........................................................................................................................................  Reviewed by Signature/Title    ...................................................              ..............................................  Date                                               Time          22EPIC Rev 08/18

## 2019-11-08 NOTE — TELEPHONE ENCOUNTER
Karishma, Froylan Kwon MD  You 15 hours ago (4:57 PM)      I really need to look at it prior to scheduling surgery unfortunately, though she should probably be added on to my clinic schedule ASAP, thanks      Let pt know that she needs to be seen in clinic prior to Dr. Schwarz placing surgery orders.  Scheduled pt with Dr. Schwarz on 11/12.  I also let pt know that Dr. Schwarz is on call at the [facility] over the weekend if it gets too unbearable before her 11/12/19 appt.    Pam Patiño RN

## 2019-11-09 NOTE — ED TRIAGE NOTES
Pt w/hx of bartholin cysts and has an appt w/surgery on Tuesday.  PT is on her period and would like surgery to remove it.

## 2019-11-09 NOTE — ED PROVIDER NOTES
History     Chief Complaint   Patient presents with     bartholin cyst     The history is provided by the patient.     Ayanna Davidson is a 29 year old female with a h/o Bartholin cysts who presents to the ED complaining of a flare up of a cyst on her left lower labia that started two days ago. Patient has had her right gland surgically removed and has had no problems since that procedure was completed. She does have an appt with the surgeon on Tues to discuss removal of that gland as well. Patient has tried warm packs and sitz baths for the discomfort with no relief. She stated she also currently has her menses.     Allergies:  No Known Allergies    Problem List:    Patient Active Problem List    Diagnosis Date Noted     Dog bite of right lower leg 09/27/2019     Priority: Medium     Cellulitis of left upper extremity 09/27/2019     Priority: Medium     Current every day smoker 09/27/2019     Priority: Medium     Gastroesophageal reflux disease without esophagitis 09/27/2019     Priority: Medium     Skin infection 09/27/2019     Priority: Medium     Dog bite of left upper extremity 05/07/2019     Priority: Medium     Cervical high risk HPV (human papillomavirus) test positive 03/12/2019     Priority: Medium     4/2016 NIL pap. No prior HPV testing.   3/12/19 NIL pap, + HR HPV (not 16 or 18). Plan: cotest in 1 yr       Vulvar abscess 08/16/2017     Priority: Medium     Drug-induced mental disorder (H) 09/20/2012     Priority: Medium     Overview:   ICD-10 Regulatory Update       Depressed 05/10/2012     Priority: Medium     Overdose of antipsychotic 05/09/2012     Priority: Medium        Past Medical History:    Past Medical History:   Diagnosis Date     Cervical high risk HPV (human papillomavirus) test positive 03/12/2019     Gastroesophageal reflux disease      HSV (herpes simplex virus) infection      Methamphetamine abuse (H)      recurrent Bartholin's cyst        Past Surgical History:    Past Surgical  History:   Procedure Laterality Date     CHOLECYSTECTOMY       ENT SURGERY       INCISION AND DRAINAGE ABSCESS PELVIS, COMBINED N/A 2/26/2018    Procedure: COMBINED INCISION AND DRAINAGE ABSCESS PELVIS;  INCISION AND DRAINAGE LABIAL ABSCESS;  Surgeon: Jase Beach MD;  [facility]     INCISION AND DRAINAGE ABSCESS PELVIS, COMBINED N/A 3/5/2019    Procedure: Incision and Drainage Right Labial Abscess;  Surgeon: Jase Beach MD;  Location[facility]     INCISION AND DRAINAGE LOWER EXTREMITY, COMBINED Right 8/11/2019    Procedure: irrigation and debridement right leg dog bite;  Surgeon: Rommel Préez MD;  Location[facility]     LAP ADJUSTABLE GASTRIC BAND       LEEP TX, CERVICAL       MAMMOPLASTY REDUCTION BILATERAL       MARSUPIALIZATION BARTHOLIN CYST N/A 4/29/2019    Procedure: Bartholin's Cyst removal;  Surgeon: Froylan Schwarz MD;  Location: [facility]     ORTHOPEDIC SURGERY         Family History:    Family History   Problem Relation Age of Onset     Heart Disease Father      Diabetes Maternal Grandmother      Breast Cancer Paternal Grandmother      Testicular cancer Paternal Grandfather        Social History:  Marital Status:  Single [1]  Social History     Tobacco Use     Smoking status: Current Every Day Smoker     Packs/day: 0.25     Smokeless tobacco: Current User     Tobacco comment: uses [employer]   Substance Use Topics     Alcohol use: Yes     Frequency: Monthly or less     Drug use: Not Currently     Comment: In treatment for meth.        Medications:    Acetaminophen (TYLENOL PO)  omeprazole (PRILOSEC OTC) 20 MG EC tablet  ondansetron (ZOFRAN ODT) 4 MG ODT tab  sulfamethoxazole-trimethoprim (BACTRIM DS/SEPTRA DS) 800-160 MG tablet  ibuprofen (ADVIL/MOTRIN) 200 MG tablet  sulfamethoxazole-trimethoprim (BACTRIM DS/SEPTRA DS) 800-160 MG tablet          Review of Systems   Constitutional: Negative for fever.   HENT: Negative for congestion.    Eyes: Negative for redness.   Respiratory: Negative  for shortness of breath.    Cardiovascular: Negative for chest pain.   Gastrointestinal: Negative for abdominal pain.   Genitourinary: Negative for difficulty urinating.   Musculoskeletal: Negative for arthralgias and neck stiffness.   Skin: Negative for color change.   Neurological: Negative for headaches.   Psychiatric/Behavioral: Negative for confusion.   All other systems reviewed and are negative.      Physical Exam          Physical Exam  Vitals signs and nursing note reviewed. Exam conducted with a chaperone present.   Constitutional:       General: She is not in acute distress.     Appearance: She is well-developed. She is not diaphoretic.   HENT:      Head: Normocephalic and atraumatic.      Right Ear: External ear normal.      Left Ear: External ear normal.      Nose: Nose normal.      Mouth/Throat:      Pharynx: No oropharyngeal exudate.   Eyes:      General:         Right eye: No discharge.         Left eye: No discharge.      Conjunctiva/sclera: Conjunctivae normal.      Pupils: Pupils are equal, round, and reactive to light.   Neck:      Musculoskeletal: Normal range of motion and neck supple.      Thyroid: No thyromegaly.   Cardiovascular:      Rate and Rhythm: Normal rate and regular rhythm.      Heart sounds: Normal heart sounds. No murmur.   Pulmonary:      Effort: Pulmonary effort is normal. No respiratory distress.      Breath sounds: Normal breath sounds. No wheezing or rales.   Chest:      Chest wall: No tenderness.   Abdominal:      General: Bowel sounds are normal. There is no distension.      Palpations: Abdomen is soft. There is no mass.      Tenderness: There is no tenderness. There is no guarding or rebound.      Hernia: No hernia is present.   Genitourinary:     Labia:         Left: Tenderness present.       Comments: Pea sized cyst, left lower labia, no redness, warmth or fluctuance.   Musculoskeletal: Normal range of motion.         General: No deformity.   Lymphadenopathy:       Cervical: No cervical adenopathy.   Skin:     General: Skin is warm.      Capillary Refill: Capillary refill takes less than 2 seconds.      Findings: No erythema or rash.   Neurological:      Mental Status: She is alert and oriented to person, place, and time.      Cranial Nerves: No cranial nerve deficit.         ED Course        Procedures       No results found for this or any previous visit (from the past 24 hour(s)).    Medications - No data to display     Patient presents with a Bartholin gland cyst.  This does not appear to be infected, I do not see any redness and there is no fluctuance and this is not a super large area.  I did recommend that the standard emergency care treatment is to do an incision and place a Word catheter.  Patient states she has had these before and does not want this.  She was told by her OB that if it gets bad like that she should come in to have surgery to have the complete cyst removed.  I told the patient that this is not something that we will call someone emergently him to do surgery for.  She can certainly follow-up with her doctor on Monday and get scheduled for an elective surgery for this if she would like.  I did offer the standard emergency room care but she does not want to get the catheter placed.  I did send her home with a few pain medications.  This does not appear to be infected and I think this can wait for follow-up on Monday.      Assessments & Plan (with Medical Decision Making)  Bartholin gland cyst     I have reviewed the nursing notes.    I have reviewed the findings, diagnosis, plan and need for follow up with the patient.      Discharge Medication List as of 11/8/2019  9:25 PM      START taking these medications    Details   HYDROcodone-acetaminophen (NORCO) 5-325 MG tablet Take 1 tablet by mouth every 6 hours as needed for severe pain, Disp-10 tablet, R-0, E-Prescribe                 Final diagnoses:   Bartholin cyst   This document serves as a record of  services personally performed by Rupert Smith MD.  It was created on their behalf by Lakeshia Winters, a trained medical scribe. The creation of this record is based on the provider's personal observations and the statements of the patient. This document has been checked and approved by the attending provider.    Disclaimer : This note consists of symbols derived from keyboarding, dictation and/or voice recognition software. As a result, there may be errors in the script that have gone undetected. Please consider this when interpreting information found in this chart.      11/8/2019   [facility] EMERGENCY DEPARTMENT     Rupert Smith MD  11/11/19 0134

## 2020-01-27 ENCOUNTER — HOSPITAL ENCOUNTER (EMERGENCY)
Facility: CLINIC | Age: 30
Discharge: HOME | End: 2020-01-27
Attending: PHYSICIAN ASSISTANT | Admitting: PHYSICIAN ASSISTANT
Payer: COMMERCIAL

## 2020-01-27 ENCOUNTER — APPOINTMENT (OUTPATIENT)
Dept: ULTRASOUND IMAGING | Facility: CLINIC | Age: 30
End: 2020-01-27
Attending: PHYSICIAN ASSISTANT
Payer: COMMERCIAL

## 2020-01-27 VITALS
OXYGEN SATURATION: 100 % | TEMPERATURE: 98.8 F | WEIGHT: 192 LBS | SYSTOLIC BLOOD PRESSURE: 122 MMHG | RESPIRATION RATE: 18 BRPM | BODY MASS INDEX: 30.99 KG/M2 | DIASTOLIC BLOOD PRESSURE: 78 MMHG | HEART RATE: 98 BPM

## 2020-01-27 DIAGNOSIS — N30.00 ACUTE CYSTITIS WITHOUT HEMATURIA: ICD-10-CM

## 2020-01-27 DIAGNOSIS — N75.0 BARTHOLIN GLAND CYST: ICD-10-CM

## 2020-01-27 LAB
ALBUMIN UR-MCNC: NEGATIVE MG/DL
APPEARANCE UR: ABNORMAL
BACTERIA #/AREA URNS HPF: ABNORMAL /HPF
BILIRUB UR QL STRIP: NEGATIVE
COLOR UR AUTO: YELLOW
GLUCOSE UR STRIP-MCNC: NEGATIVE MG/DL
HCG UR QL: NEGATIVE
HGB UR QL STRIP: NEGATIVE
KETONES UR STRIP-MCNC: NEGATIVE MG/DL
LEUKOCYTE ESTERASE UR QL STRIP: ABNORMAL
MUCOUS THREADS #/AREA URNS LPF: PRESENT /LPF
NITRATE UR QL: NEGATIVE
PH UR STRIP: 6 PH (ref 5–7)
RBC #/AREA URNS AUTO: 2 /HPF (ref 0–2)
SOURCE: ABNORMAL
SP GR UR STRIP: 1.02 (ref 1–1.03)
SQUAMOUS #/AREA URNS AUTO: 4 /HPF (ref 0–1)
UROBILINOGEN UR STRIP-MCNC: 0 MG/DL (ref 0–2)
WBC #/AREA URNS AUTO: 10 /HPF (ref 0–5)

## 2020-01-27 PROCEDURE — 96372 THER/PROPH/DIAG INJ SC/IM: CPT | Performed by: PHYSICIAN ASSISTANT

## 2020-01-27 PROCEDURE — 76856 US EXAM PELVIC COMPLETE: CPT

## 2020-01-27 PROCEDURE — 25000132 ZZH RX MED GY IP 250 OP 250 PS 637: Performed by: PHYSICIAN ASSISTANT

## 2020-01-27 PROCEDURE — 81001 URINALYSIS AUTO W/SCOPE: CPT | Performed by: PHYSICIAN ASSISTANT

## 2020-01-27 PROCEDURE — 99284 EMERGENCY DEPT VISIT MOD MDM: CPT | Mod: Z6 | Performed by: PHYSICIAN ASSISTANT

## 2020-01-27 PROCEDURE — 81025 URINE PREGNANCY TEST: CPT | Performed by: PHYSICIAN ASSISTANT

## 2020-01-27 PROCEDURE — 25000128 H RX IP 250 OP 636: Performed by: PHYSICIAN ASSISTANT

## 2020-01-27 PROCEDURE — 99285 EMERGENCY DEPT VISIT HI MDM: CPT | Mod: 25 | Performed by: PHYSICIAN ASSISTANT

## 2020-01-27 RX ORDER — HYDROCODONE BITARTRATE AND ACETAMINOPHEN 5; 325 MG/1; MG/1
1 TABLET ORAL EVERY 6 HOURS PRN
Qty: 6 TABLET | Refills: 0 | Status: ON HOLD | OUTPATIENT
Start: 2020-01-27 | End: 2020-03-11

## 2020-01-27 RX ORDER — KETOROLAC TROMETHAMINE 30 MG/ML
30 INJECTION, SOLUTION INTRAMUSCULAR; INTRAVENOUS ONCE
Status: COMPLETED | OUTPATIENT
Start: 2020-01-27 | End: 2020-01-27

## 2020-01-27 RX ORDER — CEPHALEXIN 500 MG/1
500 CAPSULE ORAL 4 TIMES DAILY
Qty: 28 CAPSULE | Refills: 0 | Status: ON HOLD | OUTPATIENT
Start: 2020-01-27 | End: 2020-03-11

## 2020-01-27 RX ORDER — ACETAMINOPHEN 325 MG/1
975 TABLET ORAL ONCE
Status: COMPLETED | OUTPATIENT
Start: 2020-01-27 | End: 2020-01-27

## 2020-01-27 RX ORDER — ONDANSETRON 4 MG/1
4 TABLET, ORALLY DISINTEGRATING ORAL EVERY 8 HOURS PRN
Qty: 10 TABLET | Refills: 0 | Status: ON HOLD | OUTPATIENT
Start: 2020-01-27 | End: 2020-03-11

## 2020-01-27 RX ADMIN — ACETAMINOPHEN 975 MG: 325 TABLET, FILM COATED ORAL at 21:13

## 2020-01-27 RX ADMIN — KETOROLAC TROMETHAMINE 30 MG: 30 INJECTION, SOLUTION INTRAMUSCULAR at 21:13

## 2020-01-27 NOTE — ED AVS SNAPSHOT
[facility] Emergency Department  [address] [place] [address]  Phone:  [phone]  Fax:  [phone]                                    Ayanna Davidson   MRN: [id number]    Department:  [facility] Emergency Department   Date of Visit:  1/27/2020           After Visit Summary Signature Page    I have received my discharge instructions, and my questions have been answered. I have discussed any challenges I see with this plan with the nurse or doctor.    ..........................................................................................................................................  Patient/Patient Representative Signature      ..........................................................................................................................................  Patient Representative Print Name and Relationship to Patient    ..................................................               ................................................  Date                                   Time    ..........................................................................................................................................  Reviewed by Signature/Title    ...................................................              ..............................................  Date                                               Time          22EPIC Rev 08/18

## 2020-01-28 ENCOUNTER — TELEPHONE (OUTPATIENT)
Dept: FAMILY MEDICINE | Facility: OTHER | Age: 30
End: 2020-01-28

## 2020-01-28 ENCOUNTER — ANESTHESIA EVENT (OUTPATIENT)
Dept: SURGERY | Facility: CLINIC | Age: 30
End: 2020-01-28
Payer: COMMERCIAL

## 2020-01-28 ENCOUNTER — OFFICE VISIT (OUTPATIENT)
Dept: OBGYN | Facility: CLINIC | Age: 30
End: 2020-01-28
Payer: COMMERCIAL

## 2020-01-28 VITALS
WEIGHT: 190.9 LBS | BODY MASS INDEX: 30.81 KG/M2 | HEART RATE: 91 BPM | DIASTOLIC BLOOD PRESSURE: 70 MMHG | SYSTOLIC BLOOD PRESSURE: 110 MMHG

## 2020-01-28 DIAGNOSIS — Z01.818 PREOPERATIVE EXAMINATION: ICD-10-CM

## 2020-01-28 DIAGNOSIS — N75.1 BARTHOLIN'S GLAND ABSCESS: Primary | ICD-10-CM

## 2020-01-28 LAB
ABO + RH BLD: NORMAL
ABO + RH BLD: NORMAL
ALBUMIN SERPL-MCNC: 3.5 G/DL (ref 3.4–5)
ALP SERPL-CCNC: 67 U/L (ref 40–150)
ALT SERPL W P-5'-P-CCNC: 22 U/L (ref 0–50)
AMPHETAMINES UR QL: NOT DETECTED NG/ML
ANION GAP SERPL CALCULATED.3IONS-SCNC: 7 MMOL/L (ref 3–14)
AST SERPL W P-5'-P-CCNC: 16 U/L (ref 0–45)
BARBITURATES UR QL SCN: NOT DETECTED NG/ML
BENZODIAZ UR QL SCN: NOT DETECTED NG/ML
BILIRUB SERPL-MCNC: 0.5 MG/DL (ref 0.2–1.3)
BLD GP AB SCN SERPL QL: NORMAL
BLOOD BANK CMNT PATIENT-IMP: NORMAL
BUN SERPL-MCNC: 14 MG/DL (ref 7–30)
BUPRENORPHINE UR QL: NOT DETECTED NG/ML
CALCIUM SERPL-MCNC: 9.1 MG/DL (ref 8.5–10.1)
CANNABINOIDS UR QL: NOT DETECTED NG/ML
CHLORIDE SERPL-SCNC: 108 MMOL/L (ref 94–109)
CO2 SERPL-SCNC: 26 MMOL/L (ref 20–32)
COCAINE UR QL SCN: NOT DETECTED NG/ML
CREAT SERPL-MCNC: 0.69 MG/DL (ref 0.52–1.04)
D-METHAMPHET UR QL: NOT DETECTED NG/ML
ERYTHROCYTE [DISTWIDTH] IN BLOOD BY AUTOMATED COUNT: 13.9 % (ref 10–15)
GFR SERPL CREATININE-BSD FRML MDRD: >90 ML/MIN/{1.73_M2}
GLUCOSE SERPL-MCNC: 102 MG/DL (ref 70–99)
HCT VFR BLD AUTO: 40.8 % (ref 35–47)
HGB BLD-MCNC: 13.3 G/DL (ref 11.7–15.7)
MCH RBC QN AUTO: 29.3 PG (ref 26.5–33)
MCHC RBC AUTO-ENTMCNC: 32.6 G/DL (ref 31.5–36.5)
MCV RBC AUTO: 90 FL (ref 78–100)
METHADONE UR QL SCN: NOT DETECTED NG/ML
OPIATES UR QL SCN: ABNORMAL NG/ML
OXYCODONE UR QL SCN: NOT DETECTED NG/ML
PCP UR QL SCN: NOT DETECTED NG/ML
PLATELET # BLD AUTO: 293 10E9/L (ref 150–450)
POTASSIUM SERPL-SCNC: 4.5 MMOL/L (ref 3.4–5.3)
PROPOXYPH UR QL: NOT DETECTED NG/ML
PROT SERPL-MCNC: 7.3 G/DL (ref 6.8–8.8)
RBC # BLD AUTO: 4.54 10E12/L (ref 3.8–5.2)
SODIUM SERPL-SCNC: 141 MMOL/L (ref 133–144)
SPECIMEN EXP DATE BLD: NORMAL
TRICYCLICS UR QL SCN: NOT DETECTED NG/ML
WBC # BLD AUTO: 12.9 10E9/L (ref 4–11)

## 2020-01-28 PROCEDURE — 85027 COMPLETE CBC AUTOMATED: CPT | Performed by: OBSTETRICS & GYNECOLOGY

## 2020-01-28 PROCEDURE — 86850 RBC ANTIBODY SCREEN: CPT | Performed by: OBSTETRICS & GYNECOLOGY

## 2020-01-28 PROCEDURE — 36415 COLL VENOUS BLD VENIPUNCTURE: CPT | Performed by: OBSTETRICS & GYNECOLOGY

## 2020-01-28 PROCEDURE — 86900 BLOOD TYPING SEROLOGIC ABO: CPT | Performed by: OBSTETRICS & GYNECOLOGY

## 2020-01-28 PROCEDURE — 86901 BLOOD TYPING SEROLOGIC RH(D): CPT | Performed by: OBSTETRICS & GYNECOLOGY

## 2020-01-28 PROCEDURE — 99213 OFFICE O/P EST LOW 20 MIN: CPT | Performed by: OBSTETRICS & GYNECOLOGY

## 2020-01-28 PROCEDURE — 80306 DRUG TEST PRSMV INSTRMNT: CPT | Performed by: OBSTETRICS & GYNECOLOGY

## 2020-01-28 PROCEDURE — 80053 COMPREHEN METABOLIC PANEL: CPT | Performed by: OBSTETRICS & GYNECOLOGY

## 2020-01-28 ASSESSMENT — LIFESTYLE VARIABLES: TOBACCO_USE: 1

## 2020-01-28 NOTE — LETTER
[address] [address]  Phone: [phone]    January 28, 2020        Ayanna Davidson  [address] [address]          To whom it may concern:    RE: Ayanna Davidson    Please excuse the above patient form work on 1/29/2020 for a procedure.    Please contact me for questions or concerns.      Sincerely,        Froylan Schwarz MD

## 2020-01-28 NOTE — NURSING NOTE
"Chief Complaint   Patient presents with     Vaginal Problem     bartholin cyst       Initial /70 (BP Location: Right arm, Patient Position: Chair, Cuff Size: Adult Regular)   Pulse 91   Wt 86.6 kg (190 lb 14.4 oz)   LMP 2019 (Approximate)   BMI 30.81 kg/m   Estimated body mass index is 30.81 kg/m  as calculated from the following:    Height as of 10/17/19: 1.676 m (5' 6\").    Weight as of this encounter: 86.6 kg (190 lb 14.4 oz).  BP completed using cuff size: regular        The following HM Due: NONE      The following patient reported/Care Every where data was sent to:  P ABSTRACT QUALITY INITIATIVES [[id number]]       Radha Pérez, [facility]  2020           "

## 2020-01-28 NOTE — ED PROVIDER NOTES
History     Chief Complaint   Patient presents with     Cyst     HPI  Ayanna Davidson is a 29 year old female who presents to the emergency department for concerns of a labial cyst. The patient has a history of bartholin gland cysts. She reports she has had issues with recurrent Bartholin gland cysts for years and in April 2019 she had a right Bartholin gland removed in the OR by Dr. Schwarz.  She has had no issues with the right Bartholin gland since then but she has had some small episodes of the left Bartholin gland flaring up.  She states in the last week it started to get more swollen and sore again and in the last couple days she has developed increased swelling and pain.  She made an appointment for tomorrow morning with Dr. Schwarz because she wants the cyst removed like she had the last one removed.  She came here tonight due to intractable pain.  She had been taking Tylenol as needed for pain control and doing sitz baths.  She notes that she has some left-sided pelvic cramping with this cyst which is unusual for her.  She has noticed some thin, watery/white, slightly bloody discharge at times.  She has not had any fevers.  Her last menstrual period was 1 to 2 months ago.  She is not on any birth control and reports being sexually active.  States there is a chance of pregnancy.  She denies any urinary symptoms.    Allergies:  No Known Allergies    Problem List:    Patient Active Problem List    Diagnosis Date Noted     Dog bite of right lower leg 09/27/2019     Priority: Medium     Cellulitis of left upper extremity 09/27/2019     Priority: Medium     Current every day smoker 09/27/2019     Priority: Medium     Gastroesophageal reflux disease without esophagitis 09/27/2019     Priority: Medium     Skin infection 09/27/2019     Priority: Medium     Dog bite of left upper extremity 05/07/2019     Priority: Medium     Cervical high risk HPV (human papillomavirus) test positive 03/12/2019     Priority: Medium      4/2016 NIL pap. No prior HPV testing.   3/12/19 NIL pap, + HR HPV (not 16 or 18). Plan: cotest in 1 yr       Vulvar abscess 08/16/2017     Priority: Medium     Drug-induced mental disorder (H) 09/20/2012     Priority: Medium     Overview:   ICD-10 Regulatory Update       Depressed 05/10/2012     Priority: Medium     Overdose of antipsychotic 05/09/2012     Priority: Medium        Past Medical History:    Past Medical History:   Diagnosis Date     Cervical high risk HPV (human papillomavirus) test positive 03/12/2019     Gastroesophageal reflux disease      HSV (herpes simplex virus) infection      Methamphetamine abuse (H)      recurrent Bartholin's cyst        Past Surgical History:    Past Surgical History:   Procedure Laterality Date     CHOLECYSTECTOMY       ENT SURGERY       INCISION AND DRAINAGE ABSCESS PELVIS, COMBINED N/A 2/26/2018    Procedure: COMBINED INCISION AND DRAINAGE ABSCESS PELVIS;  INCISION AND DRAINAGE LABIAL ABSCESS;  Surgeon: Jase Beach MD;  [facility]     INCISION AND DRAINAGE ABSCESS PELVIS, COMBINED N/A 3/5/2019    Procedure: Incision and Drainage Right Labial Abscess;  Surgeon: Jase Beach MD;  [facility]     INCISION AND DRAINAGE LOWER EXTREMITY, COMBINED Right 8/11/2019    Procedure: irrigation and debridement right leg dog bite;  Surgeon: Rommel Pérez MD;  [facility]: [facility]     LAP ADJUSTABLE GASTRIC BAND       LEEP TX, CERVICAL       MAMMOPLASTY REDUCTION BILATERAL       MARSUPIALIZATION BARTHOLIN CYST N/A 4/29/2019    Procedure: Bartholin's Cyst removal;  Surgeon: Froylan Schwarz MD;  Location: [facility]     ORTHOPEDIC SURGERY         Family History:    Family History   Problem Relation Age of Onset     Heart Disease Father      Diabetes Maternal Grandmother      Breast Cancer Paternal Grandmother      Testicular cancer Paternal Grandfather        Social History:  Marital Status:  Single [1]  Social History     Tobacco Use     Smoking status: Current  Every Day Smoker     Packs/day: 0.25     Smokeless tobacco: Current User     Tobacco comment: uses [employer]   Substance Use Topics     Alcohol use: Yes     Frequency: Monthly or less     Drug use: Not Currently     Comment: In treatment for meth.        Medications:    cephALEXin (KEFLEX) 500 MG capsule  HYDROcodone-acetaminophen (NORCO) 5-325 MG tablet  ondansetron (ZOFRAN ODT) 4 MG ODT tab  Acetaminophen (TYLENOL PO)  ibuprofen (ADVIL/MOTRIN) 200 MG tablet  omeprazole (PRILOSEC OTC) 20 MG EC tablet  sulfamethoxazole-trimethoprim (BACTRIM DS/SEPTRA DS) 800-160 MG tablet          Review of Systems   All other systems reviewed and are negative.      Physical Exam   BP: 121/87  Pulse: 102  Temp: 98.8  F (37.1  C)  Resp: 18  Weight: 87.1 kg (192 lb)  SpO2: 100 %      Physical Exam  Vitals signs and nursing note reviewed.   Constitutional:       General: She is not in acute distress.     Appearance: Normal appearance. She is obese. She is not ill-appearing, toxic-appearing or diaphoretic.   HENT:      Head: Normocephalic and atraumatic.      Mouth/Throat:      Mouth: Mucous membranes are moist.   Eyes:      Extraocular Movements: Extraocular movements intact.      Conjunctiva/sclera: Conjunctivae normal.   Neck:      Musculoskeletal: Neck supple.   Cardiovascular:      Rate and Rhythm: Normal rate and regular rhythm.      Heart sounds: Normal heart sounds.   Pulmonary:      Effort: Pulmonary effort is normal. No respiratory distress.      Breath sounds: Normal breath sounds.   Abdominal:      Tenderness: There is abdominal tenderness (throughout lower abdomen). There is no right CVA tenderness, left CVA tenderness, guarding or rebound.   Genitourinary:     Exam position: Supine.      Labia:         Right: No tenderness.         Left: Tenderness and lesion (3-4 cm area of swelling and tenderness consistent with bartholin gland abscess) present.       Vagina: Vaginal discharge (scant white) present.   Skin:     General: Skin  is warm and dry.   Neurological:      Mental Status: She is alert and oriented to person, place, and time. Mental status is at baseline.   Psychiatric:         Mood and Affect: Mood normal.         Behavior: Behavior normal.         ED Course        Procedures      Results for orders placed or performed during the hospital encounter of 01/27/20 (from the past 24 hour(s))   UA with Microscopic   Result Value Ref Range    Color Urine Yellow     Appearance Urine Slightly Cloudy     Glucose Urine Negative NEG^Negative mg/dL    Bilirubin Urine Negative NEG^Negative    Ketones Urine Negative NEG^Negative mg/dL    Specific Gravity Urine 1.018 1.003 - 1.035    Blood Urine Negative NEG^Negative    pH Urine 6.0 5.0 - 7.0 pH    Protein Albumin Urine Negative NEG^Negative mg/dL    Urobilinogen mg/dL 0.0 0.0 - 2.0 mg/dL    Nitrite Urine Negative NEG^Negative    Leukocyte Esterase Urine Moderate (A) NEG^Negative    Source Unspecified Urine     WBC Urine 10 (H) 0 - 5 /HPF    RBC Urine 2 0 - 2 /HPF    Bacteria Urine Few (A) NEG^Negative /HPF    Squamous Epithelial /HPF Urine 4 (H) 0 - 1 /HPF    Mucous Urine Present (A) NEG^Negative /LPF   HCG qualitative urine (UPT)   Result Value Ref Range    HCG Qual Urine Negative NEG^Negative   US Pelvis Cmplt w Transvag & Doppler LmtPel Duplex Limited    Narrative    PELVIC ULTRASOUND WITH ENDOVAGINAL TRANSDUCER    1/27/2020 9:32 PM     HISTORY: Pelvic pain.    TECHNIQUE:  Endovaginal sonography was added to the transabdominal  exam.    COMPARISON: Pelvic ultrasound 3/5/2019.    FINDINGS:   Endometrium: Endometrium is 7 mm thick.    Uterus: Measures 7.3 x 3.6 x 4.3 cm. No focal myometrial lesion.    Right ovary: Normal. Normal color and Doppler spectral assessment.    Left ovary: Normal. Normal color and Doppler spectral assessment.    Additional findings: There is a complex cystic lesion at the left  labial subcutaneous tissues that is approximately 2.7 x 1.6 x 1.6 cm.  Color imaging does not  show internal perfusion.    On the prior examination, there was a similar appearing cystic lesion  within the right labial subcutaneous tissues that is not currently  seen on the provided images.      Impression    IMPRESSION:    1. Complex cystic mass at the left labia is indeterminate measuring up  to 2.7 cm. Recommend further workup. On the prior ultrasound from  3/5/2019 there was a right-sided labial similar-appearing cystic mass  that is not currently identified on the provided images.  2. No acute abnormality otherwise seen.          Medications   ketorolac (TORADOL) injection 30 mg (30 mg Intramuscular Given 1/27/20 2113)   acetaminophen (TYLENOL) tablet 975 mg (975 mg Oral Given 1/27/20 2113)       Assessments & Plan (with Medical Decision Making)  Ayanna Davidson is a 29 year old female who presented the ED with left-sided labial pain for the last week, worsened in the last 2 days.  History of recurrent Bartholin gland cyst.  No fevers.  On arrival she was afebrile.  Overall unremarkable vital signs.  Exam as above, she did have a large cystic lesion to the left labia suggestive of a Bartholin gland  abscess.  She also had lower abdominal tenderness and notes some pelvic cramping as well.  She expresses possibility of pregnancy also.  She was agreeable to evaluate the pelvic cramping further with urine, pregnancy test, and ultrasound.  Urine did have moderate leukocyte esterase with white cells present suggestive of possible UTI.  UPT was negative.  Ultrasound showed a complex cystic mass at the left labia but normal ovaries bilaterally and a normal uterus.  I had a long discussion with the patient regarding her Bartholin gland cyst today.  I did offer to drain it today but she does have an appointment tomorrow morning with her OB/GYN who removed her right sided Bartholin gland with good results.  I think it may be best to defer any procedural intervention to him tomorrow in case they are able to remove  the gland again.  The patient agreed that she would rather wait till morning to have any procedure done but is requesting something for pain for the evening.  She was given IM Toradol and Tylenol orally here for pain control.  For relief at home I did give her a small prescription of [place].  For her UTI she was agreeable to treating with oral Keflex.  Requested a prescription of Zofran for nausea relief in relation to antibiotics which she requires related to her lap band.  Encouraged continued use of ibuprofen at home.  If she develops any worsening concerns prior to her appointment she can return to the ED.  All questions were answered and patient was agreeable to this plan.  She was discharged home in suitable condition.     I have reviewed the nursing notes.    I have reviewed the findings, diagnosis, plan and need for follow up with the patient.    New Prescriptions    CEPHALEXIN (KEFLEX) 500 MG CAPSULE    Take 1 capsule (500 mg) by mouth 4 times daily for 7 days    HYDROCODONE-ACETAMINOPHEN (NORCO) 5-325 MG TABLET    Take 1 tablet by mouth every 6 hours as needed for severe pain    ONDANSETRON (ZOFRAN ODT) 4 MG ODT TAB    Take 1 tablet (4 mg) by mouth every 8 hours as needed       Final diagnoses:   Bartholin gland cyst   Acute cystitis without hematuria     Note: Chart documentation done in part with Dragon Voice Recognition software. Although reviewed after completion, some word and grammatical errors may remain.     1/27/2020   [facility] EMERGENCY DEPARTMENT     Pati Russell PA-C  01/27/20 9722

## 2020-01-28 NOTE — DISCHARGE INSTRUCTIONS
Please follow-up at your appointment in the morning with Dr. Schwarz to discuss definitive management options of your Bartholin gland cyst.    Your urine had mild signs of infection so start the antibiotic.  This will also help the Bartholin gland abscess.  Use the Zofran as needed for nausea.    For pain control advised continued use of Tylenol or ibuprofen.  Reserve the use of Norco prescribed this evening for severe breakthrough pain.    If you develop any worsening symptoms in the meantime please return to the emergency department.    Thank you for choosing [facility]'s Emergency Department. It was a pleasure taking care of you today. If you have any questions, please call [phone].    Pati Russell PA-C

## 2020-01-28 NOTE — ED TRIAGE NOTES
She has a bartholin cyst on the  L and has had 8 or more in the past.  She has and appointment in the morning but the pain is intolerable.

## 2020-01-28 NOTE — TELEPHONE ENCOUNTER
Type of surgery: exam under anesthesia pelvis. Excision of left bartholins abcess  Location of surgery: [facility]   Date of surgery: 1/29/20  Surgeon: Dr. Schwarz  Pre-Op Appt Date: 1/28/20  Post-Op Appt Date:    Packet sent out: NA   Pre-cert/Authorization completed: NA  Date: 1/28/2020    Christina Magallon  Surgery Scheduler

## 2020-01-29 ENCOUNTER — HOSPITAL ENCOUNTER (OUTPATIENT)
Facility: CLINIC | Age: 30
Discharge: HOME | End: 2020-01-29
Attending: OBSTETRICS & GYNECOLOGY | Admitting: OBSTETRICS & GYNECOLOGY
Payer: COMMERCIAL

## 2020-01-29 ENCOUNTER — ANESTHESIA (OUTPATIENT)
Dept: SURGERY | Facility: CLINIC | Age: 30
End: 2020-01-29
Payer: COMMERCIAL

## 2020-01-29 VITALS
WEIGHT: 190 LBS | HEIGHT: 66 IN | SYSTOLIC BLOOD PRESSURE: 107 MMHG | RESPIRATION RATE: 14 BRPM | HEART RATE: 96 BPM | OXYGEN SATURATION: 92 % | TEMPERATURE: 98 F | BODY MASS INDEX: 30.53 KG/M2 | DIASTOLIC BLOOD PRESSURE: 76 MMHG

## 2020-01-29 DIAGNOSIS — N75.1 BARTHOLIN'S GLAND ABSCESS: ICD-10-CM

## 2020-01-29 DIAGNOSIS — N75.1 BARTHOLIN'S GLAND ABSCESS: Primary | ICD-10-CM

## 2020-01-29 DIAGNOSIS — Z01.818 PREOPERATIVE EXAMINATION: ICD-10-CM

## 2020-01-29 LAB — B-HCG SERPL-ACNC: <1 IU/L (ref 0–5)

## 2020-01-29 PROCEDURE — 25000125 ZZHC RX 250: Performed by: NURSE ANESTHETIST, CERTIFIED REGISTERED

## 2020-01-29 PROCEDURE — 25000128 H RX IP 250 OP 636: Performed by: NURSE ANESTHETIST, CERTIFIED REGISTERED

## 2020-01-29 PROCEDURE — 25000132 ZZH RX MED GY IP 250 OP 250 PS 637: Performed by: NURSE ANESTHETIST, CERTIFIED REGISTERED

## 2020-01-29 PROCEDURE — 25000128 H RX IP 250 OP 636: Performed by: OBSTETRICS & GYNECOLOGY

## 2020-01-29 PROCEDURE — 25800030 ZZH RX IP 258 OP 636: Performed by: NURSE ANESTHETIST, CERTIFIED REGISTERED

## 2020-01-29 PROCEDURE — 56740 EXC BARTHOLINS GLAND/CYST: CPT | Performed by: OBSTETRICS & GYNECOLOGY

## 2020-01-29 PROCEDURE — 36000052 ZZH SURGERY LEVEL 2 EA 15 ADDTL MIN: Performed by: OBSTETRICS & GYNECOLOGY

## 2020-01-29 PROCEDURE — 71000014 ZZH RECOVERY PHASE 1 LEVEL 2 FIRST HR: Performed by: OBSTETRICS & GYNECOLOGY

## 2020-01-29 PROCEDURE — 71000015 ZZH RECOVERY PHASE 1 LEVEL 2 EA ADDTL HR: Performed by: OBSTETRICS & GYNECOLOGY

## 2020-01-29 PROCEDURE — 25000132 ZZH RX MED GY IP 250 OP 250 PS 637: Performed by: OBSTETRICS & GYNECOLOGY

## 2020-01-29 PROCEDURE — G0476 HPV COMBO ASSAY CA SCREEN: HCPCS | Performed by: OBSTETRICS & GYNECOLOGY

## 2020-01-29 PROCEDURE — 71000027 ZZH RECOVERY PHASE 2 EACH 15 MINS: Performed by: OBSTETRICS & GYNECOLOGY

## 2020-01-29 PROCEDURE — 37000009 ZZH ANESTHESIA TECHNICAL FEE, EACH ADDTL 15 MIN: Performed by: OBSTETRICS & GYNECOLOGY

## 2020-01-29 PROCEDURE — 25000566 ZZH SEVOFLURANE, EA 15 MIN: Performed by: OBSTETRICS & GYNECOLOGY

## 2020-01-29 PROCEDURE — 84702 CHORIONIC GONADOTROPIN TEST: CPT | Performed by: OBSTETRICS & GYNECOLOGY

## 2020-01-29 PROCEDURE — G0145 SCR C/V CYTO,THINLAYER,RESCR: HCPCS | Performed by: OBSTETRICS & GYNECOLOGY

## 2020-01-29 PROCEDURE — 27210794 ZZH OR GENERAL SUPPLY STERILE: Performed by: OBSTETRICS & GYNECOLOGY

## 2020-01-29 PROCEDURE — 25000125 ZZHC RX 250: Performed by: OBSTETRICS & GYNECOLOGY

## 2020-01-29 PROCEDURE — 40000306 ZZH STATISTIC PRE PROC ASSESS II: Performed by: OBSTETRICS & GYNECOLOGY

## 2020-01-29 PROCEDURE — 87624 HPV HI-RISK TYP POOLED RSLT: CPT | Performed by: OBSTETRICS & GYNECOLOGY

## 2020-01-29 PROCEDURE — 36000050 ZZH SURGERY LEVEL 2 1ST 30 MIN: Performed by: OBSTETRICS & GYNECOLOGY

## 2020-01-29 PROCEDURE — 37000008 ZZH ANESTHESIA TECHNICAL FEE, 1ST 30 MIN: Performed by: OBSTETRICS & GYNECOLOGY

## 2020-01-29 RX ORDER — ONDANSETRON 2 MG/ML
INJECTION INTRAMUSCULAR; INTRAVENOUS PRN
Status: DISCONTINUED | OUTPATIENT
Start: 2020-01-29 | End: 2020-01-29

## 2020-01-29 RX ORDER — ACETAMINOPHEN 325 MG/1
975 TABLET ORAL ONCE
Status: COMPLETED | OUTPATIENT
Start: 2020-01-29 | End: 2020-01-29

## 2020-01-29 RX ORDER — ONDANSETRON 4 MG/1
4 TABLET, ORALLY DISINTEGRATING ORAL
Status: DISCONTINUED | OUTPATIENT
Start: 2020-01-29 | End: 2020-01-29 | Stop reason: HOSPADM

## 2020-01-29 RX ORDER — ONDANSETRON 4 MG/1
4 TABLET, ORALLY DISINTEGRATING ORAL EVERY 30 MIN PRN
Status: DISCONTINUED | OUTPATIENT
Start: 2020-01-29 | End: 2020-01-29 | Stop reason: HOSPADM

## 2020-01-29 RX ORDER — ONDANSETRON 4 MG/1
4-8 TABLET, ORALLY DISINTEGRATING ORAL EVERY 8 HOURS PRN
Qty: 4 TABLET | Refills: 0 | Status: SHIPPED | OUTPATIENT
Start: 2020-01-29 | End: 2020-10-27

## 2020-01-29 RX ORDER — KETOROLAC TROMETHAMINE 30 MG/ML
INJECTION, SOLUTION INTRAMUSCULAR; INTRAVENOUS PRN
Status: DISCONTINUED | OUTPATIENT
Start: 2020-01-29 | End: 2020-01-29

## 2020-01-29 RX ORDER — DIPHENHYDRAMINE HYDROCHLORIDE 50 MG/ML
25 INJECTION INTRAMUSCULAR; INTRAVENOUS ONCE
Status: COMPLETED | OUTPATIENT
Start: 2020-01-29 | End: 2020-01-29

## 2020-01-29 RX ORDER — ACETAMINOPHEN 325 MG/1
650 TABLET ORAL
Status: DISCONTINUED | OUTPATIENT
Start: 2020-01-29 | End: 2020-01-29 | Stop reason: HOSPADM

## 2020-01-29 RX ORDER — FENTANYL CITRATE 50 UG/ML
INJECTION, SOLUTION INTRAMUSCULAR; INTRAVENOUS PRN
Status: DISCONTINUED | OUTPATIENT
Start: 2020-01-29 | End: 2020-01-29

## 2020-01-29 RX ORDER — ONDANSETRON 2 MG/ML
4 INJECTION INTRAMUSCULAR; INTRAVENOUS EVERY 30 MIN PRN
Status: DISCONTINUED | OUTPATIENT
Start: 2020-01-29 | End: 2020-01-29 | Stop reason: HOSPADM

## 2020-01-29 RX ORDER — PROPOFOL 10 MG/ML
INJECTION, EMULSION INTRAVENOUS PRN
Status: DISCONTINUED | OUTPATIENT
Start: 2020-01-29 | End: 2020-01-29

## 2020-01-29 RX ORDER — LIDOCAINE 40 MG/G
CREAM TOPICAL
Status: DISCONTINUED | OUTPATIENT
Start: 2020-01-29 | End: 2020-01-29 | Stop reason: HOSPADM

## 2020-01-29 RX ORDER — NALOXONE HYDROCHLORIDE 0.4 MG/ML
.1-.4 INJECTION, SOLUTION INTRAMUSCULAR; INTRAVENOUS; SUBCUTANEOUS
Status: DISCONTINUED | OUTPATIENT
Start: 2020-01-29 | End: 2020-01-29 | Stop reason: HOSPADM

## 2020-01-29 RX ORDER — DIMENHYDRINATE 50 MG/ML
INJECTION, SOLUTION INTRAMUSCULAR; INTRAVENOUS PRN
Status: DISCONTINUED | OUTPATIENT
Start: 2020-01-29 | End: 2020-01-29

## 2020-01-29 RX ORDER — LIDOCAINE HYDROCHLORIDE AND EPINEPHRINE 10; 10 MG/ML; UG/ML
INJECTION, SOLUTION INFILTRATION; PERINEURAL PRN
Status: DISCONTINUED | OUTPATIENT
Start: 2020-01-29 | End: 2020-01-29 | Stop reason: HOSPADM

## 2020-01-29 RX ORDER — ALBUTEROL SULFATE 90 UG/1
AEROSOL, METERED RESPIRATORY (INHALATION) PRN
Status: DISCONTINUED | OUTPATIENT
Start: 2020-01-29 | End: 2020-01-29

## 2020-01-29 RX ORDER — LIDOCAINE HYDROCHLORIDE 20 MG/ML
INJECTION, SOLUTION INFILTRATION; PERINEURAL PRN
Status: DISCONTINUED | OUTPATIENT
Start: 2020-01-29 | End: 2020-01-29

## 2020-01-29 RX ORDER — MEPERIDINE HYDROCHLORIDE 25 MG/ML
12.5 INJECTION INTRAMUSCULAR; INTRAVENOUS; SUBCUTANEOUS
Status: DISCONTINUED | OUTPATIENT
Start: 2020-01-29 | End: 2020-01-29 | Stop reason: HOSPADM

## 2020-01-29 RX ORDER — CEFAZOLIN SODIUM 2 G/100ML
2 INJECTION, SOLUTION INTRAVENOUS
Status: COMPLETED | OUTPATIENT
Start: 2020-01-29 | End: 2020-01-29

## 2020-01-29 RX ORDER — SODIUM CHLORIDE, SODIUM LACTATE, POTASSIUM CHLORIDE, CALCIUM CHLORIDE 600; 310; 30; 20 MG/100ML; MG/100ML; MG/100ML; MG/100ML
INJECTION, SOLUTION INTRAVENOUS CONTINUOUS
Status: DISCONTINUED | OUTPATIENT
Start: 2020-01-29 | End: 2020-01-29 | Stop reason: HOSPADM

## 2020-01-29 RX ORDER — HYDROCODONE BITARTRATE AND ACETAMINOPHEN 5; 325 MG/1; MG/1
1-2 TABLET ORAL EVERY 4 HOURS PRN
Qty: 10 TABLET | Refills: 0 | Status: SHIPPED | OUTPATIENT
Start: 2020-01-29 | End: 2020-02-18

## 2020-01-29 RX ORDER — FENTANYL CITRATE 50 UG/ML
25-50 INJECTION, SOLUTION INTRAMUSCULAR; INTRAVENOUS
Status: DISCONTINUED | OUTPATIENT
Start: 2020-01-29 | End: 2020-01-29 | Stop reason: HOSPADM

## 2020-01-29 RX ORDER — HYDROMORPHONE HYDROCHLORIDE 1 MG/ML
.3-.5 INJECTION, SOLUTION INTRAMUSCULAR; INTRAVENOUS; SUBCUTANEOUS EVERY 10 MIN PRN
Status: DISCONTINUED | OUTPATIENT
Start: 2020-01-29 | End: 2020-01-29 | Stop reason: HOSPADM

## 2020-01-29 RX ORDER — DEXAMETHASONE SODIUM PHOSPHATE 10 MG/ML
INJECTION, SOLUTION INTRAMUSCULAR; INTRAVENOUS PRN
Status: DISCONTINUED | OUTPATIENT
Start: 2020-01-29 | End: 2020-01-29

## 2020-01-29 RX ORDER — DIMENHYDRINATE 50 MG/ML
25 INJECTION, SOLUTION INTRAMUSCULAR; INTRAVENOUS
Status: DISCONTINUED | OUTPATIENT
Start: 2020-01-29 | End: 2020-01-29 | Stop reason: HOSPADM

## 2020-01-29 RX ORDER — OXYCODONE HYDROCHLORIDE 5 MG/1
10 TABLET ORAL
Status: COMPLETED | OUTPATIENT
Start: 2020-01-29 | End: 2020-01-29

## 2020-01-29 RX ADMIN — KETOROLAC TROMETHAMINE 30 MG: 30 INJECTION, SOLUTION INTRAMUSCULAR at 14:28

## 2020-01-29 RX ADMIN — CEFAZOLIN SODIUM 2 G: 2 INJECTION, SOLUTION INTRAVENOUS at 13:19

## 2020-01-29 RX ADMIN — PROPOFOL 250 MG: 10 INJECTION, EMULSION INTRAVENOUS at 13:28

## 2020-01-29 RX ADMIN — HYDROMORPHONE HYDROCHLORIDE 0.5 MG: 1 INJECTION, SOLUTION INTRAMUSCULAR; INTRAVENOUS; SUBCUTANEOUS at 15:22

## 2020-01-29 RX ADMIN — LIDOCAINE HYDROCHLORIDE 1 ML: 10 INJECTION, SOLUTION EPIDURAL; INFILTRATION; INTRACAUDAL; PERINEURAL at 10:17

## 2020-01-29 RX ADMIN — FENTANYL CITRATE 50 MCG: 50 INJECTION INTRAMUSCULAR; INTRAVENOUS at 15:44

## 2020-01-29 RX ADMIN — FENTANYL CITRATE 50 MCG: 50 INJECTION, SOLUTION INTRAMUSCULAR; INTRAVENOUS at 13:42

## 2020-01-29 RX ADMIN — ROCURONIUM BROMIDE 5 MG: 10 INJECTION INTRAVENOUS at 13:27

## 2020-01-29 RX ADMIN — HYDROMORPHONE HYDROCHLORIDE 0.5 MG: 1 INJECTION, SOLUTION INTRAMUSCULAR; INTRAVENOUS; SUBCUTANEOUS at 14:27

## 2020-01-29 RX ADMIN — ROCURONIUM BROMIDE 35 MG: 10 INJECTION INTRAVENOUS at 13:42

## 2020-01-29 RX ADMIN — OXYCODONE HYDROCHLORIDE 10 MG: 5 TABLET ORAL at 15:50

## 2020-01-29 RX ADMIN — FENTANYL CITRATE 50 MCG: 50 INJECTION, SOLUTION INTRAMUSCULAR; INTRAVENOUS at 13:17

## 2020-01-29 RX ADMIN — FENTANYL CITRATE 50 MCG: 50 INJECTION INTRAMUSCULAR; INTRAVENOUS at 15:13

## 2020-01-29 RX ADMIN — ONDANSETRON 4 MG: 2 INJECTION INTRAMUSCULAR; INTRAVENOUS at 13:35

## 2020-01-29 RX ADMIN — DEXAMETHASONE SODIUM PHOSPHATE 10 MG: 10 INJECTION, SOLUTION INTRAMUSCULAR; INTRAVENOUS at 13:36

## 2020-01-29 RX ADMIN — SODIUM CHLORIDE, POTASSIUM CHLORIDE, SODIUM LACTATE AND CALCIUM CHLORIDE: 600; 310; 30; 20 INJECTION, SOLUTION INTRAVENOUS at 10:17

## 2020-01-29 RX ADMIN — ACETAMINOPHEN 975 MG: 325 TABLET, FILM COATED ORAL at 09:45

## 2020-01-29 RX ADMIN — FENTANYL CITRATE 50 MCG: 50 INJECTION, SOLUTION INTRAMUSCULAR; INTRAVENOUS at 13:38

## 2020-01-29 RX ADMIN — Medication 100 MG: at 13:28

## 2020-01-29 RX ADMIN — LIDOCAINE HYDROCHLORIDE 100 MG: 20 INJECTION, SOLUTION INFILTRATION; PERINEURAL at 13:28

## 2020-01-29 RX ADMIN — ALBUTEROL SULFATE 4 PUFF: 90 AEROSOL, METERED RESPIRATORY (INHALATION) at 13:32

## 2020-01-29 RX ADMIN — DIMENHYDRINATE 25 MG: 50 INJECTION, SOLUTION INTRAMUSCULAR; INTRAVENOUS at 14:02

## 2020-01-29 RX ADMIN — FENTANYL CITRATE 50 MCG: 50 INJECTION, SOLUTION INTRAMUSCULAR; INTRAVENOUS at 13:22

## 2020-01-29 RX ADMIN — DIPHENHYDRAMINE HYDROCHLORIDE 25 MG: 50 INJECTION, SOLUTION INTRAMUSCULAR; INTRAVENOUS at 15:48

## 2020-01-29 RX ADMIN — MIDAZOLAM 2 MG: 1 INJECTION INTRAMUSCULAR; INTRAVENOUS at 13:17

## 2020-01-29 RX ADMIN — SUGAMMADEX 200 MG: 100 INJECTION, SOLUTION INTRAVENOUS at 14:27

## 2020-01-29 ASSESSMENT — MIFFLIN-ST. JEOR: SCORE: 1593.58

## 2020-01-29 NOTE — PROGRESS NOTES
Preop H&P    CC:    Chief Complaint   Patient presents with     Vaginal Problem     bartholin cyst        HPI:  Ayanna Davidson is a 29 year old  known to me, who presents with a left Bartholin abscess, seen in the ED yesterday for this. I previously excised a similar abscess on the right with good results (). She has had repeat similar infections in the past and requests excision of this one as well.   Prior Methamphetamine use as well as dog fights with several injuries from breaking dogs up. States she is now sober (UDS supports this today) and no current infections (other than the abscess).     ED plan: She was given IM Toradol and Tylenol orally here for pain control.  For relief at home I did give her a small prescription of [place].  For her UTI she was agreeable to treating with oral Keflex.  Requested a prescription of Zofran for nausea relief in relation to antibiotics which she requires related to her lap band.  Encouraged continued use of ibuprofen at home.    PMH:  Past Medical History:   Diagnosis Date     Cervical high risk HPV (human papillomavirus) test positive 2019    last PAP NIL (), remote hx of LEEP     Gastroesophageal reflux disease      HSV (herpes simplex virus) infection     1 & 2     Methamphetamine abuse (H)     reports daily use     recurrent Bartholin's cyst      SurgHx:   Past Surgical History:   Procedure Laterality Date     CHOLECYSTECTOMY       ENT SURGERY       INCISION AND DRAINAGE ABSCESS PELVIS, COMBINED N/A 2018    Procedure: COMBINED INCISION AND DRAINAGE ABSCESS PELVIS;  INCISION AND DRAINAGE LABIAL ABSCESS;  Surgeon: Jase Beach MD;  [facility]     INCISION AND DRAINAGE ABSCESS PELVIS, COMBINED N/A 3/5/2019    Procedure: Incision and Drainage Right Labial Abscess;  Surgeon: Jase Beach MD;  [facility]     INCISION AND DRAINAGE LOWER EXTREMITY, COMBINED Right 2019    Procedure: irrigation and debridement right leg dog bite;   Surgeon: Rommel Pérez MD;  Location[facility]     LAP ADJUSTABLE GASTRIC BAND       LEEP TX, CERVICAL       MAMMOPLASTY REDUCTION BILATERAL       MARSUPIALIZATION BARTHOLIN CYST N/A 4/29/2019    Procedure: Bartholin's Cyst removal;  Surgeon: Froylan Schwarz MD;  Location: [facility]     ORTHOPEDIC SURGERY       FamHx:   Family History   Problem Relation Age of Onset     Heart Disease Father      Diabetes Maternal Grandmother      Breast Cancer Paternal Grandmother      Testicular cancer Paternal Grandfather      SocHx:   Social History     Socioeconomic History     Marital status: Single     Spouse name: None     Number of children: None     Years of education: None     Highest education level: None   Occupational History     None   Social Needs     Financial resource strain: None     Food insecurity:     Worry: None     Inability: None     Transportation needs:     Medical: None     Non-medical: None   Tobacco Use     Smoking status: Current Every Day Smoker     Packs/day: 0.25     Smokeless tobacco: Current User     Tobacco comment: uses E cig   Substance and Sexual Activity     Alcohol use: Yes     Frequency: Monthly or less     Drug use: Not Currently     Comment: In treatment for meth.     Sexual activity: Yes     Partners: Male     Birth control/protection: None   Lifestyle     Physical activity:     Days per week: None     Minutes per session: None     Stress: None   Relationships     Social connections:     Talks on phone: None     Gets together: None     Attends Hinduism service: None     Active member of club or organization: None     Attends meetings of clubs or organizations: None     Relationship status: None     Intimate partner violence:     Fear of current or ex partner: None     Emotionally abused: None     Physically abused: None     Forced sexual activity: None   Other Topics Concern     None   Social History Narrative     None     Allergies:   Patient has no known allergies.  Current  Medications:   Current Outpatient Medications   Medication Sig Dispense Refill     Acetaminophen (TYLENOL PO) Take 500 mg by mouth every 4 hours as needed for mild pain or fever       cephALEXin (KEFLEX) 500 MG capsule Take 1 capsule (500 mg) by mouth 4 times daily for 7 days 28 capsule 0     HYDROcodone-acetaminophen (NORCO) 5-325 MG tablet Take 1 tablet by mouth every 6 hours as needed for severe pain 6 tablet 0     omeprazole (PRILOSEC OTC) 20 MG EC tablet Take 1 tablet (20 mg) by mouth daily 30 tablet 3     ibuprofen (ADVIL/MOTRIN) 200 MG tablet Take 600 mg by mouth every 4 hours as needed for mild pain       ondansetron (ZOFRAN ODT) 4 MG ODT tab Take 1 tablet (4 mg) by mouth every 8 hours as needed (Patient not taking: Reported on 1/28/2020) 10 tablet 0     sulfamethoxazole-trimethoprim (BACTRIM DS/SEPTRA DS) 800-160 MG tablet Take 1 tablet by mouth 2 times daily (Patient not taking: Reported on 1/28/2020) 28 tablet 0     ROS: 10 point ROS negative other than as noted in the HPI    EXAM:  Vitals:    01/28/20 1112   BP: 110/70   BP Location: Right arm   Patient Position: Chair   Cuff Size: Adult Regular   Pulse: 91   Weight: 86.6 kg (190 lb 14.4 oz)    Body mass index is 30.81 kg/m .    Gen: Alert, oriented, appropriately interactive, NAD  CV: RRR  Resp: CTAB, good effort without distress   Abdomen: soft, non tender, non distended, no masses  Perineum: appx 3cm left Bartholin abscess, very tender, no drainage, overlaying skin erythematous   MSK: normal gait, symmetric movements UE & LE  Lower extremities: non-tender, no edema    Labs & Imaging:  Results for orders placed or performed in visit on 01/28/20 (from the past 24 hour(s))   CBC with platelets   Result Value Ref Range    WBC 12.9 (H) 4.0 - 11.0 10e9/L    RBC Count 4.54 3.8 - 5.2 10e12/L    Hemoglobin 13.3 11.7 - 15.7 g/dL    Hematocrit 40.8 35.0 - 47.0 %    MCV 90 78 - 100 fl    MCH 29.3 26.5 - 33.0 pg    MCHC 32.6 31.5 - 36.5 g/dL    RDW 13.9 10.0 -  15.0 %    Platelet Count 293 150 - 450 10e9/L   Comprehensive metabolic panel (BMP + Alb, Alk Phos, ALT, AST, Total. Bili, TP)   Result Value Ref Range    Sodium 141 133 - 144 mmol/L    Potassium 4.5 3.4 - 5.3 mmol/L    Chloride 108 94 - 109 mmol/L    Carbon Dioxide 26 20 - 32 mmol/L    Anion Gap 7 3 - 14 mmol/L    Glucose 102 (H) 70 - 99 mg/dL    Urea Nitrogen 14 7 - 30 mg/dL    Creatinine 0.69 0.52 - 1.04 mg/dL    GFR Estimate >90 >60 mL/min/[1.73_m2]    GFR Estimate If Black >90 >60 mL/min/[1.73_m2]    Calcium 9.1 8.5 - 10.1 mg/dL    Bilirubin Total 0.5 0.2 - 1.3 mg/dL    Albumin 3.5 3.4 - 5.0 g/dL    Protein Total 7.3 6.8 - 8.8 g/dL    Alkaline Phosphatase 67 40 - 150 U/L    ALT 22 0 - 50 U/L    AST 16 0 - 45 U/L   ABO/Rh type and screen   Result Value Ref Range    ABO O     RH(D) Pos     Antibody Screen Neg     Test Valid Only At [facility]        Specimen Expires 01/31/2020    Drug Abuse Screen Panel 13, Urine (Pain Care Package)   Result Value Ref Range    Cannabinoids (28-iwq-3-carboxy-9-THC) Not Detected NDET^Not Detected ng/mL    Phencyclidine (Phencyclidine) Not Detected NDET^Not Detected ng/mL    Cocaine (Benzoylecgonine) Not Detected NDET^Not Detected ng/mL    Methamphetamine (d-Methamphetamine) Not Detected NDET^Not Detected ng/mL    Opiates (Morphine) Detected, Abnormal Result (A) NDET^Not Detected ng/mL    Amphetamine (d-Amphetamine) Not Detected NDET^Not Detected ng/mL    Benzodiazepines (Nordiazepam) Not Detected NDET^Not Detected ng/mL    Tricyclic Antidepressants (Desipramine) Not Detected NDET^Not Detected ng/mL    Methadone (Methadone) Not Detected NDET^Not Detected ng/mL    Barbiturates (Butalbital) Not Detected NDET^Not Detected ng/mL    Oxycodone (Oxycodone) Not Detected NDET^Not Detected ng/mL    Propoxyphene (Norpropoxyphene) Not Detected NDET^Not Detected ng/mL    Buprenorphine (Buprenorphine) Not Detected NDET^Not Detected ng/mL       Lab Results   Component Value Date     PAP NIL 2019     Endometrium: Endometrium is 7 mm thick.  Uterus: Measures 7.3 x 3.6 x 4.3 cm. No focal myometrial lesion.  Right ovary: Normal. Normal color and Doppler spectral assessment.  Left ovary: Normal. Normal color and Doppler spectral assessment.  Additional findings: There is a complex cystic lesion at the left  labial subcutaneous tissues that is approximately 2.7 x 1.6 x 1.6 cm.  Color imaging does not show internal perfusion.  On the prior examination, there was a similar appearing cystic lesion  within the right labial subcutaneous tissues that is not currently  seen on the provided images.                                                                 IMPRESSION:    1. Complex cystic mass at the left labia is indeterminate measuring up  to 2.7 cm. Recommend further workup. On the prior ultrasound from  3/5/2019 there was a right-sided labial similar-appearing cystic mass  that is not currently identified on the provided images.  2. No acute abnormality otherwise seen.    ASSESSMENT/PLAN: Ayanna Davidson is a 29 year old  who presents with a left Bartholin abscess, requests excision (see above). Of note, last year PAP NIL with HPV other +, will repeat, plan Colpo if abnl.     1. Bartholin's gland abscess  - Good results with prior excision of Bartholin gland on right, will perform similar on left.   - Case Request: EXAM UNDER ANESTHESIA, PELVIS  Excision of left bartholin's abscess  PAP; Standing  - Case Request: EXAM UNDER ANESTHESIA, PELVIS  Excision of left bartholin's abscess  PAP    2. Preoperative examination  - Relatively healthy young woman, smoker though otherwise sober, no know chronic disease, no CV nor heart disease, no hx of bleeding nor clotting hx, no functional limitation.   - Prior surgical complications: aspiration at last surgery following being dishonest about having eaten, no other prior complications.   - She is cleared for surgery  - CBC with platelets  -  Comprehensive metabolic panel (BMP + Alb, Alk Phos, ALT, AST, Total. Bili, TP)  - ABO/Rh type and screen  - Drug Abuse Screen Panel 13, Urine (Pain Care Package)  - Case Request: EXAM UNDER ANESTHESIA, PELVIS  Excision of left bartholin's abscess  PAP; Standing  - Case Request: EXAM UNDER ANESTHESIA, PELVIS  Excision of left bartholin's abscess  PAP    Froylan Schwarz MD   1/28/2020 6:52 PM

## 2020-01-29 NOTE — ANESTHESIA CARE TRANSFER NOTE
Patient: Ayanna Davidson    Procedure(s):  EXAM UNDER ANESTHESIA, PELVIS, PAP  Excision of left bartholin's abscess    Diagnosis: Preoperative examination [Z01.818]  Bartholin's gland abscess [N75.1]  Diagnosis Additional Information: No value filed.    Anesthesia Type:   MAC     Note:  Airway :Face Mask  Patient transferred to:PACU  Handoff Report: Identifed the Patient, Identified the Reponsible Provider, Reviewed the pertinent medical history, Discussed the surgical course, Reviewed Intra-OP anesthesia mangement and issues during anesthesia, Set expectations for post-procedure period and Allowed opportunity for questions and acknowledgement of understanding      Vitals: (Last set prior to Anesthesia Care Transfer)    CRNA VITALS  1/29/2020 1409 - 1/29/2020 1444      1/29/2020             Pulse:  122    SpO2:  95 %    Resp Rate (observed):  (!) 4                Electronically Signed By: LEXX Kebede CRNA  January 29, 2020  2:44 PM

## 2020-01-29 NOTE — ANESTHESIA PREPROCEDURE EVALUATION
Anesthesia Pre-Procedure Evaluation    Patient: Ayanna Davidson   MRN: [id number] : 1990          Preoperative Diagnosis: Preoperative examination [Z01.818]  Bartholin's gland abscess [N75.1]    Procedure(s):  EXAM UNDER ANESTHESIA, PELVIS, PAP  Excision of left bartholin's abscess    Past Medical History:   Diagnosis Date     Cervical high risk HPV (human papillomavirus) test positive 2019    last PAP NIL (), remote hx of LEEP     Gastroesophageal reflux disease      HSV (herpes simplex virus) infection     1 & 2     Methamphetamine abuse (H)     reports daily use     recurrent Bartholin's cyst      Past Surgical History:   Procedure Laterality Date     CHOLECYSTECTOMY       ENT SURGERY       INCISION AND DRAINAGE ABSCESS PELVIS, COMBINED N/A 2018    Procedure: COMBINED INCISION AND DRAINAGE ABSCESS PELVIS;  INCISION AND DRAINAGE LABIAL ABSCESS;  Surgeon: Jase Beach MD;  [facility]     INCISION AND DRAINAGE ABSCESS PELVIS, COMBINED N/A 3/5/2019    Procedure: Incision and Drainage Right Labial Abscess;  Surgeon: Jase Beach MD;  [facility]     INCISION AND DRAINAGE LOWER EXTREMITY, COMBINED Right 2019    Procedure: irrigation and debridement right leg dog bite;  Surgeon: Rommel Pérez MD;  Location[facility]     LAP ADJUSTABLE GASTRIC BAND       LEEP TX, CERVICAL       MAMMOPLASTY REDUCTION BILATERAL       MARSUPIALIZATION BARTHOLIN CYST N/A 2019    Procedure: Bartholin's Cyst removal;  Surgeon: Froylan Schwarz MD;  Location: [facility]     ORTHOPEDIC SURGERY         Anesthesia Evaluation     . Pt has had prior anesthetic. Type: General and MAC    No history of anesthetic complications          ROS/MED HX    ENT/Pulmonary:     (+)tobacco use, Current use 0.25 ppd packs/day  , . .    Neurologic:  - neg neurologic ROS     Cardiovascular:  - neg cardiovascular ROS   (+) ----. : . . . :. . No previous cardiac testing       METS/Exercise Tolerance:      Hematologic:  - neg hematologic  ROS       Musculoskeletal:  - neg musculoskeletal ROS       GI/Hepatic:     (+) GERD Asymptomatic on medication,       Renal/Genitourinary:  - ROS Renal section negative   (+) Other Renal/ Genitourinary, bartholins cyst - R labia - presents for I & D      Endo:  - neg endo ROS       Psychiatric: Comment: Polysubstance abuse per last tox screen.    (+) psychiatric history anxiety and depression      Infectious Disease:  - neg infectious disease ROS       Malignancy:      - no malignancy   Other: Comment: Prior Methamphetamine abuse   (+) No chance of pregnancy C-spine cleared: N/A, no H/O Chronic Pain,H/O chronic opiod use , no other significant disability   - neg other ROS                      Physical Exam  Normal systems: cardiovascular and pulmonary    Airway   Mallampati: II  TM distance: >3 FB  Neck ROM: full    Dental   (+) missing and chipped    Cardiovascular   Rhythm and rate: regular and normal      Pulmonary    breath sounds clear to auscultation            Lab Results   Component Value Date    WBC 12.9 (H) 01/28/2020    HGB 13.3 01/28/2020    HCT 40.8 01/28/2020     01/28/2020    CRP 19.0 (H) 09/27/2019     01/28/2020    POTASSIUM 4.5 01/28/2020    CHLORIDE 108 01/28/2020    CO2 26 01/28/2020    BUN 14 01/28/2020    CR 0.69 01/28/2020     (H) 01/28/2020    ANUM 9.1 01/28/2020    ALBUMIN 3.5 01/28/2020    PROTTOTAL 7.3 01/28/2020    ALT 22 01/28/2020    AST 16 01/28/2020    ALKPHOS 67 01/28/2020    BILITOTAL 0.5 01/28/2020    LIPASE 86 11/21/2007    AMYLASE 58 11/21/2007    HCG Negative 01/27/2020       Preop Vitals  BP Readings from Last 3 Encounters:   01/28/20 110/70   01/27/20 122/78   11/08/19 136/88    Pulse Readings from Last 3 Encounters:   01/28/20 91   01/27/20 98   11/08/19 107      Resp Readings from Last 3 Encounters:   01/27/20 18   11/08/19 18   11/04/19 14    SpO2 Readings from Last 3 Encounters:   01/27/20 100%   11/08/19 98%  "  11/04/19 100%      Temp Readings from Last 1 Encounters:   01/27/20 98.8  F (37.1  C) (Oral)    Ht Readings from Last 1 Encounters:   10/17/19 1.676 m (5' 6\")      Wt Readings from Last 1 Encounters:   01/28/20 86.6 kg (190 lb 14.4 oz)    Estimated body mass index is 30.81 kg/m  as calculated from the following:    Height as of 10/17/19: 1.676 m (5' 6\").    Weight as of 1/28/20: 86.6 kg (190 lb 14.4 oz).       Anesthesia Plan      History & Physical Review  History and physical reviewed and following examination; no interval change.    ASA Status:  2 .    NPO Status:  > 8 hours    Plan for General and ETT with Propofol induction. Maintenance will be Balanced.    PONV prophylaxis:  Ondansetron (or other 5HT-3) and Dexamethasone or Solumedrol       Postoperative Care  Postoperative pain management:  IV analgesics and Oral pain medications.      Consents  Anesthetic plan, risks, benefits and alternatives discussed with:  Patient.  Use of blood products discussed: No .   .                 LEXX Lopez CRNA  "

## 2020-01-29 NOTE — OP NOTE
Brief Operative Note   Name: Ayanna Davidson  MRN: [id number]  : 1990  Date of Surgery: 2020    Pre-operative Diagnosis: left Bartholin abscess  Post-operative Diagnosis: Same  Procedure(s):   EUA  Excision of left bartholin's abscess  PAP    Surgeon: Froylan Schwarz MD    Anesthesia: GETA  EBL: 30 mL   Urine Output: Drained prior to procedure   Fluids: 800 mL crystalloid  Specimens: None  Complications: None apparent.  Findings: appx 3.5cm left Bartholin abscess, purulent discharge; remainder of genitalia, vagina, cervix all appears normal    Indications: Ayanna Davidson is a 29 year old P0 with an acute recurrent Bartholin's gland abscess in left labia, with severe pain. Similar abscess on the right in April with gland excised at that time, healed will with no further complications.  She very much wants the left gland excised as well. Reviewed risks of infection, bleeding, injury to surrounding structures. She wishes to proceed. Reviewed postoperative plan sitz baths, no sex until healed.      Procedure:  The patient was brought to the operating room, where she was placed in the dorsal lithotomy position in yellowfin stirups, underwent general endotracheal anesthesia. [place] protocol was carried out. Left labia injected with 0.5% marcaine with epi. Two Allis clamps were placed on the left labia and used for traction. A vertical incision was made at the introitus and the Allis clamps were repositioned to the edge of the vaginal mucosa which the scalpel was used to dissect down to the abscess wall. Dissection begun around cyst wall with blunt dissection using Christina forcepts in reverse. Following appx 50% of the cyst wall being freed, the abscess ruptured with copious purulent drainage. Allis clamps were placed on the cyst wall. Dissection continued with Christina forceps was used to bluntly dissect out the cyst wall circumferentially with occasional use of Charlotte and cautery for connective tissue. The cyst  wall was removed in two segments and discarded. The defect was copiously irrigated. The defect was closed in layers with interrupted 2-0 vicryl. The vaginal mucosa was closed with running 3-0 vicryl. Following this, a speculum was placed and a PAP collected. Speculum them removed.   The patient was extubated, awakened in the OR and taken to recovery in stable condition. Sponge, lap and needle counts were correct x 2. The patient received 2 grams of ancef prior to the procedure.   Froylan Schwarz MD  01/29/2020, 3:02 PM

## 2020-01-29 NOTE — DISCHARGE INSTRUCTIONS
Sitz bath with epsom salt every day. Keep incision very clean.  [facility]Day [facility]   Adult Discharge Orders & Instructions     For 24 hours after surgery    1. Get plenty of rest.  A responsible adult must stay with you for at least 24 hours after you leave the hospital.   2. Do not drive or use heavy equipment.  If you have weakness or tingling, don't drive or use heavy equipment until this feeling goes away.  3. Do not drink alcohol.  4. Avoid strenuous or risky activities.  Ask for help when climbing stairs.   5. You may feel lightheaded.  If so, sit for a few minutes before standing.  Have someone help you get up.   6. You may have a slight fever. Call the doctor if your fever is over 100 F (37.7 C) (taken under the tongue) or lasts longer than 24 hours.  7. You may have a dry mouth, a sore throat, muscle aches or trouble sleeping.  These should go away after 24 hours.  8. Do not make important or legal decisions.  We don t expect you to have any problems from the surgery or treatment you had today. Just in case, here s what to do if you have pain, upset stomach (nausea), bleeding or infection:  Pain:  Take medicines your doctor has prescribed or over-the-counter medicine they have suggested. Resting and using ice packs can help, too. For surgery on an arm or leg, raise it on a pillow to ease swelling. Call your doctor if these methods don t work.  Copyright [employer], Licensed under [employer]  Upset stomach (nausea):  Take anti-nausea medicine approved by your doctor. Drink clear liquids like apple juice, ginger ale, broth or 7-Up. Be sure to drink enough fluids. Rest can help, too. Move to normal foods when you re ready. Bleeding:  In the first 24 hours, you may see a little blood on your dressing, about the size of a quarter. You don t need to worry about this much blood, but if the blood spot keeps getting bigger:    Put pressure on the wound if you can, AND    Call your  doctor.  Copyright [employer], Licensed under [employer]  Fever/Infection: Please call your doctor if you have any of these signs:    Redness    Swelling    Wound feels warm    Pain gets worse    Bad-smelling fluid leaks from wound    Fever or chills  Call your doctor for any of the followin.  It has been over 8 to 10 hours since surgery and you are still not able to urinate (pass water).    2.  Headache for over 24 hours.    Nurse advice line: [phone]

## 2020-01-30 NOTE — ANESTHESIA POSTPROCEDURE EVALUATION
Patient: Ayanna Davidson    Procedure(s):  EXAM UNDER ANESTHESIA, PELVIS, PAP  Excision of left bartholin's abscess    Diagnosis:Preoperative examination [Z01.818]  Bartholin's gland abscess [N75.1]  Diagnosis Additional Information: No value filed.    Anesthesia Type:  MAC    Note:  Anesthesia Post Evaluation    Patient location during evaluation: Phase 2 and Bedside  Patient participation: Able to fully participate in evaluation  Level of consciousness: awake and alert  Pain management: adequate  Airway patency: patent  Cardiovascular status: acceptable  Respiratory status: acceptable  Hydration status: acceptable  PONV: none     Anesthetic complications: None    Comments: Patient was pleased with her care. Patient was very sleeping and denied pain. VSS. No complications were noted. Will follow as needed.        Last vitals:  Vitals:    01/29/20 1745 01/29/20 1750 01/29/20 1800   BP: 90/57 98/70 107/76   Pulse: 87 97 96   Resp:      Temp:   98  F (36.7  C)   SpO2: 92% 92%          Electronically Signed By: LEXX Rondon CRNA  January 29, 2020  8:44 PM

## 2020-01-31 NOTE — OR NURSING
[facility] Same Day Surgery  Discharge Call Back  Ayanna Davidson  1990  MRN: [id number]  Home: [phone] (home)   PCP: Pam, Lisbeth [place]    We are calling to see how you're doing since your surgery/procedure with us?   Comments: good  Clinical Questions  1. Have you had time to look at your discharge instructions? Do you have any questions in regards to the instructions?   Comment: yes, all read material, makes sense.  2. Do you feel your pain is being controlled with the regimen the surgeon sent you home on? (ie: prescription medications, over the counter pain medications, ice packs)   Comments: yes, little sore but tolerable  3. Have you noticed any drainage on your dressing? Do you know what to do if you have bleeding as a result of your procedure?   Comments: little drainage, mostly dry  4. Have you had any nausea/vomiting? Do you know how to treat this?   Comment: no  5. Have you had any signs/symptoms of infection? (ie: fever, swelling, heat, drainage or redness) Do you know what to do if you have?   Comment: no  6. Do you have a follow up appointment made with your surgeon? Do you have a number to contact them at if you need it?   Comment: yes  Retained Foreign Object (OZIEL, Hemovac, Penrose, Wound Packing, Vaginal Packing, Nasal Splints, Urethral Stents, Mooney Catheter)  1. Do you still have n/a in place?   2. If the item is still in place, can you review the plan for removal with me? n/a      You may be randomly selected to fill out a [facility] survey. We would appreciate you taking the time to fill this out. It is important to us if you would answer all of the questions on the survey.

## 2020-02-03 LAB
COPATH REPORT: NORMAL
PAP: NORMAL

## 2020-02-04 ENCOUNTER — TELEPHONE (OUTPATIENT)
Dept: FAMILY MEDICINE | Facility: OTHER | Age: 30
End: 2020-02-04

## 2020-02-04 ENCOUNTER — TELEPHONE (OUTPATIENT)
Dept: SURGERY | Facility: CLINIC | Age: 30
End: 2020-02-04

## 2020-02-04 DIAGNOSIS — Z98.890 STATUS POST GASTRIC SURGERY: Primary | ICD-10-CM

## 2020-02-04 NOTE — TELEPHONE ENCOUNTER
Reason for Call:  Other call back    Detailed comments: Pt would like another refill of her [place] [facility]     Phone Number Patient can be reached at: Home number on file [phone] (home)    Best Time: NA    Can we leave a detailed message on this number? YES    Call taken on 2/4/2020 at 12:06 PM by Vicky Russell

## 2020-02-04 NOTE — TELEPHONE ENCOUNTER
Pt had excision of left bartholin's abscess done in the OR on 1/29/2020.    Left detailed message on pt's answering machine that she is still requiring narcotic pain medication then she needs to be further assessed in clinic.  I advised that she try to take IBU and/or Tylenol and ice to manage her pain.    Pam Patiño RN

## 2020-02-04 NOTE — TELEPHONE ENCOUNTER
Called patient to see if she had requested the adj gastric band operative note, she has not. I asked her to have this faxed to [phone] before her appointment tomorrow.   Patient states the surgeon who placed the band has retired.

## 2020-02-05 ENCOUNTER — TELEPHONE (OUTPATIENT)
Dept: SURGERY | Facility: CLINIC | Age: 30
End: 2020-02-05

## 2020-02-05 NOTE — TELEPHONE ENCOUNTER
Patient left VM message that she could not get through to medical records because the department was closed for the day. Will request she call again today for the gastric band operative note prior to her appointment today.

## 2020-02-11 ENCOUNTER — PATIENT OUTREACH (OUTPATIENT)
Dept: PEDIATRICS | Facility: CLINIC | Age: 30
End: 2020-02-11

## 2020-02-11 ENCOUNTER — OFFICE VISIT (OUTPATIENT)
Dept: OBGYN | Facility: CLINIC | Age: 30
End: 2020-02-11
Payer: COMMERCIAL

## 2020-02-11 VITALS
SYSTOLIC BLOOD PRESSURE: 110 MMHG | BODY MASS INDEX: 32.23 KG/M2 | WEIGHT: 199.7 LBS | DIASTOLIC BLOOD PRESSURE: 70 MMHG | HEART RATE: 89 BPM

## 2020-02-11 DIAGNOSIS — Z32.00 PREGNANCY EXAMINATION OR TEST, PREGNANCY UNCONFIRMED: ICD-10-CM

## 2020-02-11 DIAGNOSIS — B97.7 HIGH RISK HPV INFECTION: Primary | ICD-10-CM

## 2020-02-11 DIAGNOSIS — Z98.890 POSTOPERATIVE STATE: ICD-10-CM

## 2020-02-11 LAB — HCG UR QL: NEGATIVE

## 2020-02-11 PROCEDURE — 57454 BX/CURETT OF CERVIX W/SCOPE: CPT | Performed by: OBSTETRICS & GYNECOLOGY

## 2020-02-11 PROCEDURE — 81025 URINE PREGNANCY TEST: CPT | Performed by: OBSTETRICS & GYNECOLOGY

## 2020-02-11 PROCEDURE — 88305 TISSUE EXAM BY PATHOLOGIST: CPT | Performed by: OBSTETRICS & GYNECOLOGY

## 2020-02-11 PROCEDURE — 99212 OFFICE O/P EST SF 10 MIN: CPT | Mod: 25 | Performed by: OBSTETRICS & GYNECOLOGY

## 2020-02-11 NOTE — PROGRESS NOTES
S: Ayanna Davidson is a 29 year old  here for post-operative visit following an Excision of left bartholin's abscess on . She reports feeling well, vulva is healing better than other side had, no F/S/C, no drainage, no pain, no bleeding. She links improved healing to the fact that she is no longer using meth.     Current Outpatient Medications   Medication Sig Dispense Refill     Acetaminophen (TYLENOL PO) Take 500 mg by mouth every 4 hours as needed for mild pain or fever       ibuprofen (ADVIL/MOTRIN) 200 MG tablet Take 600 mg by mouth every 4 hours as needed for mild pain       omeprazole (PRILOSEC OTC) 20 MG EC tablet Take 1 tablet (20 mg) by mouth daily 30 tablet 3     HYDROcodone-acetaminophen (NORCO) 5-325 MG tablet Take 1-2 tablets by mouth every 4 hours as needed for moderate to severe pain (Patient not taking: Reported on 2020) 10 tablet 0     ondansetron (ZOFRAN-ODT) 4 MG ODT tab Take 1-2 tablets (4-8 mg) by mouth every 8 hours as needed for nausea (Patient not taking: Reported on 2020) 4 tablet 0     sulfamethoxazole-trimethoprim (BACTRIM DS/SEPTRA DS) 800-160 MG tablet Take 1 tablet by mouth 2 times daily (Patient not taking: Reported on 2020) 28 tablet 0      No Known Allergies    Past Medical History:   Diagnosis Date     Cervical high risk HPV (human papillomavirus) test positive 2019    last PAP NIL (), remote hx of LEEP     Gastroesophageal reflux disease      HSV (herpes simplex virus) infection     1 & 2     Methamphetamine abuse (H)     reports daily use     recurrent Bartholin's cyst      Past Surgical History:   Procedure Laterality Date     CHOLECYSTECTOMY       ENT SURGERY       EXAM UNDER ANESTHESIA PELVIC N/A 2020    Procedure: EXAM UNDER ANESTHESIA, PELVIS, PAP;  Surgeon: Froylan Schwarz MD;  Location: [facility]     INCISION AND DRAINAGE ABSCESS PELVIS, COMBINED N/A 2018    Procedure: COMBINED INCISION AND DRAINAGE ABSCESS PELVIS;  INCISION  AND DRAINAGE LABIAL ABSCESS;  Surgeon: Jase Beach MD;  Location: [facility]     INCISION AND DRAINAGE ABSCESS PELVIS, COMBINED N/A 3/5/2019    Procedure: Incision and Drainage Right Labial Abscess;  Surgeon: Jase Beach MD;  Location: [facility]     INCISION AND DRAINAGE LOWER EXTREMITY, COMBINED Right 8/11/2019    Procedure: irrigation and debridement right leg dog bite;  Surgeon: Rommel Pérez MD;  Location: [facility]     LAP ADJUSTABLE GASTRIC BAND       LEEP TX, CERVICAL       MAMMOPLASTY REDUCTION BILATERAL       MARSUPIALIZATION BARTHOLIN CYST N/A 4/29/2019    Procedure: Bartholin's Cyst removal;  Surgeon: Vikki Badillo MD;  [facility]     MARSUPIALIZATION BARTHOLIN CYST Left 1/29/2020    Procedure: Excision of left bartholin's abscess;  Surgeon: Vikki Badillo MD;  Location: [facility]     ORTHOPEDIC SURGERY       O:   /70 (BP Location: Right arm, Patient Position: Chair, Cuff Size: Adult Regular)   Pulse 89   Wt 90.6 kg (199 lb 11.2 oz)   LMP 02/03/2020 (Approximate)   BMI 32.23 kg/m    Gen: Alert, appropriately interactive, NAD  CV: RRR, 2+ peripheral pulses  Resp: CTAB, good effort without distress   Abdomen: soft, non tender, non distended, no masses, no hernias. No inguinal lymphadenopathy.   Perineum: left vulva healing well, no drainage, some induration present, no erythema, no fluctuance, minimally tender  MSK: normal gait, symmetric movements UE & LE  Lower extremities: non-tender, no edemar rashes     Labs:   Hemoglobin   Date Value Ref Range Status   01/28/2020 13.3 11.7 - 15.7 g/dL Final     Pathology:   Copath Report   Date Value Ref Range Status   01/29/2020   Final      Patient Name: MARYURI ARTEAGA  MR#: [id number]  Specimen #: [id number]  Collected: 1/29/2020  Received: 1/30/2020  Reported: 2/3/2020 14:59  Ordering Phy(s): VIKKI BADILLO    For improved result formatting, select 'View Enhanced Report Format' under   Linked Documents  section.    SPECIMEN/STAIN PROCESS:  Pap imaged thin layer prep screening (Surepath, FocalPoint with guided   screening)       Pap-Cyto x 1, HPV ordered x 1    SOURCE: Cervical  ----------------------------------------------------------------   Pap imaged thin layer prep screening (Surepath, FocalPoint with guided   screening)  SPECIMEN ADEQUACY:  Satisfactory for evaluation.  -Transformation zone component present.    CYTOLOGIC INTERPRETATION:    Negative for intraepithelial lesion or malignancy    Electronically signed out by:  LAKIA Webb (ASCP)    CLINICAL HISTORY:    Exam Note:: Bartholin's gland abscess,    Papanicolaou Test Limitations:  Cervical cytology is a screening test with   limited sensitivity; regular  screening is critical for cancer prevention; Pap tests are primarily   effective for the diagnosis/prevention of  squamous cell carcinoma, not adenocarcinomas or other cancers.    COLLECTION SITE:  Client:  [facility]  [facility]    The technical component of this testing was completed at the [facility] with the professional component performed   at the [facility] [address] [place] [id number] ([phone])         A: Ayanna Davidson is a 29 year old  here for post-operative visit following an Excision of left bartholin's abscess on .   Doing well, no concerns    P: Return with any concerning symptoms, otherwise return in one year for an annual exam    Froylan Schwarz MD  OB/GYN  2020, 3:59 PM

## 2020-02-11 NOTE — NURSING NOTE
"Chief Complaint   Patient presents with     Surgical Followup     Colposcopy       Initial /70 (BP Location: Right arm, Patient Position: Chair, Cuff Size: Adult Regular)   Pulse 89   Wt 90.6 kg (199 lb 11.2 oz)   LMP 2020 (Approximate)   BMI 32.23 kg/m   Estimated body mass index is 32.23 kg/m  as calculated from the following:    Height as of 20: 1.676 m (5' 6\").    Weight as of this encounter: 90.6 kg (199 lb 11.2 oz).  BP completed using cuff size: regular        The following HM Due: NONE      The following patient reported/Care Every where data was sent to:  P ABSTRACT QUALITY INITIATIVES [[id number]]       Radha Pérez, KENNA  2020           "

## 2020-02-11 NOTE — PROGRESS NOTES
Colposcopy Visit/Procedure Note:    Ayanna Davidson is an 29 year old, , who comes in for diagnosis of persistent high risk HPV. Pt is a smoker, prior meth use, now sober.     History   Smoking Status     Current Every Day Smoker     Packs/day: 0.25   Smokeless Tobacco     Current User     Comment: uses E cig     Allergies as of 2020     (No Known Allergies)      Colposcopy Procedure:  Consent:  Details of the colposcopic procedure were reviewed. Risks, benefits of treatment, and alternate forms of evaluation were discussed.  Patient's questions were elicited and answered.   Written consent was obtained and scanned into medical record.     Verification of Procedure  Just before the procedure begins, through verbal and active participation of team members, I verified:   Initials   Patient Name JCB   Patient  JCB   Procedure to be performed JCB       OBJECTIVE: /70 (BP Location: Right arm, Patient Position: Chair, Cuff Size: Adult Regular)   Pulse 89   Wt 90.6 kg (199 lb 11.2 oz)   LMP 2020 (Approximate)   BMI 32.23 kg/m      Pelvic Exam:  EG/BUS: Normal genital architecture without lesions, erythema or abnormal secretions. Pubic hair shaven.   Vagina: moist, pink, rugae with creamy, white and odorlesssecretions  Cervix: Nulliparous, and no lesions  Rectum:anus normal    PROCEDURE:  Acetic acid and/or Lugol's solution applied to cervix.  Colposcopic exam of the cervix and apex of the vagina was conducted in the usual fashion.     Findings:  SCJ was not seen entirely and the exam unsatisfactory.    There were no visible lesions    Biopsies were obtained at 1200 and placed in labeled Formalin Jar.    ECC: was obtained and placed in labeled Formalin Jar.    Assessment: Normal exam without visible pathology    Plan:   Biopsies sent to pathology.  Will contact patient with results and recommended follow-up plan.  Repeat pap smear in 12 months    Patient advised to contact clinic with heavy  vaginal bleeding, fever over 101 degrees F, or any other concerns.    Advised that evaluation of sexual contacts is NOT warranted as she can not get the same virus again. Risk of exposure to a NEW virus is possible with partner change.    Verbalized understanding and agreement with plan.    Froylan Schwarz MD  OB/GYN  February 11, 2020, 3:55 PM

## 2020-02-13 ENCOUNTER — TELEPHONE (OUTPATIENT)
Dept: FAMILY MEDICINE | Facility: OTHER | Age: 30
End: 2020-02-13

## 2020-02-13 LAB — COPATH REPORT: NORMAL

## 2020-02-13 NOTE — TELEPHONE ENCOUNTER
Reason for Call:  Other call back    Detailed comments: Please call pt back as she called and said someone left her a MSg to call Karishma's office back. I did not see a Tel Enc in her chart     Phone Number Patient can be reached at: Home number on file [phone] (home)    Best Time: NA    Can we leave a detailed message on this number? YES    Call taken on 2/13/2020 at 3:34 PM by Vicky Russell

## 2020-02-13 NOTE — TELEPHONE ENCOUNTER
RN called patient back and relayed her normal results and apologized for the missed call.    Patient verbalized understanding and agreed to plan.     Patient was given the opportunity to ask additional questions and have them answered. Patient had no further questions.     Omaira Escamilla RN on 2/13/2020 at 3:41 PM

## 2020-02-18 ENCOUNTER — OFFICE VISIT (OUTPATIENT)
Dept: OBGYN | Facility: CLINIC | Age: 30
End: 2020-02-18
Payer: COMMERCIAL

## 2020-02-18 ENCOUNTER — TELEPHONE (OUTPATIENT)
Dept: FAMILY MEDICINE | Facility: OTHER | Age: 30
End: 2020-02-18

## 2020-02-18 VITALS
BODY MASS INDEX: 31.76 KG/M2 | HEART RATE: 88 BPM | SYSTOLIC BLOOD PRESSURE: 112 MMHG | WEIGHT: 196.8 LBS | DIASTOLIC BLOOD PRESSURE: 76 MMHG

## 2020-02-18 DIAGNOSIS — N90.89 VULVAR LESION: Primary | ICD-10-CM

## 2020-02-18 PROCEDURE — 56605 BIOPSY OF VULVA/PERINEUM: CPT | Performed by: OBSTETRICS & GYNECOLOGY

## 2020-02-18 PROCEDURE — 88305 TISSUE EXAM BY PATHOLOGIST: CPT | Performed by: OBSTETRICS & GYNECOLOGY

## 2020-02-18 NOTE — TELEPHONE ENCOUNTER
Froylan Schwarz MD  You 25 minutes ago (2:51 PM)      Given minor procedure and substance abuse hx, would prefer ibuprofen and tylenol, can also use a ice pack. Thanks      Relayed Dr. Schwarz's message to justine Patiño RN

## 2020-02-18 NOTE — NURSING NOTE
"Chief Complaint   Patient presents with     Follow Up       Initial /76 (BP Location: Right arm, Cuff Size: Adult Regular)   Pulse 88   Wt 89.3 kg (196 lb 12.8 oz)   LMP 2020 (Approximate)   BMI 31.76 kg/m   Estimated body mass index is 31.76 kg/m  as calculated from the following:    Height as of 20: 1.676 m (5' 6\").    Weight as of this encounter: 89.3 kg (196 lb 12.8 oz).  BP completed using cuff size: regular        The following HM Due: NONE      The following patient reported/Care Every where data was sent to:  P ABSTRACT QUALITY INITIATIVES [[id number]]       Mine Russell MA on 2020 at 11:02 AM           "

## 2020-02-18 NOTE — PROGRESS NOTES
Procedure Note   Name: Ayanna Davidson  MRN:[id number]  : 1990  Date: 2020    Pre-procedure Diagnosis: Right vulvar lesion  Post-procedure Diagnosis: Same  Procedure(s): Vulvar biopsy    Surgeon: Froylan Schwarz MD    EBL:  Minimal   Specimens: right vulvar biopsy  Complications: None apparent.  Findings: Appx 4mm white macular lesion on right posterior vulva just lateral to the posterior fourchette     Indications: Ayanna Davidson is a 29 year old  woman who presents with a vulvar lesion, desires removal and biopsy    Procedure: The patient was placed in the dorsal lithotomy position. An examination was done with findings noted above. Betadine was applied to the right vulva. 5 ml of 1& lidocaine with epi was injected.  A #11 scalpel was used to remove the lesion while elevated with a pick ups. The defect was closed with a 3-0 vicryl using a box stitch.     The patient tolerated the procedure well. She was instructed to take Sitz baths daily until the area is well healed, also to refrain from intercourse during this time.     Froylan Schwarz MD  2020, 11:31 AM

## 2020-02-18 NOTE — TELEPHONE ENCOUNTER
Pt had a vulvar biopsy in clinic today, 2/18/20.  Per OV plan:  The patient tolerated the procedure well. She was instructed to take Sitz baths daily until the area is well healed, also to refrain from intercourse during this time.

## 2020-02-18 NOTE — TELEPHONE ENCOUNTER
Reason for Call:  Other call back    Detailed comments: Pt seen Dr Schwarz this am and had a procedure done and wondering if she can get a rx for a few pain meds for the pain that she is experiencing.  Pt uses [facility].      Phone Number Patient can be reached at: Home number on file [phone] (home)    Best Time: yes    Can we leave a detailed message on this number? YES    Call taken on 2/18/2020 at 2:16 PM by Nancy Chang

## 2020-02-20 LAB — COPATH REPORT: NORMAL

## 2020-02-25 ENCOUNTER — OFFICE VISIT (OUTPATIENT)
Dept: OBGYN | Facility: CLINIC | Age: 30
End: 2020-02-25
Payer: COMMERCIAL

## 2020-02-25 ENCOUNTER — TELEPHONE (OUTPATIENT)
Dept: SURGERY | Facility: CLINIC | Age: 30
End: 2020-02-25

## 2020-02-25 VITALS
WEIGHT: 197.3 LBS | SYSTOLIC BLOOD PRESSURE: 120 MMHG | DIASTOLIC BLOOD PRESSURE: 80 MMHG | BODY MASS INDEX: 31.85 KG/M2 | HEART RATE: 79 BPM

## 2020-02-25 DIAGNOSIS — D07.1 VIN III (VULVAR INTRAEPITHELIAL NEOPLASIA III): Primary | ICD-10-CM

## 2020-02-25 PROCEDURE — 57420 EXAM OF VAGINA W/SCOPE: CPT | Performed by: OBSTETRICS & GYNECOLOGY

## 2020-02-25 PROCEDURE — 56820 COLPOSCOPY VULVA: CPT | Mod: 51 | Performed by: OBSTETRICS & GYNECOLOGY

## 2020-02-25 PROCEDURE — 99213 OFFICE O/P EST LOW 20 MIN: CPT | Mod: 25 | Performed by: OBSTETRICS & GYNECOLOGY

## 2020-02-25 NOTE — TELEPHONE ENCOUNTER
Patient had lap band weight loss surgery 2008.      Scheduled to see Solange Houston PA-C at 1000, followed by an appt with a dietitian at 1100.    Spoke to patient: regarding appointment on Wednesday February 26th.    Instructed to complete pre-visit questionnaires on Getablet, or arrive 15 minutes early to complete.    [phone] contact center phone number.    Gabbie Carreon, EMT

## 2020-02-25 NOTE — NURSING NOTE
"Chief Complaint   Patient presents with     Follow Up       Initial /80 (BP Location: Right arm, Patient Position: Chair, Cuff Size: Adult Regular)   Pulse 79   Wt 89.5 kg (197 lb 4.8 oz)   LMP 2020 (Approximate)   BMI 31.85 kg/m   Estimated body mass index is 31.85 kg/m  as calculated from the following:    Height as of 20: 1.676 m (5' 6\").    Weight as of this encounter: 89.5 kg (197 lb 4.8 oz).  BP completed using cuff size: regular        The following HM Due: NONE      The following patient reported/Care Every where data was sent to:  P ABSTRACT QUALITY INITIATIVES [[id number]]       Radha Pérez, [facility]  2020           "

## 2020-02-26 ENCOUNTER — TELEPHONE (OUTPATIENT)
Dept: FAMILY MEDICINE | Facility: OTHER | Age: 30
End: 2020-02-26

## 2020-02-26 PROBLEM — D07.1 VIN III (VULVAR INTRAEPITHELIAL NEOPLASIA III): Status: ACTIVE | Noted: 2020-02-26

## 2020-02-26 NOTE — TELEPHONE ENCOUNTER
Type of surgery: wide local excision vulva  Location of surgery: [facility]   Date of surgery: 3/11/20  Surgeon: Dr. Schwarz  Pre-Op Appt Date: na  Post-Op Appt Date: 3/25/20   Packet sent out: Surgery packet mailed to patient's home address.   Pre-cert/Authorization completed: NA  Date: 2/26/2020    Christina Magallon  Surgery Scheduler

## 2020-02-27 NOTE — TELEPHONE ENCOUNTER
4/2016 NIL pap. No prior HPV test  3/12/19 NIL pap, + HR HPV (not 16 or 18). Plan: cotest in 1 yr  3/19/19 Pt. Notified.  1/29/20 NIL pap, + HR HPV (not 16 or 18). Plan: [place]  2/11/20 [place] bx: neg. Plan: Cotest in 1 yr.

## 2020-03-10 NOTE — OP NOTE
Operative Note   Name: Ayanna Davidson  MRN: [id number]  : 1990  Date of Surgery: 03/10/2020    Pre-operative Diagnosis: TINO III of right vuvla; Left vulvar abscess  Post-operative Diagnosis: Same   Procedure(s): Wide local excision; I&D of left vulvar abscess, wound packing with Ioban     Surgeon: Froylan Schwarz MD    Anesthesia: MAC   EBL: 10 mL   Urine Output: Drained prior to procedure     Specimens: Vulvar biopsy, right  Complications: None apparent.  Findings: Well healed scar from prior right Bartholin's gland excision as well as well healed scar from recent right vulvar biopsy just lateral to the posterior fourchette visualized, appx 3 cm high by 1.5 cm wide by 2cm deep abscess in the scar tissue of the prior left Bartholin's gland excision.     Indications: Ayanna Davidson is a 29 year old  woman with right vulvar biopsy showing TINO III, dysplasia very close to margins of excision on pathology report. Colposcopy of vagina and vulva performed on 20 with not further lesions appreciated. WLE recommended surrounding prior biopsy site. She reports the abscess in her left vulva emerged in the past 24hrs. I recommended an I&D. All risks and benefits reviewed and patient agrees to proceed.   Procedure:  The patient was brought to the operating room, where she was placed in the dorsal lithotomy position in yellowfin stirups, underwent MAC anesthesia. [place] protocol was carried out. Right vulva injected with 0.5% marcaine with epi. Two Allis clamps were placed on the right vulva and used for traction. An elliptical wedge resection was taken sharply in one piece surrounding the prior biopsy site and collected for pathology. The defect was closed  in layers with interrupted 3-0 vicryl. The skin was closed with subcutaneous 3-0 vicryl. This was covered with Tegaderm for the remainder of the procedure.   Attention was then turned to the abscess. There was a this window into the abscess mid left  "vulva, and copious purulent drained emerged following mild pressure. Cultures were collected. Loculations were disrupted with a swab, and the abscess defect was copiously irrigated. The wound was then packed with Ioban 1/2\" packing.   The patient was extubated, awakened in the OR and taken to recovery in stable condition. Sponge, lap and needle counts were correct x 2. The patient received 2 grams of ancef prior to the procedure.   Froylan Schwarz MD  OB/GYN  March 11, 2020, 4:34 PM       "

## 2020-03-11 ENCOUNTER — HOSPITAL ENCOUNTER (OUTPATIENT)
Facility: CLINIC | Age: 30
Discharge: HOME | End: 2020-03-11
Attending: OBSTETRICS & GYNECOLOGY | Admitting: OBSTETRICS & GYNECOLOGY
Payer: COMMERCIAL

## 2020-03-11 ENCOUNTER — ANESTHESIA EVENT (OUTPATIENT)
Dept: SURGERY | Facility: CLINIC | Age: 30
End: 2020-03-11
Payer: COMMERCIAL

## 2020-03-11 ENCOUNTER — ANESTHESIA (OUTPATIENT)
Dept: SURGERY | Facility: CLINIC | Age: 30
End: 2020-03-11
Payer: COMMERCIAL

## 2020-03-11 VITALS
SYSTOLIC BLOOD PRESSURE: 100 MMHG | OXYGEN SATURATION: 99 % | TEMPERATURE: 98.1 F | DIASTOLIC BLOOD PRESSURE: 65 MMHG | RESPIRATION RATE: 16 BRPM | HEART RATE: 82 BPM

## 2020-03-11 DIAGNOSIS — D07.1 VIN III (VULVAR INTRAEPITHELIAL NEOPLASIA III): ICD-10-CM

## 2020-03-11 DIAGNOSIS — D07.1 VIN III (VULVAR INTRAEPITHELIAL NEOPLASIA III): Primary | ICD-10-CM

## 2020-03-11 LAB
ERYTHROCYTE [DISTWIDTH] IN BLOOD BY AUTOMATED COUNT: 14.1 % (ref 10–15)
GRAM STN SPEC: ABNORMAL
GRAM STN SPEC: ABNORMAL
HCG UR QL: NEGATIVE
HCT VFR BLD AUTO: 39.3 % (ref 35–47)
HGB BLD-MCNC: 13 G/DL (ref 11.7–15.7)
Lab: ABNORMAL
MCH RBC QN AUTO: 28.8 PG (ref 26.5–33)
MCHC RBC AUTO-ENTMCNC: 33.1 G/DL (ref 31.5–36.5)
MCV RBC AUTO: 87 FL (ref 78–100)
PLATELET # BLD AUTO: 283 10E9/L (ref 150–450)
RBC # BLD AUTO: 4.51 10E12/L (ref 3.8–5.2)
SPECIMEN SOURCE: ABNORMAL
WBC # BLD AUTO: 13.2 10E9/L (ref 4–11)

## 2020-03-11 PROCEDURE — 87185 SC STD ENZYME DETCJ PER NZM: CPT | Performed by: OBSTETRICS & GYNECOLOGY

## 2020-03-11 PROCEDURE — 36000050 ZZH SURGERY LEVEL 2 1ST 30 MIN: Performed by: OBSTETRICS & GYNECOLOGY

## 2020-03-11 PROCEDURE — 25000125 ZZHC RX 250: Performed by: NURSE ANESTHETIST, CERTIFIED REGISTERED

## 2020-03-11 PROCEDURE — 56405 I&D VULVA/PERINEAL ABSCESS: CPT | Mod: 51 | Performed by: OBSTETRICS & GYNECOLOGY

## 2020-03-11 PROCEDURE — 11423 EXC H-F-NK-SP B9+MARG 2.1-3: CPT | Performed by: OBSTETRICS & GYNECOLOGY

## 2020-03-11 PROCEDURE — 87205 SMEAR GRAM STAIN: CPT | Performed by: OBSTETRICS & GYNECOLOGY

## 2020-03-11 PROCEDURE — 25000128 H RX IP 250 OP 636: Performed by: NURSE ANESTHETIST, CERTIFIED REGISTERED

## 2020-03-11 PROCEDURE — 87075 CULTR BACTERIA EXCEPT BLOOD: CPT | Performed by: OBSTETRICS & GYNECOLOGY

## 2020-03-11 PROCEDURE — 37000009 ZZH ANESTHESIA TECHNICAL FEE, EACH ADDTL 15 MIN: Performed by: OBSTETRICS & GYNECOLOGY

## 2020-03-11 PROCEDURE — 37000008 ZZH ANESTHESIA TECHNICAL FEE, 1ST 30 MIN: Performed by: OBSTETRICS & GYNECOLOGY

## 2020-03-11 PROCEDURE — 88309 TISSUE EXAM BY PATHOLOGIST: CPT | Performed by: OBSTETRICS & GYNECOLOGY

## 2020-03-11 PROCEDURE — 40000306 ZZH STATISTIC PRE PROC ASSESS II: Performed by: OBSTETRICS & GYNECOLOGY

## 2020-03-11 PROCEDURE — 81025 URINE PREGNANCY TEST: CPT | Performed by: OBSTETRICS & GYNECOLOGY

## 2020-03-11 PROCEDURE — 25000125 ZZHC RX 250: Performed by: OBSTETRICS & GYNECOLOGY

## 2020-03-11 PROCEDURE — 36000052 ZZH SURGERY LEVEL 2 EA 15 ADDTL MIN: Performed by: OBSTETRICS & GYNECOLOGY

## 2020-03-11 PROCEDURE — 87076 CULTURE ANAEROBE IDENT EACH: CPT | Performed by: OBSTETRICS & GYNECOLOGY

## 2020-03-11 PROCEDURE — 27210794 ZZH OR GENERAL SUPPLY STERILE: Performed by: OBSTETRICS & GYNECOLOGY

## 2020-03-11 PROCEDURE — 25800030 ZZH RX IP 258 OP 636: Performed by: OBSTETRICS & GYNECOLOGY

## 2020-03-11 PROCEDURE — 25000128 H RX IP 250 OP 636: Performed by: OBSTETRICS & GYNECOLOGY

## 2020-03-11 PROCEDURE — 25800030 ZZH RX IP 258 OP 636: Performed by: NURSE ANESTHETIST, CERTIFIED REGISTERED

## 2020-03-11 PROCEDURE — 87070 CULTURE OTHR SPECIMN AEROBIC: CPT | Performed by: OBSTETRICS & GYNECOLOGY

## 2020-03-11 PROCEDURE — 85027 COMPLETE CBC AUTOMATED: CPT | Performed by: OBSTETRICS & GYNECOLOGY

## 2020-03-11 PROCEDURE — 36415 COLL VENOUS BLD VENIPUNCTURE: CPT | Performed by: OBSTETRICS & GYNECOLOGY

## 2020-03-11 PROCEDURE — 25000132 ZZH RX MED GY IP 250 OP 250 PS 637: Performed by: OBSTETRICS & GYNECOLOGY

## 2020-03-11 PROCEDURE — 87077 CULTURE AEROBIC IDENTIFY: CPT | Performed by: OBSTETRICS & GYNECOLOGY

## 2020-03-11 PROCEDURE — 88309 TISSUE EXAM BY PATHOLOGIST: CPT | Mod: 26 | Performed by: OBSTETRICS & GYNECOLOGY

## 2020-03-11 PROCEDURE — 71000027 ZZH RECOVERY PHASE 2 EACH 15 MINS: Performed by: OBSTETRICS & GYNECOLOGY

## 2020-03-11 RX ORDER — ONDANSETRON 4 MG/1
4 TABLET, ORALLY DISINTEGRATING ORAL
Status: DISCONTINUED | OUTPATIENT
Start: 2020-03-11 | End: 2020-03-11 | Stop reason: HOSPADM

## 2020-03-11 RX ORDER — OXYCODONE HYDROCHLORIDE 5 MG/1
5 TABLET ORAL EVERY 4 HOURS PRN
Status: DISCONTINUED | OUTPATIENT
Start: 2020-03-11 | End: 2020-03-11 | Stop reason: HOSPADM

## 2020-03-11 RX ORDER — DIMENHYDRINATE 50 MG/ML
25 INJECTION, SOLUTION INTRAMUSCULAR; INTRAVENOUS
Status: DISCONTINUED | OUTPATIENT
Start: 2020-03-11 | End: 2020-03-11 | Stop reason: HOSPADM

## 2020-03-11 RX ORDER — HYDROMORPHONE HYDROCHLORIDE 1 MG/ML
.3-.5 INJECTION, SOLUTION INTRAMUSCULAR; INTRAVENOUS; SUBCUTANEOUS EVERY 10 MIN PRN
Status: DISCONTINUED | OUTPATIENT
Start: 2020-03-11 | End: 2020-03-11 | Stop reason: HOSPADM

## 2020-03-11 RX ORDER — IBUPROFEN 600 MG/1
600 TABLET, FILM COATED ORAL EVERY 6 HOURS PRN
Qty: 30 TABLET | Refills: 0 | Status: SHIPPED | OUTPATIENT
Start: 2020-03-11 | End: 2021-06-22

## 2020-03-11 RX ORDER — PROPOFOL 10 MG/ML
INJECTION, EMULSION INTRAVENOUS CONTINUOUS PRN
Status: DISCONTINUED | OUTPATIENT
Start: 2020-03-11 | End: 2020-03-11

## 2020-03-11 RX ORDER — IBUPROFEN 600 MG/1
600 TABLET, FILM COATED ORAL
Status: DISCONTINUED | OUTPATIENT
Start: 2020-03-11 | End: 2020-03-11 | Stop reason: HOSPADM

## 2020-03-11 RX ORDER — FENTANYL CITRATE 50 UG/ML
25-50 INJECTION, SOLUTION INTRAMUSCULAR; INTRAVENOUS
Status: CANCELLED | OUTPATIENT
Start: 2020-03-11

## 2020-03-11 RX ORDER — CEFAZOLIN SODIUM 2 G/100ML
2 INJECTION, SOLUTION INTRAVENOUS
Status: COMPLETED | OUTPATIENT
Start: 2020-03-11 | End: 2020-03-11

## 2020-03-11 RX ORDER — OXYCODONE AND ACETAMINOPHEN 5; 325 MG/1; MG/1
1-2 TABLET ORAL EVERY 4 HOURS PRN
Qty: 10 TABLET | Refills: 0 | Status: SHIPPED | OUTPATIENT
Start: 2020-03-11 | End: 2020-04-26

## 2020-03-11 RX ORDER — SODIUM CHLORIDE, SODIUM LACTATE, POTASSIUM CHLORIDE, CALCIUM CHLORIDE 600; 310; 30; 20 MG/100ML; MG/100ML; MG/100ML; MG/100ML
INJECTION, SOLUTION INTRAVENOUS CONTINUOUS
Status: DISCONTINUED | OUTPATIENT
Start: 2020-03-11 | End: 2020-03-11 | Stop reason: HOSPADM

## 2020-03-11 RX ORDER — PROPOFOL 10 MG/ML
INJECTION, EMULSION INTRAVENOUS PRN
Status: DISCONTINUED | OUTPATIENT
Start: 2020-03-11 | End: 2020-03-11

## 2020-03-11 RX ORDER — DIPHENHYDRAMINE HYDROCHLORIDE 50 MG/ML
12.5 INJECTION INTRAMUSCULAR; INTRAVENOUS
Status: COMPLETED | OUTPATIENT
Start: 2020-03-11 | End: 2020-03-11

## 2020-03-11 RX ORDER — CEFAZOLIN SODIUM 1 G/3ML
1 INJECTION, POWDER, FOR SOLUTION INTRAMUSCULAR; INTRAVENOUS SEE ADMIN INSTRUCTIONS
Status: DISCONTINUED | OUTPATIENT
Start: 2020-03-11 | End: 2020-03-11 | Stop reason: HOSPADM

## 2020-03-11 RX ORDER — BUPIVACAINE HYDROCHLORIDE AND EPINEPHRINE 2.5; 5 MG/ML; UG/ML
INJECTION, SOLUTION INFILTRATION; PERINEURAL PRN
Status: DISCONTINUED | OUTPATIENT
Start: 2020-03-11 | End: 2020-03-11 | Stop reason: HOSPADM

## 2020-03-11 RX ORDER — OXYCODONE HYDROCHLORIDE 5 MG/1
10 TABLET ORAL
Status: COMPLETED | OUTPATIENT
Start: 2020-03-11 | End: 2020-03-11

## 2020-03-11 RX ORDER — FENTANYL CITRATE 50 UG/ML
INJECTION, SOLUTION INTRAMUSCULAR; INTRAVENOUS PRN
Status: DISCONTINUED | OUTPATIENT
Start: 2020-03-11 | End: 2020-03-11

## 2020-03-11 RX ORDER — ONDANSETRON 4 MG/1
4 TABLET, FILM COATED ORAL EVERY 8 HOURS PRN
Qty: 16 TABLET | Refills: 1 | Status: SHIPPED | OUTPATIENT
Start: 2020-03-11 | End: 2020-10-27

## 2020-03-11 RX ORDER — ONDANSETRON 4 MG/1
4 TABLET, ORALLY DISINTEGRATING ORAL EVERY 30 MIN PRN
Status: DISCONTINUED | OUTPATIENT
Start: 2020-03-11 | End: 2020-03-11 | Stop reason: HOSPADM

## 2020-03-11 RX ORDER — ONDANSETRON 2 MG/ML
INJECTION INTRAMUSCULAR; INTRAVENOUS PRN
Status: DISCONTINUED | OUTPATIENT
Start: 2020-03-11 | End: 2020-03-11

## 2020-03-11 RX ORDER — LIDOCAINE HYDROCHLORIDE 20 MG/ML
INJECTION, SOLUTION INFILTRATION; PERINEURAL PRN
Status: DISCONTINUED | OUTPATIENT
Start: 2020-03-11 | End: 2020-03-11

## 2020-03-11 RX ORDER — NALOXONE HYDROCHLORIDE 0.4 MG/ML
.1-.4 INJECTION, SOLUTION INTRAMUSCULAR; INTRAVENOUS; SUBCUTANEOUS
Status: DISCONTINUED | OUTPATIENT
Start: 2020-03-11 | End: 2020-03-11 | Stop reason: HOSPADM

## 2020-03-11 RX ORDER — ONDANSETRON 2 MG/ML
4 INJECTION INTRAMUSCULAR; INTRAVENOUS EVERY 30 MIN PRN
Status: DISCONTINUED | OUTPATIENT
Start: 2020-03-11 | End: 2020-03-11 | Stop reason: HOSPADM

## 2020-03-11 RX ORDER — LIDOCAINE 40 MG/G
CREAM TOPICAL
Status: DISCONTINUED | OUTPATIENT
Start: 2020-03-11 | End: 2020-03-11 | Stop reason: HOSPADM

## 2020-03-11 RX ORDER — KETOROLAC TROMETHAMINE 30 MG/ML
30 INJECTION, SOLUTION INTRAMUSCULAR; INTRAVENOUS ONCE
Status: COMPLETED | OUTPATIENT
Start: 2020-03-11 | End: 2020-03-11

## 2020-03-11 RX ORDER — ACETAMINOPHEN 325 MG/1
975 TABLET ORAL ONCE
Status: COMPLETED | OUTPATIENT
Start: 2020-03-11 | End: 2020-03-11

## 2020-03-11 RX ADMIN — FENTANYL CITRATE 50 MCG: 50 INJECTION, SOLUTION INTRAMUSCULAR; INTRAVENOUS at 11:04

## 2020-03-11 RX ADMIN — ONDANSETRON 4 MG: 4 TABLET, ORALLY DISINTEGRATING ORAL at 13:28

## 2020-03-11 RX ADMIN — DIPHENHYDRAMINE HYDROCHLORIDE 12.5 MG: 50 INJECTION, SOLUTION INTRAMUSCULAR; INTRAVENOUS at 12:06

## 2020-03-11 RX ADMIN — KETOROLAC TROMETHAMINE 30 MG: 30 INJECTION, SOLUTION INTRAMUSCULAR at 11:59

## 2020-03-11 RX ADMIN — PHENYLEPHRINE HYDROCHLORIDE 100 MCG: 10 INJECTION INTRAVENOUS at 11:12

## 2020-03-11 RX ADMIN — ONDANSETRON 4 MG: 2 INJECTION INTRAMUSCULAR; INTRAVENOUS at 10:57

## 2020-03-11 RX ADMIN — LIDOCAINE HYDROCHLORIDE 60 MG: 20 INJECTION, SOLUTION INFILTRATION; PERINEURAL at 10:43

## 2020-03-11 RX ADMIN — FENTANYL CITRATE 50 MCG: 50 INJECTION, SOLUTION INTRAMUSCULAR; INTRAVENOUS at 10:54

## 2020-03-11 RX ADMIN — DIPHENHYDRAMINE HYDROCHLORIDE 12.5 MG: 50 INJECTION, SOLUTION INTRAMUSCULAR; INTRAVENOUS at 12:28

## 2020-03-11 RX ADMIN — FENTANYL CITRATE 50 MCG: 50 INJECTION, SOLUTION INTRAMUSCULAR; INTRAVENOUS at 10:39

## 2020-03-11 RX ADMIN — OXYCODONE HYDROCHLORIDE 10 MG: 5 TABLET ORAL at 12:33

## 2020-03-11 RX ADMIN — ACETAMINOPHEN 975 MG: 325 TABLET, FILM COATED ORAL at 09:03

## 2020-03-11 RX ADMIN — MIDAZOLAM 2 MG: 1 INJECTION INTRAMUSCULAR; INTRAVENOUS at 10:39

## 2020-03-11 RX ADMIN — SODIUM CHLORIDE, POTASSIUM CHLORIDE, SODIUM LACTATE AND CALCIUM CHLORIDE: 600; 310; 30; 20 INJECTION, SOLUTION INTRAVENOUS at 11:44

## 2020-03-11 RX ADMIN — SODIUM CHLORIDE, POTASSIUM CHLORIDE, SODIUM LACTATE AND CALCIUM CHLORIDE: 600; 310; 30; 20 INJECTION, SOLUTION INTRAVENOUS at 09:23

## 2020-03-11 RX ADMIN — HYDROMORPHONE HYDROCHLORIDE 0.5 MG: 1 INJECTION, SOLUTION INTRAMUSCULAR; INTRAVENOUS; SUBCUTANEOUS at 12:14

## 2020-03-11 RX ADMIN — PROPOFOL 50 MG: 10 INJECTION, EMULSION INTRAVENOUS at 10:44

## 2020-03-11 RX ADMIN — CEFAZOLIN SODIUM 2 G: 2 INJECTION, SOLUTION INTRAVENOUS at 10:44

## 2020-03-11 RX ADMIN — PROPOFOL 150 MCG/KG/MIN: 10 INJECTION, EMULSION INTRAVENOUS at 10:44

## 2020-03-11 RX ADMIN — PROPOFOL 50 MG: 10 INJECTION, EMULSION INTRAVENOUS at 11:01

## 2020-03-11 RX ADMIN — FENTANYL CITRATE 50 MCG: 50 INJECTION, SOLUTION INTRAMUSCULAR; INTRAVENOUS at 11:29

## 2020-03-11 SDOH — HEALTH STABILITY: MENTAL HEALTH: CURRENT SMOKER: 1

## 2020-03-11 ASSESSMENT — LIFESTYLE VARIABLES: TOBACCO_USE: 1

## 2020-03-11 NOTE — DISCHARGE INSTRUCTIONS
Leave packing in place - follow up with Dr. Schwarz Friday, 3/13/2020 at 10:30 am in [facility]   Adult Discharge Orders & Instructions - after anesthesia    For 24 hours after surgery    1. Get plenty of rest.  A responsible adult must stay with you for at least 24 hours after you leave the hospital.   2. Do not drive or use heavy equipment.  If you have weakness or tingling, don't drive or use heavy equipment until this feeling goes away.  3. Do not drink alcohol.  4. Avoid strenuous or risky activities.  Ask for help when climbing stairs.   5. You may feel lightheaded.  If so, sit for a few minutes before standing.  Have someone help you get up.   6. You may have a slight fever. Call the doctor if your fever is over 100 F (37.7 C) (taken under the tongue) or lasts longer than 24 hours.  7. You may have a dry mouth, a sore throat, muscle aches or trouble sleeping.  These should go away after 24 hours.  8. Do not make important or legal decisions.  We don t expect you to have any problems from the surgery or treatment you had today. Just in case, here s what to do if you have pain, upset stomach (nausea), bleeding or infection:  Pain:  Take medicines your doctor has prescribed or over-the-counter medicine they have suggested. Resting and using ice packs can help, too. For surgery on an arm or leg, raise it on a pillow to ease swelling. Call your doctor if these methods don t work.  Copyright Eli[employer] Jesus, Licensed under [employer]  Upset stomach (nausea):  Take anti-nausea medicine approved by your doctor. Drink clear liquids like apple juice, ginger ale, broth or 7-Up. Be sure to drink enough fluids. Rest can help, too. Move to normal foods when you re ready. Bleeding:  In the first 24 hours, you may see a little blood on your dressing, about the size of a quarter. You don t need to worry about this much blood, but if the blood spot keeps getting bigger:    Put  pressure on the wound if you can, AND    Call your doctor.  Copyright "[employer] Licensed under [employer]  Fever/Infection: Please call your doctor if you have any of these signs:    Redness    Swelling    Wound feels warm    Pain gets worse    Bad-smelling fluid leaks from wound    Fever or chills  Call your doctor for any of the followin.  It has been over 8 to 10 hours since surgery and you are still not able to urinate (pass water).    2.  Headache for over 24 hours.    Nurse advice line: [phone]

## 2020-03-11 NOTE — ANESTHESIA POSTPROCEDURE EVALUATION
Patient: Ayanna Davidson    Procedure(s):  Right Vulva Wedge Resection and Incision and Drainage Left Vulva    Diagnosis:TINO III (vulvar intraepithelial neoplasia III) [D07.1]  Diagnosis Additional Information: No value filed.    Anesthesia Type:  MAC    Note:  Anesthesia Post Evaluation    Patient location during evaluation: Phase 2  Patient participation: Able to fully participate in evaluation  Level of consciousness: awake and alert  Pain management: adequate  Airway patency: patent  Cardiovascular status: acceptable and stable  Respiratory status: acceptable and room air  Hydration status: acceptable  PONV: none     Anesthetic complications: None    Comments:  Patient was happy with the anesthesia care received and no anesthesia related complications were noted.  I will follow up with the patient again if it is needed.        Last vitals:  Vitals:    03/11/20 1215 03/11/20 1230 03/11/20 1245   BP: 113/56 97/63 95/63   Pulse: 82 78 68   Resp: 16 16 16   Temp:      SpO2: 100% 100% 100%         Electronically Signed By: LEXX Quinn CRNA  March 11, 2020  12:57 PM

## 2020-03-11 NOTE — ANESTHESIA CARE TRANSFER NOTE
Patient: Ayanna Davidson    Procedure(s):  Right Vulva Wedge Resection and Incision and Drainage Left Vulva    Diagnosis: TINO III (vulvar intraepithelial neoplasia III) [D07.1]  Diagnosis Additional Information: No value filed.    Anesthesia Type:   MAC     Note:  Airway :Room Air  Patient transferred to:Phase II  Handoff Report: Identifed the Patient, Identified the Reponsible Provider, Reviewed the pertinent medical history, Discussed the surgical course, Reviewed Intra-OP anesthesia mangement and issues during anesthesia, Set expectations for post-procedure period and Allowed opportunity for questions and acknowledgement of understanding      Vitals: (Last set prior to Anesthesia Care Transfer)    CRNA VITALS  3/11/2020 1114 - 3/11/2020 1150      3/11/2020             Pulse:  81    SpO2:  97 %                Electronically Signed By: LEXX Quinn CRNA  March 11, 2020  11:50 AM

## 2020-03-11 NOTE — ANESTHESIA PREPROCEDURE EVALUATION
Anesthesia Pre-Procedure Evaluation    Patient: Ayanna Davidson   MRN: [id number] : 1990          Preoperative Diagnosis: TINO III (vulvar intraepithelial neoplasia III) [D07.1]    Procedure(s):  WIDE LOCAL EXCISION, VULVA    Past Medical History:   Diagnosis Date     Cervical high risk HPV (human papillomavirus) test positive 2019    last PAP NIL (), remote hx of LEEP     Gastroesophageal reflux disease      HSV (herpes simplex virus) infection     1 & 2     Methamphetamine abuse (H)     reports daily use     recurrent Bartholin's cyst      Past Surgical History:   Procedure Laterality Date     CHOLECYSTECTOMY       ENT SURGERY       EXAM UNDER ANESTHESIA PELVIC N/A 2020    Procedure: EXAM UNDER ANESTHESIA, PELVIS, PAP;  Surgeon: Froylan Schwarz MD;  Location: [facility]     INCISION AND DRAINAGE ABSCESS PELVIS, COMBINED N/A 2018    Procedure: COMBINED INCISION AND DRAINAGE ABSCESS PELVIS;  INCISION AND DRAINAGE LABIAL ABSCESS;  Surgeon: Jase Beach MD;  Location: [facility]     INCISION AND DRAINAGE ABSCESS PELVIS, COMBINED N/A 3/5/2019    Procedure: Incision and Drainage Right Labial Abscess;  Surgeon: Jase Beach MD;  Location: [facility]     INCISION AND DRAINAGE LOWER EXTREMITY, COMBINED Right 2019    Procedure: irrigation and debridement right leg dog bite;  Surgeon: Rommel Pérez MD;  Location: [facility] OR     LAP ADJUSTABLE GASTRIC BAND       LEEP TX, CERVICAL       MAMMOPLASTY REDUCTION BILATERAL       MARSUPIALIZATION BARTHOLIN CYST N/A 2019    Procedure: Bartholin's Cyst removal;  Surgeon: Froylan Schwarz MD;  Location: [facility]     MARSUPIALIZATION BARTHOLIN CYST Left 2020    Procedure: Excision of left bartholin's abscess;  Surgeon: Froylan Schwarz MD;  Location: [facility] OR     ORTHOPEDIC SURGERY         Anesthesia Evaluation     . Pt has had prior anesthetic. Type: General and MAC    No history of anesthetic complications          ROS/MED  HX    ENT/Pulmonary:     (+)tobacco use, Current use 0.25 ppd packs/day  , . .    Neurologic:  - neg neurologic ROS     Cardiovascular:  - neg cardiovascular ROS   (+) ----. : . . . :. . No previous cardiac testing       METS/Exercise Tolerance:     Hematologic:  - neg hematologic  ROS       Musculoskeletal:  - neg musculoskeletal ROS       GI/Hepatic:     (+) GERD Asymptomatic on medication,       Renal/Genitourinary:  - ROS Renal section negative   (+) Other Renal/ Genitourinary, bartholins cyst - R labia - presents for I & D      Endo:  - neg endo ROS       Psychiatric: Comment: Polysubstance abuse per last tox screen.    (+) psychiatric history anxiety and depression      Infectious Disease:  - neg infectious disease ROS       Malignancy:      - no malignancy   Other: Comment: Methamphetamine abuse   (+) No chance of pregnancy C-spine cleared: N/A, no H/O Chronic Pain,H/O chronic opiod use , no other significant disability   - neg other ROS                      Physical Exam  Normal systems: cardiovascular and pulmonary    Airway   Mallampati: II  TM distance: >3 FB  Neck ROM: full    Dental   (+) missing and chipped    Cardiovascular   Rhythm and rate: regular and normal      Pulmonary    breath sounds clear to auscultation            Lab Results   Component Value Date    WBC 13.2 (H) 03/11/2020    HGB 13.0 03/11/2020    HCT 39.3 03/11/2020     03/11/2020    CRP 19.0 (H) 09/27/2019     01/28/2020    POTASSIUM 4.5 01/28/2020    CHLORIDE 108 01/28/2020    CO2 26 01/28/2020    BUN 14 01/28/2020    CR 0.69 01/28/2020     (H) 01/28/2020    ANUM 9.1 01/28/2020    ALBUMIN 3.5 01/28/2020    PROTTOTAL 7.3 01/28/2020    ALT 22 01/28/2020    AST 16 01/28/2020    ALKPHOS 67 01/28/2020    BILITOTAL 0.5 01/28/2020    LIPASE 86 11/21/2007    AMYLASE 58 11/21/2007    HCG Negative 03/11/2020       Preop Vitals  BP Readings from Last 3 Encounters:   03/11/20 116/72   02/25/20 120/80   02/18/20 112/76    Pulse  "Readings from Last 3 Encounters:   03/11/20 95   02/25/20 79   02/18/20 88      Resp Readings from Last 3 Encounters:   03/11/20 18   01/29/20 14   01/27/20 18    SpO2 Readings from Last 3 Encounters:   03/11/20 96%   01/29/20 92%   01/27/20 100%      Temp Readings from Last 1 Encounters:   03/11/20 98.1  F (36.7  C) (Oral)    Ht Readings from Last 1 Encounters:   01/29/20 1.676 m (5' 6\")      Wt Readings from Last 1 Encounters:   02/25/20 89.5 kg (197 lb 4.8 oz)    Estimated body mass index is 31.85 kg/m  as calculated from the following:    Height as of 1/29/20: 1.676 m (5' 6\").    Weight as of 2/25/20: 89.5 kg (197 lb 4.8 oz).       Anesthesia Plan      History & Physical Review  History and physical reviewed and following examination; no interval change.    ASA Status:  2 .    NPO Status:  > 8 hours    Plan for MAC with Propofol and Intravenous induction. Maintenance will be Balanced.  Reason for MAC:  Deep or markedly invasive procedure (G8)  PONV prophylaxis:  Ondansetron (or other 5HT-3) and Dexamethasone or Solumedrol    The patient is a current Smoker, Patient was instructed to abstain from smoking on day of procedure and patient smoked on day of surgery     Postoperative Care  Postoperative pain management:  IV analgesics, Oral pain medications and Multi-modal analgesia.      Consents  Anesthetic plan, risks, benefits and alternatives discussed with:  Patient.  Use of blood products discussed: No .   .                 LEXX Quinn CRNA  "

## 2020-03-12 NOTE — PROGRESS NOTES
"[facility]  Discharge Call Back  Ayanna Davidson  1990  MRN: [id number]  Home: [phone] (home)   PCP: Pam, Lisbeth Casco    We are calling to see how you're doing since your surgery/procedure with us?   Comments: Pretty good, it's sore  Clinical Questions  1. Have you had time to look at your discharge instructions? Do you have any questions in regards to the instructions?   Comment: yes, no  2. Do you feel your pain is being controlled with the regimen the surgeon sent you home on? (ie: prescription medications, over the counter pain medications, ice packs)   Comments: yes  3. Have you noticed any drainage on your dressing? Do you know what to do if you have bleeding as a result of your procedure?   Comments: \"It was bleeding pretty good but stopped after I took a sitz bath\"  4. Have you had any nausea/vomiting? Do you know how to treat this?   Comment: Vomited after surgery, Zofran working since being home  5. Have you had any signs/symptoms of infection? (ie: fever, swelling, heat, drainage or redness) Do you know what to do if you have?   Comment: no  6. Do you have a follow up appointment made with your surgeon? Do you have a number to contact them at if you need it?   Comment: yes, yes  Retained Foreign Object (OZIEL, Hemovac, Penrose, Wound Packing, Vaginal Packing, Nasal Splints, Urethral Stents, Mooney Catheter)  1. Do you still have na in place?   2. If the item is still in place, can you review the plan for removal with me? na      You may be randomly selected to fill out a [facility] survey. We would appreciate you taking the time to fill this out. It is important to us if you would answer all of the questions on the survey.          "

## 2020-03-13 ENCOUNTER — OFFICE VISIT (OUTPATIENT)
Dept: OBGYN | Facility: OTHER | Age: 30
End: 2020-03-13
Payer: COMMERCIAL

## 2020-03-13 VITALS
WEIGHT: 202.2 LBS | SYSTOLIC BLOOD PRESSURE: 120 MMHG | HEART RATE: 87 BPM | DIASTOLIC BLOOD PRESSURE: 80 MMHG | BODY MASS INDEX: 32.64 KG/M2

## 2020-03-13 DIAGNOSIS — N76.4 VULVAR ABSCESS: Primary | ICD-10-CM

## 2020-03-13 DIAGNOSIS — D07.1 VIN III (VULVAR INTRAEPITHELIAL NEOPLASIA III): ICD-10-CM

## 2020-03-13 DIAGNOSIS — G89.18 POSTOPERATIVE PAIN: ICD-10-CM

## 2020-03-13 PROCEDURE — 99024 POSTOP FOLLOW-UP VISIT: CPT | Performed by: OBSTETRICS & GYNECOLOGY

## 2020-03-13 RX ORDER — OXYCODONE AND ACETAMINOPHEN 5; 325 MG/1; MG/1
1 TABLET ORAL EVERY 6 HOURS PRN
Qty: 8 TABLET | Refills: 0 | Status: SHIPPED | OUTPATIENT
Start: 2020-03-13 | End: 2020-03-21

## 2020-03-13 NOTE — NURSING NOTE
"Chief Complaint   Patient presents with     Wound Check       Initial /80 (BP Location: Right arm, Patient Position: Chair, Cuff Size: Adult Regular)   Pulse 87   Wt 91.7 kg (202 lb 3.2 oz)   LMP 2020 (Approximate)   BMI 32.64 kg/m   Estimated body mass index is 32.64 kg/m  as calculated from the following:    Height as of 20: 1.676 m (5' 6\").    Weight as of this encounter: 91.7 kg (202 lb 3.2 oz).  BP completed using cuff size: regular        The following HM Due: NONE      The following patient reported/Care Every where data was sent to:  P ABSTRACT QUALITY INITIATIVES [[id number]]       Radha Pérez, [facility]  2020           "

## 2020-03-13 NOTE — PROGRESS NOTES
" Clinic Note    CC:    Chief Complaint   Patient presents with     Wound Check      HPI:  Ayanna Davidson is a 29 year old  who is POD#2 s/p WLE of right vulva and I&D of left vulvar abscess, returns for wound care. Has been taking daily sitz baths, some spotting and wound drainage, both now resolved. No F/S/C, no other systemic symptoms.     EXAM:  Vitals:    20 1028   BP: 120/80   BP Location: Right arm   Patient Position: Chair   Cuff Size: Adult Regular   Pulse: 87   Weight: 91.7 kg (202 lb 3.2 oz)    Body mass index is 32.64 kg/m .    Gen: appropriately interactive, NAD  CV: RRR  Resp: CTAB, good effort without distress    Perineum: right vulvar incision healing well, left abscess site packed, no erythema, no purulence, no bleeding, tender though no focal pain  MSK: normal gait, symmetric movements UE & LE  Lower extremities: non-tender, no edema    Labs & Imaging:  Wound culture prelim gram neg rods    ASSESSMENT/PLAN: Ayanna Davidson is a 29 year old  who is POD#2 s/p WLE of right vulva and I&D of left vulvar abscess, returns for wound care.    1. Vulvar abscess  - Packing removed, wound irrigated with saline, repacked with 1/2\" iodoform packing  - will see wound care team on Monday  - Continue sitz baths  - oxyCODONE-acetaminophen (PERCOCET) 5-325 MG tablet; Take 1 tablet by mouth every 6 hours as needed for pain  Dispense: 8 tablet; Refill: 0  - Wound Care    2. TINO III (vulvar intraepithelial neoplasia III)  - Pathology pending    3. Postoperative pain  - oxyCODONE-acetaminophen (PERCOCET) 5-325 MG tablet; Take 1 tablet by mouth every 6 hours as needed for pain  Dispense: 8 tablet; Refill: 0    Froylan Schwarz MD   3/13/2020 11:01 AM  "

## 2020-03-14 LAB
BACTERIA SPEC CULT: ABNORMAL
BACTERIA SPEC CULT: ABNORMAL
Lab: ABNORMAL
SPECIMEN SOURCE: ABNORMAL

## 2020-03-16 ENCOUNTER — HOSPITAL ENCOUNTER (OUTPATIENT)
Dept: WOUND CARE | Facility: CLINIC | Age: 30
Discharge: HOME | End: 2020-03-16
Attending: OBSTETRICS & GYNECOLOGY | Admitting: OBSTETRICS & GYNECOLOGY
Payer: COMMERCIAL

## 2020-03-16 ENCOUNTER — TELEPHONE (OUTPATIENT)
Dept: WOUND CARE | Facility: CLINIC | Age: 30
End: 2020-03-16

## 2020-03-16 LAB — COPATH REPORT: NORMAL

## 2020-03-16 PROCEDURE — G0463 HOSPITAL OUTPT CLINIC VISIT: HCPCS

## 2020-03-16 NOTE — DISCHARGE INSTRUCTIONS
Today we re packed the wound on the left side with the Iodoform gauze 1/4 inch.  The is a small separation of the tissue on the right side but depth is only 2 mm so no packing needed, but we will monitor with each dressing change.  I will have the nurses in the Specialty clinic change your dressing on Friday this week and Monday of next week.    Check your appointment list for times.  Any concerns call our scheduling department at [phone] and they can assist you.    Chirag Mckeon RN cwocn

## 2020-03-16 NOTE — PROGRESS NOTES
PT IS BEING DISCHARGED FROM [facility] ON 1/25; SHE WAS ADMITTED FOR LOWER LEFT LUNG PNEUMONIA; SHE IS BEING DISCHARGED TO HOME; SHE HAS A HOSPITAL F/U WITH POLLY KLEIN ON 01/28 AT 12:30PM   Reason For Visit: Ayanna Davidson, 29 year old female, seen as outpatient to evaluate and treat left and right surgical sites of the vulva. Referred by Dr Schwarz. Patient presents by herself today.     History: Pt has history of recurrent Bartholins's cyst.  She had surgery on 3/11/20, a wide local excision of the right vulva and I&D of a left vulvar abscess.  She has been doing daily sitz baths.  Last seen by surgeon on 3/13/20 and the left wound was packed with Iodoform gauze with no secondary dressing. No packing was needed on the right side. Per the pt the Iodoform on the left side fell out on Saturday this weekend, she is unable to do wound cares herself and does not have a willing available care giver.      Personal/social history: Pt lives independently [place] [facility].    Objective:   Physical appearance: Alert and oriented  Ambulation: independent with no assistive devices  Current treatment plan: Iodoform 1/2 inch packing strip with sitz baths daily.  Last changed: 3/13/20 dressing came out 3/14/20.    Wound #1 Right Vulva, inferior aspect of intact incision.  S/P WLE 3/11/20.  Stage/tissue depth: full thickness now well approximated incision with small open aspect near inferior most portion of incision.  0.3 cm L x 0.2 cm W x 0.2 cm D  Tunneling: none  Undermining: none  Wound bed type/amount: 100 % red nongranular tissue; NA fluctuant  Wound Edges: open  Periwound: WNL on the immediate periwound skin but bruising noted on the right lower buttock and perineum.    Drainage: scant amounts of serosanguinous drainage noted  Odor: no  Pain: yes with direct touch, prolonged sitting and wound cares.    Wound #2 Left Vulva, mid aspect of intact incision.  S/P I&D 3/11/20.  Stage/tissue depth: full thickness at open site  0.7 cm L x 0.3 cm W x 2.9 cm D  Tunneling: none noted  Undermining: none noted  Wound bed type/amount: As able to visualize 100 % red nongranular tissue; NA fluctuant  Wound Edges:  open  Periwound: wnl, incision well approximated to the superior and inferior of open site.  Drainage: moderate amount of serosanguinous drainage  Odor: no  Pain: yes high amount of pain with direct touch, prolonged sitting or wound cares.     Dorsalis Pedal Pulse: Not assessed this visit  Hair growth: Not assessed this visit  Capillary Refill: Not assessed this visit  Feet/toes color: Not assessed this visit  Nails: Not assessed this visit  R Leg: Edema Not assessed this visit. Ankle circumference NA cm. Calf circumference NA cm.  L Leg: Edema Not assessed this visit. Ankle circumference NA cm. Calf circumference NA cm.    Mobility: only limited by pain in the bilateral groin  Current offloading/footwear: NA  Sensation: wnl  HgbA1C: NA Date: NA as pt does not have a diagnosis of diabetes  Checks Blood Glucose?:  NA Average Readings: NA  Other callousing/areas of concern: NA    Diet: Regular,   Smoking: Yes 1/2 pack cigarettes daily.      Discussed: etiology of wound (Surgical wounds related to cyst), pathophysiology and patient specific goals for wound healing.   Education: On role of woc nurse in clinic setting.  Wound status.  Wound cares needed at least twice weekly with Iodoform gauze.  Rational for use of medicated gauze.  Need for increased protein in diet for wound healing.  Effect of cigarette smoking on wound healing.  If packing falls out during the week to call scheduling department to make appointment for wound cares asap. Plans for next two wound dressing changes to be done in the Specialty clinic here at [facility].  Also did teaching not to use doughnut type seating surfaces as will not benefit the wound and may hinder the healing of the current wound and or create another wound due to pressure and friction.  Pt VU of all teaching done today.        Assessment:  The right Vulva incision is mostly intact well approximately with no open aspects.  Near the most inferior aspect of the incision is  a small area of separation of the incision with 2 mm of depth.  It does not appear to be a true dehisced aspect, appears more like an irregularity in the approximation of the two sides, with a small gap of the irregular edges.  Probed site with applicator and no tunneling or undermining located, appears all clean with only scant amount of serosanguinous drainage noted.  The left Vulva incision has open aspect in the mid vertical aspect.  Small opening, the wound enters body directly at a 90 degree angle.  The depth is the greatest at the most inferior aspect of the open wound and approximately 2 mm less at the superior most aspect.  Also probed the wound in all directions and depths and no tunneling or undermining was noted.  Unable to visualize the wound bed deeper than approximately 5 mm but all visible tissue is clean red nongranular tissue.  All drainage on applicator used to probe the wound was red sanguinous drainage, purulent drainage noted.  No odor or concerns noted with the wound.  Pain is present but does not appear great than would be expected.      Barriers to wound healing:   Poor nutrition: inadequate supply of protein, carbohydrates, fatty acids, and trace elements essential for all phases of wound healing.  Teaching on protein needs reviewed today.  Reduced Blood Supply: inadequate perfusion to heal wound, at times related to cigarette smoking, teaching done on effects of smoking on wound healing.  Medication: NA  Chemotherapy: suppresses the immune system and inflammatory response, NA  Radiotherapy: increases production of free radical which damage cells, NA  Psychological stress: related to re occurring wounds and pain associated with the wounds.   Obesity: decreases tissue perfusion, NA  Infection: prolongs inflammatory phase, uses vital nutrients, impairs epithelialization and releases toxins, None noted at this time, an antimicrobial dressing is in use.  Underlying Disease: diabetes mellitis  and autoimmune disorders  Maceration: reduces wound tensile strength and inhibits epithelial migration.  No noted today  Patient compliance, appears motivated to heal   Unrelieved pressure, pressure reduction and relief needs discussed today.  Immobility, NA  Substance abuse: Meth use history, not assessed this visit.      Plan: We will plan to continue with the Iodoform packing strip to the left vulva wound.  As the 1/2 inch packing strip fell out we will try the 1/4 inch today and will try to pack firmly but not tight, so as to keep the gauze in the wound bed but not hinder new growth filling in the depth.  Pt will have follow up visits in the Specialty clinic and will bring with her 1/4 inch Iodoform packing strip gauze bottle I gave her today.  Visits to fit her schedule and time availability will be this Friday the 20 th and next Monday the 23 rd.  She will also be seen by her surgeon again next week 3/25/20.      Topical care: Dry Iodoform gauze  Offloading: limit pressure and reposition frequently, no true offloading needed.  Additional recommendations: Limit or stop smoking as able, encourage protein in diet.      Wound Care: To wound 2 Left Vulva.  Wound cleansed/irrigated with NS and gauze, patted dry. Periwound protected with NA. Wound base filled with 1/4 inch Iodoform ribbon gauze. Covered with NA, followed by NA. Secured with NA. To be changed twice weekly.    Discussed plan of care with patient. Teaching done with patient for dressing changes; She is unable to perform packing of this wound herself and does not have an available willing caregiver to provide wound cares.  Cares will be provided by myself in the Wound and Ostomy clinic, Dr Schwarz on days she is scheduled with him for follow up visits, and with the Specialty nurse staff here at [facility].    The following discharge instructions were reviewed with and sent home with the patient: See discharge instructions    The following  supplies were sent home with the patient:  Remains of the bottle of 1/3 inch Iodoform packing strip gauze not used up during cares today.    Will reassess care plan in: Next visit to Wound and Ostomy clinic to be determined.    Return visit: Specialty clinic for wound cares 3/20/20 and 3/23/20.  Dr Schwarz surgeon 3/25/20.    Verbal, written, & demonstrative education provided.  Face to face time (excluding procedure): approximately 45 minutes.  Procedure: NA  Care plan was not changed.    [phone]

## 2020-03-16 NOTE — TELEPHONE ENCOUNTER
Hello  Here is that new pt with the Vulva wounds that is scheduled for this Friday and next Monday in the Specialty clinic, she will see her surgeon again Wednesday of next week.  Please pass on to Vi also if you wound.  Chirag Mckeon RN cwocn

## 2020-03-18 LAB
BACTERIA SPEC CULT: ABNORMAL
Lab: ABNORMAL
SPECIMEN SOURCE: ABNORMAL

## 2020-03-20 ENCOUNTER — OFFICE VISIT (OUTPATIENT)
Dept: FAMILY MEDICINE | Facility: CLINIC | Age: 30
End: 2020-03-20
Payer: COMMERCIAL

## 2020-03-20 DIAGNOSIS — G89.18 POSTOPERATIVE PAIN: ICD-10-CM

## 2020-03-20 DIAGNOSIS — Z48.00 ENCOUNTER FOR CHANGE OF DRESSING: Primary | ICD-10-CM

## 2020-03-20 DIAGNOSIS — N76.4 VULVAR ABSCESS: ICD-10-CM

## 2020-03-20 PROCEDURE — 99207 ZZC NO CHARGE NURSE ONLY: CPT

## 2020-03-20 NOTE — PROGRESS NOTES
Wound care per the order of Dr. Schwarz    Wound #1: Right Vulva. Inferior aspect of intact incision.   Measures: 1.3 x 0.2 x 0.2 cm   Tunneling: none  Undermining: none  Wound bed type/amount: 100 % red nongranular tissue  Wound Edges: open  Periwound: WNL   Drainage: None noted  Odor: None  Pain: Yes with direct touch, prolonged sitting and wound cares.     Wound #2: Left Vulva. Mid aspect of intact incision.    Measures: 0.5 x 0.5 cm   Tunneling: None noted  Undermining: None noted  Wound bed type/amount: Red nongranular tissue  Wound Edges: Open  Periwound: WNL,  Incision well approximated to the superior and inferior of open site.  Drainage: None noted  Odor: No  Pain: Yes, moderate amount of pain with direct touch, prolonged sitting or wound cares.     Lidocaine applied to wound prior to wound care due to pain level and sensitivity of area. Wounds cleansed with NS and gauze. 1 small piece of Iodoform packed into wound 1. Wound 2 was NOT packed as wound was too small.     Patient to follow up with Dr. Schwarz on 3/25/2020.    Vi Levy RN on 3/20/2020 at 2:07 PM

## 2020-03-20 NOTE — TELEPHONE ENCOUNTER
Percocet  Last Written Prescription Date:  3/13/20  Last Fill Quantity: 8,  # refills: 0   Last office visit: No previous visit found with prescribing provider:     Future Office Visit:   Next 5 appointments (look out 90 days)    Mar 23, 2020  1:00 PM CDT  Return Visit with [facility] SPECIALTY RN  [facility] ([address] [id number]  [phone]   Mar 25, 2020  1:15 PM CDT  SHORT with Froylan Schwarz MD  [facility] [address] [place] [id number]  [phone]           Requested Prescriptions   Pending Prescriptions Disp Refills     oxyCODONE-acetaminophen (PERCOCET) 5-325 MG tablet 8 tablet 0     Sig: Take 1 tablet by mouth every 6 hours as needed for pain       There is no refill protocol information for this order              Vi Levy RN on 3/20/2020 at 5:46 PM

## 2020-03-21 RX ORDER — OXYCODONE AND ACETAMINOPHEN 5; 325 MG/1; MG/1
1 TABLET ORAL EVERY 6 HOURS PRN
Qty: 8 TABLET | Refills: 0 | Status: SHIPPED | OUTPATIENT
Start: 2020-03-21 | End: 2020-03-23

## 2020-03-23 ENCOUNTER — TELEPHONE (OUTPATIENT)
Dept: FAMILY MEDICINE | Facility: OTHER | Age: 30
End: 2020-03-23

## 2020-03-23 ENCOUNTER — TELEPHONE (OUTPATIENT)
Dept: OBGYN | Facility: CLINIC | Age: 30
End: 2020-03-23

## 2020-03-23 ENCOUNTER — OFFICE VISIT (OUTPATIENT)
Dept: FAMILY MEDICINE | Facility: CLINIC | Age: 30
End: 2020-03-23
Payer: COMMERCIAL

## 2020-03-23 DIAGNOSIS — G89.18 POSTOPERATIVE PAIN: ICD-10-CM

## 2020-03-23 DIAGNOSIS — T14.8XXA OPEN WOUND: ICD-10-CM

## 2020-03-23 DIAGNOSIS — N76.4 VULVAR ABSCESS: ICD-10-CM

## 2020-03-23 PROCEDURE — 99207 ZZC NO CHARGE NURSE ONLY: CPT

## 2020-03-23 RX ORDER — OXYCODONE AND ACETAMINOPHEN 5; 325 MG/1; MG/1
1 TABLET ORAL EVERY 6 HOURS PRN
Qty: 8 TABLET | Refills: 0 | Status: SHIPPED | OUTPATIENT
Start: 2020-03-23 | End: 2020-04-26

## 2020-03-23 NOTE — TELEPHONE ENCOUNTER
Rx was sent to [facility].  Called patient to let her know, but she asked if it could be resent to [facility] pharmacy.  Called [facility] and canceled Rx with them.    Radha Pérez CMA  March 23, 2020

## 2020-03-23 NOTE — TELEPHONE ENCOUNTER
Reason for Call:  Other prescription    Detailed comments: Pt stopped over after stopping at the [facility] to  her RX for the Oxycodone and they did not have it.  Pt did have an appt with Vi BAH here at 1 and was told that it had been approved.  She lives about 40 minutes and would like to  before going home.    Phone Number Patient can be reached at: Cell number on file:    Telephone Information:   Mobile [phone]       Best Time: any    Can we leave a detailed message on this number? YES    Call taken on 3/23/2020 at 1:31 PM by Nancy Chang

## 2020-03-23 NOTE — PROGRESS NOTES
Wound care per the order of Dr. Schwarz     Wound #1: Right Vulva. Inferior aspect of intact incision.   Measures: Not measured  Tunneling: none  Undermining: none  Wound bed type/amount: 100% pink nongranular tissue  Wound Edges: Open  Periwound: WNL   Drainage: None noted  Odor: None  Pain: Yes with direct touch, prolonged sitting and wound cares.     Wound #2: Left Vulva. Mid aspect of intact incision.     Measures: Not measured  Tunneling: None noted  Undermining: None noted  Wound bed type/amount: Pink smooth tissue  Wound Edges: Open  Periwound: WNL,  Incision well approximated to the superior and inferior of open site.  Drainage: None noted  Odor: No  Pain: Yes, moderate amount of pain with direct touch, prolonged sitting or wound cares.   Visualized wound, no packing needed.   Patient to follow up with Dr. Schwarz on Wednesday.    Vi Levy RN on 3/23/2020 at 3:14 PM

## 2020-03-24 ENCOUNTER — TELEPHONE (OUTPATIENT)
Dept: OBGYN | Facility: CLINIC | Age: 30
End: 2020-03-24

## 2020-03-24 NOTE — TELEPHONE ENCOUNTER
Send [employer] message to patient.  Patient has rescheduled appointment to Thursday as a telephone visit.    Radha Pérez, [facility]  March 24, 2020

## 2020-03-24 NOTE — TELEPHONE ENCOUNTER
Left message for patient to return call to clinic. Per Dr. Hernandez patient only needs telephone visit with Dr. Schwarz for either Thursday or Friday to go over pathology from surgery.  Dr. Schwarz will not be in 3/25/2020 and Dr. Hernandez will be see his patients.    Radha Pérez, KENNA  March 24, 2020

## 2020-03-26 ENCOUNTER — VIRTUAL VISIT (OUTPATIENT)
Dept: OBGYN | Facility: CLINIC | Age: 30
End: 2020-03-26
Payer: COMMERCIAL

## 2020-03-26 DIAGNOSIS — N76.4 VULVAR ABSCESS: Primary | ICD-10-CM

## 2020-03-26 PROCEDURE — 99442 ZZC PHYSICIAN TELEPHONE EVALUATION 11-20 MIN: CPT | Performed by: OBSTETRICS & GYNECOLOGY

## 2020-03-26 NOTE — PROGRESS NOTES
"Ayanna Davidson is a 29 year old female who is being evaluated via a billable telephone visit.      The patient has been notified of following:     \"This telephone visit will be conducted via a call between you and your physician/provider. We have found that certain health care needs can be provided without the need for a physical exam.  This service lets us provide the care you need with a short phone conversation.  If a prescription is necessary we can send it directly to your pharmacy.  If lab work is needed we can place an order for that and you can then stop by our lab to have the test done at a later time.    If during the course of the call the physician/provider feels a telephone visit is not appropriate, you will not be charged for this service.\"     Ayanna Davidson complains of    Chief Complaint   Patient presents with     Surgical Followup       I have reviewed and updated the patient's Past Medical History, Social History, Family History and Medication List.    ALLERGIES  Patient has no known allergies.    Additional provider notes: Ayanna Davidson is a 29 year old  who is postop s/p WLE of right vulva and I&D of left vulvar abscess (3/11), she is being following by wound care with packing of her abscess, though at her last appt (Monday) the wound had healed to the point where packing was no longer needed. Has been taking daily sitz baths. She reports skin in pink, only mildly tender (improved), no bleeding, no drainage, no odor, no other symptoms. No F/S/C, no other systemic symptoms.     Assessment/Plan:  Continue self cares at home. Call if developing any signs of infection.     Phone call duration: 15 minutes    Froylan Schwarz MD  OB/GYN  2020, 11:25 PM   "

## 2020-04-26 ENCOUNTER — NURSE TRIAGE (OUTPATIENT)
Dept: NURSING | Facility: CLINIC | Age: 30
End: 2020-04-26

## 2020-04-26 ENCOUNTER — HOSPITAL ENCOUNTER (EMERGENCY)
Facility: CLINIC | Age: 30
Discharge: HOME | End: 2020-04-26
Attending: EMERGENCY MEDICINE | Admitting: EMERGENCY MEDICINE
Payer: COMMERCIAL

## 2020-04-26 VITALS
TEMPERATURE: 98.7 F | SYSTOLIC BLOOD PRESSURE: 113 MMHG | WEIGHT: 200 LBS | OXYGEN SATURATION: 99 % | BODY MASS INDEX: 32.28 KG/M2 | HEART RATE: 101 BPM | RESPIRATION RATE: 16 BRPM | DIASTOLIC BLOOD PRESSURE: 92 MMHG

## 2020-04-26 DIAGNOSIS — N76.4 VULVAR ABSCESS: ICD-10-CM

## 2020-04-26 LAB
ALBUMIN UR-MCNC: NEGATIVE MG/DL
ANION GAP SERPL CALCULATED.3IONS-SCNC: 7 MMOL/L (ref 3–14)
APPEARANCE UR: ABNORMAL
BACTERIA #/AREA URNS HPF: ABNORMAL /HPF
BASOPHILS # BLD AUTO: 0.1 10E9/L (ref 0–0.2)
BASOPHILS NFR BLD AUTO: 0.4 %
BILIRUB UR QL STRIP: NEGATIVE
BUN SERPL-MCNC: 11 MG/DL (ref 7–30)
CALCIUM SERPL-MCNC: 9.2 MG/DL (ref 8.5–10.1)
CHLORIDE SERPL-SCNC: 107 MMOL/L (ref 94–109)
CO2 SERPL-SCNC: 26 MMOL/L (ref 20–32)
COLOR UR AUTO: YELLOW
CREAT SERPL-MCNC: 0.72 MG/DL (ref 0.52–1.04)
DIFFERENTIAL METHOD BLD: ABNORMAL
EOSINOPHIL NFR BLD AUTO: 1.3 %
ERYTHROCYTE [DISTWIDTH] IN BLOOD BY AUTOMATED COUNT: 15 % (ref 10–15)
ERYTHROCYTE [SEDIMENTATION RATE] IN BLOOD BY WESTERGREN METHOD: 9 MM/H (ref 0–20)
GFR SERPL CREATININE-BSD FRML MDRD: >90 ML/MIN/{1.73_M2}
GLUCOSE SERPL-MCNC: 90 MG/DL (ref 70–99)
GLUCOSE UR STRIP-MCNC: NEGATIVE MG/DL
HCG UR QL: NEGATIVE
HCT VFR BLD AUTO: 44.6 % (ref 35–47)
HGB BLD-MCNC: 14.5 G/DL (ref 11.7–15.7)
HGB UR QL STRIP: NEGATIVE
IMM GRANULOCYTES # BLD: 0 10E9/L (ref 0–0.4)
IMM GRANULOCYTES NFR BLD: 0.3 %
KETONES UR STRIP-MCNC: NEGATIVE MG/DL
LEUKOCYTE ESTERASE UR QL STRIP: ABNORMAL
LYMPHOCYTES # BLD AUTO: 1.6 10E9/L (ref 0.8–5.3)
LYMPHOCYTES NFR BLD AUTO: 13 %
MCH RBC QN AUTO: 28.2 PG (ref 26.5–33)
MCHC RBC AUTO-ENTMCNC: 32.5 G/DL (ref 31.5–36.5)
MCV RBC AUTO: 87 FL (ref 78–100)
MONOCYTES # BLD AUTO: 0.7 10E9/L (ref 0–1.3)
MONOCYTES NFR BLD AUTO: 5.8 %
MUCOUS THREADS #/AREA URNS LPF: PRESENT /LPF
NEUTROPHILS # BLD AUTO: 9.5 10E9/L (ref 1.6–8.3)
NEUTROPHILS NFR BLD AUTO: 79.2 %
NITRATE UR QL: NEGATIVE
NRBC # BLD AUTO: 0 10*3/UL
NRBC BLD AUTO-RTO: 0 /100
PH UR STRIP: 5 PH (ref 5–7)
PLATELET # BLD AUTO: 296 10E9/L (ref 150–450)
POTASSIUM SERPL-SCNC: 4.1 MMOL/L (ref 3.4–5.3)
RBC # BLD AUTO: 5.14 10E12/L (ref 3.8–5.2)
RBC #/AREA URNS AUTO: 6 /HPF (ref 0–2)
SODIUM SERPL-SCNC: 140 MMOL/L (ref 133–144)
SOURCE: ABNORMAL
SP GR UR STRIP: 1.02 (ref 1–1.03)
SPECIMEN SOURCE: ABNORMAL
SQUAMOUS #/AREA URNS AUTO: 12 /HPF (ref 0–1)
UROBILINOGEN UR STRIP-MCNC: 0 MG/DL (ref 0–2)
WBC # BLD AUTO: 11.9 10E9/L (ref 4–11)
WBC #/AREA URNS AUTO: 3 /HPF (ref 0–5)
WET PREP SPEC: ABNORMAL

## 2020-04-26 PROCEDURE — 87491 CHLMYD TRACH DNA AMP PROBE: CPT | Performed by: EMERGENCY MEDICINE

## 2020-04-26 PROCEDURE — 81001 URINALYSIS AUTO W/SCOPE: CPT | Performed by: EMERGENCY MEDICINE

## 2020-04-26 PROCEDURE — 96374 THER/PROPH/DIAG INJ IV PUSH: CPT | Performed by: EMERGENCY MEDICINE

## 2020-04-26 PROCEDURE — 80048 BASIC METABOLIC PNL TOTAL CA: CPT | Performed by: EMERGENCY MEDICINE

## 2020-04-26 PROCEDURE — 96376 TX/PRO/DX INJ SAME DRUG ADON: CPT | Performed by: EMERGENCY MEDICINE

## 2020-04-26 PROCEDURE — 99285 EMERGENCY DEPT VISIT HI MDM: CPT | Mod: Z6 | Performed by: EMERGENCY MEDICINE

## 2020-04-26 PROCEDURE — 99285 EMERGENCY DEPT VISIT HI MDM: CPT | Mod: 25 | Performed by: EMERGENCY MEDICINE

## 2020-04-26 PROCEDURE — 81025 URINE PREGNANCY TEST: CPT | Performed by: EMERGENCY MEDICINE

## 2020-04-26 PROCEDURE — 87591 N.GONORRHOEAE DNA AMP PROB: CPT | Performed by: EMERGENCY MEDICINE

## 2020-04-26 PROCEDURE — 96375 TX/PRO/DX INJ NEW DRUG ADDON: CPT | Performed by: EMERGENCY MEDICINE

## 2020-04-26 PROCEDURE — 87210 SMEAR WET MOUNT SALINE/INK: CPT | Performed by: EMERGENCY MEDICINE

## 2020-04-26 PROCEDURE — 85025 COMPLETE CBC W/AUTO DIFF WBC: CPT | Performed by: EMERGENCY MEDICINE

## 2020-04-26 PROCEDURE — 85652 RBC SED RATE AUTOMATED: CPT | Performed by: EMERGENCY MEDICINE

## 2020-04-26 PROCEDURE — 25000128 H RX IP 250 OP 636: Performed by: EMERGENCY MEDICINE

## 2020-04-26 RX ORDER — HYDROMORPHONE HYDROCHLORIDE 1 MG/ML
0.5 INJECTION, SOLUTION INTRAMUSCULAR; INTRAVENOUS; SUBCUTANEOUS
Status: DISCONTINUED | OUTPATIENT
Start: 2020-04-26 | End: 2020-04-26 | Stop reason: HOSPADM

## 2020-04-26 RX ORDER — ONDANSETRON 2 MG/ML
4 INJECTION INTRAMUSCULAR; INTRAVENOUS EVERY 30 MIN PRN
Status: DISCONTINUED | OUTPATIENT
Start: 2020-04-26 | End: 2020-04-26 | Stop reason: HOSPADM

## 2020-04-26 RX ORDER — OXYCODONE AND ACETAMINOPHEN 5; 325 MG/1; MG/1
1-2 TABLET ORAL EVERY 4 HOURS PRN
Qty: 12 TABLET | Refills: 0 | Status: SHIPPED | OUTPATIENT
Start: 2020-04-26 | End: 2020-10-27

## 2020-04-26 RX ORDER — SULFAMETHOXAZOLE/TRIMETHOPRIM 800-160 MG
1 TABLET ORAL 2 TIMES DAILY
Qty: 20 TABLET | Refills: 0 | Status: SHIPPED | OUTPATIENT
Start: 2020-04-26 | End: 2020-05-06

## 2020-04-26 RX ADMIN — HYDROMORPHONE HYDROCHLORIDE 0.5 MG: 1 INJECTION, SOLUTION INTRAMUSCULAR; INTRAVENOUS; SUBCUTANEOUS at 17:07

## 2020-04-26 RX ADMIN — ONDANSETRON 4 MG: 2 INJECTION INTRAMUSCULAR; INTRAVENOUS at 17:03

## 2020-04-26 RX ADMIN — HYDROMORPHONE HYDROCHLORIDE 0.5 MG: 1 INJECTION, SOLUTION INTRAMUSCULAR; INTRAVENOUS; SUBCUTANEOUS at 17:47

## 2020-04-26 NOTE — DISCHARGE INSTRUCTIONS
Return to the ER if you  develop new or worsening symptoms.  Take the antibiotics and pain medicines as directed.  Follow-up with your OB/GYN tomorrow   As scheduled.

## 2020-04-26 NOTE — ED PROVIDER NOTES
History     Chief Complaint   Patient presents with     Groin Pain     Back Pain     HPI  Ayanna Davidson is a 29 year old female who presents to the emergency department complaining of vaginal swelling in the left side concerning for an abscess.  She previously had infected Bartholin's glands multiple times before they were finally removed.  The one side was removed in March the other side was removed a few months before that by Dr. Schwarz.  She noted that it was uncomfortable to have sex a week ago but it did not really hurt much after that until 3 days ago when she noted vaginal swelling and pain and a mass.  She has not had a fever, vomiting.  She has had some nausea.  No chest pain or shortness of breath or headache or new cough. No rectal drainage, pain with defecation or bleeding.     Allergies:  No Known Allergies    Problem List:    Patient Active Problem List    Diagnosis Date Noted     Open wound 03/23/2020     Priority: Medium     TINO III (vulvar intraepithelial neoplasia III) 02/26/2020     Priority: Medium     Added automatically from request for surgery [id number]       Preoperative examination 01/28/2020     Priority: Medium     Added automatically from request for surgery [id number]       Bartholin's gland abscess 01/28/2020     Priority: Medium     Added automatically from request for surgery [id number]       Dog bite of right lower leg 09/27/2019     Priority: Medium     Cellulitis of left upper extremity 09/27/2019     Priority: Medium     Current every day smoker 09/27/2019     Priority: Medium     Gastroesophageal reflux disease without esophagitis 09/27/2019     Priority: Medium     Skin infection 09/27/2019     Priority: Medium     Dog bite of left upper extremity 05/07/2019     Priority: Medium     Cervical high risk HPV (human papillomavirus) test positive 03/12/2019     Priority: Medium     4/2016 NIL pap. No prior HPV testing.   3/12/19 NIL pap, + HR HPV (not 16 or 18). Plan: cotest in 1  yr  1/29/20 NIL pap, + HR HPV (not 16 or 18). Plan: [place]  2/11/20 [place] bx: neg. Plan: [facility] in 1 yr.        Vulvar abscess 08/16/2017     Priority: Medium     Drug-induced mental disorder (H) 09/20/2012     Priority: Medium     Overview:   ICD-10 Regulatory Update       Depressed 05/10/2012     Priority: Medium     Overdose of antipsychotic 05/09/2012     Priority: Medium        Past Medical History:    Past Medical History:   Diagnosis Date     Cervical high risk HPV (human papillomavirus) test positive 03/12/2019     Gastroesophageal reflux disease      HSV (herpes simplex virus) infection      Methamphetamine abuse (H)      recurrent Bartholin's cyst        Past Surgical History:    Past Surgical History:   Procedure Laterality Date     CHOLECYSTECTOMY       ENT SURGERY       EXAM UNDER ANESTHESIA PELVIC N/A 1/29/2020    Procedure: EXAM UNDER ANESTHESIA, PELVIS, PAP;  Surgeon: Froylan Schwarz MD;  Location: [facility]     EXCISE VULVA WIDE LOCAL Bilateral 3/11/2020    Procedure: Right Vulva Wedge Resection and Incision and Drainage Left Vulva;  Surgeon: Froylan Schwarz MD;  Location: [facility]     INCISION AND DRAINAGE ABSCESS PELVIS, COMBINED N/A 2/26/2018    Procedure: COMBINED INCISION AND DRAINAGE ABSCESS PELVIS;  INCISION AND DRAINAGE LABIAL ABSCESS;  Surgeon: Jase Beach MD;  Location: [facility]     INCISION AND DRAINAGE ABSCESS PELVIS, COMBINED N/A 3/5/2019    Procedure: Incision and Drainage Right Labial Abscess;  Surgeon: Jase Beach MD;  Location: [facility]     INCISION AND DRAINAGE LOWER EXTREMITY, COMBINED Right 8/11/2019    Procedure: irrigation and debridement right leg dog bite;  Surgeon: Rommel Pérez MD;  Location: [facility]     LAP ADJUSTABLE GASTRIC BAND       LEEP TX, CERVICAL       MAMMOPLASTY REDUCTION BILATERAL       MARSUPIALIZATION BARTHOLIN CYST N/A 4/29/2019    Procedure: Bartholin's Cyst removal;  Surgeon: Froylan Schwarz MD;  Location: [facility]     MARSUPIALIZATION  BARTHOLIN CYST Left 1/29/2020    Procedure: Excision of left bartholin's abscess;  Surgeon: Froylan Schwarz MD;  Location: [facility]     ORTHOPEDIC SURGERY         Family History:    Family History   Problem Relation Age of Onset     Heart Disease Father      Diabetes Maternal Grandmother      Breast Cancer Paternal Grandmother      Testicular cancer Paternal Grandfather        Social History:  Marital Status:  Single [1]  Social History     Tobacco Use     Smoking status: Current Every Day Smoker     Packs/day: 0.25     Smokeless tobacco: Current User     Tobacco comment: uses E cig   Substance Use Topics     Alcohol use: Yes     Frequency: Monthly or less     Drug use: Not Currently     Comment: In treatment for meth.        Medications:    oxyCODONE-acetaminophen (PERCOCET) 5-325 MG tablet  sulfamethoxazole-trimethoprim (BACTRIM DS) 800-160 MG tablet  Acetaminophen (TYLENOL PO)  ibuprofen (ADVIL/MOTRIN) 200 MG tablet  ibuprofen (ADVIL/MOTRIN) 600 MG tablet  omeprazole (PRILOSEC OTC) 20 MG EC tablet  ondansetron (ZOFRAN) 4 MG tablet  ondansetron (ZOFRAN-ODT) 4 MG ODT tab          Review of Systems   All other systems reviewed and are negative.      Physical Exam   BP: (!) 113/92  Pulse: 101  Temp: 98.7  F (37.1  C)  Resp: 16  Weight: 90.7 kg (200 lb)  SpO2: 99 %      Physical Exam  Constitutional:       General: She is not in acute distress.     Appearance: Normal appearance. She is well-developed and normal weight. She is not ill-appearing or diaphoretic.   HENT:      Head: Normocephalic and atraumatic.      Nose: Nose normal.      Mouth/Throat:      Mouth: Mucous membranes are moist.   Eyes:      General: No scleral icterus.     Extraocular Movements: Extraocular movements intact.      Conjunctiva/sclera: Conjunctivae normal.      Pupils: Pupils are equal, round, and reactive to light.   Neck:      Musculoskeletal: Normal range of motion and neck supple.   Cardiovascular:      Rate and Rhythm: Normal rate.    Pulmonary:      Effort: Pulmonary effort is normal.      Breath sounds: Normal breath sounds.   Genitourinary:     Comments: Relatively large firm subcutaneous mass over the left vulva is directly behind the previous incision that has healed nicely.  There is no vulvar erythema.  There is exquisite tenderness with palpation.  Is a few centimeters in the anterior to posterior direction and probably 2 cm in the right to left orientation and difficult to say how deep.  She had exquisite pain with pelvic exam using a speculum and had a thick white discharge from the cervix.  Wet prep and GC chlamydia performed.  Skin:     General: Skin is warm and dry.      Coloration: Skin is not pale.      Findings: No erythema or rash.   Neurological:      Mental Status: She is alert and oriented to person, place, and time.         ED Course        Procedures                 Results for orders placed or performed during the hospital encounter of 04/26/20 (from the past 24 hour(s))   Routine UA with microscopic   Result Value Ref Range    Color Urine Yellow     Appearance Urine Cloudy     Glucose Urine Negative NEG^Negative mg/dL    Bilirubin Urine Negative NEG^Negative    Ketones Urine Negative NEG^Negative mg/dL    Specific Gravity Urine 1.021 1.003 - 1.035    Blood Urine Negative NEG^Negative    pH Urine 5.0 5.0 - 7.0 pH    Protein Albumin Urine Negative NEG^Negative mg/dL    Urobilinogen mg/dL 0.0 0.0 - 2.0 mg/dL    Nitrite Urine Negative NEG^Negative    Leukocyte Esterase Urine Moderate (A) NEG^Negative    Source Unspecified Urine     WBC Urine 3 0 - 5 /HPF    RBC Urine 6 (H) 0 - 2 /HPF    Bacteria Urine Few (A) NEG^Negative /HPF    Squamous Epithelial /HPF Urine 12 (H) 0 - 1 /HPF    Mucous Urine Present (A) NEG^Negative /LPF   HCG qualitative urine   Result Value Ref Range    HCG Qual Urine Negative NEG^Negative   CBC with platelets differential   Result Value Ref Range    WBC 11.9 (H) 4.0 - 11.0 10e9/L    RBC Count 5.14 3.8 -  5.2 10e12/L    Hemoglobin 14.5 11.7 - 15.7 g/dL    Hematocrit 44.6 35.0 - 47.0 %    MCV 87 78 - 100 fl    MCH 28.2 26.5 - 33.0 pg    MCHC 32.5 31.5 - 36.5 g/dL    RDW 15.0 10.0 - 15.0 %    Platelet Count 296 150 - 450 10e9/L    Diff Method Automated Method     % Neutrophils 79.2 %    % Lymphocytes 13.0 %    % Monocytes 5.8 %    % Eosinophils 1.3 %    % Basophils 0.4 %    % Immature Granulocytes 0.3 %    Nucleated RBCs 0 0 /100    Absolute Neutrophil 9.5 (H) 1.6 - 8.3 10e9/L    Absolute Lymphocytes 1.6 0.8 - 5.3 10e9/L    Absolute Monocytes 0.7 0.0 - 1.3 10e9/L    Absolute Basophils 0.1 0.0 - 0.2 10e9/L    Abs Immature Granulocytes 0.0 0 - 0.4 10e9/L    Absolute Nucleated RBC 0.0    Basic metabolic panel   Result Value Ref Range    Sodium 140 133 - 144 mmol/L    Potassium 4.1 3.4 - 5.3 mmol/L    Chloride 107 94 - 109 mmol/L    Carbon Dioxide 26 20 - 32 mmol/L    Anion Gap 7 3 - 14 mmol/L    Glucose 90 70 - 99 mg/dL    Urea Nitrogen 11 7 - 30 mg/dL    Creatinine 0.72 0.52 - 1.04 mg/dL    GFR Estimate >90 >60 mL/min/[1.73_m2]    GFR Estimate If Black >90 >60 mL/min/[1.73_m2]    Calcium 9.2 8.5 - 10.1 mg/dL   Erythrocyte sedimentation rate auto   Result Value Ref Range    Sed Rate 9 0 - 20 mm/h   Wet prep    Specimen: Vagina   Result Value Ref Range    Specimen Description Vagina     Wet Prep Moderate  PMNs seen       Wet Prep Many  Clue cells seen   (A)     Wet Prep No Trichomonas seen     Wet Prep No yeast seen        Medications - No data to display    Assessments & Plan (with Medical Decision Making) 29-year-old female with a vulvar abscess most likely.  GC chlamydia sent as she had some cervical discharge.  I will place patient on antibiotics and she will see Dr. Schwarz tomorrow - appointment made. Will rx pain meds as well.  I did not get her wet prep results back until after the visit.  She will need treatment for bacterial vaginosis.  I discussed the case with Dr. Saba who was on-call for OB/GYN and he recommended  follow-up with Dr. Schwarz in the clinic tomorrow.  Most likely she will need incision and drainage.  I felt it was best for OB/GYN to do this given her history of multiple abscesses and infected Bartholin's gland cyst.  The diagnosis, treatment options, risks discussed with a competent patient who agrees with the plan.     I have reviewed the nursing notes.    I have reviewed the findings, diagnosis, plan and need for follow up with the patient.      Discharge Medication List as of 4/26/2020  6:02 PM      START taking these medications    Details   sulfamethoxazole-trimethoprim (BACTRIM DS) 800-160 MG tablet Take 1 tablet by mouth 2 times daily for 10 days, Disp-20 tablet,R-0, E-Prescribe             Final diagnoses:   Vulvar abscess       4/26/2020   [facility] EMERGENCY DEPARTMENT     Thierno Nogueira MD  04/26/20 3239

## 2020-04-26 NOTE — ED AVS SNAPSHOT
[facility] Emergency Department  [address] [place] [address]  Phone:  [phone]  Fax:  [phone]                                    Ayanna Davidson   MRN: [id number]    Department:  [facility] Emergency Department   Date of Visit:  4/26/2020           After Visit Summary Signature Page    I have received my discharge instructions, and my questions have been answered. I have discussed any challenges I see with this plan with the nurse or doctor.    ..........................................................................................................................................  Patient/Patient Representative Signature      ..........................................................................................................................................  Patient Representative Print Name and Relationship to Patient    ..................................................               ................................................  Date                                   Time    ..........................................................................................................................................  Reviewed by Signature/Title    ...................................................              ..............................................  Date                                               Time          22EPIC Rev 08/18
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
## 2020-04-27 ENCOUNTER — OFFICE VISIT (OUTPATIENT)
Dept: OBGYN | Facility: CLINIC | Age: 30
End: 2020-04-27
Payer: COMMERCIAL

## 2020-04-27 VITALS
HEART RATE: 99 BPM | BODY MASS INDEX: 32.57 KG/M2 | TEMPERATURE: 98 F | SYSTOLIC BLOOD PRESSURE: 114 MMHG | DIASTOLIC BLOOD PRESSURE: 78 MMHG | WEIGHT: 201.8 LBS

## 2020-04-27 DIAGNOSIS — N76.4 VULVAR ABSCESS: Primary | ICD-10-CM

## 2020-04-27 LAB
C TRACH DNA SPEC QL NAA+PROBE: NEGATIVE
N GONORRHOEA DNA SPEC QL NAA+PROBE: NEGATIVE
SPECIMEN SOURCE: NORMAL
SPECIMEN SOURCE: NORMAL

## 2020-04-27 PROCEDURE — 56405 I&D VULVA/PERINEAL ABSCESS: CPT | Performed by: OBSTETRICS & GYNECOLOGY

## 2020-04-27 PROCEDURE — 99213 OFFICE O/P EST LOW 20 MIN: CPT | Mod: 25 | Performed by: OBSTETRICS & GYNECOLOGY

## 2020-04-27 NOTE — LETTER
[address] [address]  [phone]          April 27, 2020    RE:  Ayanna Davidson                                                                                                                                                       [address] [address] [facility] [address]            To whom it may concern:    Ayanna Davidson is under my professional care, She  may return to work on 4/28 without restrictions.     Sincerely,        Froylan Schwarz MD

## 2020-04-27 NOTE — PROGRESS NOTES
Clinic Note    CC:    Chief Complaint   Patient presents with     Hospital F/U     vulvar abscess      HPI:  Ayanna Davidson is a 29 year old  woman with history of Bartholin abscess s/p surgical excisions bilaterally who presents for evaluation of a recurrent vulvar abscess in the scar tissue of her last surgical excision (20). Of note, prior recurrent abscess at this same site drained and packed on ongoing would care and good resolution (3/11/20). She also underwent a WLE on the right, which healed well, with no complications. She does have a hx of multiple prior superficial infections, hx of meth use, though now sober for many months. She denies F/S/C, denies any other systemic symptoms, does note spontaneous drainage beginning last night, red to clear fluid. Labs in ED notable for very mild leukocytosis, clue cells on wet prep, and LE on UA, STI swab collected and pending. She was started on bactrim.     EXAM:  Vitals:    20 0815   BP: 114/78   BP Location: Right arm   Patient Position: Chair   Cuff Size: Adult Regular   Pulse: 99   Temp: 98  F (36.7  C)   TempSrc: Temporal   Weight: 91.5 kg (201 lb 12.8 oz)    Body mass index is 32.57 kg/m .    Gen: Alert, oriented, appropriately interactive, NAD  CV: RRR  Resp: CTAB, good effort without distress   Abdomen: soft, non tender, non distended, no masses  Perineum: 2mm defect in prior left vulvar incision scar draining blood tinged serosanguinous fluid, mild induration without fluctuance deep to the scar separation, defect probes 3cm, no skin changes on the vulva, left vulva diffusely mildly swollen  MSK: normal gait, symmetric movements UE & LE  Lower extremities: non-tender, no edema    Labs & Imaging:  Results for orders placed or performed during the hospital encounter of 20 (from the past 24 hour(s))   Routine UA with microscopic   Result Value Ref Range    Color Urine Yellow     Appearance Urine Cloudy     Glucose Urine Negative  NEG^Negative mg/dL    Bilirubin Urine Negative NEG^Negative    Ketones Urine Negative NEG^Negative mg/dL    Specific Gravity Urine 1.021 1.003 - 1.035    Blood Urine Negative NEG^Negative    pH Urine 5.0 5.0 - 7.0 pH    Protein Albumin Urine Negative NEG^Negative mg/dL    Urobilinogen mg/dL 0.0 0.0 - 2.0 mg/dL    Nitrite Urine Negative NEG^Negative    Leukocyte Esterase Urine Moderate (A) NEG^Negative    Source Unspecified Urine     WBC Urine 3 0 - 5 /HPF    RBC Urine 6 (H) 0 - 2 /HPF    Bacteria Urine Few (A) NEG^Negative /HPF    Squamous Epithelial /HPF Urine 12 (H) 0 - 1 /HPF    Mucous Urine Present (A) NEG^Negative /LPF   HCG qualitative urine   Result Value Ref Range    HCG Qual Urine Negative NEG^Negative   CBC with platelets differential   Result Value Ref Range    WBC 11.9 (H) 4.0 - 11.0 10e9/L    RBC Count 5.14 3.8 - 5.2 10e12/L    Hemoglobin 14.5 11.7 - 15.7 g/dL    Hematocrit 44.6 35.0 - 47.0 %    MCV 87 78 - 100 fl    MCH 28.2 26.5 - 33.0 pg    MCHC 32.5 31.5 - 36.5 g/dL    RDW 15.0 10.0 - 15.0 %    Platelet Count 296 150 - 450 10e9/L    Diff Method Automated Method     % Neutrophils 79.2 %    % Lymphocytes 13.0 %    % Monocytes 5.8 %    % Eosinophils 1.3 %    % Basophils 0.4 %    % Immature Granulocytes 0.3 %    Nucleated RBCs 0 0 /100    Absolute Neutrophil 9.5 (H) 1.6 - 8.3 10e9/L    Absolute Lymphocytes 1.6 0.8 - 5.3 10e9/L    Absolute Monocytes 0.7 0.0 - 1.3 10e9/L    Absolute Basophils 0.1 0.0 - 0.2 10e9/L    Abs Immature Granulocytes 0.0 0 - 0.4 10e9/L    Absolute Nucleated RBC 0.0    Basic metabolic panel   Result Value Ref Range    Sodium 140 133 - 144 mmol/L    Potassium 4.1 3.4 - 5.3 mmol/L    Chloride 107 94 - 109 mmol/L    Carbon Dioxide 26 20 - 32 mmol/L    Anion Gap 7 3 - 14 mmol/L    Glucose 90 70 - 99 mg/dL    Urea Nitrogen 11 7 - 30 mg/dL    Creatinine 0.72 0.52 - 1.04 mg/dL    GFR Estimate >90 >60 mL/min/[1.73_m2]    GFR Estimate If Black >90 >60 mL/min/[1.73_m2]    Calcium 9.2 8.5 -  "10.1 mg/dL   Erythrocyte sedimentation rate auto   Result Value Ref Range    Sed Rate 9 0 - 20 mm/h   Wet prep    Specimen: Vagina   Result Value Ref Range    Specimen Description Vagina     Wet Prep Moderate  PMNs seen       Wet Prep Many  Clue cells seen   (A)     Wet Prep No Trichomonas seen     Wet Prep No yeast seen      Lab Results   Component Value Date    PAP NIL 2020    PAP NIL 2019     ASSESSMENT/PLAN: Ayanna Davidson is a 29 year old  woman with a recurrent left vulvar abscess in scar tissue, spontaneously drained last night.     1. Vulvar abscess  - I&D with packing per note below, will plan f/u with would care  - Sitz baths daily, continue Abx  - WOUND CARE REFERRAL  - DRAIN SKIN ABSCESS SIMPLE/SINGLE    Froylan Schwarz MD   2020 8:51 AM      Procedure note    Left vulva cleaned with betadine x 3.  15mL of 1% lidocaine with epineprhine used to infiltrate the area with good anethesia.  Number 11 scapel used to make a stab incion to lengthen the defect in her prior Bartholin abscess excision scar.  Blood tinged clear drainage was remove. Defect probed with wooden handle of swab, noted 3cm depth straight in, less that 1cm laterally in all directions, any loculations broken up. Area was copiously irrigated with saline. \" Ioban gauze backing placed. Appropraite wound care dressing applied.  Pt tolerated preocedure well.    Froylan Schwarz MD  OB/GYN  2020, 8:55 AM       "

## 2020-04-27 NOTE — RESULT ENCOUNTER NOTE
Final result for both N. Gonorrhoeae PCR and Chlamydia Trachomatis PCR are NEGATIVE.  No treatment or change in treatment per [facility] ED Lab Result protocol.

## 2020-04-28 ENCOUNTER — HOSPITAL ENCOUNTER (EMERGENCY)
Facility: CLINIC | Age: 30
Discharge: HOME | End: 2020-04-29
Attending: FAMILY MEDICINE | Admitting: FAMILY MEDICINE
Payer: COMMERCIAL

## 2020-04-28 ENCOUNTER — NURSE TRIAGE (OUTPATIENT)
Dept: NURSING | Facility: CLINIC | Age: 30
End: 2020-04-28

## 2020-04-28 DIAGNOSIS — N90.89 VULVAR LESION: ICD-10-CM

## 2020-04-28 PROCEDURE — 99282 EMERGENCY DEPT VISIT SF MDM: CPT | Mod: Z6 | Performed by: FAMILY MEDICINE

## 2020-04-28 PROCEDURE — 99282 EMERGENCY DEPT VISIT SF MDM: CPT | Performed by: FAMILY MEDICINE

## 2020-04-28 NOTE — ED AVS SNAPSHOT
[facility] Emergency Department  [address] [place] [address]  Phone:  [phone]  Fax:  [phone]                                    Ayanna Davidson   MRN: [id number]    Department:  [facility] Emergency Department   Date of Visit:  4/28/2020           After Visit Summary Signature Page    I have received my discharge instructions, and my questions have been answered. I have discussed any challenges I see with this plan with the nurse or doctor.    ..........................................................................................................................................  Patient/Patient Representative Signature      ..........................................................................................................................................  Patient Representative Print Name and Relationship to Patient    ..................................................               ................................................  Date                                   Time    ..........................................................................................................................................  Reviewed by Signature/Title    ...................................................              ..............................................  Date                                               Time          22EPIC Rev 08/18

## 2020-04-29 VITALS
WEIGHT: 190 LBS | BODY MASS INDEX: 30.67 KG/M2 | TEMPERATURE: 98.5 F | DIASTOLIC BLOOD PRESSURE: 84 MMHG | SYSTOLIC BLOOD PRESSURE: 116 MMHG | RESPIRATION RATE: 20 BRPM | OXYGEN SATURATION: 98 %

## 2020-04-29 NOTE — TELEPHONE ENCOUNTER
"Packing came out of the vaginal area and past 30 minutes has had lower abdominal pain/ rates pain as an \"8/10' left side is worse than the right/ pain below her waist/feels weak from the pain/boyfriend will drive her into the er  Vel Herrera RN -[phone]    Reason for Disposition    Patient sounds very sick or weak to the triager    Additional Information    Negative: Shock suspected (e.g., cold/pale/clammy skin, too weak to stand, low BP, rapid pulse)    Negative: Difficult to awaken or acting confused (e.g., disoriented, slurred speech)    Negative: Passed out (i.e., lost consciousness, collapsed and was not responding)    Negative: Sounds like a life-threatening emergency to the triager    Negative: Chest pain    Negative: Pain is mainly in upper abdomen  (if needed ask: \"is it mainly above the belly button?\")    Negative: Followed an abdomen (stomach) injury    Negative: [1] Abdominal pain AND [2] pregnant < 20 weeks    Negative: [1] Abdominal pain AND [2] pregnant > 20 weeks    Negative: [1] Abdominal pain AND [2] postpartum < 1 month since delivery    Negative: [1] SEVERE pain (e.g., excruciating) AND [2] present > 1 hour    Negative: [1] SEVERE pain AND [2] age > 60    Negative: [1] Vomiting AND [2] contains red blood or black (\"coffee ground\") material  (Exception: few red streaks in vomit that only happened once)    Negative: Blood in bowel movements   (Exception: blood on surface of BM with constipation)    Negative: Black or tarry bowel movements  (Exception: chronic-unchanged  black-grey bowel movements AND is taking iron pills or Pepto-bismol)    Protocols used: ABDOMINAL PAIN - FEMALE-A-AH      "

## 2020-04-29 NOTE — DISCHARGE INSTRUCTIONS
See Dr. Schwarz or wound care Thursday or Friday of this week.  Try to keep the packing in place.  It was a pleasure visiting with you this evening.  I hope this settles down quickly for you.    Thank you for choosing [facility]. We appreciate the opportunity to meet your urgent medical needs. Please let us know if we could have done anything to make your stay more satisfying.    After discharge, please closely monitor for any new or worsening symptoms. Return to the Emergency Department if you develop any acute worsening signs or symptoms.    If you had lab work, cultures or imaging studies done during your stay, the final results may still be pending. We will call you if your plan of care needs to change. However, if you are not improving as expected, please follow up with your primary care provider or clinic.     Start any prescription medications that were prescribed to you and take them as directed.     Please see additional handouts that may be pertinent to your condition.

## 2020-04-29 NOTE — ED TRIAGE NOTES
Pt was seen on 4/26 in the ED then 4/27 in clinic with Dr. Schwarz for a vulvar abscess. Pt states her packing came out and pt states she is having bad cramping. Pt also feels weak.

## 2020-04-29 NOTE — ED PROVIDER NOTES
History     Chief Complaint   Patient presents with     Abdominal Pain     HPI  Ayanna Davidson is a 29 year old female who presents to the ED this evening because the packing fell out of her previously I indeed Bartholin cyst on the left vulva.    She has had infected Bartholin glands multiple times were finally surgically removed.  She was seen in the ED on 4/26/2020 by  and felt to have a recurrent vulvar abscess.  She saw Dr. Schwarz yesterday in clinic and had this drained and packed.  The packing fell out tonight around 8 PM.  She had some cramping around that time but that has subsided.  No getting some drainage.  Fevers chills or sweats.  No urinary symptoms.  She remains on the Bactrim that was started a couple of days ago when she had a positive UA.    Allergies:  No Known Allergies    Problem List:    Patient Active Problem List    Diagnosis Date Noted     Open wound 03/23/2020     Priority: Medium     TINO III (vulvar intraepithelial neoplasia III) 02/26/2020     Priority: Medium     Added automatically from request for surgery [id number]       Preoperative examination 01/28/2020     Priority: Medium     Added automatically from request for surgery [id number]       Bartholin's gland abscess 01/28/2020     Priority: Medium     Added automatically from request for surgery [id number]       Dog bite of right lower leg 09/27/2019     Priority: Medium     Cellulitis of left upper extremity 09/27/2019     Priority: Medium     Current every day smoker 09/27/2019     Priority: Medium     Gastroesophageal reflux disease without esophagitis 09/27/2019     Priority: Medium     Skin infection 09/27/2019     Priority: Medium     Dog bite of left upper extremity 05/07/2019     Priority: Medium     Cervical high risk HPV (human papillomavirus) test positive 03/12/2019     Priority: Medium     4/2016 NIL pap. No prior HPV testing.   3/12/19 NIL pap, + HR HPV (not 16 or 18). Plan: cotest in 1 yr  1/29/20 NIL pap, + HR HPV (not  16 or 18). Plan: [facility]  2/11/20 Perry bx: neg. Plan: Cotest in 1 yr.        Vulvar abscess 08/16/2017     Priority: Medium     Drug-induced mental disorder (H) 09/20/2012     Priority: Medium     Overview:   ICD-10 Regulatory Update       Depressed 05/10/2012     Priority: Medium     Overdose of antipsychotic 05/09/2012     Priority: Medium        Past Medical History:    Past Medical History:   Diagnosis Date     Cervical high risk HPV (human papillomavirus) test positive 03/12/2019     Gastroesophageal reflux disease      HSV (herpes simplex virus) infection      Methamphetamine abuse (H)      recurrent Bartholin's cyst        Past Surgical History:    Past Surgical History:   Procedure Laterality Date     CHOLECYSTECTOMY       ENT SURGERY       EXAM UNDER ANESTHESIA PELVIC N/A 1/29/2020    Procedure: EXAM UNDER ANESTHESIA, PELVIS, PAP;  Surgeon: Froylan Schwarz MD;  Location: [facility] OR     EXCISE VULVA WIDE LOCAL Bilateral 3/11/2020    Procedure: Right Vulva Wedge Resection and Incision and Drainage Left Vulva;  Surgeon: Froylan Schwarz MD;  Location: [facility] OR     INCISION AND DRAINAGE ABSCESS PELVIS, COMBINED N/A 2/26/2018    Procedure: COMBINED INCISION AND DRAINAGE ABSCESS PELVIS;  INCISION AND DRAINAGE LABIAL ABSCESS;  Surgeon: Jase Beach MD;  Location: [facility] OR     INCISION AND DRAINAGE ABSCESS PELVIS, COMBINED N/A 3/5/2019    Procedure: Incision and Drainage Right Labial Abscess;  Surgeon: Jase Beach MD;  Location: [facility] OR     INCISION AND DRAINAGE LOWER EXTREMITY, COMBINED Right 8/11/2019    Procedure: irrigation and debridement right leg dog bite;  Surgeon: Rommel Pérez MD;  Location: [facility] OR     LAP ADJUSTABLE GASTRIC BAND       LEEP TX, CERVICAL       MAMMOPLASTY REDUCTION BILATERAL       MARSUPIALIZATION BARTHOLIN CYST N/A 4/29/2019    Procedure: Bartholin's Cyst removal;  Surgeon: Froylan Schwarz MD;  Location: [facility] OR     MARSUPIALIZATION BARTHOLIN CYST Left 1/29/2020     Procedure: Excision of left bartholin's abscess;  Surgeon: Froylan Schwarz MD;  Location: [facility] OR     ORTHOPEDIC SURGERY         Family History:    Family History   Problem Relation Age of Onset     Heart Disease Father      Diabetes Maternal Grandmother      Breast Cancer Paternal Grandmother      Testicular cancer Paternal Grandfather        Social History:  Marital Status:  Single [1]  Social History     Tobacco Use     Smoking status: Current Every Day Smoker     Packs/day: 0.25     Smokeless tobacco: Current User     Tobacco comment: uses E cig   Substance Use Topics     Alcohol use: Yes     Frequency: Monthly or less     Drug use: Not Currently     Comment: In treatment for meth.        Medications:    Acetaminophen (TYLENOL PO)  ibuprofen (ADVIL/MOTRIN) 200 MG tablet  ibuprofen (ADVIL/MOTRIN) 600 MG tablet  omeprazole (PRILOSEC OTC) 20 MG EC tablet  ondansetron (ZOFRAN) 4 MG tablet  ondansetron (ZOFRAN-ODT) 4 MG ODT tab  oxyCODONE-acetaminophen (PERCOCET) 5-325 MG tablet  sulfamethoxazole-trimethoprim (BACTRIM DS) 800-160 MG tablet          Review of Systems   All other systems reviewed and are negative.      Physical Exam   BP: 116/84  Heart Rate: 88  Temp: 98.5  F (36.9  C)  Resp: 18  Weight: 86.2 kg (190 lb)  SpO2: 98 %      Physical Exam  Constitutional:       Appearance: She is well-developed.   Pulmonary:      Effort: Pulmonary effort is normal. No respiratory distress.   Genitourinary:      Neurological:      Mental Status: She is alert.         ED Course  (with Medical Decision Making)    29-year-old with multiple Bartholin abscesses in the past.  Had these surgically excised and then had recurrence of some fluid buildup earlier this week.  Saw Dr. Schwarz in clinic yesterday and he did open up this area but instead of purulent matter it was essentially serosanguineous fluid.  He felt it was just a fluid collection amongst the scar tissue.  He did pack this as it will need to heal from the inside  out.  The packing fell out tonight.    Actor both he is on-call tonight and actually in-house.  I spoke with him and he would be happy to come and see the patient and talk with her and repack it since he knows what it looks like yesterday.    Dr. Schwarz kindly came down to the ED and saw the patient and repacked her wound.  It was more of a fluid collection rather than an abscess as the fluid was serosanguineous rather than purulent.  He instructed the patient that she should be seen by either himself or wound care on Thursday or Friday of this week.          Procedures               Critical Care time:  none               No results found for this or any previous visit (from the past 24 hour(s)).    Medications - No data to display    Assessments & Plan     I have reviewed the nursing notes.    I have reviewed the findings, diagnosis, plan and need for follow up with the patient.       New Prescriptions    No medications on file       Final diagnoses:   Vulvar lesion - scar tissue with serosanguinous fluid collection       4/28/2020   [facility] EMERGENCY DEPARTMENT     Edmund Clay MD  04/29/20 0016

## 2020-05-17 ENCOUNTER — NURSE TRIAGE (OUTPATIENT)
Dept: NURSING | Facility: CLINIC | Age: 30
End: 2020-05-17

## 2020-05-17 ENCOUNTER — TELEPHONE (OUTPATIENT)
Dept: OBGYN | Facility: CLINIC | Age: 30
End: 2020-05-17

## 2020-05-17 NOTE — TELEPHONE ENCOUNTER
Reason for call:  Other   Patient called regarding (reason for call): prescription  Additional comments: Pt called regarding a medication refill for a antibodics, patient does not remember the name but has taken it before. Patient started she needed medication right away.     Phone number to reach patient:  Cell number on file:    Telephone Information:   Mobile [phone]       Best Time:  anytime    Can we leave a detailed message on this number?  YES    Travel screening: Not Applicable

## 2020-05-17 NOTE — TELEPHONE ENCOUNTER
Ayanna is reporting recurrent left vulvar pain and possible infection.    She is requesting antibiotics and pain medication.    She was previously treated for a vulvar infection in the ER on 4/28    She reports that her symptoms were improving until yesterday when she started experiencing increased pain.    Pain rated 7/10    Per protocol, advised to be seen/evaluated within 24 hours.    Warm transferred to scheduling.    COVID 19 Nurse Triage Plan/Patient Instructions    Please be aware that novel coronavirus (COVID-19) may be circulating in the community. If you develop symptoms such as fever, cough, or SOB or if you have concerns about the presence of another infection including coronavirus (COVID-19), please contact your health care provider or visit [email or web address].     Disposition/Instructions    Patient to have scheduled Telephone Visit with a provider. Follow System Ambulatory Workflow for COVID 19.     The clinic staff will assist you to schedule an appointment to complete the Telephone Visit with a provider during normal clinic hours.       Call Back If: Your symptoms worsen before you are able to complete your Telephone Visit with a provider.    Thank you for limiting contact with others, wearing a simple mask to cover your cough, practice good hand hygiene habits and accessing our virtual services where possible to limit the spread of this virus.    For more information about COVID19 and options for caring for yourself at home, please visit the CDC website at https://www.cdc.gov/coronavirus/2019-ncov/about/steps-when-sick.html  For more options for care at [facility], please visit our website at [email or web address]    For more information, please use the [place] Department of Health COVID-19 Website: https://www.health.state.[place]/diseases/coronavirus/index.html  [place] Department of Health ([facility]) COVID-19 Hotlines (Interpreters available):      Health questions:  Phone Number: [phone] or [phone] and Hours: 7 a.m. to 7 p.m.    Schools and  questions: Phone Number: [phone] or [phone] and Hours 7 a.m. to 7 p.m.    Laura Olivo RN  [facility]      Additional Information    Negative: Patient sounds very sick or weak to the triager    Negative: [1] SEVERE pain AND [2] not improved 2 hours after pain medicine    Negative: [1] Genital area looks infected (e.g., draining sore, spreading redness) AND [2] fever    Negative: MODERATE-SEVERE itching (i.e., interferes with school, work, or sleep)    Genital area looks infected (e.g., draining sore, spreading redness)    Protocols used: VULVAR SYMPTOMS-A-AH

## 2020-05-18 ENCOUNTER — OFFICE VISIT (OUTPATIENT)
Dept: OBGYN | Facility: CLINIC | Age: 30
End: 2020-05-18
Payer: COMMERCIAL

## 2020-05-18 VITALS
SYSTOLIC BLOOD PRESSURE: 110 MMHG | WEIGHT: 203.7 LBS | TEMPERATURE: 97.9 F | BODY MASS INDEX: 32.88 KG/M2 | DIASTOLIC BLOOD PRESSURE: 78 MMHG | HEART RATE: 94 BPM

## 2020-05-18 DIAGNOSIS — S31.40XS OPEN WOUND OF VULVA, SEQUELA: Primary | ICD-10-CM

## 2020-05-18 PROCEDURE — 56405 I&D VULVA/PERINEAL ABSCESS: CPT | Performed by: OBSTETRICS & GYNECOLOGY

## 2020-05-18 PROCEDURE — 99213 OFFICE O/P EST LOW 20 MIN: CPT | Mod: 25 | Performed by: OBSTETRICS & GYNECOLOGY

## 2020-05-18 PROCEDURE — 87070 CULTURE OTHR SPECIMN AEROBIC: CPT | Performed by: OBSTETRICS & GYNECOLOGY

## 2020-05-18 RX ORDER — OXYCODONE AND ACETAMINOPHEN 5; 325 MG/1; MG/1
1 TABLET ORAL EVERY 6 HOURS PRN
Qty: 8 TABLET | Refills: 0 | Status: SHIPPED | OUTPATIENT
Start: 2020-05-18 | End: 2020-05-20

## 2020-05-18 NOTE — PATIENT INSTRUCTIONS
Patient Education     Taking a Sitz Bath  A sitz bath is a type of therapy done by sitting in warm, shallow water. It can help soothe pain, itching, and other symptoms in the anal and genital areas. It can also help keep these areas clean if you can t take a bath or shower. Sitz is from the Georgian word sitzen, which means to sit.  Why a sitz bath is done  A sitz bath helps clean and treat certain problems in the anal area, genital area, and the perineum. The perineum is the area between the anus and the vulva in women. In men, it is between the anus and the scrotum. A sitz bath helps increase blood flow to these areas and relax the muscles.  A sitz bath may be done to:    Help ease pain and itching from hemorrhoids    Help ease pain from an anal fissure    Bathe and soothe the perineum after childbirth    Clean and soothe the anal area or perineum after surgery    Ease prostate pain during prostatitis or after a procedure    Help ease period cramps    Clean the anal and genital areas if you can t take a bath or shower  How a sitz bath is done  A sitz bath can be done in 2 ways: in a bathtub or using a sitz bath bowl.  In a bathtub  To take a sitz bath in a tub:    Make sure your bathtub is clean. Fill a clean bathtub with 3 to 4 inches of warm water. In some cases, your healthcare provider may tell you to use cold water instead.    Add salt or medicine to the water if advised by your healthcare provider.    Gently lower yourself down into the bathtub and sit on the bottom of the tub. Don t get into the bath unless the water temperature is comfortable.    Hold on to a railing. Or ask for help from a family member, friend, or caregiver if needed.  If you have a wound, the water may cause pain at first. But the pain should ease. Make sure the area that needs treating is under the water. You can bend your knees up to help expose the area that needs contact with the water.  Using a sitz bath bowl  A sitz bath bowl is a  special plastic container that s placed on a toilet. You can buy this in many drugstores and medical supply stores. To take a sitz bath this way:    Lift the toilet lid and seat. Place a cleaned plastic sitz bath bowl on the rim of your toilet. Make sure the bowl is firmly in place and won t move around.    Fill the sitz bath bowl with warm water from a pitcher or other container. The water should cover your perineum. Make sure the water temperature is comfortable.    Add salt or medicine to the water if advised by your healthcare provider. Or follow the package instructions about how to fill the bowl. Some kits come with a plastic bag and tubing. This lets you stream water into the bowl and at the area of your body that needs treatment. The bowl may have a slot or hole in the back. This lets water flow out so it doesn t overflow onto the floor. If there is no hole, be careful not to fill the bowl too full.    Gently sit down on the sitz bath bowl. Hold on to a railing. Or ask for help from a family member, friend, or caregiver if needed.  If you have a wound, the water may cause pain at first, but the pain should ease. Make sure the area that needs treating is under the water.  For any type of sitz bath:  1. Sit in the water for 10 to 20 minutes.  2. Add more warm water as needed to keep the water comfortable.  3. Get up slowly from the tub or toilet. You may feel lightheaded or dizzy. Hold on to a railing. Or ask for help from a family member, friend, or caregiver if needed.  4. Gently pat your anal area, perineum, and genitals dry with a clean towel. Don t rub the area.  5. Wash your hands. Put any ointment or cream on the area, if advised.  6. Wash the bathtub or sitz bath bowl with soap and water after each use.  7. Use a sitz bath 2 to 3 times a day, or as often as your healthcare provider advises.  When to call your healthcare provider  Call your healthcare provider right away if you have any of  these:    Fever of 100.4 F (38 C) or higher, or as directed    Redness, swelling, or fluid leaking from a cut (incision) that gets worse    Pain that gets worse    Symptoms that don t get better, or get worse    New symptoms  Date Last Reviewed: 5/1/2016 2000-2019 [employer]. [address] [place], [place] [address]. All rights reserved. This information is not intended as a substitute for professional medical care. Always follow your healthcare professional's instructions.

## 2020-05-18 NOTE — TELEPHONE ENCOUNTER
Pt had vulvar lesion 4/28/2020 packed and drained in ED    RN called pt, pt states she is on her way to her appt with Dr. Hernandez.    No further action needed at this time.    Patient verbalized understanding and agreed to plan.   Patient was given the opportunity to ask additional questions and have them answered. Patient had no further questions.       Omaira Escamilla RN on 5/18/2020 at 8:31 AM

## 2020-05-18 NOTE — NURSING NOTE
"Chief Complaint   Patient presents with     Vaginal Problem     vulvar infection       Initial /78 (BP Location: Right arm, Patient Position: Chair, Cuff Size: Adult Regular)   Pulse 94   Temp 97.9  F (36.6  C) (Temporal)   Wt 92.4 kg (203 lb 11.2 oz)   LMP 2020 (Approximate)   BMI 32.88 kg/m   Estimated body mass index is 32.88 kg/m  as calculated from the following:    Height as of 20: 1.676 m (5' 6\").    Weight as of this encounter: 92.4 kg (203 lb 11.2 oz).  BP completed using cuff size: regular        The following HM Due: NONE      The following patient reported/Care Every where data was sent to:  P ABSTRACT QUALITY INITIATIVES [[id number]]       Radha Pérez, KENNA  May 18, 2020           "

## 2020-05-18 NOTE — TELEPHONE ENCOUNTER
Patient is seeing Dr. Hernandez today.  This can be discussed at the time of her appointment.  Kassandra Cortez CMA

## 2020-05-18 NOTE — PROGRESS NOTES
SUBJECTIVE:       HPI: Ayanna Davidson is a 29 year old  who presents today for concern for recurrent vulvar infection.    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 She has been following with Dr. Schwarz as well as wound care. Most recent abscess noted to be healing well on 3/11/20. She is also s/o wide local excision on the right vulvar for TINO III on biopsy, no recurrence pathology. Last visit was on  with Dr. Schwarz, where she was concerned about possible new left vulvar cyst, with Bactrim given by the ER. Cyst was drained in the office with packing placed. Plan was sitz baths, finish course of Bactrim and follow-up with wound care.    Two days later, she went back to the ER when her packing fell out. It was repacked in the ER and patient instructed to f/u within one week.     Since that time, she has not been seen by wound care. She states that she lives about 45 minutes, and it is inconvenient for her to drive in so often. Therefore, she was self-packing at home. She only did this for 3 days before stopping. She was doing sitz baths previously, but states none in the last few weeks because she is concerned about the water quality. She is having the water tested, concerned about bacteria in the water, and therefore has avoided sitz baths.     She states that after initial drainage and packing, she thought the wound was improving and was not having issues. A few days ago, she noticed increased pressure, pain and swelling in the same location as before. She denies any drainage at this time. She is becoming increasingly frustrated with these recurrences and does not know what is going on. She has significant anxiety as well.    She denies vaginal discharge, irregular bleeding, dysmenorrhea, dyspareunia. She denies fevers/chills, nausea/vomiting, abdominal pain or bloating. Denies dysuria, hematuria, constipation or diarrhea.    Medical  history notable for multiple prior superficial infections, history of meth use and now substance-free. She is an every day smoker.    Ob Hx:     Gyn Hx: Patient's last menstrual period was 2020 (approximate).    Patient is sexually active, limited encounters with in the last few months.   Last pap was 20 NIL, +Other HR HPV s/p colposcopy 20 neg bx and ECC. Next pap due 2021  Using none for contraception.   STD testing offered?  Recent testing 20 neg           reports that she has been smoking. She has been smoking about 0.25 packs per day. She uses smokeless tobacco.  Tobacco Cessation Action Plan: Information offered: Patient not interested at this time    Today's PHQ-2 Score:   PHQ-2 (  [employer]) 3/12/2019   Q1: Little interest or pleasure in doing things 0   Q2: Feeling down, depressed or hopeless 0   PHQ-2 Score 0   Q1: Little interest or pleasure in doing things Several days   Q2: Feeling down, depressed or hopeless Several days   PHQ-2 Score 2     Today's PHQ-9 Score:   PHQ-9 SCORE 10/11/2019   PHQ-9 Total Score 14     Today's BINH-7 Score: No flowsheet data found.    Problem list and histories reviewed & adjusted, as indicated.  Additional history: as documented.    Patient Active Problem List   Diagnosis     Depressed     Drug-induced mental disorder (H)     Overdose of antipsychotic     Vulvar abscess     Cervical high risk HPV (human papillomavirus) test positive     Dog bite of left upper extremity     Dog bite of right lower leg     Cellulitis of left upper extremity     Current every day smoker     Gastroesophageal reflux disease without esophagitis     Skin infection     Preoperative examination     Bartholin's gland abscess     TINO III (vulvar intraepithelial neoplasia III)     Open wound     Past Surgical History:   Procedure Laterality Date     CHOLECYSTECTOMY       ENT SURGERY       EXAM UNDER ANESTHESIA PELVIC N/A 2020    Procedure: EXAM UNDER ANESTHESIA,  PELVIS, PAP;  Surgeon: Froylan Schwarz MD;  Location: [facility]     EXCISE VULVA WIDE LOCAL Bilateral 3/11/2020    Procedure: Right Vulva Wedge Resection and Incision and Drainage Left Vulva;  Surgeon: Froylan Schwarz MD;  Location: [facility]     INCISION AND DRAINAGE ABSCESS PELVIS, COMBINED N/A 2/26/2018    Procedure: COMBINED INCISION AND DRAINAGE ABSCESS PELVIS;  INCISION AND DRAINAGE LABIAL ABSCESS;  Surgeon: Jase Beach MD;  Location: [facility]     INCISION AND DRAINAGE ABSCESS PELVIS, COMBINED N/A 3/5/2019    Procedure: Incision and Drainage Right Labial Abscess;  Surgeon: Jase Beach MD;  Location: [facility]     INCISION AND DRAINAGE LOWER EXTREMITY, COMBINED Right 8/11/2019    Procedure: irrigation and debridement right leg dog bite;  Surgeon: Rommel Pérez MD;  Location: [facility]     LAP ADJUSTABLE GASTRIC BAND       LEEP TX, CERVICAL       MAMMOPLASTY REDUCTION BILATERAL       MARSUPIALIZATION BARTHOLIN CYST N/A 4/29/2019    Procedure: Bartholin's Cyst removal;  Surgeon: Froylan Schwarz MD;  Location: [facility]     MARSUPIALIZATION BARTHOLIN CYST Left 1/29/2020    Procedure: Excision of left bartholin's abscess;  Surgeon: Froylan Schwarz MD;  Location: [facility]     ORTHOPEDIC SURGERY        Social History     Tobacco Use     Smoking status: Current Every Day Smoker     Packs/day: 0.25     Smokeless tobacco: Current User     Tobacco comment: uses E cig   Substance Use Topics     Alcohol use: Yes     Frequency: Monthly or less      Problem (# of Occurrences) Relation (Name,Age of Onset)    Breast Cancer (1) Paternal Grandmother    Diabetes (1) Maternal Grandmother    Heart Disease (1) Father    Testicular cancer (1) Paternal Grandfather            Acetaminophen (TYLENOL PO), Take 500 mg by mouth every 4 hours as needed for mild pain or fever  ibuprofen (ADVIL/MOTRIN) 200 MG tablet, Take 600 mg by mouth every 4 hours as needed for mild pain  Multiple Vitamins-Minerals (MULTIVITAMIN  ADULT PO),   omeprazole (PRILOSEC OTC) 20 MG EC tablet, Take 1 tablet (20 mg) by mouth daily  ibuprofen (ADVIL/MOTRIN) 600 MG tablet, Take 1 tablet (600 mg) by mouth every 6 hours as needed for other (mild and/or inflammatory pain) (Patient not taking: Reported on 2020)  ondansetron (ZOFRAN) 4 MG tablet, Take 1 tablet (4 mg) by mouth every 8 hours as needed for nausea (Patient not taking: Reported on 2020)  ondansetron (ZOFRAN-ODT) 4 MG ODT tab, Take 1-2 tablets (4-8 mg) by mouth every 8 hours as needed for nausea (Patient not taking: Reported on 2020)  oxyCODONE-acetaminophen (PERCOCET) 5-325 MG tablet, Take 1-2 tablets by mouth every 4 hours as needed for pain (Patient not taking: Reported on 2020)  [] sulfamethoxazole-trimethoprim (BACTRIM DS) 800-160 MG tablet, Take 1 tablet by mouth 2 times daily for 10 days    No current facility-administered medications on file prior to visit.     No Known Allergies    ROS:  10 Point review of systems negative other noted above in HPI    OBJECTIVE:     /78 (BP Location: Right arm, Patient Position: Chair, Cuff Size: Adult Regular)   Pulse 94   Temp 97.9  F (36.6  C) (Temporal)   Wt 92.4 kg (203 lb 11.2 oz)   LMP 2020 (Approximate)   BMI 32.88 kg/m    Body mass index is 32.88 kg/m .      Gen: Alert, oriented, appropriately interactive, NAD  Chest: Symmetrical, unlabored breathing  Abdomen: soft, non tender, non distended, no masses, no hernias. No inguinal lymphadenopathy.   Perineum: 2-3mm defect again left vulva, at site of prior incision scar. Spontaneous drainage serosanginous fluid after application of lidocaine cream. Mild induration with minimal fluctuance, probing to 2.5cm. Moderate swelling noted, no erythema. Appropriate tender to palpation. Procedure note below.  MSK: normal gait, symmetric movements UE & LE   Lower extremities: non-tender, no edema    In-Clinic Test Results:  No results found for this or any previous  visit (from the past 24 hour(s)).    ASSESSMENT/PLAN:                                                      Ayanna Davidson is a 29 year old  who presents today for recurrent vulvar fluid collection, inadequate healing wound.      ICD-10-CM    1. Open wound of vulva, sequela  S31.40XS WOUND CARE REFERRAL     oxyCODONE-acetaminophen (PERCOCET) 5-325 MG tablet       Recurrent left vulvar fluid collection within scar tissue, spontaneous drainage in the office and s/p I+D for complete drainage. Note below. Patient increasingly frustrated and anxious about these recurrences and does not understand why they are happening. Counseled on wound healing and importance of follow-up with wound care until appropriately closed. Inadequate follow-up after last I+D without wound care. Understand challenges to get to the clinic and need for multiple visits, however, strongly encouraged to follow with wound care until resolution or risk further recurrences/inadequate healing. Patient expressed understanding and eventually agreed. Requested short course pain medication given significant discomfort, short course given. Advised to use only for severe pain, otherwise ibuprofen and tylenol. Also encouraged sitz baths, information provided on how to set up without using tub. All questions answered, continue close follow-up.      Abby Hernandez, DO  [facility]      Procedure note     Topical lidocaine cream applied to area for additional anesthesia, and left in place for 25 minutes prior to procedure. Left vulva cleaned with betadine x 3.  15mL of 1% lidocaine with epinephrine used to infiltrate the area with good anethesia.  Number 11 scapel used to make a stab incion to lengthen the defect in her prior Bartholin abscess excision scar.  Serosanginous drainage noted, even prior to stab incision. Defect probed with wooden handle of swab, noted 2.5cm depth straight in, less than 1cm laterally in all directions, any  "loculations broken up.  1/4\" Ioban gauze backing placed. Pt tolerated preocedure well.    Abby Hernandez DO    "

## 2020-05-20 LAB
BACTERIA SPEC CULT: NORMAL
Lab: NORMAL
SPECIMEN SOURCE: NORMAL

## 2020-05-21 ENCOUNTER — HOSPITAL ENCOUNTER (OUTPATIENT)
Dept: WOUND CARE | Facility: CLINIC | Age: 30
Discharge: HOME | End: 2020-05-21
Attending: OBSTETRICS & GYNECOLOGY | Admitting: OBSTETRICS & GYNECOLOGY
Payer: COMMERCIAL

## 2020-05-21 PROCEDURE — G0463 HOSPITAL OUTPT CLINIC VISIT: HCPCS

## 2020-05-21 NOTE — PROGRESS NOTES
Reason For Visit: Ayanna Davidson, 29 year old female, seen as outpatient to evaluate and treat new left side labial wound. Referred by Dr Schwarz. Patient presents by herself today.      History: Pt has history of recurrent Bartholins's cyst.  I saw the pt once in the past on 3/16/20 for left and right side wounds.  Those seemed to heal well but near the end of April 2020 the pt noted new left side pain, pressure and swelling.  Dr Schwarz drained the left side on 4/27/20 in clinic and packed with iodoform 1/4 inch.  Pt returned to the ED the following day as the packing had fell out.  Cares were performed and she was sent home with instructions to follow up with MD.  She lives approximately [place] minutes from [facility] so it was difficult for her to get into have evaluation done so she tried to perform wound cares herself with a mirror to see and use of 1/4 inch Iodoform gauze.  She felt this was improving the wound but on 5/17/20 she called triage with concerns of increasing pain and possible infection.  She was seen the following day 5/18/20 by Dr Hernandez who excised the site reporting the wound measured 2.5 cm D.  She was instructed to follow up with Wound clinic and she presents today for her initial return visit      Personal/social history: [place].     Objective:   Physical appearance: Alert and oriented  Ambulation: independent with no assistive devices  Current treatment plan: Iodoform 1/4 inch,   Last changed: 5/18/20, per pt dressing fell out yesterday.      Wound #1 Left Vulva.  S/P I&D 5/18/20  Stage/tissue depth: full thickness   0.5 cm L x 0.3 cm W x 1.8 cm D  Tunneling: none noted  Undermining: none noted  Wound bed type/amount: As able to visualize 100 % red nongranular tissue; NA fluctuant  Wound Edges: open  Periwound: wn  Drainage: moderate amount of sanguinous when probed and irrigated.  Odor: no  Pain: yes high amount of pain with direct touch, prolonged  sitting or wound cares.      Dorsalis Pedal Pulse: Not assessed this visit  Hair growth: Not assessed this visit  Capillary Refill: Not assessed this visit  Feet/toes color: Not assessed this visit  Nails: Not assessed this visit  R Leg: Edema Not assessed this visit. Ankle circumference NA cm. Calf circumference NA cm.  L Leg: Edema Not assessed this visit. Ankle circumference NA cm. Calf circumference NA cm.     Mobility: only limited by pain in the bilateral groin  Current offloading/footwear: NA  Sensation: wnl  HgbA1C: NA Date: NA as pt does not have a diagnosis of diabetes  Checks Blood Glucose?:  NA Average Readings: NA  Other callousing/areas of concern: NA     Diet: Regular,   Smoking: Yes 1/2 pack cigarettes daily.       Discussed: etiology of wound (Surgical wounds related to cyst), pathophysiology and patient specific goals for wound healing.   Education: Wound status.  Wound cares plans and trial of silicone tape to try to keep dressing in place between changes.  Plans of follow up.          Assessment:  The new left labia wound appears to be on the inner or medial aspect of the labia where as last left side wound I saw and can see scar tissue from that site is on the lateral side of her left labia.  Open site has small opening with straight depth of 1.8 cm which is 6 mm less than MD measured on 5/19/20.  Wound as able to visualize is all clean within, no slough, no purulent drainage only sanguinous drainage noted when probed with applicator.  Lateral movement of the applicator was minimal when probing the site, no tunneling or larger cavern within the wound noted.  With palpation did not not any lumps or feeling of new collection of fluid within. Pt denies any other areas of concern of her vulva.       Barriers to wound healing:   Poor nutrition: inadequate supply of protein, carbohydrates, fatty acids, and trace elements essential for all phases of wound healing.    Reduced Blood Supply: inadequate  perfusion to heal wound, at times related to cigarette smoking.  Medication: NA  Chemotherapy: suppresses the immune system and inflammatory response, NA  Radiotherapy: increases production of free radical which damage cells, NA  Psychological stress: related to re occurring wounds and pain associated with the wounds.   Obesity: decreases tissue perfusion, NA  Infection: prolongs inflammatory phase, uses vital nutrients, impairs epithelialization and releases toxins, None noted at this time, an antimicrobial dressing is in use.  Underlying Disease: diabetes mellitis and autoimmune disorders, NA  Maceration: reduces wound tensile strength and inhibits epithelial migration.  No noted today  Patient compliance, appears motivated to heal   Unrelieved pressure, pressure reduction and relief needs discussed today.  Immobility, NA  Substance abuse: Meth use history, not assessed this visit.       Plan: We re packed the wound with 1/4 inch Iodoform gauze, I packed firmer than normal and also applied a small strip of 3M blue micropore silicone tape over the site to try to limit its fallout with urination.  Pt will try to re pack if it falls out and will replace outer tape when it gets wet with urine while trying to keep packing strip within the wound.  I discussed with pt option to have packing done in the Specialty clinic by RN, female, if she would prefer.  The pt stated she would prefer to be seen in the Wound and Ostomy clinic by myself.  She will follow up with visit in one week.        Topical care: Dry Iodoform gauze  Offloading: limit pressure and reposition frequently, no true offloading needed.  Additional recommendations: none at this time     Wound Care: To wound 2 Left Vulva.  Wound cleansed/irrigated with NS and gauze, patted dry. Periwound protected with NA. Wound base filled with 1/4 inch Iodoform ribbon gauze. Covered with NA, followed by strip of 3M medipore S tape. Secured with NA. To be changed weekly and  as needed.     Discussed plan of care with patient. Teaching done with patient for dressing changes; She will perform packing of this wound herself as able or will contact specialty clinic nurse to have repacked if WOC nurse in not in house.  She does not have an available will caregiver to do wound care near her home.  Cares will be provided by myself in the Wound and Ostomy clinic and with the Specialty nurse staff here at [facility].     The following discharge instructions were reviewed with and sent home with the patient: See discharge instructions     The following supplies were sent home with the patient:  Remains of the roll of 3M micropore S tape  Will reassess care plan in one week and order supplies as needed.      Return visit: Thursday 5/28/20     Verbal, written, & demonstrative education provided.  Face to face time (excluding procedure): approximately 25 minutes.  Procedure: NA  Care plan was not changed.     [phone]

## 2020-05-21 NOTE — DISCHARGE INSTRUCTIONS
Today we re packed the wound on the left side with Iodoform gauze and tried some 3M medipore tape silicone to keep it in place.  I will send you home with additional blue tape and then will plan to see you again in one week on Thursday May the 28 th.    Any concerns or need to re pack call our scheduling department at [phone] and they will assist you.      I will see you again in one week.  Thanks for coming in today.    Chirag Mckeon RN [email or web address]

## 2020-05-26 ENCOUNTER — ALLIED HEALTH/NURSE VISIT (OUTPATIENT)
Dept: FAMILY MEDICINE | Facility: CLINIC | Age: 30
End: 2020-05-26
Payer: COMMERCIAL

## 2020-05-26 DIAGNOSIS — N76.4 VULVAR ABSCESS: Primary | ICD-10-CM

## 2020-05-26 PROCEDURE — 99207 ZZC NO CHARGE NURSE ONLY: CPT

## 2020-05-26 NOTE — PROGRESS NOTES
Wound care per the order of Dr. Schwarz:    Wound 2 Left Vulva. Patient seen by provider.  Wound appeared healed. Nothing to pack. Patient to continue with sitz baths and call clinic if any questions, concerns, or symptoms return.     Appointment with [facility] nurse canceled per Dr. Schwarz.    Vi Levy RN on 5/26/2020 at 2:15 PM

## 2020-05-27 ENCOUNTER — TELEPHONE (OUTPATIENT)
Dept: FAMILY MEDICINE | Facility: CLINIC | Age: 30
End: 2020-05-27

## 2020-05-27 NOTE — TELEPHONE ENCOUNTER
Patient is due for a PHQ-9.  Index start date:2/11/2020  Index end date:6/10/2020    Please call patient. Pt is active on [employer]. I have sent a PHQ-9 via [employer] and will postpone the encounter. Soledad Traore CMA ([facility])

## 2020-06-03 NOTE — TELEPHONE ENCOUNTER
I have attempted to contact pt to update a PHQ-9. I left a message for pt to return my call. Please transfer pt to the [facility] team if I'm unavailable to take the call. Soledad Traore CMA ([facility])

## 2020-06-09 NOTE — TELEPHONE ENCOUNTER
I have attempted to contact pt to update a PHQ-9. I left a message for pt to return my call. Please transfer pt to the [facility] team if I'm unavailable to take the call. Soledad Traore CMA ([facility])

## 2020-06-10 ENCOUNTER — TELEPHONE (OUTPATIENT)
Dept: FAMILY MEDICINE | Facility: OTHER | Age: 30
End: 2020-06-10

## 2020-06-10 ASSESSMENT — PATIENT HEALTH QUESTIONNAIRE - PHQ9: SUM OF ALL RESPONSES TO PHQ QUESTIONS 1-9: 0

## 2020-06-10 NOTE — TELEPHONE ENCOUNTER
Pt was last seen on 5/18/2020.  Per OV plan:    Recurrent left vulvar fluid collection within scar tissue, spontaneous drainage in the office and s/p I+D for complete drainage. Note below. Patient increasingly frustrated and anxious about these recurrences and does not understand why they are happening. Counseled on wound healing and importance of follow-up with wound care until appropriately closed. Inadequate follow-up after last I+D without wound care. Understand challenges to get to the clinic and need for multiple visits, however, strongly encouraged to follow with wound care until resolution or risk further recurrences/inadequate healing. Patient expressed understanding and eventually agreed. Requested short course pain medication given significant discomfort, short course given. Advised to use only for severe pain, otherwise ibuprofen and tylenol. Also encouraged sitz baths, information provided on how to set up without using tub. All questions answered, continue close follow-up.  Abby Hernandez,     Pt states her left vulva is swelling and painful again.  She noticed it yesterday.  She would like to be admitted into the hospital so she can have it I&D'd and get wound care every day until it is properly healed since it keeps coming back and she isn't able to properly take care of it at home and isn't able to get wound care as much as she needs to since she lives so far from the clinic/hospital.    Pt denies fever.    Scheduled pt with Dr. Schwarz tomorrow so he can further assess and treat.  I suggested pt ask Dr. Schwarz tomorrow if being admitted into hospital for proper wound care is an option for her.    Pam Patiño RN

## 2020-06-10 NOTE — TELEPHONE ENCOUNTER
Pt returned call and updated a PHQ-9.    PHQ 6/10/2020   PHQ-9 Total Score 0   Q9: Thoughts of better off dead/self-harm past 2 weeks Not at all     Soledad Traore CMA ([facility])

## 2020-06-10 NOTE — TELEPHONE ENCOUNTER
Reason for Call:  Other appointment    Detailed comments: Patient calling stating that she is swelling on her left side again and would like to talk to Dr. Weinstein team regarding this. Please call her at [phone]    Phone Number Patient can be reached at: Home number on file      Best Time: any    Can we leave a detailed message on this number? YES    Call taken on 6/10/2020 at 11:21 AM by Radha Devi

## 2020-08-30 ENCOUNTER — APPOINTMENT (OUTPATIENT)
Dept: GENERAL RADIOLOGY | Facility: CLINIC | Age: 30
End: 2020-08-30
Attending: EMERGENCY MEDICINE
Payer: COMMERCIAL

## 2020-08-30 ENCOUNTER — HOSPITAL ENCOUNTER (EMERGENCY)
Facility: CLINIC | Age: 30
Discharge: HOME | End: 2020-08-30
Attending: EMERGENCY MEDICINE | Admitting: EMERGENCY MEDICINE
Payer: COMMERCIAL

## 2020-08-30 VITALS
HEART RATE: 87 BPM | DIASTOLIC BLOOD PRESSURE: 87 MMHG | BODY MASS INDEX: 30.67 KG/M2 | OXYGEN SATURATION: 98 % | RESPIRATION RATE: 18 BRPM | SYSTOLIC BLOOD PRESSURE: 121 MMHG | TEMPERATURE: 98.3 F | WEIGHT: 190 LBS

## 2020-08-30 DIAGNOSIS — L02.419 CELLULITIS AND ABSCESS OF LEG: ICD-10-CM

## 2020-08-30 DIAGNOSIS — L03.119 CELLULITIS AND ABSCESS OF LEG: ICD-10-CM

## 2020-08-30 DIAGNOSIS — L30.9 ECZEMA, UNSPECIFIED TYPE: ICD-10-CM

## 2020-08-30 LAB
GRAM STN SPEC: ABNORMAL
GRAM STN SPEC: ABNORMAL
Lab: ABNORMAL
SPECIMEN SOURCE: ABNORMAL

## 2020-08-30 PROCEDURE — 73562 X-RAY EXAM OF KNEE 3: CPT | Mod: TC,LT

## 2020-08-30 PROCEDURE — 99284 EMERGENCY DEPT VISIT MOD MDM: CPT

## 2020-08-30 PROCEDURE — 99283 EMERGENCY DEPT VISIT LOW MDM: CPT | Performed by: EMERGENCY MEDICINE

## 2020-08-30 PROCEDURE — 99284 EMERGENCY DEPT VISIT MOD MDM: CPT | Mod: Z6 | Performed by: EMERGENCY MEDICINE

## 2020-08-30 PROCEDURE — 87077 CULTURE AEROBIC IDENTIFY: CPT | Performed by: EMERGENCY MEDICINE

## 2020-08-30 PROCEDURE — 87186 SC STD MICRODIL/AGAR DIL: CPT | Performed by: EMERGENCY MEDICINE

## 2020-08-30 PROCEDURE — 87070 CULTURE OTHR SPECIMN AEROBIC: CPT | Performed by: EMERGENCY MEDICINE

## 2020-08-30 PROCEDURE — 87205 SMEAR GRAM STAIN: CPT | Performed by: EMERGENCY MEDICINE

## 2020-08-30 RX ORDER — CLINDAMYCIN HCL 300 MG
300 CAPSULE ORAL 4 TIMES DAILY
Qty: 40 CAPSULE | Refills: 0 | Status: SHIPPED | OUTPATIENT
Start: 2020-08-30 | End: 2020-09-09

## 2020-08-30 NOTE — DISCHARGE INSTRUCTIONS
Start taking your antibiotics today.  Complete entire course even if you begin to feel better.  Antibiotics can give you GI upset such as diarrhea, so you should eat at least 1 cup of yogurt per day or take an over-the-counter probiotic such as Culturelle    Keep the wound clean and dry.  It is okay to just wash with soap and water, pat dry, and apply a Band-Aid over it    You should follow-up with your primary provider to recheck the wound within a week and make sure that it is improving    For the eczema on your face, you may apply a thin layer of over-the-counter hydrocortisone 1%.  You may apply it 1 or 2 times daily.  You should not use anything stronger than this on your face since it can cause thinning of the skin

## 2020-08-30 NOTE — ED TRIAGE NOTES
"Patient was kneeling in gravel approx. A week ago. Has noticed a wound to the left knee that appears infected. \"it looks like an egg growing out of it.\" Patient's airway, breathing, circulation, and disability/mental status (ABCDs) intact/WDL during triage.    "

## 2020-08-30 NOTE — ED PROVIDER NOTES
History     Chief Complaint   Patient presents with     Wound Check     The history is provided by the patient.     Ayanna Davidson is a 30 year old female who presents to the emergency department for a wound check. About a week ago the patient was kneeling on a gravel road and developed a wound on her left knee. She has been cleaning it out with hydrogen peroxide and tried squeezing the wound, but it still has not resolved. She has not talked to her primary care provider about this issue. The patient has a history of MRSA. Patient also complains of a rash on her left cheek. The rash has been there for a while and was previously on the inside of her nose. It does not itch. She does not think it is acne. She denies any allergies to antibiotics or other medications.       Allergies:  No Known Allergies    Problem List:    Patient Active Problem List    Diagnosis Date Noted     Open wound 03/23/2020     Priority: Medium     TINO III (vulvar intraepithelial neoplasia III) 02/26/2020     Priority: Medium     Added automatically from request for surgery [id number]       Preoperative examination 01/28/2020     Priority: Medium     Added automatically from request for surgery [id number]       Bartholin's gland abscess 01/28/2020     Priority: Medium     Added automatically from request for surgery [id number]       Dog bite of right lower leg 09/27/2019     Priority: Medium     Cellulitis of left upper extremity 09/27/2019     Priority: Medium     Current every day smoker 09/27/2019     Priority: Medium     Gastroesophageal reflux disease without esophagitis 09/27/2019     Priority: Medium     Skin infection 09/27/2019     Priority: Medium     Dog bite of left upper extremity 05/07/2019     Priority: Medium     Cervical high risk HPV (human papillomavirus) test positive 03/12/2019     Priority: Medium     4/2016 NIL pap. No prior HPV testing.   3/12/19 NIL pap, + HR HPV (not 16 or 18). Plan: cotest in 1 yr  1/29/20 NIL pap, + HR HPV  (not 16 or 18). Plan: [place]  2/11/20 [place] bx: neg. Plan: Cotest in 1 yr.        Vulvar abscess 08/16/2017     Priority: Medium     Drug-induced mental disorder (H) 09/20/2012     Priority: Medium     Overview:   ICD-10 Regulatory Update       Depressed 05/10/2012     Priority: Medium     Overdose of antipsychotic 05/09/2012     Priority: Medium        Past Medical History:    Past Medical History:   Diagnosis Date     Cervical high risk HPV (human papillomavirus) test positive 03/12/2019     Gastroesophageal reflux disease      HSV (herpes simplex virus) infection      Methamphetamine abuse (H)      recurrent Bartholin's cyst        Past Surgical History:    Past Surgical History:   Procedure Laterality Date     CHOLECYSTECTOMY       ENT SURGERY       EXAM UNDER ANESTHESIA PELVIC N/A 1/29/2020    Procedure: EXAM UNDER ANESTHESIA, PELVIS, PAP;  Surgeon: Froylan Schwarz MD;  Location: [facility] OR     EXCISE VULVA WIDE LOCAL Bilateral 3/11/2020    Procedure: Right Vulva Wedge Resection and Incision and Drainage Left Vulva;  Surgeon: Froylan Schwarz MD;  Location: [facility] OR     INCISION AND DRAINAGE ABSCESS PELVIS, COMBINED N/A 2/26/2018    Procedure: COMBINED INCISION AND DRAINAGE ABSCESS PELVIS;  INCISION AND DRAINAGE LABIAL ABSCESS;  Surgeon: Jase Beach MD;  Location: [facility] OR     INCISION AND DRAINAGE ABSCESS PELVIS, COMBINED N/A 3/5/2019    Procedure: Incision and Drainage Right Labial Abscess;  Surgeon: Jase Beach MD;  Location: [facility] OR     INCISION AND DRAINAGE LOWER EXTREMITY, COMBINED Right 8/11/2019    Procedure: irrigation and debridement right leg dog bite;  Surgeon: Rommel Pérez MD;  Location: [facility] OR     LAP ADJUSTABLE GASTRIC BAND       LEEP TX, CERVICAL       MAMMOPLASTY REDUCTION BILATERAL       MARSUPIALIZATION BARTHOLIN CYST N/A 4/29/2019    Procedure: Bartholin's Cyst removal;  Surgeon: Froylan Schwarz MD;  Location: [facility] OR     MARSUPIALIZATION BARTHOLIN CYST Left 1/29/2020     Procedure: Excision of left bartholin's abscess;  Surgeon: Froylan Schwarz MD;  Location: [facility] OR     ORTHOPEDIC SURGERY         Family History:    Family History   Problem Relation Age of Onset     Heart Disease Father      Diabetes Maternal Grandmother      Breast Cancer Paternal Grandmother      Testicular cancer Paternal Grandfather        Social History:  Marital Status:  Single [1]  Social History     Tobacco Use     Smoking status: Current Every Day Smoker     Packs/day: 0.25     Smokeless tobacco: Current User     Tobacco comment: uses E cig   Substance Use Topics     Alcohol use: Yes     Frequency: Monthly or less     Drug use: Not Currently     Comment: In treatment for meth.        Medications:    clindamycin (CLEOCIN) 300 MG capsule  Acetaminophen (TYLENOL PO)  ibuprofen (ADVIL/MOTRIN) 200 MG tablet  ibuprofen (ADVIL/MOTRIN) 600 MG tablet  Multiple Vitamins-Minerals (MULTIVITAMIN ADULT PO)  omeprazole (PRILOSEC OTC) 20 MG EC tablet  ondansetron (ZOFRAN) 4 MG tablet  ondansetron (ZOFRAN-ODT) 4 MG ODT tab  oxyCODONE-acetaminophen (PERCOCET) 5-325 MG tablet          Review of Systems   All other systems reviewed and are negative.      Physical Exam   BP: 121/87  Pulse: 87  Temp: 98.3  F (36.8  C)  Resp: 18  Weight: 86.2 kg (190 lb)  SpO2: 98 %      Physical Exam  Vitals signs and nursing note reviewed.   Constitutional:       General: She is not in acute distress.     Appearance: She is well-developed. She is not diaphoretic.   HENT:      Head: Normocephalic and atraumatic.        Nose: Nose normal.   Eyes:      General: No scleral icterus.     Extraocular Movements: Extraocular movements intact.   Neck:      Musculoskeletal: Normal range of motion.   Pulmonary:      Effort: Pulmonary effort is normal.   Musculoskeletal: Normal range of motion.   Skin:     General: Skin is warm and dry.      Comments: 1 cm wound on anterior left knee. Caseous material was removed. No bleeding from the wound.  Slightly tender to touch. Mild surrounding erythema. No fluctuance.      Neurological:      General: No focal deficit present.      Mental Status: She is alert.   Psychiatric:         Mood and Affect: Mood normal.         Behavior: Behavior normal.         Thought Content: Thought content normal.         Judgment: Judgment normal.         ED Course        [facility]    Wound Care    Date/Time: 8/30/2020 2:30 PM  Performed by: Monalisa Canada DO  Authorized by: Monalisa Canada DO       ANESTHESIA (see MAR for exact dosages):     Anesthesia method:  None    PROCEDURE DETAILS     Indications: open wounds      Wound age (days):  7  PROCEDURE   Patient Tolerance:  Patient tolerated the procedure well with no immediate complications  Describe Procedure: Using syringe and sterile saline, pressure wash wound.  Patted dry, covered with large bandage.  No foreign body noted on x-ray, none on visual inspection of the wound.                 Critical Care time:  none               Results for orders placed or performed during the hospital encounter of 08/30/20 (from the past 24 hour(s))   XR Knee Left 3 Views    Narrative    XR LEFT KNEE THREE VIEWS   8/30/2020 2:27 PM     HISTORY:  Knee wound, infection, possible foreign body.      Impression    IMPRESSION: Unremarkable exam.    SIMIN AL MD       Medications - No data to display    Assessments & Plan (with Medical Decision Making)  Ayanna is a 30-year-old female who presents to the ER with a wound on her left knee.  She said this started approximately 1 week ago, thinks that she may have some gravel in it after kneeling on the driveway, but it has been getting worse.  Has a history of MRSA. see history focused physical exam as above  1 cm circular wound on left anterior knee.  Has unroofed and has caseous material in the center.  Despite pressure to surrounding tissues to try to extract the material, nothing drained.  Then  used swab to obtain a wound culture, able to remove a chunk of the caseous material.  This was sent for wound culture and Gram stain.  Also sent for x-ray to evaluate for underlying foreign body since patient was concerned about this.  Knee x-ray was normal.  Wound was irrigated copiously with sterile saline using pressure wash from syringe.  Dried and covered with a bandage.  Instructed the patient on wound care and sent a prescription for antibiotics to pharmacy.  Encouraged her to follow-up with her primary provider to reassess the wound to make sure it is improving.  Also discussed reasons to return to the ER.  She understands and agrees and is discharged in no acute distress     I have reviewed the nursing notes.    I have reviewed the findings, diagnosis, plan and need for follow up with the patient.       Discharge Medication List as of 8/30/2020  2:49 PM      START taking these medications    Details   clindamycin (CLEOCIN) 300 MG capsule Take 1 capsule (300 mg) by mouth 4 times daily for 10 days, Disp-40 capsule,R-0, E-Prescribe             Final diagnoses:   Cellulitis and abscess of leg   Eczema, unspecified type   This document serves as a record of services personally performed by Monalisa Canada*. It was created on their behalf by Raquel Steel, a trained medical scribe. The creation of this record is based on the provider's personal observations and the statements of the patient. This document has been checked and approved by the attending provider.  Note: Chart documentation done in part with Dragon Voice Recognition software. Although reviewed after completion, some word and grammatical errors may remain.    8/30/2020   [facility] EMERGENCY DEPARTMENT     Monalisa Canada, DO  08/30/20 1602       Monalisa Canada, DO  08/30/20 160

## 2020-08-30 NOTE — RESULT ENCOUNTER NOTE
[facility] ED discharge antibiotic (if prescribed):  Clindamycin (Cleocin) 300 mg PO capsule, 1 capsule (300 mg) by mouth 4 times daily for 10 days  Incision and Drainage performed in [facility] ED [Yes / No] : No  No changes in treatment per ED lab result protocol.

## 2020-08-30 NOTE — ED AVS SNAPSHOT
[facility] Emergency Department  [address] [place] [address]  Phone:  [phone]  Fax:  [phone]                                    Ayanna Davidson   MRN: [id number]    Department:  [facility] Emergency Department   Date of Visit:  8/30/2020           After Visit Summary Signature Page    I have received my discharge instructions, and my questions have been answered. I have discussed any challenges I see with this plan with the nurse or doctor.    ..........................................................................................................................................  Patient/Patient Representative Signature      ..........................................................................................................................................  Patient Representative Print Name and Relationship to Patient    ..................................................               ................................................  Date                                   Time    ..........................................................................................................................................  Reviewed by Signature/Title    ...................................................              ..............................................  Date                                               Time          22EPIC Rev 08/18

## 2020-09-01 ENCOUNTER — TELEPHONE (OUTPATIENT)
Dept: EMERGENCY MEDICINE | Facility: CLINIC | Age: 30
End: 2020-09-01

## 2020-09-01 LAB
BACTERIA SPEC CULT: ABNORMAL
Lab: ABNORMAL
SPECIMEN SOURCE: ABNORMAL

## 2020-09-01 NOTE — TELEPHONE ENCOUNTER
[facility] notification:    [place] ED lab result protocol used  General culture    Reason for call  Notify of lab results, assess symptoms,  review ED providers recommendations/discharge instructions (if necessary) and advise per ED lab result f/u protocol    Lab Result   Final Wound culture (Left knee) report on 9/1/2020   Emergency Dept discharge antibiotic prescribed: Clindamycin (Cleocin) 300 mg PO capsule, 1 capsule (300 mg) by mouth 4 times daily for 10 days   #1. Bacteria, Moderate growth Staphylococcus aureus , which is [SUSCEPTIBLE] to antibiotic   Incision and Drainage performed in [place] ED [Yes / No]: No   Patient to be notified of result, symptoms assessed and advised per [place] ED lab result protocol.   Information table from ED Provider visit on 8/30/2020  Symptoms reported at ED visit (Chief complaint, HPI) Wound Check     The history is provided by the patient.      Ayanna Davidson is a 30 year old female who presents to the emergency department for a wound check. About a week ago the patient was kneeling on a gravel road and developed a wound on her left knee. She has been cleaning it out with hydrogen peroxide and tried squeezing the wound, but it still has not resolved. She has not talked to her primary care provider about this issue. The patient has a history of MRSA. Patient also complains of a rash on her left cheek. The rash has been there for a while and was previously on the inside of her nose. It does not itch. She does not think it is acne. She denies any allergies to antibiotics or other medications.      ED providers Impression and Plan (applicable information) Ayanna is a 30-year-old female who presents to the ER with a wound on her left knee.  She said this started approximately 1 week ago, thinks that she may have some gravel in it after kneeling on the driveway, but it has been getting worse.  Has a history of MRSA. see history  "focused physical exam as above  1 cm circular wound on left anterior knee.  Has unroofed and has caseous material in the center.  Despite pressure to surrounding tissues to try to extract the material, nothing drained.  Then used swab to obtain a wound culture, able to remove a chunk of the caseous material.  This was sent for wound culture and Gram stain.  Also sent for x-ray to evaluate for underlying foreign body since patient was concerned about this.  Knee x-ray was normal.  Wound was irrigated copiously with sterile saline using pressure wash from syringe.  Dried and covered with a bandage.  Instructed the patient on wound care and sent a prescription for antibiotics to pharmacy.  Encouraged her to follow-up with her primary provider to reassess the wound to make sure it is improving.  Also discussed reasons to return to the ER.  She understands and agrees and is discharged in no acute distress   Miscellaneous information na     RN Assessment (Patient s current Symptoms), include time called.  [Insert Left message here if message left]  5:28PM: Spoke with patient. \"It's improving\"  RN Recommendations/Instructions per [place] ED lab result protocol  Patient notified of lab result and treatment recommendation.   Advised to continue taking the antibiotic as prescribed and to follow up with her PCP as directed by the ED provider.  The patient is comfortable with the information given and has no further questions.     Please Contact your PCP clinic or return to the Emergency department if your:    Symptoms worsen or other concerning symptom's.    PCP follow-up Questions asked: YES       [RN Name]  Katherine Bustamante RN  [employer] [facility] RN  Lung Nodule and ED Lab Result RN  Epic pool (ED late result f/u RN): [id number]  [facility] INCIDENTAL RADIOLOGY F/U NURSES: P [id number]  # [phone]      Copy of Lab result   Wound Culture Aerobic Bacterial   Order: [id number]   Status:  Final result   Visible to patient:  Yes " (MyChart) Dx:  Cellulitis and abscess of leg   Specimen Information:  Left Knee     Abscess          Component  2d ago   Specimen Description  Left Knee Abscess     Special Requests  Specimen collected in eSwab transport (white cap)     Culture Micro  Abnormal     Moderate growth   Staphylococcus aureus     [facility]    Susceptibility      Staphylococcus aureus      BHARTI      CLINDAMYCIN  <=0.25 ug/mL  Sensitive      ERYTHROMYCIN  <=0.25 ug/mL  Sensitive      GENTAMICIN  <=0.5 ug/mL  Sensitive      OXACILLIN  <=0.25 ug/mL  Sensitive      TETRACYCLINE  <=1 ug/mL  Sensitive      Trimethoprim/Sulfa  <=0.5/9.5 u...  Sensitive      VANCOMYCIN  <=0.5 ug/mL  Sensitive                  Specimen Collected: 08/30/20  2:21 PM  Last Resulted: 09/01/20  9:57 AM

## 2020-10-27 ENCOUNTER — MYC MEDICAL ADVICE (OUTPATIENT)
Dept: FAMILY MEDICINE | Facility: CLINIC | Age: 30
End: 2020-10-27

## 2020-10-27 ENCOUNTER — OFFICE VISIT (OUTPATIENT)
Dept: OBGYN | Facility: CLINIC | Age: 30
End: 2020-10-27
Payer: COMMERCIAL

## 2020-10-27 ENCOUNTER — ALLIED HEALTH/NURSE VISIT (OUTPATIENT)
Dept: FAMILY MEDICINE | Facility: CLINIC | Age: 30
End: 2020-10-27
Payer: COMMERCIAL

## 2020-10-27 ENCOUNTER — VIRTUAL VISIT (OUTPATIENT)
Dept: FAMILY MEDICINE | Facility: CLINIC | Age: 30
End: 2020-10-27
Payer: COMMERCIAL

## 2020-10-27 VITALS — WEIGHT: 190 LBS | TEMPERATURE: 97.9 F | BODY MASS INDEX: 30.67 KG/M2

## 2020-10-27 DIAGNOSIS — J06.9 UPPER RESPIRATORY TRACT INFECTION, UNSPECIFIED TYPE: ICD-10-CM

## 2020-10-27 DIAGNOSIS — Z20.2 EXPOSURE TO CHLAMYDIA: Primary | ICD-10-CM

## 2020-10-27 DIAGNOSIS — R30.0 DYSURIA: ICD-10-CM

## 2020-10-27 DIAGNOSIS — J06.9 UPPER RESPIRATORY TRACT INFECTION, UNSPECIFIED TYPE: Primary | ICD-10-CM

## 2020-10-27 DIAGNOSIS — F15.11 HISTORY OF METHAMPHETAMINE ABUSE (H): ICD-10-CM

## 2020-10-27 LAB
ALBUMIN UR-MCNC: NEGATIVE MG/DL
APPEARANCE UR: CLEAR
BILIRUB UR QL STRIP: NEGATIVE
COLOR UR AUTO: YELLOW
FLUAV+FLUBV AG SPEC QL: NEGATIVE
FLUAV+FLUBV AG SPEC QL: NEGATIVE
GLUCOSE UR STRIP-MCNC: NEGATIVE MG/DL
HGB UR QL STRIP: NEGATIVE
KETONES UR STRIP-MCNC: NEGATIVE MG/DL
LEUKOCYTE ESTERASE UR QL STRIP: NEGATIVE
NITRATE UR QL: NEGATIVE
PH UR STRIP: 6 PH (ref 5–7)
SOURCE: NORMAL
SP GR UR STRIP: 1.01 (ref 1–1.03)
SPECIMEN SOURCE: NORMAL
UROBILINOGEN UR STRIP-MCNC: 0 MG/DL (ref 0–2)

## 2020-10-27 PROCEDURE — 87491 CHLMYD TRACH DNA AMP PROBE: CPT | Performed by: OBSTETRICS & GYNECOLOGY

## 2020-10-27 PROCEDURE — 99207 PR NO CHARGE NURSE ONLY: CPT

## 2020-10-27 PROCEDURE — 80307 DRUG TEST PRSMV CHEM ANLYZR: CPT | Mod: 90 | Performed by: OBSTETRICS & GYNECOLOGY

## 2020-10-27 PROCEDURE — 96372 THER/PROPH/DIAG INJ SC/IM: CPT | Performed by: OBSTETRICS & GYNECOLOGY

## 2020-10-27 PROCEDURE — 99213 OFFICE O/P EST LOW 20 MIN: CPT | Mod: 95 | Performed by: OBSTETRICS & GYNECOLOGY

## 2020-10-27 PROCEDURE — 87804 INFLUENZA ASSAY W/OPTIC: CPT | Performed by: OBSTETRICS & GYNECOLOGY

## 2020-10-27 PROCEDURE — 81003 URINALYSIS AUTO W/O SCOPE: CPT | Performed by: OBSTETRICS & GYNECOLOGY

## 2020-10-27 PROCEDURE — 87591 N.GONORRHOEAE DNA AMP PROB: CPT | Performed by: OBSTETRICS & GYNECOLOGY

## 2020-10-27 PROCEDURE — 99213 OFFICE O/P EST LOW 20 MIN: CPT | Mod: 25 | Performed by: OBSTETRICS & GYNECOLOGY

## 2020-10-27 PROCEDURE — 99000 SPECIMEN HANDLING OFFICE-LAB: CPT | Performed by: OBSTETRICS & GYNECOLOGY

## 2020-10-27 RX ORDER — CEFTRIAXONE SODIUM 250 MG/1
250 INJECTION, POWDER, FOR SOLUTION INTRAMUSCULAR; INTRAVENOUS ONCE
Qty: 250 MG | Refills: 0 | OUTPATIENT
Start: 2020-10-27 | End: 2020-10-27

## 2020-10-27 RX ORDER — AZITHROMYCIN 250 MG/1
TABLET, FILM COATED ORAL
Qty: 6 TABLET | Refills: 0 | Status: SHIPPED | OUTPATIENT
Start: 2020-10-27 | End: 2021-01-04

## 2020-10-27 RX ORDER — CEFTRIAXONE SODIUM 250 MG
250 VIAL (EA) INJECTION ONCE
Status: COMPLETED | OUTPATIENT
Start: 2020-10-27 | End: 2020-10-27

## 2020-10-27 RX ORDER — AZITHROMYCIN 500 MG/1
1000 TABLET, FILM COATED ORAL DAILY
Qty: 2 TABLET | Refills: 0 | Status: SHIPPED | OUTPATIENT
Start: 2020-10-27 | End: 2020-10-28

## 2020-10-27 RX ADMIN — Medication 250 MG: at 16:19

## 2020-10-27 NOTE — RESULT ENCOUNTER NOTE
Zulema/team,Please inform Ayanna/ or caretaker  that this result(s) is/are normal. The flu swab is negative-Thanks. JESSY Man MD

## 2020-10-27 NOTE — NURSING NOTE
Clinic Administered Medication Documentation      Injectable Medication Documentation    Patient was given Ceftriaxone Sodium (Rocephin). Prior to medication administration, verified patients identity using patient s name and date of birth. Please see MAR and medication order for additional information.       Was entire vial of medication used? Yes  Vial/Syringe: Single dose vial  Expiration Date:  2/2022  Was this medication supplied by the patient? No      Radha Pérez CMA  October 27, 2020

## 2020-10-27 NOTE — LETTER
[address] [address] [phone]        October 27, 2020    Regarding:  Ayanna Davidson  [address] [address]              To Whom It May Concern;     Ayanna was examined by myself today. She will need to be excused from work starting today 10/27/2020 until the results of her COVID-19 test come back and are negative. Please contact my office with questions.          Sincerely,        Jase Man MD

## 2020-10-27 NOTE — PROGRESS NOTES
Patient here for influenza swab as ordered by Dr. Man. Patient was put in the isolation room. MA obtained the nasal swab that patient tolerated well. Sample is at lab pending results.  Zulema Witt CMA

## 2020-10-27 NOTE — LETTER
October 28, 2020      Ayanna Davidson  [address]  [place] [address]        Dear ,    We are writing to inform you of your test results.    result(s) is/are normal.  Thanks. JESSY Man MD     Resulted Orders   *UA reflex to Microscopic and Culture ([place]; [facility]; [facility]; [facility]; [facility] [place]; [facility]; [place]; [place])   Result Value Ref Range    Color Urine Yellow     Appearance Urine Clear     Glucose Urine Negative NEG^Negative mg/dL    Bilirubin Urine Negative NEG^Negative    Ketones Urine Negative NEG^Negative mg/dL    Specific Gravity Urine 1.014 1.003 - 1.035    Blood Urine Negative NEG^Negative    pH Urine 6.0 5.0 - 7.0 pH    Protein Albumin Urine Negative NEG^Negative mg/dL    Urobilinogen mg/dL 0.0 0.0 - 2.0 mg/dL    Nitrite Urine Negative NEG^Negative    Leukocyte Esterase Urine Negative NEG^Negative    Source Midstream Urine        If you have any questions or concerns, please call the clinic at the number listed above.

## 2020-10-27 NOTE — PROGRESS NOTES
"Ayanna Davidson is a 30 year old female who is being evaluated via a billable telephone visit.      The patient has been notified of following:     \"This telephone visit will be conducted via a call between you and your physician/provider. We have found that certain health care needs can be provided without the need for a physical exam.  This service lets us provide the care you need with a short phone conversation.  If a prescription is necessary we can send it directly to your pharmacy.  If lab work is needed we can place an order for that and you can then stop by our lab to have the test done at a later time.    Telephone visits are billed at different rates depending on your insurance coverage. During this emergency period, for some insurers they may be billed the same as an in-person visit.  Please reach out to your insurance provider with any questions.    If during the course of the call the physician/provider feels a telephone visit is not appropriate, you will not be charged for this service.\"    Patient has given verbal consent for Telephone visit?  Yes    What phone number would you like to be contacted at? [phone]    How would you like to obtain your AVS? MyChart    Subjective     Ayanna Davidson is a 30 year old female who presents via phone visit today for the following health issues:  Subjective: Ayanna requested a phone consultation today because of concerns regarding  Sore throat. Due to the current covid-19 coronavirus epidemic we are managing much of our patients' concerns remotely when possible.    Sore throat and dry cough for 2 days- no fever. She coughs but no phlegm comes out.  She smokes 1/4 ppd- No hemoptysis reported.  Taking fluids well at this point.  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  .  Past Medical History:   Diagnosis Date     Cervical high risk HPV (human papillomavirus) test positive 03/12/2019    last PAP NIL (4/16), remote hx of LEEP     Gastroesophageal reflux disease      HSV (herpes simplex virus) infection     1 & 2     Methamphetamine abuse (H)     reports daily use     recurrent Bartholin's cyst      No Known Allergies  Current Outpatient Medications   Medication Sig Dispense Refill     ibuprofen (ADVIL/MOTRIN) 600 MG tablet Take 1 tablet (600 mg) by mouth every 6 hours as needed for other (mild and/or inflammatory pain) 30 tablet 0     Multiple Vitamins-Minerals (MULTIVITAMIN ADULT PO)        omeprazole (PRILOSEC OTC) 20 MG EC tablet Take 1 tablet (20 mg) by mouth daily 30 tablet 3       Assessment/Plan:  URI sx- See rx for  zithromax. I explained that antibiotics can cause a rash or allergic reaction to develop and so the medication should be stopped if this occurs. Also there is a risk of diarrhea or clostridium difficile pseudomembranous enterocolitis with any antibiotic use so it should be stopped if diarrhea develops and then the clinic should be called so that we have a followup evaluation.    These sx are consistent with covid- I will order a test and also influenza swab- and keep her off work until results available and until she recovers from sx-   Also she wants UA for dysuria-  Will order that    She is asking for in/clinic exam due to concerns for STDs- reyes et up appt with Dr. Schwarz who has seen her in past.    Phone call duration was   9  minutes.     JESSY Man MD

## 2020-10-28 NOTE — TELEPHONE ENCOUNTER
Froylan Schwarz MD  [facility] Triage 11 hours ago (8:54 PM)     Let her know that we added on the drug screen, will let her know the results.

## 2020-10-28 NOTE — PROGRESS NOTES
Clinic Note    CC:    Chief Complaint   Patient presents with     Exposure to STD     Chlamydia        HPI:  Ayanna Davidson is a 30 year old  who is well known to me, presents given exposure to chamydia. Seen previously for recurrent Bartholin abscesses, chronic vulvar wound, and TINO III.   Vulvar issues have resolved. She has a hx of meth use, now sober for some time. Found out recent sexual partner has chlamydia. Reports will no longer be sleeping with him. She has no focal symptoms.     PMH:   Past Medical History:   Diagnosis Date     Cervical high risk HPV (human papillomavirus) test positive 2019    last PAP NIL (), remote hx of LEEP     Gastroesophageal reflux disease      HSV (herpes simplex virus) infection     1 & 2     Methamphetamine abuse (H)     reports daily use     recurrent Bartholin's cyst      SurgHx:   Past Surgical History:   Procedure Laterality Date     CHOLECYSTECTOMY       ENT SURGERY       EXAM UNDER ANESTHESIA PELVIC N/A 2020    Procedure: EXAM UNDER ANESTHESIA, PELVIS, PAP;  Surgeon: Froylan Schwarz MD;  [facility]     EXCISE VULVA WIDE LOCAL Bilateral 3/11/2020    Procedure: Right Vulva Wedge Resection and Incision and Drainage Left Vulva;  Surgeon: Froylan Schwarz MD;  [facility]     INCISION AND DRAINAGE ABSCESS PELVIS, COMBINED N/A 2018    Procedure: COMBINED INCISION AND DRAINAGE ABSCESS PELVIS;  INCISION AND DRAINAGE LABIAL ABSCESS;  Surgeon: Jase Beach MD;  [facility]     INCISION AND DRAINAGE ABSCESS PELVIS, COMBINED N/A 3/5/2019    Procedure: Incision and Drainage Right Labial Abscess;  Surgeon: Jase Beach MD;  [facility]     INCISION AND DRAINAGE LOWER EXTREMITY, COMBINED Right 2019    Procedure: irrigation and debridement right leg dog bite;  Surgeon: Rommel Pérez MD;  [facility]     LAP ADJUSTABLE GASTRIC BAND       LEEP TX, CERVICAL       MAMMOPLASTY REDUCTION BILATERAL        MARSUPIALIZATION BARTHOLIN CYST N/A 4/29/2019    Procedure: Bartholin's Cyst removal;  Surgeon: Froylan Schwarz MD;  [facility]     MARSUPIALIZATION BARTHOLIN CYST Left 1/29/2020    Procedure: Excision of left bartholin's abscess;  Surgeon: Froylan Schwarz MD;  [facility]     ORTHOPEDIC SURGERY       FamHx:   Family History   Problem Relation Age of Onset     Heart Disease Father      Diabetes Maternal Grandmother      Breast Cancer Paternal Grandmother      Testicular cancer Paternal Grandfather      SocHx:   Social History     Socioeconomic History     Marital status: Single     Spouse name: Not on file     Number of children: Not on file     Years of education: Not on file     Highest education level: Not on file   Occupational History     Not on file   Social Needs     Financial resource strain: Not on file     Food insecurity     Worry: Not on file     Inability: Not on file     Transportation needs     Medical: Not on file     Non-medical: Not on file   Tobacco Use     Smoking status: Current Every Day Smoker     Packs/day: 0.25     Smokeless tobacco: Current User     Tobacco comment: uses E cig   Substance and Sexual Activity     Alcohol use: Yes     Frequency: Monthly or less     Drug use: Not Currently     Comment: In treatment for meth.     Sexual activity: Yes     Partners: Male     Birth control/protection: None   Lifestyle     Physical activity     Days per week: Not on file     Minutes per session: Not on file     Stress: Not on file   Relationships     Social connections     Talks on phone: Not on file     Gets together: Not on file     Attends Taoist service: Not on file     Active member of club or organization: Not on file     Attends meetings of clubs or organizations: Not on file     Relationship status: Not on file     Intimate partner violence     Fear of current or ex partner: Not on file     Emotionally abused: Not on file     Physically abused: Not on file      Forced sexual activity: Not on file   Other Topics Concern     Not on file   Social History Narrative     Not on file     Allergies:   Patient has no known allergies.  Current Medications:   Current Outpatient Medications   Medication Sig Dispense Refill     azithromycin (ZITHROMAX) 500 MG tablet Take 2 tablets (1,000 mg) by mouth daily for 1 day 2 tablet 0     cefTRIAXone (ROCEPHIN) 250 MG injection Inject 250 mg into the muscle once for 1 dose 250 mg 0     azithromycin (ZITHROMAX) 250 MG tablet Two tablets first day, then one tablet daily for four days 6 tablet 0     ibuprofen (ADVIL/MOTRIN) 600 MG tablet Take 1 tablet (600 mg) by mouth every 6 hours as needed for other (mild and/or inflammatory pain) 30 tablet 0     Multiple Vitamins-Minerals (MULTIVITAMIN ADULT PO)        omeprazole (PRILOSEC OTC) 20 MG EC tablet Take 1 tablet (20 mg) by mouth daily 30 tablet 3     EXAM:  There were no vitals filed for this visit. There is no height or weight on file to calculate BMI.    Gen: Alert, oriented, appropriately interactive, NAD   Perineum: no lesions; normal appearing external genitalia, bartholins glands, urethra, skenes glands  Vagina: no masses or lesions or discharge, normally rugated.  MSK: normal gait, symmetric movements UE & LE  Lower extremities: non-tender, no edema    Labs & Imaging:  Results for orders placed or performed in visit on 10/27/20 (from the past 24 hour(s))   *UA reflex to Microscopic and Culture ([place]; [facility]; [facility]; [facility]; [facility]; [facility]; [place]; [place])    Specimen: Midstream Urine   Result Value Ref Range    Color Urine Yellow     Appearance Urine Clear     Glucose Urine Negative NEG^Negative mg/dL    Bilirubin Urine Negative NEG^Negative    Ketones Urine Negative NEG^Negative mg/dL    Specific Gravity Urine 1.014 1.003 - 1.035    Blood Urine Negative NEG^Negative    pH Urine 6.0 5.0 - 7.0 pH    Protein Albumin Urine Negative NEG^Negative  mg/dL    Urobilinogen mg/dL 0.0 0.0 - 2.0 mg/dL    Nitrite Urine Negative NEG^Negative    Leukocyte Esterase Urine Negative NEG^Negative    Source Midstream Urine        Lab Results   Component Value Date    PAP NIL 2020    PAP NIL 2019     ASSESSMENT/PLAN: Ayanna Davidson is a 30 year old  who presents for chlamydia exposure.     Exposure to chlamydia  - Normal exam today  - azithromycin (ZITHROMAX) 500 MG tablet; Take 2 tablets (1,000 mg) by mouth daily for 1 day  Dispense: 2 tablet; Refill: 0  - cefTRIAXone (ROCEPHIN) 250 MG injection; Inject 250 mg into the muscle once for 1 dose  Dispense: 250 mg; Refill: 0  - Chlamydia trachomatis PCR  - Neisseria gonorrhoeae PCR  - cefTRIAXone (ROCEPHIN) injection 250 mg  - Advised three month follow up    History of methamphetamine abuse (H)  - drug screen per patient request   - Drug  Screen Comprehensive, Urine w/o Reported Meds (Pain Care Package)    Froylan Schwarz MD   10/27/2020 10:54 PM

## 2020-10-28 NOTE — RESULT ENCOUNTER NOTE
Zulema/team,Please inform Ayanna/ or caretaker  that this result(s) is/are normal.  Thanks. JESSY Man MD

## 2020-10-29 NOTE — TELEPHONE ENCOUNTER
Froylan Schwarz MD [place], Heidi, RN 2 hours ago (11:56 AM)     Message today from patient looking for drug screen result. I had the lab add on the drug screen, since she had already left a urine that day. It reads as still in process, not sure if its just slow of if there was an issue running the lab. Could you check with lab? If there is an issue, may need to recollect.     Message text      RN called and spoke with lab who states this lab will take 4-8 days to result from when it was collected, pt does not need to recollect.    RN routing to provider as JAY.    Omaira Escamilla RN on 10/29/2020 at 2:02 PM

## 2020-10-30 DIAGNOSIS — F15.11 HISTORY OF METHAMPHETAMINE ABUSE (H): Primary | ICD-10-CM

## 2020-10-30 LAB — COMPREHEN DRUG ANALYSIS UR: NORMAL

## 2020-11-02 ENCOUNTER — TELEPHONE (OUTPATIENT)
Dept: FAMILY MEDICINE | Facility: OTHER | Age: 30
End: 2020-11-02

## 2020-11-02 NOTE — TELEPHONE ENCOUNTER
Attempted to reach pt by phone to let her know her urine tests need to be repeated as suggested by Dr. Schwarz below.  LVM for pt to return call to clinic.  I will also send pt a [employer] message.    Pam Patiño RN

## 2020-11-02 NOTE — TELEPHONE ENCOUNTER
----- Message from Froylan Schwarz MD sent at 10/30/2020 10:06 PM CDT -----  Regarding: FW: Urine results from 10/27  Please let the patient know that unfortunately there was a problem with her urine tests last week and we will need to recollect them. I have reordered the UA and UDS, she can make a lab only appt for this.   ----- Message -----  From: Nicki Rios  Sent: 10/30/2020   4:39 PM CDT  To: Froylan Schwarz MD  Subject: Urine results from 10/27                         Good afternoon Dr. Schwarz,     We have reviewed the lab results for the patient urine left on 10/27.  The results obtained from the sample provided a pH of 6.0 and a specific gravity of 1.014 which are normal for urine.  Our results do not provide any reason to believe it is other than human urine.   The creatinine result obtained by [employer] who performed the drug screen were within normal range as well.       Inquiry with [employer] states they test for a series of biomarkers to determine if it is human urine and the patient's specimen was missing many of them.   They did suggest that there are synthetic urines that can be purchased, but they could not speak on this particular specimen.       My recommendation would be to have the patient come in and leave another specimen for testing.     Please reach out if you have any further clarifying questions.       Nicki

## 2020-11-03 DIAGNOSIS — F15.11 HISTORY OF METHAMPHETAMINE ABUSE (H): ICD-10-CM

## 2020-11-03 DIAGNOSIS — J06.9 UPPER RESPIRATORY TRACT INFECTION, UNSPECIFIED TYPE: ICD-10-CM

## 2020-11-03 LAB
ALBUMIN UR-MCNC: 30 MG/DL
APPEARANCE UR: ABNORMAL
BILIRUB UR QL STRIP: NEGATIVE
COLOR UR AUTO: ABNORMAL
GLUCOSE UR STRIP-MCNC: NEGATIVE MG/DL
HGB UR QL STRIP: NEGATIVE
HYALINE CASTS #/AREA URNS LPF: 3 /LPF (ref 0–2)
KETONES UR STRIP-MCNC: 5 MG/DL
LEUKOCYTE ESTERASE UR QL STRIP: ABNORMAL
MUCOUS THREADS #/AREA URNS LPF: PRESENT /LPF
NITRATE UR QL: NEGATIVE
PH UR STRIP: 6 PH (ref 5–7)
RBC #/AREA URNS AUTO: 1 /HPF (ref 0–2)
SOURCE: ABNORMAL
SP GR UR STRIP: 1.03 (ref 1–1.03)
SQUAMOUS #/AREA URNS AUTO: 16 /HPF (ref 0–1)
UROBILINOGEN UR STRIP-MCNC: 2 MG/DL (ref 0–2)
WBC #/AREA URNS AUTO: 3 /HPF (ref 0–5)

## 2020-11-03 PROCEDURE — 81001 URINALYSIS AUTO W/SCOPE: CPT | Performed by: OBSTETRICS & GYNECOLOGY

## 2020-11-03 PROCEDURE — 99000 SPECIMEN HANDLING OFFICE-LAB: CPT | Performed by: OBSTETRICS & GYNECOLOGY

## 2020-11-03 PROCEDURE — 80307 DRUG TEST PRSMV CHEM ANLYZR: CPT | Mod: 90 | Performed by: OBSTETRICS & GYNECOLOGY

## 2020-11-03 RX ORDER — AZITHROMYCIN 250 MG/1
TABLET, FILM COATED ORAL
Qty: 4 TABLET | Refills: 0 | OUTPATIENT
Start: 2020-11-03

## 2020-11-03 NOTE — TELEPHONE ENCOUNTER
Closing encounter as pt has read the [employer] message that was sent to her yesterday regarding leaving another urine sample.    Pam Patiño RN

## 2020-11-03 NOTE — TELEPHONE ENCOUNTER
Kenia, please let her know the zithromax is still working in her system for at least a week after she finishes taking it- another refill of this wont improve that- but if she is feeling sicker, please set up appt for her to be seen in clinic- thanks- rm     Called and LM for patient to call back please relay message above.   Kenia Chauhan MA

## 2020-11-03 NOTE — TELEPHONE ENCOUNTER
Requested Prescriptions   Pending Prescriptions Disp Refills     azithromycin (ZITHROMAX) 250 MG tablet [Pharmacy Med Name: AZITHROMYCIN 250MG TABS] 4 tablet 0     Sig: TAKE ONE TABLET BY MOUTH ONCE DAILY FOR 4 DAYS AFTER FINISHING 1000MG DOSE     Last Written Prescription Date:  10/27/2020  Last Fill Quantity: 6,   # refills: 0  Last Office Visit: 10/27/2020  Future Office visit:       Routing refill request to provider for review/approval because:  Drug not on the [facility], [facility] or [facility] refill protocol or controlled substance    Kenia Chauhan MA

## 2020-11-03 NOTE — LETTER
[address] [address]  Phone: [phone]  Fax: [phone]        November 10, 2020      Ayanna Davidson                                                                                                                                [address] [address] [place] [address]            Dear Ms. Davidson,    We are concerned about your health care.  We recently provided you with a medication refill.  Many medications require routine follow-up with your Doctor.      At this time we ask that: You schedule a routine office visit with your physician to follow your care. Your Zithromax is still working in your system for at least a week after you finish taking it. Another refill at this point wont improve that but if you are still feeling sick, please call the clinic to set up appointment to be seen.     Your prescription: Cannot be refilled until the above noted follow up has been completed.      Thank you,      Dr. Man Care Team

## 2020-11-08 LAB — COMPREHEN DRUG ANALYSIS UR: NORMAL

## 2020-11-15 ENCOUNTER — MYC MEDICAL ADVICE (OUTPATIENT)
Dept: FAMILY MEDICINE | Facility: CLINIC | Age: 30
End: 2020-11-15

## 2020-11-16 NOTE — TELEPHONE ENCOUNTER
Patient is informed to make an appointment for further discussion of symptoms.  She states she completed a COVID test outside of [facility] system and it was negative.    Closing this encounter.  CHIARA CisnerosN, RN

## 2020-11-16 NOTE — TELEPHONE ENCOUNTER
Patient messaging with symptoms of body aches, sore throat, and congestion. Directed to [email or web address]. Mayra Stratton LPN

## 2020-12-06 ENCOUNTER — HEALTH MAINTENANCE LETTER (OUTPATIENT)
Age: 30
End: 2020-12-06

## 2020-12-22 ENCOUNTER — HOSPITAL ENCOUNTER (EMERGENCY)
Facility: CLINIC | Age: 30
Discharge: HOME | End: 2020-12-22
Attending: FAMILY MEDICINE | Admitting: FAMILY MEDICINE
Payer: COMMERCIAL

## 2020-12-22 VITALS
HEART RATE: 100 BPM | TEMPERATURE: 97.2 F | RESPIRATION RATE: 14 BRPM | SYSTOLIC BLOOD PRESSURE: 146 MMHG | OXYGEN SATURATION: 100 % | DIASTOLIC BLOOD PRESSURE: 95 MMHG

## 2020-12-22 DIAGNOSIS — H60.502 ACUTE OTITIS EXTERNA OF LEFT EAR, UNSPECIFIED TYPE: ICD-10-CM

## 2020-12-22 PROCEDURE — 99282 EMERGENCY DEPT VISIT SF MDM: CPT | Performed by: FAMILY MEDICINE

## 2020-12-22 PROCEDURE — 99284 EMERGENCY DEPT VISIT MOD MDM: CPT | Performed by: FAMILY MEDICINE

## 2020-12-22 RX ORDER — NEOMYCIN SULFATE, POLYMYXIN B SULFATE, HYDROCORTISONE 3.5; 10000; 1 MG/ML; [USP'U]/ML; MG/ML
3 SOLUTION/ DROPS AURICULAR (OTIC) 4 TIMES DAILY
Qty: 5 ML | Refills: 0 | Status: SHIPPED | OUTPATIENT
Start: 2020-12-22 | End: 2020-12-29

## 2020-12-22 NOTE — ED PROVIDER NOTES
History     Chief Complaint   Patient presents with     Otalgia     HPI  Ayanna Davidson is a 30 year old female who presents with concerns of left ear pain.  Patient is worried that she might have a cyst in her ear.  She uses Q-tips often to clean her ears but when she started having the pain she was pouring alcohol and using Q-tips in her ear canal.  Denies any fevers or chills.  Patient does have chronic allergies but does not feel any stuffier than normal.  Denies any sore throat or runny nose.  Denies any recent trauma.  The pain is constant, touching her ear makes it worse.    Allergies:  No Known Allergies    Problem List:    Patient Active Problem List    Diagnosis Date Noted     Open wound 03/23/2020     Priority: Medium     TINO III (vulvar intraepithelial neoplasia III) 02/26/2020     Priority: Medium     Added automatically from request for surgery [id number]       Preoperative examination 01/28/2020     Priority: Medium     Added automatically from request for surgery [id number]       Bartholin's gland abscess 01/28/2020     Priority: Medium     Added automatically from request for surgery [id number]       Dog bite of right lower leg 09/27/2019     Priority: Medium     Cellulitis of left upper extremity 09/27/2019     Priority: Medium     Current every day smoker 09/27/2019     Priority: Medium     Gastroesophageal reflux disease without esophagitis 09/27/2019     Priority: Medium     Skin infection 09/27/2019     Priority: Medium     Dog bite of left upper extremity 05/07/2019     Priority: Medium     Cervical high risk HPV (human papillomavirus) test positive 03/12/2019     Priority: Medium     4/2016 NIL pap. No prior HPV testing.   3/12/19 NIL pap, + HR HPV (not 16 or 18). Plan: cotest in 1 yr  1/29/20 NIL pap, + HR HPV (not 16 or 18). Plan: [place]  2/11/20 [place] bx: neg. Plan: Cotest in 1 yr.        Vulvar abscess 08/16/2017     Priority: Medium     Drug-induced mental disorder (H) 09/20/2012     Priority:  Medium     Overview:   ICD-10 Regulatory Update       Depressed 05/10/2012     Priority: Medium     Overdose of antipsychotic 05/09/2012     Priority: Medium        Past Medical History:    Past Medical History:   Diagnosis Date     Cervical high risk HPV (human papillomavirus) test positive 03/12/2019     Gastroesophageal reflux disease      HSV (herpes simplex virus) infection      Methamphetamine abuse (H)      recurrent Bartholin's cyst        Past Surgical History:    Past Surgical History:   Procedure Laterality Date     CHOLECYSTECTOMY       ENT SURGERY       EXAM UNDER ANESTHESIA PELVIC N/A 1/29/2020    Procedure: EXAM UNDER ANESTHESIA, PELVIS, PAP;  Surgeon: Froylan Schwarz MD;  [facility]     EXCISE VULVA WIDE LOCAL Bilateral 3/11/2020    Procedure: Right Vulva Wedge Resection and Incision and Drainage Left Vulva;  Surgeon: Froylan Schwarz MD;  [facility]     INCISION AND DRAINAGE ABSCESS PELVIS, COMBINED N/A 2/26/2018    Procedure: COMBINED INCISION AND DRAINAGE ABSCESS PELVIS;  INCISION AND DRAINAGE LABIAL ABSCESS;  Surgeon: Jase Beach MD;  [facility]     INCISION AND DRAINAGE ABSCESS PELVIS, COMBINED N/A 3/5/2019    Procedure: Incision and Drainage Right Labial Abscess;  Surgeon: Jase Beach MD;  [facility]     INCISION AND DRAINAGE LOWER EXTREMITY, COMBINED Right 8/11/2019    Procedure: irrigation and debridement right leg dog bite;  Surgeon: Rommel Pérez MD;  [facility]     LAP ADJUSTABLE GASTRIC BAND       LEEP TX, CERVICAL       MAMMOPLASTY REDUCTION BILATERAL       MARSUPIALIZATION BARTHOLIN CYST N/A 4/29/2019    Procedure: Bartholin's Cyst removal;  Surgeon: Froylan Schwarz MD;  [facility]     MARSUPIALIZATION BARTHOLIN CYST Left 1/29/2020    Procedure: Excision of left bartholin's abscess;  Surgeon: Froylan Schwarz MD;  [facility]     ORTHOPEDIC SURGERY         Family History:    Family History   Problem Relation  Age of Onset     Heart Disease Father      Diabetes Maternal Grandmother      Breast Cancer Paternal Grandmother      Testicular cancer Paternal Grandfather        Social History:  Marital Status:  Single [1]  Social History     Tobacco Use     Smoking status: Current Every Day Smoker     Packs/day: 0.25     Smokeless tobacco: Current User     Tobacco comment: uses E cig   Substance Use Topics     Alcohol use: Not Currently     Frequency: Monthly or less     Drug use: Not Currently     Comment: In treatment for meth.        Medications:         azithromycin (ZITHROMAX) 250 MG tablet       ibuprofen (ADVIL/MOTRIN) 600 MG tablet       Multiple Vitamins-Minerals (MULTIVITAMIN ADULT PO)       omeprazole (PRILOSEC OTC) 20 MG EC tablet          Review of Systems   All other systems reviewed and are negative.      Physical Exam   BP: (!) 146/95  Pulse: 100  Temp: 97.2  F (36.2  C)  Resp: 14  SpO2: 100 %      Physical Exam  Vitals signs and nursing note reviewed.   Constitutional:       General: She is not in acute distress.     Appearance: Normal appearance. She is not ill-appearing.   HENT:      Head: Normocephalic.      Right Ear: Tympanic membrane, ear canal and external ear normal.      Left Ear: Swelling and tenderness present.      Ears:      Comments: There is an abrasion noted on the left ear canal, very tender to palpation  Neurological:      Mental Status: She is alert.         ED Course        Procedures      No results found for this or any previous visit (from the past 24 hour(s)).    Medications - No data to display     Patient has what appears to be a developing otitis externa of her left ear.  This is most likely from her continued Q-tip use.  I really encouraged her not to do this anymore.  We will treat with a course of Polytrim drops.  Patient will continue to use Tylenol and ibuprofen.  Patient will follow-up if there is no improvement over the next few days.    Assessments & Plan (with Medical Decision  Making)  Left otitis externa     I have reviewed the nursing notes.    I have reviewed the findings, diagnosis, plan and need for follow up with the patient.              12/22/2020   [facility] EMERGENCY DEPT     Rupert Smith MD  12/22/20 1640

## 2020-12-22 NOTE — ED AVS SNAPSHOT
[facility] Emergency Dept  [address] [place] [address]  Phone: [phone]  Fax: [phone]                                    Ayanna Davidson   MRN: [id number]    Department: [facility] Emergency Dept   Date of Visit: 12/22/2020           After Visit Summary Signature Page    I have received my discharge instructions, and my questions have been answered. I have discussed any challenges I see with this plan with the nurse or doctor.    ..........................................................................................................................................  Patient/Patient Representative Signature      ..........................................................................................................................................  Patient Representative Print Name and Relationship to Patient    ..................................................               ................................................  Date                                   Time    ..........................................................................................................................................  Reviewed by Signature/Title    ...................................................              ..............................................  Date                                               Time          22EPIC Rev 08/18

## 2020-12-31 NOTE — PROGRESS NOTES
ENT Consultation    Ayanna Davidson who is a 30 year old female seen in consultation at the request of self.      History of Present Illness - Ayanna Davidson is a 30 year old female presents for relation of left ear cyst.  Apparently patient presented to emergency department here at [facility]'s was diagnosed with external otitis given Bactrim DS.  Her condition exacerbated and she was seen at [facility] facility where 1 cm cystic abscess was drained and iodoform dressing placed.  She is still finishing her course of oral antibiotics  but feels much better.  She is concerned about recurrence of this process.  Other concern is potentially some hearing issues lately.  She also has intermittent nonpulsatile tinnitus in both ears.      Body mass index is 31.2 kg/m .    Weight management plan: Patient was referred to their PCP to discuss a diet and exercise plan.    BP Readings from Last 1 Encounters:   01/04/21 126/80       BP noted to be well controlled today in office.     Ayanna IS a smoker/uses chewing tobacco.  Ayanna is not ready to quit      Past Medical History -   Past Medical History:   Diagnosis Date     Cervical high risk HPV (human papillomavirus) test positive 03/12/2019    last PAP NIL (4/16), remote hx of LEEP     Gastroesophageal reflux disease      HSV (herpes simplex virus) infection     1 & 2     Methamphetamine abuse (H)     reports daily use     recurrent Bartholin's cyst        Current Medications -   Current Outpatient Medications:      clindamycin (CLEOCIN) 300 MG capsule, Take 300 mg by mouth, Disp: , Rfl:      Multiple Vitamins-Minerals (MULTIVITAMIN ADULT PO), , Disp: , Rfl:      omeprazole (PRILOSEC OTC) 20 MG EC tablet, Take 1 tablet (20 mg) by mouth daily, Disp: 30 tablet, Rfl: 3     ibuprofen (ADVIL/MOTRIN) 600 MG tablet, Take 1 tablet (600 mg) by mouth every 6 hours as needed for other (mild and/or inflammatory pain) (Patient not taking: Reported on 1/4/2021), Disp: 30 tablet, Rfl:  0    Allergies - No Known Allergies    Social History -   Social History     Socioeconomic History     Marital status: Single     Spouse name: Not on file     Number of children: Not on file     Years of education: Not on file     Highest education level: Not on file   Occupational History     Not on file   Social Needs     Financial resource strain: Not on file     Food insecurity     Worry: Not on file     Inability: Not on file     Transportation needs     Medical: Not on file     Non-medical: Not on file   Tobacco Use     Smoking status: Current Every Day Smoker     Packs/day: 0.25     Smokeless tobacco: Current User     Tobacco comment: uses E cig   Substance and Sexual Activity     Alcohol use: Not Currently     Frequency: Monthly or less     Drug use: Not Currently     Comment: In treatment for meth.     Sexual activity: Yes     Partners: Male     Birth control/protection: None   Lifestyle     Physical activity     Days per week: Not on file     Minutes per session: Not on file     Stress: Not on file   Relationships     Social connections     Talks on phone: Not on file     Gets together: Not on file     Attends Rastafarian service: Not on file     Active member of club or organization: Not on file     Attends meetings of clubs or organizations: Not on file     Relationship status: Not on file     Intimate partner violence     Fear of current or ex partner: Not on file     Emotionally abused: Not on file     Physically abused: Not on file     Forced sexual activity: Not on file   Other Topics Concern     Not on file   Social History Narrative     Not on file       Family History -   Family History   Problem Relation Age of Onset     Heart Disease Father      Diabetes Maternal Grandmother      Breast Cancer Paternal Grandmother      Testicular cancer Paternal Grandfather        Review of Systems - As per HPI and PMHx, otherwise review of system review of the head and neck negative. Otherwise 10+ review of  "system is negative    Physical Exam  /80   Temp 98.1  F (36.7  C) (Temporal)   Ht 1.676 m (5' 6\")   Wt 87.7 kg (193 lb 4.8 oz)   BMI 31.20 kg/m    BMI: Body mass index is 31.2 kg/m .    General - The patient is well nourished and well developed, and appears to have good nutritional status.  Alert and oriented to person and place, answers questions and cooperates with examination appropriately.    SKIN - No suspicious lesions or rashes.  Small scab from previous incision and drainage of an abscess in the pinna left side.  Respiration - No respiratory distress.  Head and Face - Normocephalic and atraumatic, with no gross asymmetry noted of the contour of the facial features.  The facial nerve is intact, with strong symmetric movements.    Voice and Breathing - The patient was breathing comfortably without the use of accessory muscles. The patients voice was clear and strong, and had appropriate pitch and quality.    Ears - Bilateral pinna and EACs with normal appearing overlying skin.  There appears to be a little scab from previous incision and drainage in the medial aspect of the tragal skin on the left.  I could not palpate any masses or lesions underneath the scab.  Tympanic membrane intact with good mobility on pneumatic otoscopy bilaterally. Bony landmarks of the ossicular chain are normal. The tympanic membranes are normal in appearance. No retraction, perforation, or masses.  No fluid or purulence was seen in the external canal or the middle ear.     Eyes - Extraocular movements intact.  Sclera were not icteric or injected, conjunctiva were pink and moist.    Mouth - Examination of the oral cavity showed pink, healthy oral mucosa. No lesions or ulcerations noted.  The tongue was mobile and midline, and the dentition were in good condition.      Throat - The walls of the oropharynx were smooth, pink, moist, symmetric, and had no lesions or ulcerations.  The tonsillar pillars and soft palate were " symmetric.  The uvula was midline on elevation.    Neck - Normal midline excursion of the laryngotracheal complex during swallowing.  Full range of motion on passive movement.  Palpation of the occipital, submental, submandibular, internal jugular chain, and supraclavicular nodes did not demonstrate any abnormal lymph nodes or masses.  The carotid pulse was palpable bilaterally.  Palpation of the thyroid was soft and smooth, with no nodules or goiter appreciated.  The trachea was mobile and midline.    Nose - External contour is symmetric, no gross deflection or scars.  Nasal mucosa is pink and moist with no abnormal mucus.  The septum was midline and non-obstructive, turbinates of normal size and position.  No polyps, masses, or purulence noted on examination.    Neuro - Nonfocal neuro exam is normal, CN 2 through 12 intact, normal gait and muscle tone.      Performed in clinic today:  Audiologic Studies - An audiogram and tympanogram were performed today as part of the evaluation and personally reviewed. The tympanogram shows normal Type A curves, with normal canal volumes and middle ear pressures.  There is no sign of eustachian tube dysfunction or middle ear effusion.  The audiogram was also normal.  The sensorineural hearing was age-appropriate, with no evidence of conductive hearing loss or significant asymmetry.      A/P - Ayanna Davidson is a 30 year old female with previous subcutaneous abscess possible epidermoid cyst.  This point I cannot appreciate any further pathology other than the scalp for which patient is advised to use topical Vaseline.  If this problem comes back patient should see us for possible cyst removal.  In regard to hearing at this point we reassured her that his hearing is quite normal.  She is to protect the ears about noises and recheck it again in a year if any further problems arise.      Sharad Matthews MD

## 2021-01-04 ENCOUNTER — OFFICE VISIT (OUTPATIENT)
Dept: AUDIOLOGY | Facility: CLINIC | Age: 31
End: 2021-01-04
Payer: COMMERCIAL

## 2021-01-04 ENCOUNTER — OFFICE VISIT (OUTPATIENT)
Dept: OTOLARYNGOLOGY | Facility: CLINIC | Age: 31
End: 2021-01-04
Payer: COMMERCIAL

## 2021-01-04 VITALS
SYSTOLIC BLOOD PRESSURE: 126 MMHG | WEIGHT: 193.3 LBS | DIASTOLIC BLOOD PRESSURE: 80 MMHG | BODY MASS INDEX: 31.07 KG/M2 | HEIGHT: 66 IN | TEMPERATURE: 98.1 F

## 2021-01-04 DIAGNOSIS — H91.93 HEARING PROBLEM OF BOTH EARS: Primary | ICD-10-CM

## 2021-01-04 DIAGNOSIS — H92.09 OTALGIA: Primary | ICD-10-CM

## 2021-01-04 DIAGNOSIS — L02.01 CUTANEOUS ABSCESS OF FACE: ICD-10-CM

## 2021-01-04 PROCEDURE — 99203 OFFICE O/P NEW LOW 30 MIN: CPT | Performed by: OTOLARYNGOLOGY

## 2021-01-04 PROCEDURE — 92557 COMPREHENSIVE HEARING TEST: CPT | Performed by: AUDIOLOGIST

## 2021-01-04 PROCEDURE — 92550 TYMPANOMETRY & REFLEX THRESH: CPT | Mod: 52 | Performed by: AUDIOLOGIST

## 2021-01-04 PROCEDURE — 99207 PR NO CHARGE LOS: CPT | Performed by: AUDIOLOGIST

## 2021-01-04 RX ORDER — CLINDAMYCIN HCL 300 MG
300 CAPSULE ORAL
COMMUNITY
Start: 2020-12-27 | End: 2021-01-06

## 2021-01-04 ASSESSMENT — MIFFLIN-ST. JEOR: SCORE: 1603.55

## 2021-01-04 NOTE — PROGRESS NOTES
AUDIOLOGY REPORT     SUMMARY: Audiology visit completed. See audiogram for results.     RECOMMENDATIONS: Follow-up with ENT    Mike Capellan Licensed Audiologist #[id number]

## 2021-01-04 NOTE — LETTER
1/4/2021         RE: Ayanna Davidson  [address]  [place] [address]        Dear Colleague,    Thank you for referring your patient, Ayanna Davidson, to the [facility]. Please see a copy of my visit note below.    ENT Consultation    Ayanna Davidson who is a 30 year old female seen in consultation at the request of self.      History of Present Illness - Ayanna Davidson is a 30 year old female presents for relation of left ear cyst.  Apparently patient presented to emergency department here at [facility]'s was diagnosed with external otitis given Bactrim DS.  Her condition exacerbated and she was seen at [facility] facility where 1 cm cystic abscess was drained and iodoform dressing placed.  She is still finishing her course of oral antibiotics  but feels much better.  She is concerned about recurrence of this process.  Other concern is potentially some hearing issues lately.  She also has intermittent nonpulsatile tinnitus in both ears.      Body mass index is 31.2 kg/m .    Weight management plan: Patient was referred to their PCP to discuss a diet and exercise plan.    BP Readings from Last 1 Encounters:   01/04/21 126/80       BP noted to be well controlled today in office.     Ayanna IS a smoker/uses chewing tobacco.  Ayanna is not ready to quit      Past Medical History -   Past Medical History:   Diagnosis Date     Cervical high risk HPV (human papillomavirus) test positive 03/12/2019    last PAP NIL (4/16), remote hx of LEEP     Gastroesophageal reflux disease      HSV (herpes simplex virus) infection     1 & 2     Methamphetamine abuse (H)     reports daily use     recurrent Bartholin's cyst        Current Medications -   Current Outpatient Medications:      clindamycin (CLEOCIN) 300 MG capsule, Take 300 mg by mouth, Disp: , Rfl:      Multiple Vitamins-Minerals (MULTIVITAMIN ADULT PO), , Disp: , Rfl:      omeprazole (PRILOSEC OTC) 20 MG EC tablet, Take 1 tablet (20 mg)  by mouth daily, Disp: 30 tablet, Rfl: 3     ibuprofen (ADVIL/MOTRIN) 600 MG tablet, Take 1 tablet (600 mg) by mouth every 6 hours as needed for other (mild and/or inflammatory pain) (Patient not taking: Reported on 1/4/2021), Disp: 30 tablet, Rfl: 0    Allergies - No Known Allergies    Social History -   Social History     Socioeconomic History     Marital status: Single     Spouse name: Not on file     Number of children: Not on file     Years of education: Not on file     Highest education level: Not on file   Occupational History     Not on file   Social Needs     Financial resource strain: Not on file     Food insecurity     Worry: Not on file     Inability: Not on file     Transportation needs     Medical: Not on file     Non-medical: Not on file   Tobacco Use     Smoking status: Current Every Day Smoker     Packs/day: 0.25     Smokeless tobacco: Current User     Tobacco comment: uses E cig   Substance and Sexual Activity     Alcohol use: Not Currently     Frequency: Monthly or less     Drug use: Not Currently     Comment: In treatment for meth.     Sexual activity: Yes     Partners: Male     Birth control/protection: None   Lifestyle     Physical activity     Days per week: Not on file     Minutes per session: Not on file     Stress: Not on file   Relationships     Social connections     Talks on phone: Not on file     Gets together: Not on file     Attends Taoist service: Not on file     Active member of club or organization: Not on file     Attends meetings of clubs or organizations: Not on file     Relationship status: Not on file     Intimate partner violence     Fear of current or ex partner: Not on file     Emotionally abused: Not on file     Physically abused: Not on file     Forced sexual activity: Not on file   Other Topics Concern     Not on file   Social History Narrative     Not on file       Family History -   Family History   Problem Relation Age of Onset     Heart Disease Father      Diabetes  "Maternal Grandmother      Breast Cancer Paternal Grandmother      Testicular cancer Paternal Grandfather        Review of Systems - As per HPI and PMHx, otherwise review of system review of the head and neck negative. Otherwise 10+ review of system is negative    Physical Exam  /80   Temp 98.1  F (36.7  C) (Temporal)   Ht 1.676 m (5' 6\")   Wt 87.7 kg (193 lb 4.8 oz)   BMI 31.20 kg/m    BMI: Body mass index is 31.2 kg/m .    General - The patient is well nourished and well developed, and appears to have good nutritional status.  Alert and oriented to person and place, answers questions and cooperates with examination appropriately.    SKIN - No suspicious lesions or rashes.  Small scab from previous incision and drainage of an abscess in the pinna left side.  Respiration - No respiratory distress.  Head and Face - Normocephalic and atraumatic, with no gross asymmetry noted of the contour of the facial features.  The facial nerve is intact, with strong symmetric movements.    Voice and Breathing - The patient was breathing comfortably without the use of accessory muscles. The patients voice was clear and strong, and had appropriate pitch and quality.    Ears - Bilateral pinna and EACs with normal appearing overlying skin.  There appears to be a little scab from previous incision and drainage in the medial aspect of the tragal skin on the left.  I could not palpate any masses or lesions underneath the scab.  Tympanic membrane intact with good mobility on pneumatic otoscopy bilaterally. Bony landmarks of the ossicular chain are normal. The tympanic membranes are normal in appearance. No retraction, perforation, or masses.  No fluid or purulence was seen in the external canal or the middle ear.     Eyes - Extraocular movements intact.  Sclera were not icteric or injected, conjunctiva were pink and moist.    Mouth - Examination of the oral cavity showed pink, healthy oral mucosa. No lesions or ulcerations noted. "  The tongue was mobile and midline, and the dentition were in good condition.      Throat - The walls of the oropharynx were smooth, pink, moist, symmetric, and had no lesions or ulcerations.  The tonsillar pillars and soft palate were symmetric.  The uvula was midline on elevation.    Neck - Normal midline excursion of the laryngotracheal complex during swallowing.  Full range of motion on passive movement.  Palpation of the occipital, submental, submandibular, internal jugular chain, and supraclavicular nodes did not demonstrate any abnormal lymph nodes or masses.  The carotid pulse was palpable bilaterally.  Palpation of the thyroid was soft and smooth, with no nodules or goiter appreciated.  The trachea was mobile and midline.    Nose - External contour is symmetric, no gross deflection or scars.  Nasal mucosa is pink and moist with no abnormal mucus.  The septum was midline and non-obstructive, turbinates of normal size and position.  No polyps, masses, or purulence noted on examination.    Neuro - Nonfocal neuro exam is normal, CN 2 through 12 intact, normal gait and muscle tone.      Performed in clinic today:  Audiologic Studies - An audiogram and tympanogram were performed today as part of the evaluation and personally reviewed. The tympanogram shows normal Type A curves, with normal canal volumes and middle ear pressures.  There is no sign of eustachian tube dysfunction or middle ear effusion.  The audiogram was also normal.  The sensorineural hearing was age-appropriate, with no evidence of conductive hearing loss or significant asymmetry.      A/P - Ayanna Davidson is a 30 year old female with previous subcutaneous abscess possible epidermoid cyst.  This point I cannot appreciate any further pathology other than the scalp for which patient is advised to use topical Vaseline.  If this problem comes back patient should see us for possible cyst removal.  In regard to hearing at this point we reassured her  that his hearing is quite normal.  She is to protect the ears about noises and recheck it again in a year if any further problems arise.      Sharad Matthews MD      Again, thank you for allowing me to participate in the care of your patient.        Sincerely,        Sharad Matthews MD, MD

## 2021-01-04 NOTE — LETTER
January 4, 2021      Ayanna Vicentepa  [address] [address]        To Whom It May Concern,    Ayanna was seen and treated in clinic 1/4/2021.          Sincerely,        Sharad Matthews MD, MD/tf

## 2021-01-27 ENCOUNTER — PATIENT OUTREACH (OUTPATIENT)
Dept: OBGYN | Facility: CLINIC | Age: 31
End: 2021-01-27

## 2021-01-27 DIAGNOSIS — R87.810 CERVICAL HIGH RISK HPV (HUMAN PAPILLOMAVIRUS) TEST POSITIVE: ICD-10-CM

## 2021-01-27 NOTE — LETTER
March 3, 2021      Ayanna [place]  [address] [place] [address]        Dear ,    This letter is to remind you that you are due for your follow-up Pap smear and Human Papillomavirus (HPV) test.    Please call [phone] to schedule your appointment at your earliest convenience.    If you have completed the appointment outside of the [facility] system, please have the records forwarded to our office. We will update your chart for your provider to review before your next annual wellness visit.     Thank you for choosing [facility]!      Sincerely,    Your [facility] Care Team

## 2021-03-01 NOTE — TELEPHONE ENCOUNTER
"3/1/21 Reminder call - Attempted to reach by phone. Recording states, \"call cannot be completed at this time\". Will try tomorrow.     "

## 2021-03-23 ENCOUNTER — OFFICE VISIT (OUTPATIENT)
Dept: OBGYN | Facility: CLINIC | Age: 31
End: 2021-03-23
Payer: COMMERCIAL

## 2021-03-23 VITALS
SYSTOLIC BLOOD PRESSURE: 126 MMHG | OXYGEN SATURATION: 99 % | BODY MASS INDEX: 32.4 KG/M2 | HEIGHT: 65 IN | WEIGHT: 194.5 LBS | HEART RATE: 102 BPM | DIASTOLIC BLOOD PRESSURE: 84 MMHG | TEMPERATURE: 97.6 F

## 2021-03-23 DIAGNOSIS — Z00.00 ANNUAL PHYSICAL EXAM: ICD-10-CM

## 2021-03-23 DIAGNOSIS — Z32.00 PREGNANCY EXAMINATION OR TEST, PREGNANCY UNCONFIRMED: Primary | ICD-10-CM

## 2021-03-23 DIAGNOSIS — R30.0 DYSURIA: ICD-10-CM

## 2021-03-23 DIAGNOSIS — Z11.3 ROUTINE SCREENING FOR STI (SEXUALLY TRANSMITTED INFECTION): ICD-10-CM

## 2021-03-23 DIAGNOSIS — Z12.4 CERVICAL CANCER SCREENING: ICD-10-CM

## 2021-03-23 LAB
ALBUMIN UR-MCNC: 30 MG/DL
APPEARANCE UR: ABNORMAL
BACTERIA #/AREA URNS HPF: ABNORMAL /HPF
BILIRUB UR QL STRIP: NEGATIVE
COLOR UR AUTO: YELLOW
GLUCOSE UR STRIP-MCNC: NEGATIVE MG/DL
HCG UR QL: NEGATIVE
HGB UR QL STRIP: NEGATIVE
HYALINE CASTS #/AREA URNS LPF: 5 /LPF (ref 0–2)
KETONES UR STRIP-MCNC: NEGATIVE MG/DL
LEUKOCYTE ESTERASE UR QL STRIP: ABNORMAL
MUCOUS THREADS #/AREA URNS LPF: PRESENT /LPF
NITRATE UR QL: NEGATIVE
PH UR STRIP: 5 PH (ref 5–7)
RBC #/AREA URNS AUTO: 6 /HPF (ref 0–2)
SOURCE: ABNORMAL
SP GR UR STRIP: 1.02 (ref 1–1.03)
SPECIMEN SOURCE: ABNORMAL
SQUAMOUS #/AREA URNS AUTO: 9 /HPF (ref 0–1)
UROBILINOGEN UR STRIP-MCNC: 0 MG/DL (ref 0–2)
WBC #/AREA URNS AUTO: 21 /HPF (ref 0–5)
WET PREP SPEC: ABNORMAL

## 2021-03-23 PROCEDURE — 88175 CYTOPATH C/V AUTO FLUID REDO: CPT | Performed by: OBSTETRICS & GYNECOLOGY

## 2021-03-23 PROCEDURE — 87210 SMEAR WET MOUNT SALINE/INK: CPT | Performed by: OBSTETRICS & GYNECOLOGY

## 2021-03-23 PROCEDURE — 87591 N.GONORRHOEAE DNA AMP PROB: CPT | Performed by: OBSTETRICS & GYNECOLOGY

## 2021-03-23 PROCEDURE — 87624 HPV HI-RISK TYP POOLED RSLT: CPT | Performed by: OBSTETRICS & GYNECOLOGY

## 2021-03-23 PROCEDURE — 87491 CHLMYD TRACH DNA AMP PROBE: CPT | Performed by: OBSTETRICS & GYNECOLOGY

## 2021-03-23 PROCEDURE — 99213 OFFICE O/P EST LOW 20 MIN: CPT | Mod: 25 | Performed by: OBSTETRICS & GYNECOLOGY

## 2021-03-23 PROCEDURE — 87088 URINE BACTERIA CULTURE: CPT | Performed by: OBSTETRICS & GYNECOLOGY

## 2021-03-23 PROCEDURE — 87086 URINE CULTURE/COLONY COUNT: CPT | Performed by: OBSTETRICS & GYNECOLOGY

## 2021-03-23 PROCEDURE — 99395 PREV VISIT EST AGE 18-39: CPT | Performed by: OBSTETRICS & GYNECOLOGY

## 2021-03-23 PROCEDURE — 81001 URINALYSIS AUTO W/SCOPE: CPT | Performed by: OBSTETRICS & GYNECOLOGY

## 2021-03-23 PROCEDURE — 81025 URINE PREGNANCY TEST: CPT | Performed by: OBSTETRICS & GYNECOLOGY

## 2021-03-23 ASSESSMENT — MIFFLIN-ST. JEOR: SCORE: 1585.19

## 2021-03-23 NOTE — PROGRESS NOTES
Clinic Note    CC:    Chief Complaint   Patient presents with     Physical      HPI:  Ayanna Davidson is a 30 year old  who is well known to me, presents for an annual exam.   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 Relapsed on meth and is recently using. She has known ADHD, was previously treated for this, though has been denied care given prior THC on a UDS. She uses the meth as self medication, though is fully aware that this is not healthy.   Having dysuria, also worried about some labia drainage, possible wound complication.  One new sexual partner, no one is using IV drugs.    Declines contraception.      Gyn Hx: Patient's last menstrual period was 2021 (approximate).  Menses are regular   Colposcopy with neg Bx & ECC () for persistent high risk HPV   Vulvar WLE for TINO III with negative margins   PMH:   Past Medical History:   Diagnosis Date     Cervical high risk HPV (human papillomavirus) test positive 2019    last PAP NIL (), remote hx of LEEP     Gastroesophageal reflux disease      HSV (herpes simplex virus) infection     1 & 2     Methamphetamine abuse (H)     reports daily use     recurrent Bartholin's cyst      SurgHx:   Past Surgical History:   Procedure Laterality Date     CHOLECYSTECTOMY       ENT SURGERY       EXAM UNDER ANESTHESIA PELVIC N/A 2020    Procedure: EXAM UNDER ANESTHESIA, PELVIS, PAP;  Surgeon: Froylan Schwarz MD;  [facility]     EXCISE VULVA WIDE LOCAL Bilateral 3/11/2020    Procedure: Right Vulva Wedge Resection and Incision and Drainage Left Vulva;  Surgeon: Froylan Schwarz MD;  [facility]     INCISION AND DRAINAGE ABSCESS PELVIS, COMBINED N/A 2018    Procedure: COMBINED INCISION AND DRAINAGE ABSCESS PELVIS;  INCISION AND DRAINAGE LABIAL ABSCESS;  Surgeon: Jase Beach MD;  [facility]     INCISION AND DRAINAGE ABSCESS PELVIS, COMBINED N/A 3/5/2019     Procedure: Incision and Drainage Right Labial Abscess;  Surgeon: Jase Beach MD;  [facility]     INCISION AND DRAINAGE LOWER EXTREMITY, COMBINED Right 8/11/2019    Procedure: irrigation and debridement right leg dog bite;  Surgeon: Rommel Pérez MD;  [facility]     LAP ADJUSTABLE GASTRIC BAND       LEEP TX, CERVICAL       MAMMOPLASTY REDUCTION BILATERAL       MARSUPIALIZATION BARTHOLIN CYST N/A 4/29/2019    Procedure: Bartholin's Cyst removal;  Surgeon: Froylan Schwarz MD;  [facility]     MARSUPIALIZATION BARTHOLIN CYST Left 1/29/2020    Procedure: Excision of left bartholin's abscess;  Surgeon: Froylan Schwarz MD;  [facility]     ORTHOPEDIC SURGERY       FamHx:   Family History   Problem Relation Age of Onset     Heart Disease Father      Diabetes Maternal Grandmother      Breast Cancer Paternal Grandmother      Testicular cancer Paternal Grandfather      SocHx:   Social History     Socioeconomic History     Marital status: Single     Spouse name: None     Number of children: None     Years of education: None     Highest education level: None   Occupational History     None   Social Needs     Financial resource strain: None     Food insecurity     Worry: None     Inability: None     Transportation needs     Medical: None     Non-medical: None   Tobacco Use     Smoking status: Current Every Day Smoker     Packs/day: 0.25     Smokeless tobacco: Current User     Tobacco comment: uses E cig   Substance and Sexual Activity     Alcohol use: Not Currently     Frequency: Monthly or less     Drug use: Not Currently     Comment: In treatment for meth.     Sexual activity: Yes     Partners: Male     Birth control/protection: None   Lifestyle     Physical activity     Days per week: None     Minutes per session: None     Stress: None   Relationships     Social connections     Talks on phone: None     Gets together: None     Attends Mosque service: None     Active member of  "club or organization: None     Attends meetings of clubs or organizations: None     Relationship status: None     Intimate partner violence     Fear of current or ex partner: None     Emotionally abused: None     Physically abused: None     Forced sexual activity: None   Other Topics Concern     None   Social History Narrative     None     Allergies:   Patient has no known allergies.  Current Medications:   Current Outpatient Medications   Medication Sig Dispense Refill     Multiple Vitamins-Minerals (MULTIVITAMIN ADULT PO)        omeprazole (PRILOSEC OTC) 20 MG EC tablet Take 1 tablet (20 mg) by mouth daily 30 tablet 3     ibuprofen (ADVIL/MOTRIN) 600 MG tablet Take 1 tablet (600 mg) by mouth every 6 hours as needed for other (mild and/or inflammatory pain) (Patient not taking: Reported on 1/4/2021) 30 tablet 0     ROS: 10 point ROS negative other than as noted in the HPI    EXAM:  Vitals:    03/23/21 1527   BP: 126/84   BP Location: Right arm   Patient Position: Chair   Cuff Size: Adult Regular   Pulse: 102   Temp: 97.6  F (36.4  C)   TempSrc: Temporal   SpO2: 99%   Weight: 88.2 kg (194 lb 8 oz)   Height: 1.638 m (5' 4.5\")    Body mass index is 32.87 kg/m .    Gen: Alert, oriented, appropriately interactive, NAD  CV: RRR  Resp: CTAB, good effort without distress   Abdomen: soft, non tender, non distended, no masses, no hernias. No inguinal lymphadenopathy.   Perineum: no lesions; normal appearing external genitalia, bartholins glands, urethra, skenes glands  Vagina: no masses or lesions or discharge, normally rugated.  Cervix: no masses or lesions or discharge   Anus: appears normal  Bimanual exam: Tight tender pelvic floor muscles, otherwise nontender mobile small uterus, no adnexal masses   MSK: normal gait, symmetric movements UE & LE  Lower extremities: non-tender, no edema    Labs & Imaging:  Results for orders placed or performed in visit on 03/23/21 (from the past 24 hour(s))   HCG Qual, Urine ([id number]) "   Result Value Ref Range    HCG Qual Urine Negative NEG^Negative   *UA reflex to Microscopic and Culture ([place]; [facility]; [facility]; [facility]; [facility]; [facility]; [place]; [place])    Specimen: Unspecified Urine   Result Value Ref Range    Color Urine Yellow     Appearance Urine Slightly Cloudy     Glucose Urine Negative NEG^Negative mg/dL    Bilirubin Urine Negative NEG^Negative    Ketones Urine Negative NEG^Negative mg/dL    Specific Gravity Urine 1.025 1.003 - 1.035    Blood Urine Negative NEG^Negative    pH Urine 5.0 5.0 - 7.0 pH    Protein Albumin Urine 30 (A) NEG^Negative mg/dL    Urobilinogen mg/dL 0.0 0.0 - 2.0 mg/dL    Nitrite Urine Negative NEG^Negative    Leukocyte Esterase Urine Moderate (A) NEG^Negative    Source Unspecified Urine     RBC Urine 6 (H) 0 - 2 /HPF    WBC Urine 21 (H) 0 - 5 /HPF    Bacteria Urine Few (A) NEG^Negative /HPF    Squamous Epithelial /HPF Urine 9 (H) 0 - 1 /HPF    Mucous Urine Present (A) NEG^Negative /LPF    Hyaline Casts 5 (H) 0 - 2 /LPF   Urine Culture Aerobic Bacterial    Specimen: Midstream Urine   Result Value Ref Range    Specimen Description Midstream Urine     Special Requests Specimen received in preservative     Culture Micro PENDING    Wet prep    Specimen: Vagina   Result Value Ref Range    Specimen Description Vagina     Wet Prep No Trichomonas seen     Wet Prep Few  Clue cells seen   (A)     Wet Prep No yeast seen        Lab Results   Component Value Date    PAP NIL 2020    PAP NIL 2019       ASSESSMENT/PLAN: Ayanna Davidson is a 30 year old  who is well known to me, presents for an annual exam.     Annual physical exam  - Declines breast exam  - Tender pelvic floor muscles, denies trauma hx, offered pelvic floor PT, declines, discussed relationship to stress, counseling given   - UA with possible UTI, will await culture results  - *UA reflex to Microscopic and Culture ([place]; [facility]; [facility];  [facility]; [facility]; [facility]; [place]; [place])  - Wet prep  - Chlamydia trachomatis PCR  - Neisseria gonorrhoeae PCR  - Pap imaged thin layer screen with HPV - recommended age 30 - 65 years (select HPV order below)  - HPV High Risk Types DNA Cervical    Cervical cancer screening  - Pap imaged thin layer screen with HPV - recommended age 30 - 65 years (select HPV order below)  - HPV High Risk Types DNA Cervical    Pregnancy examination or test, pregnancy unconfirmed  - Contraception discussed, patient declines  - HCG Qual, Urine ([id number])  - Urine Culture Aerobic Bacterial    Routine screening for STI (sexually transmitted infection)  - Chlamydia trachomatis PCR  - Neisseria gonorrhoeae PCR    Dysuria  - UA abnl though dirty, culture pending  - *UA reflex to Microscopic and Culture ([place]; [facility]; [facility]; [facility]; [facility]; [facility]; [facility])  - Wet prep    Froylan Schwarz MD   3/23/2021 3:38 PM

## 2021-03-24 DIAGNOSIS — N30.00 ACUTE CYSTITIS WITHOUT HEMATURIA: Primary | ICD-10-CM

## 2021-03-24 LAB
BACTERIA SPEC CULT: ABNORMAL
BACTERIA SPEC CULT: ABNORMAL
C TRACH DNA SPEC QL NAA+PROBE: NEGATIVE
Lab: ABNORMAL
N GONORRHOEA DNA SPEC QL NAA+PROBE: NEGATIVE
SPECIMEN SOURCE: ABNORMAL
SPECIMEN SOURCE: NORMAL
SPECIMEN SOURCE: NORMAL

## 2021-03-25 DIAGNOSIS — N76.0 BV (BACTERIAL VAGINOSIS): Primary | ICD-10-CM

## 2021-03-25 DIAGNOSIS — B96.89 BV (BACTERIAL VAGINOSIS): Primary | ICD-10-CM

## 2021-03-25 LAB
COPATH REPORT: NORMAL
PAP: NORMAL

## 2021-03-25 RX ORDER — METRONIDAZOLE 7.5 MG/G
1 GEL VAGINAL DAILY
Qty: 25 G | Refills: 0 | Status: SHIPPED | OUTPATIENT
Start: 2021-03-25 | End: 2021-03-30

## 2021-03-29 ENCOUNTER — PATIENT OUTREACH (OUTPATIENT)
Dept: OBGYN | Facility: CLINIC | Age: 31
End: 2021-03-29

## 2021-06-02 ENCOUNTER — MYC MEDICAL ADVICE (OUTPATIENT)
Dept: FAMILY MEDICINE | Facility: CLINIC | Age: 31
End: 2021-06-02

## 2021-06-02 NOTE — LETTER
[address] [address] [phone]        June 28, 2021    Regarding:  Ayanna Davidson  [address] [address]              To Whom It May Concern;  Patient was seen by a medical provider today and was unable to work the past 3 days prior to this appointment. If you have any further questions, please feel free to call at the number listed above.           Sincerely,        Chris Canada MD

## 2021-06-04 NOTE — TELEPHONE ENCOUNTER
Per Dr. Man's note patient scheduled.   Jessica Cr CMA on 6/4/2021 at 12:11 PM  Please add her at 930 am this coming Monday - I have surgery then but I will see her in between my cases- thanks-     Message text

## 2021-06-22 ENCOUNTER — OFFICE VISIT (OUTPATIENT)
Dept: FAMILY MEDICINE | Facility: CLINIC | Age: 31
End: 2021-06-22
Payer: COMMERCIAL

## 2021-06-22 VITALS
RESPIRATION RATE: 20 BRPM | OXYGEN SATURATION: 99 % | WEIGHT: 195 LBS | BODY MASS INDEX: 32.95 KG/M2 | HEART RATE: 88 BPM | TEMPERATURE: 97.2 F

## 2021-06-22 DIAGNOSIS — N75.0 BARTHOLIN GLAND CYST: ICD-10-CM

## 2021-06-22 DIAGNOSIS — A59.9 TRICHOMONAS INFECTION: ICD-10-CM

## 2021-06-22 DIAGNOSIS — N89.8 VAGINAL DISCHARGE: Primary | ICD-10-CM

## 2021-06-22 DIAGNOSIS — K21.00 GASTROESOPHAGEAL REFLUX DISEASE WITH ESOPHAGITIS WITHOUT HEMORRHAGE: ICD-10-CM

## 2021-06-22 LAB
SPECIMEN SOURCE: ABNORMAL
WET PREP SPEC: ABNORMAL

## 2021-06-22 PROCEDURE — 87210 SMEAR WET MOUNT SALINE/INK: CPT | Performed by: OBSTETRICS & GYNECOLOGY

## 2021-06-22 PROCEDURE — 99214 OFFICE O/P EST MOD 30 MIN: CPT | Performed by: OBSTETRICS & GYNECOLOGY

## 2021-06-22 RX ORDER — CEPHALEXIN 500 MG/1
500 CAPSULE ORAL 3 TIMES DAILY
Qty: 30 CAPSULE | Refills: 0 | Status: SHIPPED | OUTPATIENT
Start: 2021-06-22 | End: 2021-06-22

## 2021-06-22 RX ORDER — METRONIDAZOLE 500 MG/1
500 TABLET ORAL 2 TIMES DAILY
Qty: 14 TABLET | Refills: 0 | Status: SHIPPED | OUTPATIENT
Start: 2021-06-22 | End: 2021-06-29

## 2021-06-22 RX ORDER — OMEPRAZOLE 20 MG/1
20 TABLET, DELAYED RELEASE ORAL DAILY
Qty: 30 TABLET | Refills: 3 | Status: SHIPPED | OUTPATIENT
Start: 2021-06-22 | End: 2023-07-12

## 2021-06-22 ASSESSMENT — PAIN SCALES - GENERAL: PAINLEVEL: MODERATE PAIN (5)

## 2021-06-22 NOTE — PROGRESS NOTES
Subjective:    She has a swelling in her right labial area consistent with a Bartholin's gland.  She states that she thinks that she had her Bartholin's glands removed (by Dr. Schwarz) so she is not sure how that can be.  As I am looking at her list of surgery as it says that her Bartholin's glands were marsupialized so that is different than removing the glands.  Also significant is that in February 2020 she had a wide local excision of her vulva on the right side and the pathology showed TINO 3 and the lesion was very close to the edge of the biopsy.  He then performed more excision in March 2020 and the pathology was normal on that second specimen.  It is somewhat tender over the past several days.  Also she has some vaginal discharge.  She would like this checked out.  She had a recent Pap which was normal but the HPV was positive for other high risk HPV.  She was advised to have colposcopy and I discussed it again today and she declined.  She does not see the point.  She continues to smoke and I informed her of the risks of smoking and how it can propagate the HPV.      The past medical history, social history, past surgical history and family history as shown below have been reviewed by me today.    Past Medical History:   Diagnosis Date     Cervical high risk HPV (human papillomavirus) test positive 03/12/2019    last PAP NIL (4/16), remote hx of LEEP     Gastroesophageal reflux disease      HSV (herpes simplex virus) infection     1 & 2     Methamphetamine abuse (H)     reports daily use     recurrent Bartholin's cyst       No Known Allergies  Current Outpatient Medications   Medication Sig Dispense Refill     Multiple Vitamins-Minerals (MULTIVITAMIN ADULT PO)        omeprazole (PRILOSEC OTC) 20 MG EC tablet Take 1 tablet (20 mg) by mouth daily 30 tablet 3     Past Surgical History:   Procedure Laterality Date     CHOLECYSTECTOMY       ENT SURGERY       EXAM UNDER ANESTHESIA PELVIC N/A 1/29/2020    Procedure:  EXAM UNDER ANESTHESIA, PELVIS, PAP;  Surgeon: Froylan Schwarz MD;  [facility]     EXCISE VULVA WIDE LOCAL Bilateral 3/11/2020    Procedure: Right Vulva Wedge Resection and Incision and Drainage Left Vulva;  Surgeon: Froylan Schwarz MD;  [facility]     INCISION AND DRAINAGE ABSCESS PELVIS, COMBINED N/A 2/26/2018    Procedure: COMBINED INCISION AND DRAINAGE ABSCESS PELVIS;  INCISION AND DRAINAGE LABIAL ABSCESS;  Surgeon: Jase Beach MD;  [facility]     INCISION AND DRAINAGE ABSCESS PELVIS, COMBINED N/A 3/5/2019    Procedure: Incision and Drainage Right Labial Abscess;  Surgeon: Jase Beach MD;  [facility]     INCISION AND DRAINAGE LOWER EXTREMITY, COMBINED Right 8/11/2019    Procedure: irrigation and debridement right leg dog bite;  Surgeon: Rommel Pérez MD;  [facility]     LAP ADJUSTABLE GASTRIC BAND       LEEP TX, CERVICAL       MAMMOPLASTY REDUCTION BILATERAL       MARSUPIALIZATION BARTHOLIN CYST N/A 4/29/2019    Procedure: Bartholin's Cyst removal;  Surgeon: Froylan Schwarz MD;  [facility]     MARSUPIALIZATION BARTHOLIN CYST Left 1/29/2020    Procedure: Excision of left bartholin's abscess;  Surgeon: Froylan Schwarz MD;  [facility]     ORTHOPEDIC SURGERY       Social History     Socioeconomic History     Marital status: Single     Spouse name: None     Number of children: None     Years of education: None     Highest education level: None   Occupational History     None   Social Needs     Financial resource strain: None     Food insecurity     Worry: None     Inability: None     Transportation needs     Medical: None     Non-medical: None   Tobacco Use     Smoking status: Current Every Day Smoker     Packs/day: 0.25     Smokeless tobacco: Current User     Tobacco comment: uses E cig   Substance and Sexual Activity     Alcohol use: Not Currently     Frequency: Monthly or less     Drug use: Not Currently     Comment: In treatment for  meth.     Sexual activity: Yes     Partners: Male     Birth control/protection: None   Lifestyle     Physical activity     Days per week: None     Minutes per session: None     Stress: None   Relationships     Social connections     Talks on phone: None     Gets together: None     Attends Congregational service: None     Active member of club or organization: None     Attends meetings of clubs or organizations: None     Relationship status: None     Intimate partner violence     Fear of current or ex partner: None     Emotionally abused: None     Physically abused: None     Forced sexual activity: None   Other Topics Concern     None   Social History Narrative     None     Family History   Problem Relation Age of Onset     Heart Disease Father      Diabetes Maternal Grandmother      Breast Cancer Paternal Grandmother      Testicular cancer Paternal Grandfather        ROS: A 12 point review of systems was done. Except for what is listed above in the HPI, the systems review is negative .      Objective: Vital signs: Pulse 88, temperature 97.2  F (36.2  C), temperature source Temporal, resp. rate 20, weight 88.5 kg (195 lb), last menstrual period 06/08/2021, SpO2 99 %, not currently breastfeeding.    Pelvic exam was done with my nurse Eliz present.  External genitalia look normal except that there is a swelling in the right labia consistent with a Bartholin's duct cyst.  It is not red or warm.  It is tender to touch though.  It is about 3 cm in diameter.  The left side feels normal.  Vaginal vault is normal in appearance except there is a slight milky discharge and a wet prep was sent.  Bimanual exam confirms that the uterus and cervix feel normal and the adnexal areas feel normal.  No masses felt.      Assessment/Plan:     1.  Vulvar mass/Bartholin's duct cyst on the right labium/introitus.   this could be  a vulvar mass- I cant tell-  She has had a previous vulvar bx which was TINO 3 so that is concerning.  See Rx for  cephalexin.. I explained that antibiotics can cause a rash or allergic reaction to develop and so the medication should be stopped if this occurs. Also there is a risk of diarrhea or clostridium difficile pseudomembranous enterocolitis with any antibiotic use so it should be stopped if diarrhea develops and then the clinic should be called so that we have a followup evaluation.    She is invited to come back sooner if it swells further or becomes red or warm.  I advised her to soak in a warm bathtub daily.  With soapy water.  We have set up a consultation at the [facility] gynecologic oncology dept.      2.  Vaginal discharge- will review the wet prep when available and make recommendations based on results.    A total of 35 minutes were spent in today's visit including the time spent with  the patient in addition to the time spent just prior to the visit reviewing the chart  and then charting afterwards on this patient today.         The above information was dictating using Dragon voice software and as a result there may be some irregularities that were not detected in my review of this note.    JESSY Man MD      Addendum: The wet prep came back showing trichomonas and clue cells.  I am going to start her on metronidazole for 7 days.  She was advised not to drink alcoholic because it can make her nauseated.  She was advised to have her partner treated.  She was advised to not take the cephalexin because I am afraid that the 2 antibiotics will be to hard on her stomach together.

## 2021-06-24 ENCOUNTER — MYC MEDICAL ADVICE (OUTPATIENT)
Dept: ONCOLOGY | Facility: CLINIC | Age: 31
End: 2021-06-24

## 2021-06-24 NOTE — CONFIDENTIAL NOTE
RECORDS STATUS - ALL OTHER DIAGNOSIS      RECORDS RECEIVED FROM: [facility]/[place]   DATE RECEIVED: 7/2/2021   NOTES STATUS DETAILS   OFFICE NOTE from referring provider Jase Buckley MD   OFFICE NOTE from medical oncologist N/A [facility]   DISCHARGE SUMMARY from hospital Complete  3/11/2020 TINO III     1/29/2020 Bartholin's gland abscess (Primary Dx)     4/29/2019 Vulvar Abscess (Primary Dx)    DISCHARGE REPORT from the ER     OPERATIVE REPORT Complete See Internal biopsies in EPIC   MEDICATION LIST Complete [facility]   CLINICAL TRIAL TREATMENTS TO DATE     LABS     PATHOLOGY REPORTS Complete -internal biopsies in EPIC 3/11/2020   Right vulva:   - Reexcised vulvar tissue without residual dysplasia.     2/18/2020   Vulva, right, lateral to posterior fourchette, lesion, biopsy:   - High grade squamous intraepithelial lesion (TINO III; severe dysplasia)     2/11/2020   A. Cervical biopsy, 12 o'clock:   - Negative for viral cytopathic change, dysplasia and malignancy.   - Benign ectocervical squamous tissue with reactive squamous metaplasia at    endocervical glandular   transformation zone.     B. Endocervical curettings:   - Negative for viral cytopathic change, dysplasia and malignancy.   - Fragments of benign endocervical glandular epithelium without squamous   epithelium.    ANYTHING RELATED TO DIAGNOSIS Complete Labs last updated on 6/22/2021   GENONOMIC TESTING     TYPE:     IMAGING (NEED IMAGES & REPORT)     [facility]   MRI     MAMMO     ULTRASOUND     PET       Action    Action Taken 6/24/2021 9:53AM     I called [facility] to have IMG pushed to PACS Ph: [phone] -     I called pt Ayanna - her phone went to .     3:31pm   I called [facility] and asked them to push IMG again

## 2021-06-24 NOTE — TELEPHONE ENCOUNTER
RECORDS STATUS - ALL OTHER DIAGNOSIS      RECORDS RECEIVED FROM: [facility]   DATE RECEIVED: 7/2/2021   NOTES STATUS DETAILS   OFFICE NOTE from referring provider Jase Buckley MD   OFFICE NOTE from medical oncologist N/A [facility]   DISCHARGE SUMMARY from hospital Complete  3/11/2020 TINO III     1/29/2020 Bartholin's gland abscess (Primary Dx)     4/29/2019 Vulvar Abscess (Primary Dx)    DISCHARGE REPORT from the ER     OPERATIVE REPORT Complete See Internal biopsies in EPIC   MEDICATION LIST Complete [facility]   CLINICAL TRIAL TREATMENTS TO DATE     LABS     PATHOLOGY REPORTS Complete -internal biopsies in EPIC 3/11/2020   Right vulva:   - Reexcised vulvar tissue without residual dysplasia.     2/18/2020   Vulva, right, lateral to posterior fourchette, lesion, biopsy:   - High grade squamous intraepithelial lesion (TINO III; severe dysplasia)     2/11/2020   A. Cervical biopsy, 12 o'clock:   - Negative for viral cytopathic change, dysplasia and malignancy.   - Benign ectocervical squamous tissue with reactive squamous metaplasia at    endocervical glandular   transformation zone.     B. Endocervical curettings:   - Negative for viral cytopathic change, dysplasia and malignancy.   - Fragments of benign endocervical glandular epithelium without squamous   epithelium.    ANYTHING RELATED TO DIAGNOSIS Complete Labs last updated on 6/22/2021   GENONOMIC TESTING     TYPE:     IMAGING (NEED IMAGES & REPORT)     CT SCANS Requested- [facility] Pel[facility] 9/29/2018   MRI     MAMMO     ULTRASOUND     PET       Action    Action Taken 6/24/2021 9:53AM     I called [facility] to have IMG pushed to PACS Ph: [phone] -     I called jay Urena - her phone went to .

## 2021-06-27 ENCOUNTER — MYC MEDICAL ADVICE (OUTPATIENT)
Dept: FAMILY MEDICINE | Facility: CLINIC | Age: 31
End: 2021-06-27

## 2021-06-27 NOTE — LETTER
[address] [address]  Phone: [phone]  Fax: [phone]    June 30, 2021        Ayanna Davidson  [address] [address] [address]          To whom it may concern:    RE: Ayanna Davidson    Please excuse from work 6/25/2021-6/26/202l.    Please contact me for questions or concerns.      Sincerely,        Jase Man MD

## 2021-06-28 ENCOUNTER — MYC MEDICAL ADVICE (OUTPATIENT)
Dept: FAMILY MEDICINE | Facility: CLINIC | Age: 31
End: 2021-06-28

## 2021-06-28 ENCOUNTER — PRE VISIT (OUTPATIENT)
Dept: ONCOLOGY | Facility: CLINIC | Age: 31
End: 2021-06-28

## 2021-06-28 ENCOUNTER — ONCOLOGY VISIT (OUTPATIENT)
Dept: ONCOLOGY | Facility: CLINIC | Age: 31
End: 2021-06-28
Attending: OBSTETRICS & GYNECOLOGY
Payer: COMMERCIAL

## 2021-06-28 VITALS
DIASTOLIC BLOOD PRESSURE: 78 MMHG | TEMPERATURE: 98.5 F | HEART RATE: 91 BPM | BODY MASS INDEX: 32.11 KG/M2 | OXYGEN SATURATION: 99 % | SYSTOLIC BLOOD PRESSURE: 116 MMHG | WEIGHT: 199.8 LBS | HEIGHT: 66 IN

## 2021-06-28 DIAGNOSIS — D07.1 VIN III (VULVAR INTRAEPITHELIAL NEOPLASIA III): ICD-10-CM

## 2021-06-28 DIAGNOSIS — N75.0 BARTHOLIN GLAND CYST: Primary | ICD-10-CM

## 2021-06-28 DIAGNOSIS — N97.9 FEMALE INFERTILITY: ICD-10-CM

## 2021-06-28 PROCEDURE — 99205 OFFICE O/P NEW HI 60 MIN: CPT | Performed by: OBSTETRICS & GYNECOLOGY

## 2021-06-28 ASSESSMENT — MIFFLIN-ST. JEOR: SCORE: 1633.04

## 2021-06-28 ASSESSMENT — PAIN SCALES - GENERAL: PAINLEVEL: MILD PAIN (2)

## 2021-06-28 NOTE — LETTER
2021         RE: Ayanna Davidson  [address] [place] [address]        Dear Colleague,    Thank you for referring your patient, Ayanna Davidson, to the [facility]. Please see a copy of my visit note below.    Gynecologic Oncology Clinic - New Patient    Referring provider:    Jase Man MD  [address] [place] [place] [address]    Patient: Ayanna Davidson  : 1990    Date of Visit: 2021     Chief Complaint: recurrent Bartholin's gland cyst/abscess, history of TINO III    History of Present Illness:  Ayanna Davidson is a 30 year old G0 pre-menopausal female with medical history notable for everyday smoker, history of TINO III, history of cervical HR HPV + (no history of high grade dysplasia), who presents due to history of recurrent Bartholin's gland abscesses and cysts.     In 2020 she had WLE of the right vulva that showed TINO III with positive/close margins. Repeat excision gave negative margins. At that same time she had I&D of left vulvar/Bartholin's abscess. She has prior history of surgery to the right Bartholin's gland - initially marsupialization and then on 2019 she underwent right Bartholin's gland excision. Per the operative note (under Encounters from same date) the abscess/cyst wall was removed in fragments.     She recently had recurrence of abscess on the right side. Due to her history of TINO III and multiple surgeries, she is referred here for further recommendations.     Current cyst is resolving and pain is resolved. It is still present but not significant. No drainage    She also asks for referral for infertility. She has been sexually active with male partner with no contraception for >5 years without any pregnancies. She has been with two different male partners, one of whom has fathered a child with another woman. She has regular menstrual cycles, but they are short and very light. She has not had any  work-up to date.      Review of Systems:  As per HPI, otherwise non-contributory.     Past Medical History  Past Medical History:   Diagnosis Date     Cervical high risk HPV (human papillomavirus) test positive 2019    last PAP NIL (), remote hx of LEEP     Gastroesophageal reflux disease      HSV (herpes simplex virus) infection     1 & 2     Methamphetamine abuse (H)     reports daily use     recurrent Bartholin's cyst        Past Surgical History  Past Surgical History:   Procedure Laterality Date     CHOLECYSTECTOMY       ENT SURGERY       EXAM UNDER ANESTHESIA PELVIC N/A 2020    Procedure: EXAM UNDER ANESTHESIA, PELVIS, PAP;  Surgeon: Froylan Schwarz MD;  [facility]     EXCISE VULVA WIDE LOCAL Bilateral 3/11/2020    Procedure: Right Vulva Wedge Resection and Incision and Drainage Left Vulva;  Surgeon: Froylan Schwarz MD;  [facility]     INCISION AND DRAINAGE ABSCESS PELVIS, COMBINED N/A 2018    Procedure: COMBINED INCISION AND DRAINAGE ABSCESS PELVIS;  INCISION AND DRAINAGE LABIAL ABSCESS;  Surgeon: Jase Beach MD;  [facility]     INCISION AND DRAINAGE ABSCESS PELVIS, COMBINED N/A 3/5/2019    Procedure: Incision and Drainage Right Labial Abscess;  Surgeon: Jase Beach MD;  [facility]     INCISION AND DRAINAGE LOWER EXTREMITY, COMBINED Right 2019    Procedure: irrigation and debridement right leg dog bite;  Surgeon: Rommel Pérez MD;  [facility]     LAP ADJUSTABLE GASTRIC BAND       LEEP TX, CERVICAL       MAMMOPLASTY REDUCTION BILATERAL       MARSUPIALIZATION BARTHOLIN CYST N/A 2019    Procedure: Bartholin's Cyst removal;  Surgeon: Froylan Schwarz MD;  [facility]     MARSUPIALIZATION BARTHOLIN CYST Left 2020    Procedure: Excision of left bartholin's abscess;  Surgeon: Froylan Schwarz MD;  [facility]     ORTHOPEDIC SURGERY          OB/Gynecologic History:     Short, light periods. Every  "month. Back pain.   Pap Smear:   3/23/2021: negative/HPV other HR+  2/11/2020: Colpo: biopsy negative, ECC negative  1/29/2020: negative/HPV other HR+  3/12/2019: negative/HPV other HR+    2/2020: Right vulva lateral to posterior fourchette: TINO III; 3/2020: Re-excision negative margins    Social History  Social History     Tobacco Use     Smoking status: Current Every Day Smoker     Packs/day: 0.25     Smokeless tobacco: Current User     Tobacco comment: uses E cig   Substance Use Topics     Alcohol use: Not Currently     Frequency: Monthly or less     Drug use: Not Currently     Comment: In treatment for meth.       Family History  Family History   Problem Relation Age of Onset     Heart Disease Father      Diabetes Maternal Grandmother      Breast Cancer Paternal Grandmother      Testicular cancer Paternal Grandfather        Current Outpatient Medications   Medication Sig Dispense Refill     metroNIDAZOLE (FLAGYL) 500 MG tablet Take 1 tablet (500 mg) by mouth 2 times daily for 7 days 14 tablet 0     Multiple Vitamins-Minerals (MULTIVITAMIN ADULT PO)        omeprazole (PRILOSEC OTC) 20 MG EC tablet Take 1 tablet (20 mg) by mouth daily 30 tablet 3        Allergies  No Known Allergies    Physical Exam:   /78 (BP Location: Right arm, Patient Position: Chair, Cuff Size: Adult Regular)   Pulse 91   Temp 98.5  F (36.9  C) (Oral)   Ht 1.676 m (5' 6\")   Wt 90.6 kg (199 lb 12.8 oz)   LMP 06/08/2021   SpO2 99%   BMI 32.25 kg/m    General appearance: Alert and oriented, no acute distress, well groomed  GI: Abdomen soft, non-tender, non-distended. No palpable masses  Psych: alert and oriented, appropriate affect  Genitourinary:  External: anatomically normal appearing labia majora, minora, and clitoral hidalgo. On the right labia just outside the vaginal introitus there is a 2x2cm cyst within the subcutaneous tissues. This is non-tender, non fluctuant. No skin openings or drainage. On the right side there is no " palpable cyst nor palpable gland. There are no areas concerning for high grade dysplasia, faint white area on the right labia minora/lower clitoral hidalgo. Not concerning for high grade   Vagina: normal mucosa without lesions  Cervix: normal in appearance without lesions  Bimanual exam: uterus small, mobile, no adnexal masses noted      Labs/Pathology:   Reviewed all Pap smears and excision pathology results    Imaging:   Reviewed ultrasound results from 1/2020 of left labial abscess    Assessment:  Ayanna Davidson is a 30 year old G0 pre-menopausal female with medical history notable for everyday smoker, history of TINO III, history of cervical HR HPV + (no history of high grade dysplasia), with history of recurrent Bartholin's gland abscesses and cysts.      Plan:   1. Recurrent Bartholin's gland abscesses/cysts: after review of her Operative notes, her right gland was removed, however, it was unable to be removed entirely intact (cyst/abscess at the time was removed in fragments). It is possible a portion of the cyst wall/gland remains. Repeat excision could be performed. Whether this would be successful in the longterm is difficult to know. I would recommend excision if she continues with recurrent infections to ensure no evidence of malignancy within the gland and to try to decrease the incidence of these. This is best accomplished while a cyst is present. She is disinclined towards surgery at this time, thus I would recommend she call us if she has another cyst and would like excision.  2. TINO III: no evidence of high grade dysplasia on exam today. We discussed smoking as a risk factor for recurrence. She is not interested in quitting, thus I would recommend she continue to have regular vulvar exams to evaluate for recurrence. Exam should be completed at least once per year but every 6 month follow-up in high risk patients is reasonable  3. HPV HR+: Repeat HPV-based screening in 1 year based upon  ASCCP  4. Infertility: Referral placed for infertility evaluation. I recommend she check with her insurance regarding coverage    I spent a total of 60 minutes on the care of Ayanna Davidson on the day of service including face to face time and the remainder in chart review, care coordination, and documentation on the day of service.    Tyrell Adams MD    Division of Gynecologic Oncology  Department of Ob/Gyn and Women's Health  [facility]       Again, thank you for allowing me to participate in the care of your patient.        Sincerely,        Tyrell Adams MD

## 2021-06-28 NOTE — PROGRESS NOTES
Gynecologic Oncology Clinic - New Patient    Referring provider:    Jase Man MD  [address] [address]    Patient: Ayanna Davidson  : 1990    Date of Visit: 2021     Chief Complaint: recurrent Bartholin's gland cyst/abscess, history of TINO III    History of Present Illness:  Ayanna Davidson is a 30 year old pre-menopausal female g0 .     She had marsupialization of Bartholin's gland for recurrent cysts. In 2020 she had WLE of the right vulva that showed TINO III with positive/close margins. Repeat excision gave negative margins.   2019      Review of Systems:  As per HPI, otherwise non-contributory.     Past Medical History  Past Medical History:   Diagnosis Date     Cervical high risk HPV (human papillomavirus) test positive 2019    last PAP NIL (), remote hx of LEEP     Gastroesophageal reflux disease      HSV (herpes simplex virus) infection     1 & 2     Methamphetamine abuse (H)     reports daily use     recurrent Bartholin's cyst        Past Surgical History  Past Surgical History:   Procedure Laterality Date     CHOLECYSTECTOMY       ENT SURGERY       EXAM UNDER ANESTHESIA PELVIC N/A 2020    Procedure: EXAM UNDER ANESTHESIA, PELVIS, PAP;  Surgeon: Froylan Schwarz MD;  [facility]     EXCISE VULVA WIDE LOCAL Bilateral 3/11/2020    Procedure: Right Vulva Wedge Resection and Incision and Drainage Left Vulva;  Surgeon: Froylan Schwarz MD;  [facility]     INCISION AND DRAINAGE ABSCESS PELVIS, COMBINED N/A 2018    Procedure: COMBINED INCISION AND DRAINAGE ABSCESS PELVIS;  INCISION AND DRAINAGE LABIAL ABSCESS;  Surgeon: Jase Beach MD;  [facility]     INCISION AND DRAINAGE ABSCESS PELVIS, COMBINED N/A 3/5/2019    Procedure: Incision and Drainage Right Labial Abscess;  Surgeon: Jase Beach MD;  [facility]     INCISION AND DRAINAGE LOWER EXTREMITY, COMBINED Right 2019    Procedure:  "irrigation and debridement right leg dog bite;  Surgeon: Rommel Pérez MD;  [facility]     LAP ADJUSTABLE GASTRIC BAND       LEEP TX, CERVICAL       MAMMOPLASTY REDUCTION BILATERAL       MARSUPIALIZATION BARTHOLIN CYST N/A 2019    Procedure: Bartholin's Cyst removal;  Surgeon: Froylan Schwarz MD;  [facility]     MARSUPIALIZATION BARTHOLIN CYST Left 2020    Procedure: Excision of left bartholin's abscess;  Surgeon: Froylan Schwarz MD;  [facility]     ORTHOPEDIC SURGERY          OB/Gynecologic History:     Short, light periods. Every month. Back pain.   Last Pap Smear: ***. History of abnormal: {Yes and No:524349}    Social History  Social History     Tobacco Use     Smoking status: Current Every Day Smoker     Packs/day: 0.25     Smokeless tobacco: Current User     Tobacco comment: uses E cig   Substance Use Topics     Alcohol use: Not Currently     Frequency: Monthly or less     Drug use: Not Currently     Comment: In treatment for meth.    She lives with ***.    Family History  Family History   Problem Relation Age of Onset     Heart Disease Father      Diabetes Maternal Grandmother      Breast Cancer Paternal Grandmother      Testicular cancer Paternal Grandfather        Current Outpatient Medications   Medication Sig Dispense Refill     metroNIDAZOLE (FLAGYL) 500 MG tablet Take 1 tablet (500 mg) by mouth 2 times daily for 7 days 14 tablet 0     Multiple Vitamins-Minerals (MULTIVITAMIN ADULT PO)        omeprazole (PRILOSEC OTC) 20 MG EC tablet Take 1 tablet (20 mg) by mouth daily 30 tablet 3        Allergies  No Known Allergies    Preventive Care:  Mammogram: ***  Colonoscopy: ***    Physical Exam:   /78 (BP Location: Right arm, Patient Position: Chair, Cuff Size: Adult Regular)   Pulse 91   Temp 98.5  F (36.9  C) (Oral)   Ht 1.676 m (5' 6\")   Wt 90.6 kg (199 lb 12.8 oz)   LMP 2021   SpO2 99%   BMI 32.25 kg/m    General appearance: Alert and " oriented, no acute distress, well groomed  ENT: no palpable neck masses or thyromegaly appreciated  CV: *** rate, regular rhythm, no murmurs appreciated   Resp: Lungs clear to auscultation bilaterally. Normal respiratory effort.  GI: ***Abdomen soft, non-tender, non-distended. ***No palpable masses  Skin: ***no skin changes or lesions  Psych: alert and oriented, appropriate affect  Lymph: No palpable lymphadenopathy in the neck, supraclavicular region, groin  Genitourinary:  External: ***anatomically normal appearing labia majora, minora, and clitoral hidalgo. ***No lesions noted  Vagina: *** mucosa without lesions  Cervix: ***normal in appearance without lesions  Bimanual exam: ***uterus small, ***mobile, ***no adnexal masses noted      Labs/Pathology:   ***    Imaging:   ***    Assessment:  ***    Plan:   1. ***  2. ***  3. ***    ***I spent a total of *** minutes on the care of Ayanna Davidson on the day of service including *** minutes face to face time and the remainder in chart review, care coordination, and documentation on the day of service.    Tyrell Adams MD    Division of Gynecologic Oncology  Department of Ob/Gyn and Women's Health  [facility]

## 2021-06-28 NOTE — NURSING NOTE
"Oncology Rooming Note    June 28, 2021 11:08 AM   Ayanna Davidson is a 30 year old female who presents for:    Chief Complaint   Patient presents with     Oncology Clinic Visit     Barthoin gland cyst     Initial Vitals: /78 (BP Location: Right arm, Patient Position: Chair, Cuff Size: Adult Regular)   Pulse 91   Temp 98.5  F (36.9  C) (Oral)   Ht 1.676 m (5' 6\")   Wt 90.6 kg (199 lb 12.8 oz)   LMP 06/08/2021   SpO2 99%   BMI 32.25 kg/m   Estimated body mass index is 32.25 kg/m  as calculated from the following:    Height as of this encounter: 1.676 m (5' 6\").    Weight as of this encounter: 90.6 kg (199 lb 12.8 oz). Body surface area is 2.05 meters squared.  Mild Pain (2) Comment: Data Unavailable   Patient's last menstrual period was 06/08/2021.  Allergies reviewed: Yes  Medications reviewed: Yes    Medications: Medication refills not needed today.  Pharmacy name entered into [facility]:    [facility] [place], [place] - [address] [place] [address] INPATIENT RX - [address] [facility] [address] [facility] [address]     Clinical concerns: Yes Dr. Adams was notified.      Tena Pandey CMA              "

## 2021-06-28 NOTE — TELEPHONE ENCOUNTER
Routing to PCP to review and advise.        Marina Dillon RN  [facility]

## 2021-06-29 NOTE — PROGRESS NOTES
Gynecologic Oncology Clinic - New Patient    Referring provider:    Jase Man MD  [address] [place] [address]    Patient: Ayanna Davidson  : 1990    Date of Visit: 2021     Chief Complaint: recurrent Bartholin's gland cyst/abscess, history of TINO III    History of Present Illness:  Ayanna Davidson is a 30 year old G0 pre-menopausal female with medical history notable for everyday smoker, history of TINO III, history of cervical HR HPV + (no history of high grade dysplasia), who presents due to history of recurrent Bartholin's gland abscesses and cysts.     In 2020 she had WLE of the right vulva that showed TINO III with positive/close margins. Repeat excision gave negative margins. At that same time she had I&D of left vulvar/Bartholin's abscess. She has prior history of surgery to the right Bartholin's gland - initially marsupialization and then on 2019 she underwent right Bartholin's gland excision. Per the operative note (under Encounters from same date) the abscess/cyst wall was removed in fragments.     She recently had recurrence of abscess on the right side. Due to her history of TINO III and multiple surgeries, she is referred here for further recommendations.     Current cyst is resolving and pain is resolved. It is still present but not significant. No drainage    She also asks for referral for infertility. She has been sexually active with male partner with no contraception for >5 years without any pregnancies. She has been with two different male partners, one of whom has fathered a child with another woman. She has regular menstrual cycles, but they are short and very light. She has not had any work-up to date.      Review of Systems:  As per HPI, otherwise non-contributory.     Past Medical History  Past Medical History:   Diagnosis Date     Cervical high risk HPV (human papillomavirus) test positive 2019    last PAP NIL (), remote hx of  LEEP     Gastroesophageal reflux disease      HSV (herpes simplex virus) infection     1 & 2     Methamphetamine abuse (H)     reports daily use     recurrent Bartholin's cyst        Past Surgical History  Past Surgical History:   Procedure Laterality Date     CHOLECYSTECTOMY       ENT SURGERY       EXAM UNDER ANESTHESIA PELVIC N/A 2020    Procedure: EXAM UNDER ANESTHESIA, PELVIS, PAP;  Surgeon: Froylan Schwarz MD;  [facility]     EXCISE VULVA WIDE LOCAL Bilateral 3/11/2020    Procedure: Right Vulva Wedge Resection and Incision and Drainage Left Vulva;  Surgeon: Froylan Schwarz MD;  [facility]     INCISION AND DRAINAGE ABSCESS PELVIS, COMBINED N/A 2018    Procedure: COMBINED INCISION AND DRAINAGE ABSCESS PELVIS;  INCISION AND DRAINAGE LABIAL ABSCESS;  Surgeon: Jase Beach MD;  [facility]     INCISION AND DRAINAGE ABSCESS PELVIS, COMBINED N/A 3/5/2019    Procedure: Incision and Drainage Right Labial Abscess;  Surgeon: Jase Beach MD;  [facility]     INCISION AND DRAINAGE LOWER EXTREMITY, COMBINED Right 2019    Procedure: irrigation and debridement right leg dog bite;  Surgeon: Rommel Pérez MD;  [facility]     LAP ADJUSTABLE GASTRIC BAND       LEEP TX, CERVICAL       MAMMOPLASTY REDUCTION BILATERAL       MARSUPIALIZATION BARTHOLIN CYST N/A 2019    Procedure: Bartholin's Cyst removal;  Surgeon: Froylan Schwarz MD;  [facility]     MARSUPIALIZATION BARTHOLIN CYST Left 2020    Procedure: Excision of left bartholin's abscess;  Surgeon: Froylan Schwarz MD;  [facility]     ORTHOPEDIC SURGERY          OB/Gynecologic History:     Short, light periods. Every month. Back pain.   Pap Smear:   3/23/2021: negative/HPV other HR+  2020: Colpo: biopsy negative, ECC negative  2020: negative/HPV other HR+  3/12/2019: negative/HPV other HR+    2020: Right vulva lateral to posterior fourchette: TINO III; 3/2020:  "Re-excision negative margins    Social History  Social History     Tobacco Use     Smoking status: Current Every Day Smoker     Packs/day: 0.25     Smokeless tobacco: Current User     Tobacco comment: uses E cig   Substance Use Topics     Alcohol use: Not Currently     Frequency: Monthly or less     Drug use: Not Currently     Comment: In treatment for meth.       Family History  Family History   Problem Relation Age of Onset     Heart Disease Father      Diabetes Maternal Grandmother      Breast Cancer Paternal Grandmother      Testicular cancer Paternal Grandfather        Current Outpatient Medications   Medication Sig Dispense Refill     metroNIDAZOLE (FLAGYL) 500 MG tablet Take 1 tablet (500 mg) by mouth 2 times daily for 7 days 14 tablet 0     Multiple Vitamins-Minerals (MULTIVITAMIN ADULT PO)        omeprazole (PRILOSEC OTC) 20 MG EC tablet Take 1 tablet (20 mg) by mouth daily 30 tablet 3        Allergies  No Known Allergies    Physical Exam:   /78 (BP Location: Right arm, Patient Position: Chair, Cuff Size: Adult Regular)   Pulse 91   Temp 98.5  F (36.9  C) (Oral)   Ht 1.676 m (5' 6\")   Wt 90.6 kg (199 lb 12.8 oz)   LMP 06/08/2021   SpO2 99%   BMI 32.25 kg/m    General appearance: Alert and oriented, no acute distress, well groomed  GI: Abdomen soft, non-tender, non-distended. No palpable masses  Psych: alert and oriented, appropriate affect  Genitourinary:  External: anatomically normal appearing labia majora, minora, and clitoral hidalgo. On the right labia just outside the vaginal introitus there is a 2x2cm cyst within the subcutaneous tissues. This is non-tender, non fluctuant. No skin openings or drainage. On the right side there is no palpable cyst nor palpable gland. There are no areas concerning for high grade dysplasia, faint white area on the right labia minora/lower clitoral hidalgo. Not concerning for high grade   Vagina: normal mucosa without lesions  Cervix: normal in appearance without " lesions  Bimanual exam: uterus small, mobile, no adnexal masses noted      Labs/Pathology:   Reviewed all Pap smears and excision pathology results    Imaging:   Reviewed ultrasound results from 1/2020 of left labial abscess    Assessment:  Ayanna Davidson is a 30 year old G0 pre-menopausal female with medical history notable for everyday smoker, history of TINO III, history of cervical HR HPV + (no history of high grade dysplasia), with history of recurrent Bartholin's gland abscesses and cysts.      Plan:   1. Recurrent Bartholin's gland abscesses/cysts: after review of her Operative notes, her right gland was removed, however, it was unable to be removed entirely intact (cyst/abscess at the time was removed in fragments). It is possible a portion of the cyst wall/gland remains. Repeat excision could be performed. Whether this would be successful in the longterm is difficult to know. I would recommend excision if she continues with recurrent infections to ensure no evidence of malignancy within the gland and to try to decrease the incidence of these. This is best accomplished while a cyst is present. She is disinclined towards surgery at this time, thus I would recommend she call us if she has another cyst and would like excision.  2. TINO III: no evidence of high grade dysplasia on exam today. We discussed smoking as a risk factor for recurrence. She is not interested in quitting, thus I would recommend she continue to have regular vulvar exams to evaluate for recurrence. Exam should be completed at least once per year but every 6 month follow-up in high risk patients is reasonable  3. HPV HR+: Repeat HPV-based screening in 1 year based upon ASCCP  4. Infertility: Referral placed for infertility evaluation. I recommend she check with her insurance regarding coverage    I spent a total of 60 minutes on the care of Ayanna Davidson on the day of service including face to face time and the remainder in chart review,  care coordination, and documentation on the day of service.    Tyrell Adams MD    Division of Gynecologic Oncology  Department of Ob/Gyn and Women's Health  [facility]

## 2021-06-30 NOTE — TELEPHONE ENCOUNTER
Jase Man MD  You 14 minutes ago (10:45 AM)     Please write a note excusing her for those days and ask someone in clinic to sign it for me since I am gone until next Monday- thanks rm

## 2021-07-02 ENCOUNTER — PRE VISIT (OUTPATIENT)
Dept: ONCOLOGY | Facility: CLINIC | Age: 31
End: 2021-07-02

## 2021-07-16 ENCOUNTER — PATIENT OUTREACH (OUTPATIENT)
Dept: FAMILY MEDICINE | Facility: CLINIC | Age: 31
End: 2021-07-16

## 2021-07-16 ENCOUNTER — LAB REQUISITION (OUTPATIENT)
Dept: LAB | Facility: CLINIC | Age: 31
End: 2021-07-16

## 2021-07-16 PROCEDURE — U0003 INFECTIOUS AGENT DETECTION BY NUCLEIC ACID (DNA OR RNA); SEVERE ACUTE RESPIRATORY SYNDROME CORONAVIRUS 2 (SARS-COV-2) (CORONAVIRUS DISEASE [COVID-19]), AMPLIFIED PROBE TECHNIQUE, MAKING USE OF HIGH THROUGHPUT TECHNOLOGIES AS DESCRIBED BY CMS-2020-01-R: HCPCS | Performed by: FAMILY MEDICINE

## 2021-07-16 PROCEDURE — U0005 INFEC AGEN DETEC AMPLI PROBE: HCPCS | Performed by: FAMILY MEDICINE

## 2021-07-16 NOTE — TELEPHONE ENCOUNTER
"6/28/21 GynOnc visit plan - HPV HR+: \"Repeat HPV-based screening in 1 year based upon ASCCP\"    "

## 2021-07-17 LAB — SARS-COV-2 RNA RESP QL NAA+PROBE: NEGATIVE

## 2021-08-09 ENCOUNTER — TRANSFERRED RECORDS (OUTPATIENT)
Dept: HEALTH INFORMATION MANAGEMENT | Facility: CLINIC | Age: 31
End: 2021-08-09

## 2021-08-13 ENCOUNTER — MEDICAL CORRESPONDENCE (OUTPATIENT)
Dept: HEALTH INFORMATION MANAGEMENT | Facility: CLINIC | Age: 31
End: 2021-08-13

## 2021-08-13 ENCOUNTER — LAB REQUISITION (OUTPATIENT)
Dept: LAB | Facility: CLINIC | Age: 31
End: 2021-08-13

## 2021-08-13 LAB — SARS-COV-2 RNA RESP QL NAA+PROBE: NEGATIVE

## 2021-08-13 PROCEDURE — U0005 INFEC AGEN DETEC AMPLI PROBE: HCPCS | Performed by: FAMILY MEDICINE
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
## 2021-09-25 ENCOUNTER — HEALTH MAINTENANCE LETTER (OUTPATIENT)
Age: 31
End: 2021-09-25

## 2022-01-03 ENCOUNTER — TRANSFERRED RECORDS (OUTPATIENT)
Dept: HEALTH INFORMATION MANAGEMENT | Facility: CLINIC | Age: 32
End: 2022-01-03

## 2022-01-15 ENCOUNTER — HEALTH MAINTENANCE LETTER (OUTPATIENT)
Age: 32
End: 2022-01-15

## 2022-01-23 ENCOUNTER — TRANSFERRED RECORDS (OUTPATIENT)
Dept: HEALTH INFORMATION MANAGEMENT | Facility: CLINIC | Age: 32
End: 2022-01-23

## 2022-03-09 ENCOUNTER — PATIENT OUTREACH (OUTPATIENT)
Dept: FAMILY MEDICINE | Facility: CLINIC | Age: 32
End: 2022-03-09
Payer: COMMERCIAL

## 2022-03-30 NOTE — TELEPHONE ENCOUNTER
Patient is informed she should be swabbed for COVID.  She has an order in her chart already and has not completed this.  She is given the # to call and schedule this test.    Will wait for a response.  CHIARA CisnerosN, RN     Oxybutynin Counseling:  I discussed with the patient the risks of oxybutynin including but not limited to skin rash, drowsiness, dry mouth, difficulty urinating, and blurred vision.

## 2022-05-06 NOTE — TELEPHONE ENCOUNTER
"  FYI to provider - Patient is lost to pap tracking follow-up. Attempts to contact pt have been made per reminder process and there has been no reply and/or no appt scheduled. Contact hx listed below.     4/2016 NIL pap. No prior HPV testing.   3/12/19 NIL pap, + HR HPV (not 16 or 18). Plan: cotest in 1 yr  1/29/20 NIL pap, + HR HPV (not 16 or 18). Plan: Tarkio  2/11/20 Tarkio bx: neg. Plan: Cotest in 1 yr.   2/18/20 Vulvar biopsy: \"TINO III; severe dysplasia\" possible involvement of margins  3/11/20 Re excision of vulva.   3/23/21 NIL pap, + HR HPV (not 16 or 18). Plan: colp  6/22/21 Gyn visit - Tarkio recommended, Patient refused  6/28/21 GynOnc visit plan - HPV HR+: \"Repeat HPV-based screening in 1 year based upon ASCCP\", Due 3/23/22  3/9/22 Reminder Katiet  4/7/22 Reminder call - lm  5/6/22 Lost to follow up  "

## 2022-08-11 ASSESSMENT — ENCOUNTER SYMPTOMS
SHORTNESS OF BREATH: 0
DYSURIA: 0
JOINT SWELLING: 1
WEAKNESS: 1
PALPITATIONS: 0
EYE PAIN: 0
HEARTBURN: 1
SORE THROAT: 0
DIZZINESS: 1
PARESTHESIAS: 1
CONSTIPATION: 0
BREAST MASS: 0
MYALGIAS: 1
HEADACHES: 1
CHILLS: 0
DIARRHEA: 0
NAUSEA: 0
HEMATOCHEZIA: 0
HEMATURIA: 0
ARTHRALGIAS: 1
FEVER: 0
FREQUENCY: 0
COUGH: 0
NERVOUS/ANXIOUS: 0
ABDOMINAL PAIN: 0

## 2022-08-18 ENCOUNTER — OFFICE VISIT (OUTPATIENT)
Dept: FAMILY MEDICINE | Facility: CLINIC | Age: 32
End: 2022-08-18
Payer: COMMERCIAL

## 2022-08-18 VITALS
RESPIRATION RATE: 18 BRPM | DIASTOLIC BLOOD PRESSURE: 86 MMHG | HEIGHT: 66 IN | TEMPERATURE: 97.5 F | OXYGEN SATURATION: 99 % | SYSTOLIC BLOOD PRESSURE: 124 MMHG | WEIGHT: 239 LBS | BODY MASS INDEX: 38.41 KG/M2 | HEART RATE: 114 BPM

## 2022-08-18 DIAGNOSIS — N97.9 FEMALE INFERTILITY: ICD-10-CM

## 2022-08-18 DIAGNOSIS — F17.200 CURRENT EVERY DAY SMOKER: ICD-10-CM

## 2022-08-18 DIAGNOSIS — M21.42 ACQUIRED PES PLANUS OF BOTH FEET: ICD-10-CM

## 2022-08-18 DIAGNOSIS — Z12.4 CERVICAL CANCER SCREENING: ICD-10-CM

## 2022-08-18 DIAGNOSIS — N76.0 BV (BACTERIAL VAGINOSIS): ICD-10-CM

## 2022-08-18 DIAGNOSIS — Z13.1 SCREENING FOR DIABETES MELLITUS: ICD-10-CM

## 2022-08-18 DIAGNOSIS — Z00.00 ROUTINE GENERAL MEDICAL EXAMINATION AT A HEALTH CARE FACILITY: Primary | ICD-10-CM

## 2022-08-18 DIAGNOSIS — B96.89 BV (BACTERIAL VAGINOSIS): ICD-10-CM

## 2022-08-18 DIAGNOSIS — D07.1 VIN III (VULVAR INTRAEPITHELIAL NEOPLASIA III): ICD-10-CM

## 2022-08-18 DIAGNOSIS — M21.41 ACQUIRED PES PLANUS OF BOTH FEET: ICD-10-CM

## 2022-08-18 DIAGNOSIS — L98.9 SKIN LESION: ICD-10-CM

## 2022-08-18 DIAGNOSIS — E66.01 MORBID OBESITY (H): ICD-10-CM

## 2022-08-18 DIAGNOSIS — Z11.3 SCREEN FOR STD (SEXUALLY TRANSMITTED DISEASE): ICD-10-CM

## 2022-08-18 LAB
ANION GAP SERPL CALCULATED.3IONS-SCNC: 4 MMOL/L (ref 3–14)
BUN SERPL-MCNC: 10 MG/DL (ref 7–30)
CALCIUM SERPL-MCNC: 8.6 MG/DL (ref 8.5–10.1)
CHLORIDE BLD-SCNC: 111 MMOL/L (ref 94–109)
CHOLEST SERPL-MCNC: 168 MG/DL
CLUE CELLS: PRESENT
CO2 SERPL-SCNC: 26 MMOL/L (ref 20–32)
CREAT SERPL-MCNC: 0.67 MG/DL (ref 0.52–1.04)
FASTING STATUS PATIENT QL REPORTED: NO
GFR SERPL CREATININE-BSD FRML MDRD: >90 ML/MIN/1.73M2
GLUCOSE BLD-MCNC: 100 MG/DL (ref 70–99)
HDLC SERPL-MCNC: 50 MG/DL
LDLC SERPL CALC-MCNC: 62 MG/DL
NONHDLC SERPL-MCNC: 118 MG/DL
POTASSIUM BLD-SCNC: 3.9 MMOL/L (ref 3.4–5.3)
SODIUM SERPL-SCNC: 141 MMOL/L (ref 133–144)
TRICHOMONAS, WET PREP: ABNORMAL
TRIGL SERPL-MCNC: 279 MG/DL
TSH SERPL DL<=0.005 MIU/L-ACNC: 1.85 MU/L (ref 0.4–4)
WBC'S/HIGH POWER FIELD, WET PREP: ABNORMAL
YEAST, WET PREP: ABNORMAL

## 2022-08-18 PROCEDURE — 86780 TREPONEMA PALLIDUM: CPT | Performed by: STUDENT IN AN ORGANIZED HEALTH CARE EDUCATION/TRAINING PROGRAM

## 2022-08-18 PROCEDURE — 87591 N.GONORRHOEAE DNA AMP PROB: CPT | Performed by: STUDENT IN AN ORGANIZED HEALTH CARE EDUCATION/TRAINING PROGRAM

## 2022-08-18 PROCEDURE — G0145 SCR C/V CYTO,THINLAYER,RESCR: HCPCS | Performed by: STUDENT IN AN ORGANIZED HEALTH CARE EDUCATION/TRAINING PROGRAM

## 2022-08-18 PROCEDURE — 99395 PREV VISIT EST AGE 18-39: CPT | Performed by: STUDENT IN AN ORGANIZED HEALTH CARE EDUCATION/TRAINING PROGRAM

## 2022-08-18 PROCEDURE — 80061 LIPID PANEL: CPT | Performed by: STUDENT IN AN ORGANIZED HEALTH CARE EDUCATION/TRAINING PROGRAM

## 2022-08-18 PROCEDURE — 87491 CHLMYD TRACH DNA AMP PROBE: CPT | Performed by: STUDENT IN AN ORGANIZED HEALTH CARE EDUCATION/TRAINING PROGRAM

## 2022-08-18 PROCEDURE — 87210 SMEAR WET MOUNT SALINE/INK: CPT | Performed by: STUDENT IN AN ORGANIZED HEALTH CARE EDUCATION/TRAINING PROGRAM

## 2022-08-18 PROCEDURE — 80048 BASIC METABOLIC PNL TOTAL CA: CPT | Performed by: STUDENT IN AN ORGANIZED HEALTH CARE EDUCATION/TRAINING PROGRAM

## 2022-08-18 PROCEDURE — 87624 HPV HI-RISK TYP POOLED RSLT: CPT | Performed by: STUDENT IN AN ORGANIZED HEALTH CARE EDUCATION/TRAINING PROGRAM

## 2022-08-18 PROCEDURE — 87389 HIV-1 AG W/HIV-1&-2 AB AG IA: CPT | Performed by: STUDENT IN AN ORGANIZED HEALTH CARE EDUCATION/TRAINING PROGRAM

## 2022-08-18 PROCEDURE — 84443 ASSAY THYROID STIM HORMONE: CPT | Performed by: STUDENT IN AN ORGANIZED HEALTH CARE EDUCATION/TRAINING PROGRAM

## 2022-08-18 PROCEDURE — 36415 COLL VENOUS BLD VENIPUNCTURE: CPT | Performed by: STUDENT IN AN ORGANIZED HEALTH CARE EDUCATION/TRAINING PROGRAM

## 2022-08-18 PROCEDURE — 87340 HEPATITIS B SURFACE AG IA: CPT | Performed by: STUDENT IN AN ORGANIZED HEALTH CARE EDUCATION/TRAINING PROGRAM

## 2022-08-18 PROCEDURE — 99214 OFFICE O/P EST MOD 30 MIN: CPT | Mod: 25 | Performed by: STUDENT IN AN ORGANIZED HEALTH CARE EDUCATION/TRAINING PROGRAM

## 2022-08-18 ASSESSMENT — ENCOUNTER SYMPTOMS
FEVER: 0
PALPITATIONS: 0
ARTHRALGIAS: 1
HEADACHES: 1
EYE PAIN: 0
COUGH: 0
CONSTIPATION: 0
DIARRHEA: 0
DIZZINESS: 1
DYSURIA: 0
SHORTNESS OF BREATH: 0
FREQUENCY: 0
NAUSEA: 0
HEMATURIA: 0
BREAST MASS: 0
HEARTBURN: 1
CHILLS: 0
MYALGIAS: 1
JOINT SWELLING: 1
SORE THROAT: 0
NERVOUS/ANXIOUS: 0
ABDOMINAL PAIN: 0
WEAKNESS: 1
HEMATOCHEZIA: 0

## 2022-08-18 ASSESSMENT — PAIN SCALES - GENERAL: PAINLEVEL: NO PAIN (0)

## 2022-08-18 NOTE — PROGRESS NOTES
SUBJECTIVE:   CC: Ayanna Davidson is an 32 year old woman who presents for preventive health visit.     Patient has been advised of split billing requirements and indicates understanding: Yes  Healthy Habits:     Getting at least 3 servings of Calcium per day:  Yes    Bi-annual eye exam:  NO    Dental care twice a year:  Yes    Sleep apnea or symptoms of sleep apnea:  None    Diet:  Carbohydrate counting    Frequency of exercise:  2-3 days/week    Duration of exercise:  30-45 minutes    Taking medications regularly:  Yes    Medication side effects:  Not applicable    PHQ-2 Total Score: 0    Additional concerns today:  Yes     Patient here for regular physical but also multiple concerns today.  Does have history of TINO 3 as well as abnormal Pap smears.  Previous excisions x2 and did see GYN oncology last year that did not show any recurrence.  Plan for repeat exams every 6 to 12 months.  Also had Bartholin duct abscess removals in the past.  Has had recurrence of these as well as multiple other skin abscesses and cysts.  Has had pilar cyst removed previously and does have another one on the right side of her scalp that she often hits with a [place] irritates to bleed.  Has been bothersome for her.  Notes injury to her right foot several weeks ago that is improving but still has discomfort.  Notes flatfeet and generalized discomfort daily.  Does not like arches and often walks barefoot.  Does note trouble with infertility and does wonder why she cannot get pregnant.  Was on birth control consistently with she is younger since going off she has had irregular periods.  Notes no contraception and regular intercourse last 5 years without conception.  No previous pregnancies.  Does have history of multiple STDs in the past.  Did have referral to be placed for infertility but did not follow-up.  She does request referral now.  Was recently treated for cellulitis on her chin that has largely resolved.  Does have a hard  spot still present.  Possible cyst    Today's PHQ-2 Score:   PHQ-2 ( 1999 Pfizer) 8/11/2022   Q1: Little interest or pleasure in doing things 0   Q2: Feeling down, depressed or hopeless 0   PHQ-2 Score 0   PHQ-2 Total Score (12-17 Years)- Positive if 3 or more points; Administer PHQ-A if positive -   Q1: Little interest or pleasure in doing things Not at all   Q2: Feeling down, depressed or hopeless Not at all   PHQ-2 Score 0       Abuse: Current or Past (Physical, Sexual or Emotional) - No  Do you feel safe in your environment? Yes    Have you ever done Advance Care Planning? (For example, a Health Directive, POLST, or a discussion with a medical provider or your loved ones about your wishes): No, advance care planning information given to patient to review.  Advanced care planning was discussed at today's visit.    Social History     Tobacco Use     Smoking status: Current Every Day Smoker     Packs/day: 0.50     Years: 10.00     Pack years: 5.00     Types: Cigarettes     Smokeless tobacco: Current User     Tobacco comment: uses E cig   Substance Use Topics     Alcohol use: Not Currently     If you drink alcohol do you typically have >3 drinks per day or >7 drinks per week? No    Alcohol Use 8/18/2022   Prescreen: >3 drinks/day or >7 drinks/week? -   Prescreen: >3 drinks/day or >7 drinks/week? No       Reviewed orders with patient.  Reviewed health maintenance and updated orders accordingly - Yes  Lab work is in process    Breast Cancer Screening:    FHS-7:   Breast CA Risk Assessment (FHS-7) 7/18/2022 7/18/2022 8/11/2022   Did any of your first-degree relatives have breast or ovarian cancer? Yes Yes Yes   Did any of your relatives have bilateral breast cancer? Unknown Unknown Unknown   Did any man in your family have breast cancer? No No No   Did any woman in your family have breast and ovarian cancer? Yes Yes Yes   Did any woman in your family have breast cancer before age 50 y? No No No   Do you have 2 or more  relatives with breast and/or ovarian cancer? Yes Yes Yes   Do you have 2 or more relatives with breast and/or bowel cancer? Yes Yes Yes     Family hx of breast cancer with mammogram in the past but patient unsure of results. Will obtain records and consider early screening. She will ask family if any genetic testing done.   Pertinent mammograms are reviewed under the imaging tab.    History of abnormal Pap smear: YES - TINO 3 and NIL with positive HPV  PAP / HPV Latest Ref Rng & Units 3/23/2021 1/29/2020 3/12/2019   PAP (Historical) - NIL NIL NIL   HPV16 NEG:Negative Negative Negative Negative   HPV18 NEG:Negative Negative Negative Negative   HRHPV NEG:Negative Positive(A) Positive(A) Positive(A)     Reviewed and updated as needed this visit by clinical staff   Tobacco  Allergies  Meds   Med Hx  Surg Hx  Fam Hx  Soc Hx          Reviewed and updated as needed this visit by Provider                   Past Medical History:   Diagnosis Date     Cervical high risk HPV (human papillomavirus) test positive 03/12/2019    last PAP NIL (4/16), remote hx of LEEP     Gastroesophageal reflux disease      HSV (herpes simplex virus) infection     1 & 2     Methamphetamine abuse (H)     reports daily use     recurrent Bartholin's cyst       Past Surgical History:   Procedure Laterality Date     BIOPSY       CHOLECYSTECTOMY       ENT SURGERY       EXAM UNDER ANESTHESIA PELVIC N/A 01/29/2020    Procedure: EXAM UNDER ANESTHESIA, PELVIS, PAP;  Surgeon: Froylan Schwarz MD;  [facility]     EXCISE VULVA WIDE LOCAL Bilateral 03/11/2020    Procedure: Right Vulva Wedge Resection and Incision and Drainage Left Vulva;  Surgeon: Froylan Schwarz MD;  Location[facility]     INCISION AND DRAINAGE ABSCESS PELVIS, COMBINED N/A 02/26/2018    Procedure: COMBINED INCISION AND DRAINAGE ABSCESS PELVIS;  INCISION AND DRAINAGE LABIAL ABSCESS;  Surgeon: Jase Beach MD;  Location[facility]     INCISION AND DRAINAGE ABSCESS PELVIS,  COMBINED N/A 2019    Procedure: Incision and Drainage Right Labial Abscess;  Surgeon: Jase Beach MD;  Location: [facility]     INCISION AND DRAINAGE LOWER EXTREMITY, COMBINED Right 2019    Procedure: irrigation and debridement right leg dog bite;  Surgeon: Rommel Pérez MD;  Location: [facility]     LAP ADJUSTABLE GASTRIC BAND       LEEP TX, CERVICAL       MAMMOPLASTY REDUCTION BILATERAL       MARSUPIALIZATION BARTHOLIN CYST N/A 2019    Procedure: Bartholin's Cyst removal;  Surgeon: Froylan Schwraz MD;  Location[facility]     MARSUPIALIZATION BARTHOLIN CYST Left 2020    Procedure: Excision of left bartholin's abscess;  Surgeon: Froylan Schwarz MD;  Location: [facility]     ORTHOPEDIC SURGERY       OB History    Para Term  AB Living   0 0 0 0 0 0   SAB IAB Ectopic Multiple Live Births   0 0 0 0 0       Review of Systems   Constitutional: Negative for chills and fever.   HENT: Negative for congestion, ear pain, hearing loss and sore throat.    Eyes: Negative for pain and visual disturbance.   Respiratory: Negative for cough and shortness of breath.    Cardiovascular: Positive for peripheral edema. Negative for chest pain and palpitations.   Gastrointestinal: Positive for heartburn. Negative for abdominal pain, constipation, diarrhea, hematochezia and nausea.   Breasts:  Negative for tenderness, breast mass and discharge.   Genitourinary: Positive for urgency and vaginal discharge. Negative for dysuria, frequency, genital sores, hematuria, pelvic pain and vaginal bleeding.   Musculoskeletal: Positive for arthralgias, joint swelling and myalgias.   Skin: Negative for rash.   Neurological: Positive for dizziness, weakness and headaches.   Psychiatric/Behavioral: Positive for mood changes. The patient is not nervous/anxious.         OBJECTIVE:   /86 (BP Location: Right arm, Patient Position: Sitting, Cuff Size: Adult Large)   Pulse 114   Temp 97.5  F (36.4  C)  "(Temporal)   Resp 18   Ht 1.676 m (5' 6\")   Wt 108.4 kg (239 lb)   LMP 08/11/2022   SpO2 99%   BMI 38.58 kg/m    Physical Exam  GENERAL: healthy, alert and no distress  EYES: Eyes grossly normal to inspection, PERRL and conjunctivae and sclerae normal  HENT: ear canals and TM's normal, nose and mouth without ulcers or lesions  NECK: no adenopathy, no asymmetry, masses, or scars and thyroid normal to palpation  RESP: lungs clear to auscultation - no rales, rhonchi or wheezes  CV: regular rate and rhythm, normal S1 S2, no S3 or S4, no murmur, click or rub, no peripheral edema and peripheral pulses strong  ABDOMEN: soft, nontender, no hepatosplenomegaly, no masses and bowel sounds normal  Urogenital: Normal vaginal mucosa and cervix without lesions or rashes noted.    MS: no gross musculoskeletal defects noted, no edema, minimal plantar fascial tenderness, no metatarsal or malleolar tenderness.   SKIN: Scar on right calf, right scalp lesion   NEURO: Normal strength and tone, mentation intact and speech normal  PSYCH: mentation appears normal, affect normal/bright    Diagnostic Test Results:  Labs reviewed in Epic    ASSESSMENT/PLAN:   Ayanna was seen today for physical.    Diagnoses and all orders for this visit:    Routine general medical examination at a health care facility  -     Lipid panel reflex to direct LDL Non-fasting; Future   -     TSH with free T4 reflex; Future  -     Lipid panel reflex to direct LDL Non-fasting  -     TSH with free T4 reflex    Cervical cancer screening   -     Pap Screen with HPV - recommended age 30 - 65 years    TNIO III (vulvar intraepithelial neoplasia III)   No obvious recurrence on exam today but I will have her follow-up with OB for continued monitoring.  No evidence of recurrence noted 1 year ago.  Pap collected today.  -     Ob/Gyn Referral; Future     Female infertility  Has had lab work done in the past.  No previous ultrasounds that she knows of.  We will obtain " records from outside facility prior to further work-up.  Screen for STDs at this time.  Previous infection possibly contributing given concern for previous PID.  Also possibility of PCOS.  Will await records and have her follow-up with OB.  -     Ob/Gyn Referral; Future    Morbid obesity (H)  Lifestyle changes discussed in detail. She would like to start semaglutide to help with weight loss and will have her start this now. Possible side effects discussed.      Current every day smoker  Importance of smoking cessation and her history of TINO 3 and abnormal Pap smears discussed in detail.  She is not interested at this time.    Screen for STD (sexually transmitted disease)  -     Wet prep - Clinic Collect  -     NEISSERIA GONORRHOEA PCR; Future  -     CHLAMYDIA TRACHOMATIS PCR; Future  -     Treponema Abs w Reflex to RPR and Titer; Future  -     HIV Antigen Antibody Combo; Future  -     Hepatitis B surface antigen; Future  -     NEISSERIA GONORRHOEA PCR  -     CHLAMYDIA TRACHOMATIS PCR  -     Treponema Abs w Reflex to RPR and Titer  -     HIV Antigen Antibody Combo  -     Hepatitis B surface antigen    Screening for diabetes mellitus  -     Basic metabolic panel  (Ca, Cl, CO2, Creat, Gluc, K, Na, BUN); Future  -     Basic metabolic panel  (Ca, Cl, CO2, Creat, Gluc, K, Na, BUN)    Bacterial vaginosis  Will treat with metronidazole course. Discussed that he should not take with alcohol.      Skin lesion  Continues to be irritated with combing. Does not appear melanocytic. Possible irritated cyst. Can return in the future for biopsy.     Acquired pes planus of both feet  Encouraged supportive footwear and consider orthotics.      Other orders  -     REVIEW OF HEALTH MAINTENANCE PROTOCOL ORDERS  -     Pneumococcal 20 Valent Conjugate (Prevnar 20)        Patient has been advised of split billing requirements and indicates understanding: Yes    COUNSELING:  Reviewed preventive health counseling, as reflected in patient  "instructions       Regular exercise       Healthy diet/nutrition       Vision screening       Hearing screening       Contraception       Family planning       Safe sex practices/STD prevention       Colorectal Cancer Screening       Syphilis screening for high risk patients6}       HIV screeninx in teen years, 1x in adult years, and at intervals if high risk    Estimated body mass index is 38.58 kg/m  as calculated from the following:    Height as of this encounter: 1.676 m (5' 6\").    Weight as of this encounter: 108.4 kg (239 lb).    Weight management plan: Discussed healthy diet and exercise guidelines    She reports that she has been smoking cigarettes. She has a 5.00 pack-year smoking history. She uses smokeless tobacco.  Nicotine/Tobacco Cessation Plan:   Information offered: Patient not interested at this time      Counseling Resources:  ATP IV Guidelines  Pooled Cohorts Equation Calculator  Breast Cancer Risk Calculator  BRCA-Related Cancer Risk Assessment: FHS-7 Tool  FRAX Risk Assessment  ICSI Preventive Guidelines  Dietary Guidelines for Americans,   USDA's MyPlate  ASA Prophylaxis  Lung CA Screening    Obed Santiago MD  [facility]  "

## 2022-08-19 ENCOUNTER — MYC MEDICAL ADVICE (OUTPATIENT)
Dept: FAMILY MEDICINE | Facility: CLINIC | Age: 32
End: 2022-08-19

## 2022-08-19 LAB
C TRACH DNA SPEC QL NAA+PROBE: NEGATIVE
HBV SURFACE AG SERPL QL IA: NONREACTIVE
HIV 1+2 AB+HIV1 P24 AG SERPL QL IA: NONREACTIVE
N GONORRHOEA DNA SPEC QL NAA+PROBE: NEGATIVE
T PALLIDUM AB SER QL: NONREACTIVE

## 2022-08-19 NOTE — TELEPHONE ENCOUNTER
Please see [employer] message.    I believe patient is wondering about her recent lab results.  She would also like a prescription for Ozempic sent to  [facility] [place].

## 2022-08-22 RX ORDER — METRONIDAZOLE 500 MG/1
500 TABLET ORAL 2 TIMES DAILY
Qty: 14 TABLET | Refills: 0 | Status: SHIPPED | OUTPATIENT
Start: 2022-08-22 | End: 2022-08-29

## 2022-08-22 NOTE — TELEPHONE ENCOUNTER
Patient sent [employer] message asking for an update.  Please see message.    1. She is requesting an explanation about her lab results from 08/18/22.    2. She is also requesting a prescription for Ozempic.

## 2022-08-22 NOTE — TELEPHONE ENCOUNTER
Order for ozempic placed. I also order a prescription for metronidazole for bacterial vaginosis found on her wet prep. She should not drink any alcohol with this. Please see result note for further details. Let me know if further questions.

## 2022-08-23 LAB
BKR LAB AP GYN ADEQUACY: NORMAL
BKR LAB AP GYN INTERPRETATION: NORMAL
BKR LAB AP HPV REFLEX: NORMAL
BKR LAB AP LMP: NORMAL
BKR LAB AP PREVIOUS ABNL DX: NORMAL
BKR LAB AP PREVIOUS ABNORMAL: NORMAL
PATH REPORT.COMMENTS IMP SPEC: NORMAL
PATH REPORT.COMMENTS IMP SPEC: NORMAL
PATH REPORT.RELEVANT HX SPEC: NORMAL

## 2022-08-25 LAB
HUMAN PAPILLOMA VIRUS 16 DNA: NEGATIVE
HUMAN PAPILLOMA VIRUS 18 DNA: NEGATIVE
HUMAN PAPILLOMA VIRUS FINAL DIAGNOSIS: NORMAL
HUMAN PAPILLOMA VIRUS OTHER HR: NEGATIVE

## 2022-08-28 ENCOUNTER — TRANSFERRED RECORDS (OUTPATIENT)
Dept: HEALTH INFORMATION MANAGEMENT | Facility: CLINIC | Age: 32
End: 2022-08-28

## 2022-08-29 ENCOUNTER — PATIENT OUTREACH (OUTPATIENT)
Dept: FAMILY MEDICINE | Facility: CLINIC | Age: 32
End: 2022-08-29

## 2022-08-29 NOTE — RESULT ENCOUNTER NOTE
Cc'd to provider as FYI only due to pap hx.  No response needed unless prefer a change in plan.    Normal pap/Neg HPV letter sent through [facility] results. Next pap smear and HPV due in 1 year.     Evonne Mills, RN, BSN  Pap Tracking Nurse

## 2022-08-30 ENCOUNTER — MYC MEDICAL ADVICE (OUTPATIENT)
Dept: FAMILY MEDICINE | Facility: CLINIC | Age: 32
End: 2022-08-30

## 2022-09-08 ENCOUNTER — TELEPHONE (OUTPATIENT)
Dept: FAMILY MEDICINE | Facility: CLINIC | Age: 32
End: 2022-09-08

## 2022-09-08 NOTE — TELEPHONE ENCOUNTER
Prior Authorization Retail Medication Request    Medication/Dose: semaglutide (OZEMPIC) 2 MG/1.5ML SOPN pen, Inject 0.25 mg Subcutaneous every 7 days For 8 weeks and increase to .50 after - Subcutaneous  ICD code (if different than what is on RX): Morbid obesity (H) [E66.01]   Previously Tried and Failed:    Rationale:      Insurance Name:  [facility]  Insurance ID:  [id number]      Pharmacy Information (if different than what is on RX)  [facility]  Phone:  [phone]

## 2022-09-21 ENCOUNTER — MYC MEDICAL ADVICE (OUTPATIENT)
Dept: FAMILY MEDICINE | Facility: CLINIC | Age: 32
End: 2022-09-21

## 2022-09-21 DIAGNOSIS — E66.01 MORBID OBESITY (H): Primary | ICD-10-CM

## 2022-09-27 NOTE — TELEPHONE ENCOUNTER
"Patient is wondering the following:  \"is there anyway I would b eligible for the sleeve procedure where thw6 take half ur stomach r some other weight loss plan\"  "

## 2022-11-30 ENCOUNTER — MYC MEDICAL ADVICE (OUTPATIENT)
Dept: FAMILY MEDICINE | Facility: CLINIC | Age: 32
End: 2022-11-30

## 2022-12-12 ENCOUNTER — TELEPHONE (OUTPATIENT)
Dept: FAMILY MEDICINE | Facility: CLINIC | Age: 32
End: 2022-12-12

## 2022-12-12 DIAGNOSIS — E66.01 MORBID OBESITY (H): Primary | ICD-10-CM

## 2022-12-12 NOTE — TELEPHONE ENCOUNTER
Can we initiate a NEW PA on this medication please.    This information was received from the patient via emere:    Hello sir   I again got  a call about my ozempic prescription request said it was just sent to [facility] from  on 11-   I spoke with them [employer] is where the prior authorization to FAX #1 [phone]  WITH CLIENT ID ATTACHED WHICH IS ID# [id number] please get this done in have been trying for so long in this I hate myself I blimped back out again n need help loosing weight there call # is 1 [phone]       Prior Authorization Retail Medication Request    Medication/Dose: semaglutide (OZEMPIC) 2 MG/1.5ML SOPN pen  ICD code (if different than what is on RX):  Morbid obesity (H) [E66.01]   Previously Tried and Failed:  na  Rationale:  na    Insurance Name:  [facility]  Insurance ID:  [id number]      Pharmacy Information (if different than what is on RX)  Name:  [facility]  Phone:  [phone]

## 2022-12-13 NOTE — TELEPHONE ENCOUNTER
Central Prior Authorization Team   Phone: [phone]      PA Initiation    Medication: semaglutide (OZEMPIC) 2 MG/1.5ML SOPN pen - INITIATED  Insurance Company: [employer] - Phone [phone] Fax [phone]  Pharmacy Filling the Rx: [facility] [address]   Filling Pharmacy Phone: [phone]  Filling Pharmacy Fax:    Start Date: 12/13/2022

## 2022-12-14 NOTE — TELEPHONE ENCOUNTER
PRIOR AUTHORIZATION DENIED    Medication: semaglutide (OZEMPIC) 2 MG/1.5ML SOPN pen - PA DENIED    Denial Date: 12/14/2022    Denial Rational: MUST TRY/FAIL TWO PREFERRED ALTERNATIVES - BYDUREON BCISE, BYETTA, AND VICTOZA 2-PK       Appeal Information: IF PROVIDER WOULD LIKE TO APPEAL THIS DECISION PLEASE PROVIDE PA TEAM WITH LETTER OF MEDICAL NECESSITY

## 2022-12-15 RX ORDER — LIRAGLUTIDE 6 MG/ML
0.6 INJECTION SUBCUTANEOUS DAILY
Qty: 6 ML | Refills: 3 | Status: SHIPPED | OUTPATIENT
Start: 2022-12-15 | End: 2023-01-24

## 2023-01-07 ENCOUNTER — HEALTH MAINTENANCE LETTER (OUTPATIENT)
Age: 33
End: 2023-01-07

## 2023-01-22 ASSESSMENT — PATIENT HEALTH QUESTIONNAIRE - PHQ9
10. IF YOU CHECKED OFF ANY PROBLEMS, HOW DIFFICULT HAVE THESE PROBLEMS MADE IT FOR YOU TO DO YOUR WORK, TAKE CARE OF THINGS AT HOME, OR GET ALONG WITH OTHER PEOPLE: EXTREMELY DIFFICULT
SUM OF ALL RESPONSES TO PHQ QUESTIONS 1-9: 13
SUM OF ALL RESPONSES TO PHQ QUESTIONS 1-9: 13

## 2023-01-24 ENCOUNTER — OFFICE VISIT (OUTPATIENT)
Dept: FAMILY MEDICINE | Facility: CLINIC | Age: 33
End: 2023-01-24
Payer: COMMERCIAL

## 2023-01-24 VITALS
WEIGHT: 240 LBS | TEMPERATURE: 96.4 F | RESPIRATION RATE: 18 BRPM | HEIGHT: 64 IN | BODY MASS INDEX: 40.97 KG/M2 | HEART RATE: 88 BPM | SYSTOLIC BLOOD PRESSURE: 118 MMHG | OXYGEN SATURATION: 100 % | DIASTOLIC BLOOD PRESSURE: 74 MMHG

## 2023-01-24 DIAGNOSIS — G62.9 NEUROPATHY: ICD-10-CM

## 2023-01-24 DIAGNOSIS — F41.1 GAD (GENERALIZED ANXIETY DISORDER): ICD-10-CM

## 2023-01-24 DIAGNOSIS — F17.200 TOBACCO USE DISORDER: ICD-10-CM

## 2023-01-24 DIAGNOSIS — K21.9 GASTROESOPHAGEAL REFLUX DISEASE WITHOUT ESOPHAGITIS: ICD-10-CM

## 2023-01-24 DIAGNOSIS — D07.1 VIN III (VULVAR INTRAEPITHELIAL NEOPLASIA III): ICD-10-CM

## 2023-01-24 DIAGNOSIS — E66.01 MORBID OBESITY (H): Primary | ICD-10-CM

## 2023-01-24 DIAGNOSIS — F33.1 MODERATE EPISODE OF RECURRENT MAJOR DEPRESSIVE DISORDER (H): ICD-10-CM

## 2023-01-24 PROCEDURE — 99214 OFFICE O/P EST MOD 30 MIN: CPT | Performed by: STUDENT IN AN ORGANIZED HEALTH CARE EDUCATION/TRAINING PROGRAM

## 2023-01-24 RX ORDER — GABAPENTIN 100 MG/1
100 CAPSULE ORAL 3 TIMES DAILY
Qty: 90 CAPSULE | Refills: 0 | Status: SHIPPED | OUTPATIENT
Start: 2023-01-24 | End: 2023-02-28

## 2023-01-24 RX ORDER — HYDROXYZINE HYDROCHLORIDE 25 MG/1
25 TABLET, FILM COATED ORAL 3 TIMES DAILY PRN
Qty: 30 TABLET | Refills: 1 | Status: SHIPPED | OUTPATIENT
Start: 2023-01-24 | End: 2023-11-28

## 2023-01-24 RX ORDER — LIRAGLUTIDE 6 MG/ML
1.2 INJECTION SUBCUTANEOUS DAILY
Qty: 12 ML | Refills: 3 | Status: SHIPPED | OUTPATIENT
Start: 2023-01-24 | End: 2023-05-25 | Stop reason: ALTCHOICE

## 2023-01-24 ASSESSMENT — PATIENT HEALTH QUESTIONNAIRE - PHQ9
10. IF YOU CHECKED OFF ANY PROBLEMS, HOW DIFFICULT HAVE THESE PROBLEMS MADE IT FOR YOU TO DO YOUR WORK, TAKE CARE OF THINGS AT HOME, OR GET ALONG WITH OTHER PEOPLE: EXTREMELY DIFFICULT
SUM OF ALL RESPONSES TO PHQ QUESTIONS 1-9: 13

## 2023-01-24 ASSESSMENT — PAIN SCALES - GENERAL: PAINLEVEL: SEVERE PAIN (6)

## 2023-01-24 NOTE — PROGRESS NOTES
Assessment & Plan     Morbid obesity (H)  Plan to follow-up with weight management clinic  - liraglutide (VICTOZA) 18 MG/3ML solution  Dispense: 12 mL; Refill: 3  - Comprehensive Weight Management    Gastroesophageal reflux disease without esophagitis    BINH (generalized anxiety disorder)  Moderate episode of recurrent major depressive disorder (H)  Patient without any manic episodes or concerns for bipolar at this time.  Would likely benefit from trial of medication.  Risks and benefits of SSRI use discussed.  Patient would like to start sertraline now.  Would also have her use hydroxyzine as needed.  Recommend she continue to follow-up with psychology.  I do wonder about role her ADHD diagnosis is playing in her overall functioning and may need to consider further treatment in the future.  Possible sedation and side effects were discussed.  Would follow-up in 2 months.  - sertraline (ZOLOFT) 50 MG tablet  Dispense: 90 tablet; Refill: 1    Neuropathy  Patient's foot discomfort may be multifactorial given her previous injuries and surgeries in the past.  Normal lab work at last visit but symptoms do seem like peripheral neuropathy may be playing a role.  Patient would like to start gabapentin now.  Follow-up in 1 to 2 months.  - gabapentin (NEURONTIN) 100 MG capsule  Dispense: 90 capsule; Refill: 0    TINO III (vulvar intraepithelial neoplasia III)  Referral for OB placed previously.  She does not have established.    Tobacco use disorder  Continue to encourage smoking cessation.  We will consider Wellbutrin and nicotine replacement the future but she would like to hold off for now.  Long-term consequences of continued tobacco use discussed.          Depression Screening Follow Up    PHQ 1/22/2023   PHQ-9 Total Score 13   Q9: Thoughts of better off dead/self-harm past 2 weeks Not at all     Last PHQ-9 1/22/2023   1.  Little interest or pleasure in doing things 2   2.  Feeling down, depressed, or hopeless 1   3.   Trouble falling or staying asleep, or sleeping too much 1   4.  Feeling tired or having little energy 2   5.  Poor appetite or overeating 2   6.  Feeling bad about yourself 1   7.  Trouble concentrating 3   8.  Moving slowly or restless 1   Q9: Thoughts of better off dead/self-harm past 2 weeks 0   PHQ-9 Total Score 13   Difficulty at work, home, or with people -       Follow Up Actions Taken      Obed Santiago MD  [facility] DIPESH Urena is a 32 year old presenting for the following health issues:  Recheck Medication (victoza)      History of Present Illness       Reason for visit:  Need refill/followup new injectable and need ekg blood ex. Testing  Symptom onset:  More than a month  Symptoms include:  Feel like my muscles r arteries are tight n stiffness in My neck pain in chest also legs from the knees down are numb tingling sharp pains cramps swollen  Symptom intensity:  Severe  Symptom progression:  Staying the same  Had these symptoms before:  Yes  Has tried/received treatment for these symptoms:  No  What makes it worse:  Stress anxiety rage being on my feet long periods of time  What makes it better:  Hot baths,  lotion , breathing excercises ,  change in environment    She eats 2-3 servings of fruits and vegetables daily.She consumes 4 sweetened beverage(s) daily.She exercises with enough effort to increase her heart rate 10 to 19 minutes per day.  She exercises with enough effort to increase her heart rate 6 days per week.   She is taking medications regularly.    Today's PHQ-9         PHQ-9 Total Score: 13    PHQ-9 Q9 Thoughts of better off dead/self-harm past 2 weeks :   Not at all    How difficult have these problems made it for you to do your work, take care of things at home, or get along with other people: Extremely difficult     Patient here today with multiple concerns.  Notes continued struggles with her weight and was able to get Victoza as wegovy was not  "covered. She is tolerating well without side effects but has not noted weight improvement.  Does not sound like she has put into place much for lifestyle changes at this time.  She would like to follow-up with weight clinic now and was seen in [place] previously.  She also notes increased trouble with her anxiety and mood.  Does follow with counselor regularly.  She has a history of polysubstance abuse in the past but has been sober for quite some time.  She does note drinking occasional alcohol.  She also has a history of ADHD that was treated previously but she has tried to avoid treatment for this recently.    She has dealt with pain into her feet bilaterally for quite some time.  She has a history of multiple foot and ankle fractures and notes tingling and numbness into her toes.  Previous laboratory evaluation normal for peripheral neuropathy.  No radicular symptoms noted.  No changes in bowel or bladder habits.    Review of Systems   Constitutional, HEENT, cardiovascular, pulmonary, gi and gu systems are negative, except as otherwise noted.      Objective    /74 (BP Location: Right arm, Patient Position: Chair, Cuff Size: Adult Large)   Pulse 88   Temp (!) 96.4  F (35.8  C) (Temporal)   Resp 18   Ht 1.632 m (5' 4.25\")   Wt 108.9 kg (240 lb)   LMP 01/16/2023   SpO2 100%   BMI 40.88 kg/m    Body mass index is 40.88 kg/m .  Physical Exam   GENERAL: healthy, alert and no distress  EYES: Eyes grossly normal to inspection, PERRL and conjunctivae and sclerae normal  HENT:  nose and mouth without ulcers or lesions  NECK: no adenopathy, no asymmetry, masses, or scars and thyroid normal to palpation  RESP: lungs clear to auscultation - no rales, rhonchi or wheezes  CV: regular rate and rhythm, normal S1 S2,  no murmur, click or rub, no peripheral edema and peripheral pulses strong  ABDOMEN: soft, nontender, no hepatosplenomegaly, no masses and bowel sounds normal  MS: no gross musculoskeletal defects " noted, no edema, decreased sensation monofilament to great toe and small toes bilaterally.  DP pulses intact.  No malleoli or fifth metacarpal tenderness to palpation.  No point tenderness.  Negative Krishan  SKIN: no suspicious lesions or rashes  NEURO: Normal strength and tone, mentation intact and speech normal  PSYCH: mentation appears normal, affect is distracted and blunted

## 2023-01-27 ENCOUNTER — TELEPHONE (OUTPATIENT)
Dept: ENDOCRINOLOGY | Facility: CLINIC | Age: 33
End: 2023-01-27
Payer: COMMERCIAL

## 2023-01-27 ENCOUNTER — MYC MEDICAL ADVICE (OUTPATIENT)
Dept: FAMILY MEDICINE | Facility: CLINIC | Age: 33
End: 2023-01-27
Payer: COMMERCIAL

## 2023-01-27 DIAGNOSIS — E66.01 MORBID OBESITY (H): Primary | ICD-10-CM

## 2023-01-29 NOTE — PROGRESS NOTES
Patient was called 3x, left VM. Sent link to [facility] 1x. Patient was not seen.     Marie Belcher PA-C

## 2023-01-30 ENCOUNTER — VIRTUAL VISIT (OUTPATIENT)
Dept: ENDOCRINOLOGY | Facility: CLINIC | Age: 33
End: 2023-01-30
Payer: COMMERCIAL

## 2023-01-30 DIAGNOSIS — Z53.9 ERRONEOUS ENCOUNTER--DISREGARD: Primary | ICD-10-CM

## 2023-01-30 DIAGNOSIS — E66.01 MORBID OBESITY (H): ICD-10-CM

## 2023-01-30 NOTE — LETTER
Date:February 1, 2023      Provider requested that no letter be sent. Do not send.       [facility]

## 2023-01-30 NOTE — LETTER
1/30/2023       RE: Ayanna Davidson  [address] [facility] [id number]     Dear Colleague,    Thank you for referring your patient, Ayanna Davidson, to the [facility]. Please see a copy of my visit note below.    Patient was called 3x, left VM. Sent link to [employer] 1x. Patient was not seen.     Caesar Belcher PA-C              Again, thank you for allowing me to participate in the care of your patient.      Sincerely,    CAESAR BELCHER PA-C

## 2023-02-28 ENCOUNTER — MYC REFILL (OUTPATIENT)
Dept: FAMILY MEDICINE | Facility: CLINIC | Age: 33
End: 2023-02-28
Payer: COMMERCIAL

## 2023-02-28 DIAGNOSIS — G62.9 NEUROPATHY: ICD-10-CM

## 2023-02-28 NOTE — TELEPHONE ENCOUNTER
Requested Prescriptions   Pending Prescriptions Disp Refills     gabapentin (NEURONTIN) 100 MG capsule 90 capsule 0     Sig: Take 1 capsule (100 mg) by mouth 3 times daily        Routing refill request to provider for review/approval because:  Drug not on the [facility], [facility] or [facility] refill protocol or controlled substance

## 2023-03-01 RX ORDER — GABAPENTIN 100 MG/1
100 CAPSULE ORAL 3 TIMES DAILY
Qty: 180 CAPSULE | Refills: 3 | Status: SHIPPED | OUTPATIENT
Start: 2023-03-01 | End: 2023-10-10

## 2023-03-14 NOTE — PROGRESS NOTES
"Virtual Visit Check-In    During this virtual visit the patient is located in [place], patient verifies this as the location during the entirety of this visit.     Ayanna is a 32 year old who is being evaluated via a billable video visit.      How would you like to obtain your AVS? MyChart  If the video visit is dropped, the invitation should be resent by: Text to cell phone: [phone]  Will anyone else be joining your video visit? No        Video-Visit Details    Originating Location (pt. Location): Home    Distant Location (provider location):  Off-site  Platform used for Video Visit: Quantitative Medicine         Video Start Time: 2:01PM  Video End Time:2:59PM    New Re-establish Bariatric Surgery Consultation Note    March 15, 2023    RE: Ayanna Davidson  MR#: [id number]  : 1990      Referring provider:       3/13/2023   Who referred you? My PCP       Chief Complaint/Reason for visit: re-establish bariatric care    Dear Obed Santiago MD (General),    I had the pleasure of seeing your patient, Ayanna Davidson, to re-establish bariatric care. As you know, Ayanna Davidson is 32 year old.  She has a height of 5' 6\", a weight of 240 lbs 0 oz, and calculated Body mass index is 38.74 kg/m .. She has a history of lapband placed in  by Dr. Perdue and removed 2021 by Dr. Escobar at [place].      Assessment & Plan   Problem List Items Addressed This Visit        Digestive    Class 2 severe obesity with serious comorbidity and body mass index (BMI) of 38.0 to 38.9 in adult, unspecified obesity type (H) - Primary     S/p lapband in  by Dr. Perdue in [place]. No complications. Lapband removed 2021 by Dr. Escobar in [place]. Removed due to band slipping and uncontrollable GERD.    Weight prior to lapband - 265lb   Oren - 137lbs   Weight today - 240lbs     Weight was stable around 180-200lbs with the lapband. After removal weight gain has been gradual. Has not been able to lose substancial weight since the " band was removed. Wants to lose weight now to help with overall health. Is inverested in surgical options in the future. Will continue with MWM at this time.     Currently taking Victoza 1.2mg, and is helpful with hunger during the day. Struggles the most with increased hunger and snacking throughout the night, especially when she is up till 4am studying most nights.     Discussed AOMs to help with weight loss:   - Phentermine - previously trialed for 4 months, with no effect on weight.   - Topiramate - previously trial for 4 months, with no effect on weight.   - Naltrexone - can consider in the future.   - GLP-1 - is currently on Victoza 1.2m dose, tolerating well with no side effects. Has been somewhat helpful with hunger, but no effect on weight. Will transition to Wegovy 0.5mg once weekly. Discussed side effects, risks, and benefits. Will monitor GERD symptoms. Transition to Wegovy. Ozempic was denied by insurance. Consider Saxenda.          Relevant Medications    Semaglutide-Weight Management (WEGOVY) 0.5 MG/0.5ML pen    Semaglutide-Weight Management (WEGOVY) 1 MG/0.5ML pen (Start on 4/15/2023)    Other Relevant Orders    CBC with platelets    Comprehensive metabolic panel    Hemoglobin A1c    Lipid panel reflex to direct LDL Fasting    Parathyroid Hormone Intact    Vitamin D Deficiency    Adult GI  Referral - Procedure Only      1. Continue Victoza 1.2mg once daily until Wegovy can be started.  2. Transition to Wegovy 0.5mg once weekly for 4 weeks, then increase to 0.1mg once weekly. Consider Saxenda as needed.   3. Follow up with Marie Berry in 2 months   4. Follow up with dietitian as needed.     65 minutes spent on the date of the encounter doing chart review, history and exam, documentation and further activities per the note      HISTORY OF PRESENT ILLNESS:  Weight Loss History Reviewed with Patient 3/13/2023   How long have you been overweight? Since early childhood   What is the most that you  have ever weighed? 265   What is the most weight you have lost? 135   I have tried the following methods to lose weight Watching portions or calories, Exercise, Slimfast, OTC Medications, Prescription Medications, Weight Loss Surgery, Physician directed program   I have tried the following weight loss medications? (Check all that apply) Topamax/Topiramate, Phentermine/Adipex-p/Suprenza, Qysmia (phentermine + topiramate), Metformin   Have you ever had weight loss surgery? Yes   Please select the type of weight loss surgery you had (select all that apply): other (explain in next question)     S/p lapband in 2006 by Dr. Perdue in [place]. No complications. Lapband removed 8/2021 by Dr. Escobar in [place]. Removed due to band slipping and uncontrollable GERD.     Weight prior to lapband - 265lb   Oren - 137lbs   Weight today - 240lbs     Oren weight felt too low. Was weight stable around 180lbs for manhyy years. Was 200lbs at the time time of band removal. Weight gain after removal has been gradual. Has not been able to lose significant weight since then. Is interested in both medical and surgical options to help with weight loss. Wants to lose weight to help with overall health.     Eating 3 meals a day, snacks at times. Increase hunger at night after victoza wears off. Does most of her snacking at night. Is up till 4am most nights working on school. Feels like she snacks throughout the night. Emotionally eats. No thoughts of food. Craves seafood and pizza.   Breakfast - cereal, eggos   Lunch - fish sandwich, hot dog   Dinner - meatloaft, casserole, vegetable   Snacks - popcorn, sandwich, ramen   Drinks - water, Regular Mountain dew (4-5cans daily)    Activity - Very active around the house. But can be limited by chronic food pain.     AOMs:  Phentermine/Topiramate - No effect on weight. Was on it for 4 months.   Wegovy - Only on 0.25mg dose for 1-2 months. Had to stop due to national shortage. No side effects.  Was helpful with weight.   Ozempic - started after Wegovy was not available. No side effects. Was helpful with weight. Stopped due to no longer covered by insurance.   Victoza - currently on 1.2mg. Has been on this dose for around 1.5m months. No side effect. Not helping with weight.     CO-MORBIDITIES OF OBESITY INCLUDE:     3/13/2023   I have the following health issues associated with obesity: Infertility, GERD (Reflux), Stress Incontinence     GERD - has improved since band removal. Symptoms include chest pressure and acid reflux. Taking Omeprazole 20mg with relief. Triggered by spicy foods. Otherwise no longer has symptoms. No history of EGD     Chronic feet pain - Treated with Aleve daily     No current infections or cellulitis     PAST MEDICAL HISTORY:  Past Medical History:   Diagnosis Date     Cervical high risk HPV (human papillomavirus) test positive 03/12/2019    last PAP NIL (4/16), remote hx of LEEP     Gastroesophageal reflux disease      HSV (herpes simplex virus) infection     1 & 2     Methamphetamine abuse (H)     reports daily use     recurrent Bartholin's cyst        PAST SURGICAL HISTORY:  Past Surgical History:   Procedure Laterality Date     BIOPSY       CHOLECYSTECTOMY       ENT SURGERY       EXAM UNDER ANESTHESIA PELVIC N/A 01/29/2020    Procedure: EXAM UNDER ANESTHESIA, PELVIS, PAP;  Surgeon: Froylan Schwarz MD;  Location: [facility] OR     EXCISE VULVA WIDE LOCAL Bilateral 03/11/2020    Procedure: Right Vulva Wedge Resection and Incision and Drainage Left Vulva;  Surgeon: Froylan Schwarz MD;  Location: [facility] OR     INCISION AND DRAINAGE ABSCESS PELVIS, COMBINED N/A 02/26/2018    Procedure: COMBINED INCISION AND DRAINAGE ABSCESS PELVIS;  INCISION AND DRAINAGE LABIAL ABSCESS;  Surgeon: Jase Beach MD;  Location: [facility] OR     INCISION AND DRAINAGE ABSCESS PELVIS, COMBINED N/A 03/05/2019    Procedure: Incision and Drainage Right Labial Abscess;  Surgeon: Jase Beach MD;  Location:  [facility] OR     INCISION AND DRAINAGE LOWER EXTREMITY, COMBINED Right 08/11/2019    Procedure: irrigation and debridement right leg dog bite;  Surgeon: Rommel Pérez MD;  Location: [facility] OR     LAP ADJUSTABLE GASTRIC BAND       LEEP TX, CERVICAL       MAMMOPLASTY REDUCTION BILATERAL       MARSUPIALIZATION BARTHOLIN CYST N/A 04/29/2019    Procedure: Bartholin's Cyst removal;  Surgeon: Froylan Schwarz MD;  Location: [facility] OR     MARSUPIALIZATION BARTHOLIN CYST Left 01/29/2020    Procedure: Excision of left bartholin's abscess;  Surgeon: Froylan Schwarz MD;  Location: [facility] OR     ORTHOPEDIC SURGERY         FAMILY HISTORY:   Family History   Problem Relation Age of Onset     Heart Disease Father      Coronary Artery Disease Father      Hypertension Father      Hyperlipidemia Father      Mental Illness Father      Substance Abuse Father      Diabetes Maternal Grandmother      Breast Cancer Paternal Grandmother      Testicular cancer Paternal Grandfather      Thyroid Disease Mother      Prostate Cancer Maternal Grandfather        SOCIAL HISTORY:   Social History Questions Reviewed With Patient 3/13/2023   Which best describes your employment status (select all that apply) I am disabled, I am unemployed, I am a student   Which best describes your marital status: in a relationship   Do you have children? No   Who do you have in your support network that can be available to help you for the first 2 weeks after surgery? Mother sister grandmother boyfriends   Who can you count on for support throughout your weight loss surgery journey? Mother sister cousins   Can you afford 3 meals a day?  Yes   Can you afford 50-60 dollars a month for vitamins? No     Currently going to school full time doing pre-reqs, but unsure what to major in. In process of disability. Supportive family.     HABITS:     3/13/2023   How often do you drink alcohol? 2-3 times a week   If you do drink alcohol, how many drinks might you have in a day?  (one drink = 5 oz. wine, 1 can/bottle of beer, 1 shot liquor) 5 or 6   Do you currently use any of the following Nicotine products? cigarettes, e-cigarettes   Have you ever used any of the following nicotine products? E-cigarettes   Have you or are you currently using street drugs or prescription strength medication for which you do not have a prescription for? Yes   Do you have a history of chemical dependency (alcohol or drug abuse)? Yes   ETOH - 2 drinks 3-4xweek   Current use of e-cigarettes with nicotine daily  Cigarettes - 4-5day  History of Meth abuse - sober for past 6 months   Cannabis - last use a few weeks ago.     PSYCHOLOGICAL HISTORY:   Psychological History Reviewed With Patient 3/13/2023   Have you ever attempted suicide? More than 10 years ago.   Have you had thoughts of suicide in the past year? No   Have you ever been hospitalized for mental illness or a suicide attempt? More than 10 years ago.   Do you have a history of chronic pain? Yes   Have you ever been diagnosed with fibromyalgia? No   Are you currently being treated for any of the following? (select all that apply) Anxiety, Post traumatic stress disorder   Are you currently seeing a therapist or counselor?  Yes   Are you currently seeing a psychiatrist? No     History of SI in 2011 after passing of father. No current concerns  Anxiety and depression - Currently controlled on medication. Followed by therapy and PCP   PTSD - Stable. Followed by therapy and PCP   ADD - well controlled without medications.      ROS:     3/13/2023   Skin:  Skin fold rashes (groin or other folds), Leg swelling, Varicose veins   HEENT: Headaches, Dizziness/lightheadedness, Missing teeth, Teeth, dentures, or bridges needing repairs   If you answered yes to missing teeth, please indicate how many: 4   Musculoskeletal: Joint Pain, Back pain, Limited mobility, Swelling of legs   Cardiovascular: Shortness of breath with activity   Pulmonary: Shortness of breath with  activity, Snoring, Excessively sleep during the day   Gastrointestinal: Heartburn, Reflux, Difficulty swallowing (food gets stuck)   Genitourinary: Stress incontinence (losing urine when coughing, sneezing, etc.)   Hematological: Clotting problems   Neurological: Migraine headaches   Female only: Irregular menstrual cycles, Post-menopausal     Has a partial dentures, 4 teeth missing - chews to applesauce consistency  Clotting - no official diagnosis, most likely anemia.     EATING BEHAVIORS:     3/13/2023   Have you or anyone else thought that you had an eating disorder? No   Do you currently binge eat (eat a large amount of food in a short time)? Yes   Are you an emotional eater? Yes   Do you get up to eat after falling asleep? No       EXERCISE:     3/13/2023   How often do you exercise? 3 to 4 times per week   What is the duration of your exercise (in minutes)? 20 Minutes   What types of exercise do you do? walking, climbing stairs at work, other   What keeps you from being more active?  Pain, I have recently been sick, My ability to walk or move around is limited, Worried people will look at me       MEDICATIONS:  Current Outpatient Medications   Medication Sig Dispense Refill     gabapentin (NEURONTIN) 100 MG capsule Take 1 capsule (100 mg) by mouth 3 times daily 180 capsule 3     hydrOXYzine (ATARAX) 25 MG tablet Take 1 tablet (25 mg) by mouth 3 times daily as needed for itching 30 tablet 1     insulin pen needle (31G X 6 MM) 31G X 6 MM miscellaneous Use 1 pen needles daily for Victoza injection or as directed 50 each 5     liraglutide (VICTOZA) 18 MG/3ML solution Inject 1.2 mg Subcutaneous daily After one week increase to 1.2 mg daily. 12 mL 3     Multiple Vitamins-Minerals (MULTIVITAMIN ADULT PO)        omeprazole (PRILOSEC OTC) 20 MG EC tablet Take 1 tablet (20 mg) by mouth daily 30 tablet 3     Semaglutide-Weight Management (WEGOVY) 0.5 MG/0.5ML pen Inject 0.5 mg Subcutaneous once a week For 4 weeks 2 mL 0  "    [START ON 4/15/2023] Semaglutide-Weight Management (WEGOVY) 1 MG/0.5ML pen Inject 1 mg Subcutaneous once a week After completing 4 weeks of 0.5mg dose 2 mL 1     sertraline (ZOLOFT) 50 MG tablet Take 1 tablet (50 mg) by mouth daily 90 tablet 1       ALLERGIES:  No Known Allergies      Objective    Ht 1.676 m (5' 6\")   Wt 108.9 kg (240 lb)   LMP 01/16/2023   BMI 38.74 kg/m           Vitals:  No vitals were obtained today due to virtual visit.    Physical Exam   GENERAL: Healthy, alert and no distress  EYES: Eyes grossly normal to inspection.  No discharge or erythema, or obvious scleral/conjunctival abnormalities.  RESP: No audible wheeze, cough, or visible cyanosis.  No visible retractions or increased work of breathing.    SKIN: Visible skin clear. No significant rash, abnormal pigmentation or lesions.  NEURO: Cranial nerves grossly intact.  Mentation and speech appropriate for age.  PSYCH: Mentation appears normal, affect normal/bright, judgement and insight intact, normal speech and appearance well-groomed.    Anti-obesity medication ROS:    HEENT  Hx of glaucoma: No    Cardiovascular  CAD:No  HTN:No    Gastrointestinal  GERD:Yes  Constipation:No  Liver Dz:No  H/O Pancreatitis:No    Psychiatric  Bipolar: No  Anxiety:Yes  Depression:Yes  History of alcohol/drug abuse: Yes  Hx of eating disorder:No    Endocrine  Personal or family hx of MTC or MEN2:No  Diabetes/prediabetes: No    Neurologic:  Hx of seizures: No  Hx of migraines: No  Memory Impairment: No      History of kidney stones: No  Kidney disease: No  Current birth control: No      In summary, Ayanna Davidson has Class II obesity with a body mass index of Body mass index is 38.74 kg/m . kg/m2 and the comorbidities stated above. She has a history of lapband in 2008 and is here to re-establish bariatric care and discuss weight regain.          Sincerely,     CAESAR SHARP PA-C            "

## 2023-03-15 ENCOUNTER — VIRTUAL VISIT (OUTPATIENT)
Dept: ENDOCRINOLOGY | Facility: CLINIC | Age: 33
End: 2023-03-15
Payer: COMMERCIAL

## 2023-03-15 ENCOUNTER — TELEPHONE (OUTPATIENT)
Dept: ENDOCRINOLOGY | Facility: CLINIC | Age: 33
End: 2023-03-15

## 2023-03-15 VITALS — WEIGHT: 240 LBS | BODY MASS INDEX: 38.57 KG/M2 | HEIGHT: 66 IN

## 2023-03-15 DIAGNOSIS — E66.812 CLASS 2 SEVERE OBESITY WITH SERIOUS COMORBIDITY AND BODY MASS INDEX (BMI) OF 38.0 TO 38.9 IN ADULT, UNSPECIFIED OBESITY TYPE (H): Primary | ICD-10-CM

## 2023-03-15 DIAGNOSIS — E66.01 CLASS 2 SEVERE OBESITY WITH SERIOUS COMORBIDITY AND BODY MASS INDEX (BMI) OF 38.0 TO 38.9 IN ADULT, UNSPECIFIED OBESITY TYPE (H): Primary | ICD-10-CM

## 2023-03-15 PROBLEM — G89.29 CHRONIC PAIN IN RIGHT SHOULDER: Status: ACTIVE | Noted: 2020-01-21

## 2023-03-15 PROBLEM — M25.511 CHRONIC PAIN IN RIGHT SHOULDER: Status: ACTIVE | Noted: 2020-01-21

## 2023-03-15 PROBLEM — S72.002A: Status: ACTIVE | Noted: 2018-09-28

## 2023-03-15 PROBLEM — F41.9 ANXIETY: Status: ACTIVE | Noted: 2023-03-15

## 2023-03-15 PROCEDURE — 99205 OFFICE O/P NEW HI 60 MIN: CPT | Mod: VID

## 2023-03-15 RX ORDER — SEMAGLUTIDE 0.5 MG/.5ML
0.5 INJECTION, SOLUTION SUBCUTANEOUS WEEKLY
Qty: 2 ML | Refills: 0 | Status: SHIPPED | OUTPATIENT
Start: 2023-03-15 | End: 2023-05-25 | Stop reason: DRUGHIGH

## 2023-03-15 RX ORDER — SEMAGLUTIDE 1 MG/.5ML
1 INJECTION, SOLUTION SUBCUTANEOUS WEEKLY
Qty: 2 ML | Refills: 1 | Status: SHIPPED | OUTPATIENT
Start: 2023-04-15 | End: 2023-10-10

## 2023-03-15 NOTE — TELEPHONE ENCOUNTER
PA needed for Wegovy. Please submit PA and let Liaison know when Approved / Denied    MEDICA [facility]  BIN: [id number]  ID: [id number]  GROUP: [id number]  PCN: [id number]

## 2023-03-15 NOTE — NURSING NOTE
"Chief Complaint   Patient presents with     Consult     New re-establish consultation for weight management.         Vitals:    03/15/23 1336   Weight: 240 lb   Height: 5' 6\"       Body mass index is 38.74 kg/m .      Alireza Morrow, [facility]                      "

## 2023-03-15 NOTE — LETTER
"3/15/2023       RE: Ayanna Davidson  [id number]  [place] [address]     Dear Colleague,    Thank you for referring your patient, Ayanna Davidson, to the [facility]. Please see a copy of my visit note below.    Virtual Visit Check-In    During this virtual visit the patient is located in [place], patient verifies this as the location during the entirety of this visit.     Ayanna is a 32 year old who is being evaluated via a billable video visit.      How would you like to obtain your AVS? MyChart  If the video visit is dropped, the invitation should be resent by: Text to cell phone: [phone]  Will anyone else be joining your video visit? No        Video-Visit Details    Originating Location (pt. Location): Home    Distant Location (provider location):  Off-site  Platform used for Video Visit: KIWATCH         Video Start Time: 2:01PM  Video End Time:2:59PM    New Re-establish Bariatric Surgery Consultation Note    March 15, 2023    RE: Ayanna Davidson  MR#: [id number]  : 1990      Referring provider:       3/13/2023   Who referred you? My PCP       Chief Complaint/Reason for visit: re-establish bariatric care    Dear Obed Santiago MD (General),    I had the pleasure of seeing your patient, Ayanna Davidson, to re-establish bariatric care. As you know, Ayanna Davidson is 32 year old.  She has a height of 5' 6\", a weight of 240 lbs 0 oz, and calculated Body mass index is 38.74 kg/m .. She has a history of lapband placed in  by Dr. Perdue and removed 2021 by Dr. Escobar at [place].      Assessment & Plan   Problem List Items Addressed This Visit        Digestive    Class 2 severe obesity with serious comorbidity and body mass index (BMI) of 38.0 to 38.9 in adult, unspecified obesity type (H) - Primary     S/p lapband in  by Dr. Perdue in [place]. No complications. Lapband removed " 8/2021 by Dr. Escobar in [place]. Removed due to band slipping and uncontrollable GERD.    Weight prior to lapband - 265lb   Oren - 137lbs   Weight today - 240lbs     Weight was stable around 180-200lbs with the lapband. After removal weight gain has been gradual. Has not been able to lose substancial weight since the band was removed. Wants to lose weight now to help with overall health. Is inverested in surgical options in the future. Will continue with MWM at this time.     Currently taking Victoza 1.2mg, and is helpful with hunger during the day. Struggles the most with increased hunger and snacking throughout the night, especially when she is up till 4am studying most nights.     Discussed AOMs to help with weight loss:   - Phentermine - previously trialed for 4 months, with no effect on weight.   - Topiramate - previously trial for 4 months, with no effect on weight.   - Naltrexone - can consider in the future.   - GLP-1 - is currently on Victoza 1.2m dose, tolerating well with no side effects. Has been somewhat helpful with hunger, but no effect on weight. Will transition to Wegovy 0.5mg once weekly. Discussed side effects, risks, and benefits. Will monitor GERD symptoms. Transition to Wegovy. Ozempic was denied by insurance. Consider Saxenda.          Relevant Medications    Semaglutide-Weight Management (WEGOVY) 0.5 MG/0.5ML pen    Semaglutide-Weight Management (WEGOVY) 1 MG/0.5ML pen (Start on 4/15/2023)    Other Relevant Orders    CBC with platelets    Comprehensive metabolic panel    Hemoglobin A1c    Lipid panel reflex to direct LDL Fasting    Parathyroid Hormone Intact    Vitamin D Deficiency    Adult GI  Referral - Procedure Only      1. Continue Victoza 1.2mg once daily until Wegovy can be started.  2. Transition to Wegovy 0.5mg once weekly for 4 weeks, then increase to 0.1mg once weekly. Consider Saxenda as needed.   3. Follow up with Marie Berry in 2 months   4. Follow up with dietitian as  needed.     65 minutes spent on the date of the encounter doing chart review, history and exam, documentation and further activities per the note      HISTORY OF PRESENT ILLNESS:  Weight Loss History Reviewed with Patient 3/13/2023   How long have you been overweight? Since early childhood   What is the most that you have ever weighed? 265   What is the most weight you have lost? 135   I have tried the following methods to lose weight Watching portions or calories, Exercise, Slimfast, OTC Medications, Prescription Medications, Weight Loss Surgery, Physician directed program   I have tried the following weight loss medications? (Check all that apply) Topamax/Topiramate, Phentermine/Adipex-p/Suprenza, Qysmia (phentermine + topiramate), Metformin   Have you ever had weight loss surgery? Yes   Please select the type of weight loss surgery you had (select all that apply): other (explain in next question)     S/p lapband in 2006 by Dr. Perdue in [place]. No complications. Lapband removed 8/2021 by Dr. Escobar in [place]. Removed due to band slipping and uncontrollable GERD.     Weight prior to lapband - 265lb   Oren - 137lbs   Weight today - 240lbs     Oren weight felt too low. Was weight stable around 180lbs for manhyy years. Was 200lbs at the time time of band removal. Weight gain after removal has been gradual. Has not been able to lose significant weight since then. Is interested in both medical and surgical options to help with weight loss. Wants to lose weight to help with overall health.     Eating 3 meals a day, snacks at times. Increase hunger at night after victoza wears off. Does most of her snacking at night. Is up till 4am most nights working on school. Feels like she snacks throughout the night. Emotionally eats. No thoughts of food. Craves seafood and pizza.   Breakfast - cereal, eggos   Lunch - fish sandwich, hot dog   Dinner - meatloaft, casserole, vegetable   Snacks - popcorn, sandwich, ramen    Drinks - water, Regular Mountain dew (4-5cans daily)    Activity - Very active around the house. But can be limited by chronic food pain.     AOMs:  Phentermine/Topiramate - No effect on weight. Was on it for 4 months.   Wegovy - Only on 0.25mg dose for 1-2 months. Had to stop due to national shortage. No side effects. Was helpful with weight.   Ozempic - started after Wegovy was not available. No side effects. Was helpful with weight. Stopped due to no longer covered by insurance.   Victoza - currently on 1.2mg. Has been on this dose for around 1.5m months. No side effect. Not helping with weight.     CO-MORBIDITIES OF OBESITY INCLUDE:     3/13/2023   I have the following health issues associated with obesity: Infertility, GERD (Reflux), Stress Incontinence     GERD - has improved since band removal. Symptoms include chest pressure and acid reflux. Taking Omeprazole 20mg with relief. Triggered by spicy foods. Otherwise no longer has symptoms. No history of EGD     Chronic feet pain - Treated with Aleve daily     No current infections or cellulitis     PAST MEDICAL HISTORY:  Past Medical History:   Diagnosis Date     Cervical high risk HPV (human papillomavirus) test positive 03/12/2019    last PAP NIL (4/16), remote hx of LEEP     Gastroesophageal reflux disease      HSV (herpes simplex virus) infection     1 & 2     Methamphetamine abuse (H)     reports daily use     recurrent Bartholin's cyst        PAST SURGICAL HISTORY:  Past Surgical History:   Procedure Laterality Date     BIOPSY       CHOLECYSTECTOMY       ENT SURGERY       EXAM UNDER ANESTHESIA PELVIC N/A 01/29/2020    Procedure: EXAM UNDER ANESTHESIA, PELVIS, PAP;  Surgeon: Froylan Schwarz MD;  Location: [facility]     EXCISE VULVA WIDE LOCAL Bilateral 03/11/2020    Procedure: Right Vulva Wedge Resection and Incision and Drainage Left Vulva;  Surgeon: Froylan Schwarz MD;  Location: [facility]     INCISION AND DRAINAGE ABSCESS PELVIS, COMBINED N/A  02/26/2018    Procedure: COMBINED INCISION AND DRAINAGE ABSCESS PELVIS;  INCISION AND DRAINAGE LABIAL ABSCESS;  Surgeon: Jase Beach MD;  Location: [facility]     INCISION AND DRAINAGE ABSCESS PELVIS, COMBINED N/A 03/05/2019    Procedure: Incision and Drainage Right Labial Abscess;  Surgeon: Jase Beach MD;  Location: [facility]     INCISION AND DRAINAGE LOWER EXTREMITY, COMBINED Right 08/11/2019    Procedure: irrigation and debridement right leg dog bite;  Surgeon: Rommel Pérez MD;  Location: [facility]     LAP ADJUSTABLE GASTRIC BAND       LEEP TX, CERVICAL       MAMMOPLASTY REDUCTION BILATERAL       MARSUPIALIZATION BARTHOLIN CYST N/A 04/29/2019    Procedure: Bartholin's Cyst removal;  Surgeon: Froylan Schwarz MD;  Location: [facility]     MARSUPIALIZATION BARTHOLIN CYST Left 01/29/2020    Procedure: Excision of left bartholin's abscess;  Surgeon: Froylan Schwarz MD;  Location: [facility]     ORTHOPEDIC SURGERY         FAMILY HISTORY:   Family History   Problem Relation Age of Onset     Heart Disease Father      Coronary Artery Disease Father      Hypertension Father      Hyperlipidemia Father      Mental Illness Father      Substance Abuse Father      Diabetes Maternal Grandmother      Breast Cancer Paternal Grandmother      Testicular cancer Paternal Grandfather      Thyroid Disease Mother      Prostate Cancer Maternal Grandfather        SOCIAL HISTORY:   Social History Questions Reviewed With Patient 3/13/2023   Which best describes your employment status (select all that apply) I am disabled, I am unemployed, I am a student   Which best describes your marital status: in a relationship   Do you have children? No   Who do you have in your support network that can be available to help you for the first 2 weeks after surgery? Mother sister grandmother boyfriends   Who can you count on for support throughout your weight loss surgery journey? Mother sister cousins   Can you afford 3 meals a day?  Yes    Can you afford 50-60 dollars a month for vitamins? No     Currently going to school full time doing pre-reqs, but unsure what to major in. In process of disability. Supportive family.     HABITS:     3/13/2023   How often do you drink alcohol? 2-3 times a week   If you do drink alcohol, how many drinks might you have in a day? (one drink = 5 oz. wine, 1 can/bottle of beer, 1 shot liquor) 5 or 6   Do you currently use any of the following Nicotine products? cigarettes, e-cigarettes   Have you ever used any of the following nicotine products? E-cigarettes   Have you or are you currently using street drugs or prescription strength medication for which you do not have a prescription for? Yes   Do you have a history of chemical dependency (alcohol or drug abuse)? Yes   ETOH - 2 drinks 3-4xweek   Current use of e-cigarettes with nicotine daily  Cigarettes - 4-5day  History of Meth abuse - sober for past 6 months   Cannabis - last use a few weeks ago.     PSYCHOLOGICAL HISTORY:   Psychological History Reviewed With Patient 3/13/2023   Have you ever attempted suicide? More than 10 years ago.   Have you had thoughts of suicide in the past year? No   Have you ever been hospitalized for mental illness or a suicide attempt? More than 10 years ago.   Do you have a history of chronic pain? Yes   Have you ever been diagnosed with fibromyalgia? No   Are you currently being treated for any of the following? (select all that apply) Anxiety, Post traumatic stress disorder   Are you currently seeing a therapist or counselor?  Yes   Are you currently seeing a psychiatrist? No     History of SI in 2011 after passing of father. No current concerns  Anxiety and depression - Currently controlled on medication. Followed by therapy and PCP   PTSD - Stable. Followed by therapy and PCP   ADD - well controlled without medications.      ROS:     3/13/2023   Skin:  Skin fold rashes (groin or other folds), Leg swelling, Varicose veins   HEENT:  Headaches, Dizziness/lightheadedness, Missing teeth, Teeth, dentures, or bridges needing repairs   If you answered yes to missing teeth, please indicate how many: 4   Musculoskeletal: Joint Pain, Back pain, Limited mobility, Swelling of legs   Cardiovascular: Shortness of breath with activity   Pulmonary: Shortness of breath with activity, Snoring, Excessively sleep during the day   Gastrointestinal: Heartburn, Reflux, Difficulty swallowing (food gets stuck)   Genitourinary: Stress incontinence (losing urine when coughing, sneezing, etc.)   Hematological: Clotting problems   Neurological: Migraine headaches   Female only: Irregular menstrual cycles, Post-menopausal     Has a partial dentures, 4 teeth missing - chews to applesauce consistency  Clotting - no official diagnosis, most likely anemia.     EATING BEHAVIORS:     3/13/2023   Have you or anyone else thought that you had an eating disorder? No   Do you currently binge eat (eat a large amount of food in a short time)? Yes   Are you an emotional eater? Yes   Do you get up to eat after falling asleep? No       EXERCISE:     3/13/2023   How often do you exercise? 3 to 4 times per week   What is the duration of your exercise (in minutes)? 20 Minutes   What types of exercise do you do? walking, climbing stairs at work, other   What keeps you from being more active?  Pain, I have recently been sick, My ability to walk or move around is limited, Worried people will look at me       MEDICATIONS:  Current Outpatient Medications   Medication Sig Dispense Refill     gabapentin (NEURONTIN) 100 MG capsule Take 1 capsule (100 mg) by mouth 3 times daily 180 capsule 3     hydrOXYzine (ATARAX) 25 MG tablet Take 1 tablet (25 mg) by mouth 3 times daily as needed for itching 30 tablet 1     insulin pen needle (31G X 6 MM) 31G X 6 MM miscellaneous Use 1 pen needles daily for Victoza injection or as directed 50 each 5     liraglutide (VICTOZA) 18 MG/3ML solution Inject 1.2 mg  "Subcutaneous daily After one week increase to 1.2 mg daily. 12 mL 3     Multiple Vitamins-Minerals (MULTIVITAMIN ADULT PO)        omeprazole (PRILOSEC OTC) 20 MG EC tablet Take 1 tablet (20 mg) by mouth daily 30 tablet 3     Semaglutide-Weight Management (WEGOVY) 0.5 MG/0.5ML pen Inject 0.5 mg Subcutaneous once a week For 4 weeks 2 mL 0     [START ON 4/15/2023] Semaglutide-Weight Management (WEGOVY) 1 MG/0.5ML pen Inject 1 mg Subcutaneous once a week After completing 4 weeks of 0.5mg dose 2 mL 1     sertraline (ZOLOFT) 50 MG tablet Take 1 tablet (50 mg) by mouth daily 90 tablet 1       ALLERGIES:  No Known Allergies      Objective     Ht 1.676 m (5' 6\")   Wt 108.9 kg (240 lb)   LMP 01/16/2023   BMI 38.74 kg/m           Vitals:  No vitals were obtained today due to virtual visit.    Physical Exam   GENERAL: Healthy, alert and no distress  EYES: Eyes grossly normal to inspection.  No discharge or erythema, or obvious scleral/conjunctival abnormalities.  RESP: No audible wheeze, cough, or visible cyanosis.  No visible retractions or increased work of breathing.    SKIN: Visible skin clear. No significant rash, abnormal pigmentation or lesions.  NEURO: Cranial nerves grossly intact.  Mentation and speech appropriate for age.  PSYCH: Mentation appears normal, affect normal/bright, judgement and insight intact, normal speech and appearance well-groomed.    Anti-obesity medication ROS:    HEENT  Hx of glaucoma: No    Cardiovascular  CAD:No  HTN:No    Gastrointestinal  GERD:Yes  Constipation:No  Liver Dz:No  H/O Pancreatitis:No    Psychiatric  Bipolar: No  Anxiety:Yes  Depression:Yes  History of alcohol/drug abuse: Yes  Hx of eating disorder:No    Endocrine  Personal or family hx of MTC or MEN2:No  Diabetes/prediabetes: No    Neurologic:  Hx of seizures: No  Hx of migraines: No  Memory Impairment: No      History of kidney stones: No  Kidney disease: No  Current birth control: No      In summary, Ayanna Davidson has " Class II obesity with a body mass index of Body mass index is 38.74 kg/m . kg/m2 and the comorbidities stated above. She has a history of lapband in 2008 and is here to re-establish bariatric care and discuss weight regain.          Sincerely,     CAESAR SHARP PA-C

## 2023-03-17 PROBLEM — E66.812 CLASS 2 SEVERE OBESITY WITH SERIOUS COMORBIDITY AND BODY MASS INDEX (BMI) OF 38.0 TO 38.9 IN ADULT, UNSPECIFIED OBESITY TYPE (H): Status: ACTIVE | Noted: 2022-08-18

## 2023-03-17 PROBLEM — E66.01 CLASS 2 SEVERE OBESITY WITH SERIOUS COMORBIDITY AND BODY MASS INDEX (BMI) OF 38.0 TO 38.9 IN ADULT, UNSPECIFIED OBESITY TYPE (H): Status: ACTIVE | Noted: 2022-08-18

## 2023-03-17 NOTE — ASSESSMENT & PLAN NOTE
S/p lapband in 2006 by Dr. Perdue in [place]. No complications. Lapband removed 8/2021 by Dr. Escobar in [place]. Removed due to band slipping and uncontrollable GERD.    Weight prior to lapband - 265lb   Oren - 137lbs   Weight today - 240lbs     Weight was stable around 180-200lbs with the lapband. After removal weight gain has been gradual. Has not been able to lose substancial weight since the band was removed. Wants to lose weight now to help with overall health. Is inverested in surgical options in the future. Will continue with MWM at this time.     Currently taking Victoza 1.2mg, and is helpful with hunger during the day. Struggles the most with increased hunger and snacking throughout the night, especially when she is up till 4am studying most nights.     Discussed AOMs to help with weight loss:   - Phentermine - previously trialed for 4 months, with no effect on weight.   - Topiramate - previously trial for 4 months, with no effect on weight.   - Naltrexone - can consider in the future.   - GLP-1 - is currently on Victoza 1.2m dose, tolerating well with no side effects. Has been somewhat helpful with hunger, but no effect on weight. Will transition to Wegovy 0.5mg once weekly. Discussed side effects, risks, and benefits. Will monitor GERD symptoms. Transition to Wegovy. Ozempic was denied by insurance. Consider Saxenda.

## 2023-03-17 NOTE — PATIENT INSTRUCTIONS
"Thank you for allowing us the privilege of caring for you. We hope we provided you with the excellent service you deserve.   Please let us know if there is anything else we can do for you so that we can be sure you are completely satisfied with your care experience.    To ensure the quality of our services you may be receiving a patient satisfaction survey from an independent patient satisfaction monitoring company.    The greatest compliment you can give is a \"Likely to Recommend\"    Your visit was with CAESAR SHARP PA-C today.    Instructions per today's visit:     Jovon Davidson, it was great to visit with you today.  Here is a review of our visit.  If our clinic scheduler is not able to reach you please call [phone] to schedule your next appointments.    1. Continue Victoza 1.2mg once daily until Wegovy can be started.  2. Transition to Wegovy 0.5mg once weekly for 4 weeks, then increase to 0.1mg once weekly. Consider Saxenda as needed.   3. Follow up with Caesar Berry in 2 months   4. Follow up with dietitian as needed.       Information about Video Visits with [employer]: video visit information  _________________________________________________________________________________________________________________________________________________________  If you are asked by your clinic team to have your blood pressure checked:  [facility] do offer several locations for blood pressure checks. Please follow the below link to schedule an appointment. Scheduling an appointment at the pharmacy for a blood pressure check is now preferred.    Appointment Plus ([email or web address])  _________________________________________________________________________________________________________________________________________________________  Important contact and scheduling information:  Please call our contact center at [phone] to schedule your next appointments.  To find a lab location near you, please " call [phone].  For any nursing questions or concerns call Lexi Brooke LPN at [phone] or Radha Wen RN at [phone]  Please call during clinic hours Monday through Friday 8:00a - 4:00p if you have questions or you can contact us via RealtyShareshart at anytime and we will reply during clinic hours.    Lab results will be communicated through My Chart or letter (if My Chart not used). Please call the clinic if you have not received communication after 1 week or if you have any questions.?  Clinic Fax: [phone]    _________________________________________________________________________________________________________________________________________________________  Meal Replacement Products:    Here is the link to our new e-store where you can purchase our meal replacement products    [facility] E-Store  [email or web address]    The one week starter kit is a great way to sample a variety of products and see what works for you.    If you want more information about the product go to: Fresh Steps [email or web address]iralica    If you are an employee or [facility] or [facility] please contact your care team for a 10% estore discount    Free Shipping for orders over $75     Benefits of meal replacements products:    Portion and calorie control  Improved nutrition  Structured eating  Simplified food choices  Avoid contact with trigger foods  _________________________________________________________________________________________________________________________________________________________  Interested in working with a health ?  Health coaches work with you to improve your overall health and wellbeing.  They look at the whole person, and may involve discussion of different areas of life, including, but not limited to the four pillars of health (sleep, exercise, nutrition, and stress management). Discuss with your care team if you would like to start working a  health .  Health Coaching-3 Pack: Schedule by calling [phone]    $99 for three health coaching visits    Visits may be done in person or via phone    Coaching is a partnership between the  and the client; Coaches do not prescribe or diagnose    Coaching helps inspire the client to reach his/her personal goals   _________________________________________________________________________________________________________________________________________________________  24 Week Healthy Lifestyle Plan:    Our mission in the 24-week Healthy Lifestyle Plan is to provide you with individualized care by giving you the tools, education and support you need to lose weight and maintain a healthy lifestyle. In your 24-week journey, you ll be supported by a dedicated weight loss team that includes registered dietitians, medical weight management providers, health coaches, and nurses -- all with special expertise in weight loss -- to help you every step of the way.     Monthly meetings with your registered dietician or medical weight management provider help to review your progress, update your care plan, and make any adjustments needed to ensure success. Between these visits, weekly and bi-weekly health  visits will help you focus on the four pillars of weight loss -- stress, sleep, nutrition, and exercise -- and how you can best adapt each to achieve sustainable weight loss results.    In addition, you will be given exclusive access to online wellbeing classes through [email or web address].  Your initial visit will be with a medical weight management provider who will help to understand your weight loss goals and ensure this program is the right fit for you. Please let our team know if you are interested in the 24 week plan by sending a message to your care team or calling [phone] to  "schedule.  _________________________________________________________________________________________________________________________________________________________    COMPREHENSIVE WEIGHT MANAGEMENT PROGRAM  VIRTUAL SUPPORT GROUPS    For Support Group Information:      We offer support groups for patients who are working on weight loss and considering, preparing for or have had weight loss surgery.   There is no cost for this opportunity.  You are invited to attend the?Virtual Support Groups?provided by any of the following locations:    [facility] via Microsoft Teams with Fina Steven RN  2.   [place] via [employer] with Vickey Johnson, PhD, LP  3.   [place] via [employer] with Vicki Young RN  4.   [facility] via FP Complete Teams with Vicki Dorman [facility]    The following Support Group information can also be found on our website:  [email or web address]    [facility] Weight Loss Surgery Support Group    [facility] Weight Loss Surgery Support Group  The support group is a patient-lead forum that meets monthly to share experiences, encouragement and education. It is open to those who have had weight loss surgery, are scheduled for surgery, and those who are considering surgery.   WHEN: This group meets on the 3rd Wednesday of each month from 5:00PM - 6:00PM virtually using Microsoft Teams.   FACILITATOR: Led by Fina Steven, PEDRITO, LD, RN, the program's Clinical Coordinator.   TO REGISTER: Please contact the clinic via [employer] or call the nurse line directly at [phone] to inform our staff that you would like an invite sent to you and to let us know the email you would like the invite sent to. Prior to the meeting, a link with directions on how to join the meeting will be sent to you.    2022 Meetings  Lolita 15: \"Let's Talk\" a time for the group to share.  July 20: \"Let's Talk\" a time for the group to " "share.  August 17: \"Let's Talk\" a time for the group to share.  September 21: \"Let's Talk\" a time for the group to share.  October 19: Guest Speaker: Dr Andrea Keenan MD Pulmonologist and Sleep Medicine Physician, \"Getting a Good Night's Sleep\".  November 16: \"Let's Talk\" a time for the group to share.  December 21: \"Let's Talk\" a time for the group to share.    [facility] Support Groups    Connections: Bariatric Care Support Group?  This is open to all [facility] (and those external to this program) pre- and post- operative bariatric surgery patients as well as their support system.   WHEN: This group meets the 2nd Tuesday of each month from 6:30 PM - 8:00 PM virtually using Microsoft Teams.   FACILITATOR: Led by Vickey Johnson, Ph.D who is a Licensed Psychologist with the [facility] Comprehensive Weight Management Program.   TO REGISTER: Please send an email to Vickey Johnson, Ph.D., LP at?[email or web address]?if you would like an invitation to the group and to learn about using Microsoft Teams.    2022 Meetings  June 14: Phuong Ellison RD, LD at [facility], \"Nutritional Labeling\"  July 12 August 2 (Please Note Date Change)  September 13 October 11 November 8 December 13    Connections: Post-Operative Bariatric Surgery Support Group  This is a support group for [facility] bariatric patients (and those external to [facility]) who have had bariatric surgery and are at least 3 months post-surgery.  WHEN: This support group meets the 4th Wednesday of the month from 11:00 AM - 12:00 PM virtually using Microsoft Teams.   FACILITATOR: Led by Certified Bariatric Nurse, Vicki Young RN.   TO REGISTER: Please send an email to Vicki at [email or web address] if you would like an invitation to the group and to learn about using Microsoft Teams.    2022 Meetings June 22 July 27 August 24 September 28 October 26 November 23 December " "28      [facility] Healthy Lifestyle Virtual Support Group    Healthy Lifestyle Virtual Support Group?  This is 60 minutes of small group guided discussion, support and resources. All are welcome who want a healthy lifestyle.  WHEN: This group meets monthly on a Friday from 12:30 PM - 1:30 PM virtually using Microsoft Teams.   FACILITATOR: Led by National Board Certified Health and , Vicki Dorman [facility].   TO REGISTER: Please send an email to Vicki [email or web address] to receive monthly invites to the group or if you have any questions about having a health .  Prior to the meeting, a link with directions on how to join the meeting will be sent to you.    2022 Meetings  June 24: Vicki Dorman [facility], \"Setting Limits and Boundaries\".  Jul 29: Open Forum  August 26: Guest Speaker: Jeimy Sprague Registered Dietitian  September 30: Open Forum  October 28th: Guest Speaker: Natasha Noonan [facility], Health , \"Gratitude Practices\".  November 18: Guest Speaker: Michelle Fung RD Registered Dietitian, \"Navigating How to Eat around the Holidays\".  December 16: Guest Speaker: Chanel Parker [facility], \"Changing Your Relationship with Movement\".    ____________________________________________________________________________________________________________________________________________________________________________  [facility] Get Moving Program  Our team of physical therapists is trained to help you understand and take control of your condition. They will perform a thorough evaluation to determine your ability for activity and develop a customized plan to fit your goals and physical ability.  Scheduling: Unsure if the Get Moving program is right for you? Discuss the program with your medical provider or diabetes educator. You can also call us at [phone] to ask questions or schedule an appointment.   JESUS Get Moving " Program  ____________________________________________________________________________________________________________________________________________________________________________  [facility] Diabetes Prevention Program (DPP)  If you have prediabetes and Medicare please contact us via Windationhart to learn more about the Diabetes Prevention Program (DPP)  Program Details:  [facility] offers the year-long Diabetes Prevention Program (DPP). The program helps you to make lifestyle changes that prevent or delay type 2 diabetes by supporting healthy eating, increased physical activity, stress reduction and use of coping skills.   On average, previous [facility] DPP cohorts have lost and maintained at least 5% of their starting weight throughout the program and averaged more than 150 minutes of physical activity per week.  Participants meet weekly for one-hour group sessions over sixteen weeks, every other week for the next 8 weeks, and monthly for the last six months.   A year-long maintenance program is also available for participants who complete the first year.   Location & Cost:   During the COVID-19 Public Health Emergency, the program is offered virtually. When in-person classes can resume, they will be held at [facility].  For people with Medicare, the program is covered in full. A self-pay option will also be available for those with non-Medicare insurance plans.   _________________________________________________________________________________________________________________________________________________________  Bluetooth Scale:    We hope to provide you with high quality virtual healthcare visits while social distancing for COVID-19 is necessary, as well as in the future when virtual visits may be more convenient for you.     Our technology team made it possible for Bluetooth scales to send weight measurements to our electronic medical record. This allows  weights from you weighing at home to securely flow into the medical record, which will improve telephone and virtual visits.   Additionally, studies have shown that adults actually lose more weight when their weights are automatically sent to someone else, and also that this process is not stressful for those adults.    Below is a link for purchasing the scale, with a discount code for our patients. You may call your insurance company to see if they will reimburse you for the cost of the scale, as a piece of durable medical equipment. The scales only go up to a weight of 400 pounds. This is an issue and we are working with the developer on increasing this. We found no scales that go over 400lb that have blue-tooth for connecting to [employer].    Scale to purchase: the [employer]  Body  Scale: [email or web address]=aw.ds    Discount Code: We have a discount code for our patients to bring the cost down to $50, Discount code is: [place]_Scale_20%off  _______________________________________________________________________________________________________________________________________________________________________________    To work with a Behavioral Health Psychologist:    Call to schedule:    Jose Martin Sullivan - [phone]  Amie Nance - [phone]  Thea Garcia - [phone]  Sandra Terrazas - [phone]   Mariana Espinal PhD (cannot accept Medicare) [phone]        Thank you,   [facility] Comprehensive Weight Management Team      WEGOVY (semaglutide)    What is Wegovy?    Wegovy (semaglutide) injection 2.4 mg is an injectable prescription medicine used for adults with obesity (BMI ?30) or overweight (excess weight) (BMI ?27) who also have weight-related medical problems to help them lose weight and keep the weight off.    1.  Start Wegovy (semaglutide) 0.25 mg once weekly for 4 weeks, then if tolerating increase  to 0.5 mg weekly for 4 weeks, then if tolerating increase to 1 mg weekly for 4 weeks, then if tolerating increase to 1.7 mg weekly for 4 weeks, then if tolerating increase to 2.4 mg weekly thereafter.    -Each Wegovy pen is a once weekly single-dose prefilled pen with a pen injector already built within the pen. Discard the Wegovy pen after use in sharps container.     2. Storage: make sure that when you get the prescription that you store the prescription in the refrigerator until it is time to use the Wegovy pen.  Once it is time to use the Wegovy pen, you can keep the pen at room temperature and it is good for up to 28 days at room temperature.     3.  Potential common side effects: nausea, headache, diarrhea, stomach upset.  If these become unmanageable or concerning symptoms, please make sure to call or mychart.    Coverage Process: Wegovy will need to undergo a prior authorization.  A prior authorization is when the clinic needs to fill out paperwork that is sent to your insurance company to try to obtain coverage for that medication. The prescription will NOT automatically go to your pharmacy today if it needs a Prior Authorization. If the medication requires a prior authorization, that prescription will go to our team where the prior authorization will be worked on. If it is approved, the care team will contact you and the prescription will be released to your pharmacy.  If it is denied, we will work to try and appeal the prior authorization if possible. The initial prior authorization process takes up to 7-14 business days and appeals can take up to 30 days. If you do not hear from us at the end of that time, you can contact the clinic.    Go to site: Wegovy video to learn more and watch instruction videos.    For any questions or concerns please send a Reaction message to our team or call our weight management call center at [phone] during regular business hours. For questions during evenings or  weekends your messages will be addressed during the next business day.  For emergencies please call 911 or seek immediate medical care.

## 2023-03-18 NOTE — TELEPHONE ENCOUNTER
PA Initiation    Medication: Semaglutide-Weight Management (WEGOVY) 0.5 MG/0.5ML pen   Insurance Company: [employer] - Phone [phone] Fax [phone]  Pharmacy Filling the Rx: [facility] [address] [place] [id number] [address] [place] [address]  Filling Pharmacy Phone: [phone]  Filling Pharmacy Fax: [phone]  Start Date: 3/18/2023

## 2023-03-20 NOTE — TELEPHONE ENCOUNTER
Prior Authorization Approval    Authorization Effective Date: 2/16/2023  Authorization Expiration Date: 9/15/2023  Medication: Semaglutide-Weight Management (WEGOVY) 0.5 MG/0.5ML pen--APPROVED  Approved Dose/Quantity:   Reference #:     Insurance Company: [employer] - Phone [phone] Fax [phone]  Expected CoPay:       CoPay Card Available:      Foundation Assistance Needed:    Which Pharmacy is filling the prescription (Not needed for infusion/clinic administered): [facility] [address] [facility] [address] [address] [place] [address]  Pharmacy Notified: Yes  Patient Notified: Yes **Instructed pharmacy to notify patient when script is ready to /ship.**

## 2023-03-23 ENCOUNTER — TELEPHONE (OUTPATIENT)
Dept: ENDOCRINOLOGY | Facility: CLINIC | Age: 33
End: 2023-03-23
Payer: COMMERCIAL

## 2023-03-28 ENCOUNTER — TELEPHONE (OUTPATIENT)
Dept: GASTROENTEROLOGY | Facility: CLINIC | Age: 33
End: 2023-03-28
Payer: COMMERCIAL

## 2023-03-28 NOTE — TELEPHONE ENCOUNTER
Screening Questions  BLUE  KIND OF PREP RED  LOCATION [review exclusion criteria] GREEN  SEDATION TYPE        Y Are you active on mychart?       CAESAR SHARP Ordering/Referring Provider?        [employer]ARE What type of coverage do you have?      N Have you had a positive covid test in the last 14 days?     40.9 1. BMI  [BMI 40+ - review exclusion criteria]    Y  2. Are you able to give consent for your medical care? [IF NO,RN REVIEW]          N  3. Are you taking any prescription pain medications on a routine schedule   (ex narcotics: oxycodone, roxicodone, oxycontin,  and percocet)? [RN Review]          3a. EXTENDED PREP What kind of prescription?     N 4. Do you have any chemical dependencies such as alcohol, street drugs, or methadone?        **If yes 3- 5 , please schedule with MAC sedation.**          IF YES TO ANY 6 - 10 - HOSPITAL SETTING ONLY.     N 6.   Do you need assistance transferring?     N 7.   Have you had a heart or lung transplant?    N 8.   Are you currently on dialysis?   N 9.   Do you use daily home oxygen?   N 10. Do you take nitroglycerin?   10a.  If yes, how often?     N 11. [FEMALES] Are you currently pregnant?    11a.  If yes, how many weeks? [ Greater than 12 weeks, OR NEEDED]    N 12. Do you have Pulmonary Hypertension? *NEED PAC APPT AT [facility] w/ MAC*     N 13. [review exclusion criteria]  Do you have any implantable devices in your body (pacemaker, defib, LVAD)?    N 14. In the past 6 months, have you had any heart related issues including cardiomyopathy or heart attack?     14a.  If yes, did it require cardiac stenting if so when?     N 15. Have you had a stroke or Transient ischemic attack (TIA - aka  mini stroke ) within 6 months?      N 16. Do you have mod to severe Obstructive Sleep Apnea?  [Hospital only]    N 17. Do you have SEVERE AND UNCONTROLLED asthma? *NEED PAC APPT AT [facility] w/MAC*     18. Are you currently taking any blood thinners?     18a. No. Continue to 19.   18b. Yes/no  "Blood Thinner: No. Inform patient to \"follow up w/ ordering provider for bridging instructions.\"    N 19. Do you take the medication Phentermine?    19a. If yes, \"Hold for 7 days before procedure.  Please consult your prescribing provider if you have questions about holding this medication.\"     N  20. Do you have chronic kidney disease?      N  21. Do you have a diagnosis of diabetes?      23. Preferred LOCAL Pharmacy for Pre Prescription    [ LIST ONLY ONE PHARMACY]        [facility] - [place], [place] - [address] [place] [address] [address]    - CLOSING REMINDERS -    Informed patient they will need an adult    Cannot take any type of public or medical transportation alone    Conscious Sedation- Needs  for 6 hours after the procedure       MAC/General-Needs  for 24 hours after procedure    Pre-Procedure Covid test to be completed [[facility] PCR Testing Required]    Confirmed Nurse will call to complete assessment       - SCHEDULING DETAILS -  NO Hospital Setting Required? If yes, what is the exclusion?:    JAYANT  Surgeon    4/12/2023  Date of Procedure  Upper Endoscopy [EGD]  Type of Procedure Scheduled  [facility]     MODERATE Sedation Type     NO PAC / Pre-op Required                 "

## 2023-03-29 ENCOUNTER — TELEPHONE (OUTPATIENT)
Dept: GASTROENTEROLOGY | Facility: CLINIC | Age: 33
End: 2023-03-29

## 2023-03-29 NOTE — TELEPHONE ENCOUNTER
Attempted to contact patient regarding upcoming Upper endoscopy (EGD) procedure on 4.12.23 for pre assessment questions. No answer.     Left message to return call to [phone] #4    Discuss Covid policy and designated  policy.    Pre op exam? N/A    Arrival time: 1430. Procedure time: 1530    Facility location: [facility]; [address] [place] [address], [address] Floor, [place] [address]    Sedation type: Conscious sedation     Anticoagulants: No    Electronic implanted devices? No    Diabetic? No    Indication for procedure: GERD       Lexi Espinoza RN  Endoscopy Procedure Pre Assessment RN

## 2023-04-05 NOTE — TELEPHONE ENCOUNTER
Second attempt for pre-assessment prior to upcoming upper endoscopy (EGD)     No answer.  Left message to return call [phone] #4    Marie Singh RN  Endoscopy Procedure Pre Assessment RN

## 2023-04-07 NOTE — TELEPHONE ENCOUNTER
Patient returned call    Pre assessment questions completed for upcoming Upper endoscopy (EGD) procedure scheduled on 4/12/23    COVID policy reviewed.     Pre-op exam? N/A    Reviewed procedural arrival time 1430, procedure time 1530 and facility location [facility]; [address], [address], [place] [address]    Designated  policy reviewed. Instructed to have someone stay 6 hours post procedure.     Anticoagulation/blood thinners? No    Electronic implanted devices? No    Diabetic? No     Procedure indication: GERD    Reviewed procedure prep instructions.     Prep instructions sent via [employer].      Patient verbalized understanding and had no questions or concerns at this time.    Fiorella Andersen, OLVIN  Endoscopy Procedure Pre Assessment RN

## 2023-04-10 ENCOUNTER — MYC MEDICAL ADVICE (OUTPATIENT)
Dept: FAMILY MEDICINE | Facility: CLINIC | Age: 33
End: 2023-04-10
Payer: COMMERCIAL

## 2023-04-10 DIAGNOSIS — K21.9 GASTROESOPHAGEAL REFLUX DISEASE WITHOUT ESOPHAGITIS: Primary | ICD-10-CM

## 2023-04-10 NOTE — TELEPHONE ENCOUNTER
"Requested Prescriptions   Pending Prescriptions Disp Refills    omeprazole (PRILOSEC) 20 MG DR capsule [Pharmacy Med Name: omeprazole 20 mg capsule,delayed release] 60 capsule 11     Sig: TAKE 1 CAPSULE BY MOUTH TWICE DAILY       PPI Protocol Failed - 4/10/2023 12:12 PM        Failed - Medication is active on med list        Passed - Not on Clopidogrel (unless Pantoprazole ordered)        Passed - No diagnosis of osteoporosis on record        Passed - Recent (12 mo) or future (30 days) visit within the authorizing provider's specialty     Patient has had an office visit with the authorizing provider or a provider within the authorizing providers department within the previous 12 mos or has a future within next 30 days. See \"Patient Info\" tab in inbasket, or \"Choose Columns\" in Meds & Orders section of the refill encounter.              Passed - Patient is age 18 or older        Passed - No active pregnacy on record        Passed - No positive pregnancy test in past 12 months              Catherine Lopez RN  "

## 2023-04-12 ENCOUNTER — HOSPITAL ENCOUNTER (OUTPATIENT)
Facility: CLINIC | Age: 33
Discharge: HOME | End: 2023-04-12
Attending: SURGERY | Admitting: SURGERY
Payer: COMMERCIAL

## 2023-04-12 VITALS
DIASTOLIC BLOOD PRESSURE: 89 MMHG | OXYGEN SATURATION: 97 % | SYSTOLIC BLOOD PRESSURE: 144 MMHG | HEART RATE: 96 BPM | RESPIRATION RATE: 20 BRPM

## 2023-04-12 PROCEDURE — 43235 EGD DIAGNOSTIC BRUSH WASH: CPT | Performed by: SURGERY

## 2023-04-12 PROCEDURE — 250N000009 HC RX 250: Performed by: SURGERY

## 2023-04-12 PROCEDURE — 999N000099 HC STATISTIC MODERATE SEDATION < 10 MIN: Performed by: SURGERY

## 2023-04-12 PROCEDURE — G0500 MOD SEDAT ENDO SERVICE >5YRS: HCPCS | Performed by: SURGERY

## 2023-04-12 PROCEDURE — 250N000011 HC RX IP 250 OP 636: Performed by: SURGERY

## 2023-04-12 RX ORDER — LIDOCAINE 40 MG/G
CREAM TOPICAL
Status: DISCONTINUED | OUTPATIENT
Start: 2023-04-12 | End: 2023-04-12 | Stop reason: HOSPADM

## 2023-04-12 RX ORDER — NALOXONE HYDROCHLORIDE 0.4 MG/ML
0.4 INJECTION, SOLUTION INTRAMUSCULAR; INTRAVENOUS; SUBCUTANEOUS
Status: DISCONTINUED | OUTPATIENT
Start: 2023-04-12 | End: 2023-04-12 | Stop reason: HOSPADM

## 2023-04-12 RX ORDER — NALOXONE HYDROCHLORIDE 0.4 MG/ML
0.2 INJECTION, SOLUTION INTRAMUSCULAR; INTRAVENOUS; SUBCUTANEOUS
Status: DISCONTINUED | OUTPATIENT
Start: 2023-04-12 | End: 2023-04-12 | Stop reason: HOSPADM

## 2023-04-12 RX ORDER — FLUMAZENIL 0.1 MG/ML
0.2 INJECTION, SOLUTION INTRAVENOUS
Status: DISCONTINUED | OUTPATIENT
Start: 2023-04-12 | End: 2023-04-12 | Stop reason: HOSPADM

## 2023-04-12 RX ORDER — DIPHENHYDRAMINE HYDROCHLORIDE 50 MG/ML
INJECTION INTRAMUSCULAR; INTRAVENOUS PRN
Status: DISCONTINUED | OUTPATIENT
Start: 2023-04-12 | End: 2023-04-12 | Stop reason: HOSPADM

## 2023-04-12 RX ORDER — FENTANYL CITRATE 50 UG/ML
INJECTION, SOLUTION INTRAMUSCULAR; INTRAVENOUS PRN
Status: DISCONTINUED | OUTPATIENT
Start: 2023-04-12 | End: 2023-04-12 | Stop reason: HOSPADM

## 2023-04-12 ASSESSMENT — ACTIVITIES OF DAILY LIVING (ADL): ADLS_ACUITY_SCORE: 35

## 2023-04-12 NOTE — OR NURSING
EGD with no interventions performed under moderate sedation. Patient did not tolerate procedure well, excessive itching of face following administration of fentanyl. Benadryl given. Transferred to 3C and report given to recovery RN.

## 2023-04-12 NOTE — DISCHARGE INSTRUCTIONS
Discharge Instructions after  Upper Endoscopy (EGD)    Activity and Diet  You were given medicine for pain. You may be dizzy or sleepy.  For 24 hours:   Do not drive or use heavy equipment.   Do not make important decisions.   Do not drink any alcohol.  ___ You may return to your regular diet.    Discomfort  You may have a sore throat for 2 to 3 days. It may help to:   Avoid hot liquids for 24 hours.   Use sore throat lozenges.   Gargle as needed with salt water up to 4 times a day. Mix 1 cup of warm water  with 1 teaspoon of salt. Do not swallow.  ___ Your esophagus was dilated (opened) or banded during the exam:   Drink only cool liquids for the rest of the day. Eat a soft diet for the next few days.   You may have a sore chest for 2 to 3 days.    You may take Tylenol (acetaminophen) for pain unless your doctor has told you not to.    When to call us:  Problems are rare. Call right away if you have:   Unusual throat pain or trouble swallowing   Unusual pain in belly or chest that is not relieved by belching or passing air   Black stools (tar-like looking bowel movement)   Temperature above 100.6  F. (37.5  C).    If you vomit blood or have severe pain, go to an emergency room.    If you have questions, call:  Monday to Friday, 8 a.m. to 4:30 p.m.: Central Scheduling Department:[phone]    After hours: Hospital: [phone] (Ask for the GI fellow on call)

## 2023-04-12 NOTE — H&P
"[facility] Anesthesia Pre-op History and Physical    Ayanna Davidson MRN# [id number]   Age: 32 year old YOB: 1990                    Location [facility], [place]; 3rd floor endoscopy center          Primary care provider: Obed Santiago         Chief Complaint and/or Reason for Procedure:   Prior Iron chilel    Interested in potential revisional weight loss surgery.         Active problem list:     Patient Active Problem List    Diagnosis Date Noted     Anxiety 03/15/2023     Priority: Medium     Class 2 severe obesity with serious comorbidity and body mass index (BMI) of 38.0 to 38.9 in adult, unspecified obesity type (H) 08/18/2022     Priority: Medium     Open wound 03/23/2020     Priority: Medium     TINO III (vulvar intraepithelial neoplasia III) 02/26/2020     Priority: Medium     Added automatically from request for surgery [id number]       Preoperative examination 01/28/2020     Priority: Medium     Added automatically from request for surgery [id number]       Bartholin's gland abscess 01/28/2020     Priority: Medium     Added automatically from request for surgery [id number]       Chronic pain in right shoulder 01/21/2020     Priority: Medium     Dog bite of right lower leg 09/27/2019     Priority: Medium     Cellulitis of left upper extremity 09/27/2019     Priority: Medium     Current every day smoker 09/27/2019     Priority: Medium     Gastroesophageal reflux disease without esophagitis 09/27/2019     Priority: Medium     Skin infection 09/27/2019     Priority: Medium     Dog bite of left upper extremity 05/07/2019     Priority: Medium     Cervical high risk HPV (human papillomavirus) test positive 03/12/2019     Priority: Medium     4/2016 NIL pap. No prior HPV testing.   3/12/19 NIL pap, + HR HPV (not 16 or 18). Plan: cotest in 1 yr  1/29/20 NIL pap, + HR HPV (not 16 or 18). Plan: [place]  2/11/20 [place] bx: neg. Plan: Cotest in 1 yr.   2/18/20 Vulvar biopsy: \"TINO III; severe dysplasia\" " "possible involvement of margins  3/11/20 Re excision of vulva.   3/23/21 NIL pap, + HR HPV (not 16 or 18). Plan: colp  6/22/21 Gyn visit - [place] recommended, Patient refused  6/28/21 GynOnc visit plan - HPV HR+: \"Repeat HPV-based screening in 1 year based upon ASCCP\", Due 3/23/22  5/6/22 Lost to follow up  8/18/22 NIL pap, Neg HPV.  Plan: cotest in 1 year       Closed fracture dislocation of hip joint, left, initial encounter (H) 09/28/2018     Priority: Medium     Vulvar abscess 08/16/2017     Priority: Medium     ADD (attention deficit disorder) without hyperactivity 04/18/2014     Priority: Medium     Drug-induced mental disorder (H) 09/20/2012     Priority: Medium     Overview:   ICD-10 Regulatory Update       Depressed 05/10/2012     Priority: Medium     Overdose of antipsychotic 05/09/2012     Priority: Medium            Medications (include herbals and vitamins):     Current Facility-Administered Medications   Medication     penicillin G benzathine (BICILLIN L-A) injection 0.6 Million Units     Current Outpatient Medications   Medication Sig     gabapentin (NEURONTIN) 100 MG capsule Take 1 capsule (100 mg) by mouth 3 times daily     hydrOXYzine (ATARAX) 25 MG tablet Take 1 tablet (25 mg) by mouth 3 times daily as needed for itching     insulin pen needle (31G X 6 MM) 31G X 6 MM miscellaneous Use 1 pen needles daily for Victoza injection or as directed     liraglutide (VICTOZA) 18 MG/3ML solution Inject 1.2 mg Subcutaneous daily After one week increase to 1.2 mg daily.     Multiple Vitamins-Minerals (MULTIVITAMIN ADULT PO)      omeprazole (PRILOSEC OTC) 20 MG EC tablet Take 1 tablet (20 mg) by mouth daily     Semaglutide-Weight Management (WEGOVY) 0.5 MG/0.5ML pen Inject 0.5 mg Subcutaneous once a week For 4 weeks     [START ON 4/15/2023] Semaglutide-Weight Management (WEGOVY) 1 MG/0.5ML pen Inject 1 mg Subcutaneous once a week After completing 4 weeks of 0.5mg dose     sertraline (ZOLOFT) 50 MG tablet Take 1 " tablet (50 mg) by mouth daily             Allergies:    No Known Allergies           Physical Exam:   No data found.  No intake/output data recorded.  Breathing unlabored  NAD            Lab / Radiology Results:   Guidelines for CXR: new or unstable cardio-pulmonary disease         Anesthetic risk and/or ASA classification:       Thierno Escobar MD     Plan    Upper Endoscopy to evaluate for feasibility of repeat stomach surgery      Thierno Escobar MD  Surgery  [phone] (hospital )  [phone] (clinic nurses)

## 2023-04-25 LAB — UPPER GI ENDOSCOPY: NORMAL

## 2023-04-26 ENCOUNTER — MYC MEDICAL ADVICE (OUTPATIENT)
Dept: FAMILY MEDICINE | Facility: CLINIC | Age: 33
End: 2023-04-26
Payer: COMMERCIAL

## 2023-04-26 ENCOUNTER — TELEPHONE (OUTPATIENT)
Dept: FAMILY MEDICINE | Facility: CLINIC | Age: 33
End: 2023-04-26
Payer: COMMERCIAL

## 2023-04-26 ENCOUNTER — NURSE TRIAGE (OUTPATIENT)
Dept: NURSING | Facility: CLINIC | Age: 33
End: 2023-04-26
Payer: COMMERCIAL

## 2023-04-26 NOTE — TELEPHONE ENCOUNTER
"EGD 4/12/2023 I have not had a solid BM since then. Diarrhea and gut aches since then. Done by Dr Escobar @ [facility]. She feels woozy sometimes when walking and when she gets up sometimes. She hasn't been taking Gabapentin and her legs hurt. Abdominal pain below umbilicus in the middle. Pain currently=\"cramps then diarrhea, not bad\".     Antibiotic for Trichomonas just finshed yesterday. Still on another one for bladder infection. 3- 4 loose stools/day.    Triaged to a disposition of See provider within 24 hrs. Call transferred to .     Rea Almodovar RN Triage Nurse Advisor 5:53 PM 4/26/2023    Reason for Disposition    [1] Abdomen pain is main concern AND [2] started > 3 days since endoscopy AND [3] female    MODERATE diarrhea (e.g., 4-6 times / day more than normal)    Additional Information    Negative: Shock suspected (e.g., cold/pale/clammy skin, too weak to stand, low BP, rapid pulse)    Negative: Difficult to awaken or acting confused (e.g., disoriented, slurred speech)    Negative: Passed out (i.e., lost consciousness, collapsed and was not responding)    Negative: Sounds like a life-threatening emergency to the triager    Negative: Breathing difficulty    Negative: Chest pain    Negative: [1] Pain is mainly in upper abdomen AND [2] started > 3 days since endoscopy (If needed ask: \"Is it mainly above the belly button?\")    Negative: Shock suspected (e.g., cold/pale/clammy skin, too weak to stand, low BP, rapid pulse)    Negative: Difficult to awaken or acting confused (e.g., disoriented, slurred speech)    Negative: Passed out (i.e., lost consciousness, collapsed and was not responding)    Negative: Sounds like a life-threatening emergency to the triager    Negative: Chest pain    Negative: Pain is mainly in upper abdomen  (if needed ask: \"is it mainly above the belly button?\")    Negative: Followed an abdomen (stomach) injury    Negative: [1] Abdominal pain AND [2] pregnant < 20 weeks    Negative: " "[1] Abdominal pain AND [2] pregnant 20 or more weeks    Negative: [1] Abdominal pain AND [2] postpartum (from 0 to 6 weeks after delivery)    Negative: [1] SEVERE pain (e.g., excruciating) AND [2] present > 1 hour    Negative: [1] SEVERE pain AND [2] age > 60 years    Negative: [1] Vomiting AND [2] contains red blood or black (\"coffee ground\") material  (Exception: few red streaks in vomit that only happened once)    Negative: Blood in bowel movements (Exception: blood on surface of BM with constipation)    Negative: Black or tarry bowel movements (Exception: chronic-unchanged black-grey bowel movements AND is taking iron pills or Pepto-bismol)    Negative: Patient sounds very sick or weak to the triager    Negative: [1] MILD-MODERATE pain AND [2] constant AND [3] present > 2 hours    Negative: [1] Vomiting AND [2] abdomen looks much more swollen than usual    Negative: [1] Vomiting AND [2] contains bile (green color)    Negative: White of the eyes have turned yellow (i.e., jaundice)    Negative: Fever > 103 F (39.4 C)    Negative: [1] Fever > 101 F (38.3 C) AND [2] age > 60 years    Negative: [1] Fever > 100.0 F (37.8 C) AND [2] bedridden (e.g., nursing home patient, CVA, chronic illness, recovering from surgery)    Negative: [1] Fever > 100.0 F (37.8 C) AND [2] diabetes mellitus or weak immune system (e.g., HIV positive, cancer chemo, splenectomy, organ transplant, chronic steroids)    Negative: [1] SEVERE pain AND [2] present < 1 hour    Negative: Shock suspected (e.g., cold/pale/clammy skin, too weak to stand, low BP, rapid pulse)    Negative: Difficult to awaken or acting confused (e.g., disoriented, slurred speech)    Negative: Sounds like a life-threatening emergency to the triager    Negative: Shock suspected (e.g., cold/pale/clammy skin, too weak to stand, low BP, rapid pulse)    Negative: Difficult to awaken or acting confused (e.g., disoriented, slurred speech)    Negative: Sounds like a life-threatening " emergency to the triager    Negative: Vomiting also present and worse than the diarrhea    Negative: [1] Blood in stool AND [2] without diarrhea    Negative: Diarrhea in a cancer patient who is currently (or recently) receiving chemotherapy or radiation therapy, or cancer patient who has metastatic or end-stage cancer and is receiving palliative care    Negative: SEVERE abdominal pain (e.g., excruciating)    Negative: [1] Blood in the stool AND [2] moderate or large amount of blood (e.g., any blood clots, passing blood without stool, toilet water turns red)    Negative: Black or tarry bowel movements  (Exception: chronic-unchanged black-grey bowel movements AND is taking iron pills or Pepto-Bismol)    Negative: [1] Drinking very little AND [2] dehydration suspected (e.g., no urine > 12 hours, very dry mouth, very lightheaded)    Negative: Patient sounds very sick or weak to the triager    Negative: SEVERE diarrhea (e.g., 7 or more times / day more than normal)    Negative: [1] Constant abdominal pain AND [2] present > 2 hours    Negative: Abdomen looks much more swollen than usual    Protocols used: ENDOSCOPY (UPPER GI) SYMPTOMS AND [id number]-RU, DIARRHEA ON ANTIBIOTICS-A-AH, ABDOMINAL PAIN - FEMALE-A-AH, DIARRHEA-A-AH

## 2023-04-26 NOTE — TELEPHONE ENCOUNTER
RN TRIAGE CALL:    Patient Contact    Attempt # 1    Was call answered?  No.  Left message on voicemail with information to call any triage RN back.  My chart message sent to call triage.    You  Ayanna Davidson Just now (2:39 PM)     HZ  Good Afternoon Ayanna,     Through reading your My Chart message, we advise that you call and speak with a triage RN for a better assessment of your symptoms and concerns.     If this is an emergency, please call 911.     Thank you and have a good day,     [place] RN triage team  [phone]    This [employer] message has not been read.     Ayanna Davidson  P [facility] [employer] Messages (supporting Obed Santiago MD) 2 hours ago (11:58 AM)     CK  Jai if it matters or not but ever since i had my EGD at [facility] in [place] I haven't had a solid bowl movement ? Should I be concerned

## 2023-04-28 NOTE — TELEPHONE ENCOUNTER
RN TRIAGE CALL:    Patient Contact    Attempt # 2    Was call answered?  No.  Left message on voicemail with information to call any triage RN back.    Kiara Garnett RN

## 2023-05-01 NOTE — TELEPHONE ENCOUNTER
RN TRIAGE CALL:    Patient Contact    Attempt # 3    Was call answered?  No.  Left message on voicemail with information to call triage RN back.  Closing encounter at this time.    Kiara Garnett RN

## 2023-05-25 ENCOUNTER — VIRTUAL VISIT (OUTPATIENT)
Dept: ENDOCRINOLOGY | Facility: CLINIC | Age: 33
End: 2023-05-25
Payer: COMMERCIAL

## 2023-05-25 VITALS — WEIGHT: 230 LBS | HEIGHT: 66 IN | BODY MASS INDEX: 36.96 KG/M2

## 2023-05-25 DIAGNOSIS — E66.812 CLASS 2 SEVERE OBESITY WITH SERIOUS COMORBIDITY AND BODY MASS INDEX (BMI) OF 38.0 TO 38.9 IN ADULT, UNSPECIFIED OBESITY TYPE (H): Primary | ICD-10-CM

## 2023-05-25 DIAGNOSIS — E66.01 CLASS 2 SEVERE OBESITY WITH SERIOUS COMORBIDITY AND BODY MASS INDEX (BMI) OF 38.0 TO 38.9 IN ADULT, UNSPECIFIED OBESITY TYPE (H): Primary | ICD-10-CM

## 2023-05-25 PROCEDURE — 99443 PR PHYSICIAN TELEPHONE EVALUATION 21-30 MIN: CPT | Mod: 95

## 2023-05-25 RX ORDER — SEMAGLUTIDE 1.7 MG/.75ML
1.7 INJECTION, SOLUTION SUBCUTANEOUS WEEKLY
Qty: 3 ML | Refills: 3 | Status: SHIPPED | OUTPATIENT
Start: 2023-05-25 | End: 2023-10-10

## 2023-05-25 ASSESSMENT — PAIN SCALES - GENERAL: PAINLEVEL: NO PAIN (0)

## 2023-05-25 NOTE — NURSING NOTE
Is the patient currently in the state of [place]? YES    Visit mode:VIDEO    If the visit is dropped, the patient can be reconnected by: TELEPHONE VISIT: Phone number: [phone]    Will anyone else be joining the visit? NO      How would you like to obtain your AVS? MyChart    Are changes needed to the allergy or medication list? NO    Reason for visit: RECHECK    Pt declined reviewing meds/ allergies    Meghan Hagen, VF

## 2023-05-25 NOTE — LETTER
2023       RE: Ayanna Davidson  [id number]  [place] [address]     Dear Colleague,    Thank you for referring your patient, Ayanna Davidson, to the [facility] [place] at [facility]. Please see a copy of my visit note below.    Ayanna is a 32 year old who is being evaluated via a billable telephone visit.      What phone number would you like to be contacted at? [phone]  How would you like to obtain your AVS? MyChart    Distant Location (provider location):  On-site  Phone call duration: 15 minutes    Return Medical Weight Management Note     Ayanna Davidson  MRN:  [id number]  :  1990  JEFFREY:  2023    Dear Obed Santiago MD,    I had the pleasure of seeing your patient Ayanna Davidson. She is a 32 year old female who I am continuing to see for treatment of obesity related to:        3/13/2023    12:17 AM   --   I have the following health issues associated with obesity Infertility    GERD (Reflux)    Stress Incontinence       Assessment & Plan   Problem List Items Addressed This Visit          Digestive    Class 2 severe obesity with serious comorbidity and body mass index (BMI) of 38.0 to 38.9 in adult, unspecified obesity type (H) - Primary     Last seen 3/15/2023 to reestablish care. Transitioned from victoza to wegovy. Is unsure of current weight, does not have access to a scale. Will get a weight check at PCP next week. Does not think she has lost weight, clothes feel the same.     Currently taking Wegovy 1.0mg once weekly. Has taken 3 doses. No side effects. Some hunger control. Would like to dose increase for more hunger control. Will increase to Wegovy 1.7mg once weekly.     Discussed bariatric surgery and requirements needed. Patient is concerned about nicotine cessation needed 3 months prior to surgery and then indefinitely. With this, she will continue with MWM.          Relevant Medications     Semaglutide-Weight Management (WEGOVY) 1.7 MG/0.75ML pen        Continue Wegovy 1.0mg once weekly for 1 more week, then increase Wegovy 1.7mg once weekly. Reach out if would like to increase to 2.4mg prior to next visit.   Labs to be collected  Follow up with Sylvia in 2 months       INTERVAL HISTORY:  S/p lapband in 2006 by Dr. Perdue in [place]. No complications.   Lapband removed 8/2021 by Dr. Escobar in [place].  Reestablished cared - 3/15/2023 - Transitioned from Victoza to Wegovy 0.5mg  - EGD for continued GERD symptoms and possible bariatric surgery in the future.         Does not know current weight. Does not feel like clothes are fitting better. Does not have a scale.     Anti-obesity medications:     Current:   Wegovy 1.0mg - Has been on this dose for 3 weeks. No side effects. Has been helping with hunger a little bit. Still feeling hungry.       Recent diet changes: Trying to increase fruit and vegetables. Decrease in soda - only having 1 a day    Recent exercise/activity changes: Always moving around. Is struggling with chronic feet pain, but still trying to move around as much as she can. Wants to look up upper body work outs.     Recent stressors:  Stable    Recent sleep changes: Sleeping well.     Vitamins/Labs: need to be collected.     EGD was completed - Dr. Escobar oked future bariatric surgery if decided.      CURRENT WEIGHT:   230 lbs 0 oz    Initial Weight (lbs): 240 lbs     Cumulative weight loss (lbs): 10  Weight Loss Percentage: 4.17%     Wt Readings from Last 5 Encounters:   05/25/23 104.3 kg (230 lb)   03/15/23 108.9 kg (240 lb)   01/24/23 108.9 kg (240 lb)   08/18/22 108.4 kg (239 lb)   06/28/21 90.6 kg (199 lb 12.8 oz)             5/25/2023     4:57 AM   Changes and Difficulties   I have made the following changes to my diet since my last visit: Smaller portions  drinking more water   With regards to my diet, I am still struggling with: Decreasing pop intake , eating more greens  "  I have made the following changes to my activity/exercise since my last visit: More outside activities lawn work ex...   With regards to my activity/exercise, I am still struggling with: Feeling restricted and uncomfortable  \" fat \" and my feet ankles lower back  hurt all the time         MEDICATIONS:   Current Outpatient Medications   Medication Sig Dispense Refill    Semaglutide-Weight Management (WEGOVY) 1.7 MG/0.75ML pen Inject 1.7 mg Subcutaneous once a week 3 mL 3    gabapentin (NEURONTIN) 100 MG capsule Take 1 capsule (100 mg) by mouth 3 times daily 180 capsule 3    hydrOXYzine (ATARAX) 25 MG tablet Take 1 tablet (25 mg) by mouth 3 times daily as needed for itching 30 tablet 1    Multiple Vitamins-Minerals (MULTIVITAMIN ADULT PO)       omeprazole (PRILOSEC OTC) 20 MG EC tablet Take 1 tablet (20 mg) by mouth daily 30 tablet 3    omeprazole (PRILOSEC) 20 MG DR capsule TAKE 1 CAPSULE BY MOUTH TWICE DAILY 60 capsule 11    Semaglutide-Weight Management (WEGOVY) 1 MG/0.5ML pen Inject 1 mg Subcutaneous once a week After completing 4 weeks of 0.5mg dose 2 mL 1    sertraline (ZOLOFT) 50 MG tablet Take 1 tablet (50 mg) by mouth daily 90 tablet 1           5/25/2023     4:57 AM   Weight Loss Medication History Reviewed With Patient   Which weight loss medications are you currently taking on a regular basis? Wegovy   Are you having any side effects from the weight loss medication that we have prescribed you? No       EGD - 4/12/2023  Findings:        LA Grade A (one or more mucosal breaks less than 5 mm, not extending        between tops of 2 mucosal folds) esophagitis with no bleeding was found        at the gastroesophageal junction.        A small amount of food (residue) was found on the greater curvature of        the stomach.        The examined duodenum was normal.                                                                                     Impression:            - LA Grade A reflux esophagitis with no " "bleeding.                          - A small amount of food (residue) in the stomach.                          - Normal examined duodenum.                          - No specimens collected.   Recommendation:        - Discharge patient to home (ambulatory).                          - Can have stomach surgery.       Objective    Ht 1.664 m (5' 5.5\")   Wt 104.3 kg (230 lb)   BMI 37.69 kg/m      Vitals - Patient Reported  Pain Score: No Pain (0)      Vitals:  No vitals were obtained today due to virtual visit.  PHYSICAL EXAM:  GENERAL: Healthy, alert and no distress  EYES: Eyes grossly normal to inspection.  No discharge or erythema, or obvious scleral/conjunctival abnormalities.  RESP: No audible wheeze, cough, or visible cyanosis.  No visible retractions or increased work of breathing.    SKIN: Visible skin clear. No significant rash, abnormal pigmentation or lesions.  NEURO: Cranial nerves grossly intact.  Mentation and speech appropriate for age.  PSYCH: Mentation appears normal, affect normal/bright, judgement and insight intact, normal speech and appearance well-groomed.        Sincerely,    CAESAR SHARP PA-C      36 minutes spent by me on the date of the encounter doing chart review, history and exam, documentation and further activities per the note  "

## 2023-05-25 NOTE — PROGRESS NOTES
"Virtual Visit Details    Type of service:  Video Visit   Video Start Time: {video visit start/end time for provider to select:142515}  Video End Time:{video visit start/end time for provider to select:272589}    Originating Location (pt. Location): {video visit patient location:689362::\"Home\"}  {PROVIDER LOCATION On-site should be selected for visits conducted from your clinic location or adjoining [facility] hospital, academic office, or other nearby [facility] building. Off-site should be selected for all other provider locations, including home:090108}  Distant Location (provider location):  {virtual location provider:981701}  Platform used for Video Visit: {Virtual Visit Platforms:123581::\"[employer]\"}  "

## 2023-05-25 NOTE — PROGRESS NOTES
Ayanna is a 32 year old who is being evaluated via a billable telephone visit.      What phone number would you like to be contacted at? [phone]  How would you like to obtain your AVS? Oskar    Distant Location (provider location):  On-site  Phone call duration: 15 minutes    Return Medical Weight Management Note     Ayanna Davidson  MRN:  [id number]  :  1990  JEFFREY:  2023    Dear Obed Santiago MD,    I had the pleasure of seeing your patient Ayanna Davidson. She is a 32 year old female who I am continuing to see for treatment of obesity related to:        3/13/2023    12:17 AM   --   I have the following health issues associated with obesity Infertility    GERD (Reflux)    Stress Incontinence       Assessment & Plan   Problem List Items Addressed This Visit        Digestive    Class 2 severe obesity with serious comorbidity and body mass index (BMI) of 38.0 to 38.9 in adult, unspecified obesity type (H) - Primary     Last seen 3/15/2023 to reestablish care. Transitioned from victoza to wegovy. Is unsure of current weight, does not have access to a scale. Will get a weight check at PCP next week. Does not think she has lost weight, clothes feel the same.     Currently taking Wegovy 1.0mg once weekly. Has taken 3 doses. No side effects. Some hunger control. Would like to dose increase for more hunger control. Will increase to Wegovy 1.7mg once weekly.     Discussed bariatric surgery and requirements needed. Patient is concerned about nicotine cessation needed 3 months prior to surgery and then indefinitely. With this, she will continue with [facility].          Relevant Medications    Semaglutide-Weight Management (WEGOVY) 1.7 MG/0.75ML pen        1. Continue Wegovy 1.0mg once weekly for 1 more week, then increase Wegovy 1.7mg once weekly. Reach out if would like to increase to 2.4mg prior to next visit.   2. Labs to be collected  3. Follow up with Marie Berry in 2 months       INTERVAL HISTORY:  S/p  "lapband in 2006 by Dr. Perdue in [place]. No complications.   Lapband removed 8/2021 by Dr. Escobar in [place].  Reestablished cared - 3/15/2023 - Transitioned from Victoza to Wegovy 0.5mg  - EGD for continued GERD symptoms and possible bariatric surgery in the future.         Does not know current weight. Does not feel like clothes are fitting better. Does not have a scale.     Anti-obesity medications:     Current:   Wegovy 1.0mg - Has been on this dose for 3 weeks. No side effects. Has been helping with hunger a little bit. Still feeling hungry.       Recent diet changes: Trying to increase fruit and vegetables. Decrease in soda - only having 1 a day    Recent exercise/activity changes: Always moving around. Is struggling with chronic feet pain, but still trying to move around as much as she can. Wants to look up upper body work outs.     Recent stressors:  Stable    Recent sleep changes: Sleeping well.     Vitamins/Labs: need to be collected.     EGD was completed - Dr. Escobar oked future bariatric surgery if decided.      CURRENT WEIGHT:   230 lbs 0 oz    Initial Weight (lbs): 240 lbs     Cumulative weight loss (lbs): 10  Weight Loss Percentage: 4.17%     Wt Readings from Last 5 Encounters:   05/25/23 104.3 kg (230 lb)   03/15/23 108.9 kg (240 lb)   01/24/23 108.9 kg (240 lb)   08/18/22 108.4 kg (239 lb)   06/28/21 90.6 kg (199 lb 12.8 oz)             5/25/2023     4:57 AM   Changes and Difficulties   I have made the following changes to my diet since my last visit: Smaller portions  drinking more water   With regards to my diet, I am still struggling with: Decreasing pop intake , eating more greens   I have made the following changes to my activity/exercise since my last visit: More outside activities lawn work ex...   With regards to my activity/exercise, I am still struggling with: Feeling restricted and uncomfortable  \" fat \" and my feet ankles lower back  hurt all the time         MEDICATIONS: "   Current Outpatient Medications   Medication Sig Dispense Refill     Semaglutide-Weight Management (WEGOVY) 1.7 MG/0.75ML pen Inject 1.7 mg Subcutaneous once a week 3 mL 3     gabapentin (NEURONTIN) 100 MG capsule Take 1 capsule (100 mg) by mouth 3 times daily 180 capsule 3     hydrOXYzine (ATARAX) 25 MG tablet Take 1 tablet (25 mg) by mouth 3 times daily as needed for itching 30 tablet 1     Multiple Vitamins-Minerals (MULTIVITAMIN ADULT PO)        omeprazole (PRILOSEC OTC) 20 MG EC tablet Take 1 tablet (20 mg) by mouth daily 30 tablet 3     omeprazole (PRILOSEC) 20 MG DR capsule TAKE 1 CAPSULE BY MOUTH TWICE DAILY 60 capsule 11     Semaglutide-Weight Management (WEGOVY) 1 MG/0.5ML pen Inject 1 mg Subcutaneous once a week After completing 4 weeks of 0.5mg dose 2 mL 1     sertraline (ZOLOFT) 50 MG tablet Take 1 tablet (50 mg) by mouth daily 90 tablet 1           5/25/2023     4:57 AM   Weight Loss Medication History Reviewed With Patient   Which weight loss medications are you currently taking on a regular basis? Wegovy   Are you having any side effects from the weight loss medication that we have prescribed you? No       EGD - 4/12/2023  Findings:        LA Grade A (one or more mucosal breaks less than 5 mm, not extending        between tops of 2 mucosal folds) esophagitis with no bleeding was found        at the gastroesophageal junction.        A small amount of food (residue) was found on the greater curvature of        the stomach.        The examined duodenum was normal.                                                                                     Impression:            - LA Grade A reflux esophagitis with no bleeding.                          - A small amount of food (residue) in the stomach.                          - Normal examined duodenum.                          - No specimens collected.   Recommendation:        - Discharge patient to home (ambulatory).                          - Can have  "stomach surgery.       Objective    Ht 1.664 m (5' 5.5\")   Wt 104.3 kg (230 lb)   BMI 37.69 kg/m      Vitals - Patient Reported  Pain Score: No Pain (0)      Vitals:  No vitals were obtained today due to virtual visit.  PHYSICAL EXAM:  GENERAL: Healthy, alert and no distress  EYES: Eyes grossly normal to inspection.  No discharge or erythema, or obvious scleral/conjunctival abnormalities.  RESP: No audible wheeze, cough, or visible cyanosis.  No visible retractions or increased work of breathing.    SKIN: Visible skin clear. No significant rash, abnormal pigmentation or lesions.  NEURO: Cranial nerves grossly intact.  Mentation and speech appropriate for age.  PSYCH: Mentation appears normal, affect normal/bright, judgement and insight intact, normal speech and appearance well-groomed.        Sincerely,    CAESAR SHARP PA-C      36 minutes spent by me on the date of the encounter doing chart review, history and exam, documentation and further activities per the note  "

## 2023-05-26 ENCOUNTER — TELEPHONE (OUTPATIENT)
Dept: ENDOCRINOLOGY | Facility: CLINIC | Age: 33
End: 2023-05-26
Payer: COMMERCIAL

## 2023-05-26 NOTE — ASSESSMENT & PLAN NOTE
Last seen 3/15/2023 to reestablish care. Transitioned from victoza to wegovy. Is unsure of current weight, does not have access to a scale. Will get a weight check at PCP next week. Does not think she has lost weight, clothes feel the same.     Currently taking Wegovy 1.0mg once weekly. Has taken 3 doses. No side effects. Some hunger control. Would like to dose increase for more hunger control. Will increase to Wegovy 1.7mg once weekly.     Discussed bariatric surgery and requirements needed. Patient is concerned about nicotine cessation needed 3 months prior to surgery and then indefinitely. With this, she will continue with [facility].

## 2023-05-26 NOTE — PATIENT INSTRUCTIONS
"Thank you for allowing us the privilege of caring for you. We hope we provided you with the excellent service you deserve.   Please let us know if there is anything else we can do for you so that we can be sure you are completely satisfied with your care experience.    To ensure the quality of our services you may be receiving a patient satisfaction survey from an independent patient satisfaction monitoring company.    The greatest compliment you can give is a \"Likely to Recommend\"    Your visit was with CAESAR SHARP PA-C today.    Instructions per today's visit:   Continue Wegovy 1.0mg once weekly for 1 more week, then increase Wegovy 1.7mg once weekly. Reach out if would like to increase to 2.4mg prior to next visit.   Labs to be collected  Follow up with Caesar Berry in 2 months     _________________________________________________________________________________________________________________________________________________________  Important contact and scheduling information:  Please call our contact center at [phone] to schedule your next appointments.  To find a lab location near you, please call [phone].  For any nursing questions or concerns call Lexi Brooke LPN at [phone] or Radha Wen RN at [phone]  Please call during clinic hours Monday through Friday 8:00a - 4:00p if you have questions or you can contact us via [employer] at anytime and we will reply during clinic hours.    Lab results will be communicated through My Chart or letter (if My Chart not used). Please call the clinic if you have not received communication after 1 week or if you have any questions.?  Clinic Fax: [phone]  _________________________________________________________________________________________________________________________________________________________  If you are asked by your clinic team to have your blood pressure checked:  [facility] do offer several locations for blood pressure " checks. Please follow the below link to schedule an appointment. Scheduling an appointment at the pharmacy for a blood pressure check is now preferred.    Appointment Plus (appointment-plus.[place])  _________________________________________________________________________________________________________________________________________________________  Meal Replacement Products:    Here is the link to our new e-store where you can purchase our meal replacement products     [facility] E-Store  Informative[employer]"/store    The one week starter kit is a great way to sample a variety of products and see what works for you.    If you want more information about the product go to: Fresh Steps Meals  [email or web address].[email or web address]    If you are an employee or [facility] or [facility] please contact your care team for a 10% estore discount    Free Shipping for orders over $75     Benefits of meal replacements products:    Portion and calorie control  Improved nutrition  Structured eating  Simplified food choices  Avoid contact with trigger foods  _________________________________________________________________________________________________________________________________________________________  Interested in working with a health ?  Health coaches work with you to improve your overall health and wellbeing.  They look at the whole person, and may involve discussion of different areas of life, including, but not limited to the four pillars of health (sleep, exercise, nutrition, and stress management). Discuss with your care team if you would like to start working a health .  Health Coaching-3 Pack: Schedule by calling [phone]    $99 for three health coaching visits    Visits may be done in person or via phone    Coaching is a partnership between the  and the client; Coaches do not prescribe or diagnose    Coaching helps inspire the client to reach his/her personal goals    _________________________________________________________________________________________________________________________________________________________  24 Week Healthy Lifestyle Plan:    Our mission in the 24-week Healthy Lifestyle Plan is to provide you with individualized care by giving you the tools, education and support you need to lose weight and maintain a healthy lifestyle. In your 24-week journey, you ll be supported by a dedicated weight loss team that includes registered dietitians, medical weight management providers, health coaches, and nurses -- all with special expertise in weight loss -- to help you every step of the way.     Monthly meetings with your registered dietician or medical weight management provider help to review your progress, update your care plan, and make any adjustments needed to ensure success. Between these visits, weekly and bi-weekly health  visits will help you focus on the four pillars of weight loss -- stress, sleep, nutrition, and exercise -- and how you can best adapt each to achieve sustainable weight loss results.    In addition, you will be given exclusive access to online wellbeing classes through [email or web address].  Your initial visit will be with a medical weight management provider who will help to understand your weight loss goals and ensure this program is the right fit for you. Please let our team know if you are interested in the 24 week plan by sending a message to your care team or calling [phone] to schedule.  _________________________________________________________________________________________________________________________________________________________    COMPREHENSIVE WEIGHT MANAGEMENT PROGRAM  VIRTUAL SUPPORT GROUPS    For Support Group Information:      We offer support groups for patients who are working on weight loss and considering, preparing for or have had weight loss surgery.   There is no cost for this opportunity.  You are invited to  "attend the?Virtual Support Groups?provided by any of the following locations:    [facility] via [place] Teams with Fina Steven RN  2.   [place] via [place] Teams with Vickey Johnson, PhD, LP  3.   [place] via [place] Teams with Vicki Young RN  4.   [facility] via [place] Teams with Vicki Dorman [facility]    The following Support Group information can also be found on our website:  [email or web address]    [facility] Weight Loss Surgery Support Group    [facility] Weight Loss Surgery Support Group  The support group is a patient-lead forum that meets monthly to share experiences, encouragement and education. It is open to those who have had weight loss surgery, are scheduled for surgery, and those who are considering surgery.   WHEN: This group meets on the 3rd Wednesday of each month from 5:00PM - 6:00PM virtually using Microsoft Teams.   FACILITATOR: Led by Fina Steven RD, CASANDRA, RN, the program's Clinical Coordinator.   TO REGISTER: Please contact the clinic via [place] or call the nurse line directly at [phone] to inform our staff that you would like an invite sent to you and to let us know the email you would like the invite sent to. Prior to the meeting, a link with directions on how to join the meeting will be sent to you.    2022 Meetings  Lolita 15: \"Let's Talk\" a time for the group to share.  July 20: \"Let's Talk\" a time for the group to share.  August 17: \"Let's Talk\" a time for the group to share.  September 21: \"Let's Talk\" a time for the group to share.  October 19: Guest Speaker: Dr Andrea Keenan MD Pulmonologist and Sleep Medicine Physician, \"Getting a Good Night's Sleep\".  November 16: \"Let's Talk\" a time for the group to share.  December 21: \"Let's Talk\" a time for the group to share.    [facility] Support Groups    Connections: Bariatric Care Support " "Group?  This is open to all [facility] (and those external to this program) pre- and post- operative bariatric surgery patients as well as their support system.   WHEN: This group meets the 2nd Tuesday of each month from 6:30 PM - 8:00 PM virtually using Microsoft Teams.   FACILITATOR: Led by Vickey Johnson, Ph.D who is a Licensed Psychologist with the [facility] Comprehensive Weight Management Program.   TO REGISTER: Please send an email to Vickey Johnson, Ph.D.,  at?[email or web address]?if you would like an invitation to the group and to learn about using Microsoft Teams.    2022 Meetings June 14: Phuong Ellison, PEDRITO, LD at [facility], \"Nutritional Labeling\"  July 12 August 2 (Please Note Date Change)  September 13 October 11 November 8 December 13    Connections: Post-Operative Bariatric Surgery Support Group  This is a support group for [facility] bariatric patients (and those external to [facility]) who have had bariatric surgery and are at least 3 months post-surgery.  WHEN: This support group meets the 4th Wednesday of the month from 11:00 AM - 12:00 PM virtually using Microsoft Teams.   FACILITATOR: Led by Certified Bariatric Nurse, Vicki Young RN.   TO REGISTER: Please send an email to Vicki at [email or web address] if you would like an invitation to the group and to learn about using Microsoft Teams.    2022 Meetings June 22 July 27 August 24 September 28 October 26 November 23 December 28      [facility] Healthy Lifestyle Virtual Support Group    Healthy Lifestyle Virtual Support Group?  This is 60 minutes of small group guided discussion, support and resources. All are welcome who want a healthy lifestyle.  WHEN: This group meets monthly on a Friday from 12:30 PM - 1:30 PM virtually using Microsoft Teams.   FACILITATOR: Led by National Board Certified Health and , MICHAEL [facility].   TO " "REGISTER: Please send an email to Vicki [email or web address] to receive monthly invites to the group or if you have any questions about having a health .  Prior to the meeting, a link with directions on how to join the meeting will be sent to you.    2022 Meetings  June 24: Vicki Dorman, [facility], \"Setting Limits and Boundaries\".  Jul 29: Open Forum  August 26: Guest Speaker: Jeimy Sprague, Registered Dietitian  September 30: Open Forum  October 28th: Guest Speaker: Natasha Noonan, [facility], Health , \"Gratitude Practices\".  November 18: Guest Speaker: Michelle Fung RD Registered Dietitian, \"Navigating How to Eat around the Holidays\".  December 16: Guest Speaker: Chanel Parker, [facility], \"Changing Your Relationship with Movement\".    ____________________________________________________________________________________________________________________________________________________________________________  [facility] Get Moving Program  Our team of physical therapists is trained to help you understand and take control of your condition. They will perform a thorough evaluation to determine your ability for activity and develop a customized plan to fit your goals and physical ability.  Scheduling: Unsure if the Get Moving program is right for you? Discuss the program with your medical provider or diabetes educator. You can also call us at [phone] to ask questions or schedule an appointment.   [facility] Get Moving Program  ____________________________________________________________________________________________________________________________________________________________________________  [facility] Diabetes Prevention Program (DPP)  If you have prediabetes and Medicare please contact us via [employer] to learn more about the Diabetes Prevention Program (DPP)  Program Details:   [employer] [facility] offers the year-long Diabetes Prevention Program (DPP). The program helps you to make " lifestyle changes that prevent or delay type 2 diabetes by supporting healthy eating, increased physical activity, stress reduction and use of coping skills.   On average, previous [facility] DPP cohorts have lost and maintained at least 5% of their starting weight throughout the program and averaged more than 150 minutes of physical activity per week.  Participants meet weekly for one-hour group sessions over sixteen weeks, every other week for the next 8 weeks, and monthly for the last six months.   A year-long maintenance program is also available for participants who complete the first year.   Location & Cost:   During the COVID-19 Public Health Emergency, the program is offered virtually. When in-person classes can resume, they will be held at [facility].  For people with Medicare, the program is covered in full. A self-pay option will also be available for those with non-Medicare insurance plans.   _________________________________________________________________________________________________________________________________________________________  Bluetooth Scale:    We hope to provide you with high quality virtual healthcare visits while social distancing for COVID-19 is necessary, as well as in the future when virtual visits may be more convenient for you.     Our technology team made it possible for Bluetooth scales to send weight measurements to our electronic medical record. This allows weights from you weighing at home to securely flow into the medical record, which will improve telephone and virtual visits.   Additionally, studies have shown that adults actually lose more weight when their weights are automatically sent to someone else, and also that this process is not stressful for those adults.    Below is a link for purchasing the scale, with a discount code for our patients. You may call your insurance company to see if they will reimburse you for the cost  of the scale, as a piece of durable medical equipment. The scales only go up to a weight of 400 pounds. This is an issue and we are working with the developer on increasing this. We found no scales that go over 400lb that have blue-tooth for connecting to [employer].    Scale to purchase: the Compliance 11  Body  Scale: [email or web address]=aw.ds    Discount Code: We have a discount code for our patients to bring the cost down to $50, Discount code is: [place]_Scale_20%off  _______________________________________________________________________________________________________________________________________________________________________________    To work with a Behavioral Health Psychologist:    Call to schedule:    Jose Martin Sullivan - [phone]  Amie Nance - [phone]  Thea Garcia - [phone]  Sandra Terrazas - [phone]   Mariana Espinal PhD (cannot accept Medicare) [phone]        Thank you,   [facility] Comprehensive Weight Management Team

## 2023-06-13 ASSESSMENT — PATIENT HEALTH QUESTIONNAIRE - PHQ9
SUM OF ALL RESPONSES TO PHQ QUESTIONS 1-9: 10
SUM OF ALL RESPONSES TO PHQ QUESTIONS 1-9: 10
10. IF YOU CHECKED OFF ANY PROBLEMS, HOW DIFFICULT HAVE THESE PROBLEMS MADE IT FOR YOU TO DO YOUR WORK, TAKE CARE OF THINGS AT HOME, OR GET ALONG WITH OTHER PEOPLE: VERY DIFFICULT

## 2023-06-14 ENCOUNTER — OFFICE VISIT (OUTPATIENT)
Dept: FAMILY MEDICINE | Facility: CLINIC | Age: 33
End: 2023-06-14
Payer: COMMERCIAL

## 2023-06-14 ENCOUNTER — MYC MEDICAL ADVICE (OUTPATIENT)
Dept: FAMILY MEDICINE | Facility: CLINIC | Age: 33
End: 2023-06-14

## 2023-06-14 VITALS
RESPIRATION RATE: 10 BRPM | SYSTOLIC BLOOD PRESSURE: 114 MMHG | BODY MASS INDEX: 38.68 KG/M2 | DIASTOLIC BLOOD PRESSURE: 80 MMHG | TEMPERATURE: 97.5 F | WEIGHT: 236 LBS | HEART RATE: 107 BPM | OXYGEN SATURATION: 97 %

## 2023-06-14 DIAGNOSIS — E66.812 CLASS 2 SEVERE OBESITY WITH SERIOUS COMORBIDITY AND BODY MASS INDEX (BMI) OF 38.0 TO 38.9 IN ADULT, UNSPECIFIED OBESITY TYPE (H): ICD-10-CM

## 2023-06-14 DIAGNOSIS — D07.1 VIN III (VULVAR INTRAEPITHELIAL NEOPLASIA III): ICD-10-CM

## 2023-06-14 DIAGNOSIS — Z12.4 CERVICAL CANCER SCREENING: ICD-10-CM

## 2023-06-14 DIAGNOSIS — R30.0 DYSURIA: ICD-10-CM

## 2023-06-14 DIAGNOSIS — E66.01 CLASS 2 SEVERE OBESITY WITH SERIOUS COMORBIDITY AND BODY MASS INDEX (BMI) OF 38.0 TO 38.9 IN ADULT, UNSPECIFIED OBESITY TYPE (H): ICD-10-CM

## 2023-06-14 DIAGNOSIS — F17.200 TOBACCO USE DISORDER: ICD-10-CM

## 2023-06-14 DIAGNOSIS — L03.116 CELLULITIS OF LEFT LOWER EXTREMITY: Primary | ICD-10-CM

## 2023-06-14 LAB
ALBUMIN SERPL BCG-MCNC: 4.2 G/DL (ref 3.5–5.2)
ALBUMIN UR-MCNC: NEGATIVE MG/DL
ALP SERPL-CCNC: 82 U/L (ref 35–104)
ALT SERPL W P-5'-P-CCNC: 32 U/L (ref 0–50)
ANION GAP SERPL CALCULATED.3IONS-SCNC: 11 MMOL/L (ref 7–15)
APPEARANCE UR: ABNORMAL
AST SERPL W P-5'-P-CCNC: 26 U/L (ref 0–45)
BILIRUB SERPL-MCNC: 0.3 MG/DL
BILIRUB UR QL STRIP: NEGATIVE
BUN SERPL-MCNC: 8.3 MG/DL (ref 6–20)
CALCIUM SERPL-MCNC: 9.1 MG/DL (ref 8.6–10)
CHLORIDE SERPL-SCNC: 103 MMOL/L (ref 98–107)
CHOLEST SERPL-MCNC: 208 MG/DL
CLUE CELLS: ABNORMAL
COLOR UR AUTO: ABNORMAL
CREAT SERPL-MCNC: 0.69 MG/DL (ref 0.51–0.95)
DEPRECATED HCO3 PLAS-SCNC: 26 MMOL/L (ref 22–29)
ERYTHROCYTE [DISTWIDTH] IN BLOOD BY AUTOMATED COUNT: 13.2 % (ref 10–15)
GFR SERPL CREATININE-BSD FRML MDRD: >90 ML/MIN/1.73M2
GLUCOSE SERPL-MCNC: 102 MG/DL (ref 70–99)
GLUCOSE UR STRIP-MCNC: NEGATIVE MG/DL
HBA1C MFR BLD: 5.5 %
HCT VFR BLD AUTO: 44.6 % (ref 35–47)
HDLC SERPL-MCNC: 50 MG/DL
HGB BLD-MCNC: 14.8 G/DL (ref 11.7–15.7)
HGB UR QL STRIP: NEGATIVE
HYALINE CASTS: 1 /LPF
KETONES UR STRIP-MCNC: NEGATIVE MG/DL
LDLC SERPL CALC-MCNC: 113 MG/DL
LEUKOCYTE ESTERASE UR QL STRIP: NEGATIVE
MCH RBC QN AUTO: 30.8 PG (ref 26.5–33)
MCHC RBC AUTO-ENTMCNC: 33.2 G/DL (ref 31.5–36.5)
MCV RBC AUTO: 93 FL (ref 78–100)
MUCOUS THREADS #/AREA URNS LPF: PRESENT /LPF
NITRATE UR QL: NEGATIVE
NONHDLC SERPL-MCNC: 158 MG/DL
PH UR STRIP: 5 [PH] (ref 5–7)
PLATELET # BLD AUTO: 300 10E3/UL (ref 150–450)
POTASSIUM SERPL-SCNC: 4 MMOL/L (ref 3.4–5.3)
PROT SERPL-MCNC: 6.6 G/DL (ref 6.4–8.3)
PTH-INTACT SERPL-MCNC: 39 PG/ML (ref 15–65)
RBC # BLD AUTO: 4.8 10E6/UL (ref 3.8–5.2)
RBC URINE: <1 /HPF
SODIUM SERPL-SCNC: 140 MMOL/L (ref 136–145)
SP GR UR STRIP: 1.03 (ref 1–1.03)
SQUAMOUS EPITHELIAL: 5 /HPF
TRICHOMONAS, WET PREP: ABNORMAL
TRIGL SERPL-MCNC: 225 MG/DL
UROBILINOGEN UR STRIP-MCNC: NORMAL MG/DL
WBC # BLD AUTO: 8.1 10E3/UL (ref 4–11)
WBC URINE: 0 /HPF
WBC'S/HIGH POWER FIELD, WET PREP: ABNORMAL
YEAST, WET PREP: ABNORMAL

## 2023-06-14 PROCEDURE — 87624 HPV HI-RISK TYP POOLED RSLT: CPT | Performed by: STUDENT IN AN ORGANIZED HEALTH CARE EDUCATION/TRAINING PROGRAM

## 2023-06-14 PROCEDURE — 83036 HEMOGLOBIN GLYCOSYLATED A1C: CPT | Performed by: STUDENT IN AN ORGANIZED HEALTH CARE EDUCATION/TRAINING PROGRAM

## 2023-06-14 PROCEDURE — 83970 ASSAY OF PARATHORMONE: CPT | Performed by: STUDENT IN AN ORGANIZED HEALTH CARE EDUCATION/TRAINING PROGRAM

## 2023-06-14 PROCEDURE — 80053 COMPREHEN METABOLIC PANEL: CPT | Performed by: STUDENT IN AN ORGANIZED HEALTH CARE EDUCATION/TRAINING PROGRAM

## 2023-06-14 PROCEDURE — 87210 SMEAR WET MOUNT SALINE/INK: CPT | Performed by: STUDENT IN AN ORGANIZED HEALTH CARE EDUCATION/TRAINING PROGRAM

## 2023-06-14 PROCEDURE — 99214 OFFICE O/P EST MOD 30 MIN: CPT | Performed by: STUDENT IN AN ORGANIZED HEALTH CARE EDUCATION/TRAINING PROGRAM

## 2023-06-14 PROCEDURE — 87591 N.GONORRHOEAE DNA AMP PROB: CPT | Performed by: STUDENT IN AN ORGANIZED HEALTH CARE EDUCATION/TRAINING PROGRAM

## 2023-06-14 PROCEDURE — G0145 SCR C/V CYTO,THINLAYER,RESCR: HCPCS | Performed by: STUDENT IN AN ORGANIZED HEALTH CARE EDUCATION/TRAINING PROGRAM

## 2023-06-14 PROCEDURE — 36415 COLL VENOUS BLD VENIPUNCTURE: CPT | Performed by: STUDENT IN AN ORGANIZED HEALTH CARE EDUCATION/TRAINING PROGRAM

## 2023-06-14 PROCEDURE — 81001 URINALYSIS AUTO W/SCOPE: CPT | Performed by: STUDENT IN AN ORGANIZED HEALTH CARE EDUCATION/TRAINING PROGRAM

## 2023-06-14 PROCEDURE — 85027 COMPLETE CBC AUTOMATED: CPT | Performed by: STUDENT IN AN ORGANIZED HEALTH CARE EDUCATION/TRAINING PROGRAM

## 2023-06-14 PROCEDURE — 80061 LIPID PANEL: CPT | Performed by: STUDENT IN AN ORGANIZED HEALTH CARE EDUCATION/TRAINING PROGRAM

## 2023-06-14 PROCEDURE — 82306 VITAMIN D 25 HYDROXY: CPT | Performed by: STUDENT IN AN ORGANIZED HEALTH CARE EDUCATION/TRAINING PROGRAM

## 2023-06-14 PROCEDURE — 87491 CHLMYD TRACH DNA AMP PROBE: CPT | Performed by: STUDENT IN AN ORGANIZED HEALTH CARE EDUCATION/TRAINING PROGRAM

## 2023-06-14 RX ORDER — FLUCONAZOLE 150 MG/1
150 TABLET ORAL ONCE
Qty: 1 TABLET | Refills: 0 | Status: SHIPPED | OUTPATIENT
Start: 2023-06-14 | End: 2023-06-14

## 2023-06-14 NOTE — PROGRESS NOTES
Assessment & Plan     Cellulitis of left lower extremity  Concern for infected skin lesion with surrounding redness and warmth.  Did not complete antibiotics after involvement of dog bite.  Where she was admitted actually healed well.  Abrasion on lower leg concerning but no known bite.  Plan to treat as noted below.  Augmentin due to potential bite source but does have distant history of MRSA.  She will let me know if things not improving.  Plan for fluconazole given history of yeast infections with antibiotics in the past.  - amoxicillin-clavulanate (AUGMENTIN) 875-125 MG tablet  Dispense: 14 tablet; Refill: 0  - fluconazole (DIFLUCAN) 150 MG tablet  Dispense: 1 tablet; Refill: 0    Cervical cancer screening  Plan for repeat Pap given patient's history of abnormal Paps in the past.  Encourage smoking cessation.  - Pap Screen with HPV - recommended age 30 - 65 years  - HPV Hold (Lab Only)  - HPV High Risk Types DNA Cervical    Dysuria  No evidence of vaginitis or UTI today.  Await STD testing  - Wet prep - lab collect  - UA Macroscopic with reflex to Microscopic and Culture  - UA Macroscopic with reflex to Microscopic and Culture  - Wet prep - lab collect  - Chlamydia trachomatis PCR - Clinic Collect  - Neisseria gonorrhoeae PCR - Clinic Collect    TINO III (vulvar intraepithelial neoplasia III)  Patient establishing with OB.  No visual evidence of recurrent lesion at this time.    Class 2 severe obesity with serious comorbidity and body mass index (BMI) of 38.0 to 38.9 in adult, unspecified obesity type (H)  Following with weight loss clinic but weights largely unchanged.  Discussion regarding cardiac risk factors and plan to control these going forward.  Most importantly being her smoking.  Patient asymptomatic and we did discuss utility of coronary artery disease screening with the mainstay being risk factor control.  She will work on weight loss and smoking cessation.  Labs as noted below.  - CBC with  "platelets  - Comprehensive metabolic panel  - Hemoglobin A1c  - Lipid panel reflex to direct LDL Fasting  - Parathyroid Hormone Intact  - Vitamin D Deficiency           MED REC REQUIRED  Post Medication Reconciliation Status:  Discharge medications reconciled and changed, see notes/orders    BMI:   Estimated body mass index is 38.68 kg/m  as calculated from the following:    Height as of 5/25/23: 1.664 m (5' 5.5\").    Weight as of this encounter: 107 kg (236 lb).   Weight management plan: Discussed healthy diet and exercise guidelines    Depression Screening Follow Up        6/13/2023     3:40 PM   PHQ   PHQ-9 Total Score 10   Q9: Thoughts of better off dead/self-harm past 2 weeks Not at all         6/13/2023     3:40 PM   Last PHQ-9   1.  Little interest or pleasure in doing things 1   2.  Feeling down, depressed, or hopeless 1   3.  Trouble falling or staying asleep, or sleeping too much 2   4.  Feeling tired or having little energy 1   5.  Poor appetite or overeating 0   6.  Feeling bad about yourself 2   7.  Trouble concentrating 1   8.  Moving slowly or restless 2   Q9: Thoughts of better off dead/self-harm past 2 weeks 0   PHQ-9 Total Score 10       Follow Up Actions Taken      Obed Santiago MD  [facility]    Colleen Urena is a 32 year old, presenting for the following health issues:  Follow up  (Endoscopy /)        6/14/2023     2:06 PM   Additional Questions   Roomed by Kate FRYE     Wants labs drawn, thinks she has a UTI, wants to be checked for CAD as it is in family. Following up on Endoscopy. Thinks she has something in her knee, fell on glass. Sores on skin.       Review of Systems   Constitutional, HEENT, cardiovascular, pulmonary, gi and gu systems are negative, except as otherwise noted.      Objective    /80   Pulse 107   Temp 97.5  F (36.4  C)   Resp 10   Wt 107 kg (236 lb)   LMP 06/09/2023   SpO2 97%   BMI 38.68 kg/m    Body mass index is " 38.68 kg/m .  Physical Exam   GENERAL: healthy, alert and no distress  EYES: Eyes grossly normal to inspection, PERRL and conjunctivae and sclerae normal  HENT: ear canals and TM's normal, nose and mouth without ulcers or lesions  NECK: no adenopathy, no asymmetry, masses, or scars and thyroid normal to palpation  RESP: lungs clear to auscultation - no rales, rhonchi or wheezes  CV: regular rate and rhythm, normal S1 S2, no S3 or S4, no murmur, click or rub, no peripheral edema and peripheral pulses strong  Urogenital: No cervical lesions noted with normal vaginal mucosa  ABDOMEN: soft, nontender, no hepatosplenomegaly, no masses and bowel sounds normal  MS: no gross musculoskeletal defects noted, no edema  SKIN: Healed lesion on finger hand and lower leg, lower leg with erythema and apparent drainage  NEURO: mentation intact and speech normal  PSYCH: mentation appears normal, affect normal/bright    Chaperone during pelvic exam    Answers for HPI/ROS submitted by the patient on 6/13/2023  If you checked off any problems, how difficult have these problems made it for you to do your work, take care of things at home, or get along with other people?: Very difficult  PHQ9 TOTAL SCORE: 10

## 2023-06-15 LAB
C TRACH DNA SPEC QL NAA+PROBE: NEGATIVE
DEPRECATED CALCIDIOL+CALCIFEROL SERPL-MC: 28 UG/L (ref 20–75)
N GONORRHOEA DNA SPEC QL NAA+PROBE: NEGATIVE

## 2023-06-16 LAB
BKR LAB AP GYN ADEQUACY: NORMAL
BKR LAB AP GYN INTERPRETATION: NORMAL
BKR LAB AP HPV REFLEX: NORMAL
BKR LAB AP PREVIOUS ABNL DX: NORMAL
BKR LAB AP PREVIOUS ABNORMAL: NORMAL
PATH REPORT.COMMENTS IMP SPEC: NORMAL
PATH REPORT.COMMENTS IMP SPEC: NORMAL
PATH REPORT.RELEVANT HX SPEC: NORMAL

## 2023-07-03 ENCOUNTER — NURSE TRIAGE (OUTPATIENT)
Dept: NURSING | Facility: CLINIC | Age: 33
End: 2023-07-03
Payer: COMMERCIAL

## 2023-07-03 ENCOUNTER — HOSPITAL ENCOUNTER (EMERGENCY)
Facility: CLINIC | Age: 33
Discharge: HOME | End: 2023-07-03
Attending: PHYSICIAN ASSISTANT | Admitting: PHYSICIAN ASSISTANT
Payer: COMMERCIAL

## 2023-07-03 VITALS
WEIGHT: 236 LBS | TEMPERATURE: 98.1 F | SYSTOLIC BLOOD PRESSURE: 128 MMHG | BODY MASS INDEX: 38.68 KG/M2 | RESPIRATION RATE: 14 BRPM | OXYGEN SATURATION: 99 % | DIASTOLIC BLOOD PRESSURE: 78 MMHG | HEART RATE: 88 BPM

## 2023-07-03 DIAGNOSIS — N75.0 BARTHOLIN GLAND CYST: ICD-10-CM

## 2023-07-03 PROCEDURE — 99283 EMERGENCY DEPT VISIT LOW MDM: CPT | Performed by: PHYSICIAN ASSISTANT

## 2023-07-03 RX ORDER — OXYCODONE HYDROCHLORIDE 5 MG/1
5 TABLET ORAL EVERY 6 HOURS PRN
Qty: 8 TABLET | Refills: 0 | Status: SHIPPED | OUTPATIENT
Start: 2023-07-03 | End: 2023-07-06

## 2023-07-03 ASSESSMENT — ACTIVITIES OF DAILY LIVING (ADL): ADLS_ACUITY_SCORE: 33

## 2023-07-03 NOTE — TELEPHONE ENCOUNTER
Patient reporting severe vaginal pain and can feel cyst inside right side.    Patient had glands removed in 2021/2022.    Disposition to see provider within four hours and patient verbalizes understanding and agres with plan.    Lilia Rojas RN  [facility]      Reason for Disposition    [1] SEVERE pain AND [2] not improved 2 hours after pain medicine    Additional Information    Negative: Sounds like a life-threatening emergency to the triager    Negative: Patient sounds very sick or weak to the triager    Protocols used: VAGINAL SYMPTOMS-A-AH

## 2023-07-04 NOTE — ED PROVIDER NOTES
History     Chief Complaint   Patient presents with     Cyst     HPI  Ayanna Davidson is a 32 year old female with a history of multiple Bartholin cyst incision/drainage as well as surgical treatment by OB/GYN who presents for evaluation of an acute flare of her symptoms developing over the past 2 days.  No trauma to the area.  Denies any drainage.  Pain is currently rated 8 on a scale of 10.  Has attempted ibuprofen and Tylenol for pain with limited success.  No vaginal bleeding or change in vaginal discharge.  Denies any dysuria, frequency, urgency, flank pain, or gross hematuria.  No nausea or vomiting.  No fevers or chills.  States this is the exact same pain that she has had with her flares in the past.        Allergies:  Allergies   Allergen Reactions     No Known Allergies        Problem List:    Patient Active Problem List    Diagnosis Date Noted     Anxiety 03/15/2023     Priority: Medium     Class 2 severe obesity with serious comorbidity and body mass index (BMI) of 38.0 to 38.9 in adult, unspecified obesity type (H) 08/18/2022     Priority: Medium     Open wound 03/23/2020     Priority: Medium     TINO III (vulvar intraepithelial neoplasia III) 02/26/2020     Priority: Medium     Added automatically from request for surgery [id number]       Preoperative examination 01/28/2020     Priority: Medium     Added automatically from request for surgery [id number]       Bartholin's gland abscess 01/28/2020     Priority: Medium     Added automatically from request for surgery [id number]       Chronic pain in right shoulder 01/21/2020     Priority: Medium     Dog bite of right lower leg 09/27/2019     Priority: Medium     Cellulitis of left upper extremity 09/27/2019     Priority: Medium     Current every day smoker 09/27/2019     Priority: Medium     Gastroesophageal reflux disease without esophagitis 09/27/2019     Priority: Medium     Skin infection 09/27/2019     Priority: Medium     Dog bite of left upper extremity  "05/07/2019     Priority: Medium     Cervical high risk HPV (human papillomavirus) test positive 03/12/2019     Priority: Medium     4/2016 NIL pap. No prior HPV testing.   3/12/19 NIL pap, + HR HPV (not 16 or 18). Plan: cotest in 1 yr  1/29/20 NIL pap, + HR HPV (not 16 or 18). Plan: [place]  2/11/20 [place] bx: neg. Plan: Cotest in 1 yr.   2/18/20 Vulvar biopsy: \"TINO III; severe dysplasia\" possible involvement of margins  3/11/20 Re excision of vulva.   3/23/21 NIL pap, + HR HPV (not 16 or 18). Plan: colp  6/22/21 Gyn visit - [place] recommended, Patient refused  6/28/21 GynOnc visit plan - HPV HR+: \"Repeat HPV-based screening in 1 year based upon ASCCP\", Due 3/23/22  5/6/22 Lost to follow up  8/18/22 NIL pap, Neg HPV.  Plan: cotest in 1 year  6/16/23 NIL Pap, Neg HR HPV. Plan: cotest in 3 years.        Closed fracture dislocation of hip joint, left, initial encounter (H) 09/28/2018     Priority: Medium     Vulvar abscess 08/16/2017     Priority: Medium     ADD (attention deficit disorder) without hyperactivity 04/18/2014     Priority: Medium     Drug-induced mental disorder (H) 09/20/2012     Priority: Medium     Overview:   ICD-10 Regulatory Update       Depressed 05/10/2012     Priority: Medium     Overdose of antipsychotic 05/09/2012     Priority: Medium        Past Medical History:    Past Medical History:   Diagnosis Date     Cervical high risk HPV (human papillomavirus) test positive 03/12/2019     Gastroesophageal reflux disease      HSV (herpes simplex virus) infection      Methamphetamine abuse (H)      recurrent Bartholin's cyst        Past Surgical History:    Past Surgical History:   Procedure Laterality Date     BIOPSY       CHOLECYSTECTOMY       ENT SURGERY       ESOPHAGOSCOPY, GASTROSCOPY, DUODENOSCOPY (EGD), COMBINED N/A 4/12/2023    Procedure: Esophagoscopy, gastroscopy, duodenoscopy (EGD), combined;  Surgeon: Thierno Escobar MD;  Location: [facility]     EXAM UNDER ANESTHESIA PELVIC N/A 01/29/2020    " Procedure: EXAM UNDER ANESTHESIA, PELVIS, PAP;  Surgeon: Froylan Schwarz MD;  Location: [facility]     EXCISE VULVA WIDE LOCAL Bilateral 03/11/2020    Procedure: Right Vulva Wedge Resection and Incision and Drainage Left Vulva;  Surgeon: Froylan Schwarz MD;  Location: [facility]     INCISION AND DRAINAGE ABSCESS PELVIS, COMBINED N/A 02/26/2018    Procedure: COMBINED INCISION AND DRAINAGE ABSCESS PELVIS;  INCISION AND DRAINAGE LABIAL ABSCESS;  Surgeon: Jase Beach MD;  Location: [facility]     INCISION AND DRAINAGE ABSCESS PELVIS, COMBINED N/A 03/05/2019    Procedure: Incision and Drainage Right Labial Abscess;  Surgeon: Jase Beach MD;  Location: [facility]     INCISION AND DRAINAGE LOWER EXTREMITY, COMBINED Right 08/11/2019    Procedure: irrigation and debridement right leg dog bite;  Surgeon: Rommel Pérez MD;  Location: [facility]     LAP ADJUSTABLE GASTRIC BAND       LEEP TX, CERVICAL       MAMMOPLASTY REDUCTION BILATERAL       MARSUPIALIZATION BARTHOLIN CYST N/A 04/29/2019    Procedure: Bartholin's Cyst removal;  Surgeon: Froylan Schwarz MD;  [facility]: [facility]     MARSUPIALIZATION BARTHOLIN CYST Left 01/29/2020    Procedure: Excision of left bartholin's abscess;  Surgeon: Froylan Schwarz MD;  Location: [facility]     ORTHOPEDIC SURGERY         Family History:    Family History   Problem Relation Age of Onset     Heart Disease Father      Coronary Artery Disease Father      Hypertension Father      Hyperlipidemia Father      Mental Illness Father      Substance Abuse Father      Diabetes Maternal Grandmother      Breast Cancer Paternal Grandmother      Testicular cancer Paternal Grandfather      Thyroid Disease Mother      Prostate Cancer Maternal Grandfather        Social History:  Marital Status:  Single [1]  Social History     Tobacco Use     Smoking status: Every Day     Packs/day: 0.50     Years: 10.00     Pack years: 5.00     Types: Cigarettes     Smokeless tobacco: Current     Tobacco  comments:     uses E cig   Vaping Use     Vaping Use: Every day   Substance Use Topics     Alcohol use: Not Currently     Drug use: Not Currently     Comment: In treatment for meth.        Medications:    oxyCODONE (ROXICODONE) 5 MG tablet  gabapentin (NEURONTIN) 100 MG capsule  hydrOXYzine (ATARAX) 25 MG tablet  Multiple Vitamins-Minerals (MULTIVITAMIN ADULT PO)  omeprazole (PRILOSEC OTC) 20 MG EC tablet  omeprazole (PRILOSEC) 20 MG DR capsule  Semaglutide-Weight Management (WEGOVY) 1 MG/0.5ML pen  Semaglutide-Weight Management (WEGOVY) 1.7 MG/0.75ML pen  sertraline (ZOLOFT) 50 MG tablet          Review of Systems   All other systems reviewed and are negative.      Physical Exam   BP: (!) 130/97  Pulse: 117  Temp: 98.1  F (36.7  C)  Resp: 16  Weight: 107 kg (236 lb)  SpO2: 99 %      Physical Exam  Vitals and nursing note reviewed.   Constitutional:       General: She is not in acute distress.     Appearance: She is not diaphoretic.   HENT:      Head: Normocephalic and atraumatic.      Right Ear: External ear normal.      Left Ear: External ear normal.      Nose: Nose normal.      Mouth/Throat:      Pharynx: No oropharyngeal exudate.   Eyes:      General: No scleral icterus.        Right eye: No discharge.         Left eye: No discharge.      Conjunctiva/sclera: Conjunctivae normal.      Pupils: Pupils are equal, round, and reactive to light.   Neck:      Thyroid: No thyromegaly.   Cardiovascular:      Rate and Rhythm: Normal rate and regular rhythm.      Heart sounds: Normal heart sounds. No murmur heard.  Pulmonary:      Effort: Pulmonary effort is normal. No respiratory distress.      Breath sounds: Normal breath sounds. No wheezing or rales.   Chest:      Chest wall: No tenderness.   Abdominal:      General: Bowel sounds are normal. There is no distension.      Palpations: Abdomen is soft. There is no mass.      Tenderness: There is no abdominal tenderness. There is no guarding or rebound.   Genitourinary:      Comments: GYN exam performed with RN chaperone.  She has what appears to be a 2 cm vaginal cyst likely consistent with a Bartholin cyst.  It is tender to palpation.  As part of the mid labia majora area.  There is no induration or fluctuance.  No streaking erythema.  Scarring noted in the area.  No other vaginal abnormalities noted.  Musculoskeletal:         General: No tenderness or deformity. Normal range of motion.      Cervical back: Normal range of motion and neck supple.   Lymphadenopathy:      Cervical: No cervical adenopathy.   Skin:     General: Skin is warm and dry.      Capillary Refill: Capillary refill takes less than 2 seconds.      Findings: No erythema or rash.   Neurological:      Mental Status: She is alert and oriented to person, place, and time.      Cranial Nerves: No cranial nerve deficit.   Psychiatric:         Behavior: Behavior normal.         Thought Content: Thought content normal.         ED Course                 Procedures              Critical Care time:  none               No results found for this or any previous visit (from the past 24 hour(s)).    Medications - No data to display    Assessments & Plan (with Medical Decision Making)  Bartholin gland cyst     32 year old female with a history of recurrent Bartholin gland cysts who presents for evaluation of acute flare of her condition.  Noted in the right vaginal area.  Increasing over the past 2 days.  No fever, chills, nausea, vomiting, or abdominal pain.  No vaginal discharge or bleeding.  No trauma to the area.  On exam vital signs are normal.  Blood pressure 128/78, temperature 98.1, pulse 88, respiration 14, oxygen saturation 99% on room air.  Patient with a 2 cm cyst/soft mass to the right mid labia majora region which appears to be consistent with a repeat Bartholin gland cyst versus abscess.  No other vaginal abnormality noted.  Scarring present in the area.  No streaking erythema.  Tender to palpation.  RN chaperone  present the entire time.  For acute management of her discomfort and condition, I recommended incision and drainage which would also be diagnostic to rule out abscess.  Patient stated that she wants her OB/GYN to assess her prior to doing anything on her vaginal area.  She states that she wants this surgically excised.  Discussed that we do not have OB/GYN at our hospital on-call currently to address this.  Her specialist is at the [facility].  I did discuss that if this is an abscess that this could greatly worsen to the point of even causing sepsis versus other.  Patient states that she is already on an antibiotic in the form of Augmentin.  She just started this a few days ago.  Was prescribed 13 days ago, but she stated that she had a significant delay in picking up this medication.  She has not had any fever, chills, discharge, or nausea/vomiting.  Patient requested something for pain which was her greatest concern.  She states that she will continue to do warm sits baths.  She does not want any interventions at this point despite my recommendations to proceed with this.  She is aware that she can return to the emergency department at any time if her symptoms do not improve or worsen.  She is aware that if this is infection that she is risking advancement or worsening which could include disability or even death if not treated appropriately.  Patient does not appear to be under the influence of anything currently and appears to be making alert and oriented decisions.  She was discharged with a small amount of oxycodone for severe breakthrough pain.  Proper dosing for ibuprofen and Tylenol reviewed.     I have reviewed the nursing notes.    I have reviewed the findings, diagnosis, plan and need for follow up with the patient.           Medical Decision Making  The patient's presentation was of low complexity (an acute and uncomplicated illness or injury).    The patient's evaluation involved:  history  and exam without other MDM data elements    The patient's management necessitated moderate risk (prescription drug management including medications given in the ED).        Discharge Medication List as of 7/3/2023  7:55 PM      START taking these medications    Details   oxyCODONE (ROXICODONE) 5 MG tablet Take 1 tablet (5 mg) by mouth every 6 hours as needed for pain, Disp-8 tablet, R-0, E-Prescribe             Final diagnoses:   Bartholin gland cyst       Disclaimer: This note consists of symbols derived from keyboarding, dictation and/or voice recognition software. As a result, there may be errors in the script that have gone undetected. Please consider this when interpreting information found in this chart.    7/3/2023   [facility] EMERGENCY DEPT     Francesco Prakash PA-C  07/03/23 [id number]

## 2023-07-04 NOTE — ED TRIAGE NOTES
Pt presents with reoccurrence of bartholin cyst. Pt states vaginal pain for two days.       Triage Assessment     Row Name 07/03/23 1901       Triage Assessment (Adult)    Airway WDL WDL       Respiratory WDL    Respiratory WDL WDL       Skin Circulation/Temperature WDL    Skin Circulation/Temperature WDL WDL       Cardiac WDL    Cardiac WDL WDL       Peripheral/Neurovascular WDL    Peripheral Neurovascular WDL WDL       Cognitive/Neuro/Behavioral WDL    Cognitive/Neuro/Behavioral WDL WDL

## 2023-07-04 NOTE — DISCHARGE INSTRUCTIONS
It was a pleasure working with you today!  I hope your condition improves rapidly!      I respect your desire to reach out to your OB/GYN specialist first prior to proceeding with any interventional treatment of your condition.  As we discussed, we have the capability of performing incision and drainage of the cyst, which was recommended to be done today.  There is the potential for infection in the cyst, which could significantly worsen and lead to sepsis which is generalized body infection. We would have been able to determine this today if we proceeded with incision and drainage.  Please return the emergency department immediately if you develop a fever of 100.5 or higher, development of significant redness of your vaginal area, or pus drainage from the area.  Continue doing your warm water soaks.  It is okay to use ibuprofen 600 mg every 6 hours as needed for inflammation and pain.  You can also use Tylenol 1000 mg every 6 hours as needed for pain.  Reserve the oxycodone for severe pain.  Reach out to your OB/GYN specialist immediately.  You are always welcome back at the emergency department for incision and drainage if you decide to proceed with that.  Finish taking your current antibiotic.

## 2023-07-06 ENCOUNTER — PATIENT OUTREACH (OUTPATIENT)
Dept: ONCOLOGY | Facility: CLINIC | Age: 33
End: 2023-07-06
Payer: COMMERCIAL

## 2023-07-06 NOTE — PROGRESS NOTES
"[facility]: Cancer Care Note                                    Situation:                                                      Received the following staff message from cancer center scheduling team on 7/5/23:    Patient called today wanting to see if Dr. Adams had any openings to be seen. She is concerned about a new cyst. Please connect with her at your convenience, thank you!!     Background:                                                      Patient met with Dr. Adams on 6/28/2021 for bartholin gland cyst/abscess, and history of TINO III.  Per notes from Dr. Adams at that time:    \"I would recommend excision if she continues with recurrent infections to ensure no evidence of malignancy within the gland and to try to decrease the incidence of these. This is best accomplished while a cyst is present. She is disinclined towards surgery at this time, thus I would recommend she call us if she has another cyst and would like excision.\"    Assessment:                                                      Chart review was completed.  Patient was seen in the [facility] ED on 7/3/23.  A GYN exam was performed, with the following findings:    \"She has what appears to be a 2 cm vaginal cyst likely consistent with a Bartholin cyst.  It is tender to palpation.  As part of the mid labia majora area.  There is no induration or fluctuance.  No streaking erythema.  Scarring noted in the area.  No other vaginal abnormalities noted.\"    Per ED provider notes, I&D was recommended to rule out for abscess.  However, patient stated that she wants OB/GYN assessment prior to doing anything to the vaginal area, and would prefer to have the area surgically excised if possible.  Patient also reported that she is already on an antibiotic (Augmentin).    Patient was discharged from the ED with a small amount of oxycodone for breakthrough pain, and was instructed to continue alternating Tylenol with " ibuprofen.    Plan:                                                       Triage team reviewed with Christina Andre CNP, Sophie Durham CNP, and Dr. Thurman who was covering in Dr. Adams's absence.  Dr. Thurman advised this is not an emergent situation, and that patient can be seen next week - or, patient could see her OB/GYN for this.    Triage team informed writer that they had attempted calling patient x 2 on 7/5/23 with no answer, and no voicemail available to leave a message.  They also sent patient a whoactually message on 7/5/23 with appointment options, which patient has not reviewed yet.      Noted Dr. Adams has openings at the [facility] on Wednesday next week (7/12 at 3:30 pm - spot held for patient), as well as the following week (7/19).  No openings in [place] for several weeks.  Scheduling team was tasked to call patient and offer her one of these appointments to be evaluated, if she is interested.      Lamin Ricardo, RN, BSN, OCN  RN Care Coordinator - Oncology  [facility]

## 2023-07-07 ENCOUNTER — TELEPHONE (OUTPATIENT)
Dept: SURGERY | Facility: CLINIC | Age: 33
End: 2023-07-07
Payer: COMMERCIAL

## 2023-07-07 ENCOUNTER — MYC MEDICAL ADVICE (OUTPATIENT)
Dept: FAMILY MEDICINE | Facility: CLINIC | Age: 33
End: 2023-07-07
Payer: COMMERCIAL

## 2023-07-07 ENCOUNTER — PATIENT OUTREACH (OUTPATIENT)
Dept: FAMILY MEDICINE | Facility: CLINIC | Age: 33
End: 2023-07-07
Payer: COMMERCIAL

## 2023-07-07 DIAGNOSIS — R87.810 CERVICAL HIGH RISK HPV (HUMAN PAPILLOMAVIRUS) TEST POSITIVE: ICD-10-CM

## 2023-07-07 NOTE — TELEPHONE ENCOUNTER
6/14/23 NIL Pap, Neg HR HPV. Plan: cotest in 1 year and establish with OB/GYN provider, per Dr. Santiago.

## 2023-07-07 NOTE — TELEPHONE ENCOUNTER
Gynecology Oncology Attending Provider Note  Established patient of mine.    Attempted to reach out to patient but got voicemail. Briefly, Ayanna is a 33 year old with history of multiply recurrent Bartholin's gland abscesses including history of excision of the Bartholin's glad with recurrent abscess subsequently.   I previously saw her as a new patient and we discussed doing repeat excision but at the time I saw her it wasn't clear where the affected area was exactly given multiple prior excisions. We discussed re-evaluation if she developed any recurrence. She recently was seen locally for repeat vulvar abscess/Bartholin's gland abscess. There wasn't an available Ob/gyn for consult at that time. She elected to go home with conservative management. I am unable to see her until 7/12, which she is scheduled for. Despite the fact that this is generally a benign gynecology issue, due to her complex history of multiple infections and procedures, she was referred to Gyn Onc for management. I have offered her prescription for pain medication if needed. If pain is too severe or she has any fevers or feels unwell, I asked her to come to the [facility] so our team could evaluate the best person to care for her.    Tyrell Adams MD

## 2023-07-10 ENCOUNTER — MYC REFILL (OUTPATIENT)
Dept: FAMILY MEDICINE | Facility: CLINIC | Age: 33
End: 2023-07-10
Payer: COMMERCIAL

## 2023-07-10 DIAGNOSIS — R52 ACUTE PAIN: Primary | ICD-10-CM

## 2023-07-10 RX ORDER — OXYCODONE HYDROCHLORIDE 5 MG/1
5 TABLET ORAL EVERY 6 HOURS PRN
Qty: 12 TABLET | Refills: 0 | Status: SHIPPED | OUTPATIENT
Start: 2023-07-10 | End: 2023-07-13

## 2023-07-12 ENCOUNTER — ONCOLOGY VISIT (OUTPATIENT)
Dept: ONCOLOGY | Facility: CLINIC | Age: 33
End: 2023-07-12
Attending: OBSTETRICS & GYNECOLOGY
Payer: COMMERCIAL

## 2023-07-12 VITALS
OXYGEN SATURATION: 99 % | DIASTOLIC BLOOD PRESSURE: 86 MMHG | TEMPERATURE: 98.1 F | WEIGHT: 240.1 LBS | HEART RATE: 101 BPM | BODY MASS INDEX: 39.35 KG/M2 | SYSTOLIC BLOOD PRESSURE: 130 MMHG

## 2023-07-12 DIAGNOSIS — N75.1 ABSCESS OF BARTHOLIN'S GLAND: Primary | ICD-10-CM

## 2023-07-12 DIAGNOSIS — K21.00 GASTROESOPHAGEAL REFLUX DISEASE WITH ESOPHAGITIS WITHOUT HEMORRHAGE: ICD-10-CM

## 2023-07-12 PROCEDURE — 99214 OFFICE O/P EST MOD 30 MIN: CPT | Mod: 57 | Performed by: OBSTETRICS & GYNECOLOGY

## 2023-07-12 PROCEDURE — G0463 HOSPITAL OUTPT CLINIC VISIT: HCPCS | Performed by: OBSTETRICS & GYNECOLOGY

## 2023-07-12 RX ORDER — CEFAZOLIN SODIUM 2 G/50ML
2 SOLUTION INTRAVENOUS
Status: CANCELLED | OUTPATIENT
Start: 2023-07-12

## 2023-07-12 RX ORDER — OMEPRAZOLE 20 MG/1
20 TABLET, DELAYED RELEASE ORAL DAILY
Qty: 30 TABLET | Refills: 3 | Status: SHIPPED | OUTPATIENT
Start: 2023-07-12 | End: 2024-01-03

## 2023-07-12 RX ORDER — METRONIDAZOLE 500 MG/100ML
500 INJECTION, SOLUTION INTRAVENOUS
Status: CANCELLED | OUTPATIENT
Start: 2023-07-12

## 2023-07-12 RX ORDER — CEFAZOLIN SODIUM 2 G/50ML
2 SOLUTION INTRAVENOUS SEE ADMIN INSTRUCTIONS
Status: CANCELLED | OUTPATIENT
Start: 2023-07-12

## 2023-07-12 ASSESSMENT — PAIN SCALES - GENERAL: PAINLEVEL: SEVERE PAIN (6)

## 2023-07-12 NOTE — NURSING NOTE
"Oncology Rooming Note    July 12, 2023 3:11 PM   Ayanna Davidson is a 33 year old female who presents for:    Chief Complaint   Patient presents with     Oncology Clinic Visit     TINO III (vulvar intraepithelial neoplasia III)     Initial Vitals: /86 (BP Location: Right arm, Patient Position: Sitting, Cuff Size: Adult Large)   Pulse 101   Temp 98.1  F (36.7  C) (Oral)   Wt 108.9 kg (240 lb 1.6 oz)   LMP 06/09/2023   SpO2 99%   BMI 39.35 kg/m   Estimated body mass index is 39.35 kg/m  as calculated from the following:    Height as of 5/25/23: 1.664 m (5' 5.5\").    Weight as of this encounter: 108.9 kg (240 lb 1.6 oz). Body surface area is 2.24 meters squared.  Severe Pain (6) Comment: right side vagina, back pain also, some abdomenal pain. At a 6 even on medication.   Patient's last menstrual period was 06/09/2023.  Allergies reviewed: Yes  Medications reviewed: Yes    Medications: MEDICATION REFILLS NEEDED TODAY. Provider was NOT notified.  Pharmacy name entered into [facility]:    [facility] [place], [place] - [address] [place] [address] INPATIENT RX - [address] [facility] [address]  [facility] - [place], [place] - [address] [address]  [facility] [id number] [place] [facility] [address] [place] [address] [address] [place], [place] - [address] [facility]  [facility] - [facility] [address] Count includes the [facility] [address]    Clinical concerns: Pain in right side vagina, back pain also, some abdomenal pain. Pain level at a 6 even on medication.    Pt requests refill of Omeprozole.      Pam Pina, EMT on 7/12/2023 at 3:13 PM            "

## 2023-07-12 NOTE — PROGRESS NOTES
"Gynecologic Oncology Clinic - Established Patient    Referring provider:    Jase Man MD  [address] [facility] [place] [address]    Patient: Ayanna Davidson  : 1990    Date of Visit: 2023     Chief Complaint: recurrent Bartholin's gland cyst/abscess, history of TINO III    History of Present Illness:  Ayanna Davidson is a 30 year old G0 pre-menopausal female with medical history notable for everyday smoker, history of TINO III, history of cervical HR HPV + (no history of high grade dysplasia) and recurrent vulvar abscesses.     In 2020 she had WLE of the right vulva that showed TINO III with positive/close margins. Repeat excision gave negative margins. At that same time she had I&D of left vulvar/Bartholin's abscess. She has prior history of surgery to the right Bartholin's gland - initially marsupialization and then on 2019 she underwent right Bartholin's gland excision. Per the operative note (under Encounters from same date) the abscess/cyst wall was removed in fragments. Due to her history of TINO III and multiple surgeries, she was referred here for further recommendations. She saw Dr. Adams on 21, but did not have a cyst at the time and therefore excision was deferred.    She presents today with another abscess, this time on the right side. She first noticed it on . She denies fevers/chills but gets hot from the pain. She did notice drainage, so now the cyst is decreasing in size. It was roughly baseball sized at its biggest but is now golf ball sized. She presented to the ED on 7/3, at which time the physical exam reports \"2 cm vaginal cyst likely consistent with Bartholin cyst.\" She does 2-3 Sitz baths a day.    She recently completed a course of Augmentin prescribed by Dr. Sofia ([facility]) for a LLE cellulitis, as well as a dose of Diflucan for hx of yeast infections with antibiotics.    Past Medical History  Past Medical History:   Diagnosis " Date     Cervical high risk HPV (human papillomavirus) test positive 03/12/2019    last PAP NIL (4/16), remote hx of LEEP     Gastroesophageal reflux disease      HSV (herpes simplex virus) infection     1 & 2     Methamphetamine abuse (H)     reports daily use     recurrent Bartholin's cyst        Past Surgical History  Past Surgical History:   Procedure Laterality Date     BIOPSY       CHOLECYSTECTOMY       ENT SURGERY       ESOPHAGOSCOPY, GASTROSCOPY, DUODENOSCOPY (EGD), COMBINED N/A 4/12/2023    Procedure: Esophagoscopy, gastroscopy, duodenoscopy (EGD), combined;  Surgeon: Thierno Escobar MD;  Location: [facility]     EXAM UNDER ANESTHESIA PELVIC N/A 01/29/2020    Procedure: EXAM UNDER ANESTHESIA, PELVIS, PAP;  Surgeon: Froylan Schwarz MD;  [facility]     EXCISE VULVA WIDE LOCAL Bilateral 03/11/2020    Procedure: Right Vulva Wedge Resection and Incision and Drainage Left Vulva;  Surgeon: Froylan Schwarz MD;  [facility]     INCISION AND DRAINAGE ABSCESS PELVIS, COMBINED N/A 02/26/2018    Procedure: COMBINED INCISION AND DRAINAGE ABSCESS PELVIS;  INCISION AND DRAINAGE LABIAL ABSCESS;  Surgeon: Jase Beach MD;  [facility]     INCISION AND DRAINAGE ABSCESS PELVIS, COMBINED N/A 03/05/2019    Procedure: Incision and Drainage Right Labial Abscess;  Surgeon: Jase Beach MD;  [facility]     INCISION AND DRAINAGE LOWER EXTREMITY, COMBINED Right 08/11/2019    Procedure: irrigation and debridement right leg dog bite;  Surgeon: Rommel Pérez MD;  Location[facility]     LAP ADJUSTABLE GASTRIC BAND       LEEP TX, CERVICAL       MAMMOPLASTY REDUCTION BILATERAL       MARSUPIALIZATION BARTHOLIN CYST N/A 04/29/2019    Procedure: Bartholin's Cyst removal;  Surgeon: Froylan Schwarz MD;  [facility]     MARSUPIALIZATION BARTHOLIN CYST Left 01/29/2020    Procedure: Excision of left bartholin's abscess;  Surgeon: Froylan Schwarz MD;  [facility]     ORTHOPEDIC  SURGERY          OB/Gynecologic History:     Short, light periods. Every month. Back pain.   Pap Smear:   3/23/2021: negative/HPV other HR+  2020: Colpo: biopsy negative, ECC negative  2020: negative/HPV other HR+  3/12/2019: negative/HPV other HR+    2020: Right vulva lateral to posterior fourchette: TINO III; 3/2020: Re-excision negative margins    Social History  Social History     Tobacco Use     Smoking status: Every Day     Packs/day: 0.50     Years: 10.00     Pack years: 5.00     Types: Cigarettes     Smokeless tobacco: Current     Tobacco comments:     uses E cig   Vaping Use     Vaping Use: Every day   Substance Use Topics     Alcohol use: Not Currently     Drug use: Not Currently     Comment: In treatment for meth.       Family History  Family History   Problem Relation Age of Onset     Heart Disease Father      Coronary Artery Disease Father      Hypertension Father      Hyperlipidemia Father      Mental Illness Father      Substance Abuse Father      Diabetes Maternal Grandmother      Breast Cancer Paternal Grandmother      Testicular cancer Paternal Grandfather      Thyroid Disease Mother      Prostate Cancer Maternal Grandfather        Current Outpatient Medications   Medication Sig Dispense Refill     gabapentin (NEURONTIN) 100 MG capsule Take 1 capsule (100 mg) by mouth 3 times daily 180 capsule 3     hydrOXYzine (ATARAX) 25 MG tablet Take 1 tablet (25 mg) by mouth 3 times daily as needed for itching 30 tablet 1     Multiple Vitamins-Minerals (MULTIVITAMIN ADULT PO)        omeprazole (PRILOSEC OTC) 20 MG EC tablet Take 1 tablet (20 mg) by mouth daily 30 tablet 3     omeprazole (PRILOSEC) 20 MG DR capsule TAKE 1 CAPSULE BY MOUTH TWICE DAILY 60 capsule 11     oxyCODONE (ROXICODONE) 5 MG tablet Take 1 tablet (5 mg) by mouth every 6 hours as needed for severe pain 12 tablet 0     Semaglutide-Weight Management (WEGOVY) 1.7 MG/0.75ML pen Inject 1.7 mg Subcutaneous once a week 3 mL 3      sertraline (ZOLOFT) 50 MG tablet Take 1 tablet (50 mg) by mouth daily 90 tablet 1     Semaglutide-Weight Management (WEGOVY) 1 MG/0.5ML pen Inject 1 mg Subcutaneous once a week After completing 4 weeks of 0.5mg dose (Patient not taking: Reported on 7/12/2023) 2 mL 1        Allergies  Allergies   Allergen Reactions     No Known Allergies        Physical Exam:   /86 (BP Location: Right arm, Patient Position: Sitting, Cuff Size: Adult Large)   Pulse 101   Temp 98.1  F (36.7  C) (Oral)   Wt 108.9 kg (240 lb 1.6 oz)   LMP 06/09/2023   SpO2 99%   BMI 39.35 kg/m    General appearance: Alert and oriented, no acute distress, well groomed  CV: RRR  Lungs: CTAB  GI: Abdomen soft, non-tender, non-distended. No palpable masses  Psych: alert and oriented, appropriate affect  Genitourinary:  External: anatomically normal appearing labia majora, minora, and clitoral hidalgo. On the right labia just outside the vaginal introitus there is a tender, fluctuant 3x3cm cyst in the area of the Bartholin's gland. No surrounding erythema. No visible area of drainage.  Vagina: normal mucosa without lesions    Labs/Pathology:   Reviewed excision pathology results, no pathology from bartholin's gland excision      Imaging:   n/a    Assessment:  Ayanna Davidson is a 30 year old G0 pre-menopausal female with medical history notable for everyday smoker, history of TINO III, history of cervical HR HPV + (no history of high grade dysplasia), with history of recurrent Bartholin's gland abscesses and cysts who presents with a new Bartholin's gland abscess.    Plan:   1. Recurrent Bartholin's gland abscesses/cysts: after review of her Operative notes, she has undergone attempted right gland; however, it was unable to be removed entirely intact (cyst/abscess at the time was removed in fragments). It is possible a portion of the cyst wall/gland remains. Due to recurrence of the Bartholin's gland cyst/abscess, recommend surgical excision to try  and remove the entire Bartholin's gland. Plan to schedule within the next 1-2 days pending OR availability.  2. Consent reviewed and signed. Pre-op done today.    Geri Mckoy MD  OB/GYN, PGY-4  07/12/2023, 4:27 PM     I, Tyrell Adams MD, personally saw the patient with the resident/fellow and agree with the documentation as noted above. I personally reviewed path results and prior Op notes and performed the exam noted above.  I spent a total of 30 minutes of my own time on the care of Ayanna Davidson on the day of service including record review, face-to-face time, care coordination, and documentation.     Tyrell Adams MD   Gynecologic Oncology Attending Physician

## 2023-07-12 NOTE — LETTER
"    2023         RE: Ayanna Davidson  [address] [place] [address]        Dear Colleague,    Thank you for referring your patient, Ayanna Davidson, to the [facility]. Please see a copy of my visit note below.    Gynecologic Oncology Clinic - Established Patient    Referring provider:    Jase Man MD  [address] [place] [address]    Patient: Ayanna Davidson  : 1990    Date of Visit: 2023     Chief Complaint: recurrent Bartholin's gland cyst/abscess, history of TINO III    History of Present Illness:  Ayanna Davidson is a 30 year old G0 pre-menopausal female with medical history notable for everyday smoker, history of TINO III, history of cervical HR HPV + (no history of high grade dysplasia) and recurrent vulvar abscesses.     In 2020 she had WLE of the right vulva that showed TINO III with positive/close margins. Repeat excision gave negative margins. At that same time she had I&D of left vulvar/Bartholin's abscess. She has prior history of surgery to the right Bartholin's gland - initially marsupialization and then on 2019 she underwent right Bartholin's gland excision. Per the operative note (under Encounters from same date) the abscess/cyst wall was removed in fragments. Due to her history of TINO III and multiple surgeries, she was referred here for further recommendations. She saw Dr. Adams on 21, but did not have a cyst at the time and therefore excision was deferred.    She presents today with another abscess, this time on the right side. She first noticed it on . She denies fevers/chills but gets hot from the pain. She did notice drainage, so now the cyst is decreasing in size. It was roughly baseball sized at its biggest but is now golf ball sized. She presented to the ED on 7/3, at which time the physical exam reports \"2 cm vaginal cyst likely consistent with Bartholin cyst.\" She does 2-3 Sitz baths a " day.    She recently completed a course of Augmentin prescribed by Dr. Sofia ([facility]) for a LLE cellulitis, as well as a dose of Diflucan for hx of yeast infections with antibiotics.    Past Medical History  Past Medical History:   Diagnosis Date    Cervical high risk HPV (human papillomavirus) test positive 03/12/2019    last PAP NIL (4/16), remote hx of LEEP    Gastroesophageal reflux disease     HSV (herpes simplex virus) infection     1 & 2    Methamphetamine abuse (H)     reports daily use    recurrent Bartholin's cyst        Past Surgical History  Past Surgical History:   Procedure Laterality Date    BIOPSY      CHOLECYSTECTOMY      ENT SURGERY      ESOPHAGOSCOPY, GASTROSCOPY, DUODENOSCOPY (EGD), COMBINED N/A 4/12/2023    Procedure: Esophagoscopy, gastroscopy, duodenoscopy (EGD), combined;  Surgeon: Thierno Escobar MD;  Location: [facility]    EXAM UNDER ANESTHESIA PELVIC N/A 01/29/2020    Procedure: EXAM UNDER ANESTHESIA, PELVIS, PAP;  Surgeon: Froylan Schwarz MD;  [facility]    EXCISE VULVA WIDE LOCAL Bilateral 03/11/2020    Procedure: Right Vulva Wedge Resection and Incision and Drainage Left Vulva;  Surgeon: Froylan Schwarz MD;  [facility]    INCISION AND DRAINAGE ABSCESS PELVIS, COMBINED N/A 02/26/2018    Procedure: COMBINED INCISION AND DRAINAGE ABSCESS PELVIS;  INCISION AND DRAINAGE LABIAL ABSCESS;  Surgeon: Jase Beach MD;  [facility]    INCISION AND DRAINAGE ABSCESS PELVIS, COMBINED N/A 03/05/2019    Procedure: Incision and Drainage Right Labial Abscess;  Surgeon: Jase Beach MD;  [facility]    INCISION AND DRAINAGE LOWER EXTREMITY, COMBINED Right 08/11/2019    Procedure: irrigation and debridement right leg dog bite;  Surgeon: Rommel Pérez MD;  [facility]    LAP ADJUSTABLE GASTRIC BAND      LEEP TX, CERVICAL      MAMMOPLASTY REDUCTION BILATERAL      MARSUPIALIZATION BARTHOLIN CYST N/A 04/29/2019    Procedure: Bartholin's Cyst  removal;  Surgeon: Froylan Schwarz MD;  [facility]    MARSUPIALIZATION BARTHOLIN CYST Left 2020    Procedure: Excision of left bartholin's abscess;  Surgeon: Froylan Schwarz MD;  [facility]    ORTHOPEDIC SURGERY          OB/Gynecologic History:     Short, light periods. Every month. Back pain.   Pap Smear:   3/23/2021: negative/HPV other HR+  2020: Colpo: biopsy negative, ECC negative  2020: negative/HPV other HR+  3/12/2019: negative/HPV other HR+    2020: Right vulva lateral to posterior fourchette: TINO III; 3/2020: Re-excision negative margins    Social History  Social History     Tobacco Use    Smoking status: Every Day     Packs/day: 0.50     Years: 10.00     Pack years: 5.00     Types: Cigarettes    Smokeless tobacco: Current    Tobacco comments:     uses E cig   Vaping Use    Vaping Use: Every day   Substance Use Topics    Alcohol use: Not Currently    Drug use: Not Currently     Comment: In treatment for meth.       Family History  Family History   Problem Relation Age of Onset    Heart Disease Father     Coronary Artery Disease Father     Hypertension Father     Hyperlipidemia Father     Mental Illness Father     Substance Abuse Father     Diabetes Maternal Grandmother     Breast Cancer Paternal Grandmother     Testicular cancer Paternal Grandfather     Thyroid Disease Mother     Prostate Cancer Maternal Grandfather        Current Outpatient Medications   Medication Sig Dispense Refill    gabapentin (NEURONTIN) 100 MG capsule Take 1 capsule (100 mg) by mouth 3 times daily 180 capsule 3    hydrOXYzine (ATARAX) 25 MG tablet Take 1 tablet (25 mg) by mouth 3 times daily as needed for itching 30 tablet 1    Multiple Vitamins-Minerals (MULTIVITAMIN ADULT PO)       omeprazole (PRILOSEC OTC) 20 MG EC tablet Take 1 tablet (20 mg) by mouth daily 30 tablet 3    omeprazole (PRILOSEC) 20 MG DR capsule TAKE 1 CAPSULE BY MOUTH TWICE DAILY 60 capsule 11    oxyCODONE  (ROXICODONE) 5 MG tablet Take 1 tablet (5 mg) by mouth every 6 hours as needed for severe pain 12 tablet 0    Semaglutide-Weight Management (WEGOVY) 1.7 MG/0.75ML pen Inject 1.7 mg Subcutaneous once a week 3 mL 3    sertraline (ZOLOFT) 50 MG tablet Take 1 tablet (50 mg) by mouth daily 90 tablet 1    Semaglutide-Weight Management (WEGOVY) 1 MG/0.5ML pen Inject 1 mg Subcutaneous once a week After completing 4 weeks of 0.5mg dose (Patient not taking: Reported on 7/12/2023) 2 mL 1        Allergies  Allergies   Allergen Reactions    No Known Allergies        Physical Exam:   /86 (BP Location: Right arm, Patient Position: Sitting, Cuff Size: Adult Large)   Pulse 101   Temp 98.1  F (36.7  C) (Oral)   Wt 108.9 kg (240 lb 1.6 oz)   LMP 06/09/2023   SpO2 99%   BMI 39.35 kg/m    General appearance: Alert and oriented, no acute distress, well groomed  CV: RRR  Lungs: CTAB  GI: Abdomen soft, non-tender, non-distended. No palpable masses  Psych: alert and oriented, appropriate affect  Genitourinary:  External: anatomically normal appearing labia majora, minora, and clitoral hidalgo. On the right labia just outside the vaginal introitus there is a tender, fluctuant 3x3cm cyst in the area of the Bartholin's gland. No surrounding erythema. No visible area of drainage.  Vagina: normal mucosa without lesions    Labs/Pathology:   Reviewed excision pathology results, no pathology from bartholin's gland excision      Imaging:   n/a    Assessment:  Ayanna Davidson is a 30 year old G0 pre-menopausal female with medical history notable for everyday smoker, history of TINO III, history of cervical HR HPV + (no history of high grade dysplasia), with history of recurrent Bartholin's gland abscesses and cysts who presents with a new Bartholin's gland abscess.    Plan:   Recurrent Bartholin's gland abscesses/cysts: after review of her Operative notes, she has undergone attempted right gland; however, it was unable to be removed entirely  intact (cyst/abscess at the time was removed in fragments). It is possible a portion of the cyst wall/gland remains. Due to recurrence of the Bartholin's gland cyst/abscess, recommend surgical excision to try and remove the entire Bartholin's gland. Plan to schedule within the next 1-2 days pending OR availability.  Consent reviewed and signed. Pre-op done today.    Geri Mckoy MD  OB/GYN, PGY-4  07/12/2023, 4:27 PM     I, Tyrell Adams MD, personally saw the patient with the resident/fellow and agree with the documentation as noted above. I personally reviewed path results and prior Op notes and performed the exam noted above.  I spent a total of 30 minutes of my own time on the care of Ayanna Davidson on the day of service including record review, face-to-face time, care coordination, and documentation.     Tyrell Adams MD   Gynecologic Oncology Attending Physician

## 2023-07-13 ENCOUNTER — MYC MEDICAL ADVICE (OUTPATIENT)
Dept: FAMILY MEDICINE | Facility: CLINIC | Age: 33
End: 2023-07-13
Payer: COMMERCIAL

## 2023-07-13 ENCOUNTER — HOSPITAL ENCOUNTER (OUTPATIENT)
Facility: CLINIC | Age: 33
End: 2023-07-13
Attending: OBSTETRICS & GYNECOLOGY | Admitting: OBSTETRICS & GYNECOLOGY
Payer: COMMERCIAL

## 2023-07-13 ENCOUNTER — TELEPHONE (OUTPATIENT)
Dept: ONCOLOGY | Facility: CLINIC | Age: 33
End: 2023-07-13
Payer: COMMERCIAL

## 2023-07-13 RX ORDER — OXYCODONE HYDROCHLORIDE 10 MG/1
10 TABLET ORAL
Status: CANCELLED | OUTPATIENT
Start: 2023-07-13

## 2023-07-13 RX ORDER — FENTANYL CITRATE 50 UG/ML
25 INJECTION, SOLUTION INTRAMUSCULAR; INTRAVENOUS EVERY 5 MIN PRN
Status: CANCELLED | OUTPATIENT
Start: 2023-07-13

## 2023-07-13 RX ORDER — OXYCODONE HYDROCHLORIDE 5 MG/1
5 TABLET ORAL EVERY 6 HOURS PRN
Qty: 8 TABLET | Refills: 0 | OUTPATIENT
Start: 2023-07-13

## 2023-07-13 RX ORDER — ACETAMINOPHEN 325 MG/1
975 TABLET ORAL ONCE
Status: CANCELLED | OUTPATIENT
Start: 2023-07-13 | End: 2023-07-13

## 2023-07-13 RX ORDER — ONDANSETRON 2 MG/ML
4 INJECTION INTRAMUSCULAR; INTRAVENOUS EVERY 30 MIN PRN
Status: CANCELLED | OUTPATIENT
Start: 2023-07-13

## 2023-07-13 RX ORDER — FENTANYL CITRATE 50 UG/ML
50 INJECTION, SOLUTION INTRAMUSCULAR; INTRAVENOUS EVERY 5 MIN PRN
Status: CANCELLED | OUTPATIENT
Start: 2023-07-13

## 2023-07-13 RX ORDER — SODIUM CHLORIDE, SODIUM LACTATE, POTASSIUM CHLORIDE, CALCIUM CHLORIDE 600; 310; 30; 20 MG/100ML; MG/100ML; MG/100ML; MG/100ML
INJECTION, SOLUTION INTRAVENOUS CONTINUOUS
Status: CANCELLED | OUTPATIENT
Start: 2023-07-13

## 2023-07-13 RX ORDER — HYDROXYZINE HYDROCHLORIDE 25 MG/1
25 TABLET, FILM COATED ORAL EVERY 6 HOURS PRN
Status: CANCELLED | OUTPATIENT
Start: 2023-07-13

## 2023-07-13 RX ORDER — OXYCODONE HYDROCHLORIDE 5 MG/1
5 TABLET ORAL
Status: CANCELLED | OUTPATIENT
Start: 2023-07-13

## 2023-07-13 RX ORDER — ONDANSETRON 4 MG/1
4 TABLET, ORALLY DISINTEGRATING ORAL EVERY 30 MIN PRN
Status: CANCELLED | OUTPATIENT
Start: 2023-07-13

## 2023-07-13 RX ORDER — LIDOCAINE 40 MG/G
CREAM TOPICAL
Status: CANCELLED | OUTPATIENT
Start: 2023-07-13

## 2023-07-13 RX ORDER — HYDROMORPHONE HYDROCHLORIDE 1 MG/ML
0.2 INJECTION, SOLUTION INTRAMUSCULAR; INTRAVENOUS; SUBCUTANEOUS EVERY 5 MIN PRN
Status: CANCELLED | OUTPATIENT
Start: 2023-07-13

## 2023-07-13 RX ORDER — HALOPERIDOL 5 MG/ML
1 INJECTION INTRAMUSCULAR
Status: CANCELLED | OUTPATIENT
Start: 2023-07-13

## 2023-07-13 RX ORDER — LABETALOL HYDROCHLORIDE 5 MG/ML
10 INJECTION, SOLUTION INTRAVENOUS
Status: CANCELLED | OUTPATIENT
Start: 2023-07-13

## 2023-07-13 RX ORDER — HYDROMORPHONE HYDROCHLORIDE 1 MG/ML
0.4 INJECTION, SOLUTION INTRAMUSCULAR; INTRAVENOUS; SUBCUTANEOUS EVERY 5 MIN PRN
Status: CANCELLED | OUTPATIENT
Start: 2023-07-13

## 2023-07-13 NOTE — TELEPHONE ENCOUNTER
RNCC to call  patient to schedule surgery with: Dr. Adams     Surgery Date: 7/14/23     Location: [facility]    H&P: already completed by surgeon on DOS     Post-op: will be scheduled by the clinic    Patient will receive a phone call from pre-admission nurses 1-2 days prior to surgery with arrival and start time.    Patient aware times are subject to change up until day before surgery.     Patient questions/concerns: N/A     Surgery packet to be provided by the RNCC       Inna Duke on 7/13/2023 at 12:39 PM

## 2023-07-13 NOTE — PATIENT INSTRUCTIONS
It was a pleasure seeing you in clinic today-please reach out if there are any further questions that arise following today's visit.  During business hours, you may reach me at [phone].  For urgent/emergent questions after business hours, you may reach the on-call Gynecologic Oncology Resident through the [facility]  at [phone].    All normal test results are usually communicated via letter or Datical[employer] message.  Abnormal results (those that require a change in the previously discussed plan of care) are usually communicated via a phone call.    I recommend signing up for [employer] access if you have not already done so.  This allows you online access to your lab results and also helps you communicate efficiently with your clinic should any questions arise in your care.        You will be/have been scheduled for surgery     Diagnosis:  bartholin gland infection      The surgical procedure is:  I and D of bartholin gland    Anticipated length of surgery: .  You will be in the recovery room for approximately 2-4hrs after surgery.      You will be going home the same day  At discharge you will need a someone to drive you home.  You will need someone to stay with you for the first 24 hours you are home.    Preparation for Surgery:    To Schedule   /no need for     preoperative clearance for surgery             *  No solid foods or milk products 10 hours before your surgery time, you can  have clear liquids up until 4 hours before your surgery time.    Postoperative Restrictions:  No heavy lifting >20 lbs or strenuous              activity                        nothing in the vagina (no tampons, no intercourse, no douching) for two weeks.     Postoperative plan  Diet as tolerated  Take stool softener daily as directed  for at least 1-2 weeks unless you develop diarrhea.  You may shower the day after surgery,    You will be given ibuprofen and tylenol, take on schedule every 6 hours as needed    Wound care:  leave open to the air.         Call clinic if you have fever greater than 100.5, heavy vaginal bleeding,  increasing redness, pain, swelling at incision site, drainage with bad odor or other concerns.      You should be feeling better every day.    Postoperative visit:  Return to clinic 1-2 weeks after surgery for post operative visit.  Will discuss your pathology results and any needed follow up plan at that visit.      Please contact the clinic with any questions or concerns.      Dr Armando Lin RN  Phone:  [phone]  Fax:  [phone]

## 2023-07-13 NOTE — NURSING NOTE
Pre Op Nurse Teaching Template    Relevant Diagnosis: bartholin gland infection    Teaching Topic: I and D of bartholins gland    Person(s) involved in teaching :  Patient  & mother  Motivation Level:  Asks Questions:    Yes      Eager to Learn:     Yes     Cooperative:          Yes    Receptive (willing. Able to accept information):    Yes      Patient and those who are listed above demonstrates understanding of the following:   Reason for the appointment, diagnosis and treatment plan:   Yes   Knowledge of proper use of medications and conditions for which they are ordered (with special attention to potential side effects or drug interactions): Yes   Which situations necessitate calling provider and whom to contact: Yes         Nutritional needs and diet plan:  Yes      Pain management techniques:     Yes, Pain Scale   Diet:   Yes, [facility] Diet Instructions    Teaching Concerns addressed: Yes    Infection Prevention:  Patient and those who are listed above demonstrate understanding of the following:  Pre-Op CHG Bathing Instructions: Yes  Surgical procedure site care taught:   Yes   Signs and symptoms of infection taught: Yes       Instructional Materials Used/Given:  The Mode Before You Surgery Booklet  Showering or Bathing before Surgery Instructions & CHG Product  Hysterectomy Guidelines  Pain Assessment Tool   Home Care after Major Abdominal or Vaginal Surgery  Map  Accommodations Brochure  Phone numbers for [facility] and [facility]  Copy of Surgical Consent    Done Today:    Preop Visit   not needed  done per Armando on 7/12/23  Tests Ordered:  Post Op Visit Scheduled:TBD  Comments:    Surgery date/time:TBD        Consent signed via IPAD and on file in media  Hysterectomy consent signed and sent to scanning  .

## 2023-07-19 ENCOUNTER — PATIENT OUTREACH (OUTPATIENT)
Dept: CARE COORDINATION | Facility: CLINIC | Age: 33
End: 2023-07-19
Payer: COMMERCIAL

## 2023-08-02 ENCOUNTER — PATIENT OUTREACH (OUTPATIENT)
Dept: CARE COORDINATION | Facility: CLINIC | Age: 33
End: 2023-08-02
Payer: COMMERCIAL

## 2023-08-22 NOTE — OP NOTE
Chief Complaint   Patient presents with   • New Patient   • Diabetes Mellitus       HISTORY OF PRESENT ILLNESS:  Terrence Germain is here for an initial visit regarding there diabetes. His disease was diagnosed 2 months ago , He is currently taking Metformin 1000 mg daily and Jardiane 10 mg daily and tolerating both. A1C in June 2020- 11.9% A1C today is 6.2%. He is seeing very good number in the low 100's.   Since diagnosis patient has changed his eating and exercise habits for the better   History of hospitalizations with diabetic complications or DKA -no     Complications:   Retinopathy - []  YES    [x]  NO   Nephropathy-  []  YES    []  NO   Glomerular Filtration Rate (no units)   Date Value   06/03/2023 >90   No results found for: \"MALBCR\"  Neuropathy-  [x]  YES    []  NO    Macrovascular complications - HF  Diabetes complicated by: Hypertension , obesity, sleep apnea  Hypoglycemia: None    Fatty liver disease:  If BMI >/= 30, has ALT been checked in the past year?   GPT/ALT (Units/L)   Date Value   06/03/2023 30       DIABETIC HEALTH MAINTENANCE  Date of last dilated exam - Spring 222  Date of last dental exam - over 5 years  ACE-I/ ARB - no  Statin/ Fibrate/ Fish Oil therapy - no  ASA therapy - on eliquis for A-fib   Foot care education - yes  Nutrition/ DM education -yes  Smoker - no  Does the patient have a Medical ID? no  HeN has been informed of its benefit.    REVIEW OF SYSTEMS:   Diet: Breakfast: sugar free yougurt, Porotein drink, Lunch- keto meal. Dinner- low carb   Exercise: walk 1.5 hours per day.        Wt Readings from Last 4 Encounters:   08/22/23 123.8 kg (272 lb 14.9 oz)   05/11/23 135 kg (297 lb 9.9 oz)   05/05/22 (!) 137.5 kg (303 lb 3.2 oz)   12/04/19 (!) 156.3 kg (344 lb 9.6 oz)     Estimated body mass index is 34.11 kg/m² as calculated from the following:    Height as of this encounter: 6' 3\" (1.905 m).    Weight as of this encounter: 123.8 kg (272 lb 14.9 oz).     Eye: No visual blurring,  Procedure Date: 02/26/2018      PREOPERATIVE DIAGNOSIS:  Left labial abscess.      POSTOPERATIVE DIAGNOSIS:  Left labial abscess.      PROCEDURE:     1.  Incision and drainage of left labial abscess.   2.  Rectal exam under anesthesia.      SURGEON:  Jase Wolf MD, FACS     ASSISTANT:  SHAKIRA Black.      ANESTHESIA:  General endotracheal tube.      INDICATIONS FOR PROCEDURE:  This is a 27-year-old lady who developed left labial pain approximately 4 days ago, was seen at an outside ER, started on oral antibiotics.  It continued to enlarge.  On exam today had a large left labial abscess.  For this reason, it was elected to take her to the operating room for an incision and drainage of a left labial abscess.      OPERATIVE FINDINGS:  A large abscess with 50 mL of pus, no obvious vaginal or rectal source.      DETAILS OF PROCEDURE:  The patient was taken to the operating room and placed on the table in supine position.  After induction of anesthesia, placed in lithotomy position.  The genital area was then prepped and draped in sterile fashion.  A timeout was performed confirming identity of the patient as well as the procedure to be performed.  A cruciate incision was then made over the area of maximum fluctuance draining approximately 50 mL of pus.  Loculations were broken down with a finger.  We then palpated around the area and looked intravaginally for no obvious source.  A rectal exam was then carried out, and no obvious connection was seen.  A Claire was also inserted into the rectum, and again no obvious rectal source was noted.  The patient does shave that area, and that was suspected to be the source of the infection.  The wound was then copiously irrigated out.  It was packed with Betadine-soaked gauze.  A sterile dressing was applied, and the patient was then taken from the operating room to the recovery room in stable condition to be sent home.         JASE SAUNDERS MD,FACS             D:  2018   T: 2018   MT: LAUREN      Name:     MRAYURI ARTEAGA   MRN:      [id number]        Account:        [id number]   :      1990           Procedure Date: 2018      Document: [id number]       double vision, or eye pain.  ENT: No headaches, tinnitus, vertigo, no sore throat. No dental problem and hoarseness  Cardiovascular: No palpitations, chest pain or chest pressure. No LE edema. No SOB and SWEENEY.   Respiratory: No cough, shortness of breath, wheezing, or sputum production.   Gastro-intestinal: Appetite good. No nausea, vomiting, diarrhea or constipation. No bloating or abdominal pain. No history of pancreatitis.   Genito-urinary: No nocturia, dysuria, no urgency with urination or hesitancy, hematuria or incontinence.   Musculoskeletal: No back pain, arthritis, or muscular weakness.   Integumentary: No rashes or ulcerations, no itching, bruising. No hair or nail changes.  Neurological: No headaches, syncope. +numbness or tingling in bilateral lower extremities. Gait steady, no falls.   Psychiatric: No anxiety, depression, sleep disturbances.   Endocrine: No thyroid problems, heat or cold intolerance. No polydipsia, polyphagia, polyuria.   Hematologic/Lymphatic: No anemia. No fevers, night sweats, or chills, no weight changes.     Outpatient Medications Marked as Taking for the 8/22/23 encounter (Office Visit) with Rizwana Kuhn APNP   Medication Sig Dispense Refill   • Blood Glucose Monitoring Suppl (Blood Glucose Monitor System) w/Device Kit ud tid 1 kit 0   • Glucose Blood (BLOOD GLUCOSE TEST STRIPS) Strip UD tid 200 strip 11   • Lancets 30G Misc UD tid 200 each 12   • metFORMIN (GLUCOPHAGE-XR) 500 MG 24 hr tablet Take 1 tablet by mouth in the morning and 1 tablet in the evening. Take with meals. 60 tablet 3   • empagliflozin (JARDIANCE) 10 MG tablet Take 1 tablet by mouth daily (before breakfast). 30 tablet 3   • traZODone (DESYREL) 50 MG tablet Take 1 tablet by mouth nightly. 30 tablet 3   • metoPROLOL succinate (TOPROL-XL) 25 MG 24 hr tablet Take 1 tablet by mouth daily. 90 tablet 1   • apixaBAN (ELIQUIS) 5 MG Tab Take 1 tablet by mouth every 12 hours. 180 tablet 1   • fluticasone (FLONASE) 50  MCG/ACT nasal spray Spray 2 sprays in each nostril daily. 16 g 12      ALLERGIES:   Allergen Reactions   • Contrast Media Other (See Comments)     \"back spasms and severe back pain - it seizes me up\"     Past Medical History:   Diagnosis Date   • A-fib (CMD)    • Alcohol use     quit    • Anticoagulated    • Cardiomyopathy, alcoholic (CMD)     quit alcohol    • Cardiopulmonary arrest (CMD)    • Fracture    • High risk medication use- Sotalol    • Hypertriglyceridemia    • Non-ischemic cardiomyopathy (CMD)    • Obesity    • LUCINA on CPAP     12-15 cmH20   • Tobacco abuse    • Wide-complex tachycardia    • WPW (Volodymyr-Parkinson-White syndrome) 2013     Past Surgical History:   Procedure Laterality Date   • Cardioversion  2016    Successful   • Coronary angiogram/possible ptca - cv  2013   • Echo heart resting  2013    LV EF- 49%   • Echo heart resting  2013    LV EF- 48%   • Echo tiana  2016   • Ep study/svt ablation  04/15/2013    Successful   • Finger surgery Left     3 finger   • Xr chest ap or pa  2013    Interval decrease in tghe amount of opacity in the left lung base.     Family History   Problem Relation Age of Onset   • Cancer Father              Social History     Tobacco Use   • Smoking status: Former     Current packs/day: 0.00     Average packs/day: 0.5 packs/day for 10.0 years (5.0 ttl pk-yrs)     Types: Cigarettes     Start date: 2005     Quit date: 2015     Years since quittin.3   • Smokeless tobacco: Never   • Tobacco comments:     no additional smokers in home   Substance Use Topics   • Alcohol use: No     Alcohol/week: 0.0 standard drinks of alcohol     Comment: No drinks since the surgery, per/pt.since    • Drug use: No       PHYSICAL EXAMINATION:  Visit Vitals  /80 (BP Location: LUE - Left upper extremity, Patient Position: Sitting, Cuff Size: Regular)   Pulse 93   Ht 6' 3\" (1.905 m)   Wt 123.8 kg (272 lb 14.9 oz)   BMI 34.11  kg/m²       GENERAL: appears stated age, well developed and well nourished, in no distress and normal affect  SKIN normal color,no skin rashes, No vitiligo. No acanthosis nigricans. No xanthelasmas or xanthomas. .   EYES: extraocular movements are full, sclerae and conjunctivae are normal and lids and lashes are normal  LUNGS: lungs are clear to auscultation with normal inspiratory/expiratory sounds, no rales, no rhonchi and no wheezes  HEART: normal rate and rhythm, S1 and S2 normal, no murmurs and no extra heart sounds  ABDOMEN: abdomen is soft, nontender, without masses  NEUROLOGIC: motor strength normal, coordination normal and no tremor noted  EXTREMITIES: no clubbing, no cyanosis, no edema and normal muscle tone and development bilaterally      LABS:  Hemoglobin A1C (%)   Date Value   06/03/2023 11.9 (H)   04/16/2013 5.4     Lab Services on 06/03/2023   Component Date Value Ref Range Status   • Fasting Status 06/03/2023 12  0 - 999 Hours Final   • Sodium 06/03/2023 134 (L)  135 - 145 mmol/L Final   • Potassium 06/03/2023 4.2  3.4 - 5.1 mmol/L Final   • Chloride 06/03/2023 101  97 - 110 mmol/L Final   • Carbon Dioxide 06/03/2023 29  21 - 32 mmol/L Final   • Anion Gap 06/03/2023 8  7 - 19 mmol/L Final   • Glucose 06/03/2023 332 (H)  70 - 99 mg/dL Final   • BUN 06/03/2023 11  6 - 20 mg/dL Final   • Creatinine 06/03/2023 0.64 (L)  0.67 - 1.17 mg/dL Final   • Glomerular Filtration Rate 06/03/2023 >90  >=60 Final    eGFR results = or >60 mL/min/1.73m2 = Normal kidney function. Estimated GFR calculated using the CKD-EPI-R (2021) equation that does not include race in the creatinine calculation.   • BUN/Cr 06/03/2023 17  7 - 25 Final   • Calcium 06/03/2023 9.0  8.4 - 10.2 mg/dL Final   • Bilirubin, Total 06/03/2023 0.6  0.2 - 1.0 mg/dL Final   • GOT/AST 06/03/2023 11  <=37 Units/L Final   • GPT/ALT 06/03/2023 30  <64 Units/L Final   • Alkaline Phosphatase 06/03/2023 113  45 - 117 Units/L Final   • Albumin 06/03/2023  4.0  3.6 - 5.1 g/dL Final   • Protein, Total 06/03/2023 7.8  6.4 - 8.2 g/dL Final   • Globulin 06/03/2023 3.8  2.0 - 4.0 g/dL Final   • A/G Ratio 06/03/2023 1.1  1.0 - 2.4 Final   • Cholesterol 06/03/2023 179  <=199 mg/dL Final      Desirable         <200  Borderline High   200 to 239  High              >=240   • Triglycerides 06/03/2023 162 (H)  <=149 mg/dL Final      Normal            <150  Borderline High   150 to 199  High              200 to 499  Very High         >=500   • HDL 06/03/2023 34 (L)  >=40 mg/dL Final      Low              <40  Borderline Low   40 to 49  Near Optimal     50 to 59  Optimal          >=60   • LDL 06/03/2023 113  <=129 mg/dL Final      Optimal           <100  Near Optimal      100 to 129  Borderline High   130 to 159  High              160 to 189  Very High         >=190   • Non-HDL Cholesterol 06/03/2023 145  mg/dL Final      Therapeutic Target:  CHD and risk equivalents  <130  Multiple risk factors     <160  0 to 1 risk factor        <190   • Cholesterol/ HDL Ratio 06/03/2023 5.3 (H)  <=4.4 Final   • Prostate Specific Antigen 06/03/2023 0.58  <=3.50 ng/mL Final    [employer] Chemiluminescence. Results obtained with different assay methods or kits cannot be used interchangeably.     • TSH 06/03/2023 1.717  0.350 - 5.000 mcUnits/mL Final    Findings most consistent with euthyroid state, no additional testing suggested. TSH may be normal in patients with thyroid dysfunction and pituitary disease. Clinical correlation recommended.    (Reflex TSH algorithm is not recommended in hospitalized patients. A variety of drugs, as well as serious acute and chronic illnesses may alter thyroid function tests. Commonly implicated drugs include glucocorticoids, dopamine, carbamazepine, iodine, amiodarone, lithium and heparin.)   • WBC 06/03/2023 5.9  4.2 - 11.0 K/mcL Final   • RBC 06/03/2023 5.23  4.50 - 5.90 mil/mcL Final   • HGB 06/03/2023 15.3  13.0 - 17.0 g/dL Final   • HCT 06/03/2023 45.2   39.0 - 51.0 % Final   • MCV 06/03/2023 86.4  78.0 - 100.0 fl Final   • MCH 06/03/2023 29.3  26.0 - 34.0 pg Final   • MCHC 06/03/2023 33.8  32.0 - 36.5 g/dL Final   • RDW-CV 06/03/2023 12.4  11.0 - 15.0 % Final   • RDW-SD 06/03/2023 39.0  39.0 - 50.0 fL Final   • PLT 06/03/2023 247  140 - 450 K/mcL Final   • NRBC 06/03/2023 0  <=0 /100 WBC Final   • Neutrophil, Percent 06/03/2023 59  % Final   • Lymphocytes, Percent 06/03/2023 30  % Final   • Mono, Percent 06/03/2023 8  % Final   • Eosinophils, Percent 06/03/2023 2  % Final   • Basophils, Percent 06/03/2023 1  % Final   • Immature Granulocytes 06/03/2023 0  % Final   • Absolute Neutrophils 06/03/2023 3.4  1.8 - 7.7 K/mcL Final   • Absolute Lymphocytes 06/03/2023 1.8  1.0 - 4.0 K/mcL Final   • Absolute Monocytes 06/03/2023 0.5  0.3 - 0.9 K/mcL Final   • Absolute Eosinophils  06/03/2023 0.1  0.0 - 0.5 K/mcL Final   • Absolute Basophils 06/03/2023 0.0  0.0 - 0.3 K/mcL Final   • Absolute Immature Granulocytes 06/03/2023 0.0  0.0 - 0.2 K/mcL Final   • Hemoglobin A1C 06/03/2023 11.9 (H)  4.5 - 5.6 % Final      Diabetic Screening  Non Diabetic:             <5.7%  Increased Risk:           5.7-6.4%  Diagnostic For Diabetes:  >6.4%    Diabetic Control  A1C%       eAG mg/dL  6.0            126  6.5            140  7.0            154  7.5            169  8.0            183  8.5            197  9.0            212  9.5            226  10.0           240     No results found for: \"MALBCR\"    TSH (mcUnits/mL)   Date Value   06/03/2023 1.717   04/14/2013 6.413 (H)     No results found for: \"VITD25\"  LDL (mg/dL)   Date Value   06/03/2023 113       HDL (mg/dL)   Date Value   06/03/2023 34 (L)       Triglycerides (mg/dL)   Date Value   06/03/2023 162 (H)       Cholesterol (mg/dL)   Date Value   06/03/2023 179     HCT (%)   Date Value   06/03/2023 45.2     No results found for: \"ALT\"    GOT/AST (Units/L)   Date Value   06/03/2023 11          ASSESSMENT:  1. Type 2 diabetes mellitus  without complication, without long-term current use of insulin (CMD)        PLAN:  - Diabetes- Controlled    -continue current meds and eating habits.     -Review of glycemic targets, fasting <110mg/dL and 2hr post prandial <140mg/dl.   -Reviewed the importance of adherence to medical regimen.   -Reviewed hypoglycemic events, including their prevention and treatment.   -Reviewed lifestyle changes, diet education, CHO counting, reviewed weight management and reviewed exercise plan, at least 150min of moderate exercise per week.      Follow up with me in PRN can follow up with PCP     I spent a total of 35 minutes on the day of the visit.  This includes pre-charting, chart review and documenting.     CHAIM Phelps   Patient

## 2023-08-31 ENCOUNTER — TELEPHONE (OUTPATIENT)
Dept: ONCOLOGY | Facility: CLINIC | Age: 33
End: 2023-08-31
Payer: COMMERCIAL

## 2023-08-31 NOTE — TELEPHONE ENCOUNTER
Left voicemail for patient regarding scheduling surgery with Dr. Adams.  Provided contact number to discuss. [phone]    Inna Duke, on 8/31/2023 at 8:45 AM

## 2023-09-05 ENCOUNTER — TELEPHONE (OUTPATIENT)
Dept: ENDOCRINOLOGY | Facility: CLINIC | Age: 33
End: 2023-09-05
Payer: COMMERCIAL

## 2023-09-05 NOTE — TELEPHONE ENCOUNTER
Patients prior auth is about to  for Wegovy. Is pt still taking? Will need current dose and weight

## 2023-09-05 NOTE — TELEPHONE ENCOUNTER
[employer] message sent to patient asking for her current weight. Last documented weight in chart is from July and was Wt 108.9 kg (240 lb 1.6 oz).

## 2023-09-05 NOTE — TELEPHONE ENCOUNTER
Patient is schedule for surgery with: Dr. Adams     Surgery Date: 9/18/23     Location: [facility]    H&P: to be completed by Primary Care team; patient to schedule     Post-op: will be scheduled by the clinic    Patient will receive a phone call from pre-admission nurses 1-2 days prior to surgery with arrival and start time.    Patient aware times are subject to change up until day before surgery.     Patient questions/concerns: N/A     Surgery packet was provided to patient during appointment      Inna Duke on 9/5/2023 at 9:43 AM

## 2023-09-06 DIAGNOSIS — N75.1 ABSCESS OF BARTHOLIN'S GLAND: Primary | ICD-10-CM

## 2023-09-08 DIAGNOSIS — N75.0 BARTHOLIN'S GLAND CYST: Primary | ICD-10-CM

## 2023-09-08 RX ORDER — OXYCODONE HYDROCHLORIDE 5 MG/1
5 TABLET ORAL EVERY 6 HOURS PRN
Qty: 30 TABLET | Refills: 0 | Status: SHIPPED | OUTPATIENT
Start: 2023-09-08 | End: 2023-09-16

## 2023-09-08 RX ORDER — SULFAMETHOXAZOLE/TRIMETHOPRIM 800-160 MG
1 TABLET ORAL 2 TIMES DAILY
Qty: 10 TABLET | Refills: 0 | Status: SHIPPED | OUTPATIENT
Start: 2023-09-08 | End: 2023-10-10

## 2023-09-13 RX ORDER — METRONIDAZOLE 500 MG/100ML
500 INJECTION, SOLUTION INTRAVENOUS
Status: CANCELLED | OUTPATIENT
Start: 2023-09-13

## 2023-09-13 RX ORDER — CEFAZOLIN SODIUM 2 G/50ML
2 SOLUTION INTRAVENOUS
Status: CANCELLED | OUTPATIENT
Start: 2023-09-13

## 2023-09-13 RX ORDER — CEFAZOLIN SODIUM 2 G/50ML
2 SOLUTION INTRAVENOUS SEE ADMIN INSTRUCTIONS
Status: CANCELLED | OUTPATIENT
Start: 2023-09-13

## 2023-09-13 RX ORDER — HEPARIN SODIUM 5000 [USP'U]/.5ML
5000 INJECTION, SOLUTION INTRAVENOUS; SUBCUTANEOUS
Status: CANCELLED | OUTPATIENT
Start: 2023-09-13

## 2023-09-14 ENCOUNTER — ANESTHESIA EVENT (OUTPATIENT)
Dept: SURGERY | Facility: CLINIC | Age: 33
End: 2023-09-14
Payer: COMMERCIAL

## 2023-09-18 ENCOUNTER — HOSPITAL ENCOUNTER (OUTPATIENT)
Facility: CLINIC | Age: 33
Discharge: HOME | End: 2023-09-18
Attending: OBSTETRICS & GYNECOLOGY | Admitting: OBSTETRICS & GYNECOLOGY
Payer: COMMERCIAL

## 2023-09-18 ENCOUNTER — ANESTHESIA (OUTPATIENT)
Dept: SURGERY | Facility: CLINIC | Age: 33
End: 2023-09-18
Payer: COMMERCIAL

## 2023-09-18 VITALS
HEART RATE: 110 BPM | BODY MASS INDEX: 37.63 KG/M2 | OXYGEN SATURATION: 99 % | SYSTOLIC BLOOD PRESSURE: 123 MMHG | HEIGHT: 66 IN | RESPIRATION RATE: 20 BRPM | WEIGHT: 234.13 LBS | TEMPERATURE: 98.5 F | DIASTOLIC BLOOD PRESSURE: 79 MMHG

## 2023-09-18 DIAGNOSIS — N75.1 ABSCESS OF BARTHOLIN'S GLAND: ICD-10-CM

## 2023-09-18 LAB
HCG INTACT+B SERPL-ACNC: ABNORMAL MIU/ML
HCG UR QL: POSITIVE
HOLD SPECIMEN: NORMAL

## 2023-09-18 PROCEDURE — 36415 COLL VENOUS BLD VENIPUNCTURE: CPT | Performed by: OBSTETRICS & GYNECOLOGY

## 2023-09-18 PROCEDURE — 999N000001 HC CANCELLED SURGERY UP TO 15 MINS: Performed by: OBSTETRICS & GYNECOLOGY

## 2023-09-18 PROCEDURE — 84702 CHORIONIC GONADOTROPIN TEST: CPT | Performed by: OBSTETRICS & GYNECOLOGY

## 2023-09-18 PROCEDURE — 81025 URINE PREGNANCY TEST: CPT

## 2023-09-18 PROCEDURE — 999N000141 HC STATISTIC PRE-PROCEDURE NURSING ASSESSMENT: Performed by: OBSTETRICS & GYNECOLOGY

## 2023-09-18 RX ORDER — CEFAZOLIN SODIUM/WATER 2 G/20 ML
2 SYRINGE (ML) INTRAVENOUS SEE ADMIN INSTRUCTIONS
Status: DISCONTINUED | OUTPATIENT
Start: 2023-09-18 | End: 2023-09-18 | Stop reason: HOSPADM

## 2023-09-18 RX ORDER — CEFAZOLIN SODIUM/WATER 2 G/20 ML
2 SYRINGE (ML) INTRAVENOUS
Status: DISCONTINUED | OUTPATIENT
Start: 2023-09-18 | End: 2023-09-18 | Stop reason: HOSPADM

## 2023-09-18 RX ORDER — HEPARIN SODIUM 5000 [USP'U]/.5ML
5000 INJECTION, SOLUTION INTRAVENOUS; SUBCUTANEOUS
Status: DISCONTINUED | OUTPATIENT
Start: 2023-09-18 | End: 2023-09-18 | Stop reason: HOSPADM

## 2023-09-18 RX ORDER — METRONIDAZOLE 500 MG/100ML
500 INJECTION, SOLUTION INTRAVENOUS
Status: DISCONTINUED | OUTPATIENT
Start: 2023-09-18 | End: 2023-09-18 | Stop reason: HOSPADM

## 2023-09-18 ASSESSMENT — ACTIVITIES OF DAILY LIVING (ADL): ADLS_ACUITY_SCORE: 35

## 2023-09-18 NOTE — ANESTHESIA PREPROCEDURE EVALUATION
Anesthesia Pre-Procedure Evaluation    Patient: Ayanna Davidson   MRN: [id number] : 1990        Procedure : Procedure(s):  Excision of Bartholin's gland          Past Medical History:   Diagnosis Date    Cervical high risk HPV (human papillomavirus) test positive 2019    last PAP NIL (), remote hx of LEEP    Gastroesophageal reflux disease     HSV (herpes simplex virus) infection     1 & 2    Methamphetamine abuse (H)     reports daily use    recurrent Bartholin's cyst       Past Surgical History:   Procedure Laterality Date    BIOPSY      CHOLECYSTECTOMY      ENT SURGERY      ESOPHAGOSCOPY, GASTROSCOPY, DUODENOSCOPY (EGD), COMBINED N/A 2023    Procedure: Esophagoscopy, gastroscopy, duodenoscopy (EGD), combined;  Surgeon: Thierno Escobar MD;  Location: [facility]    EXAM UNDER ANESTHESIA PELVIC N/A 2020    Procedure: EXAM UNDER ANESTHESIA, PELVIS, PAP;  Surgeon: Froylan Schwarz MD;  Location: [facility]    EXCISE VULVA WIDE LOCAL Bilateral 2020    Procedure: Right Vulva Wedge Resection and Incision and Drainage Left Vulva;  Surgeon: Froylan Schwarz MD;  Location: [facility]    INCISION AND DRAINAGE ABSCESS PELVIS, COMBINED N/A 2018    Procedure: COMBINED INCISION AND DRAINAGE ABSCESS PELVIS;  INCISION AND DRAINAGE LABIAL ABSCESS;  Surgeon: Jase Beach MD;  Location: [facility]    INCISION AND DRAINAGE ABSCESS PELVIS, COMBINED N/A 2019    Procedure: Incision and Drainage Right Labial Abscess;  Surgeon: Jase Beach MD;  Location: [facility]    INCISION AND DRAINAGE LOWER EXTREMITY, COMBINED Right 2019    Procedure: irrigation and debridement right leg dog bite;  Surgeon: Rommel Pérez MD;  Location: [facility]    LAP ADJUSTABLE GASTRIC BAND      LEEP TX, CERVICAL      MAMMOPLASTY REDUCTION BILATERAL      MARSUPIALIZATION BARTHOLIN CYST N/A 2019    Procedure: Bartholin's Cyst removal;  Surgeon: Froylan Schwarz MD;  Location: [facility]     MARSUPIALIZATION BARTHOLIN CYST Left 01/29/2020    Procedure: Excision of left bartholin's abscess;  Surgeon: Froylan Schwarz MD;  Location: [facility]    ORTHOPEDIC SURGERY        Allergies   Allergen Reactions    No Known Allergies       Social History     Tobacco Use    Smoking status: Every Day     Packs/day: 0.50     Years: 10.00     Pack years: 5.00     Types: Cigarettes    Smokeless tobacco: Current    Tobacco comments:     uses E cig   Substance Use Topics    Alcohol use: Not Currently      Wt Readings from Last 1 Encounters:   07/12/23 108.9 kg (240 lb 1.6 oz)        Anesthesia Evaluation            ROS/MED HX  ENT/Pulmonary:       Neurologic:       Cardiovascular:       METS/Exercise Tolerance:     Hematologic:       Musculoskeletal:       GI/Hepatic:     (+) GERD,                   Renal/Genitourinary:       Endo:     (+)               Obesity,       Psychiatric/Substance Use:     (+)     Recreational drug usage: Meth.    Infectious Disease:       Malignancy:       Other:               OUTSIDE LABS:  CBC:   Lab Results   Component Value Date    WBC 8.1 06/14/2023    WBC 11.9 (H) 04/26/2020    HGB 14.8 06/14/2023    HGB 14.5 04/26/2020    HCT 44.6 06/14/2023    HCT 44.6 04/26/2020     06/14/2023     04/26/2020     BMP:   Lab Results   Component Value Date     06/14/2023     08/18/2022    POTASSIUM 4.0 06/14/2023    POTASSIUM 3.9 08/18/2022    CHLORIDE 103 06/14/2023    CHLORIDE 111 (H) 08/18/2022    CO2 26 06/14/2023    CO2 26 08/18/2022    BUN 8.3 06/14/2023    BUN 10 08/18/2022    CR 0.69 06/14/2023    CR 0.67 08/18/2022     (H) 06/14/2023     (H) 08/18/2022     COAGS: No results found for: PTT, INR, FIBR  POC:   Lab Results   Component Value Date    HCG Negative 03/23/2021     HEPATIC:   Lab Results   Component Value Date    ALBUMIN 4.2 06/14/2023    PROTTOTAL 6.6 06/14/2023    ALT 32 06/14/2023    AST 26 06/14/2023    ALKPHOS 82 06/14/2023    BILITOTAL 0.3  06/14/2023     OTHER:   Lab Results   Component Value Date    LACT 1.2 09/27/2019    A1C 5.5 06/14/2023    ANUM 9.1 06/14/2023    LIPASE 86 11/21/2007    AMYLASE 58 11/21/2007    TSH 1.85 08/18/2022    CRP 19.0 (H) 09/27/2019    SED 9 04/26/2020       Anesthesia Plan    ASA Status:  2       Anesthesia Type: MAC.     - Reason for MAC: straight local not clinically adequate   Induction: Intravenous, Propofol.   Maintenance: TIVA.        Consents    Anesthesia Plan(s) and associated risks, benefits, and realistic alternatives discussed. Questions answered and patient/representative(s) expressed understanding.     - Discussed: Risks, Benefits and Alternatives for BOTH SEDATION and the PROCEDURE were discussed     - Discussed with:  Patient      - Extended Intubation/Ventilatory Support Discussed: No.      - Patient is DNR/DNI Status: No     Use of blood products discussed: No .     Postoperative Care    Pain management: IV analgesics, Oral pain medications, Multi-modal analgesia.   PONV prophylaxis: Ondansetron (or other 5HT-3), Background Propofol Infusion     Comments:                Anastasiya Thurston MD

## 2023-09-18 NOTE — PLAN OF CARE
Gynecologic Oncology Provider Note  I saw the patient in pre-op. We initially had a conversation about her abscess and the plan of care. The abscess has drained and did have some bleeding. It is no longer painful or swollen. We discussed the options and ultimately elected to proceed with EUA and possible excision.     Just after leaving the room her lab result returned showing a positive urine pregnancy test. I returned to speak with the patient. She does report having missed her last menses. She believes her LMP was 2 months ago. We discussed that a UPT can tell us that the hcg level is high enough in the blood to be detected in the urine, but it doesn't give us any additional information. Per my prior conversations with the patient she has had infertility for a number of years and has concerns for possible tubal disease/scar tissue. I discussed the importance of confirming this is an intra-uterine pregnancy. We briefly discussed the process of this which starts with a quantitative hCG blood draw. Based on this result we will know whether to recommend going directly to ultrasound versus repeating a blood draw in 48 hours. She was shocked but overall happy with this news (as she has had concerns for infertility). I have ordered the hcg quant so I can more adequately direct her future care. We will review med list and make recommendations prior to discharge. We will cancel today's procedure.    I did do an external exam and on the right labia posteriorly there is a palpable nodular area deep witin the labia at the site of her prior abscess. This is in the area of the Bartholin's gland but does track slightly posteriorly. No abscess. Slightly tender to touch. I will follow-up by phone regarding next steps based upon her hcg quant results.     Tyrell Adams MD    Gynecologic Oncology

## 2023-09-19 NOTE — TELEPHONE ENCOUNTER
Measured/documented weight in chart from yesterday 9/18/23 was the last weight measured in July (240 lb 1.6 oz). No current weight.

## 2023-09-19 NOTE — TELEPHONE ENCOUNTER
Follow up. PA for Wegovy . Did you hear back if she is still on medication. Should we continue with renewal or is pt no longer taking

## 2023-09-19 NOTE — TELEPHONE ENCOUNTER
PA Initiation    Medication: WEGOVY 1.7 MG/0.75ML SC SOAJ  Insurance Company: [employer] Non-Specialty PA's - Phone [phone] Fax [phone]  Pharmacy Filling the Rx: [facility] [address] [place] [id number] [address] [place] [address]  Filling Pharmacy Phone: [phone]  Filling Pharmacy Fax: [phone]  Start Date: 9/19/2023

## 2023-09-20 NOTE — TELEPHONE ENCOUNTER
Sent patient a [employer] message asking for her current weight. Will close this encounter and can consider an appeal if patient gets a measured weight and meets insurance criteria.

## 2023-09-20 NOTE — TELEPHONE ENCOUNTER
PRIOR AUTHORIZATION DENIED    Medication: WEGOVY 1.7 MG/0.75ML SC SOAJ  Insurance Company: [employer] Non-Specialty PA's - Phone [phone] Fax [phone]  Denial Date: 9/19/2023  Denial Rational:   Appeal Information:   Patient Notified: No

## 2023-09-20 NOTE — COMMUNITY RESOURCES LIST (ENGLISH)
09/20/2023   [facility]  N/A  For questions about this resource list or additional care needs, please contact your primary care clinic or care manager.  Phone: [phone]   Email: N/A   Address: [address] [place] [id number]   Hours: N/A        Financial Stability       Utility payment assistance  1  [facility] Distance: 20.44 miles      In-Person   [address] [place] [address]  Language: English  Hours: Mon - Fri 8:00 AM - 4:00 PM  Fees: Free   Phone: (958) 633-1722 Email: info@[employer] Website: https://www.[employer]/     2  [facility] Distance: 21.4 miles      In-Person, Phone/Virtual   [address] [place] [address]  Language: English  Hours: Mon - Fri 8:00 AM - 4:30 PM  Fees: Free   Phone: (559) 958-4144 Email: Roxane@[facility].[employer]-nsn.gov Website: https://[employer]/services/community-support-services#EmergencyServices          Food and Nutrition       Food pantry  3  [facility] Distance: 12.1 miles      Pickup   [address] [place] [address]  Language: English  Hours: Mon 1:00 PM - 4:00 PM , Mon 6:00 PM - 8:00 PM , Thu 10:00 AM - 3:00 PM  Fees: Free   Phone: (407) 275-4757 Email: crosscenter@"[employer]" Website: http://www.[facility].org/     4  [facility] [employer] Distance: 15.74 miles      Pickup   [address] [place] [address]  Language: English  Hours: Thu 12:00 PM - 4:00 PM  Fees: Free   Phone: (139) 743-9864 Email: rhoda@[facility] Website: https://www.[employer].com/[facility]/     SNAP application assistance  5  [facility] Distance: 20.44 miles      In-Person   [address] [place] [address]  Language: English  Hours: Mon - Fri 8:00 AM - 4:00 PM  Fees: Free   Phone: (664) 136-4912 Email: info@[employer] Website: https://www.[employer]/     6  [facility]  (Tri-CAP) [facility] Distance: 20.47 miles      In-Person, Phone/Virtual   [address] [place] [address]  Language: English  Hours: Mon - Thu 8:00 AM - 4:30 PM , Fri 8:00 AM - 12:30 PM  Fees: Free   Phone: (253) 764-5915 Email: info@[employer] Website: http://www.[employer]     Soup kitchen or free meals  7  [facility] Distance: 20.63 miles      Pickup   [address] [place] [address]  Language: English  Hours: Mon - Fri 11:30 AM - 12:45 PM  Fees: Free   Phone: (461) 247-4874 Email: meme@[facility] CenterStorenvy.[employer] Website: http://[facility]aryPPS[facility]/          Important Numbers & Websites       Emergency Services   911  [facility]   311  Poison Control   (152) 848-9959  Suicide Prevention Lifeline   (472) 143-3491 (TALK)  Child Abuse Hotline   (293) 831-2537 (4-A-Child)  Sexual Assault Hotline   (375) 961-5214 (HOPE)  National Runaway Safeline   (840) 340-8473 (RUNAWAY)  All-Options Talkline   (875) 409-2972  Substance Abuse Referral   (402) 531-2032 (HELP)

## 2023-09-20 NOTE — TELEPHONE ENCOUNTER
PA renewal Denied for Wegovy as patient has not lost 5% baseline body weight. What would you like to do?

## 2023-09-21 ENCOUNTER — PATIENT OUTREACH (OUTPATIENT)
Dept: ONCOLOGY | Facility: CLINIC | Age: 33
End: 2023-09-21

## 2023-09-21 ENCOUNTER — VIRTUAL VISIT (OUTPATIENT)
Dept: FAMILY MEDICINE | Facility: CLINIC | Age: 33
End: 2023-09-21
Payer: COMMERCIAL

## 2023-09-21 ENCOUNTER — HOSPITAL ENCOUNTER (EMERGENCY)
Facility: CLINIC | Age: 33
Discharge: HOME | End: 2023-09-21
Attending: FAMILY MEDICINE | Admitting: FAMILY MEDICINE
Payer: COMMERCIAL

## 2023-09-21 VITALS
SYSTOLIC BLOOD PRESSURE: 131 MMHG | DIASTOLIC BLOOD PRESSURE: 94 MMHG | TEMPERATURE: 98.4 F | RESPIRATION RATE: 20 BRPM | HEART RATE: 113 BPM | OXYGEN SATURATION: 100 %

## 2023-09-21 DIAGNOSIS — O09.90 SUPERVISION OF HIGH RISK PREGNANCY, ANTEPARTUM: Primary | ICD-10-CM

## 2023-09-21 DIAGNOSIS — S39.012A LOW BACK STRAIN, INITIAL ENCOUNTER: ICD-10-CM

## 2023-09-21 DIAGNOSIS — Z34.91 FIRST TRIMESTER PREGNANCY: ICD-10-CM

## 2023-09-21 PROCEDURE — 99284 EMERGENCY DEPT VISIT MOD MDM: CPT | Mod: 25 | Performed by: FAMILY MEDICINE

## 2023-09-21 PROCEDURE — 76815 OB US LIMITED FETUS(S): CPT | Mod: 26 | Performed by: FAMILY MEDICINE

## 2023-09-21 PROCEDURE — 99207 PR NO CHARGE NURSE ONLY: CPT | Mod: 93

## 2023-09-21 PROCEDURE — 76815 OB US LIMITED FETUS(S): CPT | Performed by: FAMILY MEDICINE

## 2023-09-21 NOTE — PATIENT INSTRUCTIONS
Learning About Pregnancy  Your Care Instructions     Your health in the early weeks of your pregnancy is particularly important for your baby's health. Take good care of yourself. Anything you do that harms your body can also harm your baby.  Make sure to go to all of your doctor appointments. Regular checkups will help keep you and your baby healthy.  How can you care for yourself at home?  Diet    Eat a balanced diet. Make sure your diet includes plenty of beans, peas, and leafy green vegetables.     Do not skip meals or go for many hours without eating. If you are nauseated, try to eat a small, healthy snack every 2 to 3 hours.     Do not eat fish that has a high level of mercury, such as shark, swordfish, or mackerel. Do not eat more than one can of tuna each week.     Drink plenty of fluids. If you have kidney, heart, or liver disease and have to limit fluids, talk with your doctor before you increase the amount of fluids you drink.     Cut down on caffeine, such as coffee, tea, and cola.     Do not drink alcohol, such as beer, wine, or hard liquor.     Take a multivitamin that contains at least 400 micrograms (mcg) of folic acid to help prevent birth defects. Fortified cereal and whole wheat bread are good additional sources of folic acid.     Increase the calcium in your diet. Try to drink a quart of skim milk each day. You may also take calcium supplements and choose foods such as cheese and yogurt.   Lifestyle    Make sure you go to your follow-up appointments.     Get plenty of rest. You may be unusually tired while you are pregnant.     Get at least 30 minutes of exercise on most days of the week. Walking is a good choice. If you have not exercised in the past, start out slowly. Take several short walks each day.     Do not smoke. If you need help quitting, talk to your doctor about stop-smoking programs. These can increase your chances of quitting for good.     Do not touch cat feces or litter boxes.  Also, wash your hands after you handle raw meat, and fully cook all meat before you eat it. Wear gloves when you work in the yard or garden, and wash your hands well when you are done. Cat feces, raw or undercooked meat, and contaminated dirt can cause an infection that may harm your baby or lead to a miscarriage.     Avoid things that can make your body too hot and may be harmful to your baby, such as a hot tub or sauna. Or talk with your doctor before doing anything that raises your body temperature. Your doctor can tell you if it's safe.     Avoid chemical fumes, paint fumes, or poisons.     Do not use illegal drugs, marijuana, or alcohol.   Medicines    Review all of your medicines with your doctor. Some of your routine medicines may need to be changed to protect your baby.     Use acetaminophen (Tylenol) to relieve minor problems, such as a mild headache or backache or a mild fever with cold symptoms. Do not use nonsteroidal anti-inflammatory drugs (NSAIDs), such as ibuprofen (Advil, Motrin) or naproxen (Aleve), unless your doctor says it is okay.     Do not take two or more pain medicines at the same time unless the doctor told you to. Many pain medicines have acetaminophen, which is Tylenol. Too much acetaminophen (Tylenol) can be harmful.     Take your medicines exactly as prescribed. Call your doctor if you think you are having a problem with your medicine.   To manage morning sickness    If you feel sick when you first wake up, try eating a small snack (such as crackers) before you get out of bed. Allow some time to digest the snack, and then get out of bed slowly.     Do not skip meals or go for long periods without eating. An empty stomach can make nausea worse.     Eat small, frequent meals instead of three large meals each day.     Drink plenty of fluids.     Eat foods that are high in protein but low in fat.     If you are taking iron supplements, ask your doctor if they are necessary. Iron can make  "nausea worse.     Avoid any smells, such as coffee, that make you feel sick.     Get lots of rest. Morning sickness may be worse when you are tired.   Follow-up care is a key part of your treatment and safety. Be sure to make and go to all appointments, and call your doctor if you are having problems. It's also a good idea to know your test results and keep a list of the medicines you take.  Where can you learn more?  Go to https://www.Taking [employer].net/patiented  Enter E868 in the search box to learn more about \"Learning About Pregnancy.\"  Current as of: November 9, 2022               Content Version: 13.7    4403-7371 [employer].   Care instructions adapted under license by your healthcare professional. If you have questions about a medical condition or this instruction, always ask your healthcare professional. [employer] disclaims any warranty or liability for your use of this information.      Weeks 6 to 10 of Your Pregnancy: Care Instructions  During these weeks of pregnancy, your body goes through many changes. You may start to feel different, both in your body and your emotions. Each pregnancy is different, so there's no \"right\" way to feel. These early weeks are a time to make healthy choices for you and your pregnancy.    Take a daily prenatal vitamin. Choose one with folic acid in it.   Avoid alcohol, tobacco, and drugs (including marijuana). If you need help quitting, talk to your doctor.     Drink plenty of liquids.  Be sure to drink enough water. And limit sodas, other sweetened drinks, and caffeine.     Choose foods that are good sources of calcium, iron, and folate.  You can try dairy products, dark leafy greens, fortified orange juice and cereals, almonds, broccoli, dried fruit, and beans.     Avoid foods that may be harmful.  Don't eat raw meat, deli meat, raw seafood, or raw eggs. Avoid soft cheese and unpasteurized dairy, like Brie and blue cheese. And don't eat fish that " "contains a lot of mercury, like shark and swordfish.     Don't touch albino litter or cat poop.  They can cause an infection that could be harmful during pregnancy.     Avoid things that can make your body too hot.  For example, avoid hot tubs and saunas.     Soothe morning sickness.  Try eating 5 or 6 small meals a day, getting some fresh air, or using refugio to control symptoms.     Ask your doctor about flu and COVID-19 shots.  Getting them can help protect against infection.   Follow-up care is a key part of your treatment and safety. Be sure to make and go to all appointments, and call your doctor if you are having problems. It's also a good idea to know your test results and keep a list of the medicines you take.  Where can you learn more?  Go to https://www.Influitive.Oneflare/patiented  Enter G112 in the search box to learn more about \"Weeks 6 to 10 of Your Pregnancy: Care Instructions.\"  Current as of: November 9, 2022               Content Version: 13.7 2006-2023 [email or web address].   Care instructions adapted under license by your healthcare professional. If you have questions about a medical condition or this instruction, always ask your healthcare professional. [id number] disclaims any warranty or liability for your use of this information.         Managing Morning Sickness (01:55)  Your health professional recommends that you watch this short online health video.  Learn tips for dealing with morning sickness, no matter what time of day you have it.  Purpose:  Gives tips for managing morning sickness, including eating small low-fat meals and avoiding caffeine and spicy food.  Goal:  The user will learn tips for dealing with morning sickness during pregnancy.     How to watch the video    Scan the QR code   OR Visit the website    [email or web address]   Current as of: November 9, 2022               Content Version: 13.7 2006-2023 [email or web address].   Care instructions " adapted under license by your healthcare professional. If you have questions about a medical condition or this instruction, always ask your healthcare professional. [employer], [employer] disclaims any warranty or liability for your use of this information.      Pregnancy and Heartburn: Care Instructions  Overview     Heartburn is a common problem during pregnancy.  Heartburn happens when stomach acid backs up into the tube that carries food to the stomach. This tube is called the esophagus. Early in pregnancy, heartburn is caused by hormone changes that slow down digestion. Later on, it's also caused by the large uterus pushing up on the stomach.  Even though you can't fix the cause, there are things you can do to get relief. Treating heartburn during pregnancy focuses first on making lifestyle changes, like changing what and how you eat, and on taking medicines.  Heartburn usually improves or goes away after childbirth.  Follow-up care is a key part of your treatment and safety. Be sure to make and go to all appointments, and call your doctor if you are having problems. It's also a good idea to know your test results and keep a list of the medicines you take.  How can you care for yourself at home?  Eat small, frequent meals.  Avoid foods that make your symptoms worse, such as chocolate, peppermint, and spicy foods. Avoid drinks with caffeine, such as coffee, tea, and sodas.  Avoid bending over or lying down after meals.  Take a short walk after you eat.  If heartburn is a problem at night, do not eat for 2 hours before bedtime.  Take antacids like Mylanta, Maalox, Rolaids, or Tums. Do not take antacids that have sodium bicarbonate, magnesium trisilicate, or aspirin. Be careful when you take over-the-counter antacid medicines. Many of these medicines have aspirin in them. While you are pregnant, do not take aspirin or medicines that contain aspirin unless your doctor says it is okay.  If you're not getting  "relief, talk to your doctor. You may be able to take a stronger acid-reducing medicine.  When should you call for help?   Call your doctor now or seek immediate medical care if:    You have new or worse belly pain.     You are vomiting.   Watch closely for changes in your health, and be sure to contact your doctor if:    You have new or worse symptoms of reflux.     You are losing weight.     You have trouble or pain swallowing.     You do not get better as expected.   Where can you learn more?  Go to https://www.[employer].net/patiented  Enter U946 in the search box to learn more about \"Pregnancy and Heartburn: Care Instructions.\"  Current as of: November 9, 2022               Content Version: 13.7 2006-2023 [employer].   Care instructions adapted under license by your healthcare professional. If you have questions about a medical condition or this instruction, always ask your healthcare professional. [employer] disclaims any warranty or liability for your use of this information.      Constipation: Care Instructions  Overview     Constipation means that you have a hard time passing stools (bowel movements). People pass stools from 3 times a day to once every 3 days. What is normal for you may be different. Constipation may occur with pain in the rectum and cramping. The pain may get worse when you try to pass stools. Sometimes there are small amounts of bright red blood on toilet paper or the surface of stools. This is because of enlarged veins near the rectum (hemorrhoids).  A few changes in your diet and lifestyle may help you avoid ongoing constipation. Your doctor may also prescribe medicine to help loosen your stool.  Some medicines can cause constipation. These include pain medicines and antidepressants. Tell your doctor about all the medicines you take. Your doctor may want to make a medicine change to ease your symptoms.  Follow-up care is a key part of your treatment and " "safety. Be sure to make and go to all appointments, and call your doctor if you are having problems. It's also a good idea to know your test results and keep a list of the medicines you take.  How can you care for yourself at home?  Drink plenty of fluids. If you have kidney, heart, or liver disease and have to limit fluids, talk with your doctor before you increase the amount of fluids you drink.  Include high-fiber foods in your diet each day. These include fruits, vegetables, beans, and whole grains.  Get at least 30 minutes of exercise on most days of the week. Walking is a good choice. You also may want to do other activities, such as running, swimming, cycling, or playing tennis or team sports.  Take a fiber supplement, such as Citrucel or Metamucil, every day. Read and follow all instructions on the label.  Schedule time each day for a bowel movement. A daily routine may help. Take your time having a bowel movement, but don't sit for more than 10 minutes at a time. And don't strain too much.  Support your feet with a small step stool when you sit on the toilet. This helps flex your hips and places your pelvis in a squatting position.  Your doctor may recommend an over-the-counter laxative to relieve your constipation. Examples are Milk of Magnesia and MiraLax. Read and follow all instructions on the label. Do not use laxatives on a long-term basis.  When should you call for help?   Call your doctor now or seek immediate medical care if:    You have new or worse belly pain.     You have new or worse nausea or vomiting.     You have blood in your stools.   Watch closely for changes in your health, and be sure to contact your doctor if:    Your constipation is getting worse.     You do not get better as expected.   Where can you learn more?  Go to https://www.healthwise.net/patiented  Enter P343 in the search box to learn more about \"Constipation: Care Instructions.\"  Current as of: March 22, " "2023               Content Version: 13.7 2006-2023 [employer].   Care instructions adapted under license by your healthcare professional. If you have questions about a medical condition or this instruction, always ask your healthcare professional. [employer] disclaims any warranty or liability for your use of this information.      Learning About High-Iron Foods  What foods are high in iron?     The foods you eat contain nutrients, such as vitamins and minerals. Iron is a nutrient. Your body needs the right amount to stay healthy and work as it should. You can use the list below to help you make choices about which foods to eat.  Here are some foods that contain iron. They have 1 to 2 milligrams of iron per serving.  Fruits  Figs (dried), 5 figs  Vegetables  Asparagus (canned), 6 jimenez  Justo, beet, Swiss chard, or turnip greens, 1 cup  Dried peas, cooked,   cup  Seaweed, spirulina (dried),   cup  Spinach, (cooked)   cup or (raw) 1 cup  Grains  Cereals, fortified with iron, 1 cup  Grits (instant, cooked), fortified with iron,   cup  Meats and other protein foods  Beans (kidney, lima, navy, white), canned or cooked,   cup  Beef or lamb, 3 oz  Chicken giblets, 3 oz  Chickpeas (garbanzo beans),   cup  Liver of beef, lamb, or pork, 3 oz  Oysters (cooked), 3 oz  Sardines (canned), 3 oz  Soybeans (boiled),   cup  Tofu (firm),   cup  Work with your doctor to find out how much of this nutrient you need. Depending on your health, you may need more or less of it in your diet.  Where can you learn more?  Go to https://www.healthQ-Sensei.net/patiented  Enter R005 in the search box to learn more about \"Learning About High-Iron Foods.\"  Current as of: March 1, 2023               Content Version: 13.7 2006-2023 [employer].   Care instructions adapted under license by your healthcare professional. If you have questions about a medical condition or this instruction, always ask your " "healthcare professional. [employer], [facility] disclaims any warranty or liability for your use of this information.      Rh Antibodies Screening During Pregnancy: About This Test  What is it?     The Rh antibodies screening test is a blood test. It checks your blood for Rh antibodies. If you have Rh-negative blood and have been exposed to Rh-positive blood, your immune system may make antibodies to attack the Rh-positive blood. When a pregnant woman has these antibodies, it is called Rh sensitization.  Why is this test done?  The Rh antibodies screening test is done during pregnancy to find out if your baby is at risk for Rh disease. This can happen if you have Rh-negative blood and your baby has Rh-positive blood. If your Rh-negative blood mixes with Rh-positive blood, your immune system will make antibodies to attack the Rh-positive blood.  During pregnancy, these antibodies could attach to the baby's red blood cells. This can cause your baby to have serious health problems. The results of this test will help your doctor know how to best care for you and your baby during your pregnancy.  How do you prepare for the test?  In general, there's nothing you have to do before this test, unless your doctor tells you to.  How is the test done?  A health professional uses a needle to take a blood sample, usually from the arm.  What happens after the test?  You will probably be able to go home right away. It depends on the reason for the test.  You can go back to your usual activities right away.  Follow-up care is a key part of your treatment and safety. Be sure to make and go to all appointments, and call your doctor if you are having problems. It's also a good idea to keep a list of the medicines you take. Ask your doctor when you can expect to have your test results.  Where can you learn more?  Go to https://www.[employer].net/patiented  Enter P722 in the search box to learn more about \"Rh Antibodies Screening " "During Pregnancy: About This Test.\"  Current as of: 2022               Content Version: 13.7    6794-9929 [employer].   Care instructions adapted under license by your healthcare professional. If you have questions about a medical condition or this instruction, always ask your healthcare professional. [employer] disclaims any warranty or liability for your use of this information.      Learning About Preventing Rh Disease  What is Rh disease?     Rh disease can be a serious problem in pregnancy. It happens when substances called antibodies in the mother's blood cause red blood cells in her baby's blood to be destroyed. This can occur when the blood types of a mother and her baby do not match.  All blood has an Rh factor. This is what makes a blood type positive or negative. When you are Rh-negative, your baby may be Rh-negative or Rh-positive. If your baby has Rh-positive blood and it mixes with yours, your body will make antibodies. This is called Rh sensitization.  Most of the time, this is not a problem in a first pregnancy. But in future pregnancies, it could cause Rh disease.  A  with Rh disease has mild anemia and may have jaundice. In severe cases, anemia, jaundice, and swelling can be very dangerous or fatal. Some babies need to be delivered early. Some need special care in the NICU. A very sick baby will need a blood transfusion before or after birth.  Fortunately, Rh sensitization is usually easy to prevent.  That's why it's important to get your Rh status checked in your first trimester. It doesn't cause any warning signs. A blood test is the only way to know if you are Rh-sensitive or are at risk for it.  How can you prevent Rh disease?  If you are Rh-negative, your doctor gives you an Rh immune globulin shot (such as RhoGAM). It helps prevent your body from making the antibodies that attack your baby's red blood cells.  Timing is important. You need the " "shot at certain times during your pregnancy. And you need one anytime there is a chance that your baby's blood might mix with yours. That can happen with certain prenatal tests or when you have pregnancy bleeding, such as:  Right after any pregnancy loss, amniocentesis, or CVS testing.  After turning of a breech baby.  Before and maybe after childbirth. Your doctor gives you a shot around week 28. If your  is Rh-positive, you will have another shot.  Follow-up care is a key part of your treatment and safety. Be sure to make and go to all appointments, and call your doctor if you are having problems. It's also a good idea to know your test results and keep a list of the medicines you take.  Where can you learn more?  Go to https://www.[email or web address] Huff[employer] Post/patiented  Enter W177 in the search box to learn more about \"Learning About Preventing Rh Disease.\"  Current as of: 2022               Content Version: 13.7    1487-3176 [employer].   Care instructions adapted under license by your healthcare professional. If you have questions about a medical condition or this instruction, always ask your healthcare professional. [employer] disclaims any warranty or liability for your use of this information.      Learning About Rh Immunoglobulin Shots  Introduction     An Rh immunoglobulin shot is given to pregnant women who have Rh-negative blood.  You may have Rh-negative blood, and your baby may have Rh-positive blood. If the two types of blood mix, your body will make antibodies. This is called Rh sensitization. Most of the time, this is not a problem the first time you're pregnant. But it could cause problems in future pregnancies.  This shot keeps your body from making the antibodies. You get the shot around 28 weeks of pregnancy. After the birth, your baby's blood is tested. If the blood is Rh positive, you will get another shot. You may also get the shot if you have vaginal bleeding " "while you are pregnant or if you have a miscarriage. These shots protect future pregnancies.  Women with Rh negative blood will need this shot each time they get pregnant.  Example  Rh immunoglobulin (HypRho-D, MICRhoGAM, and RhoGAM)  Possible side effects  Rare side effects may include:  Some mild pain where you got the shot.  A slight fever.  An allergic reaction.  You may have other side effects not listed here. Check the information that comes with your medicine.  What to know about taking this medicine  You may need more than one shot. You may need the shot again:  After amniocentesis, fetal blood sampling, or chorionic villus sampling tests.  If you have bleeding in your second or third trimester.  After turning of a breech baby.  After an injury to the belly while you are pregnant.  After a miscarriage or an .  Before or right after treatment for an ectopic or a partial molar pregnancy.  Tell your doctor if you have any allergies or have had a bad response to medicines in the past.  If you get this shot within 3 months of getting a live-virus vaccine, the vaccine may not work. Your doctor will tell you if you need more vaccine.  Check with your doctor or pharmacist before you use any other medicines. This includes over-the-counter medicines. Make sure your doctor knows all of the medicines, vitamins, herbs, and supplements you take. Taking some medicines at the same time can cause problems.  Where can you learn more?  Go to https://www.[employer].net/patiented  Enter V615 in the search box to learn more about \"Learning About Rh Immunoglobulin Shots.\"  Current as of: 2022               Content Version: 13.7    8916-6404 [employer]   Care instructions adapted under license by your healthcare professional. If you have questions about a medical condition or this instruction, always ask your healthcare professional. [employer] disclaims any warranty or liability for " your use of this information.      Rubella (Northern Irish Measles): Care Instructions  Overview  Rubella, also called [place] Irish measles or 3-day measles, is a disease caused by a virus. It spreads by coughs, sneezes, and close contact. Rubella usually is mild and does not cause long-term problems. But if you are pregnant and get it, you can give the disease to your unborn baby. This can cause serious birth defects.  While you have rubella, you may get a rash and a mild fever, and the lymph glands in your neck may swell. Older children often have a fever, eye pain, a sore throat, and body aches. You can relieve most symptoms with care at home. Avoid being around others, especially pregnant people, until your rash has been gone for at least 4 days. People who have not had this disease before or have not had the vaccine have the greatest chance of getting the virus.  Follow-up care is a key part of your treatment and safety. Be sure to make and go to all appointments, and call your doctor if you are having problems. It's also a good idea to know your test results and keep a list of the medicines you take.  How can you care for yourself at home?  Drink plenty of fluids. If you have kidney, heart, or liver disease and have to limit fluids, talk with your doctor before you increase the amount of fluids you drink.  Get plenty of rest to help your body heal.  Take an over-the-counter pain medicine, such as acetaminophen (Tylenol), ibuprofen (Advil, Motrin), or naproxen (Aleve), to reduce fever and discomfort. Read and follow all instructions on the label. Do not give aspirin to anyone younger than 20. It has been linked to Reye syndrome, a serious illness.  Do not take two or more pain medicines at the same time unless the doctor told you to. Many pain medicines have acetaminophen, which is Tylenol. Too much acetaminophen (Tylenol) can be harmful.  Try not to scratch the rash. Put cold, wet cloths on the rash to reduce itching.  Do  "not smoke. Smoking can make your symptoms worse. If you need help quitting, talk to your doctor about stop-smoking programs and medicines. These can increase your chances of quitting for good.  Avoid contact with people who have never had rubella and who have not been immunized.  When should you call for help?   Call your doctor now or seek immediate medical care if:    You have a fever with a stiff neck or a severe headache.     You are sensitive to light or feel very sleepy or confused.   Watch closely for changes in your health, and be sure to contact your doctor if:    You do not get better as expected.   Where can you learn more?  Go to https://www.[employer].net/patiented  Enter B812 in the search box to learn more about \"Rubella (English Measles): Care Instructions.\"  Current as of: 2022               Content Version: 13.7    0459-8403 [employer].   Care instructions adapted under license by your healthcare professional. If you have questions about a medical condition or this instruction, always ask your healthcare professional. [employer] disclaims any warranty or liability for your use of this information.      Gonorrhea and Chlamydia: About These Tests  What is it?  These tests use a sample of urine or other body fluid to look for the bacteria that cause these sexually transmitted infections (STIs). The fluid sample can come from the cervix, vagina, rectum, throat, or eyes.  Why is this test done?  These tests may be done to:  Find out if symptoms are caused by gonorrhea or chlamydia.  Check people who are at high risk of being infected with gonorrhea or chlamydia.  Retest people several months after they have been treated for gonorrhea or chlamydia.  Check for infection in your  if you had a gonorrhea or chlamydia infection at the time of delivery.  How can you prepare for the test?  If you are going to have a urine test, do not urinate for at least 1 hour " "before the test.  If you think you may have chlamydia or gonorrhea, don't have sexual intercourse until you get your test results. And you may want to have tests for other STIs, such as HIV.  How is the test done?  For a direct sample, a swab is used to collect body fluid from the cervix, vagina, rectum, throat, or eyes. Your doctor may collect the sample. Or you may be given instructions on how to collect your own sample.  For a urine sample, you will collect the urine that comes out when you first start to urinate. Don't wipe the genital area clean before you urinate.  How long does the test take?  The test will take a few minutes.  What happens after the test?  You will be able to go home right away.  You can go back to your usual activities right away.  If you do have an infection, don't have sexual intercourse for 7 days after you start treatment. And your sex partner(s) should also be treated.  Follow-up care is a key part of your treatment and safety. Be sure to make and go to all appointments, and call your doctor if you are having problems. It's also a good idea to keep a list of the medicines you take. Ask your doctor when you can expect to have your test results.  Where can you learn more?  Go to https://www.[employer].net/patiented  Enter K976 in the search box to learn more about \"Gonorrhea and Chlamydia: About These Tests.\"  Current as of: August 2, 2022               Content Version: 13.7    1369-0509 [employer].   Care instructions adapted under license by your healthcare professional. If you have questions about a medical condition or this instruction, always ask your healthcare professional. [employer] disclaims any warranty or liability for your use of this information.      Trichomoniasis: About This Test  What is it?     This test uses a sample of urine or other body fluid to look for the tiny parasite that causes trichomoniasis (also called trich). The fluid sample " can come from the vagina, cervix, or urethra. Your doctor may choose to use one or more of many available tests.  Why is it done?  A trich test may be done to:  Find out if symptoms are caused by trich.  Check people who are at high risk for being infected with trich.  Check after treatment to make sure that the infection is gone.  How do you prepare for the test?  If you are going to have a urine test, do not urinate for at least 1 hour before the test.  How is the test done?  For a direct sample, a swab is used to collect body fluid from the cervix, vagina, or urethra. Your doctor may collect the sample. Or you may be given instructions on how to collect your own sample.  For a urine sample, you will collect the urine that comes out when you first start to urinate. Don't wipe the area clean before you urinate.  How long does the test take?  It will take a few minutes to collect a sample.  What happens after the test?  You can go home right away.  You can go back to your usual activities right away.  You may get the test results the same day or several days later. It depends on the test used.  If you do have an infection, don't have sexual intercourse for 7 days after you start treatment. Your sex partner(s) should also be treated.  Follow-up care is a key part of your treatment and safety. Be sure to make and go to all appointments, and call your doctor if you are having problems. Ask your doctor when you can expect to have your test results.  Current as of: August 2, 2022               Content Version: 13.7    4958-8642 [employer].   Care instructions adapted under license by your healthcare professional. If you have questions about a medical condition or this instruction, always ask your healthcare professional. [employer] disclaims any warranty or liability for your use of this information.      HIV Testing: Care Instructions  Overview     You can get tested for the human  "immunodeficiency virus (HIV). This test checks for HIV antibodies and antigens in your blood. If they are found, the test is positive.  If HIV antibodies or antigens are not found, you may need a repeat test to be sure the results are correct. Or your doctor may want to do a different test.  Follow-up care is a key part of your treatment and safety. Be sure to make and go to all appointments, and call your doctor if you are having problems. It's also a good idea to know your test results and keep a list of the medicines you take.  What do the results mean?  Normal result  A normal result means that no HIV antibodies or antigens were found in your blood. And if you had a test that checked for HIV RNA or DNA, none was found. Normal results are called negative.  You may need more testing to be sure the test results are correct.  Uncertain result  Test results don't clearly show whether you have an HIV infection. This is usually called an indeterminate result. This may happen before HIV antibodies or antigens develop. Or it may happen when some other type of antibody or antigen interferes with the results. If this occurs, you will probably have another test right away.  Abnormal result  An abnormal result means that you have HIV antibodies or antigens in your blood. These results are called positive.  A positive test will be confirmed by another type of test. This is because some tests can cause false-positive results. No one is considered HIV-positive until the result is confirmed by a test that shows HIV RNA or DNA in the person's blood.  If your test result is positive, your doctor will talk to you about starting treatment.  Where can you learn more?  Go to https://www.[employer].net/patiented  Enter T792 in the search box to learn more about \"HIV Testing: Care Instructions.\"  Current as of: October 31, 2022               Content Version: 13.7    3647-3024 [employer].   Care instructions adapted under " license by your healthcare professional. If you have questions about a medical condition or this instruction, always ask your healthcare professional. [employer] disclaims any warranty or liability for your use of this information.      Hepatitis C Virus Tests: About These Tests  What are they?     Hepatitis C virus tests are blood tests that check for substances in the blood that show whether you have hepatitis C now or had it in the past. The tests can also tell you what type of hepatitis C you have and how severe the disease is. This can help your doctor with treatment.  If the tests show that you have long-term hepatitis C, you need to take steps to prevent spreading the disease.  Why are these tests done?  You may need these tests if:  You have symptoms of hepatitis.  You may have been exposed to the virus. You are more likely to have been exposed to the virus if you inject drugs or are exposed to body fluids (such as if you are a health care worker).  You've had other tests that show you have liver problems.  You are 18 to 79 years old.  You have an HIV infection.  The tests also are done to help your doctor decide about your treatment and see how well it works.  How do you prepare for the test?  In general, there's nothing you have to do before this test, unless your doctor tells you to.  How is the test done?  A health professional uses a needle to take a blood sample, usually from the arm.  What happens after these tests?  You will probably be able to go home right away.  You can go back to your usual activities right away.  Follow-up care is a key part of your treatment and safety. Be sure to make and go to all appointments, and call your doctor if you are having problems. It's also a good idea to keep a list of the medicines you take. Ask your doctor when you can expect to have your test results.  Where can you learn more?  Go to https://www.[employer]Where.net/patiented  Enter W551 in the search  "box to learn more about \"Hepatitis C Virus Tests: About These Tests.\"  Current as of: October 31, 2022               Content Version: 13.7    3561-4511 [employer].   Care instructions adapted under license by your healthcare professional. If you have questions about a medical condition or this instruction, always ask your healthcare professional. [employer] disclaims any warranty or liability for your use of this information.      Learning About Fetal Ultrasound Results  What is a fetal ultrasound?     Fetal ultrasound is a test that lets your doctor see an image of your baby. Your doctor learns information about your baby from this picture. You may find out, for example, if you are having a boy or a girl. But the main reason you have this test is to get information about your baby's health.  (You may hear your baby called a fetus. This is a common medical term for a baby that's growing in the mother's uterus.)  What kind of information can you learn from this test?  The findings of an ultrasound fall into two categories, normal and abnormal.  Normal  The fetus is the right size for its age.  The placenta is the expected size and does not cover the cervix.  There is enough amniotic fluid in the uterus.  No birth defects can be seen.  Abnormal  The fetus is small or large for its age.  The placenta covers the cervix.  There is too much or too little amniotic fluid in the uterus.  The fetus may have a birth defect.  What does an abnormal result mean?  Abnormal seems to imply that something is wrong with your baby. But what it means is that the test has shown something the doctor wants to take a closer look at.  And that's what happens next. Your doctor will talk to you about what further test or tests you may need.  What do the results mean?  Some of the things your doctor may see on an abnormal ultrasound include:  Echogenic bowel.  The bowel looks very bright on the screen. This could " mean that there's blood in the bowel. Or it could mean that something is blocking the small bowel.  Increased nuchal translucency.  The ultrasound measures the thickness at the back of the baby's neck. An increase in thickness is sometimes an early sign of Down syndrome.  Increased or decreased amniotic fluid.  The doctor will look for a reason for the level of amniotic fluid and will watch the pregnancy closely as it progresses.  Large ventricles.  Ventricles in the brain look larger than they should. Your doctor may take a closer look at the brain.  Renal pyelectasis/hydronephrosis.  The ultrasound measures the fluid around the kidney. If there is more fluid than expected, there is a chance of urinary tract or kidney problems.  Short long bones.  The ultrasound measures certain arm and leg bones. A long bone (humerus or femur) that is shorter than average could be a sign of Down syndrome.  Subchorionic hemorrhage.  An ultrasound can show bleeding under one of the membranes that surrounds the fetus. Some women don't have symptoms of bleeding. The ultrasound can find this problem when women are not bleeding from their vagina. Women who have this condition have a slightly higher chance of miscarriage.  What do you do now?  Take a deep breath, and let it out. Keep in mind that an abnormal finding on an ultrasound, after it's coupled with more information, may:  Turn out to be nothing.  Turn out to be something mild that won't affect the baby.  Turn out to be something more serious. But if this happens, early diagnosis helps you and your doctor plan treatment options sooner rather than later.  Your medical team is there for you. So are your family and friends. Ask questions, and get the help and support you need.  Follow-up care is a key part of your treatment and safety. Be sure to make and go to all appointments, and call your doctor if you are having problems. It's also a good idea to know your test results and keep  "a list of the medicines you take.  Where can you learn more?  Go to https://www.[employer].net/patiented  Enter K451 in the search box to learn more about \"Learning About Fetal Ultrasound Results.\"  Current as of: November 9, 2022               Content Version: 13.7    5232-9590 [employer].   Care instructions adapted under license by your healthcare professional. If you have questions about a medical condition or this instruction, always ask your healthcare professional. [employer] disclaims any warranty or liability for your use of this information.      Learning About Prenatal Visits  Your Care Instructions     Regular prenatal visits are very important during any pregnancy. These quick office visits may seem simple and routine. But they can help you and your baby stay healthy. Your doctor is watching for problems that can only be found by regularly checking you and your baby. The visits also give you and your doctor time to build a good relationship.  Many women have prenatal visits every 4 to 6 weeks until week 28 of pregnancy. Then the visits become more frequent. This is often every 2 to 3 weeks through week 36 of pregnancy. In the final month of pregnancy, you likely will see your doctor every week. You may have a different schedule if you have a medical problem or are a teen.  At different times in your pregnancy, you will have exams and tests. Some are routine. Others are done only when there is a chance of a problem. Everything healthy you do for your body helps your growing baby. Rest when you need it. Eat well, drink plenty of water, and exercise regularly.  What happens during a prenatal visit?  You will have blood pressure checks, along with urine tests. You also may have blood tests. If you need to go to the bathroom while waiting for the doctor, tell the nurse. He or she will give you a sample cup so your urine can be tested.  You will be weighed and have your belly " measured.  Your doctor may listen to your baby's heartbeat with a special stethoscope.  In your second trimester, your doctor will check your blood sugar (glucose tolerance test) for diabetes that can occur during pregnancy. This is gestational diabetes, which can harm your baby.  You will have tests to check for infections that could harm your . These include group B streptococcus and hepatitis B.  Your doctor may do ultrasounds to check for problems. This also checks your baby's position. An ultrasound uses sound waves to produce a picture of your baby.  You may have other tests at any time during your pregnancy.  Use your visits to discuss with your doctor any concerns you have.  How can you care for yourself at home?  Get plenty of rest.  Exercise every day, if your doctor says it is okay. If you have not exercised in the past, start out slowly. Take many short walks each day.  Eat a balanced diet. Make sure your diet includes plenty of beans, peas, and leafy green vegetables.  Drink plenty of fluids. Cut down on drinks with caffeine, such as coffee, tea, and cola. If you have kidney, heart, or liver disease and have to limit fluids, talk with your doctor before you increase the amount of fluids you drink.  Avoid chemical fumes, paint fumes, and poisons. Do not use alcohol, marijuana, or illegal drugs. Do not smoke, vape, or use tobacco. If you need help quitting, talk to your doctor about stop-smoking programs and medicines. These can increase your chances of quitting for good.  Review all of your medicines with your doctor. Some of your routine medicines may need to be changed to protect your baby. Do not stop or start taking any medicines without talking to your doctor first.  Follow-up care is a key part of your treatment and safety. Be sure to make and go to all appointments, and call your doctor if you are having problems. It's also a good idea to know your test results and keep a list of the  "medicines you take.  Where can you learn more?  Go to https://www.[employer].net/patiented  Enter J502 in the search box to learn more about \"Learning About Prenatal Visits.\"  Current as of: November 9, 2022               Content Version: 13.7    9674-2038 [employer].   Care instructions adapted under license by your healthcare professional. If you have questions about a medical condition or this instruction, always ask your healthcare professional. [employer] disclaims any warranty or liability for your use of this information.      Domestic Abuse: Care Instructions  Overview     If you want to save this information but don't think it is safe to take it home, see if a trusted friend can keep it for you. Plan ahead. Know who you can call for help, and memorize the phone number.   Be careful online too. Your online activity may be seen by others. Do not use your personal computer or device to read about this topic. Use a safe computer such as one at work, a friend's house, or a library.    Domestic abuse is different from an argument now and then. It is a pattern of abuse that one person uses to control another person's behavior. It may start with threats and name-calling. Then, it may lead to more serious acts, like pushing and slapping. The abuse also may occur in other areas. For example, the abuser may withhold money or spend a partner's money without their knowledge.  Abuse can cause serious harm. You are more likely to have a long-term health problem from the injuries and stress of living in a violent relationship. People who are sexually abused by their partners have more sexually transmitted infections and unwanted pregnancies. Anyone can be abused in relationships. Anyone who is abused also faces emotional pain.  If you are pregnant, abuse can cause problems such as poor weight gain, infections, and bleeding. Abuse during this time may increase your baby's risk of low birth " "weight, premature birth, and death.  Follow-up care is a key part of your treatment and safety. Be sure to make and go to all appointments, and call your doctor if you are having problems. It's also a good idea to know your test results and keep a list of the medicines you take.  How can you care for yourself at home?  If you do not have a safe place to stay, discuss this with your doctor before you leave.  Have a plan for where to go, how to leave your house, and where to stay in case of an emergency. Do not tell your partner about your plan. Contact:  The National Domestic Violence Hotline toll-free at 1-668.382.6531. They can help you find resources in your area.  Your local police department, hospital, or clinic for information about shelters and safe homes near you.  Talk to a trusted friend or neighbor or a [employer] counselor. Do not feel that you have to hide what happened.  Teach your children how to call for help in an emergency.  Be alert to warning signs, such as threats, heavy alcohol use, or drug use. This can help you avoid danger.  If you can, make sure that there are no guns or other weapons in the house.  When should you call for help?   Call 911 anytime you think you may need emergency care. For example, call if:    You or someone else has just been abused.     You think you or someone else is in danger of being abused.   Watch closely for changes in your health, and be sure to contact your doctor if you have any problems.  Where can you learn more?  Go to https://www.[employer].net/patiented  Enter G282 in the search box to learn more about \"Domestic Abuse: Care Instructions.\"  Current as of: February 27, 2023               Content Version: 13.7    6599-2984 [employer].   Care instructions adapted under license by your healthcare professional. If you have questions about a medical condition or this instruction, always ask your healthcare professional. [employer] " disclaims any warranty or liability for your use of this information.      Domestic Violence Safety Instructions: Care Instructions  Overview     If you want to save this information but don't think it is safe to take it home, see if a trusted friend can keep it for you. Plan ahead. Know who you can call for help, and memorize the phone number.   Be careful online too. Your online activity may be seen by others. Do not use your personal computer or device to read about this topic. Use a safe computer such as one at work, a friend's house, or a library.    When you are abused by a spouse or partner, you can take actions to protect yourself and your children.  You can increase your safety whether you decide to stay with your spouse or partner or you decide to leave. You can also prepare an action plan and kit ahead of time. This will allow you to leave quickly when you decide to. Remember, you cannot stop your partner's abuse, but you can find help for you and your children. No one deserves to be abused.  Follow-up care is a key part of your treatment and safety. Be sure to make and go to all appointments, and call your doctor if you are having problems. It's also a good idea to know your test results and keep a list of the medicines you take.  How can you care for yourself at home?  Make a plan for your safety   If you decide to stay with your abusive spouse or partner, you can do the following to increase your safety:  Decide what works best to keep you safe in an emergency.  Decide who you can call to help you in an emergency.  Decide if you will call the police if you get hurt again. If you can, agree on a signal with your children or neighbor to call the police for you if you need help. You can flash lights or hang something out of a window.  Choose a place to go for a short time if you need to leave home. Memorize the address and phone number.  Learn escape routes out of your home in case you need to leave in a  hurry. Teach your children different ways to get out of the house quickly if they need to.  Take objects that can be used as weapons (guns, knives, hammers) and hide them or lock them up.  Learn the number of a domestic violence shelter. Talk to the people there about how they can help.  Find out about other community resources that can help you.  Take pictures of bruises or other injuries if you can. You can also take pictures of things your abuser has broken.  Teach your children that violence is never okay. Tell them that they do not deserve to be hurt.  Pack a bag   Prepare a kit with things you will need if you leave the house suddenly. You can try to hide this in your house, or you can leave it with a friend or relative you can trust. You should include the following items in the kit:  A set of keys to your house and car.  Emergency phone numbers and addresses. You might also want to have a map and a small flashlight in case you need to leave in the night.  Money such as cash or checks. You can also ask a trusted friend or family member to hold money for you.  Copies of legal documents such as house and car titles or rent receipts, birth certificates, Social Security card, voter registration, marriage and 's licenses, and your children's health records.  Personal items you would need for a few days, such as clothes, a toothbrush, toothpaste, and any medicines you or your children need.  A favorite toy or book for your child or children.  Diapers and bottles, if you have very young children.  Pictures that show signs of abuse and violence. You may also add pictures of your abuser.  If you leave   If you decide to leave, you can take the following steps:  Go to the emergency room at a hospital if you have been hurt.  Ask the police to be with you as you leave. They can protect you as you leave the house.  If you decide to leave secretly, remember that activities can be tracked. Your abuser may still have  "access to your cell phone, email, and credit cards. It may be possible for these to be traced. Always be aware of your surroundings.  Take the kit you have prepared. If this is an emergency, do not worry about gathering up anything. Just leave--your safety is most important.  If your abuser moves out, change the locks on the doors. If you have a security system, change the access code.  When should you call for help?   Call 911 anytime you think you may need emergency care. For example, call if:    You or someone else has just been abused.     You think you or someone else is in danger of being abused.   Watch closely for changes in your health, and be sure to contact your doctor if you have any problems.  Where can you learn more?  Go to https://www.[employer].net/patiented  Enter A752 in the search box to learn more about \"Domestic Violence Safety Instructions: Care Instructions.\"  Current as of: October 20, 2022               Content Version: 13.7    5886-2282 [employer].   Care instructions adapted under license by your healthcare professional. If you have questions about a medical condition or this instruction, always ask your healthcare professional. [employer] disclaims any warranty or liability for your use of this information.      Learning About Domestic Abuse  What is domestic abuse?  Domestic abuse is threats or violent behavior in a personal relationship. It can happen between past or current partners or spouses. It's also called domestic violence or intimate partner violence.  Domestic abuse can affect people of any ethnic group, race, or Catholic. It can affect teens, adults, or the elderly. And it can happen to people of any sexual identity or social status. But most abuse victims are women.  Abusers use fear, bullying, and threats to control their partners. They control what their partners do, where they go, or who they see. They may act jealous, controlling, or " possessive. These early signs of abuse may happen soon after the start of the relationship. Sometimes it can be hard to notice abuse at first. But after the relationship becomes more serious, the abuse may get worse.  If you are being abused in your relationship, it's important to get help. The abuse is not your fault, and you don't have to face it alone.  Be careful  It may not be safe to take home domestic abuse information like this handout. Some people ask a trusted friend to keep it for them. It's also important to plan ahead and to memorize the phone number of places you can go for help. If you are concerned about your safety, do not use your computer, smartphone, or tablet to read about domestic abuse.   What are the types of domestic abuse?  Abuse can be emotional, physical, or sexual.  Emotional abuse  Emotional abuse is a pattern of threats, insults, or controlling behavior. It includes verbal abuse. It goes beyond healthy disagreements in a relationship. It's a sign of an unhealthy relationship, and it may be against the law.  Do you feel threatened, intimidated, or controlled? Does your partner threaten your children, other family members, or pets?  Does your partner:  Use jokes meant to embarrass or shame you?  Call you names?  Tell you that you are a bad parent or threaten to take away your children?  Threaten to have you or your family members deported?  Control your access to money or other basic needs?  Control what you do, who you see or talk to, or where you go?  Another form of emotional abuse is denying that it is happening. Or the abuser may act like the abuse is no big deal or is your fault.  Sexual abuse  With sexual abuse, abusers may try to convince or force you to have sex. They may force you into sex acts you're not comfortable with. Or they may sexually assault you. Sexual abuse can happen even if you are in a committed relationship.  Physical abuse  Physical abuse means that a partner  hits, kicks, or physically hurts you. Physical abuse that starts with a slap might lead to kicking, shoving, and choking over time. The abuser may also threaten to hurt or kill you.  What problems can domestic abuse lead to?  Domestic abuse can be very dangerous. It can cause serious, repeated injury. It can even lead to death.  All forms of abuse can cause long-term health problems from the stress of a violent relationship. Verbal abuse can lead to sexual and physical abuse.  Abuse causes emotional pain, depression, anxiety, and post-traumatic stress. Sexual abuse can lead to sexually transmitted infections (including HIV/AIDS) and unplanned pregnancy.  Pregnancy can be a very dangerous time for people in abusive relationships. Pregnant people who are abused may have anemia, infections, bleeding, or poor weight gain. Abuse during this time may also increase your baby's risk of low birth weight, premature birth, and death.  It can be hard for some victims of domestic abuse to ask for help or to leave their relationship. You may feel scared, stuck, or not sure what steps to take. But it's important not to ignore abuse. Talking to someone could be the first step to ending the abuse and taking care of your own health and happiness again.  Where can you get help?  Talk to a trusted friend. Find a local advocacy group, or talk to your doctor about the abuse.  Contact the National Domestic Violence Hotline at 5-889-401-FDFW (1-970.583.8027) for more safety tips. They can guide you to groups in your area that can help. Or go to the National Coalition Against Domestic Violence website at www.thehotline.org to learn more.  Domestic violence groups or a counselor in your area can help you make a safety plan for yourself and your children.  When to call for help  Call 911 anytime you think you may need emergency care. For example, call if:  You think that you or someone you know is in danger of being abused.  You have been  "hurt and can't have someone safely take you to emergency care.  You have just been abused.  A family member has just been abused.  Where can you learn more?  Go to https://www.[employer].net/patiented  Enter S665 in the search box to learn more about \"Learning About Domestic Abuse.\"  Current as of: February 27, 2023               Content Version: 13.7    5556-3651 [employer].   Care instructions adapted under license by your healthcare professional. If you have questions about a medical condition or this instruction, always ask your healthcare professional. [employer] disclaims any warranty or liability for your use of this information.      Vaginal Bleeding During Pregnancy: Care Instructions  Overview     It's common to have some vaginal spotting when you are pregnant. In some cases, the bleeding isn't serious. And there aren't any more problems with the pregnancy.  But sometimes bleeding is a sign of a more serious problem. This is more common if the bleeding is heavy or painful. Examples of more serious problems include miscarriage, an ectopic pregnancy, and a problem with the placenta.  You may have to see your doctor again to be sure everything is okay. You may also need more tests to find the cause of the bleeding.  Home treatment may be all you need. But it depends on what is causing the bleeding. Be sure to tell your doctor if you have any new symptoms or if your symptoms get worse.  The doctor has checked you carefully, but problems can develop later. If you notice any problems or new symptoms, get medical treatment right away.  Follow-up care is a key part of your treatment and safety. Be sure to make and go to all appointments, and call your doctor if you are having problems. It's also a good idea to know your test results and keep a list of the medicines you take.  How can you care for yourself at home?  If your doctor prescribed medicines, take them exactly as directed. Call " "your doctor if you think you are having a problem with your medicine.  Do not have vaginal sex until your doctor says it's okay.  Do not put anything in your vagina until your doctor says it's okay.  Ask your doctor about other activities you can or can't do.  Get a lot of rest. Being pregnant can make you tired.  Do not use nonsteroidal anti-inflammatory drugs (NSAIDs), such as ibuprofen (Advil, Motrin), naproxen (Aleve), or aspirin, unless your doctor says it is okay.  When should you call for help?   Call 911 anytime you think you may need emergency care. For example, call if:    You passed out (lost consciousness).     You have severe vaginal bleeding. This means you are soaking through a pad each hour for 2 or more hours.     You have sudden, severe pain in your belly or pelvis.   Call your doctor now or seek immediate medical care if:    You have new or worse vaginal bleeding.     You are dizzy or lightheaded, or you feel like you may faint.     You have pain in your belly, pelvis, or lower back.     You think that you are in labor.     You have a sudden release of fluid from your vagina.     You've been having regular contractions for an hour. This means that you've had at least 8 contractions within 1 hour or at least 4 contractions within 20 minutes, even after you change your position and drink fluids.     You notice that your baby has stopped moving or is moving much less than normal.   Watch closely for changes in your health, and be sure to contact your doctor if you have any problems.  Where can you learn more?  Go to https://www.[employer].net/patiented  Enter N829 in the search box to learn more about \"Vaginal Bleeding During Pregnancy: Care Instructions.\"  Current as of: November 9, 2022               Content Version: 13.7    2549-7387 [employer].   Care instructions adapted under license by your healthcare professional. If you have questions about a medical condition or this " instruction, always ask your healthcare professional. [employer] disclaims any warranty or liability for your use of this information.      Weeks 34 to 36 of Your Pregnancy: Care Instructions  Your belly has grown quite large. It's almost time to give birth! Your baby's lungs are almost ready to breathe air. The skull bones are firm enough to protect your baby's head. But they're soft enough to move down through the birth canal.    You might be wondering what to expect during labor. Because each birth is different, there's no way to know exactly what childbirth will be like for you. Talk to your doctor or midwife about any concerns you have.   You'll probably have a test for group B streptococcus (GBS). GBS is bacteria that can live in the vagina and rectum. GBS can make your baby sick after birth. If you test positive, you'll get antibiotics during labor.     Choose what type of pain relief you would like during labor.  You can choose from a few types, including medicine and non-medicine options. You may want to use several types of pain relief.     Know how medicines can help with pain during labor.  Some medicines lower anxiety and help with some of the pain. Others make your lower body numb so that you will feel less pain.     Tell your doctor about your pain medicine choice.  Do this before you start labor or very early in your labor. You may be able to change your mind during labor.     Learn about the stages of labor.    The first stage includes the early (latent) and active phases of labor. Contractions start in early labor. During active labor, contractions get stronger, last longer, and happen more often. And the cervix opens more rapidly.  The second stage starts when you're ready to push. During this stage, your baby is born.  During the third stage, you'll usually have a few more contractions to push out the placenta.   Follow-up care is a key part of your treatment and safety. Be sure to  "make and go to all appointments, and call your doctor if you are having problems. It's also a good idea to know your test results and keep a list of the medicines you take.  Where can you learn more?  Go to https://www.[employer].net/patiented  Enter B912 in the search box to learn more about \"Weeks 34 to 36 of Your Pregnancy: Care Instructions.\"  Current as of: November 9, 2022               Content Version: 13.7    2104-9864 [employer].   Care instructions adapted under license by your healthcare professional. If you have questions about a medical condition or this instruction, always ask your healthcare professional. [employer] disclaims any warranty or liability for your use of this information.      "

## 2023-09-21 NOTE — ED NOTES
Patient had POC US performed by provider to confirm pregnancy/heartbeat. Patient has OB intake appointment later today and wants to ask them further questions rather than have more tests performed in ED.

## 2023-09-21 NOTE — PROGRESS NOTES
Ayanna expressed concern about being high risk for maternal age and she has used ETOH, meth and marijuana as recently as last week (says she is no longer using since pregnancy confirmation).  Unsure paternity.  She plans to parent and is happy about being pregnant since she said she thought she was not able to conceive.       Message sent to Dr. Blood re. Referral to [facility] once dating has been established at scheduled ultrasound next week  
OB Intake phone visit with verbal patient permission.  
with patient

## 2023-09-21 NOTE — ED TRIAGE NOTES
Patient presents with concerns of R lower back pain for about a month. Patient is roughly 10 weeks pregnant, just found out with [facility] during a pre[facility] visit. Believed she was not able to have kids at this age, believing she was premenopausal. Patient is anxious and has a lot of various concerns related to this pregnancy. Also stated she was a heavy drinker prior to becoming pregnant but has since stopped.     Triage Assessment       Row Name 09/21/23 0157       Triage Assessment (Adult)    Airway WDL WDL       Respiratory WDL    Respiratory WDL WDL       Skin Circulation/Temperature WDL    Skin Circulation/Temperature WDL WDL       Cardiac WDL    Cardiac WDL X;rhythm    Pulse Rate & Regularity tachycardic       Peripheral/Neurovascular WDL    Peripheral Neurovascular WDL WDL       Cognitive/Neuro/Behavioral WDL    Cognitive/Neuro/Behavioral WDL WDL

## 2023-09-21 NOTE — ED PROVIDER NOTES
History     Chief Complaint   Patient presents with    Back Pain     HPI  Ayanna Davidson is a 33 year old female who presents with concerns of being worried about her early pregnancy.  Patient was just found out that she was pregnant incidentally when she went in for a cyst removal.  She thinks she is about 10 weeks along.  She actually has a first OB appointment set up for later this morning.  Denies any abdominal pain or dysuria or hematuria.  Patient has chronic back pain has been bothering her for months, but has not changed recently.  Denies any current vaginal bleeding or spotting.  Denies any new dysuria but has been urinating frequently.    Allergies:  Allergies   Allergen Reactions    No Known Allergies        Problem List:    Patient Active Problem List    Diagnosis Date Noted    Anxiety 03/15/2023     Priority: Medium    Class 2 severe obesity with serious comorbidity and body mass index (BMI) of 38.0 to 38.9 in adult, unspecified obesity type (H) 08/18/2022     Priority: Medium    Open wound 03/23/2020     Priority: Medium    TINO III (vulvar intraepithelial neoplasia III) 02/26/2020     Priority: Medium     Added automatically from request for surgery [id number]      Preoperative examination 01/28/2020     Priority: Medium     Added automatically from request for surgery [id number]      Bartholin's gland abscess 01/28/2020     Priority: Medium     Added automatically from request for surgery [id number]      Chronic pain in right shoulder 01/21/2020     Priority: Medium    Dog bite of right lower leg 09/27/2019     Priority: Medium    Cellulitis of left upper extremity 09/27/2019     Priority: Medium    Current every day smoker 09/27/2019     Priority: Medium    Gastroesophageal reflux disease without esophagitis 09/27/2019     Priority: Medium    Skin infection 09/27/2019     Priority: Medium    Dog bite of left upper extremity 05/07/2019     Priority: Medium    Cervical high risk HPV (human papillomavirus)  "test positive 03/12/2019     Priority: Medium     4/2016 NIL pap. No prior HPV testing.   3/12/19 NIL pap, + HR HPV (not 16 or 18). Plan: cotest in 1 yr  1/29/20 NIL pap, + HR HPV (not 16 or 18). Plan: [place]  2/11/20 [place] bx: neg. Plan: Cotest in 1 yr.   2/18/20 Vulvar biopsy: \"TINO III; severe dysplasia\" possible involvement of margins  3/11/20 Re excision of vulva.   3/23/21 NIL pap, + HR HPV (not 16 or 18). Plan: colp  6/22/21 Gyn visit - [place] recommended, Patient refused  6/28/21 GynOnc visit plan - HPV HR+: \"Repeat HPV-based screening in 1 year based upon ASCCP\", Due 3/23/22  5/6/22 Lost to follow up  8/18/22 NIL pap, Neg HPV.  Plan: cotest in 1 year  6/14/23 NIL Pap, Neg HR HPV. Plan: cotest in 1 year and establish with OB/GYN provider, per Dr. Santiago.       Closed fracture dislocation of hip joint, left, initial encounter (H) 09/28/2018     Priority: Medium    Vulvar abscess 08/16/2017     Priority: Medium    ADD (attention deficit disorder) without hyperactivity 04/18/2014     Priority: Medium    Drug-induced mental disorder (H) 09/20/2012     Priority: Medium     Overview:   ICD-10 Regulatory Update      Depressed 05/10/2012     Priority: Medium    Overdose of antipsychotic 05/09/2012     Priority: Medium        Past Medical History:    Past Medical History:   Diagnosis Date    Cervical high risk HPV (human papillomavirus) test positive 03/12/2019    Gastroesophageal reflux disease     HSV (herpes simplex virus) infection     Methamphetamine abuse (H)     recurrent Bartholin's cyst        Past Surgical History:    Past Surgical History:   Procedure Laterality Date    BIOPSY      CHOLECYSTECTOMY      ENT SURGERY      ESOPHAGOSCOPY, GASTROSCOPY, DUODENOSCOPY (EGD), COMBINED N/A 4/12/2023    Procedure: Esophagoscopy, gastroscopy, duodenoscopy (EGD), combined;  Surgeon: Thierno Escobar MD;  Location:  GI    EXAM UNDER ANESTHESIA PELVIC N/A 01/29/2020    Procedure: EXAM UNDER ANESTHESIA, PELVIS, PAP;  " Surgeon: Froylan Schwarz MD;  Location: [facility]    EXCISE VULVA WIDE LOCAL Bilateral 03/11/2020    Procedure: Right Vulva Wedge Resection and Incision and Drainage Left Vulva;  Surgeon: Froylan Schwarz MD;  Location: [facility]    INCISION AND DRAINAGE ABSCESS PELVIS, COMBINED N/A 02/26/2018    Procedure: COMBINED INCISION AND DRAINAGE ABSCESS PELVIS;  INCISION AND DRAINAGE LABIAL ABSCESS;  Surgeon: Jase Beach MD;  Location: [facility]    INCISION AND DRAINAGE ABSCESS PELVIS, COMBINED N/A 03/05/2019    Procedure: Incision and Drainage Right Labial Abscess;  Surgeon: Jase Beach MD;  Location: [facility]    INCISION AND DRAINAGE LOWER EXTREMITY, COMBINED Right 08/11/2019    Procedure: irrigation and debridement right leg dog bite;  Surgeon: Rommel Pérez MD;  Location: [facility]    LAP ADJUSTABLE GASTRIC BAND      LEEP TX, CERVICAL      MAMMOPLASTY REDUCTION BILATERAL      MARSUPIALIZATION BARTHOLIN CYST N/A 04/29/2019    Procedure: Bartholin's Cyst removal;  Surgeon: Froylan Schwarz MD;  Location: [facility]    MARSUPIALIZATION BARTHOLIN CYST Left 01/29/2020    Procedure: Excision of left bartholin's abscess;  Surgeon: Froylan Schwarz MD;  Location: [facility]    ORTHOPEDIC SURGERY         Family History:    Family History   Problem Relation Age of Onset    Heart Disease Father     Coronary Artery Disease Father     Hypertension Father     Hyperlipidemia Father     Mental Illness Father     Substance Abuse Father     Diabetes Maternal Grandmother     Breast Cancer Paternal Grandmother     Testicular cancer Paternal Grandfather     Thyroid Disease Mother     Prostate Cancer Maternal Grandfather        Social History:  Marital Status:  Single [1]  Social History     Tobacco Use    Smoking status: Every Day     Packs/day: 0.50     Years: 10.00     Pack years: 5.00     Types: Cigarettes    Smokeless tobacco: Current    Tobacco comments:     uses E cig   Vaping Use    Vaping Use: Every day   Substance  Use Topics    Alcohol use: Not Currently    Drug use: Not Currently     Comment: In treatment for meth.        Medications:    gabapentin (NEURONTIN) 100 MG capsule  hydrOXYzine (ATARAX) 25 MG tablet  Multiple Vitamins-Minerals (MULTIVITAMIN ADULT PO)  omeprazole (PRILOSEC OTC) 20 MG EC tablet  omeprazole (PRILOSEC) 20 MG DR capsule  Semaglutide-Weight Management (WEGOVY) 1 MG/0.5ML pen  Semaglutide-Weight Management (WEGOVY) 1.7 MG/0.75ML pen  sertraline (ZOLOFT) 50 MG tablet  sulfamethoxazole-trimethoprim (BACTRIM DS) 800-160 MG tablet          Review of Systems   All other systems reviewed and are negative.      Physical Exam   BP: (!) 131/94  Pulse: 113  Temp: 98.4  F (36.9  C)  Resp: 20  SpO2: 100 %      Physical Exam  Vitals and nursing note reviewed.   Constitutional:       General: She is not in acute distress.     Appearance: Normal appearance. She is not ill-appearing.   Pulmonary:      Effort: Pulmonary effort is normal. No respiratory distress.      Breath sounds: Normal breath sounds. No wheezing.   Abdominal:      General: Abdomen is flat. There is no distension.      Tenderness: There is no abdominal tenderness.   Skin:     General: Skin is warm and dry.      Capillary Refill: Capillary refill takes less than 2 seconds.      Findings: No rash.   Neurological:      Mental Status: She is alert.         ED Course                 Procedures    Results for orders placed during the hospital encounter of 09/21/23    POC US OB TRANSABDOMINAL LIMITED    Impression  [facility] Procedure Note    Limited Bedside ED Pelvic Ultrasound (PREGNANT PATIENT):    PROCEDURE: PERFORMED BY: Dr. Rupert Smith MD  INDICATIONS/SYMPTOM: Wants to have baby checked out  PATIENT: Ayanna Davidson MRN: [id number]  DATE: 09/21/23 TIME: 215am  PROBE: Low frequency convex probe  Type of US Study: Transabdominal Exam  BODY LOCATION: Pelvis  FINDINGS: Fetal heart rate: Present and counted at 160 bpm.  There is active  fetal movement, good fluid pocket placenta visualized  INTERPRETATION: Normal pelvic ultrasound with intrauterine pregnancy present. FHR was 160 with noted fetal activity.  No pelvic free fluid was present. No adnexal abnormality noted.  IMAGE DOCUMENTATION: Images were archived to PACs system.         Results for orders placed or performed during the hospital encounter of 09/21/23 (from the past 24 hour(s))   POC US OB TRANSABDOMINAL LIMITED    Impression    [facility] Procedure Note     Limited Bedside ED Pelvic Ultrasound (PREGNANT PATIENT):    PROCEDURE: PERFORMED BY: Dr. Rupert Smith MD  INDICATIONS/SYMPTOM: Wants to have baby checked out  PATIENT: Ayanna Davidson MRN: [id number]   DATE: 09/21/23 TIME: 215am  PROBE: Low frequency convex probe  Type of US Study: Transabdominal Exam  BODY LOCATION: Pelvis  FINDINGS: Fetal heart rate: Present and counted at 160 bpm.  There is active fetal movement, good fluid pocket placenta visualized  INTERPRETATION: Normal pelvic ultrasound with intrauterine pregnancy present. FHR was 160 with noted fetal activity.  No pelvic free fluid was present. No adnexal abnormality noted.  IMAGE DOCUMENTATION: Images were archived to PACs system.           Medications - No data to display    Bedside ultrasound was done which showed an intrauterine pregnancy with good fetal movement, heart rate was in the 160s.  Patient's back pain is not new this has been going on for months, I do not think that her pregnancy is related to this.  I do not suspect a miscarriage or any complications.  She has her first OB intake appointment here in just a few hours, recommend that she keep that appointment and get established with a primary OB doctor.  Patient will be discharged at this time.    Assessments & Plan (with Medical Decision Making)  Low back strain, first trimester pregnancy     I have reviewed the nursing notes.    I have reviewed the findings, diagnosis, plan and need for  follow up with the patient.      New Prescriptions    No medications on file       Final diagnoses:   Low back strain, initial encounter   First trimester pregnancy       9/21/2023   [facility] EMERGENCY DEPT       Rupert Smith MD  09/21/23 [id number]

## 2023-09-23 ENCOUNTER — HEALTH MAINTENANCE LETTER (OUTPATIENT)
Age: 33
End: 2023-09-23

## 2023-09-24 LAB
ABO/RH(D): NORMAL
ANTIBODY SCREEN: NEGATIVE
SPECIMEN EXPIRATION DATE: NORMAL

## 2023-09-25 ENCOUNTER — HOSPITAL ENCOUNTER (OUTPATIENT)
Dept: ULTRASOUND IMAGING | Facility: CLINIC | Age: 33
Discharge: HOME | End: 2023-09-25
Attending: FAMILY MEDICINE | Admitting: FAMILY MEDICINE
Payer: COMMERCIAL

## 2023-09-25 ENCOUNTER — LAB (OUTPATIENT)
Dept: LAB | Facility: CLINIC | Age: 33
End: 2023-09-25
Payer: COMMERCIAL

## 2023-09-25 ENCOUNTER — ANCILLARY ORDERS (OUTPATIENT)
Dept: FAMILY MEDICINE | Facility: CLINIC | Age: 33
End: 2023-09-25

## 2023-09-25 DIAGNOSIS — O09.90 SUPERVISION OF HIGH RISK PREGNANCY, ANTEPARTUM: Primary | ICD-10-CM

## 2023-09-25 DIAGNOSIS — O09.90 SUPERVISION OF HIGH RISK PREGNANCY, ANTEPARTUM: ICD-10-CM

## 2023-09-25 LAB
ERYTHROCYTE [DISTWIDTH] IN BLOOD BY AUTOMATED COUNT: 13.7 % (ref 10–15)
HBA1C MFR BLD: 5.4 %
HCT VFR BLD AUTO: 41.1 % (ref 35–47)
HGB BLD-MCNC: 13.8 G/DL (ref 11.7–15.7)
MCH RBC QN AUTO: 31.2 PG (ref 26.5–33)
MCHC RBC AUTO-ENTMCNC: 33.6 G/DL (ref 31.5–36.5)
MCV RBC AUTO: 93 FL (ref 78–100)
PLATELET # BLD AUTO: 260 10E3/UL (ref 150–450)
RBC # BLD AUTO: 4.43 10E6/UL (ref 3.8–5.2)
WBC # BLD AUTO: 9.8 10E3/UL (ref 4–11)

## 2023-09-25 PROCEDURE — 86780 TREPONEMA PALLIDUM: CPT

## 2023-09-25 PROCEDURE — 86850 RBC ANTIBODY SCREEN: CPT

## 2023-09-25 PROCEDURE — 87389 HIV-1 AG W/HIV-1&-2 AB AG IA: CPT

## 2023-09-25 PROCEDURE — 36415 COLL VENOUS BLD VENIPUNCTURE: CPT

## 2023-09-25 PROCEDURE — 86901 BLOOD TYPING SEROLOGIC RH(D): CPT

## 2023-09-25 PROCEDURE — 86762 RUBELLA ANTIBODY: CPT

## 2023-09-25 PROCEDURE — 76805 OB US >/= 14 WKS SNGL FETUS: CPT

## 2023-09-25 PROCEDURE — 86900 BLOOD TYPING SEROLOGIC ABO: CPT

## 2023-09-25 PROCEDURE — 87340 HEPATITIS B SURFACE AG IA: CPT

## 2023-09-25 PROCEDURE — 86803 HEPATITIS C AB TEST: CPT

## 2023-09-25 PROCEDURE — 87086 URINE CULTURE/COLONY COUNT: CPT

## 2023-09-25 PROCEDURE — 83036 HEMOGLOBIN GLYCOSYLATED A1C: CPT

## 2023-09-25 PROCEDURE — 85027 COMPLETE CBC AUTOMATED: CPT

## 2023-09-26 LAB
HBV SURFACE AG SERPL QL IA: NONREACTIVE
HCV AB SERPL QL IA: NONREACTIVE
HIV 1+2 AB+HIV1 P24 AG SERPL QL IA: NONREACTIVE
RUBV IGG SERPL QL IA: 1.16 INDEX
RUBV IGG SERPL QL IA: POSITIVE
T PALLIDUM AB SER QL: NONREACTIVE

## 2023-09-27 LAB — BACTERIA UR CULT: NORMAL

## 2023-10-08 ENCOUNTER — HOSPITAL ENCOUNTER (EMERGENCY)
Facility: CLINIC | Age: 33
Discharge: HOME | End: 2023-10-08
Attending: NURSE PRACTITIONER | Admitting: NURSE PRACTITIONER
Payer: COMMERCIAL

## 2023-10-08 VITALS
TEMPERATURE: 97.7 F | RESPIRATION RATE: 18 BRPM | OXYGEN SATURATION: 99 % | HEART RATE: 88 BPM | DIASTOLIC BLOOD PRESSURE: 80 MMHG | SYSTOLIC BLOOD PRESSURE: 130 MMHG

## 2023-10-08 DIAGNOSIS — S71.151A DOG BITE OF RIGHT THIGH WITH INFECTION, INITIAL ENCOUNTER: ICD-10-CM

## 2023-10-08 DIAGNOSIS — L08.9 DOG BITE OF RIGHT THIGH WITH INFECTION, INITIAL ENCOUNTER: ICD-10-CM
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
## 2023-10-08 LAB
ALBUMIN SERPL BCG-MCNC: 3.5 G/DL (ref 3.5–5.2)
ALP SERPL-CCNC: 62 U/L (ref 35–104)
ALT SERPL W P-5'-P-CCNC: 15 U/L (ref 0–50)
AMPHETAMINES UR QL SCN: ABNORMAL
ANION GAP SERPL CALCULATED.3IONS-SCNC: 14 MMOL/L (ref 7–15)
AST SERPL W P-5'-P-CCNC: 13 U/L (ref 0–45)
BARBITURATES UR QL SCN: ABNORMAL
BASO+EOS+MONOS # BLD AUTO: ABNORMAL 10*3/UL
BASO+EOS+MONOS NFR BLD AUTO: ABNORMAL %
BASOPHILS # BLD AUTO: 0 10E3/UL (ref 0–0.2)
BASOPHILS NFR BLD AUTO: 0 %
BENZODIAZ UR QL SCN: ABNORMAL
BILIRUB SERPL-MCNC: <0.2 MG/DL
BUN SERPL-MCNC: 8.6 MG/DL (ref 6–20)
BZE UR QL SCN: ABNORMAL
CALCIUM SERPL-MCNC: 8.9 MG/DL (ref 8.6–10)
CANNABINOIDS UR QL SCN: ABNORMAL
CHLORIDE SERPL-SCNC: 102 MMOL/L (ref 98–107)
CREAT SERPL-MCNC: 0.58 MG/DL (ref 0.51–0.95)
DEPRECATED HCO3 PLAS-SCNC: 20 MMOL/L (ref 22–29)
EGFRCR SERPLBLD CKD-EPI 2021: >90 ML/MIN/1.73M2
EOSINOPHIL # BLD AUTO: 0.2 10E3/UL (ref 0–0.7)
EOSINOPHIL NFR BLD AUTO: 2 %
ERYTHROCYTE [DISTWIDTH] IN BLOOD BY AUTOMATED COUNT: 13.7 % (ref 10–15)
FENTANYL UR QL: ABNORMAL
GLUCOSE SERPL-MCNC: 117 MG/DL (ref 70–99)
HCT VFR BLD AUTO: 37.4 % (ref 35–47)
HGB BLD-MCNC: 12.7 G/DL (ref 11.7–15.7)
HOLD SPECIMEN: NORMAL
IMM GRANULOCYTES # BLD: 0.2 10E3/UL
IMM GRANULOCYTES NFR BLD: 1 %
LYMPHOCYTES # BLD AUTO: 2.1 10E3/UL (ref 0.8–5.3)
LYMPHOCYTES NFR BLD AUTO: 14 %
MCH RBC QN AUTO: 30.8 PG (ref 26.5–33)
MCHC RBC AUTO-ENTMCNC: 34 G/DL (ref 31.5–36.5)
MCV RBC AUTO: 91 FL (ref 78–100)
MONOCYTES # BLD AUTO: 0.7 10E3/UL (ref 0–1.3)
MONOCYTES NFR BLD AUTO: 5 %
NEUTROPHILS # BLD AUTO: 11.3 10E3/UL (ref 1.6–8.3)
NEUTROPHILS NFR BLD AUTO: 78 %
NRBC # BLD AUTO: 0 10E3/UL
NRBC BLD AUTO-RTO: 0 /100
OPIATES UR QL SCN: ABNORMAL
PCP QUAL URINE (ROCHE): ABNORMAL
PLATELET # BLD AUTO: 307 10E3/UL (ref 150–450)
POTASSIUM SERPL-SCNC: 3.7 MMOL/L (ref 3.4–5.3)
PROT SERPL-MCNC: 6.3 G/DL (ref 6.4–8.3)
RBC # BLD AUTO: 4.13 10E6/UL (ref 3.8–5.2)
SODIUM SERPL-SCNC: 136 MMOL/L (ref 135–145)
WBC # BLD AUTO: 14.6 10E3/UL (ref 4–11)

## 2023-10-08 PROCEDURE — 250N000013 HC RX MED GY IP 250 OP 250 PS 637: Performed by: NURSE PRACTITIONER

## 2023-10-08 PROCEDURE — 99284 EMERGENCY DEPT VISIT MOD MDM: CPT | Mod: 25 | Performed by: NURSE PRACTITIONER

## 2023-10-08 PROCEDURE — 99284 EMERGENCY DEPT VISIT MOD MDM: CPT | Performed by: NURSE PRACTITIONER

## 2023-10-08 PROCEDURE — 96361 HYDRATE IV INFUSION ADD-ON: CPT | Performed by: NURSE PRACTITIONER

## 2023-10-08 PROCEDURE — 85025 COMPLETE CBC W/AUTO DIFF WBC: CPT | Performed by: NURSE PRACTITIONER

## 2023-10-08 PROCEDURE — 96365 THER/PROPH/DIAG IV INF INIT: CPT | Performed by: NURSE PRACTITIONER

## 2023-10-08 PROCEDURE — 258N000003 HC RX IP 258 OP 636: Performed by: NURSE PRACTITIONER

## 2023-10-08 PROCEDURE — 80307 DRUG TEST PRSMV CHEM ANLYZR: CPT | Performed by: NURSE PRACTITIONER

## 2023-10-08 PROCEDURE — 250N000011 HC RX IP 250 OP 636: Mod: JZ | Performed by: NURSE PRACTITIONER

## 2023-10-08 PROCEDURE — 80053 COMPREHEN METABOLIC PANEL: CPT | Performed by: NURSE PRACTITIONER

## 2023-10-08 PROCEDURE — 36415 COLL VENOUS BLD VENIPUNCTURE: CPT | Performed by: NURSE PRACTITIONER

## 2023-10-08 RX ORDER — ACETAMINOPHEN 500 MG
1000 TABLET ORAL ONCE
Status: COMPLETED | OUTPATIENT
Start: 2023-10-08 | End: 2023-10-08

## 2023-10-08 RX ORDER — CLINDAMYCIN PHOSPHATE 900 MG/50ML
900 INJECTION, SOLUTION INTRAVENOUS ONCE
Status: COMPLETED | OUTPATIENT
Start: 2023-10-08 | End: 2023-10-08

## 2023-10-08 RX ORDER — CLINDAMYCIN HCL 300 MG
300 CAPSULE ORAL 4 TIMES DAILY
Qty: 30 CAPSULE | Refills: 0 | Status: SHIPPED | OUTPATIENT
Start: 2023-10-08 | End: 2023-11-28

## 2023-10-08 RX ORDER — CLINDAMYCIN HCL 300 MG
300 CAPSULE ORAL 4 TIMES DAILY
Qty: 8 CAPSULE | Refills: 0 | Status: SHIPPED | OUTPATIENT
Start: 2023-10-08 | End: 2023-10-10

## 2023-10-08 RX ADMIN — CLINDAMYCIN PHOSPHATE 900 MG: 900 INJECTION, SOLUTION INTRAVENOUS at 20:29

## 2023-10-08 RX ADMIN — ACETAMINOPHEN 1000 MG: 500 TABLET ORAL at 20:30

## 2023-10-08 RX ADMIN — SODIUM CHLORIDE 1000 ML: 9 INJECTION, SOLUTION INTRAVENOUS at 19:34

## 2023-10-08 ASSESSMENT — ACTIVITIES OF DAILY LIVING (ADL)
ADLS_ACUITY_SCORE: 33
ADLS_ACUITY_SCORE: 35

## 2023-10-08 NOTE — ED TRIAGE NOTES
Pt reports dog bite to bilateral legs 9/28, was seen in the ED, finished her antibiotics but is concerned it is infected.      Triage Assessment       Row Name 10/08/23 [id number]       Triage Assessment (Adult)    Airway WDL WDL       Respiratory WDL    Respiratory WDL WDL       Skin Circulation/Temperature WDL    Skin Circulation/Temperature WDL X       Cardiac WDL    Cardiac WDL WDL       Peripheral/Neurovascular WDL    Peripheral Neurovascular WDL WDL

## 2023-10-09 NOTE — DISCHARGE INSTRUCTIONS
Tomorrow start clindamycin 300 mg 4 times a day for 10 days.  You were given a dose of IV clindamycin here today.    Make appointment for recheck of your wounds and removal of your sutures in 3 days.    Return to the emergency department  for fevers, increased redness, or any other symptoms of concern.
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
## 2023-10-10 ENCOUNTER — MYC MEDICAL ADVICE (OUTPATIENT)
Dept: FAMILY MEDICINE | Facility: CLINIC | Age: 33
End: 2023-10-10

## 2023-10-10 ENCOUNTER — PRENATAL OFFICE VISIT (OUTPATIENT)
Dept: FAMILY MEDICINE | Facility: CLINIC | Age: 33
End: 2023-10-10
Payer: COMMERCIAL

## 2023-10-10 ENCOUNTER — TELEPHONE (OUTPATIENT)
Dept: FAMILY MEDICINE | Facility: CLINIC | Age: 33
End: 2023-10-10

## 2023-10-10 VITALS
BODY MASS INDEX: 39.53 KG/M2 | WEIGHT: 246 LBS | TEMPERATURE: 97.1 F | OXYGEN SATURATION: 98 % | HEART RATE: 110 BPM | SYSTOLIC BLOOD PRESSURE: 108 MMHG | HEIGHT: 66 IN | RESPIRATION RATE: 20 BRPM | DIASTOLIC BLOOD PRESSURE: 70 MMHG

## 2023-10-10 DIAGNOSIS — O26.90 PREGNANCY RELATED CONDITION, ANTEPARTUM: Primary | ICD-10-CM

## 2023-10-10 DIAGNOSIS — Z34.02 ENCOUNTER FOR SUPERVISION OF NORMAL FIRST PREGNANCY, SECOND TRIMESTER: ICD-10-CM

## 2023-10-10 DIAGNOSIS — O09.90 SUPERVISION OF HIGH RISK PREGNANCY, ANTEPARTUM: Primary | ICD-10-CM

## 2023-10-10 DIAGNOSIS — O99.322 DRUG USE AFFECTING PREGNANCY IN SECOND TRIMESTER: Primary | ICD-10-CM

## 2023-10-10 PROBLEM — K31.89: Status: ACTIVE | Noted: 2021-06-14

## 2023-10-10 PROBLEM — F43.10 PTSD (POST-TRAUMATIC STRESS DISORDER): Status: ACTIVE | Noted: 2023-10-10

## 2023-10-10 PROCEDURE — 99213 OFFICE O/P EST LOW 20 MIN: CPT | Performed by: FAMILY MEDICINE

## 2023-10-10 ASSESSMENT — PAIN SCALES - GENERAL: PAINLEVEL: MILD PAIN (3)
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
## 2023-10-12 ENCOUNTER — TELEPHONE (OUTPATIENT)
Dept: MATERNAL FETAL MEDICINE | Facility: CLINIC | Age: 33
End: 2023-10-12

## 2023-10-12 ENCOUNTER — PRE VISIT (OUTPATIENT)
Dept: MATERNAL FETAL MEDICINE | Facility: CLINIC | Age: 33
End: 2023-10-12

## 2023-10-12 ENCOUNTER — ALLIED HEALTH/NURSE VISIT (OUTPATIENT)
Dept: FAMILY MEDICINE | Facility: CLINIC | Age: 33
End: 2023-10-12
Payer: COMMERCIAL

## 2023-10-12 DIAGNOSIS — Z48.02 ENCOUNTER FOR REMOVAL OF SUTURES: Primary | ICD-10-CM

## 2023-10-12 PROCEDURE — 99207 PR NO CHARGE NURSE ONLY: CPT

## 2023-10-12 NOTE — PROGRESS NOTES
Ayanna Davidson presents to the clinic today for removal of sutures.  The patient has had the sutures in place for 13 days.  There has been no history of infection or drainage.  14 sutures are seen located on the 10 on right leg, 4 on left leg.  The wound is healing well. Infection is improving. Dr. Blood looked at wound to verify removal of status of wound.  Tetanus status is up to date.   All sutures were easily removed today.  Steri strips placed.Routine wound care discussed.  The patient will follow up as needed.     Michell Nye RN on 10/12/2023 at 10:31 AM

## 2023-10-12 NOTE — TELEPHONE ENCOUNTER
Pt returning call to [facility] regarding appt on 10/16 and 10/19. Pt prefers to have all appt scheduled on 10/19. GC moved from 10/16 to 10/19.    Navya Curran RN

## 2023-10-12 NOTE — TELEPHONE ENCOUNTER
Ayanna called and message left to offer pt virtual GC appointment for Thursday, Oct 19 either before or after her L2/Consult instead of pt coming in for GC only on Monday, Oct 16. Gave pt P[facility] number of [phone] to call back.    Vi Quigley RN

## 2023-10-16 ENCOUNTER — MYC MEDICAL ADVICE (OUTPATIENT)
Dept: FAMILY MEDICINE | Facility: CLINIC | Age: 33
End: 2023-10-16
Payer: COMMERCIAL

## 2023-10-16 RX ORDER — PRENATAL VIT/IRON FUM/FOLIC AC 27MG-0.8MG
1 TABLET ORAL DAILY
Qty: 90 TABLET | Refills: 3 | Status: SHIPPED | OUTPATIENT
Start: 2023-10-16

## 2023-10-17 ENCOUNTER — HOSPITAL ENCOUNTER (OUTPATIENT)
Facility: CLINIC | Age: 33
Discharge: HOME | End: 2023-10-17
Attending: FAMILY MEDICINE | Admitting: FAMILY MEDICINE
Payer: COMMERCIAL

## 2023-10-17 VITALS
RESPIRATION RATE: 20 BRPM | DIASTOLIC BLOOD PRESSURE: 69 MMHG | HEART RATE: 99 BPM | OXYGEN SATURATION: 99 % | TEMPERATURE: 98.3 F | SYSTOLIC BLOOD PRESSURE: 116 MMHG

## 2023-10-17 PROBLEM — Z36.89 ENCOUNTER FOR TRIAGE IN PREGNANT PATIENT: Status: ACTIVE | Noted: 2023-10-17

## 2023-10-17 LAB
ALBUMIN SERPL BCG-MCNC: 3.5 G/DL (ref 3.5–5.2)
ALBUMIN UR-MCNC: NEGATIVE MG/DL
ALP SERPL-CCNC: 62 U/L (ref 35–104)
ALT SERPL W P-5'-P-CCNC: 13 U/L (ref 0–50)
ANION GAP SERPL CALCULATED.3IONS-SCNC: 14 MMOL/L (ref 7–15)
APPEARANCE UR: ABNORMAL
AST SERPL W P-5'-P-CCNC: 16 U/L (ref 0–45)
BILIRUB SERPL-MCNC: <0.2 MG/DL
BILIRUB UR QL STRIP: NEGATIVE
BUN SERPL-MCNC: 7.9 MG/DL (ref 6–20)
CALCIUM SERPL-MCNC: 9.4 MG/DL (ref 8.6–10)
CHLORIDE SERPL-SCNC: 102 MMOL/L (ref 98–107)
CLUE CELLS: ABNORMAL
COLOR UR AUTO: YELLOW
CREAT SERPL-MCNC: 0.5 MG/DL (ref 0.51–0.95)
CRYSTALS AMN MICRO: NORMAL
DEPRECATED HCO3 PLAS-SCNC: 18 MMOL/L (ref 22–29)
EGFRCR SERPLBLD CKD-EPI 2021: >90 ML/MIN/1.73M2
GLUCOSE SERPL-MCNC: 97 MG/DL (ref 70–99)
GLUCOSE UR STRIP-MCNC: NEGATIVE MG/DL
HGB UR QL STRIP: NEGATIVE
HOLD SPECIMEN: NORMAL
KETONES UR STRIP-MCNC: NEGATIVE MG/DL
LEUKOCYTE ESTERASE UR QL STRIP: NEGATIVE
MUCOUS THREADS #/AREA URNS LPF: PRESENT /LPF
NITRATE UR QL: NEGATIVE
PH UR STRIP: 5.5 [PH] (ref 5–7)
POTASSIUM SERPL-SCNC: 3.9 MMOL/L (ref 3.4–5.3)
PROT SERPL-MCNC: 6.3 G/DL (ref 6.4–8.3)
RBC URINE: 7 /HPF
SODIUM SERPL-SCNC: 134 MMOL/L (ref 135–145)
SP GR UR STRIP: >=1.03 (ref 1–1.03)
SPERM #/AREA URNS HPF: PRESENT /HPF
SQUAMOUS EPITHELIAL: 2 /HPF
T VAGINALIS DNA SPEC QL NAA+PROBE: NOT DETECTED
TRICHOMONAS, WET PREP: ABNORMAL
UROBILINOGEN UR STRIP-MCNC: NORMAL MG/DL
WBC URINE: 2 /HPF
WBC'S/HIGH POWER FIELD, WET PREP: ABNORMAL
YEAST, WET PREP: ABNORMAL

## 2023-10-17 PROCEDURE — 81001 URINALYSIS AUTO W/SCOPE: CPT | Performed by: STUDENT IN AN ORGANIZED HEALTH CARE EDUCATION/TRAINING PROGRAM

## 2023-10-17 PROCEDURE — G0463 HOSPITAL OUTPT CLINIC VISIT: HCPCS

## 2023-10-17 PROCEDURE — 36415 COLL VENOUS BLD VENIPUNCTURE: CPT | Performed by: STUDENT IN AN ORGANIZED HEALTH CARE EDUCATION/TRAINING PROGRAM

## 2023-10-17 PROCEDURE — 87661 TRICHOMONAS VAGINALIS AMPLIF: CPT | Performed by: STUDENT IN AN ORGANIZED HEALTH CARE EDUCATION/TRAINING PROGRAM

## 2023-10-17 PROCEDURE — 87491 CHLMYD TRACH DNA AMP PROBE: CPT | Performed by: STUDENT IN AN ORGANIZED HEALTH CARE EDUCATION/TRAINING PROGRAM

## 2023-10-17 PROCEDURE — 87210 SMEAR WET MOUNT SALINE/INK: CPT | Performed by: STUDENT IN AN ORGANIZED HEALTH CARE EDUCATION/TRAINING PROGRAM

## 2023-10-17 PROCEDURE — 80053 COMPREHEN METABOLIC PANEL: CPT | Performed by: STUDENT IN AN ORGANIZED HEALTH CARE EDUCATION/TRAINING PROGRAM

## 2023-10-17 RX ORDER — LIDOCAINE 40 MG/G
CREAM TOPICAL
Status: DISCONTINUED | OUTPATIENT
Start: 2023-10-17 | End: 2023-10-17 | Stop reason: HOSPADM

## 2023-10-17 ASSESSMENT — ENCOUNTER SYMPTOMS
BLURRED VISION: 0
FEVER: 0
HEMATURIA: 0
CONSTIPATION: 0
CHILLS: 0
VOMITING: 0
DIZZINESS: 1
SORE THROAT: 0
DYSURIA: 0
NAUSEA: 0
COUGH: 0
SHORTNESS OF BREATH: 0
DIARRHEA: 0

## 2023-10-17 ASSESSMENT — ACTIVITIES OF DAILY LIVING (ADL)
ADLS_ACUITY_SCORE: 18
ADLS_ACUITY_SCORE: 18

## 2023-10-17 NOTE — PROGRESS NOTES
Dr. Hunt ordered labs for patient and collected swabs with nurse presence in room without incidence. RN updated Dr. Hunt on lab results, and left message for Dr. Olivas to call back to update and see if he would like any new orders. Nancy Jay RN on 10/17/2023 at 3:13 PM

## 2023-10-17 NOTE — PROGRESS NOTES
S: Triage Arrival  B: Ayanna is a 33 y.o.  @ 20w 5d who presents with complaint of cramping and fluid discharge  A: [id number] via doppler. Contractions none  R: Will notify MD to obtain further orders.

## 2023-10-17 NOTE — PROGRESS NOTES
Subjective/objective  Patient presented with complaint of cramping and fluid discharge.  Sometimes she feels like she feels a gush of fluid and other times is just a trickle or she is wet down below.  She had sex recently and today has had some discharge also.  No dysuria.  The cramping has been on the lateral aspects but no bleeding.  She is feeling baby move.  I spoken to Dr. Chun about her earlier and labs were done and exam was done.  It was reported that the cervix was long thick and closed.  Ferning test, wet prep, DNA probe and a basic profile were obtained.  All labs were normal, her sodium was just a little bit low at 134 as well as her bicarb at 18.  UA was negative as was her wet prep.  The DNA probe is still pending.  Ferning was negative.    Fetal heart tones by Trisha were in the 150s.    I did speak with the patient and she feels better now after having eaten something happening is better, she feels reassured that she leaking.    Assessment/plan  20-5/7 weeks gestation in a G1, P0 advanced maternal age with a negative exam and lab work.  She has her level 2 ultrasound on the 19th of this month with maternal-fetal medicine.  She has her next follow-up with Dr. Blood on November 7.  She is planning on keeping both appointments and will follow-up as noted above.  She did have an ultrasound done on 10/8/2023 which was normal with placenta is posterior    Electronically signed by:  Inderjit Olivas M.D.  10/17/2023

## 2023-10-17 NOTE — TELEPHONE ENCOUNTER
Spoke to patient, expresses no change to vaginal fluid/discharge. Advised to present to L& D to assess.

## 2023-10-17 NOTE — TELEPHONE ENCOUNTER
Left message at home number to call back, [employer] message sent also.  Michell Nye RN on 10/17/2023 at 7:49 AM

## 2023-10-17 NOTE — PROGRESS NOTES
Dr. Olivas called back and updated on patient status. Plan of care is for her to follow up with MFM on 10/19/23 and okay to discharge. Nancy Jay RN on 10/17/2023 at 3:35 PM

## 2023-10-17 NOTE — PROGRESS NOTES
SUBJECTIVE:  Ayanna Davidson is a 33 year old  at 20w5d with Estimated Date of Delivery: 2024 who presents to labor and delivery for leakage of fluid. She says that over the past couple of days, she has had intermittent leaking and gushes of clear fluid. She's also noted intermittent lower pelvic/abdominal cramping. Good fetal movement. No vaginal bleeding. Had sex yesterday without pain. Denies concern for STIs. No new sexual partners this pregnancy.  Denies dysuria or hematuria.     Her OB record was reviewed.  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   HENT:  Negative for congestion and sore throat.    Eyes:  Negative for blurred vision.   Respiratory:  Negative for cough and shortness of breath.    Cardiovascular:  Negative for chest pain and leg swelling.   Gastrointestinal:  Negative for constipation, diarrhea, nausea and vomiting.   Genitourinary:  Negative for dysuria and hematuria.   Neurological:  Positive for dizziness (Saturday, self-resolved).         OBJECTIVE:  Vitals:    10/17/23 1337 10/17/23 1343   BP:  120/73   BP Location:  Left arm   Patient Position:  Semi-Davis's   Cuff Size:  Adult Large   Pulse: 99    Resp: 20    Temp:  98.3  F (36.8  C)   TempSrc:  Oral   SpO2: 99%      General Appearance: Resting in bed. NAD.  HEENT: sclera non-injected and non-icteric  Respiratory: Breathing comfortably on room air. No increased work of breathing. Breath sounds present throughout lung fields. No wheezes, rhonchi, or crackles.  Cardiovascular: Regular rate and rhythm. No murmurs.  GI: abdomen soft. +BS. Tender in the RUQ  Extremities: No peripheral edema in the bilateral lower extremities  Skin: healing dog bites on bilateral thighs. Right has induration and area of erythema. No  discharge  Neuro: alert and oriented    Speculum exam: minimal off-white discharge. Cervix high and closed.     ASSESSMENT:  Ayanna Davidson is a 33 year old  at 20w5d who presents to labor and delivery for vaginal discharge and intermittent abdominal cramping. Differential includes  labor (although cervix closed and cramping is not regular or strong), SROM (no pooling), vaginitis (at risk for candidiasis with current antibiotic use), UTI (although no dysuria), uterine irritability/jcarlos bishop, or GI related abdominal symptoms.     PLAN:  - ferning  - wet prep  - gonorrhea/chlamydia  - Trich PCR  - UA  - CMP    Ai Macias MD   [facility] Res[facility], [facility]

## 2023-10-17 NOTE — DISCHARGE INSTRUCTIONS
OB Triage Discharge Instructions    Diet:  Regular diet as tolerated    Activity:  As tolerated    Other Special Instructions: Follow up with Maternal fetal medicine on 1/19/23 and Dr. Blood as scheduled    Reason for being seen in L&D: cramping and discharge    Treatment/procedures performed in L&D: lab work, doppler    Call [facility] at [phone] if you have:  5 or more contractions in one hour  Leaking of fluid from your vaginal area  Decreased fetal movement (follow kick count instructions)  Bleeding from your vaginal area  Swelling in your face, or increased swelling in your hands or legs  Headaches or vision problems such as blurring or seeing spots or starts  Nausea or vomiting lasting for more than 24 hours  An increase in weight (5lbs/week)  Epigastric pain (sometimes confused with heartburn that does not go away or a very bad stomach ache)    If you have any questions, please follow up with your doctor.        Patient Signature: ______________________________________________________________  By signing the above I acknowledge that a nurse or my care provider has explained the instruction to me and I have had any questions regarding my care explained to me.        Discharge Nurse Signature: _______________________________ 10/17/2023  3:38 PM    Method of discharge: ***    Accompanied by: ***  Time of discharge: ***

## 2023-10-17 NOTE — PROGRESS NOTES
S:  Discharge from triage.   A:  FHT's 150's, via doppler Contractions none note per patient.  Cervical exam NA  R:  Written and verbal D/C instruction provided. Pt. Verbalized understanding of D/C instructions and will follow up with MFM on 10/19/23 as previously scheduled.   Verbalizes understanding that she will call or return to [facility] with any further questions or concerns.   Pt. D/C'd via ambulation with mother and grandmother    Nursing Discharge Checklist  Discharge order entered: Yes  Patient care order entered: Yes  Charges entered: Yes  IV start and stop times have been documented in Epic?  NA  NST start and stop times have been documented in Epic Doc F/S?  NA

## 2023-10-18 LAB
C TRACH DNA SPEC QL PROBE+SIG AMP: NEGATIVE
N GONORRHOEA DNA SPEC QL NAA+PROBE: NEGATIVE

## 2023-10-19 ENCOUNTER — HOSPITAL ENCOUNTER (OUTPATIENT)
Dept: ULTRASOUND IMAGING | Facility: CLINIC | Age: 33
Discharge: HOME | End: 2023-10-19
Attending: OBSTETRICS & GYNECOLOGY
Payer: COMMERCIAL

## 2023-10-19 ENCOUNTER — OFFICE VISIT (OUTPATIENT)
Dept: MATERNAL FETAL MEDICINE | Facility: CLINIC | Age: 33
End: 2023-10-19
Attending: OBSTETRICS & GYNECOLOGY
Payer: COMMERCIAL

## 2023-10-19 VITALS
BODY MASS INDEX: 40.35 KG/M2 | HEART RATE: 94 BPM | DIASTOLIC BLOOD PRESSURE: 71 MMHG | RESPIRATION RATE: 20 BRPM | WEIGHT: 250 LBS | SYSTOLIC BLOOD PRESSURE: 124 MMHG | OXYGEN SATURATION: 98 %

## 2023-10-19 DIAGNOSIS — O26.90 PREGNANCY RELATED CONDITION, ANTEPARTUM: Primary | ICD-10-CM

## 2023-10-19 DIAGNOSIS — O26.90 PREGNANCY RELATED CONDITION, ANTEPARTUM: ICD-10-CM

## 2023-10-19 DIAGNOSIS — F19.90 POLYSUBSTANCE USE DISORDER: ICD-10-CM

## 2023-10-19 DIAGNOSIS — O99.210 OBESITY IN PREGNANCY, ANTEPARTUM: ICD-10-CM

## 2023-10-19 PROCEDURE — G0463 HOSPITAL OUTPT CLINIC VISIT: HCPCS | Mod: 25 | Performed by: OBSTETRICS & GYNECOLOGY

## 2023-10-19 PROCEDURE — 99205 OFFICE O/P NEW HI 60 MIN: CPT | Mod: 25 | Performed by: OBSTETRICS & GYNECOLOGY

## 2023-10-19 PROCEDURE — 76811 OB US DETAILED SNGL FETUS: CPT | Mod: 26 | Performed by: OBSTETRICS & GYNECOLOGY

## 2023-10-19 PROCEDURE — 76811 OB US DETAILED SNGL FETUS: CPT
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
## 2023-10-19 NOTE — NURSING NOTE
Ayanna and her mother here for MFM consult and L2 at 21w0d related to history of Ozempic, Meth, THC, and alcohol use during pregnancy. Medications and allergies reviewed. Dr. Madrid and Dr. Mckeon met with patient to discuss plan. Plan for PNC with primary, follow up US for suboptimal heart views. . Patient discharged stable and ambulatory.

## 2023-10-19 NOTE — Clinical Note
Thank you for allowing us to participate in the care of your patient Ayanna Davidson. Please see attached for details from our visit and don't hesitate to reach out with questions.   MD AMALIA Esteves , OB/GYN Maternal-Fetal Medicine [phone] (Pager)

## 2023-11-07 ENCOUNTER — PRENATAL OFFICE VISIT (OUTPATIENT)
Dept: FAMILY MEDICINE | Facility: CLINIC | Age: 33
End: 2023-11-07
Payer: COMMERCIAL

## 2023-11-07 VITALS
SYSTOLIC BLOOD PRESSURE: 110 MMHG | HEART RATE: 104 BPM | RESPIRATION RATE: 20 BRPM | TEMPERATURE: 97.5 F | WEIGHT: 251.8 LBS | OXYGEN SATURATION: 99 % | BODY MASS INDEX: 40.47 KG/M2 | DIASTOLIC BLOOD PRESSURE: 70 MMHG | HEIGHT: 66 IN

## 2023-11-07 DIAGNOSIS — O09.90 SUPERVISION OF HIGH RISK PREGNANCY, ANTEPARTUM: Primary | ICD-10-CM

## 2023-11-07 PROCEDURE — 99207 PR PRENATAL VISIT: CPT | Performed by: FAMILY MEDICINE

## 2023-11-07 ASSESSMENT — PAIN SCALES - GENERAL: PAINLEVEL: NO PAIN (0)

## 2023-11-07 NOTE — PROGRESS NOTES
Concerns: none   No vaginal bleeding, no contractions, no leakage of fluid  No nausea/vomiting. No heartburn  No vaginal discharge. No dysuria.   No headache, vision changes, lower extremity swelling, upper abdominal pain, chest pain, shortness of breath  Tdap planned next visit  Discussed PTL, PROM, and when to call or come in.  Returning to maternal-fetal medicine this week and having another anatomy ultrasound.  High risk pregnancy because that the use and substance abuse.  Order was placed for the 1 hour Glucola she will do it done at maternal-fetal medicine if she is there for a long time or have it completed our clinic in the next 3 to 4 weeks.  RTC 4 weeks.  GTT and labs today       Garett Blood MD

## 2023-11-09 ENCOUNTER — LAB (OUTPATIENT)
Dept: LAB | Facility: CLINIC | Age: 33
End: 2023-11-09
Attending: OBSTETRICS & GYNECOLOGY
Payer: COMMERCIAL

## 2023-11-09 ENCOUNTER — MEDICAL CORRESPONDENCE (OUTPATIENT)
Dept: MATERNAL FETAL MEDICINE | Facility: CLINIC | Age: 33
End: 2023-11-09

## 2023-11-09 ENCOUNTER — OFFICE VISIT (OUTPATIENT)
Dept: MATERNAL FETAL MEDICINE | Facility: CLINIC | Age: 33
End: 2023-11-09
Attending: OBSTETRICS & GYNECOLOGY
Payer: COMMERCIAL

## 2023-11-09 ENCOUNTER — HOSPITAL ENCOUNTER (OUTPATIENT)
Dept: ULTRASOUND IMAGING | Facility: CLINIC | Age: 33
Discharge: HOME | End: 2023-11-09
Attending: OBSTETRICS & GYNECOLOGY
Payer: COMMERCIAL

## 2023-11-09 DIAGNOSIS — Z31.5 ENCOUNTER FOR PROCREATIVE GENETIC COUNSELING AND TESTING: ICD-10-CM

## 2023-11-09 DIAGNOSIS — O09.512 SUPERVISION OF ELDERLY PRIMIGRAVIDA IN SECOND TRIMESTER: ICD-10-CM

## 2023-11-09 DIAGNOSIS — O09.512 SUPERVISION OF ELDERLY PRIMIGRAVIDA IN SECOND TRIMESTER: Primary | ICD-10-CM

## 2023-11-09 DIAGNOSIS — O26.90 PREGNANCY RELATED CONDITION, ANTEPARTUM: ICD-10-CM

## 2023-11-09 DIAGNOSIS — F19.90 POLYSUBSTANCE USE DISORDER: ICD-10-CM

## 2023-11-09 DIAGNOSIS — Z36.9 UNSPECIFIED ANTENATAL SCREENING: ICD-10-CM

## 2023-11-09 DIAGNOSIS — O09.90 SUPERVISION OF HIGH RISK PREGNANCY, ANTEPARTUM: ICD-10-CM

## 2023-11-09 DIAGNOSIS — O35.2XX1: ICD-10-CM

## 2023-11-09 DIAGNOSIS — Z31.430 ENCOUNTER OF FEMALE FOR TESTING FOR GENETIC DISEASE CARRIER STATUS FOR PROCREATIVE MANAGEMENT: ICD-10-CM

## 2023-11-09 LAB — GLUCOSE 1H P 50 G GLC PO SERPL-MCNC: 159 MG/DL (ref 70–129)

## 2023-11-09 PROCEDURE — 36415 COLL VENOUS BLD VENIPUNCTURE: CPT

## 2023-11-09 PROCEDURE — 76816 OB US FOLLOW-UP PER FETUS: CPT

## 2023-11-09 PROCEDURE — 82950 GLUCOSE TEST: CPT | Performed by: STUDENT IN AN ORGANIZED HEALTH CARE EDUCATION/TRAINING PROGRAM

## 2023-11-09 PROCEDURE — 96040 HC GENETIC COUNSELING, EACH 30 MINUTES: CPT | Performed by: GENETIC COUNSELOR, MS

## 2023-11-09 PROCEDURE — 76816 OB US FOLLOW-UP PER FETUS: CPT | Mod: 26 | Performed by: STUDENT IN AN ORGANIZED HEALTH CARE EDUCATION/TRAINING PROGRAM

## 2023-11-09 NOTE — PROGRESS NOTES
Please see the full imaging report from the ViewPoint program under the imaging tab.    Lizzie Hayden MD  Maternal Fetal Medicine

## 2023-11-09 NOTE — PROGRESS NOTES
[facility]  Genetic Counseling Consult    Patient:  Ayanna Davidson YOB: 1990   Date of Service:  11/09/23   MRN: [id number]    Ayanna was seen at the [facility] for genetic consultation. The indication for genetic counseling is advanced maternal age and desire to discuss options for genetic screening and diagnostics. The patient was accompanied to this visit by their Mom.    The session was conducted in English.      IMPRESSION/ PLAN   1. Ayanna has not had genetic screening in this pregnancy but elected to have screening today.     2. During today's [facility] visit, Ayanna had a blood draw for expanded non-invasive prenatal testing (also called NIPT, NIPS, or cell-free DNA) through [email or web address]. The expanded NIPT screens for trisomy 21, 18, and 13 and 22q11.2 deletion syndrome. The patient opted to screen for sex chromosome aneuploidies, including reported fetal sex.  In accordance with current guidelines, other microdeletion syndromes and rare autosomal trisomies were not included, but reflex to include these conditions remains available with expanded NIPT if there is an indication later in the pregnancy. Results are expected in 7-14 days. The patient will be called with results and if they do not answer they requested a detailed message with results on their voicemail.  Patient was informed that results, including fetal sex, will be available in [email or web address].    3. Since the patient chose aneuploidy screening via NIPT, quad screen is NOT recommended in the second trimester. If the patient desires screening for open neural tube defects, maternal serum AFP only is recommended, ideally between 16-18 weeks gestation.    4. Ayanna had a comprehensive anatomy follow-up ultrasound today. Please see the ultrasound report for further details.    5. Ayanna also had blood drawn for comprehensive carrier screening.  Results are  "expected in 2-3 weeks and we will contact her to discuss results.      PREGNANCY HISTORY   /Parity:       Ayanna's pregnancy history is significant for:   This is Ayanna's first pregnancy    CURRENT PREGNANCY   Current Age: 33 year old   Age at Delivery: 33 year old  DANIE: 2024, Alternate DANIE Entry                                   Gestational Age: 24w0d  This pregnancy is a single gestation.   This pregnancy was conceived spontaneously.    MEDICAL HISTORY   Ayanna s medical history is being appropriately managed by our MFM team and her Primary OB.       FAMILY HISTORY   A three-generation pedigree was obtained today and is scanned under the \"Media\" tab in Epic. The family history was reported by Ayanna. Ayanna is unsure of the paternity at this time. She reports there are two different individuals she is thinking for the FOB. Ayanna requested information on non-invasive prenatal paternity testing, and was provided contact information for a local laboratory company that is able to facilitate that testing.  Ayanna understands our clinic has no affiliation with that group and she would need to contact them directly to discuss that testing.     The following significant findings were reported today:   Ayanna reports one of the potential FOBs, Jan (age 26), has a family history of Noelle's Disease (HD). Jan's father (age 58, dx. unknown) and paternal grandfather (dx unknown) have HD. Ayanna also reports Jan has a maternal family history of HD, but given his mom is adopted, was unclear on the details of who was affected. Jan has three full brothers with children - all of whom are reported to be alive and well. Jan nor his siblings have undergone genetic testing for HD.   Farner's Disease a neurogenetic disorder characterized by uncontrollable movement (chorea), decreased cognitive ability, mood swings, behavioral and personality changes, and other psychiatric symptoms " "(hallucinations, depression, etc.). The age of onset for HD is typically 40's-50's. HD is a repeat expansion disorder detected by counting the amount of \"CAG\" repeats within the HTT gene. Greater than 40 repeats are considered to be fully penetrant meaning an individual will develop HD. Repeat numbers between 27-39 are considered intermediate meaning there is not enough evidence for HD to develop, but may be more likely the higher the number of  repeats. Less than 26 repeats are considered normal meaning there is no risk of HD.   HD is an autosomal dominant condition meaning an affected person has a 50% chance of passing the condition down to their child. Given Jan has not undergone genetic testing we don't know his repeat counts. Therefore, we can only estimate the risk to this pregnancy. If Jan is FOB, and without knowing Jan's HD status, we estimate a 1/4 or 25% chance of this pregnancy being affected. If Jan is not affected, we do not expect this pregnancy to be at risk of having HD. If Jan is affected, we expect this pregnancy to have a 50% chance of being affected with HD.   The current HD genetic testing protocol does not support testing children for an adult-onset condition.   The other potential FOB and his family were not reported to have any health concerns.       RISK ASSESSMENT FOR INHERITED CONDITIONS AND CARRIER SCREENING OPTIONS   Expanded carrier screening is available to screen for autosomal recessive conditions and X-linked conditions in a large list of genes. Carrier screening does not test the pregnancy but gives a risk assessment for the pregnancy and future pregnancies to have the condition. Expanded carrier screening is designed to identify carrier status for conditions that are primarily childhood or adolescent onset. Expanded carrier screening does not evaluate for adult-onset conditions such as hereditary cancer syndromes, dementia/ Alzheimer's disease, or cardiovascular " disease risk factors. Additionally, expanded carrier screening is not comprehensive for all known genetic diseases or inherited conditions. Carrier screening does not test for all genetic and health conditions or risk factors.     Autosomal recessive conditions happen when a mutation has been inherited from the egg and sperm and include conditions like cystic fibrosis, thalassemia, hearing loss, spinal muscular atrophy, and more. We reviewed that when both biological parents carry a harmful genetic change in a gene associated with autosomal recessive inheritance, each of their pregnancies has a 1 in 4 (25%) chance to be affected by that condition. X-linked conditions happen when a mutation has been inherited from the egg and include conditions like fragile X syndrome.With x-linked conditions, the specific risk generally depends on the chromosomal sex of the fetus, with XY individuals (generally male) being most severely affected.      screening was reviewed. About [place] Screening    The patient does NOT have a family history of known inherited conditions. This does NOT mean the patient and/or their partner is not a carrier of a condition. Approximately 90% of couples at an increased reproductive risk for an inherited condition have no family history of that condition.     The patient has not had carrier screening previously.     We discussed carrier screening can be done before or during apregnancy. The purpose of carrier screening is providing an individual or couple with a personalized risk assessment for genetic conditions. This is accomplished by screening an individual to determine if they are a carrierfor a genetic condition. For most conditions, both parents must be a carrier of the condition for the pregnancy to be at an increased risk. For other conditions that are X-linked, there is an increased risk when a female(mother) is a carrier and the concerns are greater if she passes that condition to  a son.     Carrier screening is an option and a personal decision to pursue. If an individual or couple is interested or wishes to pursue carrier screening the following is discussed:  For most genetic conditions, carriers are healthy individuals. For autosomal recessive conditions (ex cystic fibrosis, sicklecell anemia, etc), individuals have two copies of each gene. Carrier individuals have a mutation in one copy. For X-linked conditions, females have two copies of each gene and are considered carriers if one copy has amutation. Males only have one copy of an X-linked gene, therefore, if that one copy has a mutation they are affected by the condition, rather than only being a carrier    For select conditions, carriers can have symptoms, however, the chance is variable. Examples of these conditions include:  Femalepremutation carriers of fragile X syndrome can have symptoms in adulthood including premature ovarian failure and ataxia. If a female passes the mutation to a son they are at a high risk for fragile X syndrome, which is themost common genetic cause for autism spectrum disorder  Female or male carriers of Gaucher disease can have an increased chance for developing Parkinson's disease. Gaucher disease is a severe metabolic disorder.     If both parents are carriers of an autosomal recessive condition, there are three possible outcomes for each pregnancy/child: 25% unaffected, 50% carrier, and 25%affected. The only method to determine the outcome or diagnosis the condition in a pregnancy is an invasive testing option such as an amniocentesis. Some genetic conditions will have ultrasound findings during the pregnancybut many do not. Some couples will choose the diagnostic testing to plan for delivery or choose termination of an affected pregnancy. Other couples will choose to test for the condition after delivery. While these conditionscannot be cured or treated during the pregnancy it may guide management  recommendations (ultrasounds) for pregnancy as well as delivery and infant care.     Ayanna wished to pursue carrier screening. The following was discussed as part of informed consent in addition to the above information:  Our largest carrier screening offering includes 558 genetic conditions.     Carrier screening is not meant to diagnose the patient with a condition, and generally carriers are asymptomatic. However, certain genes may confer increased risks for various health concerns in carriers such as fragile X syndrome, Duchene muscular dystrophy, and Gaucher disease among others.     We discussed that expanded carrier screening is designed to identify carrier status for conditions that are primarily childhood or adolescent onset. Expanded carrier screening does not evaluate for adult-onset conditions such as hereditary cancer syndromes, dementia/ Alzheimer's disease, or cardiovascular disease risk factors. Additionally, expanded carrier screening is not comprehensive for all known genetic diseases or inherited conditions. This is a screening test, and residual carrier status risk figures will be provided to the patient after results become available.  We discussed there are limitations such as current technology and rare chance of a false positive or negative.     [facility] laboratory will report on pseudodeficiency alleles, which are benign variants that are not known to be associated with disease and are not thought to impact the individuals risk to be a carrier for these conditions. However, the presence of pseudodeficiency alleles can exhibit false positive results on biochemical. Therefore, disclosing this information to patients may aid in interpretation of a  screen flag.     Results are typically available in 10-21 days. We will call her with the results and the report will eventually be available via [facility] patient portal.     Typically carrier screening is most informative when both the  sperm and egg provider complete testing.  Because of the uncertain paternity of this pregnancy, we discussed that if Ayanna is found to be a carrier of any recessive condition, risks to pregnancy will be dependent on the general population frequency of carriers for that condition.      There are implications for family members. If an individual is a carrier of a condition there is a chance for relatives to also be a carrier. This may be helpful information to disclose to family (siblings, cousins) so they may choose if they want to pursue carriers screening. In addition, if only one parent is found to be a carrier, there is a 50% chance for each child to be a carrier. This may be helpful information for the patient's children when they start a family.    [facility] will contact the patient via text, email, or both with cost estimate information. The communication will list the cost billed through insurance and provide an option for self-pay. The default is insurance billing so patients must choose the self pay option and follow through with credit card information if desired. The self pay cost of NIPT is $99, carrier screening is $250 with partner carrier screening for $100. The patient has the responsibility to determine if insurance or self pay is a better financial decision.          RISK ASSESSMENT FOR CHROMOSOME CONDITIONS   We explained that the risk for fetal chromosome abnormalities increases with maternal age. We discussed specific features of common chromosome abnormalities, including Down syndrome, trisomy 13, trisomy 18, and sex chromosome trisomies.    At age 33 at midtrimester, the risk to have a baby with Down syndrome is 1 in 274.   At age 33 at midtrimester, the risk to have a baby with any chromosome abnormality is 1 in 135.     Ayanna has not had genetic screening in this pregnancy but elected to have screening today.      GENETIC TESTING OPTIONS   We discussed the following diagnostic options:      Amniocentesis  Invasive diagnostic procedure done after 15 weeks gestation  The procedure collects a small sample of amniotic fluid for the purpose of chromosomal testing and/or other genetic testing  Diagnostic result; more than 99% sensitivity for fetal chromosome abnormalities  Testing for AFP in the amniotic fluid can test for open neural tube defects    It was a pleasure to be involved with Ayanna s care. Face-to-face time of the meeting was 45 minutes.    Grace Downey   Genetic Counseling Student  MS Genetic Counseling Candidate  [facility]      Patient seen, evaluated and discussed with the Genetic Counseling Student. I have verified the content of the note, which accurately reflects my assessment of the patient and the plan of care.   Supervising Genetic Counselor       Josef Mckinney, GC, MS, [facility]  Board Certified and [place] Licensed Genetic Counselor   [facility]  Maternal Fetal Medicine  Office: [phone]  [facility]: [phone]   Fax: [phone]  [facility]

## 2023-11-09 NOTE — NURSING NOTE
Patient here follow up ultrasound. Met with GC, planning on NIPT. Patient requesting to do her glucose testing. Will give her glucola during ultrasound and directions to lab after.  SBAR given to RADHA MONTELONGO, see their note in Epic.

## 2023-11-13 ENCOUNTER — MYC MEDICAL ADVICE (OUTPATIENT)
Dept: FAMILY MEDICINE | Facility: CLINIC | Age: 33
End: 2023-11-13
Payer: COMMERCIAL

## 2023-11-13 DIAGNOSIS — Z34.82 ENCOUNTER FOR SUPERVISION OF OTHER NORMAL PREGNANCY, SECOND TRIMESTER: Primary | ICD-10-CM

## 2023-11-14 ENCOUNTER — LAB (OUTPATIENT)
Dept: LAB | Facility: CLINIC | Age: 33
End: 2023-11-14
Payer: COMMERCIAL

## 2023-11-14 DIAGNOSIS — Z34.82 ENCOUNTER FOR SUPERVISION OF OTHER NORMAL PREGNANCY, SECOND TRIMESTER: ICD-10-CM

## 2023-11-14 LAB — GLUCOSE P FAST SERPL-MCNC: NORMAL MG/DL

## 2023-11-14 PROCEDURE — 36415 COLL VENOUS BLD VENIPUNCTURE: CPT

## 2023-11-14 PROCEDURE — 82947 ASSAY GLUCOSE BLOOD QUANT: CPT

## 2023-11-15 ENCOUNTER — LAB (OUTPATIENT)
Dept: LAB | Facility: CLINIC | Age: 33
End: 2023-11-15
Payer: COMMERCIAL

## 2023-11-15 DIAGNOSIS — Z34.82 ENCOUNTER FOR SUPERVISION OF OTHER NORMAL PREGNANCY, SECOND TRIMESTER: ICD-10-CM

## 2023-11-15 LAB
GESTATIONAL GTT 1 HR POST DOSE: 182 MG/DL (ref 60–179)
GESTATIONAL GTT 2 HR POST DOSE: 150 MG/DL (ref 60–154)
GESTATIONAL GTT 3 HR POST DOSE: 66 MG/DL (ref 60–139)
GLUCOSE P FAST SERPL-MCNC: 99 MG/DL (ref 60–94)

## 2023-11-15 PROCEDURE — 36415 COLL VENOUS BLD VENIPUNCTURE: CPT

## 2023-11-15 PROCEDURE — 82951 GLUCOSE TOLERANCE TEST (GTT): CPT

## 2023-11-15 PROCEDURE — 82952 GTT-ADDED SAMPLES: CPT

## 2023-11-17 ENCOUNTER — TELEPHONE (OUTPATIENT)
Dept: MATERNAL FETAL MEDICINE | Facility: CLINIC | Age: 33
End: 2023-11-17
Payer: COMMERCIAL

## 2023-11-17 LAB — SCANNED LAB RESULT: NORMAL

## 2023-11-17 NOTE — CONFIDENTIAL NOTE
November 17, 2023    I left a message for Ayanna regarding her non-invasive prenatal screen (NIPS) results.     Results indicate NO ANEUPLOIDY DETECTED for chromosomes 21, 18, 13, or sex chromosomes (XY - MALE). Results were also negative for 22q11.2 deletion syndrome.    This puts her current pregnancy at low risk for Down syndrome, trisomy 18, trisomy 13, sex chromosome abnormalities, and 22q11.2 deletion syndrome. This test is reported to have the following sensitivities: Down syndrome: 99.99%, trisomy 18: 99.99%, and trisomy 13: 99.99%. Although these results are reassuring, this does not replace a standard chromosome analysis from a chorionic villus sampling or amniocentesis.     Level II ultrasound is recommended given AMA and has been scheduled for 12/08/2023.    MSAFP is the appropriate second trimester screening test for open neural tube defects; the maternal quad screen is not recommended.    Her results will be made available in her Epic chart for her primary OB to review.       Vicki Espinal MS, [facility]  Maternal Fetal Medicine  Office: [phone]  [facility]: [phone]   Fax: [phone]  [facility]

## 2023-11-20 ENCOUNTER — TELEPHONE (OUTPATIENT)
Dept: MATERNAL FETAL MEDICINE | Facility: CLINIC | Age: 33
End: 2023-11-20
Payer: COMMERCIAL

## 2023-11-20 LAB — SCANNED LAB RESULT: NORMAL

## 2023-11-20 NOTE — TELEPHONE ENCOUNTER
11/20/2023    Called Ayanna to discuss carrier screening results. Ayanna's results are negative for all 558 assessed conditions.  This result means that the pregnancy is considered at low risk for all assessed conditions.  This information was left in Ayanna's voicemail and she was encouraged to contact me to discuss further if needed.     Josef Mckinney MS, [facility]  Licensed Genetic Counselor  Phone: [phone]  Pager: [phone]

## 2023-11-27 ENCOUNTER — MYC MEDICAL ADVICE (OUTPATIENT)
Dept: FAMILY MEDICINE | Facility: CLINIC | Age: 33
End: 2023-11-27
Payer: COMMERCIAL

## 2023-11-27 ENCOUNTER — TELEPHONE (OUTPATIENT)
Dept: FAMILY MEDICINE | Facility: CLINIC | Age: 33
End: 2023-11-27

## 2023-11-27 NOTE — TELEPHONE ENCOUNTER
General Call    Contacts         Type Contact Phone/Fax    11/27/2023 02:42 PM CST Phone (Incoming) [facility] [phone]          Reason for Call:  Questions on Wegovy exposure while becoming pregnant    What are your questions or concerns:  Leslie calling with some questions about patients exposure to WEGOVY while becoming pregnant.  Leslie stating there was to have been something scanned in to chart/medical records that she faxed to [facility] at [phone] and also to the clinic at [phone].  Informed there is nothing noted in chart of this form being received. Leslie also stating patient had called clinic and gave verbal to speak with her in regards to the Wegovy exposure.  No documentation noted in patients chart of this conversation having taken place.  Did inform that patient can call and give verbal, but will need form signed giving us permission to give out the information for the Wegovy study.  Leslie will speak with patient in regards to the requirements before we can give out any medical information.    Date of last appointment with provider: 11/7/23, Dr. Blood    With any further questions, Leslie can be reached at [phone], eastern time, office hours 8:30 am-5 pm

## 2023-11-28 ENCOUNTER — E-VISIT (OUTPATIENT)
Dept: FAMILY MEDICINE | Facility: CLINIC | Age: 33
End: 2023-11-28
Payer: COMMERCIAL

## 2023-11-28 DIAGNOSIS — N39.0 ACUTE UTI (URINARY TRACT INFECTION): Primary | ICD-10-CM

## 2023-11-28 PROCEDURE — 99207 PR NO CHARGE LOS: CPT | Performed by: FAMILY MEDICINE

## 2023-11-28 RX ORDER — NITROFURANTOIN 25; 75 MG/1; MG/1
100 CAPSULE ORAL 2 TIMES DAILY
Qty: 10 CAPSULE | Refills: 0 | Status: SHIPPED | OUTPATIENT
Start: 2023-11-28 | End: 2023-12-03

## 2023-11-28 RX ORDER — PHENAZOPYRIDINE HYDROCHLORIDE 200 MG/1
200 TABLET, FILM COATED ORAL 3 TIMES DAILY PRN
Qty: 15 TABLET | Refills: 0 | Status: SHIPPED | OUTPATIENT
Start: 2023-11-28 | End: 2024-01-03

## 2023-11-28 NOTE — COMMUNITY RESOURCES LIST (ENGLISH)
11/28/2023   [facility]  N/A  For questions about this resource list or additional care needs, please contact your primary care clinic or care manager.  Phone: [phone]   Email: N/A   Address: [address] [place] [id number]   Hours: N/A        Financial Stability       Utility payment assistance  1  [facility] Distance: 20.44 miles      In-Person   [address] [place] [address]  Language: English  Hours: Mon - Fri 8:00 AM - 4:00 PM  Fees: Free   Phone: (553) 101-3638 Email: info@[employer] Website: https://www.[employer]/     2  [facility] Distance: 21.4 miles      In-Person, Phone/Virtual   [address] [place] [address]  Language: English  Hours: Mon - Fri 8:00 AM - 4:30 PM  Fees: Free   Phone: (664) 675-1231 Email: Roxane@[facility].[employer]-nsn.gov Website: https://[employer]/services/community-support-services#EmergencyServices          Food and Nutrition       Food pantry  3  [facility] Distance: 12.09 miles      Pickup   [address] [place] [address]  Language: English  Hours: Mon 1:00 PM - 4:00 PM , Mon 6:00 PM - 8:00 PM , Thu 10:00 AM - 3:00 PM  Fees: Free   Phone: (339) 379-6769 Email: crosscenter@[employer] Website: http://www.[facility].org/     4  [facility] Distance: 15.74 miles      Pickup   [address] [place] [address]  Language: English  Hours: Thu 12:00 PM - 4:00 PM  Fees: Free   Phone: (685) 479-2645 Email: rhoda@[facility] Website: https://www.[employer].com/[facility]/     SNAP application assistance  5  [facility] Distance: 20.44 miles      In-Person   [address] [place] [address]  Language: English  Hours: Mon - Fri 8:00 AM - 4:00 PM  Fees: Free   Phone: (909) 245-4811 Email: info@[employer] Website: https://www.[employer]/     6  [facility]  (Tri-CAP) [facility] Distance: 20.47 miles      In-Person, Phone/Virtual   [address] [place] [address]  Language: English  Hours: Mon - Thu 8:00 AM - 4:30 PM , Fri 8:00 AM - 12:30 PM  Fees: Free   Phone: (247) 550-6790 Email: info@[employer] Website: http://www.[employer]     Soup kitchen or free meals  7  [facility] L[facility] Distance: 20.63 miles      Pickup   [address] [place] [address]  Language: English  Hours: Mon - Fri 11:30 AM - 12:45 PM  Fees: Free   Phone: (873) 302-5497 Email: meme@[facility].[employer] Website: http://[facility] Hospital[facility]ony[facility]/          Important Numbers & Websites       Emergency Services   911  [facility] Services   311  Poison Control   (844) 172-8442  Suicide Prevention Lifeline   (715) 445-4261 (TALK)  Child Abuse Hotline   (127) 540-2430 (4-A-Child)  Sexual Assault Hotline   (694) 115-6017 (HOPE)  National Runaway Safeline   (383) 937-8664 (RUNAWAY)  All-Options Talkline   (607) 478-8509  Substance Abuse Referral   (535) 993-8054 (HELP)

## 2023-12-05 ENCOUNTER — MYC MEDICAL ADVICE (OUTPATIENT)
Dept: FAMILY MEDICINE | Facility: CLINIC | Age: 33
End: 2023-12-05

## 2023-12-05 ENCOUNTER — ALLIED HEALTH/NURSE VISIT (OUTPATIENT)
Dept: EDUCATION SERVICES | Facility: CLINIC | Age: 33
End: 2023-12-05
Payer: COMMERCIAL

## 2023-12-05 ENCOUNTER — PRENATAL OFFICE VISIT (OUTPATIENT)
Dept: FAMILY MEDICINE | Facility: CLINIC | Age: 33
End: 2023-12-05
Payer: COMMERCIAL

## 2023-12-05 ENCOUNTER — TELEPHONE (OUTPATIENT)
Dept: FAMILY MEDICINE | Facility: CLINIC | Age: 33
End: 2023-12-05

## 2023-12-05 VITALS
HEIGHT: 66 IN | HEART RATE: 107 BPM | BODY MASS INDEX: 40.82 KG/M2 | OXYGEN SATURATION: 98 % | WEIGHT: 254 LBS | TEMPERATURE: 97.6 F | RESPIRATION RATE: 22 BRPM | SYSTOLIC BLOOD PRESSURE: 102 MMHG | DIASTOLIC BLOOD PRESSURE: 72 MMHG

## 2023-12-05 DIAGNOSIS — O24.419 GESTATIONAL DIABETES MELLITUS: Primary | ICD-10-CM

## 2023-12-05 DIAGNOSIS — O24.410 DIET CONTROLLED GESTATIONAL DIABETES MELLITUS (GDM) IN SECOND TRIMESTER: Primary | ICD-10-CM

## 2023-12-05 DIAGNOSIS — O09.90 HIGH-RISK PREGNANCY, UNSPECIFIED TRIMESTER: ICD-10-CM

## 2023-12-05 PROCEDURE — 99207 PR PRENATAL VISIT: CPT | Performed by: FAMILY MEDICINE

## 2023-12-05 PROCEDURE — G0108 DIAB MANAGE TRN  PER INDIV: HCPCS | Mod: AE

## 2023-12-05 ASSESSMENT — PAIN SCALES - GENERAL: PAINLEVEL: SEVERE PAIN (6)

## 2023-12-05 ASSESSMENT — PATIENT HEALTH QUESTIONNAIRE - PHQ9: SUM OF ALL RESPONSES TO PHQ QUESTIONS 1-9: 0

## 2023-12-05 NOTE — COMMUNITY RESOURCES LIST (ENGLISH)
12/05/2023   [facility] - Outpatient Clinics  N/A  For additional resource needs, please contact your health insurance member services or your primary care team.  Phone: [phone]   Email: N/A   Address: [address] [place] [id number]   Hours: N/A        Financial Stability       Utility payment assistance  1  [place] [facility] - [facility] and Util[facility] Distance: 74.27 miles      In-Person, Phone/Virtual   [address] [place] [address]  Language: English  Hours: Mon - Fri 8:30 AM - 4:30 PM  Fees: Free   Phone: (783) 882-8323 Website: https://[place].[facility]/Skimlinks/energy/consumer-assistance/energy-assistance-program/     2  [place] [employer] - [place]'s Telephone Assistance Plan (TAP) and Federal Lifeline and Affordable Connectivity Program (ACP) Distance: 76.68 miles      Phone/Virtual   [address] [place] [address]  Language: English  Fees: Free   Phone: (134) 140-8640 Email: consumer.puc@[facility].mn. Website: https://[place].gov/puc/consumers/telephone/          Food and Nutrition       Food pantry  3  [facility] Distance: 12.09 miles      Pickup   [address] [place] [address]  Language: English  Hours: Mon 1:00 PM - 4:00 PM , Mon 6:00 PM - 8:00 PM , Thu 10:00 AM - 3:00 PM  Fees: Free   Phone: (858) 201-3157 Email: crosscenter@[employer] Website: http://www.[facility].org/     4  [place] [facility] [employer] Distance: 15.74 miles      Pickup   [address] [place] [address]  Language: English  Hours: Thu 12:00 PM - 4:00 PM  Fees: Free   Phone: (725) 561-4401 Email: rhoda@[facility] Website: https://www.Trippy.com/Leonardareapantry/     SNAP application assistance  5  [facility] Action Partnership - [place] Distance: 20.44 miles      In-Person   [address] [place] [address]  Language: English  Hours: Mon - Fri 8:00 AM - 4:00 PM  Fees: Free   Phone: (545) 572-8277 Email: info@tccaction.com Website:  https://www.[employer]uspine/     6  [facility] Action [facility] ([facility] Distance: 20.47 miles      In-Person, Phone/Virtual   [address] [place] [address]  Language: English  Hours: Mon - Thu 8:00 AM - 4:30 PM , Fri 8:00 AM - 12:30 PM  Fees: Free   Phone: (465) 240-8195 Email: info@[employer] Website: http://www.Flexuspine     Soup kitchen or free meals  7  [facility] Distance: 20.63 miles      Pickup   [address] [place] [address]  Language: English  Hours: Mon - Fri 11:30 AM - 12:45 PM  Fees: Free   Phone: (420) 764-3654 Email: meme@[facility].Prime[employer]s Website: http://[facility] Department[facility] information technology.org/[facility]/-cloud/          Important Numbers & Websites       [facility]   211 211itedway.org  Poison Control   (588) 437-6106 [place]po[place].org  Suicide and Crisis Lifeline   [address] [place], [place] [address]line.org  Childhelp [place] Child Abuse Hotline   835.714.9628 Childhelphotline.org  National Sexual Assault Hotline   (644) 987-3522 (HOPE) Rainn.org  National Runaway Safeline   (257) 218-5212 (RUNAWAY) [facility].org  Pregnancy & Postpartum Support [place]   Call/text 496-131-2709 Ppsupportmn.org  Substance Abuse National Helpline ([facility]   322-643-HELP (2376) Findtreatment.gov  Emergency Services   912

## 2023-12-05 NOTE — PROGRESS NOTES
Concerns: none   Patient has another maternal-fetal medicine appointment coming up.   I did discuss with her that she had 2 abnormal values and her 3-hour Glucola so she does have a diagnosis of gestational diabetes and we will need to have her meet with the diabetic nurse educator she will do that today at 1300 hrs. learn to check her blood sugars.  I did talk to her about care for gestational diabetes if it is diet controlled she will be treated like a normal pregnancy if we do need to use medications it may require increased fetal surveillance at 32 weeks.  I will explain that to her if we move in that direction.  Doing well.  No concerns today.  No vaginal bleeding, loss of fluid, or contractions  Discussed kick counts and fetal movement.  Reportable signs and symptoms discussed.  Tdap given today  Discussed kick counts and fetal movement.  Discussed PTL, PROM, and when to call or come in.  RTC in 2 weeks.      Garett Blood MD

## 2023-12-05 NOTE — PATIENT INSTRUCTIONS
Check glucose 4 times daily, before breakfast and 1 hour after each meal.   Fasting goal 95 mg/dL or less  1 Hour after meal goal 140 mg/dL or less  2 Hour after meal goal 120 mg/dL or less    Check Ketones daily for one week, if negative for 5 days, reduce testing to once a week.     Physical activity recommended: walking daily.    Meal plan: 30-45 grams carbs at breakfast, 45-60 grams carbs at lunch, 45-60 grams carbs at supper, 15-30 grams carbs at 3 snacks a day.  Follow consistent CHO meal plan, eat CHO and protein/fat at all meals/snacks.    Call/e-mail/FOODITYhart message diabetes educator if 3 or more blood sugars are above the goal in 1 week, if ketones are positive, or with questions/concerns.    Thank you,   Supriya Sahu RDN, LD, [facility]  Outpatient Diabetes Education  Adult Diabetes Education Triage [phone]  Diabetes Scheduling [phone]

## 2023-12-05 NOTE — PROGRESS NOTES
Diabetes Self-Management Education & Support  Type of Service: In Person Visit    SUBJECTIVE/OBJECTIVE:  Presents for education related to gestational diabetes.    Accompanied by: Self, Mother  Diabetes management related comments/concerns: no  Gestational weeks: 27 weeks  Hospital planned for delivery: [facility] OB Visit Date: 01/03/24  Number of previous pregnancies: 0  Had any babies over 9 lbs: Other  Previously had Gestational Diabetes: No  Have you ever had thyroid problems or taken thyroid medication?: No  Heart disease, mitral valve prolapse or rheumatic fever?: No  Hypertension : No  High Cholesterol: No  High Triglycerides: Unknown  Do you use tobacco products?: (!) Yes  Do you drink beer, wine or hard liquor?: No    Cultural Influences/Ethnic Background:  Not  or       Estimated Date of Delivery: Feb 29, 2024    1 hour OGTT  Lab Results   Component Value Date    GLU1 159 (H) 11/09/2023         3 hour OGTT    Fasting  Lab Results   Component Value Date    GTTGF 99 (H) 11/15/2023       1 hour  Lab Results   Component Value Date    GTTG1 182 (H) 11/15/2023       2 hour  Lab Results   Component Value Date    GTTG2 150 11/15/2023       3 hour  Lab Results   Component Value Date    GTTG3 66 11/15/2023       Lifestyle and Health Behaviors:  Pre-pregnancy weight (lbs): 220  Exercise:: Yes  Days per week of moderate to strenuous exercise (like a brisk walk): 5  On average, minutes per day of exercise at this level: 20  How intense was your typical exercise? : Light (like stretching or slow walking)  Exercise Minutes per Week: 100  Barrier to exercise: Physical limitation  Cultural/Jain diet restrictions?: No  Meal planning/habits: Avoiding sweets, Smaller portions, Frequent snacking  How many times a week on average do you eat food made away from home (restaurant/take-out)?: 2  Meals include: Breakfast, Lunch, Dinner  Breakfast: cereal with milk (Life, crave, kix) with  raspberries  Lunch: 2p- tuna melt OR tater tot hotdish, likes corn OR cold chicken sandwich OR spaghetti and meat balls  Dinner: 6-7p spaghetti and meat balls OR hotdish  Snacks: 8:30- cereal with milk OR jello and fruit- manderine oranges  Beverages: Water, Sports drinks, Juice, Soda (apple juice about 10 ounces Ken-aid)  Pre- vitamin?: Yes  Supplements?: Yes  List supplements currently taking: D3, B12, vitamin C  Experiencing nausea?: Yes  Experiencing heartburn?: Yes    Healthy Coping:  Emotional response to diabetes: Ready to learn  Informal Support system:: Family, Parent  Stage of change: ACTION (Actively working towards change)    Current Management:  Taking medications for gestational diabetes?: No  Difficulty affording diabetes medication?: No    ASSESSMENT:  Ayanna comes to clinic today with her mom for new Dx of GDM.  She reports she was taking Wegovy prior to pregnancy and wonders if not taking it could be why BG is high.  Discussed pathophysiology of GDM.  Reviewed usual daily routines with eating and activity.  Ayanna has used a BG meter before in the past at a prior job.  Reviewed use of meter and BG testing goals.  Daily care for dogs and her home are main sources of physical activity.    Diet is high in sugar from sweetened beverages- discussed portion sizes of juice and Ken-aid.  Recommended including it at minimum with meal carbohydrate count and if BG is high recommend limit/avoid.      INTERVENTION:  Patient was instructed on One Touch Verio meter and was able to provide an accurate return demonstration.     Educational topics covered today:  GDM diagnosis, pathophysiology, Risks and Complications of GDM, Means of controlling GDM, Using a Blood Glucose Monitor, Blood Glucose Goals, Logging and Interpreting Glucose Results, Ketone Testing, When to Call a Diabetes Educator or OB Provider, Healthy Eating During Pregnancy, Counting Carbohydrates, Meal Planning for GDM, and Physical  Activity    Educational materials provided today:   Lisbeth Understanding Gestational Diabetes  GDM Log Book  Sharps Disposal  Care After Delivery      Pt verbalized understanding of concepts discussed and recommendations provided today.     PLAN:  Check glucose 4 times daily, before breakfast and 1 hour after each meal.   Fasting goal 95 mg/dL or less  1 Hour after meal goal 140 mg/dL or less  2 Hour after meal goal 120 mg/dL or less    Check Ketones daily for one week, if negative for 5 days, reduce testing to once a week.     Physical activity recommended: walking daily.    Meal plan: 30-45 grams carbs at breakfast, 45-60 grams carbs at lunch, 45-60 grams carbs at supper, 15-30 grams carbs at 3 snacks a day.  Follow consistent CHO meal plan, eat CHO and protein/fat at all meals/snacks.    Call/e-mail/MyChart message diabetes educator if 3 or more blood sugars are above the goal in 1 week, if ketones are positive, or with questions/concerns.    Virtual visit 12/19 to follow up    Supriya Sahu RDN, CASANDRA, [facility]  Outpatient Diabetes Education  Adult Diabetes Education Triage [phone]    Time Spent: 52 minutes  Encounter Type: Individual    Any diabetes medication dose changes were made via the CDE Protocol and Collaborative Practice Agreement with the patient's referring provider. A copy of this encounter was shared with the provider.

## 2023-12-05 NOTE — TELEPHONE ENCOUNTER
Order/Referral Request    Who is requesting: The patient    Orders being requested: Belly belt    When are orders needed by: asap    Has this been discussed with Provider: Yes    Does patient have an appointment scheduled?:     Where to send orders: [facility] in [place]    Could we send this information to you in [facility] or would you prefer to receive a phone call?:   Patient would prefer a phone call   Okay to leave a detailed message?: Yes at Home number on file [phone] ([place])

## 2023-12-05 NOTE — LETTER
12/5/2023         RE: Ayanna Daivdson  [address]  [place] [address]        Dear Colleague,    Thank you for referring your patient, Ayanna Davidson, to the [facility] DIABETES EDUCATION [place]. Please see a copy of my visit note below.    Diabetes Self-Management Education & Support  Type of Service: In Person Visit    SUBJECTIVE/OBJECTIVE:  Presents for education related to gestational diabetes.    Accompanied by: Self, Mother  Diabetes management related comments/concerns: no  Gestational weeks: 27 weeks  Hospital planned for delivery: [facility] OB Visit Date: 01/03/24  Number of previous pregnancies: 0  Had any babies over 9 lbs: Other  Previously had Gestational Diabetes: No  Have you ever had thyroid problems or taken thyroid medication?: No  Heart disease, mitral valve prolapse or rheumatic fever?: No  Hypertension : No  High Cholesterol: No  High Triglycerides: Unknown  Do you use tobacco products?: (!) Yes  Do you drink beer, wine or hard liquor?: No    Cultural Influences/Ethnic Background:  Not  or       Estimated Date of Delivery: Feb 29, 2024    1 hour OGTT  Lab Results   Component Value Date    GLU1 159 (H) 11/09/2023         3 hour OGTT    Fasting  Lab Results   Component Value Date    GTTGF 99 (H) 11/15/2023       1 hour  Lab Results   Component Value Date    GTTG1 182 (H) 11/15/2023       2 hour  Lab Results   Component Value Date    GTTG2 150 11/15/2023       3 hour  Lab Results   Component Value Date    GTTG3 66 11/15/2023       Lifestyle and Health Behaviors:  Pre-pregnancy weight (lbs): 220  Exercise:: Yes  Days per week of moderate to strenuous exercise (like a brisk walk): 5  On average, minutes per day of exercise at this level: 20  How intense was your typical exercise? : Light (like stretching or slow walking)  Exercise Minutes per Week: 100  Barrier to exercise: Physical limitation  Cultural/Caodaism diet restrictions?: No  Meal planning/habits:  Avoiding sweets, Smaller portions, Frequent snacking  How many times a week on average do you eat food made away from home (restaurant/take-out)?: 2  Meals include: Breakfast, Lunch, Dinner  Breakfast: cereal with milk (Life, crave, kix) with raspberries  Lunch: 2p- tuna melt OR tater tot hotdish, likes corn OR cold chicken sandwich OR spaghetti and meat balls  Dinner: 6-7p spaghetti and meat balls OR hotdish  Snacks: 8:30- cereal with milk OR jello and fruit- manderine oranges  Beverages: Water, Sports drinks, Juice, Soda (apple juice about 10 ounces Ken-aid)  Pre- vitamin?: Yes  Supplements?: Yes  List supplements currently taking: D3, B12, vitamin C  Experiencing nausea?: Yes  Experiencing heartburn?: Yes    Healthy Coping:  Emotional response to diabetes: Ready to learn  Informal Support system:: Family, Parent  Stage of change: ACTION (Actively working towards change)    Current Management:  Taking medications for gestational diabetes?: No  Difficulty affording diabetes medication?: No    ASSESSMENT:  Ayanna comes to clinic today with her mom for new Dx of GDM.  She reports she was taking Wegovy prior to pregnancy and wonders if not taking it could be why BG is high.  Discussed pathophysiology of GDM.  Reviewed usual daily routines with eating and activity.  Ayanna has used a BG meter before in the past at a prior job.  Reviewed use of meter and BG testing goals.  Daily care for dogs and her home are main sources of physical activity.    Diet is high in sugar from sweetened beverages- discussed portion sizes of juice and Ken-aid.  Recommended including it at minimum with meal carbohydrate count and if BG is high recommend limit/avoid.      INTERVENTION:  Patient was instructed on One Touch Verio meter and was able to provide an accurate return demonstration.     Educational topics covered today:  GDM diagnosis, pathophysiology, Risks and Complications of GDM, Means of controlling GDM, Using a Blood  Glucose Monitor, Blood Glucose Goals, Logging and Interpreting Glucose Results, Ketone Testing, When to Call a Diabetes Educator or OB Provider, Healthy Eating During Pregnancy, Counting Carbohydrates, Meal Planning for GDM, and Physical Activity    Educational materials provided today:   Lisbeth Understanding Gestational Diabetes  GDM Log Book  Sharps Disposal  Care After Delivery      Pt verbalized understanding of concepts discussed and recommendations provided today.     PLAN:  Check glucose 4 times daily, before breakfast and 1 hour after each meal.   Fasting goal 95 mg/dL or less  1 Hour after meal goal 140 mg/dL or less  2 Hour after meal goal 120 mg/dL or less    Check Ketones daily for one week, if negative for 5 days, reduce testing to once a week.     Physical activity recommended: walking daily.    Meal plan: 30-45 grams carbs at breakfast, 45-60 grams carbs at lunch, 45-60 grams carbs at supper, 15-30 grams carbs at 3 snacks a day.  Follow consistent CHO meal plan, eat CHO and protein/fat at all meals/snacks.    Call/e-mail/MyChart message diabetes educator if 3 or more blood sugars are above the goal in 1 week, if ketones are positive, or with questions/concerns.    Virtual visit 12/19 to follow up    Supriya Sahu RDN, CASANDRA, [facility]  Outpatient Diabetes Education  Adult Diabetes Education Triage [phone]    Time Spent: 52 minutes  Encounter Type: Individual    Any diabetes medication dose changes were made via the CDE Protocol and Collaborative Practice Agreement with the patient's referring provider. A copy of this encounter was shared with the provider.

## 2023-12-08 ENCOUNTER — OFFICE VISIT (OUTPATIENT)
Dept: MATERNAL FETAL MEDICINE | Facility: CLINIC | Age: 33
End: 2023-12-08
Attending: OBSTETRICS & GYNECOLOGY
Payer: COMMERCIAL

## 2023-12-08 ENCOUNTER — HOSPITAL ENCOUNTER (OUTPATIENT)
Dept: ULTRASOUND IMAGING | Facility: CLINIC | Age: 33
Discharge: HOME | End: 2023-12-08
Attending: OBSTETRICS & GYNECOLOGY
Payer: COMMERCIAL

## 2023-12-08 DIAGNOSIS — F19.90 POLYSUBSTANCE USE DISORDER: ICD-10-CM

## 2023-12-08 DIAGNOSIS — S39.013A STRAIN OF PELVIS, INITIAL ENCOUNTER: Primary | ICD-10-CM

## 2023-12-08 DIAGNOSIS — O24.410 DIET CONTROLLED GESTATIONAL DIABETES MELLITUS (GDM) IN THIRD TRIMESTER: ICD-10-CM

## 2023-12-08 DIAGNOSIS — O24.419 GESTATIONAL DIABETES MELLITUS (GDM) AFFECTING THIRD PREGNANCY: Primary | ICD-10-CM

## 2023-12-08 DIAGNOSIS — O99.213 OBESITY AFFECTING PREGNANCY IN THIRD TRIMESTER, UNSPECIFIED OBESITY TYPE: ICD-10-CM

## 2023-12-08 PROCEDURE — 76816 OB US FOLLOW-UP PER FETUS: CPT | Mod: 26 | Performed by: OBSTETRICS & GYNECOLOGY

## 2023-12-08 PROCEDURE — 76816 OB US FOLLOW-UP PER FETUS: CPT

## 2023-12-08 NOTE — PROGRESS NOTES
Please see the imaging tab for details of the ultrasound performed today.    Kenia Li MD  Specialist in Maternal-Fetal Medicine

## 2023-12-08 NOTE — NURSING NOTE
Patient reports positive fetal movement, no pain, no contractions, leaking of fluid, or bleeding.  Reports blood sugar values have all been within range and no ketones.  Patient denies headache, visual changes, nausea/vomiting, epigastric pain related to preeclampsia.  Education provided to patient on today's ultrasound.  SBAR given to RADHA MONTELONGO, see their note in Epic.  Michelle Quinones RN

## 2023-12-11 ENCOUNTER — TELEPHONE (OUTPATIENT)
Dept: FAMILY MEDICINE | Facility: CLINIC | Age: 33
End: 2023-12-11
Payer: COMMERCIAL

## 2023-12-11 NOTE — TELEPHONE ENCOUNTER
General Call    Contacts         Type Contact Phone/Fax    12/11/2023 12:21 PM CST Phone (Incoming) JulespaDeaconjessica MENDOZA (Self) [phone] (M)          Reason for Call:  The patient is giving a verbal communication to talk to [facility] pregnancy registry    What are your questions or concerns:  Verbal communication    Jocy [phone]

## 2023-12-12 ENCOUNTER — TELEPHONE (OUTPATIENT)
Dept: FAMILY MEDICINE | Facility: CLINIC | Age: 33
End: 2023-12-12
Payer: COMMERCIAL

## 2023-12-19 ENCOUNTER — VIRTUAL VISIT (OUTPATIENT)
Dept: EDUCATION SERVICES | Facility: CLINIC | Age: 33
End: 2023-12-19
Payer: COMMERCIAL

## 2023-12-19 DIAGNOSIS — O24.419 GESTATIONAL DIABETES MELLITUS: Primary | ICD-10-CM

## 2023-12-19 PROCEDURE — 98967 PH1 ASSMT&MGMT NQHP 11-20: CPT | Mod: 95

## 2023-12-19 NOTE — LETTER
12/19/2023         RE: Ayanna Davidson  [address]   [place] [address]        Dear Colleague,    Thank you for referring your patient, Ayanna Davidson, to the [facility] DIABETES [facility]. Please see a copy of my visit note below.    Diabetes Self-Management Education & Support  Type of Service: Telephone Visit    Originating Location (Patient Location): Home  Distant Location (Provider Location): [facility] DIABETES [facility]  Mode of Communication:  Telephone    Telephone Visit Start Time:  10:30 am  Telephone Visit End Time (telephone visit stop time): 11:45 am    How would patient like to obtain AVS? MyChart    SUBJECTIVE/OBJECTIVE:  Presents for education related to gestational diabetes.    Accompanied by: Self  Diabetes management related comments/concerns: legs are sore  Gestational weeks: 29 weeks  Next OB Visit Date: 01/03/24  Number of previous pregnancies: 0  Had any babies over 9 lbs: Other  Previously had Gestational Diabetes: No  Have you ever had thyroid problems or taken thyroid medication?: No  Heart disease, mitral valve prolapse or rheumatic fever?: No  Hypertension : No  High Cholesterol: No  High Triglycerides: Unknown  Do you use tobacco products?: (!) Yes  Do you drink beer, wine or hard liquor?: No    Cultural Influences/Ethnic Background:  Not  or       LMP  (LMP Unknown)         Estimated Date of Delivery: Feb 29, 2024    Blood Glucose/Ketone Log:     Date  Ketones FBG 1 hours post Breakfast 1 hour post lunch    1 hours post dinner   12/19 Neg 82      12/18  99- said she smoked cigarette right before 124 163 ---   12/17 12/16  89 114 139 ---   12/15   87 111 129 ---   12/14 13/1  92 117 129 ---   12/12  82 107 117       Date  Ketones FBG 1 hours post Breakfast 1 hour post lunch    1 hours post dinner   12/11 Neg 93 140 132 ---   12/10  86 118 123 ---   12/9  88 101 121 ---   12/8  89 108 130 ---        Lifestyle and  Health Behaviors:  Pre-pregnancy weight (lbs): 220  Exercise:: Yes  How intense was your typical exercise? : Light (like stretching or slow walking)  Meals include: Breakfast, Lunch, Dinner  Breakfast: cereal with milk (Life, crave, kix) with raspberries  Lunch: 2p- tuna melt OR tater tot hotdish, likes corn OR cold chicken sandwich OR spaghetti and meat balls  Dinner: 6-7p spaghetti and meat balls OR hotdish OR grilled cheese OR canned fish/chicken, corn, green beans or brussle sprouts  Snacks: 8:30- cereal with milk OR jello and fruit- manderine oranges OR fruit cup- Jello and fruit  Beverages: Water, Sports drinks, Juice, Soda (apple juice about)  Pre-toi vitamin?: Yes  Supplements?: Yes  List supplements currently taking: D3, B12, vitamin C  Experiencing nausea?: Yes  Experiencing heartburn?: Yes    Healthy Coping:  Emotional response to diabetes: Ready to learn  Stage of change: ACTION (Actively working towards change)    Current Management:  Taking medications for gestational diabetes?: No  Difficulty affording diabetes medication?: No    ASSESSMENT:  Ketones: Negative.   Fasting blood glucoses: 90% in target.  After breakfast: 100% in target.  After lunch: 89% in target.  After dinner: No results    Stopped drinking Ken-aid, is only drinking water and occasional juice.  Has forgot about testing after dinner.  Recommend she begin testing and continue this routine at least until she see's her provider on .    Reports appetite has decreased lately, continues to experience heart burn and intermittent nausea.  Is doing minimal walking, says her legs hurt.  Encouraged her to continue and to take breaks when needed.    INTERVENTION:  Educational topics covered today:  What to expect after delivery, Future testing for Type 2 diabetes (2 hour OGTT at 6 week post-partum check-up and annual fasting blood glucose level), Risk of GDM and planning ahead for future pregnancies, Recommended lifestyle  interventions for reducing the risk of Type 2 Diabetes, When to Call a Diabetes Educator or OB Provider    Educational Materials provided today:  Lisbeth Preventing Diabetes    PLAN:  Check glucose 4 times daily.  Check ketones once a week when readings are consistently negative.  Continue with recommended physical activity.  Continue to follow recommended meal plan: 30-45 grams carbs at breakfast, 45-60 grams carbs at lunch, 45-60 grans carbs at supper, 15-30 grams carbs at snacks.  Follow consistent CHO meal plan, eat CHO and protein/fat at all meals/snacks.    Call/e-mail/MyChart message diabetes educator if 3 or more blood sugars are above the goal in 1 week or if ketones are positive.    Supriya Sahu RDN, LD, [facility]  Outpatient Diabetes Education  Adult Diabetes Education Triage [phone]    Time Spent: 15 minutes  Encounter Type: Individual    Any diabetes medication dose changes were made via the CDE Protocol and Collaborative Practice Agreement with the patient's referring provider. A copy of this encounter was shared with the provider.

## 2023-12-19 NOTE — PROGRESS NOTES
Diabetes Self-Management Education & Support  Type of Service: Telephone Visit    Originating Location (Patient Location): Home  Distant Location (Provider Location):  [facility] [place] [facility] [place]  Mode of Communication:  Telephone    Telephone Visit Start Time:  10:30 am  Telephone Visit End Time (telephone visit stop time): 11:45 am    How would patient like to obtain AVS? Oskar    SUBJECTIVE/OBJECTIVE:  Presents for education related to gestational diabetes.    Accompanied by: Self  Diabetes management related comments/concerns: legs are sore  Gestational weeks: 29 weeks  Next OB Visit Date: 01/03/24  Number of previous pregnancies: 0  Had any babies over 9 lbs: Other  Previously had Gestational Diabetes: No  Have you ever had thyroid problems or taken thyroid medication?: No  Heart disease, mitral valve prolapse or rheumatic fever?: No  Hypertension : No  High Cholesterol: No  High Triglycerides: Unknown  Do you use tobacco products?: (!) Yes  Do you drink beer, wine or hard liquor?: No    Cultural Influences/Ethnic Background:  Not  or       LMP  (LMP Unknown)         Estimated Date of Delivery: Feb 29, 2024    Blood Glucose/Ketone Log:     Date  Ketones FBG 1 hours post Breakfast 1 hour post lunch    1 hours post dinner   12/19 Neg 82      12/18  99- said she smoked cigarette right before 124 163 ---   12/17 12/16  89 114 139 ---   12/15   87 111 129 ---   12/14 13/1  92 117 129 ---   12/12  82 107 117       Date  Ketones FBG 1 hours post Breakfast 1 hour post lunch    1 hours post dinner   12/11 Neg 93 140 132 ---   12/10  86 118 123 ---   12/9  88 101 121 ---   12/8  89 108 130 ---        Lifestyle and Health Behaviors:  Pre-pregnancy weight (lbs): 220  Exercise:: Yes  How intense was your typical exercise? : Light (like stretching or slow walking)  Meals include: Breakfast, Lunch, Dinner  Breakfast: cereal with milk (Life, crave, kix) with raspberries  Lunch:  2p- tuna melt OR tater tot hotdish, likes corn OR cold chicken sandwich OR spaghetti and meat balls  Dinner: 6-7p spaghetti and meat balls OR hotdish OR grilled cheese OR canned fish/chicken, corn, green beans or brussle sprouts  Snacks: 8:30- cereal with milk OR jello and fruit- manderine oranges OR fruit cup- Jello and fruit  Beverages: Water, Sports drinks, Juice, Soda (apple juice about)  Pre- vitamin?: Yes  Supplements?: Yes  List supplements currently taking: D3, B12, vitamin C  Experiencing nausea?: Yes  Experiencing heartburn?: Yes    Healthy Coping:  Emotional response to diabetes: Ready to learn  Stage of change: ACTION (Actively working towards change)    Current Management:  Taking medications for gestational diabetes?: No  Difficulty affording diabetes medication?: No    ASSESSMENT:  Ketones: Negative.   Fasting blood glucoses: 90% in target.  After breakfast: 100% in target.  After lunch: 89% in target.  After dinner: No results    Stopped drinking Ken-aid, is only drinking water and occasional juice.  Has forgot about testing after dinner.  Recommend she begin testing and continue this routine at least until she see's her provider on .    Reports appetite has decreased lately, continues to experience heart burn and intermittent nausea.  Is doing minimal walking, says her legs hurt.  Encouraged her to continue and to take breaks when needed.    INTERVENTION:  Educational topics covered today:  What to expect after delivery, Future testing for Type 2 diabetes (2 hour OGTT at 6 week post-partum check-up and annual fasting blood glucose level), Risk of GDM and planning ahead for future pregnancies, Recommended lifestyle interventions for reducing the risk of Type 2 Diabetes, When to Call a Diabetes Educator or OB Provider    Educational Materials provided today:  [place] Preventing Diabetes    PLAN:  Check glucose 4 times daily.  Check ketones once a week when readings are consistently  negative.  Continue with recommended physical activity.  Continue to follow recommended meal plan: 30-45 grams carbs at breakfast, 45-60 grams carbs at lunch, 45-60 grans carbs at supper, 15-30 grams carbs at snacks.  Follow consistent CHO meal plan, eat CHO and protein/fat at all meals/snacks.    Call/e-mail/MyChart message diabetes educator if 3 or more blood sugars are above the goal in 1 week or if ketones are positive.    Supriya Sahu RDN, LD, [facility]  Outpatient Diabetes Education  Adult Diabetes Education Triage [phone]    Time Spent: 15 minutes  Encounter Type: Individual    Any diabetes medication dose changes were made via the CDE Protocol and Collaborative Practice Agreement with the patient's referring provider. A copy of this encounter was shared with the provider.

## 2023-12-22 ENCOUNTER — MYC MEDICAL ADVICE (OUTPATIENT)
Dept: FAMILY MEDICINE | Facility: CLINIC | Age: 33
End: 2023-12-22
Payer: COMMERCIAL

## 2023-12-26 ENCOUNTER — TELEPHONE (OUTPATIENT)
Dept: FAMILY MEDICINE | Facility: CLINIC | Age: 33
End: 2023-12-26
Payer: COMMERCIAL

## 2023-12-26 ENCOUNTER — MYC MEDICAL ADVICE (OUTPATIENT)
Dept: EDUCATION SERVICES | Facility: CLINIC | Age: 33
End: 2023-12-26
Payer: COMMERCIAL

## 2023-12-26 DIAGNOSIS — K21.00 GASTROESOPHAGEAL REFLUX DISEASE WITH ESOPHAGITIS WITHOUT HEMORRHAGE: ICD-10-CM

## 2023-12-26 NOTE — TELEPHONE ENCOUNTER
Gestational Diabetes Follow-up    Subjective/Objective:    Ayanna KELLY Stuart sent in blood glucose log for review. Last date of communication was: 23.    Gestational diabetes is being managed with diet    Taking diabetes medications: no    Estimated Date of Delivery: 2024    BG/Food Lo/19  ketone negative   82 121 139 151    83 111 141 152    88 115 129 140    86 121     89 105     83 111 106    Assessment:    Ketones: negative.   Fasting blood glucoses: 100% in target.  After breakfast: 100% in target.  Before lunch: -% in target.  After lunch: 30% in target.  Before dinner: -% in target.  After dinner: 30% in target.    Plan/Response:  No changes in the patient's current treatment plan.  Recommend patient check more consistently and resend in 1 week    Any diabetes medication dose changes were made via the CDE Protocol and Collaborative Practice Agreement with the patient's referring provider. A copy of this encounter was shared with the provider.

## 2023-12-26 NOTE — TELEPHONE ENCOUNTER
Leslie from the [facility] pregnancy registry is calling again.    She is in need of information for the patient:    Information about previous pregnancies  She is also wanting information regarding the current pregnancy.    Leslie states patient filled out a release on 12/11/23 while she was in clinic.    No release found.    Please advise if patient needs to re-fill out forms.    Michell Nye RN on 12/26/2023 at 10:01 AM

## 2023-12-26 NOTE — TELEPHONE ENCOUNTER
Patient is going to sign the ABIEL at her upcoming appointment for this. She does give verbal consent to communicate with Leslie from [facility] pregnancy registry.   They did request an RN call them back to discuss in previous encounter.

## 2023-12-26 NOTE — TELEPHONE ENCOUNTER
RN Triage    Patient Contact    Attempt # 1    Was call answered?  No.  Left message on voicemail with information to call me back.    Michell Nye RN on 12/26/2023 at 1:47 PM

## 2023-12-27 NOTE — COMMUNITY RESOURCES LIST (ENGLISH)
12/27/2023   [facility]  N/A  For questions about this resource list or additional care needs, please contact your primary care clinic or care manager.  Phone: [phone]   Email: N/A   Address: [address] [place] [id number]   Hours: N/A        Financial Stability       Utility payment assistance  1  [facility] Action [facility] Distance: 20.44 miles      In-Person   [address] [place] [address]  Language: English  Hours: Mon - Fri 8:00 AM - 4:00 PM  Fees: Free   Phone: (361) 608-2012 Email: info@[employer] Website: https://www.eIQnetworks/     2  [facility] Distance: 21.4 miles      In-Person, Phone/Virtual   [address] [place] [address]  Language: English  Hours: Mon - Fri 8:00 AM - 4:30 PM  Fees: Free   Phone: (378) 704-6281 Email: Roxane@[facility].StyleSaint-nsn.gov Website: https://Mobile[employer]HQ/services/community-support-services#EmergencyServices          Food and Nutrition       Food pantry  3  [facility] Distance: 12.09 miles      Pickup   [address] [place] [address]  Language: English  Hours: Mon 1:00 PM - 4:00 PM , Mon 6:00 PM - 8:00 PM , Thu 10:00 AM - 3:00 PM  Fees: Free   Phone: (918) 395-7113 Email: crosscenter@[employer] Website: http://www.[facility].org/     4  [facility] Distance: 15.74 miles      Pickup   [address] [place] [address]  Language: English  Hours: Thu 12:00 PM - 4:00 PM  Fees: Free   Phone: (566) 930-1477 Email: rhoda@[facility]me[facility] Website: https://www.[employer].com/[facility] Regionousmane/     SNAP application assistance  5  [facility] Action [facility] Distance: 20.44 miles      In-Person   [address] [place] [address]  Language: English  Hours: Mon - Fri 8:00 AM - 4:00 PM  Fees: Free   Phone: (226) 993-8328 Email: info@[employer] Website: https://www.eIQnetworks/     6  [facility]  (Tri-CAP) [facility] Distance: 20.47 miles      In-Person, Phone/Virtual   [address] [place] [address]  Language: English  Hours: Mon - Thu 8:00 AM - 4:30 PM , Fri 8:00 AM - 12:30 PM  Fees: Free   Phone: (937) 798-2238 Email: info@[employer] Website: http://www.[employer]     Soup kitchen or free meals  7  [facility] Lunch Distance: 20.63 miles      Pickup   [address] [place] [address]  Language: English  Hours: Mon - Fri 11:30 AM - 12:45 PM  Fees: Free   Phone: (855) 764-7260 Email: meme@[facility] HospitalL[facility] Wireless.[employer] Website: http://[facility] HospitalSurroundsMe[facility]/          Important Numbers & Websites       Emergency Services   911  [facility] Services   311  Poison Control   (280) 945-5885  Suicide Prevention Lifeline   (353) 825-3291 (TALK)  Child Abuse Hotline   (964) 476-7017 (4-A-Child)  Sexual Assault Hotline   (216) 479-7301 (HOPE)  National Runaway Safeline   (233) 355-4029 (RUNAWAY)  All-Options Talkline   (580) 699-7784  Substance Abuse Referral   (533) 452-5655 (HELP)

## 2023-12-28 RX ORDER — OMEPRAZOLE 20 MG/1
20 TABLET, DELAYED RELEASE ORAL DAILY
Qty: 30 TABLET | Refills: 3 | OUTPATIENT
Start: 2023-12-28

## 2024-01-02 ENCOUNTER — TELEPHONE (OUTPATIENT)
Dept: EDUCATION SERVICES | Facility: CLINIC | Age: 34
End: 2024-01-02
Payer: COMMERCIAL

## 2024-01-03 ENCOUNTER — PRENATAL OFFICE VISIT (OUTPATIENT)
Dept: FAMILY MEDICINE | Facility: CLINIC | Age: 34
End: 2024-01-03
Payer: COMMERCIAL

## 2024-01-03 VITALS
HEIGHT: 66 IN | DIASTOLIC BLOOD PRESSURE: 64 MMHG | SYSTOLIC BLOOD PRESSURE: 110 MMHG | OXYGEN SATURATION: 96 % | TEMPERATURE: 97.2 F | HEART RATE: 102 BPM | BODY MASS INDEX: 41.3 KG/M2 | WEIGHT: 257 LBS

## 2024-01-03 DIAGNOSIS — K21.00 GASTROESOPHAGEAL REFLUX DISEASE WITH ESOPHAGITIS WITHOUT HEMORRHAGE: ICD-10-CM

## 2024-01-03 DIAGNOSIS — Z34.83 ENCOUNTER FOR SUPERVISION OF OTHER NORMAL PREGNANCY, THIRD TRIMESTER: Primary | ICD-10-CM

## 2024-01-03 PROCEDURE — 99207 PR PRENATAL VISIT: CPT | Performed by: FAMILY MEDICINE

## 2024-01-03 RX ORDER — OMEPRAZOLE 20 MG/1
20 TABLET, DELAYED RELEASE ORAL DAILY
Qty: 90 TABLET | Refills: 3 | Status: ON HOLD | OUTPATIENT
Start: 2024-01-03 | End: 2024-05-08

## 2024-01-03 ASSESSMENT — PAIN SCALES - GENERAL: PAINLEVEL: NO PAIN (0)

## 2024-01-03 NOTE — PROGRESS NOTES
Concerns: None   Fasting BG < 90 2 hour PP < 120 diet controlled   Doing well.  No concerns today.  No vaginal bleeding, LOF.  No contractions.  Discussed kick counts and fetal movement.  Encouraged to quit smoking.  Reportable signs and symptoms discussed.  Discussed kick counts and fetal movement.  Discussed PTL, PROM, and when to call or come in.  RTC 2 weeks.    Garett Blood MD

## 2024-01-04 ENCOUNTER — MYC MEDICAL ADVICE (OUTPATIENT)
Dept: FAMILY MEDICINE | Facility: CLINIC | Age: 34
End: 2024-01-04
Payer: COMMERCIAL

## 2024-01-05 ENCOUNTER — HOSPITAL ENCOUNTER (OUTPATIENT)
Dept: ULTRASOUND IMAGING | Facility: CLINIC | Age: 34
Discharge: HOME | End: 2024-01-05
Attending: FAMILY MEDICINE | Admitting: FAMILY MEDICINE
Payer: COMMERCIAL

## 2024-01-05 DIAGNOSIS — O24.410 DIET CONTROLLED GESTATIONAL DIABETES MELLITUS (GDM) IN THIRD TRIMESTER: ICD-10-CM

## 2024-01-05 PROCEDURE — 76805 OB US >/= 14 WKS SNGL FETUS: CPT

## 2024-01-05 PROCEDURE — 76816 OB US FOLLOW-UP PER FETUS: CPT

## 2024-01-15 ENCOUNTER — MYC MEDICAL ADVICE (OUTPATIENT)
Dept: EDUCATION SERVICES | Facility: CLINIC | Age: 34
End: 2024-01-15
Payer: COMMERCIAL

## 2024-01-17 ENCOUNTER — PRENATAL OFFICE VISIT (OUTPATIENT)
Dept: FAMILY MEDICINE | Facility: CLINIC | Age: 34
End: 2024-01-17
Payer: COMMERCIAL

## 2024-01-17 VITALS
HEART RATE: 105 BPM | SYSTOLIC BLOOD PRESSURE: 118 MMHG | DIASTOLIC BLOOD PRESSURE: 76 MMHG | TEMPERATURE: 97.3 F | OXYGEN SATURATION: 97 % | WEIGHT: 263 LBS | HEIGHT: 66 IN | BODY MASS INDEX: 42.27 KG/M2

## 2024-01-17 DIAGNOSIS — R82.90 FOUL SMELLING URINE: Primary | ICD-10-CM

## 2024-01-17 DIAGNOSIS — J34.89 STUFFY AND RUNNY NOSE: ICD-10-CM

## 2024-01-17 DIAGNOSIS — O09.529 HIGH-RISK PREGNANCY, ELDERLY MULTIGRAVIDA, UNSPECIFIED TRIMESTER: ICD-10-CM

## 2024-01-17 LAB
ALBUMIN UR-MCNC: 30 MG/DL
APPEARANCE UR: ABNORMAL
BACTERIA #/AREA URNS HPF: ABNORMAL /HPF
BILIRUB UR QL STRIP: NEGATIVE
COLOR UR AUTO: ABNORMAL
GLUCOSE UR STRIP-MCNC: NEGATIVE MG/DL
HGB UR QL STRIP: NEGATIVE
KETONES UR STRIP-MCNC: NEGATIVE MG/DL
LEUKOCYTE ESTERASE UR QL STRIP: ABNORMAL
MUCOUS THREADS #/AREA URNS LPF: PRESENT /LPF
NITRATE UR QL: NEGATIVE
PH UR STRIP: 5 [PH] (ref 5–7)
RBC URINE: 10 /HPF
SP GR UR STRIP: 1.02 (ref 1–1.03)
SQUAMOUS EPITHELIAL: 40 /HPF
UROBILINOGEN UR STRIP-MCNC: NORMAL MG/DL
WBC URINE: 16 /HPF

## 2024-01-17 PROCEDURE — 87635 SARS-COV-2 COVID-19 AMP PRB: CPT | Performed by: FAMILY MEDICINE

## 2024-01-17 PROCEDURE — 81001 URINALYSIS AUTO W/SCOPE: CPT | Performed by: FAMILY MEDICINE

## 2024-01-17 PROCEDURE — 99207 PR PRENATAL VISIT: CPT | Performed by: FAMILY MEDICINE

## 2024-01-17 PROCEDURE — 87086 URINE CULTURE/COLONY COUNT: CPT | Performed by: FAMILY MEDICINE

## 2024-01-17 RX ORDER — METHYLERGONOVINE MALEATE 0.2 MG/ML
200 INJECTION INTRAVENOUS
Status: CANCELLED | OUTPATIENT
Start: 2024-01-17

## 2024-01-17 RX ORDER — OXYTOCIN/0.9 % SODIUM CHLORIDE 30/500 ML
100-340 PLASTIC BAG, INJECTION (ML) INTRAVENOUS CONTINUOUS PRN
Status: CANCELLED | OUTPATIENT
Start: 2024-01-17

## 2024-01-17 RX ORDER — OXYTOCIN/0.9 % SODIUM CHLORIDE 30/500 ML
340 PLASTIC BAG, INJECTION (ML) INTRAVENOUS CONTINUOUS PRN
Status: CANCELLED | OUTPATIENT
Start: 2024-01-17

## 2024-01-17 RX ORDER — MISOPROSTOL 200 UG/1
400 TABLET ORAL
Status: CANCELLED | OUTPATIENT
Start: 2024-01-17

## 2024-01-17 RX ORDER — OXYTOCIN 10 [USP'U]/ML
10 INJECTION, SOLUTION INTRAMUSCULAR; INTRAVENOUS
Status: CANCELLED | OUTPATIENT
Start: 2024-01-17

## 2024-01-17 RX ORDER — LOPERAMIDE HCL 2 MG
4 CAPSULE ORAL
Status: CANCELLED | OUTPATIENT
Start: 2024-01-17

## 2024-01-17 RX ORDER — TRANEXAMIC ACID 10 MG/ML
1 INJECTION, SOLUTION INTRAVENOUS EVERY 30 MIN PRN
Status: CANCELLED | OUTPATIENT
Start: 2024-01-17

## 2024-01-17 RX ORDER — LOPERAMIDE HCL 2 MG
2 CAPSULE ORAL
Status: CANCELLED | OUTPATIENT
Start: 2024-01-17

## 2024-01-17 RX ORDER — CARBOPROST TROMETHAMINE 250 UG/ML
250 INJECTION, SOLUTION INTRAMUSCULAR
Status: CANCELLED | OUTPATIENT
Start: 2024-01-17

## 2024-01-17 RX ORDER — LIDOCAINE 40 MG/G
CREAM TOPICAL
Status: CANCELLED | OUTPATIENT
Start: 2024-01-17

## 2024-01-17 RX ORDER — ACETAMINOPHEN 325 MG/1
975 TABLET ORAL ONCE
Status: CANCELLED | OUTPATIENT
Start: 2024-01-17 | End: 2024-01-17

## 2024-01-17 RX ORDER — SODIUM CHLORIDE, SODIUM LACTATE, POTASSIUM CHLORIDE, CALCIUM CHLORIDE 600; 310; 30; 20 MG/100ML; MG/100ML; MG/100ML; MG/100ML
INJECTION, SOLUTION INTRAVENOUS CONTINUOUS
Status: CANCELLED | OUTPATIENT
Start: 2024-01-17

## 2024-01-17 RX ORDER — CITRIC ACID/SODIUM CITRATE 334-500MG
30 SOLUTION, ORAL ORAL
Status: CANCELLED | OUTPATIENT
Start: 2024-01-17

## 2024-01-17 ASSESSMENT — PAIN SCALES - GENERAL: PAINLEVEL: NO PAIN (0)

## 2024-01-17 NOTE — PROGRESS NOTES
Concerns: Fasting and postprandial blood sugars all within goal  continues to report to Diabetic Ed   Strong smelling urine wants U/A, not a clean catch will wait for culture   Doing well.    No vaginal bleeding, LOF.  No contractions.  Discussed kick counts and fetal movement.  Reportable signs and symptoms discussed.  Discussed kick counts and fetal movement.  Discussed PTL, PROM, and when to call or come in.  RTC 2 weeks.  C/S orders placed     Garett Blood MD

## 2024-01-18 LAB
BACTERIA UR CULT: NORMAL
SARS-COV-2 RNA RESP QL NAA+PROBE: NEGATIVE

## 2024-01-23 ENCOUNTER — MYC MEDICAL ADVICE (OUTPATIENT)
Dept: EDUCATION SERVICES | Facility: CLINIC | Age: 34
End: 2024-01-23
Payer: COMMERCIAL

## 2024-01-23 DIAGNOSIS — O24.419 GESTATIONAL DIABETES MELLITUS: ICD-10-CM

## 2024-01-31 ENCOUNTER — MYC MEDICAL ADVICE (OUTPATIENT)
Dept: FAMILY MEDICINE | Facility: CLINIC | Age: 34
End: 2024-01-31

## 2024-01-31 ENCOUNTER — PRENATAL OFFICE VISIT (OUTPATIENT)
Dept: FAMILY MEDICINE | Facility: CLINIC | Age: 34
End: 2024-01-31
Payer: COMMERCIAL

## 2024-01-31 VITALS
BODY MASS INDEX: 41.75 KG/M2 | HEIGHT: 66 IN | TEMPERATURE: 97.2 F | HEART RATE: 110 BPM | RESPIRATION RATE: 12 BRPM | OXYGEN SATURATION: 99 % | WEIGHT: 259.8 LBS | DIASTOLIC BLOOD PRESSURE: 70 MMHG | SYSTOLIC BLOOD PRESSURE: 118 MMHG

## 2024-01-31 DIAGNOSIS — O24.410 DIET CONTROLLED GESTATIONAL DIABETES MELLITUS (GDM) IN THIRD TRIMESTER: Primary | ICD-10-CM

## 2024-01-31 PROCEDURE — 99207 PR PRENATAL VISIT: CPT | Performed by: FAMILY MEDICINE

## 2024-01-31 RX ORDER — ASPIRIN 81 MG/1
81 TABLET ORAL DAILY
Status: ON HOLD | COMMUNITY
End: 2024-03-03

## 2024-01-31 ASSESSMENT — PAIN SCALES - GENERAL: PAINLEVEL: NO PAIN (0)

## 2024-01-31 NOTE — PROGRESS NOTES
Concerns:  Blood sugars are below 90.  Her postprandial blood sugars are below 130.  He is completely diet controlled.  She asked today if she actually has gestational diabetes I did states she may have more of a glucose intolerance during this pregnancy.  Doing well.  No concerns today.  No vaginal bleeding, LOF.  No contractions.  Cervix is posterior, long, closed and firm.  Discussed kick counts and fetal movement.  Reportable signs and symptoms discussed.  Discussed kick counts and fetal movement.  Discussed PTL, PROM, and when to call or come in.  RTC 2 weeks.    Garett Blood MD

## 2024-02-06 ENCOUNTER — MYC MEDICAL ADVICE (OUTPATIENT)
Dept: EDUCATION SERVICES | Facility: CLINIC | Age: 34
End: 2024-02-06
Payer: COMMERCIAL

## 2024-02-07 ENCOUNTER — PRENATAL OFFICE VISIT (OUTPATIENT)
Dept: FAMILY MEDICINE | Facility: CLINIC | Age: 34
End: 2024-02-07
Payer: COMMERCIAL

## 2024-02-07 ENCOUNTER — HOSPITAL ENCOUNTER (OUTPATIENT)
Facility: CLINIC | Age: 34
Discharge: HOME | End: 2024-02-07
Attending: FAMILY MEDICINE | Admitting: FAMILY MEDICINE
Payer: COMMERCIAL

## 2024-02-07 VITALS
OXYGEN SATURATION: 99 % | WEIGHT: 261 LBS | BODY MASS INDEX: 41.95 KG/M2 | SYSTOLIC BLOOD PRESSURE: 120 MMHG | HEIGHT: 66 IN | HEART RATE: 112 BPM | DIASTOLIC BLOOD PRESSURE: 76 MMHG | TEMPERATURE: 97.6 F

## 2024-02-07 VITALS
DIASTOLIC BLOOD PRESSURE: 73 MMHG | HEART RATE: 107 BPM | TEMPERATURE: 97.6 F | RESPIRATION RATE: 16 BRPM | SYSTOLIC BLOOD PRESSURE: 118 MMHG

## 2024-02-07 DIAGNOSIS — O24.410 DIET CONTROLLED GESTATIONAL DIABETES MELLITUS (GDM) IN THIRD TRIMESTER: Primary | ICD-10-CM

## 2024-02-07 PROBLEM — O47.00 PRETERM CONTRACTIONS: Status: ACTIVE | Noted: 2024-02-07

## 2024-02-07 LAB
ALBUMIN UR-MCNC: 100 MG/DL
AMPHETAMINES UR QL SCN: NORMAL
APPEARANCE UR: ABNORMAL
BACTERIA #/AREA URNS HPF: ABNORMAL /HPF
BARBITURATES UR QL SCN: NORMAL
BENZODIAZ UR QL SCN: NORMAL
BILIRUB UR QL STRIP: NEGATIVE
BZE UR QL SCN: NORMAL
CANNABINOIDS UR QL SCN: NORMAL
CAOX CRY #/AREA URNS HPF: ABNORMAL /HPF
COLOR UR AUTO: ABNORMAL
FENTANYL UR QL: NORMAL
GLUCOSE UR STRIP-MCNC: NEGATIVE MG/DL
HGB UR QL STRIP: NEGATIVE
KETONES UR STRIP-MCNC: NEGATIVE MG/DL
LEUKOCYTE ESTERASE UR QL STRIP: ABNORMAL
MUCOUS THREADS #/AREA URNS LPF: PRESENT /LPF
NITRATE UR QL: NEGATIVE
OPIATES UR QL SCN: NORMAL
PCP QUAL URINE (ROCHE): NORMAL
PH UR STRIP: 5 [PH] (ref 5–7)
RBC URINE: 3 /HPF
SP GR UR STRIP: 1.03 (ref 1–1.03)
SQUAMOUS EPITHELIAL: 13 /HPF
UROBILINOGEN UR STRIP-MCNC: NORMAL MG/DL
WBC URINE: 13 /HPF

## 2024-02-07 PROCEDURE — 87081 CULTURE SCREEN ONLY: CPT | Mod: XU | Performed by: FAMILY MEDICINE

## 2024-02-07 PROCEDURE — 87086 URINE CULTURE/COLONY COUNT: CPT | Performed by: FAMILY MEDICINE

## 2024-02-07 PROCEDURE — 99207 PR COMPLICATED OB VISIT: CPT | Performed by: FAMILY MEDICINE

## 2024-02-07 PROCEDURE — G0463 HOSPITAL OUTPT CLINIC VISIT: HCPCS

## 2024-02-07 PROCEDURE — 59425 ANTEPARTUM CARE ONLY: CPT | Performed by: FAMILY MEDICINE

## 2024-02-07 PROCEDURE — 81001 URINALYSIS AUTO W/SCOPE: CPT | Performed by: FAMILY MEDICINE

## 2024-02-07 PROCEDURE — 80307 DRUG TEST PRSMV CHEM ANLYZR: CPT | Performed by: FAMILY MEDICINE

## 2024-02-07 RX ORDER — METOCLOPRAMIDE HYDROCHLORIDE 5 MG/ML
10 INJECTION INTRAMUSCULAR; INTRAVENOUS EVERY 6 HOURS PRN
Status: DISCONTINUED | OUTPATIENT
Start: 2024-02-07 | End: 2024-02-07 | Stop reason: HOSPADM

## 2024-02-07 RX ORDER — ONDANSETRON 4 MG/1
4 TABLET, ORALLY DISINTEGRATING ORAL EVERY 6 HOURS PRN
Status: DISCONTINUED | OUTPATIENT
Start: 2024-02-07 | End: 2024-02-07 | Stop reason: HOSPADM

## 2024-02-07 RX ORDER — ONDANSETRON 2 MG/ML
4 INJECTION INTRAMUSCULAR; INTRAVENOUS EVERY 6 HOURS PRN
Status: DISCONTINUED | OUTPATIENT
Start: 2024-02-07 | End: 2024-02-07 | Stop reason: HOSPADM

## 2024-02-07 RX ORDER — PROCHLORPERAZINE 25 MG
25 SUPPOSITORY, RECTAL RECTAL EVERY 12 HOURS PRN
Status: DISCONTINUED | OUTPATIENT
Start: 2024-02-07 | End: 2024-02-07 | Stop reason: HOSPADM

## 2024-02-07 RX ORDER — METOCLOPRAMIDE 5 MG/1
10 TABLET ORAL EVERY 6 HOURS PRN
Status: DISCONTINUED | OUTPATIENT
Start: 2024-02-07 | End: 2024-02-07 | Stop reason: HOSPADM

## 2024-02-07 RX ORDER — PROCHLORPERAZINE MALEATE 5 MG
10 TABLET ORAL EVERY 6 HOURS PRN
Status: DISCONTINUED | OUTPATIENT
Start: 2024-02-07 | End: 2024-02-07 | Stop reason: HOSPADM

## 2024-02-07 ASSESSMENT — ACTIVITIES OF DAILY LIVING (ADL): ADLS_ACUITY_SCORE: 21

## 2024-02-07 ASSESSMENT — PAIN SCALES - GENERAL: PAINLEVEL: MILD PAIN (3)

## 2024-02-07 NOTE — TELEPHONE ENCOUNTER
Gestational Diabetes Follow-up    Subjective/Objective:    Ayanna Davidson sent in blood glucose log for review. Last date of communication was: .    Gestational diabetes is being managed with diet and activity    Taking diabetes medications: No    Estimated Date of Delivery: 2024    BG/Food Lo/13  83 103 124 111    85 128 91 125  1/15   89 112 108 121    86 114 98 96    87 100 108 115    80 74 97 114    83 106 108 116    88 115 89 104    83 92 132 106    86 105 93 113    86 99 92 114    88 103 107 114    89 118 102 97    87 105 106 129    89 106 120 116    90 122 106 114    89 108 116 104    90 105 132 114    89 93 97 106     90 103 112 121  /  92 129 131 111  2/3  89 113 100 119    90 86 105 121     89 100 129 116    94 103 121 113    Assessment:    Ketones: .   Fasting blood glucoses: 100% in target.  After breakfast: 100% in target.  Before lunch: x% in target.  After lunch: 100% in target.  Before dinner: x% in target.  After dinner: 100% in target.    Plan/Response:  No changes in the patient's current treatment plan.  Follow-up in 1 week.    PEDRITO Dasilva [facility]  Triage: [phone]  [phone]      Any diabetes medication dose changes were made via the CDE Protocol and Collaborative Practice Agreement with the patient's referring provider. A copy of this encounter was shared with the provider.

## 2024-02-07 NOTE — PROGRESS NOTES
S:  Discharge from triage.   A:  FHT's130 , Moderate variability, Accels present, no decels noted. Contractions none noted.  Cervical exam not assessed, cervix checked in clinic closed and long.  R:  Written and verbal D/C instruction provided. Pt. Verbalized understanding of D/C instructions and will follow up with RF as previously scheduled.   Verbalizes understanding that she will call or return to the [facility] with any further questions or concerns.   Pt. D/C'd via ambulatory with mother    Nursing Discharge Checklist  Discharge order entered: Yes  Patient care order entered: Yes  Charges entered: Yes  IV start and stop times have been documented in Epic?  NA  NST start and stop times have been documented in Epic Doc F/S?  NA

## 2024-02-07 NOTE — PROGRESS NOTES
Concerns: has been experiencing increased nausea every day, has not taken anything for it. Developed a hemorrhoid 3 days ago - not painful with bowel movement, not bleeding. Has been having contractions more often in the last week, today having them every few minutes for the past 1.5 hours.   No vaginal bleeding, LOF, contractions.  No HA, RUQ pain, N/V, visual changes.  Cervix is posterior, long, closed and firm.  GBS not completed due to scheduled .   Recommended she head up to L&D to be monitored due to her consistent contractions today.   Blood sugars in excellent control with diet alone   RTC 1 week.  Prenatal flowsheet information is reviewed.    Inge Wilson, MS3    I was present with the medical student who participated in the service and in the documentation of the note. I have verified the history and personally performed the physical exam and medical decision making. I reviewed the note in detail and edited it appropriately. I agree with the assessment and plan of care as documented in the note.     Garett Blood MD

## 2024-02-07 NOTE — PROGRESS NOTES
S: Triage Arrival  B: Ayanna is a 33 y.o.  @ 36w 6d who presents with complaint of contractions  A: EFM applied. FHT's145 with moderate variability, accels present, no decels noted on strip. Contractions none noted. HX of drug addiction in the past had a + urine tox in October. Pt consented to a urine tox.  R: Will notify MD to obtain further orders.

## 2024-02-08 LAB — BACTERIA UR CULT: NORMAL

## 2024-02-09 LAB — BACTERIA SPEC CULT: NORMAL

## 2024-02-17 ENCOUNTER — HOSPITAL ENCOUNTER (EMERGENCY)
Facility: CLINIC | Age: 34
Discharge: HOME | End: 2024-02-17
Attending: PHYSICIAN ASSISTANT | Admitting: FAMILY MEDICINE
Payer: COMMERCIAL

## 2024-02-17 ENCOUNTER — MYC MEDICAL ADVICE (OUTPATIENT)
Dept: FAMILY MEDICINE | Facility: CLINIC | Age: 34
End: 2024-02-17

## 2024-02-17 ENCOUNTER — NURSE TRIAGE (OUTPATIENT)
Dept: NURSING | Facility: CLINIC | Age: 34
End: 2024-02-17
Payer: COMMERCIAL

## 2024-02-17 VITALS
BODY MASS INDEX: 42.29 KG/M2 | DIASTOLIC BLOOD PRESSURE: 79 MMHG | HEART RATE: 97 BPM | TEMPERATURE: 98 F | SYSTOLIC BLOOD PRESSURE: 129 MMHG | RESPIRATION RATE: 20 BRPM | WEIGHT: 262 LBS | OXYGEN SATURATION: 99 %

## 2024-02-17 DIAGNOSIS — N89.8 VAGINAL CYST: ICD-10-CM

## 2024-02-17 DIAGNOSIS — Z33.1 PREGNANCY, INCIDENTAL: ICD-10-CM

## 2024-02-17 DIAGNOSIS — J21.0 RSV BRONCHIOLITIS: ICD-10-CM

## 2024-02-17 LAB
DEPRECATED S PYO AG THROAT QL EIA: NEGATIVE
FLUAV RNA SPEC QL NAA+PROBE: NEGATIVE
FLUBV RNA RESP QL NAA+PROBE: NEGATIVE
GROUP A STREP BY PCR: NOT DETECTED
RSV RNA SPEC NAA+PROBE: POSITIVE
SARS-COV-2 RNA RESP QL NAA+PROBE: NEGATIVE

## 2024-02-17 PROCEDURE — G0463 HOSPITAL OUTPT CLINIC VISIT: HCPCS | Mod: 25

## 2024-02-17 PROCEDURE — 99284 EMERGENCY DEPT VISIT MOD MDM: CPT | Performed by: PHYSICIAN ASSISTANT

## 2024-02-17 PROCEDURE — 87637 SARSCOV2&INF A&B&RSV AMP PRB: CPT | Performed by: PHYSICIAN ASSISTANT

## 2024-02-17 PROCEDURE — 59025 FETAL NON-STRESS TEST: CPT

## 2024-02-17 PROCEDURE — 87651 STREP A DNA AMP PROBE: CPT | Performed by: PHYSICIAN ASSISTANT

## 2024-02-17 PROCEDURE — 999N000105 HC STATISTIC NO DOCUMENTATION TO SUPPORT CHARGE

## 2024-02-17 RX ORDER — HYDROXYZINE PAMOATE 25 MG/1
50-75 CAPSULE ORAL
Qty: 30 CAPSULE | Refills: 0 | Status: ON HOLD | OUTPATIENT
Start: 2024-02-17 | End: 2024-05-08

## 2024-02-17 RX ORDER — ALBUTEROL SULFATE 90 UG/1
2 AEROSOL, METERED RESPIRATORY (INHALATION) EVERY 6 HOURS PRN
Qty: 18 G | Refills: 0 | Status: SHIPPED | OUTPATIENT
Start: 2024-02-17 | End: 2024-03-10

## 2024-02-17 ASSESSMENT — ACTIVITIES OF DAILY LIVING (ADL)
ADLS_ACUITY_SCORE: 36
ADLS_ACUITY_SCORE: 36
ADLS_ACUITY_SCORE: 34
ADLS_ACUITY_SCORE: 36

## 2024-02-17 NOTE — DISCHARGE INSTRUCTIONS
It was a pleasure working with you today!  I hope your condition improves rapidly!     You tested positive for RSV.  This is a virus.  Your immune system will need to hatfield this off.  Please make sure that you are drinking 8+ glasses of water daily.  Use Vicks vapor rub on your chest for cough.  Sleep with an extra pillow or 2 to help with the cough.  It is okay to use the Vistaril as needed for insomnia.  Use the inhaler 2 puffs every 4 hours on a regular basis for the next 2-3 days to help with the cough and your wheezing.  Please try and limit smoke exposure during this time.  Please also continue your warm water baths 3-4 times daily to help with your vaginal cyst.  Return if this becomes very red or if you start to develop a fever with increased swelling.

## 2024-02-17 NOTE — TELEPHONE ENCOUNTER
"  OB Triage Call      Is patient's OB/Midwife with the [facility] or [facility]? [facility]- Proceed with triage     Reason for call: Cold symptoms.    Assessment: Patient reports she is scheduled for a  on , but started having cold symptoms 2 days ago. Reports cough and severe sore throat. She also states she is wheezing at times and that makes it harder to breathe. She is able to speak in sentences. She has not taken a home COVID test. She states she has only used Vicks on her chest and cough drops.    Plan: See a provider within 24 hours. Advised to go to  today. Reviewed care advice and call back instructions. Patient plans to follow advice.      Patient's primary OB Provider is Dr. Garett Khoury.            38w2d    Estimated Date of Delivery: 2024        OB History    Para Term  AB Living   1 0 0 0 0 0   SAB IAB Ectopic Multiple Live Births   0 0 0 0 0      # Outcome Date GA Lbr Ky/2nd Weight Sex Delivery Anes PTL Lv   1 Current               Obstetric Comments   Unsure LMP.    Est EDC 2024 based on pos home UPT and early pregnancy symptoms.  Plans to parent  and unsure about father of the baby's involvement.         No results found for: \"GBS\"       Rosalinda Roque RN 24 8:57 AM  [facility] Nurse Advisor    Reason for Disposition   [1] SEVERE sore throat AND [2] present > 24 hours    Additional Information   Negative: SEVERE difficulty breathing (e.g., struggling for each breath, speaks in single words)   Negative: Sounds like a life-threatening emergency to the triager   Negative: [1] Difficulty breathing AND [2] not from stuffy nose (e.g., not relieved by cleaning out the nose)   Negative: Runny nose is caused by pollen or other allergies   Negative: Cough is main symptom   Negative: Severe sore throat   Negative: Fever > 104 F (40 C)   Negative: Patient sounds very sick or weak to the triager   Negative: [1] Fever > 101 F (38.3 C) AND [2] age > 60 years   " Negative: [1] Fever > 100.0 F (37.8 C) AND [2] bedridden (e.g., CVA, chronic illness, recovering from surgery)   Negative: [1] Fever > 100.0 F (37.8 C) AND [2] diabetes mellitus or weak immune system (e.g., HIV positive, cancer chemo, splenectomy, organ transplant, chronic steroids)   Negative: Fever present > 3 days (72 hours)   Negative: [1] Fever returns after gone for over 24 hours AND [2] symptoms worse or not improved   Negative: [1] Sinus pain (not just congestion) AND [2] fever   Negative: Earache    Protocols used: Common Cold-A-AH

## 2024-02-17 NOTE — PROGRESS NOTES
S: Triage Arrival  B: yAanna is a 33 y.o.  @ 38w 2d who presents to ED with RSV. Sent to OB for NST.  A: EFM applied. FHT's140 with moderate variability, accels present, no decels noted on strip. Contractions not present.  R: Will notify MD to obtain further orders.

## 2024-02-17 NOTE — ED PROVIDER NOTES
History     Chief Complaint   Patient presents with    Cough     HPI  Ayanna Davidson is a 33 year old female currently 38w2d gestation with planned  on 24 secondary to vaginal herpes history indication who presents for evaluation of a cough that started 2 days ago.  Productive of yellow phlegm.  Chest discomfort with coughing and she does feel episodes of shortness of breath.  Rhinorrhea, congestion, and sore throat present as well.  No fevers or chills.  Her mother has been ill with URI symptoms as well and she has been around.  Patient is a daily smoker.  No prior history of COPD or asthma.  Also has concerned about a right vaginal cyst for the past week which has not improved with conservative management treatments.  She has had multiple Bartholin cysts in the past that have required incision and drainage.  Has also had operative treatment for this concern as well.  She denies any change in vaginal discharge.  No vaginal bleeding.  She denies any abdominal contractions.  No rush of fluid.  Continues to feel baby move regularly.    Allergies:  Allergies   Allergen Reactions    No Known Allergies        Problem List:    Patient Active Problem List    Diagnosis Date Noted     contractions 2024     Priority: Medium    Diet controlled gestational diabetes mellitus (GDM) in third trimester 2024     Priority: Medium    Encounter for triage in pregnant patient 10/17/2023     Priority: Medium    PTSD (post-traumatic stress disorder) 10/10/2023     Priority: Medium    Anxiety 03/15/2023     Priority: Medium    Class 2 severe obesity with serious comorbidity and body mass index (BMI) of 38.0 to 38.9 in adult, unspecified obesity type (H) 2022     Priority: Medium    Prolapse of the stomach 2021     Priority: Medium     Formatting of this note might be different from the original. Added automatically from request for surgery [id number]      Open wound 2020     Priority: Medium  "   TINO III (vulvar intraepithelial neoplasia III) 02/26/2020     Priority: Medium     Added automatically from request for surgery [id number]      Preoperative examination 01/28/2020     Priority: Medium     Added automatically from request for surgery [id number]      Bartholin's gland abscess 01/28/2020     Priority: Medium     Added automatically from request for surgery [id number]      Chronic pain in right shoulder 01/21/2020     Priority: Medium    Dog bite of right lower leg 09/27/2019     Priority: Medium    Cellulitis of left upper extremity 09/27/2019     Priority: Medium    Current every day smoker 09/27/2019     Priority: Medium    Gastroesophageal reflux disease without esophagitis 09/27/2019     Priority: Medium    Skin infection 09/27/2019     Priority: Medium    Dog bite of left upper extremity 05/07/2019     Priority: Medium    Cervical high risk HPV (human papillomavirus) test positive 03/12/2019     Priority: Medium     4/2016 NIL pap. No prior HPV testing.   3/12/19 NIL pap, + HR HPV (not 16 or 18). Plan: cotest in 1 yr  1/29/20 NIL pap, + HR HPV (not 16 or 18). Plan: [facility]  2/11/20 [place] bx: neg. Plan: Cotest in 1 yr.   2/18/20 Vulvar biopsy: \"TINO III; severe dysplasia\" possible involvement of margins  3/11/20 Re excision of vulva.   3/23/21 NIL pap, + HR HPV (not 16 or 18). Plan: colp  6/22/21 Gyn visit - [facility] recommended, Patient refused  6/28/21 GynOnc visit plan - HPV HR+: \"Repeat HPV-based screening in 1 year based upon ASCCP\", Due 3/23/22  5/6/22 Lost to follow up  8/18/22 NIL pap, Neg HPV.  Plan: cotest in 1 year  6/14/23 NIL Pap, Neg HR HPV. Plan: cotest in 1 year and establish with OB/GYN provider, per Dr. Santiago.       Closed fracture dislocation of hip joint, left, initial encounter (H) 09/28/2018     Priority: Medium    Vulvar abscess 08/16/2017     Priority: Medium    ADD (attention deficit disorder) without hyperactivity 04/18/2014     Priority: Medium    Drug-induced mental disorder (H) " 09/20/2012     Priority: Medium     Overview:   ICD-10 Regulatory Update      Depressed 05/10/2012     Priority: Medium    Overdose of antipsychotic 05/09/2012     Priority: Medium        Past Medical History:    Past Medical History:   Diagnosis Date    Cervical high risk HPV (human papillomavirus) test positive 03/12/2019    Gastroesophageal reflux disease     HSV (herpes simplex virus) infection     Methamphetamine abuse (H)     recurrent Bartholin's cyst        Past Surgical History:    Past Surgical History:   Procedure Laterality Date    BIOPSY      CHOLECYSTECTOMY      ENT SURGERY      ESOPHAGOSCOPY, GASTROSCOPY, DUODENOSCOPY (EGD), COMBINED N/A 4/12/2023    Procedure: Esophagoscopy, gastroscopy, duodenoscopy (EGD), combined;  Surgeon: Thierno Escobar MD;  Location: [facility]    EXAM UNDER ANESTHESIA PELVIC N/A 01/29/2020    Procedure: EXAM UNDER ANESTHESIA, PELVIS, PAP;  Surgeon: Froylan Schwarz MD;  Location: [facility]    EXCISE VULVA WIDE LOCAL Bilateral 03/11/2020    Procedure: Right Vulva Wedge Resection and Incision and Drainage Left Vulva;  Surgeon: Froylan Schwarz MD;  Location: [facility]    INCISION AND DRAINAGE ABSCESS PELVIS, COMBINED N/A 02/26/2018    Procedure: COMBINED INCISION AND DRAINAGE ABSCESS PELVIS;  INCISION AND DRAINAGE LABIAL ABSCESS;  Surgeon: Jase Beach MD;  Location: [facility]    INCISION AND DRAINAGE ABSCESS PELVIS, COMBINED N/A 03/05/2019    Procedure: Incision and Drainage Right Labial Abscess;  Surgeon: Jase Beach MD;  Location: [facility]    INCISION AND DRAINAGE LOWER EXTREMITY, COMBINED Right 08/11/2019    Procedure: irrigation and debridement right leg dog bite;  Surgeon: Rommel Pérez MD;  Location: [facility]    LAP ADJUSTABLE GASTRIC BAND      LEEP TX, CERVICAL      MAMMOPLASTY REDUCTION BILATERAL      MARSUPIALIZATION BARTHOLIN CYST N/A 04/29/2019    Procedure: Bartholin's Cyst removal;  Surgeon: Froylan Schwarz MD;  Location: [facility]     MARSUPIALIZATION BARTHOLIN CYST Left 01/29/2020    Procedure: Excision of left bartholin's abscess;  Surgeon: Froylan Schwarz MD;  Location: [facility]    ORTHOPEDIC SURGERY         Family History:    Family History   Problem Relation Age of Onset    Thyroid Disease Mother     Heart Disease Father     Coronary Artery Disease Father     Hypertension Father     Hyperlipidemia Father     Mental Illness Father     Substance Abuse Father     Diabetes Maternal Grandmother     Prostate Cancer Maternal Grandfather     Breast Cancer Paternal Grandmother     Testicular cancer Paternal Grandfather     Depression Half-Sister     Anxiety Disorder Half-Sister        Social History:  Marital Status:  Single [1]  Social History     Tobacco Use    Smoking status: Every Day     Packs/day: 0.15     Years: 10.00     Additional pack years: 0.00     Total pack years: 1.50     Types: Cigarettes, Vaping Device    Smokeless tobacco: Current    Tobacco comments:     uses E cig with zero nicotine    Vaping Use    Vaping Use: Every day    Substances: no nicotine   Substance Use Topics    Alcohol use: Not Currently     Comment: Reports last use 9/17 and denies use during pregnancy    Drug use: Not Currently     Types: Marijuana, Methamphetamines     Comment: Reports Marijuana and meth use one week ago        Medications:    albuterol (PROAIR HFA/PROVENTIL HFA/VENTOLIN HFA) 108 (90 Base) MCG/ACT inhaler  hydrOXYzine estuardo (VISTARIL) 25 MG capsule          Review of Systems   All other systems reviewed and are negative.      Physical Exam   BP: 139/82  Pulse: 117  Temp: 98.2  F (36.8  C)  Resp: 20  Weight: 118.8 kg (262 lb)  SpO2: 99 %      Physical Exam  Vitals and nursing note reviewed.   Constitutional:       General: She is not in acute distress.     Appearance: She is not diaphoretic.   HENT:      Head: Normocephalic and atraumatic.      Right Ear: Tympanic membrane, ear canal and external ear normal.      Left Ear: Tympanic membrane, ear  canal and external ear normal.      Nose: Congestion and rhinorrhea present.      Mouth/Throat:      Mouth: Mucous membranes are dry.      Pharynx: No oropharyngeal exudate or posterior oropharyngeal erythema.   Eyes:      General: No scleral icterus.        Right eye: No discharge.         Left eye: No discharge.      Conjunctiva/sclera: Conjunctivae normal.      Pupils: Pupils are equal, round, and reactive to light.   Neck:      Thyroid: No thyromegaly.   Cardiovascular:      Rate and Rhythm: Normal rate and regular rhythm.      Heart sounds: Normal heart sounds. No murmur heard.  Pulmonary:      Effort: Pulmonary effort is normal. No respiratory distress.      Breath sounds: Wheezing (right mid anterior) and rhonchi (right mid anterior) present. No rales.   Chest:      Chest wall: No tenderness.   Abdominal:      General: Bowel sounds are normal. There is no distension.      Palpations: Abdomen is soft. There is no mass.      Tenderness: There is no abdominal tenderness. There is no right CVA tenderness, left CVA tenderness, guarding or rebound.   Genitourinary:     Comments: Exam performed with female RN chaperone.  There is a grape sized 2 cm compressible, mobile, and mildly tender cyst of the right inferior vaginal wall.  No induration or fluctuance.  No drainage.  No sign of vaginal discharge or vaginal bleeding.  Musculoskeletal:         General: No tenderness or deformity. Normal range of motion.      Cervical back: Normal range of motion and neck supple.   Lymphadenopathy:      Cervical: No cervical adenopathy.   Skin:     General: Skin is warm and dry.      Capillary Refill: Capillary refill takes less than 2 seconds.      Findings: No bruising, erythema, lesion or rash.   Neurological:      General: No focal deficit present.      Mental Status: She is alert and oriented to person, place, and time.      Cranial Nerves: No cranial nerve deficit.      Motor: No weakness.      Gait: Gait normal.    Psychiatric:         Behavior: Behavior normal.         Thought Content: Thought content normal.         ED Course                 Procedures              Critical Care time:  none               Results for orders placed or performed during the hospital encounter of 02/17/24 (from the past 24 hour(s))   Symptomatic Influenza A/B, RSV, & SARS-CoV2 PCR (COVID-19) Nose    Specimen: Nose; Swab   Result Value Ref Range    Influenza A PCR Negative Negative    Influenza B PCR Negative Negative    RSV PCR Positive (A) Negative    SARS CoV2 PCR Negative Negative    Narrative    Testing was performed using the Xpert Xpress CoV2/Flu/RSV Assay on the [employer]. This test should be ordered for the detection of SARS-CoV-2, influenza, and RSV viruses in individuals who meet clinical and/or epidemiological criteria. Test performance is unknown in asymptomatic patients. This test is for in vitro diagnostic use under the FDA EUA for laboratories certified under CLIA to perform high or moderate complexity testing. This test has not been FDA cleared or approved. A negative result does not rule out the presence of PCR inhibitors in the specimen or target RNA in concentration below the limit of detection for the assay. If only one viral target is positive but coinfection with multiple targets is suspected, the sample should be re-tested with another FDA cleared, approved, or authorized test, if coinfection would change clinical management. This test was validated by the [facility]. These laboratories are certified under the Clinical Laboratory Improvement Amendments of 1988 (CLIA-88) as qualified to perform high complexity laboratory testing.   Streptococcus A Rapid Screen w/Reflex to PCR    Specimen: Throat; Swab   Result Value Ref Range    Group A Strep antigen Negative Negative       Medications - No data to display    Assessments & Plan (with Medical Decision Making)     RSV  bronchiolitis  Pregnancy, incidental  Vaginal cyst     33 year old female currently 38w2d gestation with her first pregnancy with plan for  on 2024 secondary to herpes genitalis history who presents for evaluation of a cough starting 2 days ago productive of yellow phlegm associated with rhinorrhea, congestion, sore throat, and episodes of dyspnea.  No fevers or chills.  She is a smoker.  Also has a repeat vaginal cyst on the right side.  Has had multiple incision and drainage procedures in the past.  No active drainage.  No change in vaginal discharge or vaginal bleeding.  No contractions per patient report.  Feeling normal fetal movement.  Exam with blood pressure 139/82, temperature 98.2, pulse 117, respiration 20, oxygen saturation 99% on room air.  Patient is in no acute distress.  ENT exam with congestion and rhinorrhea.  No significant oropharyngeal erythema, exudate, or tonsillar hypertrophy.  No nuchal rigidity.  Cardiopulmonary exam with wheezing and rhonchi of the right mid anterior.  Abdomen soft and nontender.  Right vaginal area with a grape sized 2 cm compressible and soft cystic area without surrounding induration, fluctuance, or erythema.  No vaginal drainage noted.  No vaginal bleeding.  No other exam abnormalities.  Influenza, RSV, COVID, and strep was positive for RSV.  Other tests were negative.  I consulted OB/ on-call Dr. Saba regarding this patient situation.  He agreed that it would be appropriate to treat her with an albuterol MDI and other conservative care measures.  He recommended that we send her up to the OB floor for a 30-minute NST to ensure no prenatal complications after her ED evaluation has finished.  He did not recommend acute intervention with incision and drainage of this noninfected vaginal cyst.  Recommended continued conservative management prior to her scheduled  in 5 days.  Reviewed the viral etiology with the patient.  Thankfully, she is not  having any sign of respiratory distress.  No tachypnea, hypoxia, or hypotension.  She is a smoker, and certainly could have early development of underlying COPD.  Start the albuterol MDI 2 puffs every 4 hours on a regular basis for the next 4 to 5 days, and then as needed thereafterwards.  Push clear fluids.  Increase rest.  She states that she cannot sleep at night.  Therefore, I prescribed her hydroxyzine to use as needed.  Possible side effects discussed.  We discussed warm water sitz bath's to help with the vaginal cyst.  Strict ED return instructions with any purulent discharge, erythematous change at the skin, or development of fever above 100.5 reviewed with the patient in detail.  She was in agreement with this plan and was suitable for discharge.     I have reviewed the nursing notes.    I have reviewed the findings, diagnosis, plan and need for follow up with the patient.           Medical Decision Making  The patient's presentation was of moderate complexity (an acute illness with systemic symptoms).    The patient's evaluation involved:  ordering and/or review of 3+ test(s) in this encounter (see separate area of note for details)  discussion of management or test interpretation with another health professional (see separate area of note for details)    The patient's management necessitated moderate risk (prescription drug management including medications given in the ED).        Current Discharge Medication List        START taking these medications    Details   albuterol (PROAIR HFA/PROVENTIL HFA/VENTOLIN HFA) 108 (90 Base) MCG/ACT inhaler Inhale 2 puffs into the lungs every 6 hours as needed for shortness of breath, wheezing or cough  Qty: 18 g, Refills: 0    Comments: Pharmacy may dispense brand covered by insurance (Proair, or proventil or ventolin or generic albuterol inhaler)      hydrOXYzine estuardo (VISTARIL) 25 MG capsule Take 2-3 capsules (50-75 mg) by mouth nightly as needed for other  (insomnia)  Qty: 30 capsule, Refills: 0             Final diagnoses:   RSV bronchiolitis   Pregnancy, incidental   Vaginal cyst       Disclaimer: This note consists of symbols derived from keyboarding, dictation and/or voice recognition software. As a result, there may be errors in the script that have gone undetected. Please consider this when interpreting information found in this chart.      2/17/2024   [facility] EMERGENCY DEPT       Francesco Prakash PA-C  02/17/24 [id number]

## 2024-02-17 NOTE — ED TRIAGE NOTES
Pt c/o productive cough that started 2 days ago. Pt is currently 39 weeks pregnant; states she is having mild cramping however feels it is related to the frequent cough.   No vaginal discharge or bleeding. No fevers.     Upon arrival to room, pt states she thinks she has a vaginal cyst that she would also like evaluated.      Triage Assessment (Adult)       Row Name 02/17/24 1014          Triage Assessment    Airway WDL WDL        Respiratory WDL    Respiratory WDL X;cough     Cough Frequency frequent     Cough Type productive        Cardiac WDL    Cardiac WDL WDL

## 2024-02-18 NOTE — PROGRESS NOTES
S:  Discharge from triage.   A:  FHT's 145, Moderate variability, Accels present, no decels noted. Contractions not present.  Cervical exam not performed. Pt coughing continuously - was just diagnosed with RSV.  R:  Written and verbal D/C instruction provided. Pt. Verbalized understanding of D/C instructions. Will return for scheduled c/s in 1 week.  Verbalizes understanding that she will call or return to [facility] with any further questions or concerns.   Pt. D/C'd via walking with self.    Nursing Discharge Checklist  Discharge order entered: Yes  Patient care order entered: Yes  Charges entered: Yes  IV start and stop times have been documented in Epic? No  NST start and stop times have been documented in Epic Doc F/S? Yes

## 2024-02-19 ENCOUNTER — MYC MEDICAL ADVICE (OUTPATIENT)
Dept: FAMILY MEDICINE | Facility: CLINIC | Age: 34
End: 2024-02-19
Payer: COMMERCIAL

## 2024-02-20 ENCOUNTER — TELEPHONE (OUTPATIENT)
Dept: FAMILY MEDICINE | Facility: CLINIC | Age: 34
End: 2024-02-20
Payer: COMMERCIAL

## 2024-02-20 DIAGNOSIS — J21.0 RSV BRONCHIOLITIS: Primary | ICD-10-CM

## 2024-02-20 DIAGNOSIS — O24.410 DIET CONTROLLED GESTATIONAL DIABETES MELLITUS (GDM) IN THIRD TRIMESTER: Primary | ICD-10-CM

## 2024-02-20 DIAGNOSIS — O09.529 HIGH-RISK PREGNANCY, ELDERLY MULTIGRAVIDA, UNSPECIFIED TRIMESTER: ICD-10-CM

## 2024-02-20 RX ORDER — ALBUTEROL SULFATE 0.83 MG/ML
2.5 SOLUTION RESPIRATORY (INHALATION) EVERY 4 HOURS PRN
Qty: 60 ML | Refills: 0 | Status: ON HOLD | OUTPATIENT
Start: 2024-02-20 | End: 2024-05-08

## 2024-02-20 RX ORDER — PREDNISONE 20 MG/1
TABLET ORAL
Qty: 15 TABLET | Refills: 0 | Status: ON HOLD | OUTPATIENT
Start: 2024-02-20 | End: 2024-03-03

## 2024-02-20 NOTE — TELEPHONE ENCOUNTER
Patient would like meds for her RSV.    With the patient about a prednisone burst and starting nebulization treatment because is difficult for her to use her inhaler.  If she develops shortness of breath or symptoms are worse I would do want her to report to the [place] emergency room as she lives in that area.  She stated she understood the instructions I will contact her tomorrow morning for follow-up.     Garett Blood MD

## 2024-02-20 NOTE — PROGRESS NOTES
Phone note: I spoke with Ayanna today to prepare for her surgery in several days.  She is still coughing and does not feel that the RSV has improved since she was seen in the emergency room.  At that time she had normal oxygen saturation and vitals were otherwise reassuring.  She does not feel that she is any improved since then and so I did suggest that we postpone her surgery until next week so that she can continue to give antibodies to the baby and she can also recover more before surgery.  I did previously speak with Dr. Wei Hughes from [facility] but this was before I reached the patient.  I was assuming that her symptoms would be improved by this point and that we would proceed with surgery. Dr. Hughes felt that we could proceed if her symptoms were improving but in light of the patient still feeling symptomatic I think we should postpone.  If she does go into labor of course her hand would be forced and we would proceed with the surgery but hopefully in another week she will be much improved and she will be getting antibodies to the  baby all along and hopefully this will lower the risk of communicability to the baby.    We are working on rescheduling the surgery at this point.  She knows to contact us if she gets worse or if she has symptoms suggestive of labor.  I did ask her to standby her phone so that we can make arrangements with her later today.  We have been having some trouble reaching her by phone up until now.  When we call the patient back today I will suggest that she have a biophysical profile this week and a clinic visit so that we can reassure us about fetal status.  JESSY Man MD

## 2024-02-22 ENCOUNTER — APPOINTMENT (OUTPATIENT)
Dept: ULTRASOUND IMAGING | Facility: CLINIC | Age: 34
End: 2024-02-22
Attending: FAMILY MEDICINE
Payer: COMMERCIAL

## 2024-02-22 ENCOUNTER — HOSPITAL ENCOUNTER (INPATIENT)
Facility: CLINIC | Age: 34
Setting detail: SURGERY ADMIT
End: 2024-02-22
Attending: OBSTETRICS & GYNECOLOGY | Admitting: OBSTETRICS & GYNECOLOGY
Payer: COMMERCIAL

## 2024-02-22 ENCOUNTER — OFFICE VISIT (OUTPATIENT)
Dept: FAMILY MEDICINE | Facility: CLINIC | Age: 34
End: 2024-02-22
Payer: COMMERCIAL

## 2024-02-22 ENCOUNTER — HOSPITAL ENCOUNTER (OUTPATIENT)
Facility: CLINIC | Age: 34
Discharge: HOME | End: 2024-02-22
Attending: FAMILY MEDICINE | Admitting: FAMILY MEDICINE
Payer: COMMERCIAL

## 2024-02-22 ENCOUNTER — TELEPHONE (OUTPATIENT)
Dept: FAMILY MEDICINE | Facility: CLINIC | Age: 34
End: 2024-02-22

## 2024-02-22 VITALS
TEMPERATURE: 98.6 F | OXYGEN SATURATION: 98 % | DIASTOLIC BLOOD PRESSURE: 82 MMHG | BODY MASS INDEX: 43.16 KG/M2 | WEIGHT: 267.38 LBS | RESPIRATION RATE: 18 BRPM | HEART RATE: 100 BPM | SYSTOLIC BLOOD PRESSURE: 142 MMHG

## 2024-02-22 VITALS
OXYGEN SATURATION: 97 % | HEART RATE: 98 BPM | DIASTOLIC BLOOD PRESSURE: 71 MMHG | SYSTOLIC BLOOD PRESSURE: 109 MMHG | TEMPERATURE: 97.4 F | RESPIRATION RATE: 18 BRPM

## 2024-02-22 DIAGNOSIS — O34.219 PREVIOUS CESAREAN DELIVERY, ANTEPARTUM CONDITION OR COMPLICATION: Primary | ICD-10-CM

## 2024-02-22 DIAGNOSIS — J21.0 RSV BRONCHIOLITIS: ICD-10-CM

## 2024-02-22 DIAGNOSIS — R03.0 ELEVATED BLOOD PRESSURE READING WITHOUT DIAGNOSIS OF HYPERTENSION: ICD-10-CM

## 2024-02-22 DIAGNOSIS — O24.410 DIET CONTROLLED GESTATIONAL DIABETES MELLITUS (GDM) IN THIRD TRIMESTER: ICD-10-CM

## 2024-02-22 DIAGNOSIS — E66.812 CLASS 2 SEVERE OBESITY WITH SERIOUS COMORBIDITY AND BODY MASS INDEX (BMI) OF 38.0 TO 38.9 IN ADULT, UNSPECIFIED OBESITY TYPE (H): ICD-10-CM

## 2024-02-22 DIAGNOSIS — E66.01 CLASS 2 SEVERE OBESITY WITH SERIOUS COMORBIDITY AND BODY MASS INDEX (BMI) OF 38.0 TO 38.9 IN ADULT, UNSPECIFIED OBESITY TYPE (H): ICD-10-CM

## 2024-02-22 DIAGNOSIS — F17.200 SMOKER: ICD-10-CM

## 2024-02-22 DIAGNOSIS — J20.9 ACUTE BRONCHITIS, UNSPECIFIED ORGANISM: ICD-10-CM

## 2024-02-22 DIAGNOSIS — Z01.818 PREOP EXAMINATION: Primary | ICD-10-CM

## 2024-02-22 LAB
ALBUMIN MFR UR ELPH: 53.2 MG/DL
ALBUMIN SERPL BCG-MCNC: 3.3 G/DL (ref 3.5–5.2)
ALP SERPL-CCNC: 156 U/L (ref 40–150)
ALT SERPL W P-5'-P-CCNC: 38 U/L (ref 0–50)
AMPHETAMINES UR QL SCN: NORMAL
ANION GAP SERPL CALCULATED.3IONS-SCNC: 13 MMOL/L (ref 7–15)
AST SERPL W P-5'-P-CCNC: 40 U/L (ref 0–45)
BARBITURATES UR QL SCN: NORMAL
BENZODIAZ UR QL SCN: NORMAL
BILIRUB SERPL-MCNC: 0.2 MG/DL
BUN SERPL-MCNC: 9.8 MG/DL (ref 6–20)
BZE UR QL SCN: NORMAL
CALCIUM SERPL-MCNC: 8.9 MG/DL (ref 8.6–10)
CANNABINOIDS UR QL SCN: NORMAL
CHLORIDE SERPL-SCNC: 104 MMOL/L (ref 98–107)
CREAT SERPL-MCNC: 0.57 MG/DL (ref 0.51–0.95)
CREAT UR-MCNC: 237.7 MG/DL
DEPRECATED HCO3 PLAS-SCNC: 18 MMOL/L (ref 22–29)
EGFRCR SERPLBLD CKD-EPI 2021: >90 ML/MIN/1.73M2
ERYTHROCYTE [DISTWIDTH] IN BLOOD BY AUTOMATED COUNT: 14.5 % (ref 10–15)
FENTANYL UR QL: NORMAL
GLUCOSE SERPL-MCNC: 130 MG/DL (ref 70–99)
HCT VFR BLD AUTO: 39 % (ref 35–47)
HGB BLD-MCNC: 13.3 G/DL (ref 11.7–15.7)
MCH RBC QN AUTO: 30.6 PG (ref 26.5–33)
MCHC RBC AUTO-ENTMCNC: 34.1 G/DL (ref 31.5–36.5)
MCV RBC AUTO: 90 FL (ref 78–100)
MRSA DNA SPEC QL NAA+PROBE: NEGATIVE
OPIATES UR QL SCN: NORMAL
PCP QUAL URINE (ROCHE): NORMAL
PLATELET # BLD AUTO: 272 10E3/UL (ref 150–450)
POTASSIUM SERPL-SCNC: 4 MMOL/L (ref 3.4–5.3)
PROT SERPL-MCNC: 6.3 G/DL (ref 6.4–8.3)
PROT/CREAT 24H UR: 0.22 MG/MG CR (ref 0–0.2)
RBC # BLD AUTO: 4.35 10E6/UL (ref 3.8–5.2)
SA TARGET DNA: NEGATIVE
SODIUM SERPL-SCNC: 135 MMOL/L (ref 135–145)
WBC # BLD AUTO: 11.7 10E3/UL (ref 4–11)

## 2024-02-22 PROCEDURE — 84156 ASSAY OF PROTEIN URINE: CPT | Mod: XU | Performed by: FAMILY MEDICINE

## 2024-02-22 PROCEDURE — 87640 STAPH A DNA AMP PROBE: CPT | Mod: XU | Performed by: FAMILY MEDICINE

## 2024-02-22 PROCEDURE — 85041 AUTOMATED RBC COUNT: CPT | Performed by: FAMILY MEDICINE

## 2024-02-22 PROCEDURE — 76819 FETAL BIOPHYS PROFIL W/O NST: CPT

## 2024-02-22 PROCEDURE — 36415 COLL VENOUS BLD VENIPUNCTURE: CPT | Performed by: FAMILY MEDICINE

## 2024-02-22 PROCEDURE — G0463 HOSPITAL OUTPT CLINIC VISIT: HCPCS | Mod: 25

## 2024-02-22 PROCEDURE — 82247 BILIRUBIN TOTAL: CPT | Performed by: FAMILY MEDICINE

## 2024-02-22 PROCEDURE — 80307 DRUG TEST PRSMV CHEM ANLYZR: CPT | Performed by: FAMILY MEDICINE

## 2024-02-22 PROCEDURE — 59025 FETAL NON-STRESS TEST: CPT

## 2024-02-22 PROCEDURE — 99214 OFFICE O/P EST MOD 30 MIN: CPT | Performed by: FAMILY MEDICINE

## 2024-02-22 RX ORDER — METOCLOPRAMIDE HYDROCHLORIDE 5 MG/ML
10 INJECTION INTRAMUSCULAR; INTRAVENOUS EVERY 6 HOURS PRN
Status: DISCONTINUED | OUTPATIENT
Start: 2024-02-22 | End: 2024-02-22 | Stop reason: HOSPADM

## 2024-02-22 RX ORDER — PROCHLORPERAZINE MALEATE 5 MG
10 TABLET ORAL EVERY 6 HOURS PRN
Status: DISCONTINUED | OUTPATIENT
Start: 2024-02-22 | End: 2024-02-22 | Stop reason: HOSPADM

## 2024-02-22 RX ORDER — AZITHROMYCIN 250 MG/1
TABLET, FILM COATED ORAL
Qty: 6 TABLET | Refills: 0 | Status: SHIPPED | OUTPATIENT
Start: 2024-02-22 | End: 2024-02-27

## 2024-02-22 RX ORDER — ONDANSETRON 4 MG/1
4 TABLET, ORALLY DISINTEGRATING ORAL EVERY 6 HOURS PRN
Status: DISCONTINUED | OUTPATIENT
Start: 2024-02-22 | End: 2024-02-22 | Stop reason: HOSPADM

## 2024-02-22 RX ORDER — BUDESONIDE 1 MG/2ML
1 INHALANT ORAL 2 TIMES DAILY
Qty: 2 ML | Refills: 4 | Status: ON HOLD | OUTPATIENT
Start: 2024-02-22 | End: 2024-05-08

## 2024-02-22 RX ORDER — METOCLOPRAMIDE 5 MG/1
10 TABLET ORAL EVERY 6 HOURS PRN
Status: DISCONTINUED | OUTPATIENT
Start: 2024-02-22 | End: 2024-02-22 | Stop reason: HOSPADM

## 2024-02-22 RX ORDER — ONDANSETRON 2 MG/ML
4 INJECTION INTRAMUSCULAR; INTRAVENOUS EVERY 6 HOURS PRN
Status: DISCONTINUED | OUTPATIENT
Start: 2024-02-22 | End: 2024-02-22 | Stop reason: HOSPADM

## 2024-02-22 RX ORDER — PROCHLORPERAZINE 25 MG
25 SUPPOSITORY, RECTAL RECTAL EVERY 12 HOURS PRN
Status: DISCONTINUED | OUTPATIENT
Start: 2024-02-22 | End: 2024-02-22 | Stop reason: HOSPADM

## 2024-02-22 ASSESSMENT — ACTIVITIES OF DAILY LIVING (ADL)
ADLS_ACUITY_SCORE: 36
ADLS_ACUITY_SCORE: 19

## 2024-02-22 NOTE — PROGRESS NOTES
Preoperative Evaluation  [address] [address]  Phone: [phone]  Fax: [phone]  Primary Provider: Obed Santiago  Pre-op Performing Provider: ABHI MAGDALENO  2024       Ayanna is a 33 year old, presenting for the following:  Prenatal Care and Pre-Op Exam        2024     8:48 AM   Additional Questions   Roomed by Irina YANES MA     Surgical Information  Surgery/Procedure:   Surgery Location: [facility]  Surgeon: MATTHEW  Surgery Date: 24  Time of Surgery: 12  Where patient plans to recover: At home with family  Fax number for surgical facility: Note does not need to be faxed, will be available electronically in Epic.    Assessment & Plan     The proposed surgical procedure is considered INTERMEDIATE risk.      ICD-10-CM    1. Preop examination  Z01.818       2. Acute bronchitis, unspecified organism  J20.9 budesonide (PULMICORT) 1 MG/2ML neb solution     azithromycin (ZITHROMAX) 250 MG tablet      3. Smoker  F17.200 budesonide (PULMICORT) 1 MG/2ML neb solution     azithromycin (ZITHROMAX) 250 MG tablet      4. Elevated blood pressure reading without diagnosis of hypertension  R03.0       5. RSV bronchiolitis  J21.0       6. Class 2 severe obesity with serious comorbidity and body mass index (BMI) of 38.0 to 38.9 in adult, unspecified obesity type (H)  E66.01     Z68.38       7. Diet controlled gestational diabetes mellitus (GDM) in third trimester  O24.410            Approved for .  Wheezing and coughing on exam today.  Suspect her RSV has turned into acute bronchitis in the smoker.  She is already completing a course of prednisone and I will place her on amoxicillin.  Hopefully she will be in better shape by next week.  She does have an elevated blood pressure and she was sent to labor and delivery for preeclampsia workup.        - No identified additional risk factors other than  previously addressed        Recommendation  APPROVAL GIVEN to proceed with proposed procedure, without further diagnostic evaluation.          Subjective       HPI related to upcoming procedure:     CS for HSV        2/20/2024     2:43 PM   Preop Questions   1. Have you ever had a heart attack or stroke? No   2. Have you ever had surgery on your heart or blood vessels, such as a stent placement, a coronary artery bypass, or surgery on an artery in your head, neck, heart, or legs? No   3. Do you have chest pain with activity? No   4. Do you have a history of  heart failure? No   5. Do you currently have a cold, bronchitis or symptoms of other infection? YES -    6. Do you have a cough, shortness of breath, or wheezing? YES -    7. Do you or anyone in your family have previous history of blood clots? No   8. Do you or does anyone in your family have a serious bleeding problem such as prolonged bleeding following surgeries or cuts? No   9. Have you ever had problems with anemia or been told to take iron pills? YES -    10. Have you had any abnormal blood loss such as black, tarry or bloody stools, or abnormal vaginal bleeding? No   11. Have you ever had a blood transfusion? No   12. Are you willing to have a blood transfusion if it is medically needed before, during, or after your surgery? Yes   13. Have you or any of your relatives ever had problems with anesthesia? YES -    14. Do you have sleep apnea, excessive snoring or daytime drowsiness? No   15. Do you have any artifical heart valves or other implanted medical devices like a pacemaker, defibrillator, or continuous glucose monitor? No   16. Do you have artificial joints? No   17. Are you allergic to latex? No   18. Is there any chance that you may be pregnant? YES -        Health Care Directive  Patient does not have a Health Care Directive or Living Will: Discussed advance care planning with patient; information given to patient to review.    Preoperative  Review of             Patient Active Problem List    Diagnosis Date Noted    Elevated BP without diagnosis of hypertension 2024     Priority: Medium     contractions 2024     Priority: Medium    Diet controlled gestational diabetes mellitus (GDM) in third trimester 2024     Priority: Medium    Encounter for triage in pregnant patient 10/17/2023     Priority: Medium    PTSD (post-traumatic stress disorder) 10/10/2023     Priority: Medium    Anxiety 03/15/2023     Priority: Medium    Class 2 severe obesity with serious comorbidity and body mass index (BMI) of 38.0 to 38.9 in adult, unspecified obesity type (H) 2022     Priority: Medium    Prolapse of the stomach 2021     Priority: Medium     Formatting of this note might be different from the original. Added automatically from request for surgery [id number]      Open wound 2020     Priority: Medium    TINO III (vulvar intraepithelial neoplasia III) 2020     Priority: Medium     Added automatically from request for surgery [id number]      Preoperative examination 2020     Priority: Medium     Added automatically from request for surgery [id number]      Bartholin's gland abscess 2020     Priority: Medium     Added automatically from request for surgery [id number]      Chronic pain in right shoulder 2020     Priority: Medium    Dog bite of right lower leg 2019     Priority: Medium    Cellulitis of left upper extremity 2019     Priority: Medium    Current every day smoker 2019     Priority: Medium    Gastroesophageal reflux disease without esophagitis 2019     Priority: Medium    Skin infection 2019     Priority: Medium    Dog bite of left upper extremity 2019     Priority: Medium    Cervical high risk HPV (human papillomavirus) test positive 2019     Priority: Medium     2016 NIL pap. No prior HPV testing.   3/12/19 NIL pap, + HR HPV (not 16 or 18). Plan: cotest in 1  "yr  1/29/20 NIL pap, + HR HPV (not 16 or 18). Plan: [facility]  2/11/20 [facility] bx: neg. Plan: Cotest in 1 yr.   2/18/20 Vulvar biopsy: \"TINO III; severe dysplasia\" possible involvement of margins  3/11/20 Re excision of vulva.   3/23/21 NIL pap, + HR HPV (not 16 or 18). Plan: colp  6/22/21 Gyn visit - [facility] recommended, Patient refused  6/28/21 GynOnc visit plan - HPV HR+: \"Repeat HPV-based screening in 1 year based upon ASCCP\", Due 3/23/22  5/6/22 Lost to follow up  8/18/22 NIL pap, Neg HPV.  Plan: cotest in 1 year  6/14/23 NIL Pap, Neg HR HPV. Plan: cotest in 1 year and establish with OB/GYN provider, per Dr. Santiago.       Closed fracture dislocation of hip joint, left, initial encounter (H) 09/28/2018     Priority: Medium    Vulvar abscess 08/16/2017     Priority: Medium    ADD (attention deficit disorder) without hyperactivity 04/18/2014     Priority: Medium    Drug-induced mental disorder (H) 09/20/2012     Priority: Medium     Overview:   ICD-10 Regulatory Update      Depressed 05/10/2012     Priority: Medium    Overdose of antipsychotic 05/09/2012     Priority: Medium      Past Medical History:   Diagnosis Date    Cervical high risk HPV (human papillomavirus) test positive 03/12/2019    last PAP NIL (4/16), remote hx of LEEP    Gastroesophageal reflux disease     HSV (herpes simplex virus) infection     1 & 2    Methamphetamine abuse (H)     reports daily use    recurrent Bartholin's cyst      Past Surgical History:   Procedure Laterality Date    BIOPSY      CHOLECYSTECTOMY      ENT SURGERY      ESOPHAGOSCOPY, GASTROSCOPY, DUODENOSCOPY (EGD), COMBINED N/A 4/12/2023    Procedure: Esophagoscopy, gastroscopy, duodenoscopy (EGD), combined;  Surgeon: Thierno Escobar MD;  Location: [facility]    EXAM UNDER ANESTHESIA PELVIC N/A 01/29/2020    Procedure: EXAM UNDER ANESTHESIA, PELVIS, PAP;  Surgeon: Froylan Schwarz MD;  Location: [facility]    EXCISE VULVA WIDE LOCAL Bilateral 03/11/2020    Procedure: Right Vulva Wedge " Resection and Incision and Drainage Left Vulva;  Surgeon: Froylan Schwarz MD;  Location: [facility]    INCISION AND DRAINAGE ABSCESS PELVIS, COMBINED N/A 02/26/2018    Procedure: COMBINED INCISION AND DRAINAGE ABSCESS PELVIS;  INCISION AND DRAINAGE LABIAL ABSCESS;  Surgeon: Jase Beach MD;  Location: [facility]    INCISION AND DRAINAGE ABSCESS PELVIS, COMBINED N/A 03/05/2019    Procedure: Incision and Drainage Right Labial Abscess;  Surgeon: Jase Beach MD;  Location: [facility]    INCISION AND DRAINAGE LOWER EXTREMITY, COMBINED Right 08/11/2019    Procedure: irrigation and debridement right leg dog bite;  Surgeon: Rommel Pérez MD;  Location: [facility]    LAP ADJUSTABLE GASTRIC BAND      LEEP TX, CERVICAL      MAMMOPLASTY REDUCTION BILATERAL      MARSUPIALIZATION BARTHOLIN CYST N/A 04/29/2019    Procedure: Bartholin's Cyst removal;  Surgeon: Froylan Schwarz MD;  Location: [facility]    MARSUPIALIZATION BARTHOLIN CYST Left 01/29/2020    Procedure: Excision of left bartholin's abscess;  Surgeon: Froylna Schwarz MD;  Location: [facility]    ORTHOPEDIC SURGERY       Current Outpatient Medications   Medication Sig Dispense Refill    acetone urine (KETOSTIX) test strip Use to test first morning urine for ketones 50 strip 0    albuterol (PROAIR HFA/PROVENTIL HFA/VENTOLIN HFA) 108 (90 Base) MCG/ACT inhaler Inhale 2 puffs into the lungs every 6 hours as needed for shortness of breath, wheezing or cough 18 g 0    albuterol (PROVENTIL) (2.5 MG/3ML) 0.083% neb solution Take 1 vial (2.5 mg) by nebulization every 4 hours as needed for shortness of breath, wheezing or cough 60 mL 0    aspirin 81 MG EC tablet Take 81 mg by mouth daily      azithromycin (ZITHROMAX) 250 MG tablet Take 2 tablets (500 mg) by mouth daily for 1 day, THEN 1 tablet (250 mg) daily for 4 days. 6 tablet 0    blood glucose (NO BRAND SPECIFIED) lancets standard Use to test blood sugar 4 times daily or as directed. 100 each 3    blood glucose (NO  "BRAND SPECIFIED) test strip Use to test blood sugar 4 times daily or as directed. 100 strip 3    blood glucose monitoring (NO BRAND SPECIFIED) meter device kit Use to test blood sugar 4 times daily or as directed. 1 kit 0    budesonide (PULMICORT) 1 MG/2ML neb solution Take 2 mLs (1 mg) by nebulization 2 times daily 2 mL 4    hydrOXYzine estuardo (VISTARIL) 25 MG capsule Take 2-3 capsules (50-75 mg) by mouth nightly as needed for other (insomnia) 30 capsule 0    omeprazole (PRILOSEC OTC) 20 MG EC tablet Take 1 tablet (20 mg) by mouth daily 90 tablet 3    predniSONE (DELTASONE) 20 MG tablet 60 mg by mouth  for 5 days then stop 15 tablet 0    Prenatal Vit-Fe Fumarate-FA (PRENATAL MULTIVITAMIN W/IRON) 27-0.8 MG tablet Take 1 tablet by mouth daily 90 tablet 3       Allergies   Allergen Reactions    No Known Allergies         Social History     Tobacco Use    Smoking status: Some Days     Packs/day: 0.50     Years: 10.00     Additional pack years: 0.00     Total pack years: 5.00     Types: Cigarettes    Smokeless tobacco: Current    Tobacco comments:     uses E cig with zero nicotine    Substance Use Topics    Alcohol use: Not Currently     Comment: Reports last use 9/17 and denies use during pregnancy       History   Drug Use Unknown     Comment: Reports Marijuana and meth use one week ago         Review of Systems    Review of Systems  Constitutional, HEENT, cardiovascular, pulmonary, gi and gu systems are negative, except as otherwise noted.  Objective    BP (!) 142/82   Pulse 100   Temp 98.6  F (37  C) (Temporal)   Resp 18   Wt 121.3 kg (267 lb 6 oz)   LMP  (LMP Unknown)   SpO2 98%   BMI 43.16 kg/m     Estimated body mass index is 43.16 kg/m  as calculated from the following:    Height as of 2/14/24: 1.676 m (5' 6\").    Weight as of this encounter: 121.3 kg (267 lb 6 oz).  Physical Exam  GENERAL: alert and no distress  NECK: no adenopathy, no asymmetry, masses, or scars  RESP: lungs clear to auscultation - no " rales, rhonchi or wheezes  CV: regular rate and rhythm, normal S1 S2, no S3 or S4, no murmur, click or rub, no peripheral edema  ABDOMEN: soft, nontender, no hepatosplenomegaly, no masses and bowel sounds normal  MS: no gross musculoskeletal defects noted, no edema    Recent Labs   Lab Test 10/17/23  1414 10/08/23  1935 09/25/23  1325 06/14/23  1536   HGB  --  12.7 13.8 14.8   PLT  --  307 260 300   * 136  --  140   POTASSIUM 3.9 3.7  --  4.0   CR 0.50* 0.58  --  0.69   A1C  --   --  5.4 5.5        Diagnostics  No labs were ordered during this visit.   No EKG required, no history of coronary heart disease, significant arrhythmia, peripheral arterial disease or other structural heart disease.    Revised Cardiac Risk Index (RCRI)  The patient has the following serious cardiovascular risks for perioperative complications:   - No serious cardiac risks = 0 points     RCRI Interpretation: 0 points: Class I (very low risk - 0.4% complication rate)         Signed Electronically by: Chaim Felipe MD  Copy of this evaluation report is provided to requesting physician.

## 2024-02-22 NOTE — PROGRESS NOTES
Infection Prevention Progress Note  2/22/2024      Patient Name: Ayanna Davidson [id number]  Admit Date: 2/22/2024    Infection Status as of 2/22/2024 11:21 AM: RSV, MRSA  Isolation Status as of 2/22/2024 11:21 AM: No active isolations     MDRO Discontinuation  Infection Prevention has reviewed this patient's chart per the MDRO D/C Policy and have taken the following action:    Patient meets all the criteria for discontinuation and Infection Prevention will resolve the MRSA infection status.    Contact Precautions discontinued for the following MDRO(s): MRSA    If you have any questions, please contact Infection Prevention.    Kathleen Campbell, Infection Prevention

## 2024-02-22 NOTE — DISCHARGE INSTRUCTIONS
OB Triage Discharge Instructions    Diet:  Regular diet as tolerated  Drink 8-10 glasses of water and / or juice every day    Activity:  As tolerated    Other Special Instructions: Follow up next MOnday for BP check  CS scheduled for 2/29 Thursday at 1200, arrive at the [facility]place at 10am    Reason for being seen in L&D: elevated BP    Treatment/procedures performed in L&D: labs, U/S biophysical profile, non stress test, MRSA swab    Call the Birthplace at [phone] if you have:  5 or more contractions in one hour  Leaking of fluid from your vaginal area  Decreased fetal movement (follow kick count instructions)  Bleeding from your vaginal area  Swelling in your face, or increased swelling in your hands or legs  Headaches or vision problems such as blurring or seeing spots or starts  Nausea or vomiting lasting for more than 24 hours  An increase in weight (5lbs/week)  Epigastric pain (sometimes confused with heartburn that does not go away or a very bad stomach ache)    If you have any questions, please follow up with your doctor.        Patient Signature: ______________________________________________________________  By signing the above I acknowledge that a nurse or my care provider has explained the instruction to me and I have had any questions regarding my care explained to me.        Discharge Nurse Signature: _______________________________ 2/22/2024  11:24 AM    Method of discharge: amb    Accompanied by: mother  Time of discharge: 2869

## 2024-02-22 NOTE — PROGRESS NOTES
Dr. Blood called after MD reviewed lab results, BPP 8/8, reactive NST & VSS. MD gave ordered to discharge pt to home & follow up in 4 days for a BP recheck & visit. Will update pt of plan of care.

## 2024-02-22 NOTE — PROGRESS NOTES
S:  Discharge from triage.   A:  FHT's 130's, Moderate variability, Accels present, no decels noted. Contractions not seen or felt per pt.   R:  Written and verbal D/C instruction provided. Pt. Verbalized understanding of D/C instructions and will follow up with triage nurse next week for BP check.   Verbalizes understanding that she will call or return to the [facility] with any further questions or concerns.   Pt. D/C'd via amb with mother    Nursing Discharge Checklist  Discharge order entered: Yes  Patient care order entered: Yes  Charges entered: Yes  IV start and stop times have been documented in Epic?  N/a  NST start and stop times have been documented in Epic Doc F/S? Yes

## 2024-02-22 NOTE — PROGRESS NOTES
S: Triage Arrival  B: Ayanna is a 33 y.o.  @ 39w 0d who presents from clinic with elevated BP  A: EFM applied. FHT's130's with moderate variability, accels present, no decels noted on strip. Contractions no ctxs felt or noted on monitor. MD ordered labs & U/S  R: Will notify MD to obtain further orders after lab results are back.

## 2024-02-22 NOTE — TELEPHONE ENCOUNTER
Left message for patient to return call. Called to let patient know I spoke to Dr Felipe and no there is nothing available earlier for the  he is surprised they could switch it to the next day. All the rooms are booked and its up to the doctor on call. Dr Felipe is on call Thursday.  Not Friday so she will have Dr Olivas doing the Section. She understands now and will keep the date and time.     Maddie Padilla MA 2024

## 2024-02-23 ENCOUNTER — MYC MEDICAL ADVICE (OUTPATIENT)
Dept: EDUCATION SERVICES | Facility: CLINIC | Age: 34
End: 2024-02-23
Payer: COMMERCIAL

## 2024-02-23 NOTE — TELEPHONE ENCOUNTER
Gestational Diabetes Follow-up    Subjective/Objective:    Ayanna Davidson sent in blood glucose log for review. Last date of communication was: 24.    Gestational diabetes is being managed with diet and activity    Taking diabetes medications: no    Estimated Date of Delivery: 2024    BG/Food Lo 109 121 111   119 124 103   129 94 109  2/10-90 133 95 108   118 132 106   135 113 114   126 134 110   109 115 121  2/15-92 126 110 128   121 103 108   123 118 139   118 135 128   124 102 129   113 146 106   163 116 123   130 118 141    Assessment:  Blood sugars in target no changes needed.  Delivery is planned.     Ketones: none reported.   Fasting blood glucoses: 100% in target.  After breakfast: 100% in target.  Before lunch: -% in target.  After lunch: 100% in target.  Before dinner: -% in target.  After dinner: 100% in target.    Plan/Response:  No changes in the patient's current treatment plan.  No follow up due to delivery.    Shantell Garcia MS, RD, LD, CDE      Any diabetes medication dose changes were made via the CDE Protocol and Collaborative Practice Agreement with the patient's OB/GYN provider. A copy of this encounter was shared with the provider.

## 2024-02-24 ENCOUNTER — MYC MEDICAL ADVICE (OUTPATIENT)
Dept: FAMILY MEDICINE | Facility: CLINIC | Age: 34
End: 2024-02-24
Payer: COMMERCIAL

## 2024-02-24 ENCOUNTER — NURSE TRIAGE (OUTPATIENT)
Dept: NURSING | Facility: CLINIC | Age: 34
End: 2024-02-24
Payer: COMMERCIAL

## 2024-02-25 NOTE — TELEPHONE ENCOUNTER
Nurse Triage SBAR    Is this a 2nd Level Triage? NO    Situation: Information    Background: :Patient was diagnosed with a Bartholin's cyst.    Assessment: Patient is 39 weeks 2 days pregnant and scheduled for  section on 3/1/24. She is inquiring if the cyst bursts and her water breaks if baby will be at risk for infection. She is also inquiring if she would be able to have cyst drained while in surgery.    Protocol Recommended Disposition:   According to the protocol, patient should discuss with provider when clinic is open.  Care advice given. Patient verbalizes understanding and agrees with plan of care. Routing note to provider.    Rosa Cesar RN  24 6:17 PM  [facility] Nurse Advisor    Reason for Disposition   [1] Caller requesting NON-URGENT health information AND [2] PCP's office is the best resource    Additional Information   Negative: [1] Caller is not with the adult (patient) AND [2] reporting urgent symptoms   Negative: Lab result questions   Negative: Medication questions   Negative: Caller can't be reached by phone   Negative: Caller has already spoken to PCP or another triager   Negative: RN needs further essential information from caller in order to complete triage   Negative: Requesting regular office appointment    Protocols used: Information Only Call - [facility]

## 2024-02-26 ENCOUNTER — OFFICE VISIT (OUTPATIENT)
Dept: FAMILY MEDICINE | Facility: CLINIC | Age: 34
End: 2024-02-26
Payer: COMMERCIAL

## 2024-02-26 VITALS
DIASTOLIC BLOOD PRESSURE: 70 MMHG | HEART RATE: 110 BPM | WEIGHT: 271 LBS | RESPIRATION RATE: 12 BRPM | OXYGEN SATURATION: 96 % | BODY MASS INDEX: 43.74 KG/M2 | SYSTOLIC BLOOD PRESSURE: 116 MMHG

## 2024-02-26 DIAGNOSIS — N75.0 BARTHOLIN CYST: Primary | ICD-10-CM

## 2024-02-26 PROCEDURE — 99213 OFFICE O/P EST LOW 20 MIN: CPT | Performed by: NURSE PRACTITIONER

## 2024-02-26 RX ORDER — FLUCONAZOLE 150 MG/1
150 TABLET ORAL ONCE
Qty: 1 TABLET | Refills: 0 | Status: SHIPPED | OUTPATIENT
Start: 2024-02-26 | End: 2024-02-26

## 2024-02-26 NOTE — CONFIDENTIAL NOTE
Cyst has been present for 1 week, getting bigger. Cyst is located right side near the vaginal opening.      Huddled with AF, recommend patient be seen in clinic. Patient scheduled with Navya Busch. Martir to inform her as well. Patient updated

## 2024-02-26 NOTE — PROGRESS NOTES
"  Assessment & Plan     Bartholin cyst  - amoxicillin-clavulanate (AUGMENTIN) 875-125 MG tablet; Take 1 tablet by mouth 2 times daily for 10 days    Ayanna presents today with concerns of bartholin cyst on her right labia that has been present for the past several weeks. She is 39w4d pregnant,  scheduled on 3/1/24. Discussed with Dr. Olivas who will be performing her , no incision or drainage is recommended at this time. Start antibiotics as above. She was recently treated for respiratory symptoms with azithromycin and prednisone however I suspect neither of these were actually helpful for her symptoms. Encouraged sitz baths and follow-up with OB/GYN following delivery. Monitor for signs/symptoms of worsening infection. Follow-up in the meantime with any new or worsening symptoms, questions or concerns.     I explained my diagnostic considerations and recommendations to the patient, who voiced understanding and agreement with the assessment and treatment plan. All questions were answered to patient's apparent satisfaction. We discussed potential side effects of any prescribed or recommended therapies, as well as expectations for response to treatments and importance of lifestyle measures that may improve symptoms. Patient was advised to contact our office if there are new symptoms or no improvement or worsening of conditions or symptoms.            Nicotine/Tobacco Cessation  She reports that she has been smoking cigarettes. She has a 5 pack-year smoking history. She uses smokeless tobacco.  Nicotine/Tobacco Cessation Plan  Information offered: Patient not interested at this time      BMI  Estimated body mass index is 43.74 kg/m  as calculated from the following:    Height as of 24: 1.676 m (5' 6\").    Weight as of this encounter: 122.9 kg (271 lb).             Colleen Urena is a 33 year old, presenting for the following health issues:  Derm Problem        2024     3:21 PM "   Additional Questions   Roomed by Kate morrison     History of Present Illness       Reason for visit:  Bartholin cyst    She eats 2-3 servings of fruits and vegetables daily.She consumes 3 sweetened beverage(s) daily.She exercises with enough effort to increase her heart rate 30 to 60 minutes per day.  She exercises with enough effort to increase her heart rate 4 days per week.   She is taking medications regularly.       Ayanna presents to clinic today with concerns of labial cyst. She has had the cyst over the past several weeks, now worsening over the past week. She has had bartholin cyst in the past, previously requiring surgical intervention. She also has a history of HSV, she denies any current symptoms. She is not on any suppressive therapy at this point.  She is currently 39w4d pregnant. She is scheduled for  delivery on 3/1/24. She has recently been ill with RSV and pneumonia. She was treated with Prednisone and Azithromycin with improvement in her symptoms. She continues to have a cough related to this as well. She denies any ongoing fever, chills, discharge or drainage from the cyst.       Patient Active Problem List   Diagnosis    Depressed    Drug-induced mental disorder (H)    Overdose of antipsychotic    Vulvar abscess    Cervical high risk HPV (human papillomavirus) test positive    Dog bite of left upper extremity    Dog bite of right lower leg    Cellulitis of left upper extremity    Current every day smoker    Gastroesophageal reflux disease without esophagitis    Skin infection    Preoperative examination    Bartholin's gland abscess    TINO III (vulvar intraepithelial neoplasia III)    Open wound    Class 2 severe obesity with serious comorbidity and body mass index (BMI) of 38.0 to 38.9 in adult, unspecified obesity type (H)    Anxiety    ADD (attention deficit disorder) without hyperactivity    Chronic pain in right shoulder    Closed fracture dislocation of hip joint, left, initial  encounter (H)    Prolapse of the stomach    PTSD (post-traumatic stress disorder)    Encounter for triage in pregnant patient     contractions    Diet controlled gestational diabetes mellitus (GDM) in third trimester    Elevated BP without diagnosis of hypertension     Current Outpatient Medications   Medication    albuterol (PROAIR HFA/PROVENTIL HFA/VENTOLIN HFA) 108 (90 Base) MCG/ACT inhaler    albuterol (PROVENTIL) (2.5 MG/3ML) 0.083% neb solution    amoxicillin-clavulanate (AUGMENTIN) 875-125 MG tablet    aspirin 81 MG EC tablet    budesonide (PULMICORT) 1 MG/2ML neb solution    hydrOXYzine estuardo (VISTARIL) 25 MG capsule    omeprazole (PRILOSEC OTC) 20 MG EC tablet    Prenatal Vit-Fe Fumarate-FA (PRENATAL MULTIVITAMIN W/IRON) 27-0.8 MG tablet    acetone urine (KETOSTIX) test strip    azithromycin (ZITHROMAX) 250 MG tablet    blood glucose (NO BRAND SPECIFIED) lancets standard    blood glucose (NO BRAND SPECIFIED) test strip    blood glucose monitoring (NO BRAND SPECIFIED) meter device kit    predniSONE (DELTASONE) 20 MG tablet     Current Facility-Administered Medications   Medication    penicillin G benzathine (BICILLIN L-A) injection 0.6 Million Units         Review of Systems  Constitutional, HEENT, cardiovascular, pulmonary, gi and gu systems are negative, except as otherwise noted.      Objective    /70   Pulse 110   Resp 12   Wt 122.9 kg (271 lb)   LMP  (LMP Unknown)   SpO2 96%   BMI 43.74 kg/m    Body mass index is 43.74 kg/m .  Physical Exam  Vitals reviewed.   Constitutional:       General: She is not in acute distress.     Appearance: Normal appearance.   Pulmonary:      Effort: Pulmonary effort is normal.   Genitourinary:     Pubic Area: No rash.       Labia:         Right: Tenderness and lesion present.         Left: No rash, tenderness, lesion or injury.       Comments: Quarter sized area of erythema, edema and tenderness on right labia, at vaginal opening. Tender to palpation, soft  and fluctuant. No discharge or drainage present. Previous surgical scars present.   Skin:     General: Skin is warm and dry.   Neurological:      Mental Status: She is alert and oriented to person, place, and time. Mental status is at baseline.   Psychiatric:         Mood and Affect: Mood normal.         Behavior: Behavior normal.                    Signed Electronically by: Navya Busch NP

## 2024-02-28 ENCOUNTER — TELEPHONE (OUTPATIENT)
Dept: FAMILY MEDICINE | Facility: CLINIC | Age: 34
End: 2024-02-28

## 2024-02-28 NOTE — TELEPHONE ENCOUNTER
Per Dr. Olivas, patient is to stop taking baby aspirin now.  In absence of carb pre-surgical drink, she should drink one bottle of gatorade and finish drinking by 7a.  Patient instructed and expressed understanding.

## 2024-03-01 ENCOUNTER — ANESTHESIA EVENT (OUTPATIENT)
Dept: OBGYN | Facility: CLINIC | Age: 34
End: 2024-03-01
Payer: COMMERCIAL

## 2024-03-01 ENCOUNTER — ANESTHESIA (OUTPATIENT)
Dept: OBGYN | Facility: CLINIC | Age: 34
End: 2024-03-01
Payer: COMMERCIAL

## 2024-03-01 ENCOUNTER — HOSPITAL ENCOUNTER (INPATIENT)
Facility: CLINIC | Age: 34
LOS: 2 days | Discharge: HOME | End: 2024-03-03
Attending: FAMILY MEDICINE | Admitting: STUDENT IN AN ORGANIZED HEALTH CARE EDUCATION/TRAINING PROGRAM
Payer: COMMERCIAL

## 2024-03-01 DIAGNOSIS — Z98.891 S/P C-SECTION: ICD-10-CM

## 2024-03-01 LAB
ABO/RH(D): NORMAL
AMPHETAMINES UR QL SCN: ABNORMAL
ANTIBODY SCREEN: NEGATIVE
BARBITURATES UR QL SCN: ABNORMAL
BENZODIAZ UR QL SCN: ABNORMAL
BZE UR QL SCN: ABNORMAL
CANNABINOIDS UR QL SCN: ABNORMAL
CREAT SERPL-MCNC: 0.63 MG/DL (ref 0.51–0.95)
EGFRCR SERPLBLD CKD-EPI 2021: >90 ML/MIN/1.73M2
FENTANYL UR QL: ABNORMAL
HGB BLD-MCNC: 14.6 G/DL (ref 11.7–15.7)
OPIATES UR QL SCN: ABNORMAL
PCP QUAL URINE (ROCHE): ABNORMAL
SPECIMEN EXPIRATION DATE: NORMAL
T PALLIDUM AB SER QL: NONREACTIVE

## 2024-03-01 PROCEDURE — 360N000076 HC SURGERY LEVEL 3, PER MIN: Performed by: FAMILY MEDICINE

## 2024-03-01 PROCEDURE — 250N000009 HC RX 250: Performed by: FAMILY MEDICINE

## 2024-03-01 PROCEDURE — 86900 BLOOD TYPING SEROLOGIC ABO: CPT | Performed by: FAMILY MEDICINE

## 2024-03-01 PROCEDURE — 258N000003 HC RX IP 258 OP 636: Performed by: FAMILY MEDICINE

## 2024-03-01 PROCEDURE — 80359 METHYLENEDIOXYAMPHETAMINES: CPT | Performed by: FAMILY MEDICINE

## 2024-03-01 PROCEDURE — 120N000001 HC R&B MED SURG/OB

## 2024-03-01 PROCEDURE — 710N000010 HC RECOVERY PHASE 1, LEVEL 2, PER MIN: Performed by: FAMILY MEDICINE

## 2024-03-01 PROCEDURE — 370N000017 HC ANESTHESIA TECHNICAL FEE, PER MIN: Performed by: FAMILY MEDICINE

## 2024-03-01 PROCEDURE — 258N000003 HC RX IP 258 OP 636: Performed by: NURSE ANESTHETIST, CERTIFIED REGISTERED

## 2024-03-01 PROCEDURE — 86780 TREPONEMA PALLIDUM: CPT | Performed by: FAMILY MEDICINE

## 2024-03-01 PROCEDURE — 271N000001 HC OR GENERAL SUPPLY NON-STERILE: Performed by: FAMILY MEDICINE

## 2024-03-01 PROCEDURE — 272N000001 HC OR GENERAL SUPPLY STERILE: Performed by: FAMILY MEDICINE

## 2024-03-01 PROCEDURE — 80307 DRUG TEST PRSMV CHEM ANLYZR: CPT | Performed by: FAMILY MEDICINE

## 2024-03-01 PROCEDURE — 85018 HEMOGLOBIN: CPT | Performed by: FAMILY MEDICINE

## 2024-03-01 PROCEDURE — 250N000009 HC RX 250: Performed by: NURSE ANESTHETIST, CERTIFIED REGISTERED

## 2024-03-01 PROCEDURE — 250N000013 HC RX MED GY IP 250 OP 250 PS 637: Performed by: FAMILY MEDICINE

## 2024-03-01 PROCEDURE — 250N000011 HC RX IP 250 OP 636: Performed by: NURSE ANESTHETIST, CERTIFIED REGISTERED

## 2024-03-01 PROCEDURE — 36415 COLL VENOUS BLD VENIPUNCTURE: CPT | Performed by: FAMILY MEDICINE

## 2024-03-01 PROCEDURE — 250N000011 HC RX IP 250 OP 636: Performed by: FAMILY MEDICINE

## 2024-03-01 PROCEDURE — C9290 INJ, BUPIVACAINE LIPOSOME: HCPCS | Performed by: NURSE ANESTHETIST, CERTIFIED REGISTERED

## 2024-03-01 PROCEDURE — 82565 ASSAY OF CREATININE: CPT | Performed by: FAMILY MEDICINE

## 2024-03-01 PROCEDURE — 59510 CESAREAN DELIVERY: CPT | Performed by: STUDENT IN AN ORGANIZED HEALTH CARE EDUCATION/TRAINING PROGRAM

## 2024-03-01 PROCEDURE — 59514 CESAREAN DELIVERY ONLY: CPT | Mod: 80 | Performed by: OBSTETRICS & GYNECOLOGY

## 2024-03-01 RX ORDER — BUDESONIDE 0.5 MG/2ML
1 INHALANT ORAL 2 TIMES DAILY
Status: DISCONTINUED | OUTPATIENT
Start: 2024-03-01 | End: 2024-03-03 | Stop reason: HOSPADM

## 2024-03-01 RX ORDER — ONDANSETRON 2 MG/ML
4 INJECTION INTRAMUSCULAR; INTRAVENOUS EVERY 30 MIN PRN
Status: DISCONTINUED | OUTPATIENT
Start: 2024-03-01 | End: 2024-03-02

## 2024-03-01 RX ORDER — OXYTOCIN/0.9 % SODIUM CHLORIDE 30/500 ML
100-340 PLASTIC BAG, INJECTION (ML) INTRAVENOUS CONTINUOUS PRN
Status: DISCONTINUED | OUTPATIENT
Start: 2024-03-01 | End: 2024-03-01

## 2024-03-01 RX ORDER — OXYTOCIN 10 [USP'U]/ML
10 INJECTION, SOLUTION INTRAMUSCULAR; INTRAVENOUS
Status: DISCONTINUED | OUTPATIENT
Start: 2024-03-01 | End: 2024-03-01

## 2024-03-01 RX ORDER — NALOXONE HYDROCHLORIDE 0.4 MG/ML
0.1 INJECTION, SOLUTION INTRAMUSCULAR; INTRAVENOUS; SUBCUTANEOUS
Status: DISCONTINUED | OUTPATIENT
Start: 2024-03-01 | End: 2024-03-03 | Stop reason: HOSPADM

## 2024-03-01 RX ORDER — KETOROLAC TROMETHAMINE 30 MG/ML
30 INJECTION, SOLUTION INTRAMUSCULAR; INTRAVENOUS ONCE
Status: COMPLETED | OUTPATIENT
Start: 2024-03-01 | End: 2024-03-01

## 2024-03-01 RX ORDER — KETOROLAC TROMETHAMINE 30 MG/ML
30 INJECTION, SOLUTION INTRAMUSCULAR; INTRAVENOUS EVERY 6 HOURS
Qty: 3 ML | Refills: 0 | Status: COMPLETED | OUTPATIENT
Start: 2024-03-01 | End: 2024-03-02

## 2024-03-01 RX ORDER — CEFAZOLIN SODIUM/WATER 3 G/30 ML
3 SYRINGE (ML) INTRAVENOUS SEE ADMIN INSTRUCTIONS
Status: DISCONTINUED | OUTPATIENT
Start: 2024-03-01 | End: 2024-03-01

## 2024-03-01 RX ORDER — DEXTROSE, SODIUM CHLORIDE, SODIUM LACTATE, POTASSIUM CHLORIDE, AND CALCIUM CHLORIDE 5; .6; .31; .03; .02 G/100ML; G/100ML; G/100ML; G/100ML; G/100ML
INJECTION, SOLUTION INTRAVENOUS CONTINUOUS
Status: DISCONTINUED | OUTPATIENT
Start: 2024-03-01 | End: 2024-03-01

## 2024-03-01 RX ORDER — CARBOPROST TROMETHAMINE 250 UG/ML
250 INJECTION, SOLUTION INTRAMUSCULAR
Status: DISCONTINUED | OUTPATIENT
Start: 2024-03-01 | End: 2024-03-03 | Stop reason: HOSPADM

## 2024-03-01 RX ORDER — ONDANSETRON 2 MG/ML
INJECTION INTRAMUSCULAR; INTRAVENOUS PRN
Status: DISCONTINUED | OUTPATIENT
Start: 2024-03-01 | End: 2024-03-01

## 2024-03-01 RX ORDER — BUPIVACAINE HYDROCHLORIDE 2.5 MG/ML
INJECTION, SOLUTION EPIDURAL; INFILTRATION; INTRACAUDAL PRN
Status: DISCONTINUED | OUTPATIENT
Start: 2024-03-01 | End: 2024-03-01

## 2024-03-01 RX ORDER — MORPHINE SULFATE 1 MG/ML
INJECTION, SOLUTION EPIDURAL; INTRATHECAL; INTRAVENOUS PRN
Status: DISCONTINUED | OUTPATIENT
Start: 2024-03-01 | End: 2024-03-01

## 2024-03-01 RX ORDER — ACETAMINOPHEN 325 MG/1
975 TABLET ORAL ONCE
Status: COMPLETED | OUTPATIENT
Start: 2024-03-01 | End: 2024-03-01

## 2024-03-01 RX ORDER — TRANEXAMIC ACID 10 MG/ML
1 INJECTION, SOLUTION INTRAVENOUS EVERY 30 MIN PRN
Status: DISCONTINUED | OUTPATIENT
Start: 2024-03-01 | End: 2024-03-01

## 2024-03-01 RX ORDER — OXYCODONE HYDROCHLORIDE 5 MG/1
5 TABLET ORAL EVERY 4 HOURS PRN
Status: DISCONTINUED | OUTPATIENT
Start: 2024-03-01 | End: 2024-03-03 | Stop reason: HOSPADM

## 2024-03-01 RX ORDER — SIMETHICONE 80 MG
80 TABLET,CHEWABLE ORAL 4 TIMES DAILY PRN
Status: DISCONTINUED | OUTPATIENT
Start: 2024-03-01 | End: 2024-03-03 | Stop reason: HOSPADM

## 2024-03-01 RX ORDER — MISOPROSTOL 200 UG/1
400 TABLET ORAL
Status: DISCONTINUED | OUTPATIENT
Start: 2024-03-01 | End: 2024-03-03 | Stop reason: HOSPADM

## 2024-03-01 RX ORDER — IBUPROFEN 800 MG/1
800 TABLET, FILM COATED ORAL EVERY 6 HOURS
Status: DISCONTINUED | OUTPATIENT
Start: 2024-03-02 | End: 2024-03-03 | Stop reason: HOSPADM

## 2024-03-01 RX ORDER — SODIUM CHLORIDE, SODIUM LACTATE, POTASSIUM CHLORIDE, CALCIUM CHLORIDE 600; 310; 30; 20 MG/100ML; MG/100ML; MG/100ML; MG/100ML
INJECTION, SOLUTION INTRAVENOUS CONTINUOUS
Status: DISCONTINUED | OUTPATIENT
Start: 2024-03-01 | End: 2024-03-01

## 2024-03-01 RX ORDER — ENOXAPARIN SODIUM 100 MG/ML
40 INJECTION SUBCUTANEOUS EVERY 12 HOURS
Status: DISCONTINUED | OUTPATIENT
Start: 2024-03-02 | End: 2024-03-03 | Stop reason: SINTOL

## 2024-03-01 RX ORDER — ALBUTEROL SULFATE 90 UG/1
2 AEROSOL, METERED RESPIRATORY (INHALATION) EVERY 6 HOURS PRN
Status: DISCONTINUED | OUTPATIENT
Start: 2024-03-01 | End: 2024-03-03 | Stop reason: HOSPADM

## 2024-03-01 RX ORDER — OXYTOCIN/0.9 % SODIUM CHLORIDE 30/500 ML
PLASTIC BAG, INJECTION (ML) INTRAVENOUS PRN
Status: DISCONTINUED | OUTPATIENT
Start: 2024-03-01 | End: 2024-03-01

## 2024-03-01 RX ORDER — METHYLERGONOVINE MALEATE 0.2 MG/ML
200 INJECTION INTRAVENOUS
Status: DISCONTINUED | OUTPATIENT
Start: 2024-03-01 | End: 2024-03-03 | Stop reason: HOSPADM

## 2024-03-01 RX ORDER — METOCLOPRAMIDE HYDROCHLORIDE 5 MG/ML
10 INJECTION INTRAMUSCULAR; INTRAVENOUS EVERY 6 HOURS PRN
Status: DISCONTINUED | OUTPATIENT
Start: 2024-03-01 | End: 2024-03-03 | Stop reason: HOSPADM

## 2024-03-01 RX ORDER — DIMENHYDRINATE 50 MG/ML
25 INJECTION, SOLUTION INTRAMUSCULAR; INTRAVENOUS
Status: DISCONTINUED | OUTPATIENT
Start: 2024-03-01 | End: 2024-03-03 | Stop reason: HOSPADM

## 2024-03-01 RX ORDER — HYDRALAZINE HYDROCHLORIDE 20 MG/ML
2.5-5 INJECTION INTRAMUSCULAR; INTRAVENOUS EVERY 10 MIN PRN
Status: DISCONTINUED | OUTPATIENT
Start: 2024-03-01 | End: 2024-03-03 | Stop reason: HOSPADM

## 2024-03-01 RX ORDER — PROCHLORPERAZINE MALEATE 5 MG
10 TABLET ORAL EVERY 6 HOURS PRN
Status: DISCONTINUED | OUTPATIENT
Start: 2024-03-01 | End: 2024-03-03 | Stop reason: HOSPADM

## 2024-03-01 RX ORDER — ONDANSETRON 4 MG/1
4 TABLET, ORALLY DISINTEGRATING ORAL EVERY 30 MIN PRN
Status: DISCONTINUED | OUTPATIENT
Start: 2024-03-01 | End: 2024-03-02

## 2024-03-01 RX ORDER — SODIUM CHLORIDE, SODIUM LACTATE, POTASSIUM CHLORIDE, CALCIUM CHLORIDE 600; 310; 30; 20 MG/100ML; MG/100ML; MG/100ML; MG/100ML
INJECTION, SOLUTION INTRAVENOUS CONTINUOUS
Status: DISCONTINUED | OUTPATIENT
Start: 2024-03-01 | End: 2024-03-03 | Stop reason: HOSPADM

## 2024-03-01 RX ORDER — CARBOPROST TROMETHAMINE 250 UG/ML
250 INJECTION, SOLUTION INTRAMUSCULAR
Status: DISCONTINUED | OUTPATIENT
Start: 2024-03-01 | End: 2024-03-01

## 2024-03-01 RX ORDER — HYDROCORTISONE 25 MG/G
CREAM TOPICAL 3 TIMES DAILY PRN
Status: DISCONTINUED | OUTPATIENT
Start: 2024-03-01 | End: 2024-03-03 | Stop reason: HOSPADM

## 2024-03-01 RX ORDER — ACETAMINOPHEN 325 MG/1
975 TABLET ORAL EVERY 6 HOURS
Status: DISCONTINUED | OUTPATIENT
Start: 2024-03-01 | End: 2024-03-03 | Stop reason: HOSPADM

## 2024-03-01 RX ORDER — OXYTOCIN 10 [USP'U]/ML
INJECTION, SOLUTION INTRAMUSCULAR; INTRAVENOUS PRN
Status: DISCONTINUED | OUTPATIENT
Start: 2024-03-01 | End: 2024-03-03 | Stop reason: HOSPADM

## 2024-03-01 RX ORDER — LIDOCAINE 40 MG/G
CREAM TOPICAL
Status: DISCONTINUED | OUTPATIENT
Start: 2024-03-01 | End: 2024-03-03 | Stop reason: HOSPADM

## 2024-03-01 RX ORDER — PROCHLORPERAZINE 25 MG
25 SUPPOSITORY, RECTAL RECTAL EVERY 12 HOURS PRN
Status: DISCONTINUED | OUTPATIENT
Start: 2024-03-01 | End: 2024-03-03 | Stop reason: HOSPADM

## 2024-03-01 RX ORDER — AMOXICILLIN 250 MG
1 CAPSULE ORAL 2 TIMES DAILY
Status: DISCONTINUED | OUTPATIENT
Start: 2024-03-01 | End: 2024-03-03 | Stop reason: HOSPADM

## 2024-03-01 RX ORDER — PANTOPRAZOLE SODIUM 40 MG/1
40 TABLET, DELAYED RELEASE ORAL DAILY
Status: DISCONTINUED | OUTPATIENT
Start: 2024-03-01 | End: 2024-03-03 | Stop reason: HOSPADM

## 2024-03-01 RX ORDER — METHYLERGONOVINE MALEATE 0.2 MG/ML
200 INJECTION INTRAVENOUS
Status: DISCONTINUED | OUTPATIENT
Start: 2024-03-01 | End: 2024-03-01

## 2024-03-01 RX ORDER — TRANEXAMIC ACID 10 MG/ML
1 INJECTION, SOLUTION INTRAVENOUS EVERY 30 MIN PRN
Status: DISCONTINUED | OUTPATIENT
Start: 2024-03-01 | End: 2024-03-03 | Stop reason: HOSPADM

## 2024-03-01 RX ORDER — LOPERAMIDE HCL 2 MG
2 CAPSULE ORAL
Status: DISCONTINUED | OUTPATIENT
Start: 2024-03-01 | End: 2024-03-01

## 2024-03-01 RX ORDER — ALBUTEROL SULFATE 0.83 MG/ML
2.5 SOLUTION RESPIRATORY (INHALATION) EVERY 4 HOURS PRN
Status: DISCONTINUED | OUTPATIENT
Start: 2024-03-01 | End: 2024-03-03 | Stop reason: HOSPADM

## 2024-03-01 RX ORDER — CITRIC ACID/SODIUM CITRATE 334-500MG
30 SOLUTION, ORAL ORAL
Status: COMPLETED | OUTPATIENT
Start: 2024-03-01 | End: 2024-03-01

## 2024-03-01 RX ORDER — MEPERIDINE HYDROCHLORIDE 25 MG/ML
12.5 INJECTION INTRAMUSCULAR; INTRAVENOUS; SUBCUTANEOUS EVERY 5 MIN PRN
Status: DISCONTINUED | OUTPATIENT
Start: 2024-03-01 | End: 2024-03-03 | Stop reason: HOSPADM

## 2024-03-01 RX ORDER — OXYTOCIN/0.9 % SODIUM CHLORIDE 30/500 ML
340 PLASTIC BAG, INJECTION (ML) INTRAVENOUS CONTINUOUS PRN
Status: DISCONTINUED | OUTPATIENT
Start: 2024-03-01 | End: 2024-03-01

## 2024-03-01 RX ORDER — CEFAZOLIN SODIUM/WATER 3 G/30 ML
3 SYRINGE (ML) INTRAVENOUS
Status: DISCONTINUED | OUTPATIENT
Start: 2024-03-01 | End: 2024-03-01

## 2024-03-01 RX ORDER — HYDROXYZINE HYDROCHLORIDE 25 MG/1
25 TABLET, FILM COATED ORAL EVERY 6 HOURS PRN
Status: DISCONTINUED | OUTPATIENT
Start: 2024-03-01 | End: 2024-03-03 | Stop reason: HOSPADM

## 2024-03-01 RX ORDER — ONDANSETRON 4 MG/1
4 TABLET, ORALLY DISINTEGRATING ORAL EVERY 6 HOURS PRN
Status: DISCONTINUED | OUTPATIENT
Start: 2024-03-01 | End: 2024-03-03 | Stop reason: HOSPADM

## 2024-03-01 RX ORDER — METOCLOPRAMIDE 5 MG/1
10 TABLET ORAL EVERY 6 HOURS PRN
Status: DISCONTINUED | OUTPATIENT
Start: 2024-03-01 | End: 2024-03-03 | Stop reason: HOSPADM

## 2024-03-01 RX ORDER — OXYTOCIN 10 [USP'U]/ML
10 INJECTION, SOLUTION INTRAMUSCULAR; INTRAVENOUS
Status: DISCONTINUED | OUTPATIENT
Start: 2024-03-01 | End: 2024-03-03 | Stop reason: HOSPADM

## 2024-03-01 RX ORDER — LOPERAMIDE HCL 2 MG
4 CAPSULE ORAL
Status: DISCONTINUED | OUTPATIENT
Start: 2024-03-01 | End: 2024-03-01

## 2024-03-01 RX ORDER — MODIFIED LANOLIN
OINTMENT (GRAM) TOPICAL
Status: DISCONTINUED | OUTPATIENT
Start: 2024-03-01 | End: 2024-03-03 | Stop reason: HOSPADM

## 2024-03-01 RX ORDER — MISOPROSTOL 200 UG/1
400 TABLET ORAL
Status: DISCONTINUED | OUTPATIENT
Start: 2024-03-01 | End: 2024-03-01

## 2024-03-01 RX ORDER — AMOXICILLIN 250 MG
2 CAPSULE ORAL 2 TIMES DAILY
Status: DISCONTINUED | OUTPATIENT
Start: 2024-03-01 | End: 2024-03-03 | Stop reason: HOSPADM

## 2024-03-01 RX ORDER — LOPERAMIDE HCL 2 MG
4 CAPSULE ORAL
Status: DISCONTINUED | OUTPATIENT
Start: 2024-03-01 | End: 2024-03-03 | Stop reason: HOSPADM

## 2024-03-01 RX ORDER — LIDOCAINE 40 MG/G
CREAM TOPICAL
Status: DISCONTINUED | OUTPATIENT
Start: 2024-03-01 | End: 2024-03-01

## 2024-03-01 RX ORDER — BISACODYL 10 MG
10 SUPPOSITORY, RECTAL RECTAL DAILY PRN
Status: DISCONTINUED | OUTPATIENT
Start: 2024-03-03 | End: 2024-03-03 | Stop reason: HOSPADM

## 2024-03-01 RX ORDER — OXYTOCIN/0.9 % SODIUM CHLORIDE 30/500 ML
340 PLASTIC BAG, INJECTION (ML) INTRAVENOUS CONTINUOUS PRN
Status: DISCONTINUED | OUTPATIENT
Start: 2024-03-01 | End: 2024-03-03 | Stop reason: HOSPADM

## 2024-03-01 RX ORDER — ONDANSETRON 2 MG/ML
4 INJECTION INTRAMUSCULAR; INTRAVENOUS EVERY 6 HOURS PRN
Status: DISCONTINUED | OUTPATIENT
Start: 2024-03-01 | End: 2024-03-03 | Stop reason: HOSPADM

## 2024-03-01 RX ORDER — LOPERAMIDE HCL 2 MG
2 CAPSULE ORAL
Status: DISCONTINUED | OUTPATIENT
Start: 2024-03-01 | End: 2024-03-03 | Stop reason: HOSPADM

## 2024-03-01 RX ORDER — BUPIVACAINE HYDROCHLORIDE 7.5 MG/ML
INJECTION, SOLUTION INTRASPINAL PRN
Status: DISCONTINUED | OUTPATIENT
Start: 2024-03-01 | End: 2024-03-01

## 2024-03-01 RX ADMIN — PHENYLEPHRINE HYDROCHLORIDE 0.2 MCG/KG/MIN: 10 INJECTION INTRAVENOUS at 11:02

## 2024-03-01 RX ADMIN — PANTOPRAZOLE SODIUM 40 MG: 40 TABLET, DELAYED RELEASE ORAL at 15:25

## 2024-03-01 RX ADMIN — BUPIVACAINE HYDROCHLORIDE IN DEXTROSE 1.8 ML: 7.5 INJECTION, SOLUTION SUBARACHNOID at 10:59

## 2024-03-01 RX ADMIN — SENNOSIDES AND DOCUSATE SODIUM 1 TABLET: 8.6; 5 TABLET ORAL at 21:18

## 2024-03-01 RX ADMIN — SODIUM CITRATE AND CITRIC ACID MONOHYDRATE 30 ML: 500; 334 SOLUTION ORAL at 10:30

## 2024-03-01 RX ADMIN — MIDAZOLAM 2 MG: 1 INJECTION INTRAMUSCULAR; INTRAVENOUS at 11:25

## 2024-03-01 RX ADMIN — ACETAMINOPHEN 975 MG: 325 TABLET, FILM COATED ORAL at 16:55

## 2024-03-01 RX ADMIN — PROCHLORPERAZINE EDISYLATE 10 MG: 5 INJECTION INTRAMUSCULAR; INTRAVENOUS at 16:27

## 2024-03-01 RX ADMIN — BUDESONIDE 1 MG: 0.5 INHALANT RESPIRATORY (INHALATION) at 21:14

## 2024-03-01 RX ADMIN — SODIUM CHLORIDE, POTASSIUM CHLORIDE, SODIUM LACTATE AND CALCIUM CHLORIDE: 600; 310; 30; 20 INJECTION, SOLUTION INTRAVENOUS at 10:56

## 2024-03-01 RX ADMIN — AMOXICILLIN AND CLAVULANATE POTASSIUM 1 TABLET: 875; 125 TABLET, COATED ORAL at 21:13

## 2024-03-01 RX ADMIN — Medication 3 G: at 10:56

## 2024-03-01 RX ADMIN — SODIUM CHLORIDE, POTASSIUM CHLORIDE, SODIUM LACTATE AND CALCIUM CHLORIDE 500 ML: 600; 310; 30; 20 INJECTION, SOLUTION INTRAVENOUS at 09:56

## 2024-03-01 RX ADMIN — BUPIVACAINE HYDROCHLORIDE 40 ML: 2.5 INJECTION, SOLUTION EPIDURAL; INFILTRATION; INTRACAUDAL; PERINEURAL at 12:33

## 2024-03-01 RX ADMIN — KETOROLAC TROMETHAMINE 30 MG: 30 INJECTION, SOLUTION INTRAMUSCULAR; INTRAVENOUS at 12:52

## 2024-03-01 RX ADMIN — ACETAMINOPHEN 975 MG: 325 TABLET, FILM COATED ORAL at 22:22

## 2024-03-01 RX ADMIN — Medication 340 ML: at 11:31

## 2024-03-01 RX ADMIN — SODIUM CHLORIDE, POTASSIUM CHLORIDE, SODIUM LACTATE AND CALCIUM CHLORIDE: 600; 310; 30; 20 INJECTION, SOLUTION INTRAVENOUS at 12:04

## 2024-03-01 RX ADMIN — ACETAMINOPHEN 975 MG: 325 TABLET, FILM COATED ORAL at 10:29

## 2024-03-01 RX ADMIN — PHENYLEPHRINE HYDROCHLORIDE 100 MCG: 10 INJECTION INTRAVENOUS at 11:24

## 2024-03-01 RX ADMIN — ONDANSETRON 4 MG: 2 INJECTION INTRAMUSCULAR; INTRAVENOUS at 10:56

## 2024-03-01 RX ADMIN — MORPHINE SULFATE 0.2 MG: 1 INJECTION EPIDURAL; INTRATHECAL; INTRAVENOUS at 10:59

## 2024-03-01 RX ADMIN — ONDANSETRON 4 MG: 4 TABLET, ORALLY DISINTEGRATING ORAL at 15:48

## 2024-03-01 RX ADMIN — SODIUM CHLORIDE, POTASSIUM CHLORIDE, SODIUM LACTATE AND CALCIUM CHLORIDE: 600; 310; 30; 20 INJECTION, SOLUTION INTRAVENOUS at 14:28

## 2024-03-01 RX ADMIN — BUPIVACAINE 20 MG: 13.3 INJECTION, SUSPENSION, LIPOSOMAL INFILTRATION at 12:32

## 2024-03-01 RX ADMIN — KETOROLAC TROMETHAMINE 30 MG: 30 INJECTION, SOLUTION INTRAMUSCULAR; INTRAVENOUS at 19:52

## 2024-03-01 RX ADMIN — Medication 100 ML: at 11:49

## 2024-03-01 ASSESSMENT — ACTIVITIES OF DAILY LIVING (ADL)
ADLS_ACUITY_SCORE: 22
ADLS_ACUITY_SCORE: 20
ADLS_ACUITY_SCORE: 19
ADLS_ACUITY_SCORE: 22
ADLS_ACUITY_SCORE: 20
ADLS_ACUITY_SCORE: 19
ADLS_ACUITY_SCORE: 19
ADLS_ACUITY_SCORE: 20
ADLS_ACUITY_SCORE: 20
ADLS_ACUITY_SCORE: 19
ADLS_ACUITY_SCORE: 20
ADLS_ACUITY_SCORE: 22

## 2024-03-01 ASSESSMENT — LIFESTYLE VARIABLES: TOBACCO_USE: 1

## 2024-03-01 NOTE — ANESTHESIA PREPROCEDURE EVALUATION
Anesthesia Pre-Procedure Evaluation    Patient: Ayanna Davidson   MRN: [id number] : 1990        Procedure : Procedure(s):   SECTION          Past Medical History:   Diagnosis Date     Cervical high risk HPV (human papillomavirus) test positive 2019    last PAP NIL (), remote hx of LEEP     Gastroesophageal reflux disease      HSV (herpes simplex virus) infection     1 & 2     Methamphetamine abuse (H)     reports daily use     recurrent Bartholin's cyst       Past Surgical History:   Procedure Laterality Date     BIOPSY       CHOLECYSTECTOMY       ENT SURGERY       ESOPHAGOSCOPY, GASTROSCOPY, DUODENOSCOPY (EGD), COMBINED N/A 2023    Procedure: Esophagoscopy, gastroscopy, duodenoscopy (EGD), combined;  Surgeon: Thierno Escobar MD;  Location: [facility]     EXAM UNDER ANESTHESIA PELVIC N/A 2020    Procedure: EXAM UNDER ANESTHESIA, PELVIS, PAP;  Surgeon: Froylan Schwarz MD;  [facility]     EXCISE VULVA WIDE LOCAL Bilateral 2020    Procedure: Right Vulva Wedge Resection and Incision and Drainage Left Vulva;  Surgeon: Froylan Schwarz MD;  [facility]     INCISION AND DRAINAGE ABSCESS PELVIS, COMBINED N/A 2018    Procedure: COMBINED INCISION AND DRAINAGE ABSCESS PELVIS;  INCISION AND DRAINAGE LABIAL ABSCESS;  Surgeon: Jase Beach MD;  [facility]     INCISION AND DRAINAGE ABSCESS PELVIS, COMBINED N/A 2019    Procedure: Incision and Drainage Right Labial Abscess;  Surgeon: Jase Beach MD;  [facility]     INCISION AND DRAINAGE LOWER EXTREMITY, COMBINED Right 2019    Procedure: irrigation and debridement right leg dog bite;  Surgeon: Rommel Pérez MD;  [facility]     LAP ADJUSTABLE GASTRIC BAND       LEEP TX, CERVICAL       MAMMOPLASTY REDUCTION BILATERAL       MARSUPIALIZATION BARTHOLIN CYST N/A 2019    Procedure: Bartholin's Cyst removal;  Surgeon: Froylan Schwarz MD;  [facility]      MARSUPIALIZATION BARTHOLIN CYST Left 01/29/2020    Procedure: Excision of left bartholin's abscess;  Surgeon: Froylan Schwarz MD;  [facility]     ORTHOPEDIC SURGERY        Allergies   Allergen Reactions     No Known Allergies       Social History     Tobacco Use     Smoking status: Some Days     Packs/day: 0.50     Years: 10.00     Additional pack years: 0.00     Total pack years: 5.00     Types: Cigarettes     Smokeless tobacco: Current     Tobacco comments:     uses E cig with zero nicotine    Substance Use Topics     Alcohol use: Not Currently     Comment: Reports last use 9/17 and denies use during pregnancy      Wt Readings from Last 1 Encounters:   02/26/24 122.9 kg (271 lb)        Anesthesia Evaluation   Pt has had prior anesthetic. Type: General and MAC.        ROS/MED HX  ENT/Pulmonary:     (+)                tobacco use, Current use,                       Neurologic:       Cardiovascular:  - neg cardiovascular ROS     METS/Exercise Tolerance:     Hematologic:  - neg hematologic  ROS     Musculoskeletal:  - neg musculoskeletal ROS     GI/Hepatic:     (+) GERD,                   Renal/Genitourinary:       Endo:     (+)  type II DM, Last HgA1c: 5.4, date: 9/25/2023,           Obesity,       Psychiatric/Substance Use:     (+)     Recreational drug usage: Meth.    Infectious Disease: Comment: HSV      Malignancy:  - neg malignancy ROS     Other:            Physical Exam    Airway  airway exam normal      Mallampati: II   TM distance: > 3 FB   Neck ROM: full   Mouth opening: > 3 cm    Respiratory Devices and Support         Dental  no notable dental history         Cardiovascular   cardiovascular exam normal       Rhythm and rate: regular and normal     Pulmonary   pulmonary exam normal        breath sounds clear to auscultation       OUTSIDE LABS:  CBC:   Lab Results   Component Value Date    WBC 11.7 (H) 02/22/2024    WBC 14.6 (H) 10/08/2023    HGB 13.3 02/22/2024    HGB 12.7 10/08/2023    HCT 39.0  "02/22/2024    HCT 37.4 10/08/2023     02/22/2024     10/08/2023     BMP:   Lab Results   Component Value Date     02/22/2024     (L) 10/17/2023    POTASSIUM 4.0 02/22/2024    POTASSIUM 3.9 10/17/2023    CHLORIDE 104 02/22/2024    CHLORIDE 102 10/17/2023    CO2 18 (L) 02/22/2024    CO2 18 (L) 10/17/2023    BUN 9.8 02/22/2024    BUN 7.9 10/17/2023    CR 0.57 02/22/2024    CR 0.50 (L) 10/17/2023     (H) 02/22/2024    GLC 97 10/17/2023     COAGS: No results found for: \"PTT\", \"INR\", \"FIBR\"  POC:   Lab Results   Component Value Date    HCG Positive (A) 09/18/2023     HEPATIC:   Lab Results   Component Value Date    ALBUMIN 3.3 (L) 02/22/2024    PROTTOTAL 6.3 (L) 02/22/2024    ALT 38 02/22/2024    AST 40 02/22/2024    ALKPHOS 156 (H) 02/22/2024    BILITOTAL 0.2 02/22/2024     OTHER:   Lab Results   Component Value Date    LACT 1.2 09/27/2019    A1C 5.4 09/25/2023    ANUM 8.9 02/22/2024    LIPASE 86 11/21/2007    AMYLASE 58 11/21/2007    TSH 1.85 08/18/2022    CRP 19.0 (H) 09/27/2019    SED 9 04/26/2020       Anesthesia Plan    ASA Status:  2    NPO Status:  NPO Appropriate    Anesthesia Type: Spinal.              Consents    Anesthesia Plan(s) and associated risks, benefits, and realistic alternatives discussed. Questions answered and patient/representative(s) expressed understanding.     - Discussed:     - Discussed with:  Patient      - Extended Intubation/Ventilatory Support Discussed: No.      - Patient is DNR/DNI Status: No     Use of blood products discussed: No .     Postoperative Care    Pain management: Oral pain medications, Peripheral nerve block (Single Shot), Neuraxial analgesia, intrathecal morphine, Multi-modal analgesia.   PONV prophylaxis: Ondansetron (or other 5HT-3)     Comments:    Other Comments: The risks and benefits of anesthesia, and the alternatives where applicable, have been discussed with the patient, and they wish to proceed.            LEXX Rapp " CRNA    I have reviewed the pertinent notes and labs in the chart from the past 30 days and (re)examined the patient.  Any updates or changes from those notes are reflected in this note.     # Hyponatremia: Lowest Na = 135 mmol/L in last 30 days, will monitor as appropriate      # Hypoalbuminemia: Lowest albumin = 3.3 g/dL in the past 30 days , will monitor as appropriate    # Drug Induced Platelet Defect: home medication list includes an antiplatelet medication

## 2024-03-01 NOTE — ANESTHESIA PROCEDURE NOTES
TAP Procedure Note    Pre-Procedure   Staff -        CRNA: Gabbie Reynaga APRN CRNA       Performed By: CRNA       Location: OR       Pre-Anesthestic Checklist: patient identified, IV checked, site marked, risks and benefits discussed, informed consent, monitors and equipment checked, pre-op evaluation, at physician/surgeon's request and post-op pain management  Timeout:       Correct Patient: Yes        Correct Procedure: Yes        Correct Site: Yes        Correct Position: Yes        Correct Laterality: Yes        Site Marked: Yes  Procedure Documentation  Procedure: TAP       Laterality: bilateral       Patient Position: supine       Patient Prep/Sterile Barriers: sterile gloves, mask       Skin prep: Chloraprep       Needle Type: insulated       Needle Gauge: 22.        Needle Length (millimeters): 100        Ultrasound guided       1. Ultrasound was used to identify targeted nerve, plexus, vascular marker, or fascial plane and place a needle adjacent to it in real-time.       2. Ultrasound was used to visualize the spread of anesthetic in close proximity to the above referenced structure.       3. A permanent image is entered into the patient's record.    Assessment/Narrative         The placement was negative for: blood aspirated, painful injection and site bleeding       Paresthesias: No.       Test dose of mL at.         Test dose negative, 3 minutes after injection, for signs of intravascular, subdural, or intrathecal injection.       Bolus given via needle..        Secured via.        Insertion/Infusion Method: Single Shot       Complications: none       Injection made incrementally with aspirations every 5 mL.     Comments:  Pt tolerated the procedure well as under General Anesthesia.  There was good visualization of the space between the internal oblique and the transverses abdominis.  There was also good visualization of fluid dissection in this layer.  No complications were noted.  I will follow up  "with this pt if needed.      FOR [facility] ([facility]) ONLY:   Pain Team Contact information: please page the Pain Team Via [employer]. Search \"Pain\". During daytime hours, please page the attending first. At night please page the resident first.      "

## 2024-03-01 NOTE — ANESTHESIA CARE TRANSFER NOTE
Patient: Ayanna Davidson    Procedure: Procedure(s):   SECTION       Diagnosis: Previous  delivery, antepartum condition or complication [O34.219]  Diagnosis Additional Information: No value filed.    Anesthesia Type:   Spinal     Note:    Oropharynx: oropharynx clear of all foreign objects and spontaneously breathing  Level of Consciousness: drowsy  Oxygen Supplementation: room air    Independent Airway: airway patency satisfactory and stable  Dentition: dentition unchanged  Vital Signs Stable: post-procedure vital signs reviewed and stable  Report to RN Given: handoff report given  Patient transferred to: PACU    Handoff Report: Identifed the Patient, Identified the Reponsible Provider, Reviewed the pertinent medical history, Discussed the surgical course, Reviewed Intra-OP anesthesia mangement and issues during anesthesia, Set expectations for post-procedure period and Allowed opportunity for questions and acknowledgement of understanding  Vitals:  Vitals Value Taken Time   /69 24 1247   Temp     Pulse 81 24 1249   Resp 35 24 1249   SpO2 95 % 24 1249   Vitals shown include unfiled device data.    Electronically Signed By: LEXX Rapp CRNA  2024  12:51 PM

## 2024-03-01 NOTE — ANESTHESIA CARE TRANSFER NOTE
Patient: Ayanna Davidson    Procedure: Procedure(s):   SECTION       Diagnosis: Previous  delivery, antepartum condition or complication [O34.219]  Diagnosis Additional Information: No value filed.    Anesthesia Type:   Spinal     Note:    Oropharynx: oropharynx clear of all foreign objects and spontaneously breathing  Level of Consciousness: drowsy  Oxygen Supplementation: face mask    Independent Airway: airway patency satisfactory and stable  Dentition: dentition unchanged  Vital Signs Stable: post-procedure vital signs reviewed and stable  Report to RN Given: handoff report given  Patient transferred to: PACU    Handoff Report: Identifed the Patient, Identified the Reponsible Provider, Reviewed the pertinent medical history, Discussed the surgical course, Reviewed Intra-OP anesthesia mangement and issues during anesthesia, Set expectations for post-procedure period and Allowed opportunity for questions and acknowledgement of understanding  Vitals:  Vitals Value Taken Time   BP     Temp     Pulse     Resp 23 24 1243   SpO2 97 % 24 1243   Vitals shown include unfiled device data.    Electronically Signed By: LEXX Rapp CRNA  2024  12:44 PM

## 2024-03-01 NOTE — ANESTHESIA PROCEDURE NOTES
"Intrathecal Procedure Note    Pre-Procedure   Staff -        CRNA: Gabbie Reynaga APRN CRNA       Performed By: CRNA       Location: OR       Pre-Anesthestic Checklist: patient identified, IV checked, risks and benefits discussed, informed consent, monitors and equipment checked and pre-op evaluation  Timeout:       Correct Patient: Yes        Correct Procedure: Yes        Correct Site: Yes        Correct Position: Yes   Procedure Documentation  Procedure: intrathecal       Patient Position: sitting       Patient Prep/Sterile Barriers: sterile gloves, mask, patient draped       Skin prep: Betadine       Insertion Site: L3-4. (midline approach).       Needle Gauge: 25.        Needle Length (Inches): 3.5        Spinal Needle Type: Pencan       Introducer used       Introducer: 20 G       # of attempts: 1 and  # of redirects:  0    Assessment/Narrative         Paresthesias: No.       Sensory Level: T4       CSF fluid: clear.       Opening pressure was cmH2O while  Sitting.       Comments:  Spinal placed without difficulty as per note above      FOR [facility] ([facility]) ONLY:   Pain Team Contact information: please page the Pain Team Via [employer]. Search \"Pain\". During daytime hours, please page the attending first. At night please page the resident first.      "

## 2024-03-01 NOTE — OR NURSING
Transfer from OB PACU to Room 359.  Patient on recovery inpatient bed.    S: 34 y/o Female  Anesthesia Type: Spinal and tap block  Surgeon: Dr. Olivas  Allergies: See Medication Reconciliation Record    B: Pertinent Medical History:   Past Medical History:   Diagnosis Date    Cervical high risk HPV (human papillomavirus) test positive 03/12/2019    last PAP NIL (4/16), remote hx of LEEP    Gastroesophageal reflux disease     HSV (herpes simplex virus) infection     1 & 2    Methamphetamine abuse (H)     reports daily use    recurrent Bartholin's cyst        Surgical History:   Past Surgical History:   Procedure Laterality Date    BIOPSY      CHOLECYSTECTOMY      ENT SURGERY      ESOPHAGOSCOPY, GASTROSCOPY, DUODENOSCOPY (EGD), COMBINED N/A 4/12/2023    Procedure: Esophagoscopy, gastroscopy, duodenoscopy (EGD), combined;  Surgeon: Thierno Escobar MD;  Location: [facility]    EXAM UNDER ANESTHESIA PELVIC N/A 01/29/2020    Procedure: EXAM UNDER ANESTHESIA, PELVIS, PAP;  Surgeon: Froylan Schwarz MD;  [facility]    EXCISE VULVA WIDE LOCAL Bilateral 03/11/2020    Procedure: Right Vulva Wedge Resection and Incision and Drainage Left Vulva;  Surgeon: Froylan Schwarz MD;  [facility]    INCISION AND DRAINAGE ABSCESS PELVIS, COMBINED N/A 02/26/2018    Procedure: COMBINED INCISION AND DRAINAGE ABSCESS PELVIS;  INCISION AND DRAINAGE LABIAL ABSCESS;  Surgeon: Jase eBach MD;  [facility]    INCISION AND DRAINAGE ABSCESS PELVIS, COMBINED N/A 03/05/2019    Procedure: Incision and Drainage Right Labial Abscess;  Surgeon: Jase Beach MD;  [facility]    INCISION AND DRAINAGE LOWER EXTREMITY, COMBINED Right 08/11/2019    Procedure: irrigation and debridement right leg dog bite;  Surgeon: Rommel Pérez MD;  [facility]    LAP ADJUSTABLE GASTRIC BAND      LEEP TX, CERVICAL      MAMMOPLASTY REDUCTION BILATERAL      MARSUPIALIZATION BARTHOLIN CYST N/A 04/29/2019    Procedure:  Bartholin's Cyst removal;  Surgeon: Froylan Schwarz MD;  [facility]    MARSUPIALIZATION BARTHOLIN CYST Left 01/29/2020    Procedure: Excision of left bartholin's abscess;  Surgeon: Froylan Schwarz MD;  [facility]    ORTHOPEDIC SURGERY         DNR/DNI Full     A: EBL: 733ml  IVF: Pitocin and LR infusing  UOP:   NPO: ___Yes __x_No   Vomiting: ___Yes __X_No   Drainage: None  Skin Integrity: surgical incision WNL (Normal; Pressure Ulcer (Location)  Pain: 5/10  On DVPRS Scale  See PACU record for ongoing assessment, vital signs and pain assessment.    RFO (Retained Foreign Object) ___Yes__X_No (identify item if present)   Brace/sling/equipment: ___Yes__X_No (identify item if present)    Report Given to:  OLVIN Nieto    R: Post-Op vitals and assessments as ordered/indicated per patient's condition.  Follow Post-Op orders and notify Physician prn.  Continue to involve patient/family in plan of care and discharge planning.  Reinforce Pre-Operative education.  Implement skin safety interventions as appropriate.

## 2024-03-01 NOTE — L&D DELIVERY NOTE
PREOPERATIVE DIAGNOSES:   1. Term intrauterine pregnancy at 40 1/7 weeks' gestation.   2. Elective scheduled ILTCS due to a history of genital herpes  3. Morbid obesity  4. 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 Term intrauterine pregnancy at 40 1/7 weeks' gestation.   2. Elective scheduled ILTCS due to a history of genital herpes  3. Morbid obesity  4. 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     SURGEON: Mily with proctoring by Deisy   PRIMARY: Jeremi  ASSISTANT: Magda and Graciela Wilson MS3     Dr. Man was asked to assist in the  Section because of his board certification in gyn surgey and the skills he/possess in Myotomy repair, fascial closure, and special skin closure techniques.  The patient was morbidly obese and an assistance was indicated.  Dr. Man was a vital assistant in all of the above listed procedures. His/skills allowed us to shorten the length of the procedure considerably, which has been shown to decrease risk of infection, decrease post operative morbidity, and improve outcomes.     ANESTHESIA: Spinal with Gabbie Reynaga CRNA.     INDICATIONS: Primiparous patient who is at 40 1/7 weeks' gestation. She has a history fo genital herpes and had discussed with her primary provider the options for delivery and did not want to risk having a vaginal delivery.  Essentially this was a planned primary CS.    PROCEDURE: Patient was taken to the operating room where spinal anesthesia was placed. She was then laid in the supine position and a pannus retractor was placed. She was then prepped and draped in the routine sterile fashion after a Mooney catheter was placed. Under adequate anesthesia, a Pfannenstiel incision was made with a #15 blade. The incision was extended down through the subcutaneous tissue with blunt and sharp dissection, cauterizing any bleeding as  we went. The midline of the fascia was scored with a #15 blade and carried transversely in both directions bluntly. The fascia was then dissected off the anterior surface of the rectus muscle inferiorly and superiorly with both sharp and blunt dissection. Again, any bleeding was cauterized and taken care of at the time. The rectus muscles were then  in the midline and entered with blunt dissection. This was then extended with blunt dissection in both directions until adequate space was appreciated. The Johnathan O self-retaining retractor was then inserted. With good exposure, we then incised the uterus with a #15 blade, carefully getting down to the final layer. This was then broken through digitally and then the uterine incision was extended with craniocaudal traction. The presenting part was identified; it was then delivered through the abdominal incision atraumatically. The shoulders and remainder of the body were then delivered and spontaneous crying and respirations were noted with this viable . Suctioning through the mouth and nares was done at this time. The cord was clamped x2 and cut and the baby brought over to the warming table and nursing staff by the assistant. Cord bloods were then obtained.     The placenta was then removed manually intact. The cut surface of the uterus was then isolated with Quarles clamps. The uterine incision was closed using 0 Vicryl in a running locked fashion, followed by a second imbricating layer of 0 Monocryl. Any bleeding was controlled with figure-of-eight sutures. Hemostasis was excellent.  On reinspection, the uterine scar was dry and clear of any bleeding.     Bilateral fallopian tubes and ovaries were visualized. A small degree of endometriosis was noted on the left ovary.    The parietal peritoneum was then closed using 0 Vicryl in a running fashion. The rectus muscles were brought together to the midline with 0 Vicryl interrupted sutures.     The corners  of the fascia on either end were isolated with Kocher clamps. The fascia was then closed with 0 Vicryl in a running fashion. The subq layer was closed by running sutures of 2-0 plain catgut suture. The skin was then closed with Insorb resorbable staples and sealed with Exofin glue.    VARIATIONS: None.     ESTIMATED BLOOD LOSS: 733 mL.     TOTAL FLUIDS IN: 1000 ml of LR.     TOTAL URINE OUTPUT: About 25 mL of clear urine.     Both mom and this male  with apgars of 7 and 9 at 1 and 5 min respectively, which weighed 9 pounds 0.1 ounces or 4085 grams were stable upon me leaving the delivery room.     OPERATING ROOM TIMES:   Into the operating room at 11:00, time of incision 11:25, time of delivery 11:29, and time of closure 11:55.     Qiana Minor MD    Proctored by:  Inderjit Olivas M.D.  3/1/2024

## 2024-03-01 NOTE — ANESTHESIA CARE TRANSFER NOTE
Patient: Ayanna Davidson    Procedure: Procedure(s):   SECTION       Diagnosis: Previous  delivery, antepartum condition or complication [O34.219]  Diagnosis Additional Information: No value filed.    Anesthesia Type:   Spinal     Note:    Oropharynx: oropharynx clear of all foreign objects and spontaneously breathing  Level of Consciousness: drowsy  Oxygen Supplementation: face mask    Independent Airway: airway patency satisfactory and stable  Dentition: dentition unchanged  Vital Signs Stable: post-procedure vital signs reviewed and stable  Report to RN Given: handoff report given  Patient transferred to: PACU    Handoff Report: Identifed the Patient, Identified the Reponsible Provider, Reviewed the pertinent medical history, Discussed the surgical course, Reviewed Intra-OP anesthesia mangement and issues during anesthesia, Set expectations for post-procedure period and Allowed opportunity for questions and acknowledgement of understanding  Vitals:  Vitals Value Taken Time   /85 24 1241   Temp     Pulse 77 24 1244   Resp 14 24 1247   SpO2 97 % 24 1247   Vitals shown include unfiled device data.    Electronically Signed By: LEXX Rapp CRNA  2024  12:48 PM

## 2024-03-01 NOTE — PROGRESS NOTES
S: Scheduled C/S  B: Patient is a  @ 40w1d gestation with active herpes lesions.  A: [facility] for [facility]. VS stable. 20 minute category 1 FHT strip obtained. IV placed, fluids initiated. Procedure explained to patient, patient educated on importance of skin-to-skin contact and intent for skin-to-skin initiation in OR. Questions answered. Consents signed. Patient prepped for surgery.  R: Will proceed with scheduled C/S as planned.

## 2024-03-01 NOTE — PROGRESS NOTES
S:  Section Delivery  B: Primary  Section for active herpes lesions.  A: Baby delivered, cord clamping delayed for 30-60seconds, then brought to pre- warmed infant warmer. Infant stimulated and dried. Infant then brought to mother and placed skin to skin for bonding. Apgars 7/9. Educated mother on expected feeding readiness cues and encouraged her to observe for feeding cues. Mother informed that breast feeding assistance would be provided.   R: Mother and baby bonding well. Anticipate first feed within the hour.

## 2024-03-01 NOTE — PROGRESS NOTES
Care Transitions Note:    CTS staff received notification from [facility] RN informing that a cord tissue segment was sent for drug detection panel based on mother's history of meth/marijuana/alcohol use early in pregnancy.     Mom's urine toxicology positive for amphetamine and marijuana use. Report was made to [facility]  Navya FINE P: [phone]. Faxed report and labs per request to F: [phone].    [facility] to follow for results.       ELOISA Brown  Inpatient Care Coordinator   [facility] [phone]  [facility] [phone]

## 2024-03-02 ENCOUNTER — MEDICAL CORRESPONDENCE (OUTPATIENT)
Dept: HEALTH INFORMATION MANAGEMENT | Facility: CLINIC | Age: 34
End: 2024-03-02
Payer: COMMERCIAL

## 2024-03-02 LAB
GLUCOSE SERPL-MCNC: 135 MG/DL (ref 70–99)
HGB BLD-MCNC: 12 G/DL (ref 11.7–15.7)

## 2024-03-02 PROCEDURE — 250N000013 HC RX MED GY IP 250 OP 250 PS 637: Performed by: FAMILY MEDICINE

## 2024-03-02 PROCEDURE — 99231 SBSQ HOSP IP/OBS SF/LOW 25: CPT | Performed by: FAMILY MEDICINE

## 2024-03-02 PROCEDURE — 999N000105 HC STATISTIC NO DOCUMENTATION TO SUPPORT CHARGE

## 2024-03-02 PROCEDURE — 250N000009 HC RX 250: Performed by: FAMILY MEDICINE

## 2024-03-02 PROCEDURE — 85018 HEMOGLOBIN: CPT | Performed by: FAMILY MEDICINE

## 2024-03-02 PROCEDURE — 82947 ASSAY GLUCOSE BLOOD QUANT: CPT | Performed by: FAMILY MEDICINE

## 2024-03-02 PROCEDURE — 120N000001 HC R&B MED SURG/OB

## 2024-03-02 PROCEDURE — 36415 COLL VENOUS BLD VENIPUNCTURE: CPT | Performed by: FAMILY MEDICINE

## 2024-03-02 PROCEDURE — 250N000011 HC RX IP 250 OP 636: Performed by: FAMILY MEDICINE

## 2024-03-02 RX ADMIN — AMOXICILLIN AND CLAVULANATE POTASSIUM 1 TABLET: 875; 125 TABLET, COATED ORAL at 21:06

## 2024-03-02 RX ADMIN — OXYCODONE HYDROCHLORIDE 5 MG: 5 TABLET ORAL at 21:10

## 2024-03-02 RX ADMIN — SENNOSIDES AND DOCUSATE SODIUM 2 TABLET: 8.6; 5 TABLET ORAL at 19:51

## 2024-03-02 RX ADMIN — IBUPROFEN 800 MG: 800 TABLET ORAL at 13:34

## 2024-03-02 RX ADMIN — ENOXAPARIN SODIUM 40 MG: 40 INJECTION SUBCUTANEOUS at 07:41

## 2024-03-02 RX ADMIN — PANTOPRAZOLE SODIUM 40 MG: 40 TABLET, DELAYED RELEASE ORAL at 06:15

## 2024-03-02 RX ADMIN — IBUPROFEN 800 MG: 800 TABLET ORAL at 19:51

## 2024-03-02 RX ADMIN — ENOXAPARIN SODIUM 40 MG: 40 INJECTION SUBCUTANEOUS at 19:51

## 2024-03-02 RX ADMIN — ACETAMINOPHEN 975 MG: 325 TABLET, FILM COATED ORAL at 22:34

## 2024-03-02 RX ADMIN — BUDESONIDE 1 MG: 0.5 INHALANT RESPIRATORY (INHALATION) at 08:18

## 2024-03-02 RX ADMIN — AMOXICILLIN AND CLAVULANATE POTASSIUM 1 TABLET: 875; 125 TABLET, COATED ORAL at 08:02

## 2024-03-02 RX ADMIN — KETOROLAC TROMETHAMINE 30 MG: 30 INJECTION, SOLUTION INTRAMUSCULAR; INTRAVENOUS at 01:37

## 2024-03-02 RX ADMIN — ACETAMINOPHEN 975 MG: 325 TABLET, FILM COATED ORAL at 16:44

## 2024-03-02 RX ADMIN — BUDESONIDE 1 MG: 0.5 INHALANT RESPIRATORY (INHALATION) at 19:52

## 2024-03-02 RX ADMIN — ACETAMINOPHEN 975 MG: 325 TABLET, FILM COATED ORAL at 10:28

## 2024-03-02 RX ADMIN — SENNOSIDES AND DOCUSATE SODIUM 1 TABLET: 8.6; 5 TABLET ORAL at 08:02

## 2024-03-02 RX ADMIN — KETOROLAC TROMETHAMINE 30 MG: 30 INJECTION, SOLUTION INTRAMUSCULAR; INTRAVENOUS at 07:41

## 2024-03-02 RX ADMIN — ACETAMINOPHEN 975 MG: 325 TABLET, FILM COATED ORAL at 04:37

## 2024-03-02 ASSESSMENT — ACTIVITIES OF DAILY LIVING (ADL)
ADLS_ACUITY_SCORE: 18
ADLS_ACUITY_SCORE: 18
ADLS_ACUITY_SCORE: 20
ADLS_ACUITY_SCORE: 20
ADLS_ACUITY_SCORE: 18
ADLS_ACUITY_SCORE: 20
ADLS_ACUITY_SCORE: 18
ADLS_ACUITY_SCORE: 18
ADLS_ACUITY_SCORE: 20
ADLS_ACUITY_SCORE: 18
ADLS_ACUITY_SCORE: 20
ADLS_ACUITY_SCORE: 18
ADLS_ACUITY_SCORE: 20
ADLS_ACUITY_SCORE: 18

## 2024-03-02 NOTE — PROGRESS NOTES
[facility]   Obstetrics Post-Op / Progress Note         Assessment and Plan:    Assessment:   Post-operative day #1  Low transverse primary  section  L&D complications: Intrauterine pregnancy at 40 1/7 weeks gestation  Genital herpes history  Maternal drug abuse  Morbid Obesity      Doing well.  Clean wound without signs of infection.  Normal healing wound.  No immediate surgical complications identified.  No excessive bleeding  Pain well-controlled.      Plan:   Ambulation encouraged  Monitor wound for signs of infection  Pain control measures as needed  Reportable signs and symptoms dicussed with the patient  Anticipate discharge in 1-2 days            Interval History:     Doing well.  Pain is well-controlled.  No fevers.  No history of wound drainage, warmth or significant erythema.  Good appetite.  Denies chest pain, shortness of breath, nausea or vomiting.  Ambulatory.  Breastfeeding well.          Significant Problems:   Maternal drug use          Review of Systems:    The patient denies any chest pain, shortness of breath, excessive pain, fever, chills, purulent drainage from the wound, nausea or vomiting.          Medications:   All medications related to the patient's surgery have been reviewed          Physical Exam:   All vitals stable  Temp: 98  F (36.7  C) Temp src: Oral BP: 125/68 Pulse: 85   Resp: 17 SpO2: 96 % O2 Device: None (Room air)    Wound clean and dry with minimal or no drainage.  Surrounding skin with minimal erythema.  Constitutional:   awake, alert, cooperative, no apparent distress, and appears stated age     Neck:   Supple, symmetrical, trachea midline, no adenopathy, thyroid symmetric, not enlarged and no tenderness, skin normal     Lungs:   No increased work of breathing, good air exchange, clear to auscultation bilaterally, no crackles or wheezing     Cardiovascular:   Normal apical impulse, regular rate and rhythm, normal S1 and S2, no S3 or S4, and  no murmur noted     Musculoskeletal:   no lower extremity pitting edema present             Data:   All laboratory data related to this surgery reviewed  All imaging studies related to this surgery reviewed    Thierno Hughes MD, MD

## 2024-03-02 NOTE — ANESTHESIA POSTPROCEDURE EVALUATION
Patient: Ayanna Davidson    Procedure: Procedure(s):  PRIMARY  SECTION       Anesthesia Type:  Spinal    Note:  Disposition: Inpatient   Postop Pain Control: Uneventful            Sign Out: Well controlled pain   PONV: No   Neuro/Psych: Uneventful            Sign Out: Acceptable/Baseline neuro status   Airway/Respiratory: Uneventful            Sign Out: Acceptable/Baseline resp. status   CV/Hemodynamics: Uneventful            Sign Out: Acceptable CV status   Other NRE: NONE   DID A NON-ROUTINE EVENT OCCUR? No    Event details/Postop Comments:  Pt was happy with her anesthesia care.  No complications.  I advised the pt she may have some soreness at the spinal site and this is normal.  However if the soreness continues over a week or if redness is noted around the site to let anesthesia know.  I will follow up with the pt if needed.       Last vitals:  Vitals Value Taken Time   /72 24 1330   Temp     Pulse 85 24 1330   Resp 17 24 1332   SpO2 97 % 24 1335   Vitals shown include unfiled device data.    Electronically Signed By: LEXX Rapp CRNA  2024  4:12 PM

## 2024-03-02 NOTE — PLAN OF CARE
Goal Outcome Evaluation:      Plan of Care Reviewed With: patient    Overall Patient Progress: improvingOverall Patient Progress: improving    Outcome Evaluation: Has voided. Rubra flow in small amount. Incision intact. Pain is well controlled with Tylenol. Had two emesis; the first time she threw up into the garbage can and the second time was 500ml of undigested food affter she had received Zofran. Compazine brought relief. Patient had eaten a fish sandwich hurriedly at lunch time. Had clear liquids for lunch and is denying nausea. Bonding well with baby. Became frustrated with breastfeeding despite trying nipple shield due to flat breasts. Bottle feeding infant. Up ad juan c.

## 2024-03-02 NOTE — PLAN OF CARE
Goal Outcome Evaluation:    S: Shift review  B:Ayanna is a , day 1 post  birth.  A: Stable post-op, incision is clean, dry intact- interdry in place. Lung sounds-clear, bowel sounds active in all 4 quadrants, tolerating regular diet with no emesis. 1 bowel movement this afternoon. Independent with mobility, showered today and IV removed. Voiding without issues. Scant to light flow, no clots. Pain control achieved with scheduled p.o. pain meds. Ice frequently applied to abdomen and abdominal binder given. Handles baby with confidence- formula feeding independently. PPD score of 0, denies need for pump.  R: Continue with plan of care. Offer pain meds routinely.

## 2024-03-02 NOTE — PLAN OF CARE
Goal Outcome Evaluation:      Plan of Care Reviewed With: patient    Overall Patient Progress: improvingOverall Patient Progress: improving  S: Shift review  B:Ayanna is a , day 1 post  birth.  A: Stable post-op, incision is clean, dry and intact, Lung sounds-clear, bowel sounds hypoactive in all 4 quadrants, independent with mobility, pain control achieved with p.o. pain meds. Handles baby with confidence.  R: Continue with plan of care. Offer pain meds routinely.

## 2024-03-03 VITALS
WEIGHT: 259 LBS | TEMPERATURE: 97.6 F | DIASTOLIC BLOOD PRESSURE: 92 MMHG | RESPIRATION RATE: 19 BRPM | OXYGEN SATURATION: 99 % | BODY MASS INDEX: 41.8 KG/M2 | SYSTOLIC BLOOD PRESSURE: 134 MMHG | HEART RATE: 73 BPM

## 2024-03-03 PROCEDURE — 99238 HOSP IP/OBS DSCHRG MGMT 30/<: CPT | Performed by: FAMILY MEDICINE

## 2024-03-03 PROCEDURE — 250N000013 HC RX MED GY IP 250 OP 250 PS 637: Performed by: FAMILY MEDICINE

## 2024-03-03 PROCEDURE — 250N000009 HC RX 250: Performed by: FAMILY MEDICINE

## 2024-03-03 RX ORDER — IBUPROFEN 800 MG/1
800 TABLET, FILM COATED ORAL EVERY 8 HOURS PRN
Qty: 90 TABLET | Refills: 0 | Status: ON HOLD | OUTPATIENT
Start: 2024-03-03 | End: 2024-05-08

## 2024-03-03 RX ORDER — OXYCODONE HYDROCHLORIDE 5 MG/1
5 TABLET ORAL EVERY 4 HOURS PRN
Qty: 15 TABLET | Refills: 0 | Status: ON HOLD | OUTPATIENT
Start: 2024-03-03 | End: 2024-05-08

## 2024-03-03 RX ORDER — ACETAMINOPHEN 325 MG/1
975 TABLET ORAL EVERY 6 HOURS
Status: ON HOLD | COMMUNITY
Start: 2024-03-03 | End: 2024-05-08

## 2024-03-03 RX ORDER — AMOXICILLIN 250 MG
1 CAPSULE ORAL 2 TIMES DAILY PRN
Qty: 60 TABLET | Refills: 0 | Status: ON HOLD | OUTPATIENT
Start: 2024-03-03 | End: 2024-05-08

## 2024-03-03 RX ADMIN — OXYCODONE HYDROCHLORIDE 5 MG: 5 TABLET ORAL at 01:40

## 2024-03-03 RX ADMIN — BUDESONIDE 1 MG: 0.5 INHALANT RESPIRATORY (INHALATION) at 08:29

## 2024-03-03 RX ADMIN — PANTOPRAZOLE SODIUM 40 MG: 40 TABLET, DELAYED RELEASE ORAL at 07:36

## 2024-03-03 RX ADMIN — IBUPROFEN 800 MG: 800 TABLET ORAL at 01:37

## 2024-03-03 RX ADMIN — IBUPROFEN 800 MG: 800 TABLET ORAL at 07:36

## 2024-03-03 RX ADMIN — ACETAMINOPHEN 975 MG: 325 TABLET, FILM COATED ORAL at 10:31

## 2024-03-03 RX ADMIN — ACETAMINOPHEN 975 MG: 325 TABLET, FILM COATED ORAL at 04:27

## 2024-03-03 RX ADMIN — AMOXICILLIN AND CLAVULANATE POTASSIUM 1 TABLET: 875; 125 TABLET, COATED ORAL at 08:29

## 2024-03-03 RX ADMIN — IBUPROFEN 800 MG: 800 TABLET ORAL at 13:49

## 2024-03-03 RX ADMIN — SENNOSIDES AND DOCUSATE SODIUM 1 TABLET: 8.6; 5 TABLET ORAL at 09:07

## 2024-03-03 ASSESSMENT — ACTIVITIES OF DAILY LIVING (ADL)
ADLS_ACUITY_SCORE: 18
DEPENDENT_IADLS:: INDEPENDENT
ADLS_ACUITY_SCORE: 18

## 2024-03-03 NOTE — PLAN OF CARE
Goal Outcome Evaluation:     S: Shift review  B:Ayanna is a , day 2 post  birth.  A: Stable post-op, incision is clean, dry, intact- interdry in place and ice being utilized intermittently. Flow today is scant, there are no further clots or increased heavy bleeding that patient reports having overnight. Fundus is firm, midline at umbilicus. Lung sounds-clear, frequent cough- utilizing scheduled nebs due to recent rsv. Bowel sounds active in all 4 quadrants, tolerating regular diet, bowel movement 3/2. Independent with mobility, showered independently. Pain control achieved with scheduled p.o. pain meds- did not request PRN oxycodone yet today. Handles baby with confidence.  R: Discharge to home today.

## 2024-03-03 NOTE — PLAN OF CARE
Goal Outcome Evaluation:      Plan of Care Reviewed With: patient    Overall Patient Progress: improvingOverall Patient Progress: improving         S: Shift review  B:Ayanna is a , day 2 post  birth.  A: Stable post-op, incision is well approximated with liquid bandage with inter-=dry in place with rajiv packs for comfort. , Lung sounds-clear, bowel sounds active in all 4 quadrants, independent with mobility, pain control achieved with p.o. pain meds and PRN p.o. oxycodone X2. Handles baby with confidence.  R: Continue with plan of care. Offer pain meds routinely.

## 2024-03-03 NOTE — DISCHARGE INSTRUCTIONS
Warning Signs after Having a Baby    Keep this paper on your fridge or somewhere else where you can see it.    Call your provider if you have any of these symptoms up to 12 weeks after having your baby.    Thoughts of hurting yourself or your baby  Pain in your chest or trouble breathing  Severe headache not helped by pain medicine  Eyesight concerns (blurry vision, seeing spots or flashes of light, other changes to eyesight)  Fainting, shaking or other signs of a seizure    Call 9-1-1 if you feel that it is an emergency.     The symptoms below can happen to anyone after giving birth. They can be very serious. Call your provider if you have any of these warning signs.    My provider s phone number: _______________________    Losing too much blood (hemorrhage)    Call your provider if you soak through a pad in less than an hour or pass blood clots bigger than a golf ball. These may be signs that you are bleeding too much.    Blood clots in the legs or lungs    After you give birth, your body naturally clots its blood to help prevent blood loss. Sometimes this increased clotting can happen in other areas of the body, like the legs or lungs. This can block your blood flow and be very dangerous.     Call your provider if you:  Have a red, swollen spot on the back of your leg that is warm or painful when you touch it.   Are coughing up blood.     Infection    Call your provider if you have any of these symptoms:  Fever of 100.4 F (38 C) or higher.  Pain or redness around your stitches if you had an incision.   Any yellow, white, or green fluid coming from places where you had stitches or surgery.    Mood Problems (postpartum depression)    Many people feel sad or have mood changes after having a baby. But for some people, these mood swings are worse.     Call your provider right away if you feel so anxious or nervous that you can't care for yourself or your baby.    Preeclampsia (high blood pressure)    Even if you  didn't have high blood pressure when you were pregnant, you are at risk for the high blood pressure disease called preeclampsia. This risk can last up to 12 weeks after giving birth.     Call your provider if you have:   Pain on your right side under your rib cage  Sudden swelling in the hands and face    Remember: You know your body. If something doesn't feel right, get medical help.     For informational purposes only. Not to replace the advice of your health care provider. Copyright 2020 [facility]. All rights reserved. Clinically reviewed by Trinity Galvan, RNC-OB, MSN. "[employer] 724745 - Rev 02/23.

## 2024-03-03 NOTE — PROGRESS NOTES
S: Discharge  to home, self care.    B: Patient had a scheduled, primary  delivery with no complications. Baby boy, Caleb, bottle:  Similac Advance. Support person is patient's mother.    A: Stable post-op, incision is clean, dry, intact- interdry in place and ice being utilized intermittently. Flow today is scant, there are no further clots or increased heavy bleeding that patient reports having overnight. Fundus is firm, midline at umbilicus. Lung sounds-clear, frequent cough- utilizing scheduled nebs due to recent rsv. Bowel sounds active in all 4 quadrants, tolerating regular diet, bowel movement 3/2. Independent with mobility, showered independently. Pain control achieved with scheduled p.o. pain meds- did not request PRN oxycodone yet today. Handles baby with confidence.     R: Discharge instructions reviewed and questions answered.  Belongings gathered and returned to the patient. Agreed to follow up at next scheduled visits for self and baby- or sooner with any question or concerns.     Nursing Discharge Checklist:    Pneumovax screened and given, if appropriate: N/A  Influenza vaccine screened and given, if appropriate: N/A  Staples removed (): N/A  Breast milk returned: N/A  Hydrogel pads sent home:N/A  Birth Certificate Done: YES  Public Health Referral Made: N/A

## 2024-03-03 NOTE — PROGRESS NOTES
Dr. Hughes bedside to assess. Patient reports increased blood flow overnight- reports soaking through a pad and onto bed sheet. No increased flow or QBL recorded in flowsheets. Per Dr. Hughes, if patient has another episode of increased blood flow, weigh the pads and release a STAT hemoglobin draw on patient.

## 2024-03-03 NOTE — PROGRESS NOTES
Received report from Jeannette, nurse bedside to see patient. Patient sleeping comfortably. Patient awoken for Am protonix and scheduled pain medication. Patient resting again.

## 2024-03-03 NOTE — DISCHARGE SUMMARY
[facility] Discharge Summary    Ayanna Davidson MRN# [id number]   Age: 33 year old YOB: 1990     Date of Admission:  3/1/2024  Date of Discharge::  3/3/2024  Admitting Physician:  Inderjit Olivas MD  Discharge Physician:  Thierno Hughes MD, MD     [place] clinic: [facility]          Admission Diagnoses:   History of maternal herpes  Diet-controlled gestational diabetes  History of Drug abuse  Intrauterine pregnancy at 40 1/7 weeks gestation  Request for  section from the patient          Discharge Diagnosis:     History of maternal herpes  Diet-controlled gestational diabetes  History of Drug abuse  Intrauterine pregnancy at 40 1/7 weeks gestation  Request for  section from the patient    Of not, patient was not actively using drugs during this pregnancy and had negative urine tests with Dr. Blood.  She had an initial screen in the hospital that was positive, but the confirmatory tests were negative.      Electronically signed by:  Inderjit Olivas M.D.  7/15/2024            Procedures:     Procedure(s): Primary low transverse  section       No other significant procedures performed during this admission           Medications Prior to Admission:     Facility-Administered Medications Prior to Admission   Medication Dose Route Frequency Provider Last Rate Last Admin    [DISCONTINUED] penicillin G benzathine (BICILLIN L-A) injection 0.6 Million Units  0.6 Million Units Intramuscular Once Froylan Schwarz MD         Medications Prior to Admission   Medication Sig Dispense Refill Last Dose    albuterol (PROAIR HFA/PROVENTIL HFA/VENTOLIN HFA) 108 (90 Base) MCG/ACT inhaler Inhale 2 puffs into the lungs every 6 hours as needed for shortness of breath, wheezing or cough 18 g 0 Past Week    albuterol (PROVENTIL) (2.5 MG/3ML) 0.083% neb solution Take 1 vial (2.5 mg) by nebulization every 4 hours as needed for shortness of breath,  wheezing or cough 60 mL 0 Past Week    amoxicillin-clavulanate (AUGMENTIN) 875-125 MG tablet Take 1 tablet by mouth 2 times daily for 10 days 20 tablet 0 2024 at 1700    budesonide (PULMICORT) 1 MG/2ML neb solution Take 2 mLs (1 mg) by nebulization 2 times daily 2 mL 4 Past Week    hydrOXYzine estuardo (VISTARIL) 25 MG capsule Take 2-3 capsules (50-75 mg) by mouth nightly as needed for other (insomnia) 30 capsule 0 Past Week    omeprazole (PRILOSEC OTC) 20 MG EC tablet Take 1 tablet (20 mg) by mouth daily 90 tablet 3 2024 at 1700    Prenatal Vit-Fe Fumarate-FA (PRENATAL MULTIVITAMIN W/IRON) 27-0.8 MG tablet Take 1 tablet by mouth daily 90 tablet 3 2024 at 1700    acetone urine (KETOSTIX) test strip Use to test first morning urine for ketones (Patient not taking: Reported on 2024) 50 strip 0     blood glucose (NO BRAND SPECIFIED) lancets standard Use to test blood sugar 4 times daily or as directed. (Patient not taking: Reported on 2024) 100 each 3     blood glucose (NO BRAND SPECIFIED) test strip Use to test blood sugar 4 times daily or as directed. (Patient not taking: Reported on 2024) 100 strip 3     blood glucose monitoring (NO BRAND SPECIFIED) meter device kit Use to test blood sugar 4 times daily or as directed. (Patient not taking: Reported on 2024) 1 kit 0     [DISCONTINUED] aspirin 81 MG EC tablet Take 81 mg by mouth daily   Past Week    [DISCONTINUED] predniSONE (DELTASONE) 20 MG tablet 60 mg by mouth  for 5 days then stop (Patient not taking: Reported on 2024) 15 tablet 0              Discharge Medications:     Current Discharge Medication List        START taking these medications    Details   acetaminophen (TYLENOL) 325 MG tablet Take 3 tablets (975 mg) by mouth every 6 hours    Associated Diagnoses: S/P       ibuprofen (ADVIL/MOTRIN) 800 MG tablet Take 1 tablet (800 mg) by mouth every 8 hours as needed for moderate pain  Qty: 90 tablet, Refills: 0     Associated Diagnoses: S/P       oxyCODONE (ROXICODONE) 5 MG tablet Take 1 tablet (5 mg) by mouth every 4 hours as needed  Qty: 15 tablet, Refills: 0    Associated Diagnoses: S/P       senna-docusate (SENOKOT-S/PERICOLACE) 8.6-50 MG tablet Take 1 tablet by mouth 2 times daily as needed for constipation  Qty: 60 tablet, Refills: 0    Associated Diagnoses: S/P            CONTINUE these medications which have NOT CHANGED    Details   albuterol (PROAIR HFA/PROVENTIL HFA/VENTOLIN HFA) 108 (90 Base) MCG/ACT inhaler Inhale 2 puffs into the lungs every 6 hours as needed for shortness of breath, wheezing or cough  Qty: 18 g, Refills: 0    Comments: Pharmacy may dispense brand covered by insurance (Proair, or proventil or ventolin or generic albuterol inhaler)      albuterol (PROVENTIL) (2.5 MG/3ML) 0.083% neb solution Take 1 vial (2.5 mg) by nebulization every 4 hours as needed for shortness of breath, wheezing or cough  Qty: 60 mL, Refills: 0    Associated Diagnoses: RSV bronchiolitis      amoxicillin-clavulanate (AUGMENTIN) 875-125 MG tablet Take 1 tablet by mouth 2 times daily for 10 days  Qty: 20 tablet, Refills: 0    Associated Diagnoses: Bartholin cyst      budesonide (PULMICORT) 1 MG/2ML neb solution Take 2 mLs (1 mg) by nebulization 2 times daily  Qty: 2 mL, Refills: 4    Associated Diagnoses: Acute bronchitis, unspecified organism; Smoker      hydrOXYzine estuardo (VISTARIL) 25 MG capsule Take 2-3 capsules (50-75 mg) by mouth nightly as needed for other (insomnia)  Qty: 30 capsule, Refills: 0      omeprazole (PRILOSEC OTC) 20 MG EC tablet Take 1 tablet (20 mg) by mouth daily  Qty: 90 tablet, Refills: 3    Associated Diagnoses: Gastroesophageal reflux disease with esophagitis without hemorrhage      Prenatal Vit-Fe Fumarate-FA (PRENATAL MULTIVITAMIN W/IRON) 27-0.8 MG tablet Take 1 tablet by mouth daily  Qty: 90 tablet, Refills: 3    Associated Diagnoses: Supervision of high risk pregnancy,  antepartum      acetone urine (KETOSTIX) test strip Use to test first morning urine for ketones  Qty: 50 strip, Refills: 0    Associated Diagnoses: Gestational diabetes mellitus      blood glucose (NO BRAND SPECIFIED) lancets standard Use to test blood sugar 4 times daily or as directed.  Qty: 100 each, Refills: 3    Associated Diagnoses: Gestational diabetes mellitus      blood glucose (NO BRAND SPECIFIED) test strip Use to test blood sugar 4 times daily or as directed.  Qty: 100 strip, Refills: 3    Associated Diagnoses: Gestational diabetes mellitus      blood glucose monitoring (NO BRAND SPECIFIED) meter device kit Use to test blood sugar 4 times daily or as directed.  Qty: 1 kit, Refills: 0    Associated Diagnoses: Gestational diabetes mellitus           STOP taking these medications       aspirin 81 MG EC tablet Comments:   Reason for Stopping:         predniSONE (DELTASONE) 20 MG tablet Comments:   Reason for Stopping:                     Consultations:   No consultations were requested during this admission          Brief History of Labor or Admission:   For additional information, see history and physical and operative note.  Briefly, this patient is a 33-year-old G1, P0 who presented to labor and delivery for a scheduled  section due to history of maternal herpes and gestational diabetes, diet-controlled.  Her pregnancy was complicated by maternal drug use.   section was uncomplicated.  Mother and infant were doing well immediately postoperatively.           Hospital Course:   The patient's hospital course was unremarkable.  She recovered as anticipated and experienced no post-operative complications.  On discharge, her pain was well controlled. Vaginal bleeding is similar to peak menstrual flow.  Voiding without difficulty.  Ambulating well and tolerating a normal diet.  No fever or significant wound drainage.  Infant is stable.  No bowel movement yet.  She was discharged on post-partum  day #.    Post-partum hemoglobin:   Hemoglobin   Date Value Ref Range Status   03/02/2024 12.0 11.7 - 15.7 g/dL Final   04/26/2020 14.5 11.7 - 15.7 g/dL Final             Discharge Instructions and Follow-Up:     Discharge diet: Regular   Discharge activity: No heavy lifting, pushing, pulling for 6 week(s)  Pelvic rest: abstain from intercourse and do not use tampons for 6 week(s)   Discharge follow-up: Follow up with primary care provider in 1, 6 weeks   Wound care: Drink plenty of fluids  Ice to area for comfort  Keep wound clean and dry           Discharge Disposition:     Discharged to home      Attestation:  I have reviewed today's vital signs, notes, medications, labs and imaging.    Thierno Hughes MD, MD

## 2024-03-04 ENCOUNTER — MYC MEDICAL ADVICE (OUTPATIENT)
Dept: FAMILY MEDICINE | Facility: CLINIC | Age: 34
End: 2024-03-04
Payer: COMMERCIAL

## 2024-03-04 ENCOUNTER — TELEPHONE (OUTPATIENT)
Dept: FAMILY MEDICINE | Facility: CLINIC | Age: 34
End: 2024-03-04
Payer: COMMERCIAL

## 2024-03-04 ENCOUNTER — PATIENT OUTREACH (OUTPATIENT)
Dept: CARE COORDINATION | Facility: CLINIC | Age: 34
End: 2024-03-04
Payer: COMMERCIAL

## 2024-03-04 NOTE — PROGRESS NOTES
Connected Care Resource Center: [facility]    Background: Transitional Care Management program identified per system criteria and reviewed by [facility] Resource [place] team for possible outreach.    Assessment: Upon chart review, CCR Team member will not proceed with patient outreach related to this episode of Transitional Care Management program due to reason below:    Patient has active communication with a nurse, provider or care team for reason of post-hospital follow up plan.  Outreach call by CCRC team not indicated to minimize duplicative efforts.     Plan: Transitional Care Management episode addressed appropriately per reason noted above.      Flor Sousa  Community Health Worker  [facility]  [phone]      *Connected Care Resource Team does NOT follow patient ongoing. Referrals are identified based on internal discharge reports and the outreach is to ensure patient has an understanding of their discharge instructions.

## 2024-03-04 NOTE — TELEPHONE ENCOUNTER
Patient is calling and stated she sent a message as stated below and would like Dr. Deisy MD to return her call/ message on why the amphetamine lab would be positive.  She stated she did not use outside drugs during her pregnancy and wanted to know if any of the medications given to her during her  could have caused a positive reaction.  She stated she is confused on these results.    Patient would like this message sent to only Dr. Deisy MD for review and call back.      OLVIN Wu [facility] (supporting Garett Blood MD)12 minutes ago (10:41 AM)     could someone please call me regarding my urine screening before surgery [phone]

## 2024-03-05 LAB
AMPHET UR-MCNC: <50 NG/ML
MDA UR-MCNC: <200 NG/ML
MDEA UR-MCNC: <200 NG/ML
MDMA UR-MCNC: <200 NG/ML
METHAMPHET UR-MCNC: <200 NG/ML
PHENTERMINE UR CFM-MCNC: <200 NG/ML

## 2024-03-06 ENCOUNTER — TELEPHONE (OUTPATIENT)
Dept: FAMILY MEDICINE | Facility: CLINIC | Age: 34
End: 2024-03-06
Payer: COMMERCIAL

## 2024-03-06 ENCOUNTER — MYC MEDICAL ADVICE (OUTPATIENT)
Dept: FAMILY MEDICINE | Facility: CLINIC | Age: 34
End: 2024-03-06
Payer: COMMERCIAL

## 2024-03-06 DIAGNOSIS — J20.9 ACUTE BRONCHITIS, UNSPECIFIED ORGANISM: Primary | ICD-10-CM

## 2024-03-07 NOTE — PROGRESS NOTES
Ayanna Davidson  Gender: female  : 1990  [address]  [place] [id number] [phone] (home)   Medical Record: [id number]  Primary Care Provider: Obed Santiago       [facility]   ?   Discharge Phone Call: Key Words/Key Times     How are you and the baby?     How are feedings going?     Voiding & Stooling?     Any questions or concerns?     Follow-up appointment?       We want to provide excellent care here at [facility]. Do you have any feedback for us that would help us improve?     Call back COMMENTS:         Attempted Calls:   __3/2024 no answer, L/M per JOSE Oden RN_______     __________

## 2024-03-08 NOTE — PROGRESS NOTES
Ayanna Davidson  Gender: female  : 1990  [address]  [place] [id number] [phone] (home)   Medical Record: [id number]  Primary Care Provider: Obde Santiago       [facility]   ?      Attempted Calls:   _________  no answer 3/8/24 @ 1310   __________

## 2024-03-10 ENCOUNTER — MYC MEDICAL ADVICE (OUTPATIENT)
Dept: FAMILY MEDICINE | Facility: CLINIC | Age: 34
End: 2024-03-10
Payer: COMMERCIAL

## 2024-03-10 ENCOUNTER — HOSPITAL ENCOUNTER (EMERGENCY)
Facility: CLINIC | Age: 34
Discharge: HOME | End: 2024-03-10
Attending: STUDENT IN AN ORGANIZED HEALTH CARE EDUCATION/TRAINING PROGRAM | Admitting: STUDENT IN AN ORGANIZED HEALTH CARE EDUCATION/TRAINING PROGRAM
Payer: COMMERCIAL

## 2024-03-10 VITALS
HEART RATE: 81 BPM | RESPIRATION RATE: 16 BRPM | SYSTOLIC BLOOD PRESSURE: 125 MMHG | OXYGEN SATURATION: 99 % | TEMPERATURE: 98.1 F | DIASTOLIC BLOOD PRESSURE: 109 MMHG

## 2024-03-10 DIAGNOSIS — N75.0 CYST OF RIGHT BARTHOLIN'S GLAND DUCT: ICD-10-CM

## 2024-03-10 PROCEDURE — 99283 EMERGENCY DEPT VISIT LOW MDM: CPT

## 2024-03-10 PROCEDURE — 99284 EMERGENCY DEPT VISIT MOD MDM: CPT | Performed by: STUDENT IN AN ORGANIZED HEALTH CARE EDUCATION/TRAINING PROGRAM

## 2024-03-10 PROCEDURE — 250N000013 HC RX MED GY IP 250 OP 250 PS 637: Performed by: STUDENT IN AN ORGANIZED HEALTH CARE EDUCATION/TRAINING PROGRAM

## 2024-03-10 RX ADMIN — AMOXICILLIN AND CLAVULANATE POTASSIUM 1 TABLET: 875; 125 TABLET, COATED ORAL at 20:01

## 2024-03-10 ASSESSMENT — COLUMBIA-SUICIDE SEVERITY RATING SCALE - C-SSRS
2. HAVE YOU ACTUALLY HAD ANY THOUGHTS OF KILLING YOURSELF IN THE PAST MONTH?: NO
1. IN THE PAST MONTH, HAVE YOU WISHED YOU WERE DEAD OR WISHED YOU COULD GO TO SLEEP AND NOT WAKE UP?: NO
6. HAVE YOU EVER DONE ANYTHING, STARTED TO DO ANYTHING, OR PREPARED TO DO ANYTHING TO END YOUR LIFE?: NO

## 2024-03-10 ASSESSMENT — ACTIVITIES OF DAILY LIVING (ADL)
ADLS_ACUITY_SCORE: 35

## 2024-03-10 NOTE — ED TRIAGE NOTES
"Pt presents with \"bartholen cyst.\" Pt states just had c section 9 days ago. Pt states left lateral side pain also. Pt has pain patch on this area. Pt thinks could be normal post op pain.      Triage Assessment (Adult)       Row Name 03/10/24 9030          Triage Assessment    Airway WDL WDL        Respiratory WDL    Respiratory WDL WDL        Skin Circulation/Temperature WDL    Skin Circulation/Temperature WDL WDL        Cardiac WDL    Cardiac WDL WDL        Peripheral/Neurovascular WDL    Peripheral Neurovascular WDL WDL        Cognitive/Neuro/Behavioral WDL    Cognitive/Neuro/Behavioral WDL WDL                     "

## 2024-03-11 NOTE — TELEPHONE ENCOUNTER
Per Dr. Blood:  She needs an appointment with a Gyn surgeon so if you can get an appointment with a GYN  surgeon over the next few days that is  who needs to take care of this.  I do not do that surgery

## 2024-03-11 NOTE — DISCHARGE INSTRUCTIONS
Please follow-up with OB/GYN in regards to this evaluation to ensure area gets reevaluated and possibly requiring surgical procedure to drain.  This will require drainage as it can often result in infection to worsen if not.  I recommend at this time warm compresses and sitz bath's 3-4 times daily.  He is not having any fevers nausea vomiting fevers chills please return for reevaluation.  We sent in Augmentin to your pharmacy please take this as prescribed.  Please return if you have any worsening symptoms and/or change her mind and want this reevaluated and drained.

## 2024-03-11 NOTE — TELEPHONE ENCOUNTER
She states they are very bad. She went to the ER last night.    She states she doesn't want it opened up but to have it totally removed.    Please advise.  Michell Nye RN on 3/11/2024 at 10:57 AM

## 2024-03-11 NOTE — MEDICATION SCRIBE - ADMISSION MEDICATION HISTORY
Medication Scribe Admission Medication History    Admission medication history is complete. The information provided in this note is only as accurate as the sources available at the time of the update.    Information Source(s): Patient and CareEverywhere/SureScripts via in-person    Pertinent Information: n/a    Changes made to PTA medication list:  Added: None  Deleted: BG testing system, albuterol inhaler  Changed: None    Allergies reviewed with patient and updates made in EHR: yes    Medication History Completed By: EVANS WOLF 3/10/2024 7:01 PM    PTA Med List   Medication Sig Last Dose    acetaminophen (TYLENOL) 325 MG tablet Take 3 tablets (975 mg) by mouth every 6 hours 3/10/2024 at 1700    albuterol (PROVENTIL) (2.5 MG/3ML) 0.083% neb solution Take 1 vial (2.5 mg) by nebulization every 4 hours as needed for shortness of breath, wheezing or cough 3/9/2024 at am    budesonide (PULMICORT) 1 MG/2ML neb solution Take 2 mLs (1 mg) by nebulization 2 times daily 3/9/2024 at hs    hydrOXYzine estuardo (VISTARIL) 25 MG capsule Take 2-3 capsules (50-75 mg) by mouth nightly as needed for other (insomnia) Past Month at hs    ibuprofen (ADVIL/MOTRIN) 800 MG tablet Take 1 tablet (800 mg) by mouth every 8 hours as needed for moderate pain 3/10/2024 at 1700    omeprazole (PRILOSEC OTC) 20 MG EC tablet Take 1 tablet (20 mg) by mouth daily 3/10/2024 at 1700    oxyCODONE (ROXICODONE) 5 MG tablet Take 1 tablet (5 mg) by mouth every 4 hours as needed 3/10/2024 at 0400    Prenatal Vit-Fe Fumarate-FA (PRENATAL MULTIVITAMIN W/IRON) 27-0.8 MG tablet Take 1 tablet by mouth daily 3/10/2024 at 1700    senna-docusate (SENOKOT-S/PERICOLACE) 8.6-50 MG tablet Take 1 tablet by mouth 2 times daily as needed for constipation 3/10/2024 at am

## 2024-03-11 NOTE — ED PROVIDER NOTES
History     Chief Complaint   Patient presents with    Cyst     HPI  Ayanna Davidson is a 33 year old female presenting with concerns of a Bartholin gland cyst.  She notes she had her left 1 removed and partial of her right and notes that they possibly messed a piece.  She notes that she had a baby 9 days ago.  She is also concerned about some mild discomfort in her abdomen.  No nausea no vomiting no fevers no rashes were noted.  Denies any urinary changes and/or vaginal bleeding.  She denies any stooling changes.    Allergies:  Allergies   Allergen Reactions    No Known Allergies        Problem List:    Patient Active Problem List    Diagnosis Date Noted    S/P  2024     Priority: Medium    Elevated BP without diagnosis of hypertension 2024     Priority: Medium     contractions 2024     Priority: Medium    Diet controlled gestational diabetes mellitus (GDM) in third trimester 2024     Priority: Medium    Encounter for triage in pregnant patient 10/17/2023     Priority: Medium    PTSD (post-traumatic stress disorder) 10/10/2023     Priority: Medium    Anxiety 03/15/2023     Priority: Medium    Class 2 severe obesity with serious comorbidity and body mass index (BMI) of 38.0 to 38.9 in adult, unspecified obesity type (H) 2022     Priority: Medium    Prolapse of the stomach 2021     Priority: Medium     Formatting of this note might be different from the original. Added automatically from request for surgery [id number]      Open wound 2020     Priority: Medium    TINO III (vulvar intraepithelial neoplasia III) 2020     Priority: Medium     Added automatically from request for surgery [id number]      Preoperative examination 2020     Priority: Medium     Added automatically from request for surgery [id number]      Bartholin's gland abscess 2020     Priority: Medium     Added automatically from request for surgery [id number]      Chronic pain in right  "shoulder 01/21/2020     Priority: Medium    Dog bite of right lower leg 09/27/2019     Priority: Medium    Cellulitis of left upper extremity 09/27/2019     Priority: Medium    Current every day smoker 09/27/2019     Priority: Medium    Gastroesophageal reflux disease without esophagitis 09/27/2019     Priority: Medium    Skin infection 09/27/2019     Priority: Medium    Dog bite of left upper extremity 05/07/2019     Priority: Medium    Cervical high risk HPV (human papillomavirus) test positive 03/12/2019     Priority: Medium     4/2016 NIL pap. No prior HPV testing.   3/12/19 NIL pap, + HR HPV (not 16 or 18). Plan: cotest in 1 yr  1/29/20 NIL pap, + HR HPV (not 16 or 18). Plan: [place]  2/11/20 [place] bx: neg. Plan: Cotest in 1 yr.   2/18/20 Vulvar biopsy: \"TINO III; severe dysplasia\" possible involvement of margins  3/11/20 Re excision of vulva.   3/23/21 NIL pap, + HR HPV (not 16 or 18). Plan: [facility]  6/22/21 Gyn visit - [place] recommended, Patient refused  6/28/21 GynOnc visit plan - HPV HR+: \"Repeat HPV-based screening in 1 year based upon [facility]P\", Due 3/23/22  5/6/22 Lost to follow up  8/18/22 NIL pap, Neg HPV.  Plan: cotest in 1 year  6/14/23 NIL Pap, Neg HR HPV. Plan: cotest in 1 year and establish with OB/GYN provider, per Dr. Santiago.       Closed fracture dislocation of hip joint, left, initial encounter (H) 09/28/2018     Priority: Medium    Vulvar abscess 08/16/2017     Priority: Medium    ADD (attention deficit disorder) without hyperactivity 04/18/2014     Priority: Medium    Drug-induced mental disorder (H) 09/20/2012     Priority: Medium     Overview:   ICD-10 Regulatory Update      Depressed 05/10/2012     Priority: Medium    Overdose of antipsychotic 05/09/2012     Priority: Medium        Past Medical History:    Past Medical History:   Diagnosis Date    Cervical high risk HPV (human papillomavirus) test positive 03/12/2019    Gastroesophageal reflux disease     HSV (herpes simplex virus) infection     " Methamphetamine abuse (H)     recurrent Bartholin's cyst        Past Surgical History:    Past Surgical History:   Procedure Laterality Date    BIOPSY       SECTION N/A 3/1/2024    Procedure: PRIMARY  SECTION;  Surgeon: Inderjit Olivas MD;  Location: [facility]    CHOLECYSTECTOMY      ENT SURGERY      ESOPHAGOSCOPY, GASTROSCOPY, DUODENOSCOPY (EGD), COMBINED N/A 2023    Procedure: Esophagoscopy, gastroscopy, duodenoscopy (EGD), combined;  Surgeon: Thierno Escobar MD;  Location: [facility]    EXAM UNDER ANESTHESIA PELVIC N/A 2020    Procedure: EXAM UNDER ANESTHESIA, PELVIS, PAP;  Surgeon: Froylan Schwarz MD;  Location: [facility]    EXCISE VULVA WIDE LOCAL Bilateral 2020    Procedure: Right Vulva Wedge Resection and Incision and Drainage Left Vulva;  Surgeon: Froylan Schwarz MD;  Location: [facility]    INCISION AND DRAINAGE ABSCESS PELVIS, COMBINED N/A 2018    Procedure: COMBINED INCISION AND DRAINAGE ABSCESS PELVIS;  INCISION AND DRAINAGE LABIAL ABSCESS;  Surgeon: Jase Beach MD;  Location: [facility]    INCISION AND DRAINAGE ABSCESS PELVIS, COMBINED N/A 2019    Procedure: Incision and Drainage Right Labial Abscess;  Surgeon: Jase Beach MD;  Location: [facility]    INCISION AND DRAINAGE LOWER EXTREMITY, COMBINED Right 2019    Procedure: irrigation and debridement right leg dog bite;  Surgeon: Rommel Pérez MD;  Location: [facility]    LAP ADJUSTABLE GASTRIC BAND      LEEP TX, CERVICAL      MAMMOPLASTY REDUCTION BILATERAL      MARSUPIALIZATION BARTHOLIN CYST N/A 2019    Procedure: Bartholin's Cyst removal;  Surgeon: Froylan Schwarz MD;  Location: [facility]    MARSUPIALIZATION BARTHOLIN CYST Left 2020    Procedure: Excision of left bartholin's abscess;  Surgeon: Froylan Schwarz MD;  Location: [facility]    ORTHOPEDIC SURGERY         Family History:    Family History   Problem Relation Age of Onset    Thyroid Disease Mother     Heart Disease  Father     Coronary Artery Disease Father     Hypertension Father     Hyperlipidemia Father     Mental Illness Father     Substance Abuse Father     Diabetes Maternal Grandmother     Prostate Cancer Maternal Grandfather     Breast Cancer Paternal Grandmother     Testicular cancer Paternal Grandfather     Depression Half-Sister     Anxiety Disorder Half-Sister        Social History:  Marital Status:  Single [1]  Social History     Tobacco Use    Smoking status: Some Days     Packs/day: 0.50     Years: 10.00     Additional pack years: 0.00     Total pack years: 5.00     Types: Cigarettes    Smokeless tobacco: Current    Tobacco comments:     uses E cig with zero nicotine    Vaping Use    Vaping Use: Every day    Substances: no nicotine   Substance Use Topics    Alcohol use: Not Currently     Comment: Reports last use 9/17 and denies use during pregnancy    Drug use: Not Currently     Types: Marijuana, Methamphetamines     Comment: Reports Marijuana and meth use one week ago        Medications:    acetaminophen (TYLENOL) 325 MG tablet  albuterol (PROVENTIL) (2.5 MG/3ML) 0.083% neb solution  amoxicillin-clavulanate (AUGMENTIN) 875-125 MG tablet  budesonide (PULMICORT) 1 MG/2ML neb solution  hydrOXYzine estuardo (VISTARIL) 25 MG capsule  ibuprofen (ADVIL/MOTRIN) 800 MG tablet  omeprazole (PRILOSEC OTC) 20 MG EC tablet  oxyCODONE (ROXICODONE) 5 MG tablet  Prenatal Vit-Fe Fumarate-FA (PRENATAL MULTIVITAMIN W/IRON) 27-0.8 MG tablet  senna-docusate (SENOKOT-S/PERICOLACE) 8.6-50 MG tablet          Review of Systems   Genitourinary:  Positive for vaginal pain.   All other systems reviewed and are negative.      Physical Exam   BP: (!) 145/104  Pulse: 84  Temp: 98.1  F (36.7  C)  Resp: 16  SpO2: 99 %      Physical Exam  Vitals and nursing note reviewed.   Constitutional:       General: She is not in acute distress.     Appearance: She is obese. She is not ill-appearing, toxic-appearing or diaphoretic.   HENT:      Head:  Normocephalic and atraumatic.   Cardiovascular:      Rate and Rhythm: Normal rate.      Pulses: Normal pulses.      Heart sounds: Normal heart sounds.   Pulmonary:      Effort: Pulmonary effort is normal.      Breath sounds: Normal breath sounds.   Abdominal:      General: Abdomen is flat. Bowel sounds are normal. There is no distension.      Palpations: Abdomen is soft.      Tenderness: There is no abdominal tenderness. There is no guarding or rebound.      Comments:  incision looks appropriately healed.  No signs of infection dehiscence or any signs of abnormal tenderness.  Uterus is palpable in the pelvis.  This is not tender or boggy.   Genitourinary:     Comments: Patient has approximately 3-1/2 x 3-1/2 circular mass in the right lower vulva consistent with a fungal abscess.  Not erythematous or warm.  No signs of cellulitis around the area.  Neurological:      Mental Status: She is alert.         ED Course        Procedures                No results found for this or any previous visit (from the past 24 hour(s)).    Medications   amoxicillin-clavulanate (AUGMENTIN) 875-125 MG per tablet 1 tablet (has no administration in time range)       Assessments & Plan (with Medical Decision Making)     I have reviewed the nursing notes.    I have reviewed the findings, diagnosis, plan and need for follow up with the patient.      Medical Decision Making  33-year-old female presenting with concern of a recurrent Bartholin gland cyst.  She has not had any chills fevers nausea vomiting diaphoresis or sweats.  She recent had a baby about 9 days ago.  She notes some mild abdominal discomfort.  Her abdomen exam is negative for any acute signs of an acute abdomen.  It soft throughout with appropriate bowel habits and incision is well-healed without signs of infection or dehiscence.  Uterus is blottable in the pelvis with no acute signs of tenderness or bogginess.  Evaluation of the vagina is consistent with  approximately a 3-1/2-3-1/2 circular fluctuant mass consistent with a Bartholin gland cyst.  No signs of cellulitis throughout.  Patient has not tried any sitz bath's or compress at home and she notes that she has had these areas lacerated and cut numerous times secondary to recurrent cyst.  She does not want to have this cut today and wanted to know if there is any other treatments which we discussed was to do nothing today or provide antibiotics with home sitz bath's and warm compresses and follow-up outpatient with her OB/GYN.  She preferred to have this done.  Augmentin was provided as well as prescription was sent to patient's pharmacy.  Pain control discussed.  Return precautions discussed with patient.  Patient discharged home      New Prescriptions    AMOXICILLIN-CLAVULANATE (AUGMENTIN) 875-125 MG TABLET    Take 1 tablet by mouth 2 times daily       Final diagnoses:   Cyst of right Bartholin's gland duct       3/10/2024   [facility] EMERGENCY DEPT       Shilpi Dumont MD  03/10/24 1947

## 2024-03-13 ENCOUNTER — OFFICE VISIT (OUTPATIENT)
Dept: FAMILY MEDICINE | Facility: CLINIC | Age: 34
End: 2024-03-13
Payer: COMMERCIAL

## 2024-03-13 VITALS
HEIGHT: 66 IN | HEART RATE: 82 BPM | SYSTOLIC BLOOD PRESSURE: 110 MMHG | BODY MASS INDEX: 40.04 KG/M2 | TEMPERATURE: 98.6 F | OXYGEN SATURATION: 100 % | RESPIRATION RATE: 18 BRPM | DIASTOLIC BLOOD PRESSURE: 74 MMHG | WEIGHT: 249.13 LBS

## 2024-03-13 DIAGNOSIS — G44.209 TENSION HEADACHE: Primary | ICD-10-CM

## 2024-03-13 LAB
ALBUMIN MFR UR ELPH: 8.5 MG/DL
ALBUMIN SERPL BCG-MCNC: 4.2 G/DL (ref 3.5–5.2)
ALP SERPL-CCNC: 111 U/L (ref 40–150)
ALT SERPL W P-5'-P-CCNC: 24 U/L (ref 0–50)
AST SERPL W P-5'-P-CCNC: 27 U/L (ref 0–45)
BILIRUB DIRECT SERPL-MCNC: <0.2 MG/DL (ref 0–0.3)
BILIRUB SERPL-MCNC: 0.3 MG/DL
CREAT UR-MCNC: 97.8 MG/DL
ERYTHROCYTE [DISTWIDTH] IN BLOOD BY AUTOMATED COUNT: 13.6 % (ref 10–15)
HCT VFR BLD AUTO: 44 % (ref 35–47)
HGB BLD-MCNC: 14.9 G/DL (ref 11.7–15.7)
MCH RBC QN AUTO: 30.5 PG (ref 26.5–33)
MCHC RBC AUTO-ENTMCNC: 33.9 G/DL (ref 31.5–36.5)
MCV RBC AUTO: 90 FL (ref 78–100)
PLATELET # BLD AUTO: 315 10E3/UL (ref 150–450)
PROT SERPL-MCNC: 7.4 G/DL (ref 6.4–8.3)
PROT/CREAT 24H UR: 0.09 MG/MG CR (ref 0–0.2)
RBC # BLD AUTO: 4.89 10E6/UL (ref 3.8–5.2)
WBC # BLD AUTO: 8.9 10E3/UL (ref 4–11)

## 2024-03-13 PROCEDURE — 36415 COLL VENOUS BLD VENIPUNCTURE: CPT | Performed by: FAMILY MEDICINE

## 2024-03-13 PROCEDURE — 99214 OFFICE O/P EST MOD 30 MIN: CPT | Performed by: FAMILY MEDICINE

## 2024-03-13 PROCEDURE — 84156 ASSAY OF PROTEIN URINE: CPT | Performed by: FAMILY MEDICINE

## 2024-03-13 PROCEDURE — 80076 HEPATIC FUNCTION PANEL: CPT | Performed by: FAMILY MEDICINE

## 2024-03-13 PROCEDURE — 85027 COMPLETE CBC AUTOMATED: CPT | Performed by: FAMILY MEDICINE

## 2024-03-13 RX ORDER — NAPROXEN 500 MG/1
500 TABLET ORAL 2 TIMES DAILY WITH MEALS
Qty: 40 TABLET | Refills: 0 | Status: ON HOLD | OUTPATIENT
Start: 2024-03-13 | End: 2024-05-08

## 2024-03-13 NOTE — PROGRESS NOTES
Assessment & Plan     Tension headache  Patient has just been going through a lot with her Bartholin cysts and other things that she has tension headaches we will try some Naprosyn since ibuprofen is not helping her.  All her laboratory studies were completely normal I did inform her we will see her back for her 6-week postpartum check.  - naproxen (NAPROSYN) 500 MG tablet; Take 1 tablet (500 mg) by mouth 2 times daily (with meals)  - Hepatic panel (Albumin, ALT, AST, Bili, Alk Phos, TP); Future  - Protein  random urine; Future  - CBC with platelets; Future  - CBC with platelets  - Protein  random urine  - Hepatic panel (Albumin, ALT, AST, Bili, Alk Phos, TP)        Colleen Urena is a 33 year old, presenting for the following health issues:  Follow Up        3/13/2024     9:05 AM   Additional Questions   Roomed by Irina YANES MA     History of Present Illness       Reason for visit:  Bartholin cyst    She eats 2-3 servings of fruits and vegetables daily.She consumes 3 sweetened beverage(s) daily.She exercises with enough effort to increase her heart rate 30 to 60 minutes per day.  She exercises with enough effort to increase her heart rate 4 days per week.   She is taking medications regularly.         Hospital Follow-up Visit:    Hospital/Nursing Home/IP Rehab Facility: [facility]  Date of Admission: 3/1/24  Date of Discharge: 3/3/24  Reason(s) for Admission: birth of a baby    Was your hospitalization related to COVID-19? No   Problems taking medications regularly:    Medication changes since discharge: None  Problems adhering to non-medication therapy:  None    Patient was seen in the emergency room for a Bartholin cyst that ruptured.  She is continue to have this treated definitively but will wait till Dr. Man comes she did not have a good outcome with Dr. Schwarz.  She has been having some headaches that.  Blood pressure is in the normal but she has tried ibuprofen without  "much relief.  I did tell her I will get some laboratory studies today just to make sure were not missing anything in regards to postpartum preeclampsia.  Does have some visual disturbance which is the reason to do some laboratory studies.  Rest of her history is unremarkable.  States that the cyst is healing now she is using sitz bath's.    Summary of hospitalization:  [facility] discharge summary reviewed  Diagnostic Tests/Treatments reviewed.  Follow up needed: none  Other Healthcare Providers Involved in Patient s Care:         None  Update since discharge: improved.         Plan of care communicated with patient                 Review of Systems  Constitutional, HEENT, cardiovascular, pulmonary, gi and gu systems are negative, except as otherwise noted.      Objective    /74   Pulse 82   Temp 98.6  F (37  C) (Temporal)   Resp 18   Ht 1.676 m (5' 6\")   Wt 113 kg (249 lb 2 oz)   LMP  (LMP Unknown)   SpO2 100%   Breastfeeding No   BMI 40.21 kg/m    Body mass index is 40.21 kg/m .  Physical Exam   GENERAL: alert and no distress  NEURO: Normal strength and tone, mentation intact and speech normal    Results for orders placed or performed in visit on 03/13/24 (from the past 24 hour(s))   CBC with platelets   Result Value Ref Range    WBC Count 8.9 4.0 - 11.0 10e3/uL    RBC Count 4.89 3.80 - 5.20 10e6/uL    Hemoglobin 14.9 11.7 - 15.7 g/dL    Hematocrit 44.0 35.0 - 47.0 %    MCV 90 78 - 100 fL    MCH 30.5 26.5 - 33.0 pg    MCHC 33.9 31.5 - 36.5 g/dL    RDW 13.6 10.0 - 15.0 %    Platelet Count 315 150 - 450 10e3/uL   Hepatic panel (Albumin, ALT, AST, Bili, Alk Phos, TP)   Result Value Ref Range    Protein Total 7.4 6.4 - 8.3 g/dL    Albumin 4.2 3.5 - 5.2 g/dL    Bilirubin Total 0.3 <=1.2 mg/dL    Alkaline Phosphatase 111 40 - 150 U/L    AST 27 0 - 45 U/L    ALT 24 0 - 50 U/L    Bilirubin Direct <0.20 0.00 - 0.30 mg/dL   Protein  random urine   Result Value Ref Range    Total Protein Urine " mg/dL 8.5   mg/dL    Total Protein Urine mg/mg Creat 0.09 0.00 - 0.20 mg/mg Cr    Creatinine Urine mg/dL 97.8 mg/dL           Signed Electronically by: Garett Blood MD

## 2024-03-14 ENCOUNTER — TELEPHONE (OUTPATIENT)
Dept: FAMILY MEDICINE | Facility: CLINIC | Age: 34
End: 2024-03-14
Payer: COMMERCIAL

## 2024-03-14 NOTE — TELEPHONE ENCOUNTER
Left message for patient to return call to any triage RN.  Need consent/verification patient is involved in this program and ok with giving information.    Catherine Caro, RN, BSN

## 2024-03-14 NOTE — TELEPHONE ENCOUNTER
Leslie with the [facility] pregnancy registry calling needing information regarding her pregnancy and outcome.    Will route to provider    Kathleen Gasca RN on 3/14/2024 at 8:12 AM

## 2024-03-14 NOTE — TELEPHONE ENCOUNTER
Patient returned call and informed of message below. Patient stating yes she is in the program and has given verbal consent to share information of her pregnancy with the [facility] registry. Mayra Stratton LPN

## 2024-03-21 ENCOUNTER — VIRTUAL VISIT (OUTPATIENT)
Dept: ENDOCRINOLOGY | Facility: CLINIC | Age: 34
End: 2024-03-21
Payer: COMMERCIAL

## 2024-03-21 VITALS — BODY MASS INDEX: 38.89 KG/M2 | WEIGHT: 242 LBS | HEIGHT: 66 IN

## 2024-03-21 DIAGNOSIS — Z98.891 S/P C-SECTION: ICD-10-CM

## 2024-03-21 DIAGNOSIS — E66.01 CLASS 2 SEVERE OBESITY WITH SERIOUS COMORBIDITY AND BODY MASS INDEX (BMI) OF 39.0 TO 39.9 IN ADULT, UNSPECIFIED OBESITY TYPE (H): Primary | ICD-10-CM

## 2024-03-21 DIAGNOSIS — E66.812 CLASS 2 SEVERE OBESITY WITH SERIOUS COMORBIDITY AND BODY MASS INDEX (BMI) OF 39.0 TO 39.9 IN ADULT, UNSPECIFIED OBESITY TYPE (H): Primary | ICD-10-CM

## 2024-03-21 DIAGNOSIS — O24.410 DIET CONTROLLED GESTATIONAL DIABETES MELLITUS (GDM) IN THIRD TRIMESTER: ICD-10-CM

## 2024-03-21 PROCEDURE — 99214 OFFICE O/P EST MOD 30 MIN: CPT | Mod: 95

## 2024-03-21 NOTE — PROGRESS NOTES
Return Medical Weight Management Note     Ayanna Davidson  MRN:  [id number]  :  1990  JEFFREY:  3/21/2024    Dear Obed Santiago MD,    I had the pleasure of seeing your patient Ayanna Davidson. She is a 33 year old female who I am continuing to see for treatment of obesity related to:        3/13/2023    12:17 AM   --   I have the following health issues associated with obesity Infertility    GERD (Reflux)    Stress Incontinence       Assessment & Plan   Problem List Items Addressed This Visit       Class 2 severe obesity with serious comorbidity and body mass index (BMI) of 39.0 to 39.9 in adult, unspecified obesity type (H) - Primary    Diet controlled gestational diabetes mellitus (GDM) in third trimester    S/P       Plan  Congratulations on your beautiful baby!   Briefly discussed weight loss medications, however Ayanna is not willing to use contraception for the time being (condoms, IUD, birth control), so we discussed that it would not be safe to start  weight loss medication at this time. Assured Ayanna that if she changes her mind and is willing to use contraception we can consider restarting wegovy or other weight loss meds.   Follow up with Mounika or Marie Berry as needed   Dietician appointment as needed      INTERVAL HISTORY:  New re-establish with Marie Berry, 3/15/23  S/p lapband in  by Dr. Perdue in [place]. No complications. Lapband removed 2021 by Dr. Escobar in [place]. Removed due to band slipping and uncontrollable GERD.     Weight prior to lapband - 265lb   Oren - 137lbs   Weight today - 240lbs      Weight was stable around 180-200lbs with the lapband. After removal weight gain has been gradual. Has not been able to lose substancial weight since the band was removed. Wants to lose weight now to help with overall health. Is inverested in surgical options in the future. Will continue with MWM at this time.      Currently taking Victoza 1.2mg, and is helpful with  hunger during the day. Struggles the most with increased hunger and snacking throughout the night, especially when she is up till 4am studying most nights.      Discussed AOMs to help with weight loss:   - Phentermine - previously trialed for 4 months, with no effect on weight.   - Topiramate - previously trial for 4 months, with no effect on weight.   - Naltrexone - can consider in the future.   - GLP-1 - is currently on Victoza 1.2m dose, tolerating well with no side effects. Has been somewhat helpful with hunger, but no effect on weight. Will transition to Wegovy 0.5mg once weekly. Discussed side effects, risks, and benefits. Will monitor GERD symptoms. Transition to Wegovy. Ozempic was denied by insurance. Consider Saxenda.     Follow up visit with Sylvia, last seen 5/25/23: Was doing well on wegovy, was unsure if she's seen successful weight loss. Discussed bariatric surgery, patient was concerned about permanent nicotine cessation requirements.     Since last visit:   Became pregnant, stopped wegovy approximately 4 months into pregnancy.   Delivered baby 3/1/24,  named Caleb, both mom and baby are doing well.   Diet controlled gestational diabetes in 3rd semester   Doing well since pregnancy, no longer breast feeding.     Hoping to restart wegovy today, however is not willing at this time to commit to using contraception or starting any form of birth control while taking Wegovy.  Confirms that she is sexually active with her male partner.    We discussed that Wegovy is not established to be safe in pregnancy due to lack of clinical testing, and is therefore not safe to prescribe at this time.  Patient expressed confusion at first, stating that her baby is very healthy despite her having taken Wegovy in the first few months of pregnancy.  We discussed that while this is wonderful that her baby is doing well, 1 scenario does not ensure the safety of this medication for any future pregnancies.  Patient  expressed understanding, will defer starting weight loss medications for now.  Discussed that if patient is willing to utilize appropriate contraception, we can revisit weight loss medications.  Offered to discuss lifestyle adjustments in the meantime, patient declined stating that she is working on diet adjustments via meal plan.    Wt Readings from Last 5 Encounters:   03/21/24 109.8 kg (242 lb)   03/13/24 113 kg (249 lb 2 oz)   03/03/24 117.5 kg (259 lb)   02/26/24 122.9 kg (271 lb)   02/22/24 121.3 kg (267 lb 6 oz)       Anti-obesity medication history    Current:   none    Past/Failed/contraindicated:   Wegovy- was taking while pregnant and didn't know, was unsure if it was helpful for weight loss. Some nausea while taking, again is unsure if this was related to pregnancy.   Phentermine - previously trialed for 4 months, with no effect on weight.   Topiramate - previously trial for 4 months, with no effect on weight.   Victoza 1.2m dose- no side effects. Was somewhat helpful with hunger, but no effect on weight.        GERD symptoms: none     Constipation/Diarrhea: none     Recent diet changes: is currently on a meal plan for weight loss      Recent exercise/activity changes: busy around the house, as active as she can be given that she had a c section.     Vitamins/Labs: Gestational diabetes in third trimester pregnancy.  Urine drug screen negative.    Pregnancy: 21 days post partum. Is hoping to get pregnant again, is not willing to go back on birth control.     CURRENT WEIGHT:   242 lbs 0 oz    Initial Weight (lbs): 240 lbs  Last Visits Weight: 112.9 kg (249 lb)  Cumulative weight loss (lbs): -2  Weight Loss Percentage: -0.83%        3/20/2024    11:41 AM   Changes and Difficulties   I have made the following changes to my diet since my last visit: meal plans   With regards to my diet, I am still struggling with: pop intake   I have made the following changes to my activity/exercise since my last visit:  taking care of baby   With regards to my activity/exercise, I am still struggling with: pain in feet             MEDICATIONS:   Current Outpatient Medications   Medication Sig Dispense Refill    acetaminophen (TYLENOL) 325 MG tablet Take 3 tablets (975 mg) by mouth every 6 hours      albuterol (PROVENTIL) (2.5 MG/3ML) 0.083% neb solution Take 1 vial (2.5 mg) by nebulization every 4 hours as needed for shortness of breath, wheezing or cough (Patient not taking: Reported on 3/13/2024) 60 mL 0    amoxicillin-clavulanate (AUGMENTIN) 875-125 MG tablet Take 1 tablet by mouth 2 times daily 20 tablet 0    budesonide (PULMICORT) 1 MG/2ML neb solution Take 2 mLs (1 mg) by nebulization 2 times daily (Patient not taking: Reported on 3/13/2024) 2 mL 4    hydrOXYzine estuardo (VISTARIL) 25 MG capsule Take 2-3 capsules (50-75 mg) by mouth nightly as needed for other (insomnia) (Patient not taking: Reported on 3/13/2024) 30 capsule 0    ibuprofen (ADVIL/MOTRIN) 800 MG tablet Take 1 tablet (800 mg) by mouth every 8 hours as needed for moderate pain 90 tablet 0    naproxen (NAPROSYN) 500 MG tablet Take 1 tablet (500 mg) by mouth 2 times daily (with meals) 40 tablet 0    omeprazole (PRILOSEC OTC) 20 MG EC tablet Take 1 tablet (20 mg) by mouth daily 90 tablet 3    oxyCODONE (ROXICODONE) 5 MG tablet Take 1 tablet (5 mg) by mouth every 4 hours as needed (Patient not taking: Reported on 3/13/2024) 15 tablet 0    Prenatal Vit-Fe Fumarate-FA (PRENATAL MULTIVITAMIN W/IRON) 27-0.8 MG tablet Take 1 tablet by mouth daily 90 tablet 3    senna-docusate (SENOKOT-S/PERICOLACE) 8.6-50 MG tablet Take 1 tablet by mouth 2 times daily as needed for constipation 60 tablet 0           3/20/2024    11:41 AM   Weight Loss Medication History Reviewed With Patient   Which weight loss medications are you currently taking on a regular basis? None   If you are not taking a weight loss medication that was prescribed to you, please indicate why: Other   Are you having  "any side effects from the weight loss medication that we have prescribed you? No               1/30/2020     3:10 PM   ANTHONY Score (Last Two)   ANTHONY Raw Score 36   Activation Score 75.5   ANTHONY Level 4         PHYSICAL EXAM:  Objective    Ht 1.676 m (5' 5.98\")   Wt 109.8 kg (242 lb)   LMP  (LMP Unknown)   BMI 39.08 kg/m             Vitals:  No vitals were obtained today due to virtual visit.    GENERAL: alert and no distress  EYES: Eyes grossly normal to inspection.  No discharge or erythema, or obvious scleral/conjunctival abnormalities.  RESP: No audible wheeze, cough, or visible cyanosis.    SKIN: Visible skin clear. No significant rash, abnormal pigmentation or lesions.  NEURO: Cranial nerves grossly intact.  Mentation and speech appropriate for age.  PSYCH: Appropriate affect, tone, and pace of words        Sincerely,    Mounika Ness PA-C        " No

## 2024-03-21 NOTE — PATIENT INSTRUCTIONS
"Thank you for allowing us the privilege of caring for you. We hope we provided you with the excellent service you deserve.   Please let us know if there is anything else we can do for you so that we can be sure you are completely satisfied with your care experience.    To ensure the quality of our services you may be receiving a patient satisfaction survey from an independent patient satisfaction monitoring company.    The greatest compliment you can give is a \"Likely to Recommend\"    Your visit was with Mounika Ness PA-C today.    Instructions per today's visit:     Jovon Davidson, it was great to visit with you today.  Here is a review of our visit.  If our clinic scheduler is not able to reach you please call [phone] to schedule your next appointments.    Plan  Congratulations on your beautiful baby!   Briefly discussed weight loss medications, however these are not safe to prescribe without reliable and consistent use of contraception (condoms, IUD, birth control),.  If you decide that you are willing to use contraception in the future, we can consider restarting Wegovy or other weight loss medications in the future.  Follow up with Mounika or Marie Berry as needed   Dietician appointment as needed      Information about Video Visits with [facility]: video visit information  _________________________________________________________________________________________________________________________________________________________  If you are asked by your clinic team to have your blood pressure checked:  [facility] do offer several locations for blood pressure checks. Please follow the below link to schedule an appointment. Scheduling an appointment at the pharmacy for a blood pressure check is now preferred.    Appointment Plus " ([email or web address])  _________________________________________________________________________________________________________________________________________________________  Important contact and scheduling information:  Please call our contact center at [phone] to schedule your next appointments.  To find a lab location near you, please call [phone].  For any nursing questions or concerns call Lexi Brooke LPN at [phone] or Radha Wen RN at [phone]  Please call during clinic hours Monday through Friday 8:00a - 4:00p if you have questions or you can contact us via [employer] at anytime and we will reply during clinic hours.    Lab results will be communicated through My Chart or letter (if My Chart not used). Please call the clinic if you have not received communication after 1 week or if you have any questions.?  Clinic Fax: [phone]    _________________________________________________________________________________________________________________________________________________________  Meal Replacement Products:    Here is the link to our new e-store where you can purchase our meal replacement products    [facility] E-Store  [email or web address]    The one week starter kit is a great way to sample a variety of products and see what works for you.    If you want more information about the product go to: [employer]als  [email or web address]    If you are an employee or [facility] or [facility] please contact your care team for a 10% estore discount    Free Shipping for orders over $75     Benefits of meal replacements products:    Portion and calorie control  Improved nutrition  Structured eating  Simplified food choices  Avoid contact with trigger foods  _________________________________________________________________________________________________________________________________________________________  Interested in working with a health  ?  Health coaches work with you to improve your overall health and wellbeing.  They look at the whole person, and may involve discussion of different areas of life, including, but not limited to the four pillars of health (sleep, exercise, nutrition, and stress management). Discuss with your care team if you would like to start working a health .  Health Coaching-3 Pack: Schedule by calling [phone]    $99 for three health coaching visits    Visits may be done in person or via phone    Coaching is a partnership between the  and the client; Coaches do not prescribe or diagnose    Coaching helps inspire the client to reach his/her personal goals   _________________________________________________________________________________________________________________________________________________________  24 Week Healthy Lifestyle Plan:    Our mission in the 24-week Healthy Lifestyle Plan is to provide you with individualized care by giving you the tools, education and support you need to lose weight and maintain a healthy lifestyle. In your 24-week journey, you ll be supported by a dedicated weight loss team that includes registered dietitians, medical weight management providers, health coaches, and nurses -- all with special expertise in weight loss -- to help you every step of the way.     Monthly meetings with your registered dietician or medical weight management provider help to review your progress, update your care plan, and make any adjustments needed to ensure success. Between these visits, weekly and bi-weekly health  visits will help you focus on the four pillars of weight loss -- stress, sleep, nutrition, and exercise -- and how you can best adapt each to achieve sustainable weight loss results.    In addition, you will be given exclusive access to online wellbeing classes through [employer].  Your initial visit will be with a medical weight management provider who will help to  understand your weight loss goals and ensure this program is the right fit for you. Please let our team know if you are interested in the 24 week plan by sending a message to your care team or calling [phone] to schedule.  _________________________________________________________________________________________________________________________________________________________  __________  [facility] Get Moving Program  Our team of physical therapists is trained to help you understand and take control of your condition. They will perform a thorough evaluation to determine your ability for activity and develop a customized plan to fit your goals and physical ability.  Scheduling: Unsure if the Get Moving program is right for you? Discuss the program with your medical provider or diabetes educator. You can also call us at [phone] to ask questions or schedule an appointment.   [facility] Get Moving Program  ____________________________________________________________________________________________________________________________________________________________________________  [facility] Diabetes Prevention Program (DPP)  If you have prediabetes and Medicare please contact us via [email or web address] to learn more about the Diabetes Prevention Program (DPP)  Program Details:   [facility] offers the year-long Diabetes Prevention Program (DPP). The program helps you to make lifestyle changes that prevent or delay type 2 diabetes by supporting healthy eating, increased physical activity, stress reduction and use of coping skills.   On average, previous [facility] DPP cohorts have lost and maintained at least 5% of their starting weight throughout the program and averaged more than 150 minutes of physical activity per week.  Participants meet weekly for one-hour group sessions over sixteen weeks, every other week for the next 8 weeks, and monthly for the last six months.   A year-long  maintenance program is also available for participants who complete the first year.   Location & Cost:   During the COVID-19 Public Health Emergency, the program is offered virtually. When in-person classes can resume, they will be held at [facility].  For people with Medicare, the program is covered in full. A self-pay option will also be available for those with non-Medicare insurance plans.   ______________________________________________________________________________________________________________________________________________________________________________________________________________________________    To work with a Behavioral Health Psychologist:    Call to schedule:    Jose Martin Sullivan - [phone]  Amie Nance - [phone]  Thea Garcia - [phone]  Sandra Terrazas - [phone]   Mariana Espinal PhD (cannot accept Medicare) [phone]        Thank you,   [facility] Comprehensive Weight Management Team

## 2024-03-21 NOTE — NURSING NOTE
Is the patient currently in the state of [place]? YES    Visit mode:VIDEO    If the visit is dropped, the patient can be reconnected by: VIDEO VISIT: Text to cell phone:   Telephone Information:   Mobile [phone]       Will anyone else be joining the visit? NO  (If patient encounters technical issues they should call [phone]    How would you like to obtain your AVS? MyChart    Are changes needed to the allergy or medication list? No    Reason for visit: RECHMARINA LASSITER

## 2024-03-21 NOTE — LETTER
3/21/2024       RE: Ayanna Davidson  [id number] [facility]  [place] [address]     Dear Colleague,    Thank you for referring your patient, Ayanna Davidson, to the [facility] [place] [facility]. Please see a copy of my visit note below.      Return Medical Weight Management Note     Ayanna Davidson  MRN:  [id number]  :  1990  JEFFREY:  3/21/2024    Dear Obed Santiago MD,    I had the pleasure of seeing your patient Ayanna Davidson. She is a 33 year old female who I am continuing to see for treatment of obesity related to:        3/13/2023    12:17 AM   --   I have the following health issues associated with obesity Infertility    GERD (Reflux)    Stress Incontinence       Assessment & Plan  Problem List Items Addressed This Visit       Class 2 severe obesity with serious comorbidity and body mass index (BMI) of 39.0 to 39.9 in adult, unspecified obesity type (H) - Primary    Diet controlled gestational diabetes mellitus (GDM) in third trimester    S/P       Plan  Congratulations on your beautiful baby!   Briefly discussed weight loss medications, however Ayanna is not willing to use contraception for the time being (condoms, IUD, birth control), so we discussed that it would not be safe to start  weight loss medication at this time. Assured Ayanna that if she changes her mind and is willing to use contraception we can consider restarting wegovy or other weight loss meds.   Follow up with Mounika or Marie Berry as needed   Dietician appointment as needed      INTERVAL HISTORY:  New re-establish with Marie Berry, 3/15/23  S/p lapband in  by Dr. Perdue in [place]. No complications. Lapband removed 2021 by Dr. Escobar in [place]. Removed due to band slipping and uncontrollable GERD.     Weight prior to lapband - 265lb   Oren - 137lbs   Weight today - 240lbs      Weight was stable around 180-200lbs with the lapband.  After removal weight gain has been gradual. Has not been able to lose substancial weight since the band was removed. Wants to lose weight now to help with overall health. Is inverested in surgical options in the future. Will continue with MWM at this time.      Currently taking Victoza 1.2mg, and is helpful with hunger during the day. Struggles the most with increased hunger and snacking throughout the night, especially when she is up till 4am studying most nights.      Discussed AOMs to help with weight loss:   - Phentermine - previously trialed for 4 months, with no effect on weight.   - Topiramate - previously trial for 4 months, with no effect on weight.   - Naltrexone - can consider in the future.   - GLP-1 - is currently on Victoza 1.2m dose, tolerating well with no side effects. Has been somewhat helpful with hunger, but no effect on weight. Will transition to Wegovy 0.5mg once weekly. Discussed side effects, risks, and benefits. Will monitor GERD symptoms. Transition to Wegovy. Ozempic was denied by insurance. Consider Saxenda.     Follow up visit with Marie Berry, last seen 5/25/23: Was doing well on wegovy, was unsure if she's seen successful weight loss. Discussed bariatric surgery, patient was concerned about permanent nicotine cessation requirements.     Since last visit:   Became pregnant, stopped wegovy approximately 4 months into pregnancy.   Delivered baby 3/1/24,  named Caleb, both mom and baby are doing well.   Diet controlled gestational diabetes in 3rd semester   Doing well since pregnancy, no longer breast feeding.     Hoping to restart wegovy today, however is not willing at this time to commit to using contraception or starting any form of birth control while taking Wegovy.  Confirms that she is sexually active with her male partner.    We discussed that Wegovy is not established to be safe in pregnancy due to lack of clinical testing, and is therefore not safe to prescribe at this time.   Patient expressed confusion at first, stating that her baby is very healthy despite her having taken Wegovy in the first few months of pregnancy.  We discussed that while this is wonderful that her baby is doing well, 1 scenario does not ensure the safety of this medication for any future pregnancies.  Patient expressed understanding, will defer starting weight loss medications for now.  Discussed that if patient is willing to utilize appropriate contraception, we can revisit weight loss medications.  Offered to discuss lifestyle adjustments in the meantime, patient declined stating that she is working on diet adjustments via meal plan.    Wt Readings from Last 5 Encounters:   03/21/24 109.8 kg (242 lb)   03/13/24 113 kg (249 lb 2 oz)   03/03/24 117.5 kg (259 lb)   02/26/24 122.9 kg (271 lb)   02/22/24 121.3 kg (267 lb 6 oz)       Anti-obesity medication history    Current:   none    Past/Failed/contraindicated:   Wegovy- was taking while pregnant and didn't know, was unsure if it was helpful for weight loss. Some nausea while taking, again is unsure if this was related to pregnancy.   Phentermine - previously trialed for 4 months, with no effect on weight.   Topiramate - previously trial for 4 months, with no effect on weight.   Victoza 1.2m dose- no side effects. Was somewhat helpful with hunger, but no effect on weight.        GERD symptoms: none     Constipation/Diarrhea: none     Recent diet changes: is currently on a meal plan for weight loss      Recent exercise/activity changes: busy around the house, as active as she can be given that she had a c section.     Vitamins/Labs: Gestational diabetes in third trimester pregnancy.  Urine drug screen negative.    Pregnancy: 21 days post partum. Is hoping to get pregnant again, is not willing to go back on birth control.     CURRENT WEIGHT:   242 lbs 0 oz    Initial Weight (lbs): 240 lbs  Last Visits Weight: 112.9 kg (249 lb)  Cumulative weight loss (lbs):  -2  Weight Loss Percentage: -0.83%        3/20/2024    11:41 AM   Changes and Difficulties   I have made the following changes to my diet since my last visit: meal plans   With regards to my diet, I am still struggling with: pop intake   I have made the following changes to my activity/exercise since my last visit: taking care of baby   With regards to my activity/exercise, I am still struggling with: pain in feet             MEDICATIONS:   Current Outpatient Medications   Medication Sig Dispense Refill    acetaminophen (TYLENOL) 325 MG tablet Take 3 tablets (975 mg) by mouth every 6 hours      albuterol (PROVENTIL) (2.5 MG/3ML) 0.083% neb solution Take 1 vial (2.5 mg) by nebulization every 4 hours as needed for shortness of breath, wheezing or cough (Patient not taking: Reported on 3/13/2024) 60 mL 0    amoxicillin-clavulanate (AUGMENTIN) 875-125 MG tablet Take 1 tablet by mouth 2 times daily 20 tablet 0    budesonide (PULMICORT) 1 MG/2ML neb solution Take 2 mLs (1 mg) by nebulization 2 times daily (Patient not taking: Reported on 3/13/2024) 2 mL 4    hydrOXYzine estuardo (VISTARIL) 25 MG capsule Take 2-3 capsules (50-75 mg) by mouth nightly as needed for other (insomnia) (Patient not taking: Reported on 3/13/2024) 30 capsule 0    ibuprofen (ADVIL/MOTRIN) 800 MG tablet Take 1 tablet (800 mg) by mouth every 8 hours as needed for moderate pain 90 tablet 0    naproxen (NAPROSYN) 500 MG tablet Take 1 tablet (500 mg) by mouth 2 times daily (with meals) 40 tablet 0    omeprazole (PRILOSEC OTC) 20 MG EC tablet Take 1 tablet (20 mg) by mouth daily 90 tablet 3    oxyCODONE (ROXICODONE) 5 MG tablet Take 1 tablet (5 mg) by mouth every 4 hours as needed (Patient not taking: Reported on 3/13/2024) 15 tablet 0    Prenatal Vit-Fe Fumarate-FA (PRENATAL MULTIVITAMIN W/IRON) 27-0.8 MG tablet Take 1 tablet by mouth daily 90 tablet 3    senna-docusate (SENOKOT-S/PERICOLACE) 8.6-50 MG tablet Take 1 tablet by mouth 2 times daily as needed  "for constipation 60 tablet 0           3/20/2024    11:41 AM   Weight Loss Medication History Reviewed With Patient   Which weight loss medications are you currently taking on a regular basis? None   If you are not taking a weight loss medication that was prescribed to you, please indicate why: Other   Are you having any side effects from the weight loss medication that we have prescribed you? No               1/30/2020     3:10 PM   ANTHONY Score (Last Two)   ANTHONY Raw Score 36   Activation Score 75.5   ANTHONY Level 4         PHYSICAL EXAM:  Objective   Ht 1.676 m (5' 5.98\")   Wt 109.8 kg (242 lb)   LMP  (LMP Unknown)   BMI 39.08 kg/m             Vitals:  No vitals were obtained today due to virtual visit.    GENERAL: alert and no distress  EYES: Eyes grossly normal to inspection.  No discharge or erythema, or obvious scleral/conjunctival abnormalities.  RESP: No audible wheeze, cough, or visible cyanosis.    SKIN: Visible skin clear. No significant rash, abnormal pigmentation or lesions.  NEURO: Cranial nerves grossly intact.  Mentation and speech appropriate for age.  PSYCH: Appropriate affect, tone, and pace of words        Sincerely,    Mounika Ness PA-C          "

## 2024-04-02 ENCOUNTER — MEDICAL CORRESPONDENCE (OUTPATIENT)
Dept: HEALTH INFORMATION MANAGEMENT | Facility: CLINIC | Age: 34
End: 2024-04-02
Payer: COMMERCIAL

## 2024-05-08 ENCOUNTER — MEDICAL CORRESPONDENCE (OUTPATIENT)
Dept: HEALTH INFORMATION MANAGEMENT | Facility: CLINIC | Age: 34
End: 2024-05-08

## 2024-05-08 ENCOUNTER — OFFICE VISIT (OUTPATIENT)
Dept: FAMILY MEDICINE | Facility: CLINIC | Age: 34
End: 2024-05-08
Payer: COMMERCIAL

## 2024-05-08 ENCOUNTER — ANESTHESIA (OUTPATIENT)
Dept: SURGERY | Facility: CLINIC | Age: 34
End: 2024-05-08
Payer: COMMERCIAL

## 2024-05-08 ENCOUNTER — HOSPITAL ENCOUNTER (OUTPATIENT)
Facility: CLINIC | Age: 34
Discharge: HOME | End: 2024-05-08
Attending: OBSTETRICS & GYNECOLOGY | Admitting: OBSTETRICS & GYNECOLOGY
Payer: COMMERCIAL

## 2024-05-08 ENCOUNTER — PRENATAL OFFICE VISIT (OUTPATIENT)
Dept: FAMILY MEDICINE | Facility: CLINIC | Age: 34
End: 2024-05-08
Payer: COMMERCIAL

## 2024-05-08 ENCOUNTER — ANESTHESIA EVENT (OUTPATIENT)
Dept: SURGERY | Facility: CLINIC | Age: 34
End: 2024-05-08
Payer: COMMERCIAL

## 2024-05-08 VITALS
OXYGEN SATURATION: 99 % | TEMPERATURE: 97.4 F | HEART RATE: 75 BPM | BODY MASS INDEX: 39.44 KG/M2 | RESPIRATION RATE: 18 BRPM | DIASTOLIC BLOOD PRESSURE: 80 MMHG | SYSTOLIC BLOOD PRESSURE: 100 MMHG | WEIGHT: 245.4 LBS | HEIGHT: 66 IN

## 2024-05-08 VITALS
SYSTOLIC BLOOD PRESSURE: 120 MMHG | WEIGHT: 245 LBS | TEMPERATURE: 97.6 F | DIASTOLIC BLOOD PRESSURE: 80 MMHG | HEIGHT: 66 IN | HEART RATE: 75 BPM | OXYGEN SATURATION: 99 % | RESPIRATION RATE: 18 BRPM | BODY MASS INDEX: 39.37 KG/M2

## 2024-05-08 VITALS
WEIGHT: 245 LBS | DIASTOLIC BLOOD PRESSURE: 78 MMHG | RESPIRATION RATE: 16 BRPM | BODY MASS INDEX: 39.54 KG/M2 | SYSTOLIC BLOOD PRESSURE: 109 MMHG | HEART RATE: 68 BPM | TEMPERATURE: 98.3 F | OXYGEN SATURATION: 96 %

## 2024-05-08 VITALS — SYSTOLIC BLOOD PRESSURE: 120 MMHG | DIASTOLIC BLOOD PRESSURE: 80 MMHG

## 2024-05-08 DIAGNOSIS — Z87.898 HISTORY OF SUBSTANCE USE: ICD-10-CM

## 2024-05-08 DIAGNOSIS — Z01.818 PREOP EXAMINATION: Primary | ICD-10-CM

## 2024-05-08 DIAGNOSIS — F17.200 CURRENT EVERY DAY SMOKER: ICD-10-CM

## 2024-05-08 DIAGNOSIS — N75.1 BARTHOLIN'S GLAND ABSCESS: Primary | ICD-10-CM

## 2024-05-08 DIAGNOSIS — N75.0 BARTHOLIN CYST: ICD-10-CM

## 2024-05-08 LAB
BASOPHILS # BLD AUTO: 0 10E3/UL (ref 0–0.2)
BASOPHILS NFR BLD AUTO: 0 %
EOSINOPHIL # BLD AUTO: 0.1 10E3/UL (ref 0–0.7)
EOSINOPHIL NFR BLD AUTO: 1 %
ERYTHROCYTE [DISTWIDTH] IN BLOOD BY AUTOMATED COUNT: 13.9 % (ref 10–15)
HCG UR QL: NEGATIVE
HCT VFR BLD AUTO: 41.9 % (ref 35–47)
HGB BLD-MCNC: 14.1 G/DL (ref 11.7–15.7)
IMM GRANULOCYTES # BLD: 0 10E3/UL
IMM GRANULOCYTES NFR BLD: 0 %
LYMPHOCYTES # BLD AUTO: 1.2 10E3/UL (ref 0.8–5.3)
LYMPHOCYTES NFR BLD AUTO: 12 %
MCH RBC QN AUTO: 30.3 PG (ref 26.5–33)
MCHC RBC AUTO-ENTMCNC: 33.7 G/DL (ref 31.5–36.5)
MCV RBC AUTO: 90 FL (ref 78–100)
MONOCYTES # BLD AUTO: 0.4 10E3/UL (ref 0–1.3)
MONOCYTES NFR BLD AUTO: 4 %
NEUTROPHILS # BLD AUTO: 8.8 10E3/UL (ref 1.6–8.3)
NEUTROPHILS NFR BLD AUTO: 83 %
NRBC # BLD AUTO: 0 10E3/UL
NRBC BLD AUTO-RTO: 0 /100
PLATELET # BLD AUTO: 265 10E3/UL (ref 150–450)
RBC # BLD AUTO: 4.65 10E6/UL (ref 3.8–5.2)
WBC # BLD AUTO: 10.6 10E3/UL (ref 4–11)

## 2024-05-08 PROCEDURE — 250N000009 HC RX 250: Performed by: OBSTETRICS & GYNECOLOGY

## 2024-05-08 PROCEDURE — 710N000012 HC RECOVERY PHASE 2, PER MINUTE: Performed by: OBSTETRICS & GYNECOLOGY

## 2024-05-08 PROCEDURE — 87070 CULTURE OTHR SPECIMN AEROBIC: CPT | Performed by: OBSTETRICS & GYNECOLOGY

## 2024-05-08 PROCEDURE — 258N000003 HC RX IP 258 OP 636: Performed by: NURSE ANESTHETIST, CERTIFIED REGISTERED

## 2024-05-08 PROCEDURE — 56440 MRSPLZATN BRTHLNS GLND CST: CPT | Performed by: OBSTETRICS & GYNECOLOGY

## 2024-05-08 PROCEDURE — 272N000001 HC OR GENERAL SUPPLY STERILE: Performed by: OBSTETRICS & GYNECOLOGY

## 2024-05-08 PROCEDURE — 250N000011 HC RX IP 250 OP 636: Performed by: NURSE ANESTHETIST, CERTIFIED REGISTERED

## 2024-05-08 PROCEDURE — 360N000075 HC SURGERY LEVEL 2, PER MIN: Performed by: OBSTETRICS & GYNECOLOGY

## 2024-05-08 PROCEDURE — 88304 TISSUE EXAM BY PATHOLOGIST: CPT | Mod: TC | Performed by: OBSTETRICS & GYNECOLOGY

## 2024-05-08 PROCEDURE — 88304 TISSUE EXAM BY PATHOLOGIST: CPT | Mod: 26 | Performed by: PATHOLOGY

## 2024-05-08 PROCEDURE — 999N000141 HC STATISTIC PRE-PROCEDURE NURSING ASSESSMENT: Performed by: OBSTETRICS & GYNECOLOGY

## 2024-05-08 PROCEDURE — 88305 TISSUE EXAM BY PATHOLOGIST: CPT | Mod: 26 | Performed by: PATHOLOGY

## 2024-05-08 PROCEDURE — 96372 THER/PROPH/DIAG INJ SC/IM: CPT | Performed by: FAMILY MEDICINE

## 2024-05-08 PROCEDURE — 81025 URINE PREGNANCY TEST: CPT | Performed by: OBSTETRICS & GYNECOLOGY

## 2024-05-08 PROCEDURE — 370N000017 HC ANESTHESIA TECHNICAL FEE, PER MIN: Performed by: OBSTETRICS & GYNECOLOGY

## 2024-05-08 PROCEDURE — 250N000011 HC RX IP 250 OP 636: Performed by: OBSTETRICS & GYNECOLOGY

## 2024-05-08 PROCEDURE — 85049 AUTOMATED PLATELET COUNT: CPT | Performed by: OBSTETRICS & GYNECOLOGY

## 2024-05-08 PROCEDURE — 99214 OFFICE O/P EST MOD 30 MIN: CPT | Mod: 25 | Performed by: FAMILY MEDICINE

## 2024-05-08 PROCEDURE — 87075 CULTR BACTERIA EXCEPT BLOOD: CPT | Performed by: OBSTETRICS & GYNECOLOGY

## 2024-05-08 PROCEDURE — 250N000009 HC RX 250: Performed by: NURSE ANESTHETIST, CERTIFIED REGISTERED

## 2024-05-08 RX ORDER — DEXAMETHASONE SODIUM PHOSPHATE 10 MG/ML
4 INJECTION, SOLUTION INTRAMUSCULAR; INTRAVENOUS
Status: CANCELLED | OUTPATIENT
Start: 2024-05-08

## 2024-05-08 RX ORDER — SODIUM CHLORIDE, SODIUM LACTATE, POTASSIUM CHLORIDE, CALCIUM CHLORIDE 600; 310; 30; 20 MG/100ML; MG/100ML; MG/100ML; MG/100ML
INJECTION, SOLUTION INTRAVENOUS CONTINUOUS
Status: DISCONTINUED | OUTPATIENT
Start: 2024-05-08 | End: 2024-05-08 | Stop reason: HOSPADM

## 2024-05-08 RX ORDER — NALOXONE HYDROCHLORIDE 0.4 MG/ML
0.1 INJECTION, SOLUTION INTRAMUSCULAR; INTRAVENOUS; SUBCUTANEOUS
Status: CANCELLED | OUTPATIENT
Start: 2024-05-08

## 2024-05-08 RX ORDER — MEDROXYPROGESTERONE ACETATE 150 MG/ML
150 INJECTION, SUSPENSION INTRAMUSCULAR
Status: DISCONTINUED | OUTPATIENT
Start: 2024-05-08 | End: 2024-05-08

## 2024-05-08 RX ORDER — SULFAMETHOXAZOLE/TRIMETHOPRIM 800-160 MG
1 TABLET ORAL 2 TIMES DAILY
Qty: 14 TABLET | Refills: 0 | Status: SHIPPED | OUTPATIENT
Start: 2024-05-08 | End: 2024-07-25

## 2024-05-08 RX ORDER — BUPIVACAINE HYDROCHLORIDE AND EPINEPHRINE 2.5; 5 MG/ML; UG/ML
INJECTION, SOLUTION INFILTRATION; PERINEURAL PRN
Status: DISCONTINUED | OUTPATIENT
Start: 2024-05-08 | End: 2024-05-08 | Stop reason: HOSPADM

## 2024-05-08 RX ORDER — ONDANSETRON 4 MG/1
4 TABLET, ORALLY DISINTEGRATING ORAL EVERY 30 MIN PRN
Status: CANCELLED | OUTPATIENT
Start: 2024-05-08

## 2024-05-08 RX ORDER — CEFAZOLIN SODIUM/WATER 2 G/20 ML
2 SYRINGE (ML) INTRAVENOUS SEE ADMIN INSTRUCTIONS
Status: DISCONTINUED | OUTPATIENT
Start: 2024-05-08 | End: 2024-05-08 | Stop reason: HOSPADM

## 2024-05-08 RX ORDER — CEPHALEXIN 500 MG/1
500 CAPSULE ORAL 3 TIMES DAILY
Qty: 30 CAPSULE | Refills: 0 | Status: ON HOLD | OUTPATIENT
Start: 2024-05-08 | End: 2024-05-08 | Stop reason: ALTCHOICE

## 2024-05-08 RX ORDER — ONDANSETRON 2 MG/ML
INJECTION INTRAMUSCULAR; INTRAVENOUS PRN
Status: DISCONTINUED | OUTPATIENT
Start: 2024-05-08 | End: 2024-05-08

## 2024-05-08 RX ORDER — LIDOCAINE HYDROCHLORIDE 20 MG/ML
INJECTION, SOLUTION INFILTRATION; PERINEURAL PRN
Status: DISCONTINUED | OUTPATIENT
Start: 2024-05-08 | End: 2024-05-08

## 2024-05-08 RX ORDER — OXYCODONE HYDROCHLORIDE 5 MG/1
5 TABLET ORAL
Status: CANCELLED | OUTPATIENT
Start: 2024-05-08

## 2024-05-08 RX ORDER — FENTANYL CITRATE 50 UG/ML
INJECTION, SOLUTION INTRAMUSCULAR; INTRAVENOUS PRN
Status: DISCONTINUED | OUTPATIENT
Start: 2024-05-08 | End: 2024-05-08

## 2024-05-08 RX ORDER — OXYCODONE HYDROCHLORIDE 5 MG/1
5 TABLET ORAL EVERY 6 HOURS PRN
Qty: 6 TABLET | Refills: 0 | Status: SHIPPED | OUTPATIENT
Start: 2024-05-08 | End: 2024-05-11

## 2024-05-08 RX ORDER — PROPOFOL 10 MG/ML
INJECTION, EMULSION INTRAVENOUS PRN
Status: DISCONTINUED | OUTPATIENT
Start: 2024-05-08 | End: 2024-05-08

## 2024-05-08 RX ORDER — ONDANSETRON 2 MG/ML
4 INJECTION INTRAMUSCULAR; INTRAVENOUS EVERY 30 MIN PRN
Status: CANCELLED | OUTPATIENT
Start: 2024-05-08

## 2024-05-08 RX ORDER — OXYCODONE HYDROCHLORIDE 5 MG/1
10 TABLET ORAL
Status: CANCELLED | OUTPATIENT
Start: 2024-05-08

## 2024-05-08 RX ORDER — SULFAMETHOXAZOLE/TRIMETHOPRIM 800-160 MG
1 TABLET ORAL 2 TIMES DAILY
Qty: 14 TABLET | Refills: 0 | Status: SHIPPED | OUTPATIENT
Start: 2024-05-08 | End: 2024-05-08

## 2024-05-08 RX ORDER — CEFAZOLIN SODIUM/WATER 2 G/20 ML
2 SYRINGE (ML) INTRAVENOUS
Status: COMPLETED | OUTPATIENT
Start: 2024-05-08 | End: 2024-05-08

## 2024-05-08 RX ORDER — DEXAMETHASONE SODIUM PHOSPHATE 4 MG/ML
INJECTION, SOLUTION INTRA-ARTICULAR; INTRALESIONAL; INTRAMUSCULAR; INTRAVENOUS; SOFT TISSUE PRN
Status: DISCONTINUED | OUTPATIENT
Start: 2024-05-08 | End: 2024-05-08

## 2024-05-08 RX ORDER — KETOROLAC TROMETHAMINE 30 MG/ML
INJECTION, SOLUTION INTRAMUSCULAR; INTRAVENOUS PRN
Status: DISCONTINUED | OUTPATIENT
Start: 2024-05-08 | End: 2024-05-08

## 2024-05-08 RX ORDER — PROPOFOL 10 MG/ML
INJECTION, EMULSION INTRAVENOUS CONTINUOUS PRN
Status: DISCONTINUED | OUTPATIENT
Start: 2024-05-08 | End: 2024-05-08

## 2024-05-08 RX ADMIN — KETOROLAC TROMETHAMINE 30 MG: 30 INJECTION, SOLUTION INTRAMUSCULAR at 15:50

## 2024-05-08 RX ADMIN — FENTANYL CITRATE 50 MCG: 50 INJECTION INTRAMUSCULAR; INTRAVENOUS at 15:12

## 2024-05-08 RX ADMIN — FENTANYL CITRATE 50 MCG: 50 INJECTION INTRAMUSCULAR; INTRAVENOUS at 15:15

## 2024-05-08 RX ADMIN — LIDOCAINE HYDROCHLORIDE 50 MG: 20 INJECTION, SOLUTION INFILTRATION; PERINEURAL at 15:15

## 2024-05-08 RX ADMIN — PROPOFOL 20 MG: 10 INJECTION, EMULSION INTRAVENOUS at 15:28

## 2024-05-08 RX ADMIN — MIDAZOLAM 2 MG: 1 INJECTION INTRAMUSCULAR; INTRAVENOUS at 15:08

## 2024-05-08 RX ADMIN — SODIUM CHLORIDE, POTASSIUM CHLORIDE, SODIUM LACTATE AND CALCIUM CHLORIDE: 600; 310; 30; 20 INJECTION, SOLUTION INTRAVENOUS at 15:04

## 2024-05-08 RX ADMIN — PROPOFOL 20 MG: 10 INJECTION, EMULSION INTRAVENOUS at 15:26

## 2024-05-08 RX ADMIN — MEDROXYPROGESTERONE ACETATE 150 MG: 150 INJECTION, SUSPENSION INTRAMUSCULAR at 10:31

## 2024-05-08 RX ADMIN — PROPOFOL 150 MCG/KG/MIN: 10 INJECTION, EMULSION INTRAVENOUS at 15:15

## 2024-05-08 RX ADMIN — FAMOTIDINE 20 MG: 10 INJECTION, SOLUTION INTRAVENOUS at 15:15

## 2024-05-08 RX ADMIN — Medication 2 G: at 15:00

## 2024-05-08 RX ADMIN — ONDANSETRON 4 MG: 2 INJECTION INTRAMUSCULAR; INTRAVENOUS at 15:15

## 2024-05-08 RX ADMIN — PROPOFOL 100 MG: 10 INJECTION, EMULSION INTRAVENOUS at 15:15

## 2024-05-08 RX ADMIN — DEXAMETHASONE SODIUM PHOSPHATE 4 MG: 4 INJECTION, SOLUTION INTRA-ARTICULAR; INTRALESIONAL; INTRAMUSCULAR; INTRAVENOUS; SOFT TISSUE at 15:21

## 2024-05-08 ASSESSMENT — PAIN SCALES - GENERAL
PAINLEVEL: EXTREME PAIN (8)
PAINLEVEL: EXTREME PAIN (9)

## 2024-05-08 ASSESSMENT — ACTIVITIES OF DAILY LIVING (ADL)
ADLS_ACUITY_SCORE: 35

## 2024-05-08 ASSESSMENT — PATIENT HEALTH QUESTIONNAIRE - PHQ9: SUM OF ALL RESPONSES TO PHQ QUESTIONS 1-9: 4

## 2024-05-08 ASSESSMENT — LIFESTYLE VARIABLES: TOBACCO_USE: 1

## 2024-05-08 NOTE — PROGRESS NOTES
"Ayanna is here for a 6-week postpartum checkup.    She had a  of a viable boy, weight 9 pounds 0 oz., with no complications. Date of delivery was 2024. Since delivery, she has not been breast feeding.  She has No signs of infection, bleeding or other complications.  She is not pregnant.  We discussed contraceptions and she has chosen none.      Post partum tubal: No  History of Gestational Diabetes? No  Type of Delivery:    Feeding Method:  Formula  If initiated breast feeding and stopped, how long did you breast feed?:  did not breast feed    REVIEW OF SYSTEMS:      Contraception Plan: depoprovera    Medical History Reviewed no  Family History Reviewed no  Problem List Updated no    EXAM:  /80   Pulse 75   Temp 97.4  F (36.3  C) (Temporal)   Resp 18   Ht 1.676 m (5' 6\")   Wt 111.3 kg (245 lb 6.4 oz)   LMP  (LMP Unknown)   SpO2 99%   Breastfeeding No   BMI 39.61 kg/m    HEENT: grossly normal.  NECK: no lymphadenopathy or thyroidomegaly.  LUNGS: CTA X 2, no rales or crackles.  BACK: No spinal or CVA tenderness.  HEART: RRR without murmurs clicks or gallops.  ABDOMEN: soft, non tender, good bowel sounds, without masses rebound, guarding or tenderness.    Adnexa: non tender, without masses.    EXTREMITIES:  warm to touch, good pulses, no ankle edema or calf tenderness.  NEUROLOGIC: grossly normal.    ASSESSMENT:   6-week postpartum exam after CS.    PLAN:    Depo  No depression  Stay sober, feels supported  rh    "

## 2024-05-08 NOTE — ANESTHESIA PREPROCEDURE EVALUATION
Anesthesia Pre-Procedure Evaluation    Patient: Ayanna Davidson   MRN: [id number] : 1990        Procedure : Procedure(s):  MARSUPIALIZATION, CYST, BARTHOLIN'S GLAND          Past Medical History:   Diagnosis Date    Cervical high risk HPV (human papillomavirus) test positive 2019    last PAP NIL (), remote hx of LEEP    Gastroesophageal reflux disease     HSV (herpes simplex virus) infection     1 & 2    Methamphetamine abuse (H)     reports daily use    recurrent Bartholin's cyst       Past Surgical History:   Procedure Laterality Date    BIOPSY       SECTION N/A 3/1/2024    Procedure: PRIMARY  SECTION;  Surgeon: Inderjit Olivas MD;  Location: [facility]    CHOLECYSTECTOMY      ENT SURGERY      ESOPHAGOSCOPY, GASTROSCOPY, DUODENOSCOPY (EGD), COMBINED N/A 2023    Procedure: Esophagoscopy, gastroscopy, duodenoscopy (EGD), combined;  Surgeon: Thierno Escobar MD;  Location: [facility]    EXAM UNDER ANESTHESIA PELVIC N/A 2020    Procedure: EXAM UNDER ANESTHESIA, PELVIS, PAP;  Surgeon: Froylan Schwarz MD;  Location: [facility]    EXCISE VULVA WIDE LOCAL Bilateral 2020    Procedure: Right Vulva Wedge Resection and Incision and Drainage Left Vulva;  Surgeon: Froylan Schwarz MD;  Location: [facility]    INCISION AND DRAINAGE ABSCESS PELVIS, COMBINED N/A 2018    Procedure: COMBINED INCISION AND DRAINAGE ABSCESS PELVIS;  INCISION AND DRAINAGE LABIAL ABSCESS;  Surgeon: Jase Beach MD;  Location: [facility]    INCISION AND DRAINAGE ABSCESS PELVIS, COMBINED N/A 2019    Procedure: Incision and Drainage Right Labial Abscess;  Surgeon: Jase Beach MD;  Location: [facility]    INCISION AND DRAINAGE LOWER EXTREMITY, COMBINED Right 2019    Procedure: irrigation and debridement right leg dog bite;  Surgeon: Rommel Pérez MD;  Location: [facility]    LAP ADJUSTABLE GASTRIC BAND      LEEP TX, CERVICAL      MAMMOPLASTY REDUCTION BILATERAL      MARSUPIALIZATION  BARTHOLIN CYST N/A 04/29/2019    Procedure: Bartholin's Cyst removal;  Surgeon: Froylan Schwarz MD;  Location: [facility]    MARSUPIALIZATION BARTHOLIN CYST Left 01/29/2020    Procedure: Excision of left bartholin's abscess;  Surgeon: Froylan Schwarz MD;  Location: [facility] OR    ORTHOPEDIC SURGERY        Allergies   Allergen Reactions    No Known Allergies       Social History     Tobacco Use    Smoking status: Some Days     Current packs/day: 0.50     Average packs/day: 0.5 packs/day for 10.0 years (5.0 ttl pk-yrs)     Types: Cigarettes    Smokeless tobacco: Current    Tobacco comments:     uses E cig with zero nicotine    Substance Use Topics    Alcohol use: Not Currently     Comment: Reports last use 9/17 and denies use during pregnancy      Wt Readings from Last 1 Encounters:   05/08/24 111.1 kg (245 lb)        Anesthesia Evaluation            ROS/MED HX  ENT/Pulmonary:     (+)                tobacco use, Current use,     asthma                  Neurologic:  - neg neurologic ROS     Cardiovascular:     (+) Dyslipidemia - -   -  - -                                      METS/Exercise Tolerance:     Hematologic:  - neg hematologic  ROS     Musculoskeletal:  - neg musculoskeletal ROS     GI/Hepatic:     (+) GERD,                   Renal/Genitourinary:  - neg Renal ROS     Endo:  - neg endo ROS   (+)               Obesity,       Psychiatric/Substance Use:     (+) psychiatric history anxiety and depression   Recreational drug usage: Meth.    Infectious Disease:  - neg infectious disease ROS     Malignancy:  - neg malignancy ROS     Other:            Physical Exam    Airway  airway exam normal      Mallampati: II   TM distance: > 3 FB   Neck ROM: full   Mouth opening: > 3 cm    Respiratory Devices and Support         Dental           Cardiovascular   cardiovascular exam normal       Rhythm and rate: regular and normal     Pulmonary   pulmonary exam normal        breath sounds clear to auscultation           OUTSIDE  "LABS:  CBC:   Lab Results   Component Value Date    WBC 8.9 03/13/2024    WBC 11.7 (H) 02/22/2024    HGB 14.9 03/13/2024    HGB 12.0 03/02/2024    HCT 44.0 03/13/2024    HCT 39.0 02/22/2024     03/13/2024     02/22/2024     BMP:   Lab Results   Component Value Date     02/22/2024     (L) 10/17/2023    POTASSIUM 4.0 02/22/2024    POTASSIUM 3.9 10/17/2023    CHLORIDE 104 02/22/2024    CHLORIDE 102 10/17/2023    CO2 18 (L) 02/22/2024    CO2 18 (L) 10/17/2023    BUN 9.8 02/22/2024    BUN 7.9 10/17/2023    CR 0.63 03/01/2024    CR 0.57 02/22/2024     (H) 03/02/2024     (H) 02/22/2024     COAGS: No results found for: \"PTT\", \"INR\", \"FIBR\"  POC:   Lab Results   Component Value Date    HCG Positive (A) 09/18/2023     HEPATIC:   Lab Results   Component Value Date    ALBUMIN 4.2 03/13/2024    PROTTOTAL 7.4 03/13/2024    ALT 24 03/13/2024    AST 27 03/13/2024    ALKPHOS 111 03/13/2024    BILITOTAL 0.3 03/13/2024     OTHER:   Lab Results   Component Value Date    LACT 1.2 09/27/2019    A1C 5.4 09/25/2023    ANUM 8.9 02/22/2024    LIPASE 86 11/21/2007    AMYLASE 58 11/21/2007    TSH 1.85 08/18/2022    CRP 19.0 (H) 09/27/2019    SED 9 04/26/2020       Anesthesia Plan    ASA Status:  2, emergent    NPO Status:  NPO Appropriate    Anesthesia Type: MAC.     - Reason for MAC: straight local not clinically adequate   Induction: Intravenous, Propofol.   Maintenance: TIVA.        Consents    Anesthesia Plan(s) and associated risks, benefits, and realistic alternatives discussed. Questions answered and patient/representative(s) expressed understanding.     - Discussed:     - Discussed with:  Patient       Use of blood products discussed: No .     Postoperative Care    Pain management: IV analgesics, Oral pain medications.   PONV prophylaxis: Ondansetron (or other 5HT-3), Dexamethasone or Solumedrol     Comments:    Other Comments: The risks and benefits of anesthesia, and the alternatives where " "applicable, have been discussed with the patient, and they wish to proceed.              LEXX Kebede CRNA    I have reviewed the pertinent notes and labs in the chart from the past 30 days and (re)examined the patient.  Any updates or changes from those notes are reflected in this note.              # Obesity: Estimated body mass index is 39.54 kg/m  as calculated from the following:    Height as of an earlier encounter on 5/8/24: 1.676 m (5' 6\").    Weight as of an earlier encounter on 5/8/24: 111.1 kg (245 lb).      "

## 2024-05-08 NOTE — ANESTHESIA CARE TRANSFER NOTE
Patient: Ayanna Davidson    Procedure: Procedure(s):  MARSUPIALIZATION, CYST, BARTHOLIN'S GLAND       Diagnosis: Bartholin's gland abscess [N75.1]  Diagnosis Additional Information: No value filed.    Anesthesia Type:   MAC     Note:    Oropharynx: oropharynx clear of all foreign objects and spontaneously breathing  Level of Consciousness: drowsy  Oxygen Supplementation: face mask    Independent Airway: airway patency satisfactory and stable  Dentition: dentition unchanged  Vital Signs Stable: post-procedure vital signs reviewed and stable  Report to RN Given: handoff report given  Patient transferred to: Phase II    Handoff Report: Identifed the Patient, Identified the Reponsible Provider, Reviewed the pertinent medical history, Discussed the surgical course, Reviewed Intra-OP anesthesia mangement and issues during anesthesia, Set expectations for post-procedure period and Allowed opportunity for questions and acknowledgement of understanding      Vitals:  Vitals Value Taken Time   /78 05/08/24 1650   Temp 98.3  F (36.8  C) 05/08/24 1609   Pulse 68 05/08/24 1650   Resp 16 05/08/24 1650   SpO2 96 % 05/08/24 1650       Electronically Signed By: LEXX Combs CRNA  May 8, 2024  5:15 PM

## 2024-05-08 NOTE — PROGRESS NOTES
Preoperative Evaluation  [address] [address]  Phone: [phone]  Fax: [phone]  Primary Provider: Obed Santiago  Pre-op Performing Provider: ABHI MAGDALENO  May 8, 2024       Ayanna is a 33 year old, presenting for the following:  Pre-Op Exam        5/8/2024     9:05 AM   Additional Questions   Roomed by Margaret MORATAYA     Surgical Information  Surgery/Procedure: Cyst removal  Surgery Location: [facility]  Surgeon: Dr. Jase Man  Surgery Date: 05/08/2024  Time of Surgery: unknown  Where patient plans to recover: At home with family  Fax number for surgical facility: Note does not need to be faxed, will be available electronically in Epic.    Assessment & Plan     The proposed surgical procedure is considered LOW risk.      ICD-10-CM    1. Preop examination  Z01.818 HCG Qual, Urine ([id number])     CBC with platelets and differential      2. Current every day smoker  F17.200       3. Bartholin cyst  N75.0          Smoker  Cyst removal planned urgently today  Feels well  Breahting at baseline, not on inhalers, denies SOB, cough  History of substance use, last use over a year ago       - No identified additional risk factors other than previously addressed        Recommendation  APPROVAL GIVEN to proceed with proposed procedure, without further diagnostic evaluation.          Subjective       HPI related to upcoming procedure:   Having bartholin cyst removed today  Feels well  Can get up a single flight of stairs without dyspnea. Estimated METS > 4.          5/8/2024     1:48 PM   Preop Questions   1. Have you ever had a heart attack or stroke? No   2. Have you ever had surgery on your heart or blood vessels, such as a stent placement, a coronary artery bypass, or surgery on an artery in your head, neck, heart, or legs? No   3. Do you have chest pain with activity? No   4. Do you have a history of  heart  failure? No   5. Do you currently have a cold, bronchitis or symptoms of other infection? No   6. Do you have a cough, shortness of breath, or wheezing? No   7. Do you or anyone in your family have previous history of blood clots? No   8. Do you or does anyone in your family have a serious bleeding problem such as prolonged bleeding following surgeries or cuts? No   9. Have you ever had problems with anemia or been told to take iron pills? YES -    10. Have you had any abnormal blood loss such as black, tarry or bloody stools, or abnormal vaginal bleeding? No   11. Have you ever had a blood transfusion? No   12. Are you willing to have a blood transfusion if it is medically needed before, during, or after your surgery? Yes   13. Have you or any of your relatives ever had problems with anesthesia? No   14. Do you have sleep apnea, excessive snoring or daytime drowsiness? No   15. Do you have any artifical heart valves or other implanted medical devices like a pacemaker, defibrillator, or continuous glucose monitor? No   16. Do you have artificial joints? No   17. Are you allergic to latex? No   18. Is there any chance that you may be pregnant? No       Health Care Directive  Patient does not have a Health Care Directive or Living Will: Discussed advance care planning with patient; however, patient declined at this time.    Preoperative Review of             Patient Active Problem List    Diagnosis Date Noted    S/P  2024     Priority: Medium    Elevated BP without diagnosis of hypertension 2024     Priority: Medium     contractions 2024     Priority: Medium    Diet controlled gestational diabetes mellitus (GDM) in third trimester 2024     Priority: Medium    Encounter for triage in pregnant patient 10/17/2023     Priority: Medium    PTSD (post-traumatic stress disorder) 10/10/2023     Priority: Medium    Anxiety 03/15/2023     Priority: Medium    Class 2 severe obesity with  "serious comorbidity and body mass index (BMI) of 39.0 to 39.9 in adult, unspecified obesity type (H) 08/18/2022     Priority: Medium    Prolapse of the stomach 06/14/2021     Priority: Medium     Formatting of this note might be different from the original. Added automatically from request for surgery [id number]      Open wound 03/23/2020     Priority: Medium    TINO III (vulvar intraepithelial neoplasia III) 02/26/2020     Priority: Medium     Added automatically from request for surgery [id number]      Preoperative examination 01/28/2020     Priority: Medium     Added automatically from request for surgery [id number]      Bartholin's gland abscess 01/28/2020     Priority: Medium     Added automatically from request for surgery [id number]      Chronic pain in right shoulder 01/21/2020     Priority: Medium    Dog bite of right lower leg 09/27/2019     Priority: Medium    Cellulitis of left upper extremity 09/27/2019     Priority: Medium    Current every day smoker 09/27/2019     Priority: Medium    Gastroesophageal reflux disease without esophagitis 09/27/2019     Priority: Medium    Skin infection 09/27/2019     Priority: Medium    Dog bite of left upper extremity 05/07/2019     Priority: Medium    Cervical high risk HPV (human papillomavirus) test positive 03/12/2019     Priority: Medium     4/2016 NIL pap. No prior HPV testing.   3/12/19 NIL pap, + HR HPV (not 16 or 18). Plan: cotest in 1 yr  1/29/20 NIL pap, + HR HPV (not 16 or 18). Plan: [facility]  2/11/20 [facility] bx: neg. Plan: Cotest in 1 yr.   2/18/20 Vulvar biopsy: \"TINO III; severe dysplasia\" possible involvement of margins  3/11/20 Re excision of vulva.   3/23/21 NIL pap, + HR HPV (not 16 or 18). Plan: [facility]  6/22/21 Gyn visit - [facility] recommended, Patient refused  6/28/21 GynOnc visit plan - HPV HR+: \"Repeat HPV-based screening in 1 year based upon ASCCP\", Due 3/23/22  5/6/22 Lost to follow up  8/18/22 NIL pap, Neg HPV.  Plan: cotest in 1 year  6/14/23 NIL Pap, Neg HR HPV. Plan: " cotest in 1 year and establish with OB/GYN provider, per Dr. Santiago.       Closed fracture dislocation of hip joint, left, initial encounter (H) 2018     Priority: Medium    Vulvar abscess 2017     Priority: Medium    ADD (attention deficit disorder) without hyperactivity 2014     Priority: Medium    Drug-induced mental disorder (H) 2012     Priority: Medium     Overview:   ICD-10 Regulatory Update      Depressed 05/10/2012     Priority: Medium    Overdose of antipsychotic 2012     Priority: Medium      Past Medical History:   Diagnosis Date    Cervical high risk HPV (human papillomavirus) test positive 2019    last PAP NIL (), remote hx of LEEP    Gastroesophageal reflux disease     HSV (herpes simplex virus) infection     1 & 2    Methamphetamine abuse (H)     reports daily use    recurrent Bartholin's cyst      Past Surgical History:   Procedure Laterality Date    BIOPSY       SECTION N/A 3/1/2024    Procedure: PRIMARY  SECTION;  Surgeon: Inderjit Olivas MD;  [facility]    CHOLECYSTECTOMY      ENT SURGERY      ESOPHAGOSCOPY, GASTROSCOPY, DUODENOSCOPY (EGD), COMBINED N/A 2023    Procedure: Esophagoscopy, gastroscopy, duodenoscopy (EGD), combined;  Surgeon: Thierno Escobar MD;  Location: [facility]    EXAM UNDER ANESTHESIA PELVIC N/A 2020    Procedure: EXAM UNDER ANESTHESIA, PELVIS, PAP;  Surgeon: Froylan Schwarz MD;  [facility]    EXCISE VULVA WIDE LOCAL Bilateral 2020    Procedure: Right Vulva Wedge Resection and Incision and Drainage Left Vulva;  Surgeon: Froylan Schwarz MD;  [facility]    INCISION AND DRAINAGE ABSCESS PELVIS, COMBINED N/A 2018    Procedure: COMBINED INCISION AND DRAINAGE ABSCESS PELVIS;  INCISION AND DRAINAGE LABIAL ABSCESS;  Surgeon: Jase Beach MD;  [facility]    INCISION AND DRAINAGE ABSCESS PELVIS, COMBINED N/A 2019    Procedure: Incision and Drainage Right  Labial Abscess;  Surgeon: Jase Beach MD;  [facility]    INCISION AND DRAINAGE LOWER EXTREMITY, COMBINED Right 08/11/2019    Procedure: irrigation and debridement right leg dog bite;  Surgeon: Rommel Péerz MD;  [facility]    LAP ADJUSTABLE GASTRIC BAND      LEEP TX, CERVICAL      MAMMOPLASTY REDUCTION BILATERAL      MARSUPIALIZATION BARTHOLIN CYST N/A 04/29/2019    Procedure: Bartholin's Cyst removal;  Surgeon: Froylan Schwarz MD;  [facility]    MARSUPIALIZATION BARTHOLIN CYST Left 01/29/2020    Procedure: Excision of left bartholin's abscess;  Surgeon: Froylan Schwarz MD;  [facility]    ORTHOPEDIC SURGERY       Current Outpatient Medications   Medication Sig Dispense Refill    acetaminophen (TYLENOL) 325 MG tablet Take 3 tablets (975 mg) by mouth every 6 hours      ibuprofen (ADVIL/MOTRIN) 800 MG tablet Take 1 tablet (800 mg) by mouth every 8 hours as needed for moderate pain 90 tablet 0    naproxen (NAPROSYN) 500 MG tablet Take 1 tablet (500 mg) by mouth 2 times daily (with meals) 40 tablet 0    omeprazole (PRILOSEC OTC) 20 MG EC tablet Take 1 tablet (20 mg) by mouth daily 90 tablet 3    Prenatal Vit-Fe Fumarate-FA (PRENATAL MULTIVITAMIN W/IRON) 27-0.8 MG tablet Take 1 tablet by mouth daily 90 tablet 3    albuterol (PROVENTIL) (2.5 MG/3ML) 0.083% neb solution Take 1 vial (2.5 mg) by nebulization every 4 hours as needed for shortness of breath, wheezing or cough (Patient not taking: Reported on 3/13/2024) 60 mL 0    amoxicillin-clavulanate (AUGMENTIN) 875-125 MG tablet Take 1 tablet by mouth 2 times daily 20 tablet 0    budesonide (PULMICORT) 1 MG/2ML neb solution Take 2 mLs (1 mg) by nebulization 2 times daily (Patient not taking: Reported on 3/13/2024) 2 mL 4    cephALEXin (KEFLEX) 500 MG capsule Take 1 capsule (500 mg) by mouth 3 times daily for 10 days 30 capsule 0    hydrOXYzine estuardo (VISTARIL) 25 MG capsule Take 2-3 capsules (50-75 mg) by mouth nightly as needed  "for other (insomnia) (Patient not taking: Reported on 3/13/2024) 30 capsule 0    oxyCODONE (ROXICODONE) 5 MG tablet Take 1 tablet (5 mg) by mouth every 6 hours as needed for pain 6 tablet 0    oxyCODONE (ROXICODONE) 5 MG tablet Take 1 tablet (5 mg) by mouth every 4 hours as needed (Patient not taking: Reported on 3/13/2024) 15 tablet 0    senna-docusate (SENOKOT-S/PERICOLACE) 8.6-50 MG tablet Take 1 tablet by mouth 2 times daily as needed for constipation 60 tablet 0       Allergies   Allergen Reactions    No Known Allergies         Social History     Tobacco Use    Smoking status: Some Days     Current packs/day: 0.50     Average packs/day: 0.5 packs/day for 10.0 years (5.0 ttl pk-yrs)     Types: Cigarettes    Smokeless tobacco: Current    Tobacco comments:     uses E cig with zero nicotine    Substance Use Topics    Alcohol use: Not Currently     Comment: Reports last use 9/17 and denies use during pregnancy       History   Drug Use Unknown     Comment: Reports Marijuana and meth use one week ago         Review of Systems    Review of Systems  Constitutional, HEENT, cardiovascular, pulmonary, gi and gu systems are negative, except as otherwise noted.    Objective    /80   Pulse 75   Temp 97.6  F (36.4  C) (Temporal)   Resp 18   Ht 1.676 m (5' 6\")   Wt 111.1 kg (245 lb)   LMP  (LMP Unknown)   SpO2 99%   BMI 39.54 kg/m     Estimated body mass index is 39.54 kg/m  as calculated from the following:    Height as of this encounter: 1.676 m (5' 6\").    Weight as of this encounter: 111.1 kg (245 lb).  Physical Exam  GENERAL: alert and no distress  NECK: no adenopathy, no asymmetry, masses, or scars  RESP: lungs clear to auscultation - no rales, rhonchi or wheezes  CV: regular rate and rhythm, normal S1 S2, no S3 or S4, no murmur, click or rub, no peripheral edema  ABDOMEN: soft, nontender, no hepatosplenomegaly, no masses and bowel sounds normal  MS: no gross musculoskeletal defects noted, no edema    Recent " Labs   Lab Test 03/13/24  0947 03/02/24  0559 03/01/24  2119 03/01/24  0955 02/22/24  1018 10/17/23  1414 10/08/23  1935 09/25/23  1325 06/14/23  1536   HGB 14.9 12.0  --    < > 13.3  --    < > 13.8 14.8     --   --   --  272  --    < > 260 300   NA  --   --   --   --  135 134*   < >  --  140   POTASSIUM  --   --   --   --  4.0 3.9   < >  --  4.0   CR  --   --  0.63  --  0.57 0.50*   < >  --  0.69   A1C  --   --   --   --   --   --   --  5.4 5.5    < > = values in this interval not displayed.        Diagnostics  Labs pending at this time.  Results will be reviewed when available.   No EKG required for low risk surgery (cataract, skin procedure, breast biopsy, etc).    Revised Cardiac Risk Index (RCRI)  The patient has the following serious cardiovascular risks for perioperative complications:   - No serious cardiac risks = 0 points     RCRI Interpretation: 0 points: Class I (very low risk - 0.4% complication rate)         Signed Electronically by: Chaim Felipe MD  Copy of this evaluation report is provided to requesting physician.

## 2024-05-08 NOTE — PROGRESS NOTES
ASSESSMENT/PLAN:                                                    Chaim Felipe MD  requested this consult due to concerns regarding  bartholin's duct cyst with suspected abscess    My assessment and plan are as follows:       Bartholin's duct cyst on the right side with suspected abscess: 33-year-old female  1 para 1 who is now 9 weeks out from  section.  This is expanding and becoming much more sensitive over the past several days and she is miserable and requesting immediate surgery if possible.  She is n.p.o. since last midnight which was 14 hours ago.  I have offered her option of marsupialization and at the very least I will open and drain this suspected cyst/abscess.  I will send it for culture.  I will excise a portion of the cyst wall if possible to send for pathology in light of her previous TINO.  I did explain that there are many risks with the surgery including the risk of bleeding and that perhaps this is not a cyst as I suspect but perhaps it could be a mass.  There is a risk that the infection could worsen but I do think it needs to be opened and drained as the very first step to help heal this.  If I can marsupialize it I will definitely do that.  She had good success on the left side but for some reason a previous attempt on the right side did not work well and I am not sure why.  She has done well with anesthesia previously.  I believe we are acting in her best interest to arrange to have the surgery done immediately.  Dr. Felipe will plan to do her preop clearance now and then we will plan to take her to surgery sometime within the next several hours so that she can get some relief from the significant discomfort.      2.  I did explain to her that I would plan to place a small Nu Gauze wick that she could take out in several days and then replace it just to keep that cavity open for a few days.  I would plan to see her again on Monday, May 6 for a postop check.  I did explain  to her that I am itinerant at this point and not staying in town so if there are complications in the meantime that we may have to make other arrangements for her but I would plan to see her next Monday for a postop follow-up and I will keep her on antibiotics for a few days till we get a culture back.                                                                      Thank you for this consultation.    Copy to  Chaim Man MD          SUBJECTIVE:                                                      Referral from Chaim Felipe MD    Reason for consultation:   Bartholin's duct cyst with suspected abscess-right side    History:  Ayanna  Is a 33 year old female  1 para 1001( 1 CS which was done 9 weeks ago)   who presents today because of 4-day history of swollen painful right labium consistent with a prior history of multiple episodes of Bartholin duct cyst with infection.  She has had several incisions and drainage on this side and also had 1 marsupialization attempt several years ago.  This is all per her history and I have not seen documentation of that although she also states that she had a marsupialization on the left for recurrent cysts on that side and that marsupialization worked quite well.    She denies any fevers.  She states that the right side is becoming increasingly painful and she cannot walk or stand without significant discomfort at this point.  It has swollen dramatically in the last 2 days.  It is similar to previous episodes of her Bartholin gland inflammation/infection.  She is requesting that something definitive be done other than just draining it.  She is hoping that I can marsupialize it and she is requesting that this be done today if at all possible because she is miserable.  She has been n.p.o. entirely since midnight of last night.      I do note that she had a wide local excision of part of her right vulva previously for TINO 3 and the margins were  negative except that one margin was fairly close and so it was reexcised in that area and the margin was negative.                 Patient Active Problem List   Diagnosis    Depressed    Drug-induced mental disorder (H)    Overdose of antipsychotic    Vulvar abscess    Cervical high risk HPV (human papillomavirus) test positive    Dog bite of left upper extremity    Dog bite of right lower leg    Cellulitis of left upper extremity    Current every day smoker    Gastroesophageal reflux disease without esophagitis    Skin infection    Preoperative examination    Bartholin's gland abscess    TINO III (vulvar intraepithelial neoplasia III)    Open wound    Class 2 severe obesity with serious comorbidity and body mass index (BMI) of 39.0 to 39.9 in adult, unspecified obesity type (H)    Anxiety    ADD (attention deficit disorder) without hyperactivity    Chronic pain in right shoulder    Closed fracture dislocation of hip joint, left, initial encounter (H)    Prolapse of the stomach    PTSD (post-traumatic stress disorder)    Encounter for triage in pregnant patient     contractions    Diet controlled gestational diabetes mellitus (GDM) in third trimester    Elevated BP without diagnosis of hypertension    S/P      Past Surgical History:   Procedure Laterality Date    BIOPSY       SECTION N/A 3/1/2024    Procedure: PRIMARY  SECTION;  Surgeon: Inderjit Olivas MD;  Location:  [facility]    CHOLECYSTECTOMY      ENT SURGERY      ESOPHAGOSCOPY, GASTROSCOPY, DUODENOSCOPY (EGD), COMBINED N/A 2023    Procedure: Esophagoscopy, gastroscopy, duodenoscopy (EGD), combined;  Surgeon: Thierno Escobar MD;  Location: [facility]    EXAM UNDER ANESTHESIA PELVIC N/A 2020    Procedure: EXAM UNDER ANESTHESIA, PELVIS, PAP;  Surgeon: Froylan Schwarz MD;  [facility]    EXCISE VULVA WIDE LOCAL Bilateral 2020    Procedure: Right Vulva Wedge Resection and Incision and Drainage Left Vulva;   Surgeon: Froylan Schwarz MD;  [facility]    INCISION AND DRAINAGE ABSCESS PELVIS, COMBINED N/A 02/26/2018    Procedure: COMBINED INCISION AND DRAINAGE ABSCESS PELVIS;  INCISION AND DRAINAGE LABIAL ABSCESS;  Surgeon: Jase Beach MD;  [facility]    INCISION AND DRAINAGE ABSCESS PELVIS, COMBINED N/A 03/05/2019    Procedure: Incision and Drainage Right Labial Abscess;  Surgeon: Jase Beach MD;  [facility]    INCISION AND DRAINAGE LOWER EXTREMITY, COMBINED Right 08/11/2019    Procedure: irrigation and debridement right leg dog bite;  Surgeon: Rommel Pérez MD;  [facility]    LAP ADJUSTABLE GASTRIC BAND      LEEP TX, CERVICAL      MAMMOPLASTY REDUCTION BILATERAL      MARSUPIALIZATION BARTHOLIN CYST N/A 04/29/2019    Procedure: Bartholin's Cyst removal;  Surgeon: Froylan Schwarz MD;  [facility]    MARSUPIALIZATION BARTHOLIN CYST Left 01/29/2020    Procedure: Excision of left bartholin's abscess;  Surgeon: Froylan Schwarz MD;  [facility]: [facility]    ORTHOPEDIC SURGERY         Social History     Tobacco Use    Smoking status: Some Days     Current packs/day: 0.50     Average packs/day: 0.5 packs/day for 10.0 years (5.0 ttl pk-yrs)     Types: Cigarettes    Smokeless tobacco: Current    Tobacco comments:     uses E cig with zero nicotine    Substance Use Topics    Alcohol use: Not Currently     Comment: Reports last use 9/17 and denies use during pregnancy     Family History   Problem Relation Age of Onset    Thyroid Disease Mother     Heart Disease Father     Coronary Artery Disease Father     Hypertension Father     Hyperlipidemia Father     Mental Illness Father     Substance Abuse Father     Diabetes Maternal Grandmother     Prostate Cancer Maternal Grandfather     Breast Cancer Paternal Grandmother     Testicular cancer Paternal Grandfather     Depression Half-Sister     Anxiety Disorder Half-Sister          Current Outpatient Medications   Medication Sig  Dispense Refill    acetaminophen (TYLENOL) 325 MG tablet Take 3 tablets (975 mg) by mouth every 6 hours      albuterol (PROVENTIL) (2.5 MG/3ML) 0.083% neb solution Take 1 vial (2.5 mg) by nebulization every 4 hours as needed for shortness of breath, wheezing or cough (Patient not taking: Reported on 3/13/2024) 60 mL 0    amoxicillin-clavulanate (AUGMENTIN) 875-125 MG tablet Take 1 tablet by mouth 2 times daily 20 tablet 0    budesonide (PULMICORT) 1 MG/2ML neb solution Take 2 mLs (1 mg) by nebulization 2 times daily (Patient not taking: Reported on 3/13/2024) 2 mL 4    hydrOXYzine estuardo (VISTARIL) 25 MG capsule Take 2-3 capsules (50-75 mg) by mouth nightly as needed for other (insomnia) (Patient not taking: Reported on 3/13/2024) 30 capsule 0    ibuprofen (ADVIL/MOTRIN) 800 MG tablet Take 1 tablet (800 mg) by mouth every 8 hours as needed for moderate pain 90 tablet 0    naproxen (NAPROSYN) 500 MG tablet Take 1 tablet (500 mg) by mouth 2 times daily (with meals) 40 tablet 0    omeprazole (PRILOSEC OTC) 20 MG EC tablet Take 1 tablet (20 mg) by mouth daily 90 tablet 3    oxyCODONE (ROXICODONE) 5 MG tablet Take 1 tablet (5 mg) by mouth every 4 hours as needed (Patient not taking: Reported on 3/13/2024) 15 tablet 0    Prenatal Vit-Fe Fumarate-FA (PRENATAL MULTIVITAMIN W/IRON) 27-0.8 MG tablet Take 1 tablet by mouth daily 90 tablet 3    senna-docusate (SENOKOT-S/PERICOLACE) 8.6-50 MG tablet Take 1 tablet by mouth 2 times daily as needed for constipation 60 tablet 0       ROS:  A 12 point systems review is negative except for what is listed above in the Subjective history.        OBJECTIVE:                                                    Vital signs: Blood pressure 120/80, not currently breastfeeding.    Pelvic exam:My nurse Jazmyne  was present to chaperone the exam.    The external genitalia appeared normal except that there is a swollen area in the mid to lower right labium extending just inside the vaginal introitus  very consistent with an enlarged inflamed Bartholin duct cyst with suspected abscess.  The area is red and warm.  It is fluctuant.  She is very tender to touching this area.  She states that this is very similar to previous episodes of the Bartholin's abscess.  She is strongly requesting that I address this definitively today if possible.  I did note that even trying to move on the table to get into the stirrups she was quite uncomfortable.  In fact I could not do a digital exam inside the vagina to feel the adnexa etc. because that area is so tender that any motion pushing on it is very sensitive.         -------------------------------------------------    A total of 1 hour 15 minutes were spent in today's visit including the time spent with  the patient in addition to the time spent just prior to the visit reviewing the chart  and then charting afterwards on this patient today.        Thank you for this consultation.    Copy to  Chaim Man MD  [facility]

## 2024-05-08 NOTE — ANESTHESIA POSTPROCEDURE EVALUATION
Patient: Ayanna Davidson    Procedure: Procedure(s):  MARSUPIALIZATION, CYST, BARTHOLIN'S GLAND       Anesthesia Type:  MAC    Note:  Disposition: Outpatient   Postop Pain Control: Uneventful            Sign Out: Well controlled pain   PONV: No   Neuro/Psych: Uneventful            Sign Out: Acceptable/Baseline neuro status   Airway/Respiratory: Uneventful            Sign Out: Acceptable/Baseline resp. status   CV/Hemodynamics: Uneventful            Sign Out: Acceptable CV status   Other NRE: NONE   DID A NON-ROUTINE EVENT OCCUR? No    Event details/Postop Comments:  Pt was happy with anesthesia care.  No complications.  I will follow up with the pt if needed.           Last vitals:  Vitals Value Taken Time   /78 05/08/24 1650   Temp 98.3  F (36.8  C) 05/08/24 1609   Pulse 68 05/08/24 1650   Resp 16 05/08/24 1650   SpO2 96 % 05/08/24 1650       Electronically Signed By: LEXX Combs CRNA  May 8, 2024  5:16 PM

## 2024-05-08 NOTE — DISCHARGE INSTRUCTIONS
Discharge instructions for Dr. Man:         You will have a postoperative appointment with Dr. Man  on Monday May 13th at 945 am.   Dr. Man will also plan to call you tomorrow to check on your progress.  Expect a call sometime during the day.         If you have unusually heavy vaginal bleeding, severe nausea with vomiting, or increased pain, or fever, call us at that same number to be seen earlier, or go to the emergency room if after hours or we are unavailable.          you should avoid intercourse for 2 weeks  after surgery.           you should not drive for 1 day   after surgery             you should not shower today but may resume showering tomorrow after the effects of anesthesia wear off.  Do not take a bath for 1  week   after surgery.    If you have questions do not hesitate to call the office at [phone]. Thank you.         Jase Man MD, FACOG, FAAFP              , OB/GYN  Department.    You will remove the packing tomorrow.  Additional sterile packing was sent home with you.  You should repack the wound with the sterile packing.  Dr. Man will give you a phone call to give you guidance on wound re-packing.    You will need to take antibiotic tonight (1 tablet) then continue 2 times a day until gone.    Delay taking any Ibuprofen until 9:45 pm.

## 2024-05-08 NOTE — OP NOTE
OPERATIVE NOTE    Surgeon: Magda    Preoperative diagnosis:Right  Sided Bartholin's duct cyst and suspected abscess    Postoperative diagnosis: same      Procedure: Incision and drainage of Bartholin's duct cyst with abscess and marsupialization of Bartholin's duct cyst, with a vulvar biopsy and Bartholin's cyst/gland biopsy.    Anesthesia: Monitored anesthesia care (MAC) and local anesthesia    Patient profile: 33-year-old female  1 para 1 with rapidly swelling painful right labial cyst consistent with Bartholin's duct cyst and abscess.  History of previous wide local excision for TINO 3 and history of multiple drainages of Bartholin cysts and a successful marsupialization of a cyst on the left side and 1 previous marsupialization on the right side which closed off resulting in recurrent Bartholin cysts.    Operative findings: There was a large cyst on the right labium directly over where the Bartholin's gland would be expected.  It was almost tennis ball sized.  It was red and obviously inflamed.  It was fluctuant.  I did incise directly over the center of that area and a large amount of foul-smelling pus was expressed.  We sent it for anaerobic as well as aerobic cultures in 2 separate culturette containers.  I also excised a small segment of vulva directly contiguous with this area because of the previous history of TINO 3.  Also I excised a small portion of the cyst wall or Bartholin gland and sent that separately.    Operative description: After the establishment of satisfactory monitored   anesthesia, the patient was prepped and draped in the usual fashion for vaginal surgery and the bladder was drained of urine.  I then administered quarter percent Marcaine with diluted epinephrine local anesthesia directly over the center of the suspected cyst and then incised with a 15 blade scalpel and then expressed a large amount of foul-smelling pus and sent samples for culture for anaerobic as well as aerobic  culture.  I then anesthetized further into the cyst wall with the quarter percent Marcaine with epinephrine.  I then marsupialized the cyst wall to the vulvar epithelium and used 4-0 Vicryl interrupted sutures to accomplish this.  I did biopsy the cyst wall or possibly the lateral aspect of the Bartholin's gland and also sent a separate vulvar biopsy immediately contiguous with the swollen gland.    I then inserted 1/2 inch Nu Gauze into the cavity to help maintain its patency while this heals.  The final sponge needle and instrument counts were reported to me as correct. The patient was taken to the recovery room in good condition without complications. Estimated blood loss was 10 ml.    I will plan to call her in the next several days to check on her postoperative progress. She will be instructed to recheck acutely if she experiences any heavy vaginal bleeding greater than that of usual menses or fever or increased pain or vomiting.        Jase Man MD

## 2024-05-09 NOTE — PROGRESS NOTES
Postop call: I spoke with her today and the pain is much improved and overall doing better. Mom also talked with me earlier todasy. She knoiws to call if  concerns, otherwise I will see her Monday for check in clinic. JESSY Man MD

## 2024-05-10 LAB
BACTERIA FLD CULT: NO GROWTH
PATH REPORT.COMMENTS IMP SPEC: NORMAL
PATH REPORT.FINAL DX SPEC: NORMAL
PATH REPORT.GROSS SPEC: NORMAL
PATH REPORT.MICROSCOPIC SPEC OTHER STN: NORMAL
PATH REPORT.RELEVANT HX SPEC: NORMAL
PHOTO IMAGE: NORMAL

## 2024-05-12 LAB — BACTERIA FLD CULT: NORMAL

## 2024-05-13 ENCOUNTER — OFFICE VISIT (OUTPATIENT)
Dept: FAMILY MEDICINE | Facility: CLINIC | Age: 34
End: 2024-05-13
Payer: COMMERCIAL

## 2024-05-13 VITALS — DIASTOLIC BLOOD PRESSURE: 71 MMHG | SYSTOLIC BLOOD PRESSURE: 105 MMHG

## 2024-05-13 DIAGNOSIS — Z98.890 POSTOPERATIVE STATE: Primary | ICD-10-CM

## 2024-05-13 PROCEDURE — 99024 POSTOP FOLLOW-UP VISIT: CPT | Performed by: OBSTETRICS & GYNECOLOGY

## 2024-05-13 NOTE — PROGRESS NOTES
S: The patient is here for postop check from marsupialization of Bartholin duct cyst and abscess on the right vulva.  She reports normal bowel and bladder function and pain control is adequate. No fevers or other complaints.  She is feeling much much better today.  The pain is completely gone.    O: VSS as shown in chart /71   LMP  (LMP Unknown)     The exam was done with my nurse present.  The labia look normal with the exception of the marsupialized area of the right Bartholin duct cyst which is healing quite well.  There is no evidence of inflammation.  It is draining well.  She has been packing it with gauze but I am inclined to advise her to leave the gauze out now because it is marsupialized well and should drain consistently until it heals by secondary intention.  She knows that the sutures will dissolve on their own.  There is no surrounding erythema or inflammation at this point.  No discharge.      A: postoperative state:   stable post op check from marsupialization of Bartholin duct cyst with abscess.    P: l discussed the lab tests and pathology report with with her.  the anaerobic cultures showed mixed staci with no predominant organisms and that is not concerning to me.  The aerobic cultures showed no growth.     The pathology report of the vulvar biopsy and the cyst biopsy:    Pathologist:           Kevin Sandy MD                                                     Specimens:   A) - Vagina, right labia bartholin cyst                                                              B) - Vulva, vulvar biopsy                                                                  Final Diagnosis   A.  Right labia bartholin cyst:  - Benign cyst with fibrous wall, denuded      B.  Vulvar biopsy:  - Benign hypertrophic keratosis with marked acute neutrophilic inflammation   Electronically signed by Kevin Sandy MD on 5/10/2024 at  5:48 PM       I reported to her that these are benign findings  and do not need any follow up.     She'll follow up prn and she is advised to recheck acutely if the incisions become red or discarge noted or any sx of infection or  nausea, vomiting, or pain.    A total of 30 minutes spent in chart review, patient exam in clinic, and documentation today.         JESSY Man MD

## 2024-05-15 ENCOUNTER — VIRTUAL VISIT (OUTPATIENT)
Dept: ENDOCRINOLOGY | Facility: CLINIC | Age: 34
End: 2024-05-15
Payer: COMMERCIAL

## 2024-05-15 VITALS — BODY MASS INDEX: 38.89 KG/M2 | WEIGHT: 242 LBS | HEIGHT: 66 IN

## 2024-05-15 DIAGNOSIS — E66.812 CLASS 2 SEVERE OBESITY WITH SERIOUS COMORBIDITY AND BODY MASS INDEX (BMI) OF 39.0 TO 39.9 IN ADULT, UNSPECIFIED OBESITY TYPE (H): Primary | ICD-10-CM

## 2024-05-15 DIAGNOSIS — E66.01 CLASS 2 SEVERE OBESITY WITH SERIOUS COMORBIDITY AND BODY MASS INDEX (BMI) OF 39.0 TO 39.9 IN ADULT, UNSPECIFIED OBESITY TYPE (H): Primary | ICD-10-CM

## 2024-05-15 PROCEDURE — 99213 OFFICE O/P EST LOW 20 MIN: CPT | Mod: 95

## 2024-05-15 RX ORDER — SEMAGLUTIDE 1 MG/.5ML
1 INJECTION, SOLUTION SUBCUTANEOUS WEEKLY
Qty: 2 ML | Refills: 1 | Status: SHIPPED | OUTPATIENT
Start: 2024-06-26 | End: 2024-08-15 | Stop reason: DRUGHIGH

## 2024-05-15 RX ORDER — SEMAGLUTIDE 0.25 MG/.5ML
0.25 INJECTION, SOLUTION SUBCUTANEOUS WEEKLY
Qty: 2 ML | Refills: 0 | Status: SHIPPED | OUTPATIENT
Start: 2024-05-15 | End: 2024-07-25

## 2024-05-15 RX ORDER — SEMAGLUTIDE 0.5 MG/.5ML
0.5 INJECTION, SOLUTION SUBCUTANEOUS WEEKLY
Qty: 2 ML | Refills: 0 | Status: SHIPPED | OUTPATIENT
Start: 2024-05-15 | End: 2024-06-06

## 2024-05-15 NOTE — PATIENT INSTRUCTIONS
"Thank you for allowing us the privilege of caring for you. We hope we provided you with the excellent service you deserve.   Please let us know if there is anything else we can do for you so that we can be sure you are completely satisfied with your care experience.    To ensure the quality of our services you may be receiving a patient satisfaction survey from an independent patient satisfaction monitoring company.    The greatest compliment you can give is a \"Likely to Recommend\"    Your visit was with Mounika Ness PA-C today.    Instructions per today's visit:     Jovon Davidson, it was great to visit with you today.  Here is a review of our visit.  If our clinic scheduler is not able to reach you please call [phone] to schedule your next appointments.    Plan  Start Wegovy 0.25mg once weekly for 4 weeks, then increase to 0.5mg once weekly. As long as tolerating, can increase to wegovy 1mg after 4 weeks of 0.5mg   Will reach out in 1 month to check in on wegovy start.   Goals we discussed today: working towards 60-90g protein per day, continuing to increase exercise   Labs ordered today: vitamin d, pth, cmp, A1c, lipids    Follow up with Mounika in 3 months   Keep up the excellent work!       Information about Video Visits with [employer]: video visit information  _________________________________________________________________________________________________________________________________________________________  If you are asked by your clinic team to have your blood pressure checked:  [facility] do offer several locations for blood pressure checks. Please follow the below link to schedule an appointment. Scheduling an appointment at the pharmacy for a blood pressure check is now preferred.    Appointment Plus " ([email or web address])  _________________________________________________________________________________________________________________________________________________________  Important contact and scheduling information:  Please call our contact center at [phone] to schedule your next appointments.  To find a lab location near you, please call [phone].  For any nursing questions or concerns call Lexi rBooke LPN at [phone] or Radha Wen RN at [phone]  Please call during clinic hours Monday through Friday 8:00a - 4:00p if you have questions or you can contact us via [employer] at anytime and we will reply during clinic hours.    Lab results will be communicated through My Chart or letter (if My Chart not used). Please call the clinic if you have not received communication after 1 week or if you have any questions.?  Clinic Fax: [phone]    _________________________________________________________________________________________________________________________________________________________  Meal Replacement Products:    Here is the link to our new e-store where you can purchase our meal replacement products    [facility] E-Store  [email or web address]    The one week starter kit is a great way to sample a variety of products and see what works for you.    If you want more information about the product go to: [employer] Meals  [email or web address]    If you are an employee or [facility] or [facility] please contact your care team for a 10% estore discount    Free Shipping for orders over $75     Benefits of meal replacements products:    Portion and calorie control  Improved nutrition  Structured eating  Simplified food choices  Avoid contact with trigger foods  _________________________________________________________________________________________________________________________________________________________  Interested in working with a health  ?  Health coaches work with you to improve your overall health and wellbeing.  They look at the whole person, and may involve discussion of different areas of life, including, but not limited to the four pillars of health (sleep, exercise, nutrition, and stress management). Discuss with your care team if you would like to start working a health .  Health Coaching-3 Pack: Schedule by calling [phone]    $99 for three health coaching visits    Visits may be done in person or via phone    Coaching is a partnership between the  and the client; Coaches do not prescribe or diagnose    Coaching helps inspire the client to reach his/her personal goals   _________________________________________________________________________________________________________________________________________________________  24 Week Healthy Lifestyle Plan:    Our mission in the 24-week Healthy Lifestyle Plan is to provide you with individualized care by giving you the tools, education and support you need to lose weight and maintain a healthy lifestyle. In your 24-week journey, you ll be supported by a dedicated weight loss team that includes registered dietitians, medical weight management providers, health coaches, and nurses -- all with special expertise in weight loss -- to help you every step of the way.     Monthly meetings with your registered dietician or medical weight management provider help to review your progress, update your care plan, and make any adjustments needed to ensure success. Between these visits, weekly and bi-weekly health  visits will help you focus on the four pillars of weight loss -- stress, sleep, nutrition, and exercise -- and how you can best adapt each to achieve sustainable weight loss results.    In addition, you will be given exclusive access to online wellbeing classes through [employer].  Your initial visit will be with a medical weight management provider who will help to  understand your weight loss goals and ensure this program is the right fit for you. Please let our team know if you are interested in the 24 week plan by sending a message to your care team or calling [phone] to schedule.  _________________________________________________________________________________________________________________________________________________________  __________  [facility] Get Moving Program  Our team of physical therapists is trained to help you understand and take control of your condition. They will perform a thorough evaluation to determine your ability for activity and develop a customized plan to fit your goals and physical ability.  Scheduling: Unsure if the Get Moving program is right for you? Discuss the program with your medical provider or diabetes educator. You can also call us at [phone] to ask questions or schedule an appointment.   [facility] Get Moving Program  ____________________________________________________________________________________________________________________________________________________________________________  [facility] Diabetes Prevention Program (DPP)  If you have prediabetes and Medicare please contact us via [employer] to learn more about the Diabetes Prevention Program (DPP)  Program Details:   [employer] [facility] offers the year-long Diabetes Prevention Program (DPP). The program helps you to make lifestyle changes that prevent or delay type 2 diabetes by supporting healthy eating, increased physical activity, stress reduction and use of coping skills.   On average, previous [facility] DPP cohorts have lost and maintained at least 5% of their starting weight throughout the program and averaged more than 150 minutes of physical activity per week.  Participants meet weekly for one-hour group sessions over sixteen weeks, every other week for the next 8 weeks, and monthly for the last six months.   A year-long  maintenance program is also available for participants who complete the first year.   Location & Cost:   During the COVID-19 Public Health Emergency, the program is offered virtually. When in-person classes can resume, they will be held at [facility].  For people with Medicare, the program is covered in full. A self-pay option will also be available for those with non-Medicare insurance plans.   ______________________________________________________________________________________________________________________________________________________________________________________________________________________________    To work with a Behavioral Health Psychologist:    Call to schedule:    Jose Martin Sullivan - [phone]  Amie Nance - [phone]  Thea Garcia - [phone]  Sandra Terrazas - [phone]   Mariana Espinal PhD (cannot accept Medicare) [phone]        Thank you,   [facility] Comprehensive Weight Management Team

## 2024-05-15 NOTE — PROGRESS NOTES
Virtual Visit Details    Type of service:  Video Visit   Video Start Time:  3:01pm  Video End Time: 3:13pm    Originating Location (pt. Location): Home    Distant Location (provider location):  Off-site  Platform used for Video Visit: [facility] Medical Weight Management Note     Ayanna Davidson  MRN:  [id number]  :  1990  JEFFREY:  5/15/2024    Dear Obed Santiago MD,    I had the pleasure of seeing your patient Ayanna Davidson. She is a 33 year old female who I am continuing to see for treatment of obesity related to:        3/13/2023    12:17 AM   --   I have the following health issues associated with obesity Infertility    GERD (Reflux)    Stress Incontinence       Assessment & Plan   Problem List Items Addressed This Visit       Class 2 severe obesity with serious comorbidity and body mass index (BMI) of 39.0 to 39.9 in adult, unspecified obesity type (H) - Primary    Relevant Medications    Semaglutide-Weight Management (WEGOVY) 0.25 MG/0.5ML pen    Semaglutide-Weight Management (WEGOVY) 0.5 MG/0.5ML pen    Semaglutide-Weight Management (WEGOVY) 1 MG/0.5ML pen (Start on 2024)    Other Relevant Orders    Comprehensive metabolic panel    Hemoglobin A1c    Lipid panel reflex to direct LDL Fasting    Parathyroid Hormone Intact    Vitamin D Deficiency      Plan  Start Wegovy 0.25mg once weekly for 4 weeks, then increase to 0.5mg once weekly. As long as tolerating, can increase to wegovy 1mg after 4 weeks of 0.5mg   Will reach out in 1 month to check in on wegovy start.   Goals we discussed today: working towards 60-90g protein per day, continuing to increase exercise   Labs ordered today: vitamin d, pth, cmp, A1c, lipids    Follow up with Mounika in 3 months   Keep up the excellent work!     Anti-obesity medication started today for this patient   BISHOP  Has seen successful weight loss with this in the past , stopped due to pregnancy  No history of pancreatitis   No personal or family history of  medullary thyroid carcinoma  No personal or family history of Multiple Endocrine Neoplasia Type 2  On birth control: depo provera injection   .          INTERVAL HISTORY:  New re-establish with Marie Berry, 3/15/23  S/p lapband in 2006 by Dr. Perdue in [place]. No complications. Lapband removed 8/2021 by Dr. Escobar in [place]. Removed due to band slipping and uncontrollable GERD.     Weight prior to lapband - 265lb   Oren - 137lbs   Weight today - 240lbs      Weight was stable around 180-200lbs with the lapband. After removal weight gain has been gradual. Has not been able to lose substancial weight since the band was removed. Wants to lose weight now to help with overall health. Is inverested in surgical options in the future. Will continue with MWM at this time.      Currently taking Victoza 1.2mg, and is helpful with hunger during the day. Struggles the most with increased hunger and snacking throughout the night, especially when she is up till 4am studying most nights.      Discussed AOMs to help with weight loss:   - Phentermine - previously trialed for 4 months, with no effect on weight.   - Topiramate - previously trial for 4 months, with no effect on weight.   - Naltrexone - can consider in the future.   - GLP-1 - is currently on Victoza 1.2m dose, tolerating well with no side effects. Has been somewhat helpful with hunger, but no effect on weight. Will transition to Wegovy 0.5mg once weekly. Discussed side effects, risks, and benefits. Will monitor GERD symptoms. Transition to Wegovy. Ozempic was denied by insurance. Consider Saxenda.      Follow up visit with Marie Berry, last seen 5/25/23: Was doing well on wegovy, was unsure if she's seen successful weight loss. Discussed bariatric surgery, patient was concerned about permanent nicotine cessation requirements.      New to me 3/21/24:   Became pregnant, stopped wegovy approximately 4 months into pregnancy.   Delivered baby 3/1/24,  named Caleb, both  mom and baby are doing well.   Diet controlled gestational diabetes in 3rd semester   Doing well since pregnancy, no longer breast feeding.      Hoping to restart wegovy today, however is not willing at this time to commit to using contraception or starting any form of birth control while taking Wegovy.  Confirms that she is sexually active with her male partner.    We discussed that Wegovy is not established to be safe in pregnancy due to lack of clinical testing, and is therefore not safe to prescribe at this time.  Patient expressed confusion at first, stating that her baby is very healthy despite her having taken Wegovy in the first few months of pregnancy.  We discussed that while this is wonderful that her baby is doing well, 1 scenario does not ensure the safety of this medication for any future pregnancies.  Patient expressed understanding, will defer starting weight loss medications for now.  Discussed that if patient is willing to utilize appropriate contraception, we can revisit weight loss medications.  Offered to discuss lifestyle adjustments in the meantime, patient declined stating that she is working on diet adjustments via meal plan.    Since last visit:   Started birth control (depo shot), hoping to start wegovy today. Has been working on exercising more, reducing portion size, has seen overall weight maintenance.     Had surgery for bartholin's gland abscess last week, recovering well since then. Had follow up with pcp 2 days ago regarding this, no concerns     Baby is doing well.   Wt Readings from Last 5 Encounters:   05/15/24 109.8 kg (242 lb)   05/08/24 111.1 kg (245 lb)   05/08/24 111.1 kg (245 lb)   05/08/24 111.3 kg (245 lb 6.4 oz)   03/21/24 109.8 kg (242 lb)       Anti-obesity medication history    Current:       Past/Failed/contraindicated:   Wegovy- was taking while pregnant and didn't know, was unsure if it was helpful for weight loss. Some nausea while taking, again is unsure if this  was related to pregnancy.   Phentermine - previously trialed for 4 months, with no effect on weight.   Topiramate - previously trial for 4 months, with no effect on weight.   Victoza 1.2m dose- no side effects. Was somewhat helpful with hunger, but no effect on weight.      Recent diet changes: no major changes, feels like she's eating pretty healthy. Eating fish, crabs.       Recent exercise/activity changes: exercising more       Vitamins/Labs: taking prenatal pill, b12, d3     Pregnancy: Started depo provera     CURRENT WEIGHT:   242 lbs 0 oz    Initial Weight (lbs): 240 lbs  Last Visits Weight: 111.1 kg (245 lb)  Cumulative weight loss (lbs): -2  Weight Loss Percentage: -0.83%        3/20/2024    11:41 AM   Changes and Difficulties   I have made the following changes to my diet since my last visit: meal plans   With regards to my diet, I am still struggling with: pop intake   I have made the following changes to my activity/exercise since my last visit: taking care of baby   With regards to my activity/exercise, I am still struggling with: pain in feet             MEDICATIONS:   Current Outpatient Medications   Medication Sig Dispense Refill    Semaglutide-Weight Management (WEGOVY) 0.25 MG/0.5ML pen Inject 0.25 mg Subcutaneous once a week For 4 weeks 2 mL 0    Semaglutide-Weight Management (WEGOVY) 0.5 MG/0.5ML pen Inject 0.5 mg Subcutaneous once a week After completing 4 weeks of 0.5mg dose 2 mL 0    [START ON 6/26/2024] Semaglutide-Weight Management (WEGOVY) 1 MG/0.5ML pen Inject 1 mg Subcutaneous once a week After completing 4 weeks of 0.5mg dose 2 mL 1    Prenatal Vit-Fe Fumarate-FA (PRENATAL MULTIVITAMIN W/IRON) 27-0.8 MG tablet Take 1 tablet by mouth daily 90 tablet 3    sulfamethoxazole-trimethoprim (BACTRIM DS) 800-160 MG tablet Take 1 tablet by mouth 2 times daily 14 tablet 0           5/8/2024     1:51 PM   Weight Loss Medication History Reviewed With Patient   Which weight loss medications are you  "currently taking on a regular basis? None   If you are not taking a weight loss medication that was prescribed to you, please indicate why: Other   Are you having any side effects from the weight loss medication that we have prescribed you? No               1/30/2020     3:10 PM   ANTHONY Score (Last Two)   ANTHONY Raw Score 36   Activation Score 75.5   ANTHONY Level 4         PHYSICAL EXAM:  Objective    Ht 1.676 m (5' 6\")   Wt 109.8 kg (242 lb)   LMP  (LMP Unknown)   BMI 39.06 kg/m             Vitals:  No vitals were obtained today due to virtual visit.    GENERAL: alert and no distress  EYES: Eyes grossly normal to inspection.  No discharge or erythema, or obvious scleral/conjunctival abnormalities.  RESP: No audible wheeze, cough, or visible cyanosis.    SKIN: Visible skin clear. No significant rash, abnormal pigmentation or lesions.  NEURO: Cranial nerves grossly intact.  Mentation and speech appropriate for age.  PSYCH: Appropriate affect, tone, and pace of words        Sincerely,    Mounika Ness PA-C      24 minutes spent by me on the date of the encounter doing chart review, history and exam, documentation and further activities per the note  "

## 2024-05-15 NOTE — LETTER
5/15/2024       RE: Ayanna Davidson  [id number]   [place] [address]     Dear Colleague,    Thank you for referring your patient, Ayanna Davidson, to the [facility]NAG[facility] [place] at [facility]. Please see a copy of my visit note below.    Virtual Visit Details    Type of service:  Video Visit   Video Start Time:  3:01pm  Video End Time: 3:13pm    Originating Location (pt. Location): Home    Distant Location (provider location):  Off-site  Platform used for Video Visit: [facility] Medical Weight Management Note     Ayanna Davidson  MRN:  [id number]  :  1990  JEFFREY:  5/15/2024    Dear Obed Santiago MD,    I had the pleasure of seeing your patient Ayanna Davidson. She is a 33 year old female who I am continuing to see for treatment of obesity related to:        3/13/2023    12:17 AM   --   I have the following health issues associated with obesity Infertility    GERD (Reflux)    Stress Incontinence       Assessment & Plan  Problem List Items Addressed This Visit       Class 2 severe obesity with serious comorbidity and body mass index (BMI) of 39.0 to 39.9 in adult, unspecified obesity type (H) - Primary    Relevant Medications    Semaglutide-Weight Management (WEGOVY) 0.25 MG/0.5ML pen    Semaglutide-Weight Management (WEGOVY) 0.5 MG/0.5ML pen    Semaglutide-Weight Management (WEGOVY) 1 MG/0.5ML pen (Start on 2024)    Other Relevant Orders    Comprehensive metabolic panel    Hemoglobin A1c    Lipid panel reflex to direct LDL Fasting    Parathyroid Hormone Intact    Vitamin D Deficiency      Plan  Start Wegovy 0.25mg once weekly for 4 weeks, then increase to 0.5mg once weekly. As long as tolerating, can increase to wegovy 1mg after 4 weeks of 0.5mg   Will reach out in 1 month to check in on wegovy start.   Goals we discussed today: working towards 60-90g protein per day, continuing to increase exercise   Labs ordered  today: vitamin d, pth, cmp, A1c, lipids    Follow up with Mounika in 3 months   Keep up the excellent work!     Anti-obesity medication started today for this patient   BISHOP  Has seen successful weight loss with this in the past , stopped due to pregnancy  No history of pancreatitis   No personal or family history of medullary thyroid carcinoma  No personal or family history of Multiple Endocrine Neoplasia Type 2  On birth control: depo provera injection   .          INTERVAL HISTORY:  New re-establish with Marie Berry, 3/15/23  S/p lapband in 2006 by Dr. Perdue in [place]. No complications. Lapband removed 8/2021 by Dr. Escobar in [place]. Removed due to band slipping and uncontrollable GERD.     Weight prior to lapband - 265lb   Oren - 137lbs   Weight today - 240lbs      Weight was stable around 180-200lbs with the lapband. After removal weight gain has been gradual. Has not been able to lose substancial weight since the band was removed. Wants to lose weight now to help with overall health. Is inverested in surgical options in the future. Will continue with MWM at this time.      Currently taking Victoza 1.2mg, and is helpful with hunger during the day. Struggles the most with increased hunger and snacking throughout the night, especially when she is up till 4am studying most nights.      Discussed AOMs to help with weight loss:   - Phentermine - previously trialed for 4 months, with no effect on weight.   - Topiramate - previously trial for 4 months, with no effect on weight.   - Naltrexone - can consider in the future.   - GLP-1 - is currently on Victoza 1.2m dose, tolerating well with no side effects. Has been somewhat helpful with hunger, but no effect on weight. Will transition to Wegovy 0.5mg once weekly. Discussed side effects, risks, and benefits. Will monitor GERD symptoms. Transition to Wegovy. Ozempic was denied by insurance. Consider Saxenda.      Follow up visit with Marie Berry, last seen 5/25/23:  Was doing well on wegovy, was unsure if she's seen successful weight loss. Discussed bariatric surgery, patient was concerned about permanent nicotine cessation requirements.      New to me 3/21/24:   Became pregnant, stopped wegovy approximately 4 months into pregnancy.   Delivered baby 3/1/24,  named Caleb, both mom and baby are doing well.   Diet controlled gestational diabetes in 3rd semester   Doing well since pregnancy, no longer breast feeding.      Hoping to restart wegovy today, however is not willing at this time to commit to using contraception or starting any form of birth control while taking Wegovy.  Confirms that she is sexually active with her male partner.    We discussed that Wegovy is not established to be safe in pregnancy due to lack of clinical testing, and is therefore not safe to prescribe at this time.  Patient expressed confusion at first, stating that her baby is very healthy despite her having taken Wegovy in the first few months of pregnancy.  We discussed that while this is wonderful that her baby is doing well, 1 scenario does not ensure the safety of this medication for any future pregnancies.  Patient expressed understanding, will defer starting weight loss medications for now.  Discussed that if patient is willing to utilize appropriate contraception, we can revisit weight loss medications.  Offered to discuss lifestyle adjustments in the meantime, patient declined stating that she is working on diet adjustments via meal plan.    Since last visit:   Started birth control (depo shot), hoping to start wegovy today. Has been working on exercising more, reducing portion size, has seen overall weight maintenance.     Had surgery for bartholin's gland abscess last week, recovering well since then. Had follow up with pcp 2 days ago regarding this, no concerns     Baby is doing well.   Wt Readings from Last 5 Encounters:   05/15/24 109.8 kg (242 lb)   05/08/24 111.1 kg (245 lb)   05/08/24  111.1 kg (245 lb)   05/08/24 111.3 kg (245 lb 6.4 oz)   03/21/24 109.8 kg (242 lb)       Anti-obesity medication history    Current:       Past/Failed/contraindicated:   Wegovy- was taking while pregnant and didn't know, was unsure if it was helpful for weight loss. Some nausea while taking, again is unsure if this was related to pregnancy.   Phentermine - previously trialed for 4 months, with no effect on weight.   Topiramate - previously trial for 4 months, with no effect on weight.   Victoza 1.2m dose- no side effects. Was somewhat helpful with hunger, but no effect on weight.      Recent diet changes: no major changes, feels like she's eating pretty healthy. Eating fish, crabs.       Recent exercise/activity changes: exercising more       Vitamins/Labs: taking prenatal pill, b12, d3     Pregnancy: Started depo provera     CURRENT WEIGHT:   242 lbs 0 oz    Initial Weight (lbs): 240 lbs  Last Visits Weight: 111.1 kg (245 lb)  Cumulative weight loss (lbs): -2  Weight Loss Percentage: -0.83%        3/20/2024    11:41 AM   Changes and Difficulties   I have made the following changes to my diet since my last visit: meal plans   With regards to my diet, I am still struggling with: pop intake   I have made the following changes to my activity/exercise since my last visit: taking care of baby   With regards to my activity/exercise, I am still struggling with: pain in feet             MEDICATIONS:   Current Outpatient Medications   Medication Sig Dispense Refill    Semaglutide-Weight Management (WEGOVY) 0.25 MG/0.5ML pen Inject 0.25 mg Subcutaneous once a week For 4 weeks 2 mL 0    Semaglutide-Weight Management (WEGOVY) 0.5 MG/0.5ML pen Inject 0.5 mg Subcutaneous once a week After completing 4 weeks of 0.5mg dose 2 mL 0    [START ON 6/26/2024] Semaglutide-Weight Management (WEGOVY) 1 MG/0.5ML pen Inject 1 mg Subcutaneous once a week After completing 4 weeks of 0.5mg dose 2 mL 1    Prenatal Vit-Fe Fumarate-FA (PRENATAL  "MULTIVITAMIN W/IRON) 27-0.8 MG tablet Take 1 tablet by mouth daily 90 tablet 3    sulfamethoxazole-trimethoprim (BACTRIM DS) 800-160 MG tablet Take 1 tablet by mouth 2 times daily 14 tablet 0           5/8/2024     1:51 PM   Weight Loss Medication History Reviewed With Patient   Which weight loss medications are you currently taking on a regular basis? None   If you are not taking a weight loss medication that was prescribed to you, please indicate why: Other   Are you having any side effects from the weight loss medication that we have prescribed you? No               1/30/2020     3:10 PM   ANTHONY Score (Last Two)   ANTHONY Raw Score 36   Activation Score 75.5   ANTHONY Level 4         PHYSICAL EXAM:  Objective   Ht 1.676 m (5' 6\")   Wt 109.8 kg (242 lb)   LMP  (LMP Unknown)   BMI 39.06 kg/m             Vitals:  No vitals were obtained today due to virtual visit.    GENERAL: alert and no distress  EYES: Eyes grossly normal to inspection.  No discharge or erythema, or obvious scleral/conjunctival abnormalities.  RESP: No audible wheeze, cough, or visible cyanosis.    SKIN: Visible skin clear. No significant rash, abnormal pigmentation or lesions.  NEURO: Cranial nerves grossly intact.  Mentation and speech appropriate for age.  PSYCH: Appropriate affect, tone, and pace of words        Sincerely,    Mounika Ness PA-C      24 minutes spent by me on the date of the encounter doing chart review, history and exam, documentation and further activities per the note  "

## 2024-05-15 NOTE — NURSING NOTE
Is the patient currently in the state of [place]? YES    Visit mode:VIDEO    If the visit is dropped, the patient can be reconnected by: VIDEO VISIT: Text to cell phone:   Telephone Information:   Mobile [phone]    and VIDEO VISIT: Send to e-mail at: [email or web address]    Will anyone else be joining the visit? NO  (If patient encounters technical issues they should call [phone])    How would you like to obtain your AVS? MyChart    Are changes needed to the allergy or medication list? No    Are refills needed on medications prescribed by this physician? NO    Reason for visit: RECHMARINA LASSITER

## 2024-05-20 ENCOUNTER — MYC MEDICAL ADVICE (OUTPATIENT)
Dept: FAMILY MEDICINE | Facility: CLINIC | Age: 34
End: 2024-05-20
Payer: COMMERCIAL

## 2024-05-22 ENCOUNTER — TELEPHONE (OUTPATIENT)
Dept: ENDOCRINOLOGY | Facility: CLINIC | Age: 34
End: 2024-05-22
Payer: COMMERCIAL

## 2024-05-22 NOTE — TELEPHONE ENCOUNTER
FYI - Status Update    Who is Calling: patient    Update: Insurance will be reaching out shortly to request prior authorization from care team    Does caller want a call/response back: Yes     Could we send this information to you in [employer] or would you prefer to receive a phone call?:   Patient would prefer a phone call   Okay to leave a detailed message?: Yes at Cell number on file:    Telephone Information:   Mobile [phone]

## 2024-05-22 NOTE — TELEPHONE ENCOUNTER
Left Voicemail (1st Attempt) for the patient to call back and schedule the following:    Appointment type: RTN MWM  Provider: Mounika Ness  Return date: Aug 2024  Specialty phone number: [phone]

## 2024-05-23 NOTE — TELEPHONE ENCOUNTER
PA Initiation    Medication: WEGOVY 0.25 MG/0.5ML SC SOAJ  Insurance Company: [employer] - Phone [phone] Fax [phone]  Pharmacy Filling the Rx: [facility] [address] [place] [address] [id number] [facility]  Filling Pharmacy Phone: [phone]  Filling Pharmacy Fax: [phone]  Start Date: 5/23/2024

## 2024-05-24 NOTE — TELEPHONE ENCOUNTER
Prior Authorization Approval    Medication: WEGOVY 0.25 MG/0.5ML SC SOAJ  Authorization Effective Date: 5/23/2024  Authorization Expiration Date: 11/11/2024  Approved Dose/Quantity: 1 month  Reference #: [id number] [id number]   Insurance Company: [employer] - Phone [phone] Fax [phone]  Expected CoPay: $    CoPay Card Available:      Financial Assistance Needed: N/A  Which Pharmacy is filling the prescription: [facility] [address] [place] [id number] [address] [place] [address]  Pharmacy Notified: called pharmacy  Patient Notified: [employer] message sent to patient

## 2024-05-24 NOTE — TELEPHONE ENCOUNTER
PA Approved for Wegovy. Called pharmacy left message and mychart message sent to patient. Closing encounter

## 2024-06-04 ENCOUNTER — TELEPHONE (OUTPATIENT)
Dept: ENDOCRINOLOGY | Facility: CLINIC | Age: 34
End: 2024-06-04

## 2024-06-04 NOTE — TELEPHONE ENCOUNTER
Called and spoke with patient in regards to labs. Advised patient that these are fasting labs. No further questions.

## 2024-06-04 NOTE — TELEPHONE ENCOUNTER
General Call    Contacts         Type Contact Phone/Fax    06/04/2024 07:59 AM CDT Phone (Incoming) JulespaDeaconjessica MENDOZA (Self) [phone] (M)          Reason for Call:  Needs to know I she needs to fast for labs she wants to do today.      Could we send this information to you in [email or web address] or would you prefer to receive a phone call?:   Patient would prefer a phone call   Okay to leave a detailed message?: Yes at Cell number on file:    Telephone Information:   Mobile [phone]

## 2024-06-05 ENCOUNTER — PATIENT OUTREACH (OUTPATIENT)
Dept: FAMILY MEDICINE | Facility: CLINIC | Age: 34
End: 2024-06-05
Payer: COMMERCIAL

## 2024-06-06 ENCOUNTER — MYC REFILL (OUTPATIENT)
Dept: ENDOCRINOLOGY | Facility: CLINIC | Age: 34
End: 2024-06-06
Payer: COMMERCIAL

## 2024-06-06 DIAGNOSIS — E66.01 CLASS 2 SEVERE OBESITY WITH SERIOUS COMORBIDITY AND BODY MASS INDEX (BMI) OF 39.0 TO 39.9 IN ADULT, UNSPECIFIED OBESITY TYPE (H): ICD-10-CM

## 2024-06-06 DIAGNOSIS — E66.812 CLASS 2 SEVERE OBESITY WITH SERIOUS COMORBIDITY AND BODY MASS INDEX (BMI) OF 39.0 TO 39.9 IN ADULT, UNSPECIFIED OBESITY TYPE (H): ICD-10-CM

## 2024-06-06 RX ORDER — SEMAGLUTIDE 0.5 MG/.5ML
0.5 INJECTION, SOLUTION SUBCUTANEOUS WEEKLY
Qty: 2 ML | Refills: 0 | Status: SHIPPED | OUTPATIENT
Start: 2024-06-06 | End: 2024-08-15 | Stop reason: DRUGHIGH

## 2024-06-07 ENCOUNTER — LAB (OUTPATIENT)
Dept: LAB | Facility: CLINIC | Age: 34
End: 2024-06-07
Payer: COMMERCIAL

## 2024-06-07 DIAGNOSIS — E66.01 CLASS 2 SEVERE OBESITY WITH SERIOUS COMORBIDITY AND BODY MASS INDEX (BMI) OF 39.0 TO 39.9 IN ADULT, UNSPECIFIED OBESITY TYPE (H): ICD-10-CM

## 2024-06-07 DIAGNOSIS — E66.812 CLASS 2 SEVERE OBESITY WITH SERIOUS COMORBIDITY AND BODY MASS INDEX (BMI) OF 39.0 TO 39.9 IN ADULT, UNSPECIFIED OBESITY TYPE (H): ICD-10-CM

## 2024-06-07 LAB
ALBUMIN SERPL BCG-MCNC: 4.6 G/DL (ref 3.5–5.2)
ALP SERPL-CCNC: 70 U/L (ref 40–150)
ALT SERPL W P-5'-P-CCNC: 24 U/L (ref 0–50)
ANION GAP SERPL CALCULATED.3IONS-SCNC: 10 MMOL/L (ref 7–15)
AST SERPL W P-5'-P-CCNC: 19 U/L (ref 0–45)
BILIRUB SERPL-MCNC: 0.7 MG/DL
BUN SERPL-MCNC: 11.2 MG/DL (ref 6–20)
CALCIUM SERPL-MCNC: 9.8 MG/DL (ref 8.6–10)
CHLORIDE SERPL-SCNC: 102 MMOL/L (ref 98–107)
CHOLEST SERPL-MCNC: 177 MG/DL
CREAT SERPL-MCNC: 0.85 MG/DL (ref 0.51–0.95)
DEPRECATED HCO3 PLAS-SCNC: 26 MMOL/L (ref 22–29)
EGFRCR SERPLBLD CKD-EPI 2021: >90 ML/MIN/1.73M2
FASTING STATUS PATIENT QL REPORTED: YES
FASTING STATUS PATIENT QL REPORTED: YES
GLUCOSE SERPL-MCNC: 100 MG/DL (ref 70–99)
HBA1C MFR BLD: 5.1 %
HDLC SERPL-MCNC: 49 MG/DL
LDLC SERPL CALC-MCNC: 84 MG/DL
NONHDLC SERPL-MCNC: 128 MG/DL
POTASSIUM SERPL-SCNC: 3.7 MMOL/L (ref 3.4–5.3)
PROT SERPL-MCNC: 7.3 G/DL (ref 6.4–8.3)
SODIUM SERPL-SCNC: 138 MMOL/L (ref 135–145)
TRIGL SERPL-MCNC: 220 MG/DL

## 2024-06-07 PROCEDURE — 83970 ASSAY OF PARATHORMONE: CPT

## 2024-06-07 PROCEDURE — 36415 COLL VENOUS BLD VENIPUNCTURE: CPT

## 2024-06-07 PROCEDURE — 80053 COMPREHEN METABOLIC PANEL: CPT

## 2024-06-07 PROCEDURE — 82306 VITAMIN D 25 HYDROXY: CPT

## 2024-06-07 PROCEDURE — 80061 LIPID PANEL: CPT

## 2024-06-07 PROCEDURE — 83036 HEMOGLOBIN GLYCOSYLATED A1C: CPT

## 2024-06-08 LAB
PTH-INTACT SERPL-MCNC: 33 PG/ML (ref 15–65)
VIT D+METAB SERPL-MCNC: 30 NG/ML (ref 20–50)

## 2024-07-14 SDOH — HEALTH STABILITY: PHYSICAL HEALTH: ON AVERAGE, HOW MANY DAYS PER WEEK DO YOU ENGAGE IN MODERATE TO STRENUOUS EXERCISE (LIKE A BRISK WALK)?: 4 DAYS

## 2024-07-14 SDOH — HEALTH STABILITY: PHYSICAL HEALTH: ON AVERAGE, HOW MANY MINUTES DO YOU ENGAGE IN EXERCISE AT THIS LEVEL?: 20 MIN

## 2024-07-14 ASSESSMENT — SOCIAL DETERMINANTS OF HEALTH (SDOH): HOW OFTEN DO YOU GET TOGETHER WITH FRIENDS OR RELATIVES?: TWICE A WEEK

## 2024-07-19 ENCOUNTER — PATIENT OUTREACH (OUTPATIENT)
Dept: ONCOLOGY | Facility: CLINIC | Age: 34
End: 2024-07-19

## 2024-07-19 ENCOUNTER — MEDICAL CORRESPONDENCE (OUTPATIENT)
Dept: HEALTH INFORMATION MANAGEMENT | Facility: CLINIC | Age: 34
End: 2024-07-19

## 2024-07-19 ENCOUNTER — OFFICE VISIT (OUTPATIENT)
Dept: FAMILY MEDICINE | Facility: CLINIC | Age: 34
End: 2024-07-19
Payer: COMMERCIAL

## 2024-07-19 VITALS
DIASTOLIC BLOOD PRESSURE: 84 MMHG | HEART RATE: 99 BPM | HEIGHT: 64 IN | TEMPERATURE: 97.5 F | RESPIRATION RATE: 18 BRPM | BODY MASS INDEX: 40.49 KG/M2 | WEIGHT: 237.2 LBS | OXYGEN SATURATION: 97 % | SYSTOLIC BLOOD PRESSURE: 102 MMHG

## 2024-07-19 DIAGNOSIS — M67.442 GANGLION CYST OF TENDON SHEATH OF LEFT HAND: ICD-10-CM

## 2024-07-19 DIAGNOSIS — Z80.0 FAMILY HISTORY OF COLON CANCER: ICD-10-CM

## 2024-07-19 DIAGNOSIS — Z11.3 ROUTINE SCREENING FOR STI (SEXUALLY TRANSMITTED INFECTION): ICD-10-CM

## 2024-07-19 DIAGNOSIS — Z80.41 FAMILY HISTORY OF MALIGNANT NEOPLASM OF OVARY: ICD-10-CM

## 2024-07-19 DIAGNOSIS — Z82.49 FAMILY HISTORY OF ISCHEMIC HEART DISEASE: ICD-10-CM

## 2024-07-19 DIAGNOSIS — Z12.4 CERVICAL CANCER SCREENING: Primary | ICD-10-CM

## 2024-07-19 DIAGNOSIS — M79.642 PAIN OF LEFT HAND: ICD-10-CM

## 2024-07-19 PROCEDURE — 87624 HPV HI-RISK TYP POOLED RSLT: CPT | Performed by: FAMILY MEDICINE

## 2024-07-19 PROCEDURE — 87491 CHLMYD TRACH DNA AMP PROBE: CPT | Performed by: FAMILY MEDICINE

## 2024-07-19 PROCEDURE — 99395 PREV VISIT EST AGE 18-39: CPT | Mod: 25 | Performed by: FAMILY MEDICINE

## 2024-07-19 PROCEDURE — G0145 SCR C/V CYTO,THINLAYER,RESCR: HCPCS | Performed by: FAMILY MEDICINE

## 2024-07-19 PROCEDURE — 90677 PCV20 VACCINE IM: CPT | Performed by: FAMILY MEDICINE

## 2024-07-19 PROCEDURE — 90472 IMMUNIZATION ADMIN EACH ADD: CPT | Performed by: FAMILY MEDICINE

## 2024-07-19 PROCEDURE — 90471 IMMUNIZATION ADMIN: CPT | Performed by: FAMILY MEDICINE

## 2024-07-19 PROCEDURE — 90713 POLIOVIRUS IPV SC/IM: CPT | Performed by: FAMILY MEDICINE

## 2024-07-19 PROCEDURE — 99213 OFFICE O/P EST LOW 20 MIN: CPT | Mod: 25 | Performed by: FAMILY MEDICINE

## 2024-07-19 ASSESSMENT — PAIN SCALES - GENERAL: PAINLEVEL: MILD PAIN (2)

## 2024-07-19 NOTE — PROGRESS NOTES
"Preventive Care Visit  [facility]  Rhianna Michaels MD, Family Medicine  2024      Assessment & Plan     (Z12.4) Cervical cancer screening  (primary encounter diagnosis)  Comment: Hx of Yvon III and intermittent Hpv positivity. Exam benign today. III will do pap today but can likley return to normal schedule after.   Plan: Pap Screen with HPV - Recommended Age 30 - 65         Years    (M67.442) Ganglion cyst of tendon sheath of left hand  Comment: Pt requesting ref to ortho for follow up on acute on chronic worsening of hand pain related to ganglion cyst recurrence.   Plan: Orthopedic  Referral    (M79.642) Pain of left hand  Comment: as above  Plan: Orthopedic  Referral    (Z11.3) Routine screening for STI (sexually transmitted infection)  Comment: Per pt request  Plan: Chlamydia trachomatis PCR, NEISSERIA GONORRHOEA        PCR    (Z82.49) Family history of ischemic heart disease  Comment: ref to coronary calcium studies.   Review of records indicates mild hypertriglyceridemia in the past will continue to monitor. Per pt father  in his forties from CAD suddenly.       (Z80.41) Family history of malignant neoplasm of ovary  Comment: ref genetics.   Plan: Adult Genetics & Metabolism  Referral      (Z80.0) Family history of colon cancer  Comment: ref genetics        Patient has been advised of split billing requirements and indicates understanding: Yes    The longitudinal plan of care for the diagnosis(es)/condition(s) as documented were addressed during this visit. Due to the added complexity in care, I will continue to support Ayanna in the subsequent management and with ongoing continuity of care.      BMI  Estimated body mass index is 40.72 kg/m  as calculated from the following:    Height as of this encounter: 1.626 m (5' 4\").    Weight as of this encounter: 107.6 kg (237 lb 3.2 oz).   Weight management plan: Discussed healthy diet and " exercise guidelines    Counseling  Appropriate preventive services were addressed with this patient via screening, questionnaire, or discussion as appropriate for fall prevention, nutrition, physical activity, Tobacco-use cessation, weight loss and cognition.  Checklist reviewing preventive services available has been given to the patient.  Reviewed patient's diet, addressing concerns and/or questions.   She is at risk for psychosocial distress and has been provided with information to reduce risk.         Colleen Urena is a 34 year old, presenting for the following:  Physical        7/19/2024    12:47 PM   Additional Questions   Roomed by Margaret MORATAYA        Health Care Directive  Patient does not have a Health Care Directive or Living Will: Discussed advance care planning with patient; however, patient declined at this time.    Patient is a 33 y/o F with multiple issues she would like to be assessed for today:     1. Desires pap and gc/chlamydia testing: hx of vulvar bx and found to have TINO 2 prior . Paps have been routinely NILM but with intermittently positive HPV. Last two paps without HPV.   2. Hand pain: hx of left ganglion cyst removal now with recurrence and bruising/pain at the site requesting ortho ref.   3. Strong FH of both ovarian and breast CA: discussed ref to genetics for risk assessment and pt amenable.   4. Strong FH of coronary artery disease. Pt reports elevated triglycerides as well. Amenable to coronary calcium studies.             7/14/2024   General Health   How would you rate your overall physical health? Good   Feel stress (tense, anxious, or unable to sleep) Only a little      (!) STRESS CONCERN      7/14/2024   Nutrition   Three or more servings of calcium each day? Yes   Diet: I don't know   How many servings of fruit and vegetables per day? (!) 2-3   How many sweetened beverages each day? (!) 2            7/14/2024   Exercise   Days per week of moderate/strenous exercise 4 days    Average minutes spent exercising at this level 20 min            7/14/2024   Social Factors   Frequency of gathering with friends or relatives Twice a week   Worry food won't last until get money to buy more No   Food not last or not have enough money for food? No   Do you have housing? (Housing is defined as stable permanent housing and does not include staying ouside in a car, in a tent, in an abandoned building, in an overnight shelter, or couch-surfing.) Yes   Are you worried about losing your housing? No   Lack of transportation? No   Unable to get utilities (heat,electricity)? No            7/14/2024   Dental   Dentist two times every year? Yes            7/14/2024   TB Screening   Were you born outside of the [place]? No            Today's PHQ-9 Score:       5/8/2024     9:14 AM   PHQ-9 SCORE   PHQ-9 Total Score 4           7/14/2024   Substance Use   Alcohol more than 3/day or more than 7/wk No   Do you use any other substances recreationally? No        Social History     Tobacco Use    Smoking status: Some Days     Current packs/day: 0.50     Average packs/day: 0.5 packs/day for 10.0 years (5.0 ttl pk-yrs)     Types: Cigarettes    Smokeless tobacco: Current    Tobacco comments:     uses E cig with zero nicotine    Vaping Use    Vaping status: Every Day    Substances: no nicotine   Substance Use Topics    Alcohol use: Not Currently     Comment: Reports last use 9/17 and denies use during pregnancy    Drug use: Not Currently     Types: Marijuana, Methamphetamines     Comment: Reports Marijuana and meth use one week ago           8/11/2022   LAST FHS-7 RESULTS   1st degree relative breast or ovarian cancer Yes   Any relative bilateral breast cancer Unknown   Any male have breast cancer No   Any ONE woman have BOTH breast AND ovarian cancer Yes   Any woman with breast cancer before 50yrs No   2 or more relatives with breast AND/OR ovarian cancer Yes   2 or more relatives with breast AND/OR bowel cancer Yes            Mammogram Screening - Patient under 40 years of age: Routine Mammogram Screening not recommended.         2024   STI Screening   New sexual partner(s) since last STI/HIV test? No        History of abnormal Pap smear: No - age 30- 64 PAP with HPV every 5 years recommended        Latest Ref Rng & Units 2023     3:07 PM 2022    12:05 PM 3/23/2021     3:44 PM   PAP / HPV   PAP  Negative for Intraepithelial Lesion or Malignancy (NILM)  Negative for Intraepithelial Lesion or Malignancy (NILM)     PAP (Historical)    NIL    HPV 16 DNA Negative Negative  Negative  Negative    HPV 18 DNA Negative Negative  Negative  Negative    Other HR HPV Negative Negative  Negative  Positive            2024   Contraception/Family Planning   Questions about contraception or family planning No           Reviewed and updated as needed this visit by Provider                    Past Medical History:   Diagnosis Date    Cervical high risk HPV (human papillomavirus) test positive 2019    last PAP NIL (), remote hx of LEEP    Gastroesophageal reflux disease     HSV (herpes simplex virus) infection     1 & 2    Methamphetamine abuse (H)     reports daily use    recurrent Bartholin's cyst      Past Surgical History:   Procedure Laterality Date    BIOPSY       SECTION N/A 3/1/2024    Procedure: PRIMARY  SECTION;  Surgeon: Inderjit Olivas MD;  [facility]    CHOLECYSTECTOMY      ENT SURGERY      ESOPHAGOSCOPY, GASTROSCOPY, DUODENOSCOPY (EGD), COMBINED N/A 2023    Procedure: Esophagoscopy, gastroscopy, duodenoscopy (EGD), combined;  Surgeon: Thierno Escobar MD;  Location: [facility]    EXAM UNDER ANESTHESIA PELVIC N/A 2020    Procedure: EXAM UNDER ANESTHESIA, PELVIS, PAP;  Surgeon: Froylan Schwarz MD;  [facility]    EXCISE VULVA WIDE LOCAL Bilateral 2020    Procedure: Right Vulva Wedge Resection and Incision and Drainage Left Vulva;  Surgeon: Froylan Schwarz,  "MD;  [facility]    INCISION AND DRAINAGE ABSCESS PELVIS, COMBINED N/A 2018    Procedure: COMBINED INCISION AND DRAINAGE ABSCESS PELVIS;  INCISION AND DRAINAGE LABIAL ABSCESS;  Surgeon: Jsae Beach MD;  [facility]    INCISION AND DRAINAGE ABSCESS PELVIS, COMBINED N/A 2019    Procedure: Incision and Drainage Right Labial Abscess;  Surgeon: Jase Beach MD;  [facility]    INCISION AND DRAINAGE LOWER EXTREMITY, COMBINED Right 2019    Procedure: irrigation and debridement right leg dog bite;  Surgeon: Rommel Pérez MD;  [facility]    LAP ADJUSTABLE GASTRIC BAND      LEEP TX, CERVICAL      MAMMOPLASTY REDUCTION BILATERAL      MARSUPIALIZATION BARTHOLIN CYST N/A 2019    Procedure: Bartholin's Cyst removal;  Surgeon: Froylan Schwarz MD;  [facility]    MARSUPIALIZATION BARTHOLIN CYST Left 2020    Procedure: Excision of left bartholin's abscess;  Surgeon: Froylan Schwarz MD;  [facility]    MARSUPIALIZATION BARTHOLIN CYST Right 2024    Procedure: MARSUPIALIZATION, CYST, BARTHOLIN'S GLAND;  Surgeon: Jase Man MD;  [facility]    ORTHOPEDIC SURGERY       OB History    Para Term  AB Living   1 1 1 0 0 1   SAB IAB Ectopic Multiple Live Births   0 0 0 0 1      # Outcome Date GA Lbr Ky/2nd Weight Sex Type Anes PTL Lv   1 Term 24 40w1d  4.085 kg (9 lb 0.1 oz) M CS-LTranv Spinal N MARCOS      Name: Caleb Davidson      Apgar1: 7  Apgar5: 9      Obstetric Comments   Unsure LMP.    Est EDC 2024 based on pos home UPT and early pregnancy symptoms.  Plans to parent  and unsure about father of the baby's involvement.         Comprehensive ROS negative except for what is mentioned in HPI     Objective    Exam  /84   Pulse 99   Temp 97.5  F (36.4  C) (Temporal)   Resp 18   Ht 1.626 m (5' 4\")   Wt 107.6 kg (237 lb 3.2 oz)   LMP 2024 (Approximate)   SpO2 97%   Breastfeeding No   BMI " "40.72 kg/m     Estimated body mass index is 40.72 kg/m  as calculated from the following:    Height as of this encounter: 1.626 m (5' 4\").    Weight as of this encounter: 107.6 kg (237 lb 3.2 oz).    Physical Exam  Constitutional:       General: She is not in acute distress.     Appearance: She is obese. She is not ill-appearing, toxic-appearing or diaphoretic.   HENT:      Head: Normocephalic.      Right Ear: Tympanic membrane normal.      Left Ear: Tympanic membrane normal.      Mouth/Throat:      Mouth: Mucous membranes are dry.   Eyes:      Pupils: Pupils are equal, round, and reactive to light.   Cardiovascular:      Rate and Rhythm: Normal rate and regular rhythm.      Pulses: Normal pulses.   Pulmonary:      Effort: Pulmonary effort is normal. No respiratory distress.      Breath sounds: Normal breath sounds. No stridor. No wheezing, rhonchi or rales.   Abdominal:      General: Abdomen is flat.      Palpations: Abdomen is soft.   Genitourinary:     Exam position: Supine.      Vagina: Normal.      Cervix: Normal.      Uterus: Normal.       Adnexa: Right adnexa normal and left adnexa normal.   Skin:     General: Skin is warm and dry.      Capillary Refill: Capillary refill takes less than 2 seconds.   Neurological:      Mental Status: She is alert. Mental status is at baseline.   Psychiatric:         Mood and Affect: Mood normal.         Behavior: Behavior normal.         Thought Content: Thought content normal.         Judgment: Judgment normal.               Signed Electronically by: Rhianna Michaels MD    "

## 2024-07-19 NOTE — PROGRESS NOTES
Writer received referral to Cancer Risk Management/Genetic Counseling.    Referred for: Genetic Counseling; multiple family members with ovarian and breast cancer, including possible colon cancer per patient.    Referred by:   Provider Department Location Phone   Rhianna Michaels MD [facility] [phone]     Referral reviewed for appropriate plan and sent to New Patient Scheduling ([phone]) for completion.

## 2024-07-20 ENCOUNTER — MYC MEDICAL ADVICE (OUTPATIENT)
Dept: FAMILY MEDICINE | Facility: CLINIC | Age: 34
End: 2024-07-20
Payer: COMMERCIAL

## 2024-07-20 DIAGNOSIS — J20.9 ACUTE BRONCHITIS, UNSPECIFIED ORGANISM: ICD-10-CM

## 2024-07-20 DIAGNOSIS — F17.200 SMOKER: ICD-10-CM

## 2024-07-20 LAB — C TRACH DNA SPEC QL NAA+PROBE: POSITIVE

## 2024-07-22 ENCOUNTER — TELEPHONE (OUTPATIENT)
Dept: FAMILY MEDICINE | Facility: CLINIC | Age: 34
End: 2024-07-22
Payer: COMMERCIAL

## 2024-07-22 ENCOUNTER — MYC MEDICAL ADVICE (OUTPATIENT)
Dept: FAMILY MEDICINE | Facility: CLINIC | Age: 34
End: 2024-07-22
Payer: COMMERCIAL

## 2024-07-22 DIAGNOSIS — A74.9 CHLAMYDIA INFECTION: Primary | ICD-10-CM

## 2024-07-22 DIAGNOSIS — E66.01 CLASS 2 SEVERE OBESITY WITH SERIOUS COMORBIDITY AND BODY MASS INDEX (BMI) OF 39.0 TO 39.9 IN ADULT, UNSPECIFIED OBESITY TYPE (H): ICD-10-CM

## 2024-07-22 DIAGNOSIS — E66.812 CLASS 2 SEVERE OBESITY WITH SERIOUS COMORBIDITY AND BODY MASS INDEX (BMI) OF 39.0 TO 39.9 IN ADULT, UNSPECIFIED OBESITY TYPE (H): ICD-10-CM

## 2024-07-22 LAB
HPV HR 12 DNA CVX QL NAA+PROBE: NEGATIVE
HPV16 DNA CVX QL NAA+PROBE: NEGATIVE
HPV18 DNA CVX QL NAA+PROBE: NEGATIVE
HUMAN PAPILLOMA VIRUS FINAL DIAGNOSIS: NORMAL

## 2024-07-22 RX ORDER — AZITHROMYCIN 500 MG/1
1000 TABLET, FILM COATED ORAL DAILY
Qty: 2 TABLET | Refills: 0 | Status: SHIPPED | OUTPATIENT
Start: 2024-07-22 | End: 2024-07-23

## 2024-07-22 NOTE — TELEPHONE ENCOUNTER
See phone note/lab notes from Dr. Michaels dated from 7/22/24. Attempted to call patient today.    RN Triage    Patient Contact    Attempt # 1    Was call answered?  No.  Left message on voicemail with information to call me back.      CHIARA RichN, RN

## 2024-07-22 NOTE — TELEPHONE ENCOUNTER
RN Triage    Patient Contact    Attempt # 1    Was call answered?  No.  Left message on voicemail with information to call me back. See MyChart from 7/20/24.    CHIARA RichN, RN

## 2024-07-22 NOTE — TELEPHONE ENCOUNTER
Rhianna Michaels MD [facility]  Hey it's my admin day but PT just tested positive for Chlamydia and will need treatment. I can send rx. I haven't figured out myCmariaelenat on haiku yet. Can you call her and let her know? I will send an Rx for azithromycin. We will also need to time a test of cure for about 4-6 weeks from now.    Thank you!    RR

## 2024-07-22 NOTE — PROGRESS NOTES
Pt tested positive for Chlamydia. Messages routed to RN staff to call PT. Rx sent for azithromycin. Need appt for test of cure

## 2024-07-23 DIAGNOSIS — A74.9 CHLAMYDIA INFECTION: Primary | ICD-10-CM

## 2024-07-23 RX ORDER — AZITHROMYCIN 500 MG/1
1000 TABLET, FILM COATED ORAL DAILY
Qty: 2 TABLET | Refills: 0 | Status: SHIPPED | OUTPATIENT
Start: 2024-07-23

## 2024-07-23 RX ORDER — AZITHROMYCIN 250 MG/1
TABLET, FILM COATED ORAL
Qty: 6 TABLET | Refills: 0 | OUTPATIENT
Start: 2024-07-23

## 2024-07-24 ENCOUNTER — MYC MEDICAL ADVICE (OUTPATIENT)
Dept: FAMILY MEDICINE | Facility: CLINIC | Age: 34
End: 2024-07-24
Payer: COMMERCIAL

## 2024-07-24 DIAGNOSIS — R30.0 DYSURIA: Primary | ICD-10-CM

## 2024-07-24 PROBLEM — T14.8XXA OPEN WOUND: Status: RESOLVED | Noted: 2020-03-23 | Resolved: 2024-07-24

## 2024-07-24 PROBLEM — O47.00 PRETERM CONTRACTIONS: Status: RESOLVED | Noted: 2024-02-07 | Resolved: 2024-07-24

## 2024-07-24 PROBLEM — Z01.818 PREOPERATIVE EXAMINATION: Status: RESOLVED | Noted: 2020-01-28 | Resolved: 2024-07-24

## 2024-07-25 ENCOUNTER — ANCILLARY PROCEDURE (OUTPATIENT)
Dept: GENERAL RADIOLOGY | Facility: CLINIC | Age: 34
End: 2024-07-25
Attending: ORTHOPAEDIC SURGERY
Payer: COMMERCIAL

## 2024-07-25 ENCOUNTER — OFFICE VISIT (OUTPATIENT)
Dept: ORTHOPEDICS | Facility: CLINIC | Age: 34
End: 2024-07-25
Attending: FAMILY MEDICINE
Payer: COMMERCIAL

## 2024-07-25 VITALS
RESPIRATION RATE: 18 BRPM | DIASTOLIC BLOOD PRESSURE: 85 MMHG | HEIGHT: 64 IN | WEIGHT: 237 LBS | HEART RATE: 91 BPM | SYSTOLIC BLOOD PRESSURE: 117 MMHG | BODY MASS INDEX: 40.46 KG/M2

## 2024-07-25 DIAGNOSIS — M25.521 RIGHT ELBOW PAIN: Primary | ICD-10-CM

## 2024-07-25 DIAGNOSIS — M79.642 PAIN OF LEFT HAND: ICD-10-CM

## 2024-07-25 DIAGNOSIS — M67.442 GANGLION CYST OF TENDON SHEATH OF LEFT HAND: ICD-10-CM

## 2024-07-25 DIAGNOSIS — M18.12 PRIMARY OSTEOARTHRITIS OF FIRST CARPOMETACARPAL JOINT OF LEFT HAND: ICD-10-CM

## 2024-07-25 DIAGNOSIS — M25.521 RIGHT ELBOW PAIN: ICD-10-CM

## 2024-07-25 DIAGNOSIS — G56.21 CUBITAL TUNNEL SYNDROME ON RIGHT: ICD-10-CM

## 2024-07-25 LAB
BKR LAB AP GYN ADEQUACY: NORMAL
BKR LAB AP GYN INTERPRETATION: NORMAL
BKR LAB AP PREVIOUS ABNORMAL: NORMAL
PATH REPORT.COMMENTS IMP SPEC: NORMAL
PATH REPORT.COMMENTS IMP SPEC: NORMAL
PATH REPORT.RELEVANT HX SPEC: NORMAL

## 2024-07-25 PROCEDURE — 99243 OFF/OP CNSLTJ NEW/EST LOW 30: CPT | Performed by: ORTHOPAEDIC SURGERY

## 2024-07-25 PROCEDURE — 73130 X-RAY EXAM OF HAND: CPT | Mod: TC | Performed by: STUDENT IN AN ORGANIZED HEALTH CARE EDUCATION/TRAINING PROGRAM

## 2024-07-25 PROCEDURE — 73080 X-RAY EXAM OF ELBOW: CPT | Mod: TC | Performed by: STUDENT IN AN ORGANIZED HEALTH CARE EDUCATION/TRAINING PROGRAM

## 2024-07-25 RX ORDER — MELOXICAM 15 MG/1
15 TABLET ORAL DAILY
Qty: 30 TABLET | Refills: 11 | Status: SHIPPED | OUTPATIENT
Start: 2024-07-25

## 2024-07-25 NOTE — LETTER
2024      Ayanna Davidson  [id number]  [place] [address]      Dear Colleague,    Thank you for referring your patient, Ayanna Davidson, to the [facility]. Please see a copy of my visit note below.    Ayanna Davidson is a 34 year old female who is seen in consultation at the request of Dr. Michaels for pain in her left hand with gripping and pain in the right forearm and ulnar side of the hand with use.  She often has a sharp shooting pain in the right hand along the ulnar aspect of the forearm down into the hand.  She has a small baby and tends to sleep with the baby in her arm.  She also will often sleep with the right elbow fully flexed.  She has had prior ganglion cyst excision from the base of the left thumb.  She is concerned that this is returned and is causing her pain.  She has a large yard and does weed whacking for an hour at a time.  It hurts to hold onto the equipment.  There is no numbness of the left hand.    Past Medical History:   Diagnosis Date     Cervical high risk HPV (human papillomavirus) test positive 2019    last PAP NIL () with Neg HPV h/o LEEP     Gastroesophageal reflux disease      HSV (herpes simplex virus) infection     1 & 2     Methamphetamine abuse (H)     sober since her  pregnancy     Overdose of antipsychotic 2012     recurrent Bartholin's cyst      TINO III (vulvar intraepithelial neoplasia III)     biopsy +TINO III       Past Surgical History:   Procedure Laterality Date     BIOPSY        SECTION N/A 3/1/2024    Procedure: PRIMARY  SECTION;  Surgeon: Inderjit Olivas MD;  [facility]     CHOLECYSTECTOMY       ENT SURGERY       ESOPHAGOSCOPY, GASTROSCOPY, DUODENOSCOPY (EGD), COMBINED N/A 2023    Procedure: Esophagoscopy, gastroscopy, duodenoscopy (EGD), combined;  Surgeon: Thierno Escobar MD;  Location: [facility]     EXAM UNDER ANESTHESIA PELVIC N/A 2020    Procedure: EXAM UNDER ANESTHESIA,  PELVIS, PAP;  Surgeon: Froylan Schwarz MD;  [facility]     EXCISE VULVA WIDE LOCAL Bilateral 03/11/2020    Procedure: Right Vulva Wedge Resection and Incision and Drainage Left Vulva;  Surgeon: Froylan Schwarz MD;  [facility]     INCISION AND DRAINAGE ABSCESS PELVIS, COMBINED N/A 02/26/2018    Procedure: COMBINED INCISION AND DRAINAGE ABSCESS PELVIS;  INCISION AND DRAINAGE LABIAL ABSCESS;  Surgeon: Jase Beach MD;  [facility]     INCISION AND DRAINAGE ABSCESS PELVIS, COMBINED N/A 03/05/2019    Procedure: Incision and Drainage Right Labial Abscess;  Surgeon: Jase Beach MD;  [facility]     INCISION AND DRAINAGE LOWER EXTREMITY, COMBINED Right 08/11/2019    Procedure: irrigation and debridement right leg dog bite;  Surgeon: Rommel Pérez MD;  [facility]     LAP ADJUSTABLE GASTRIC BAND       LEEP TX, CERVICAL       MAMMOPLASTY REDUCTION BILATERAL       MARSUPIALIZATION BARTHOLIN CYST N/A 04/29/2019    Procedure: Bartholin's Cyst removal;  Surgeon: Froylan Schwarz MD;  [facility]     MARSUPIALIZATION BARTHOLIN CYST Left 01/29/2020    Procedure: Excision of left bartholin's abscess;  Surgeon: Froylan Schwarz MD;  [facility]     MARSUPIALIZATION BARTHOLIN CYST Right 5/8/2024    Procedure: MARSUPIALIZATION, CYST, BARTHOLIN'S GLAND;  Surgeon: Jase Man MD;  [facility]     ORTHOPEDIC SURGERY         Family History   Problem Relation Age of Onset     Thyroid Disease Mother      Heart Disease Father      Coronary Artery Disease Father      Hypertension Father      Hyperlipidemia Father      Mental Illness Father      Substance Abuse Father      Diabetes Maternal Grandmother      Prostate Cancer Maternal Grandfather      Breast Cancer Paternal Grandmother      Testicular cancer Paternal Grandfather      Depression Half-Sister      Anxiety Disorder Half-Sister      Obesity Sister        Social History     Socioeconomic  "History     Marital status: Single     Spouse name: Not on file     Number of children: 1     Years of education: Not on file     Highest education level: Not on file   Occupational History     Not on file   Tobacco Use     Smoking status: Some Days     Current packs/day: 0.50     Average packs/day: 0.5 packs/day for 10.0 years (5.0 ttl pk-yrs)     Types: Cigarettes     Smokeless tobacco: Current     Tobacco comments:     uses E cig with zero nicotine    Vaping Use     Vaping status: Every Day     Substances: no nicotine   Substance and Sexual Activity     Alcohol use: Not Currently     Comment: Reports last use 9/17 and denies use during pregnancy     Drug use: Not Currently     Types: Marijuana, Methamphetamines     Comment: Reports Marijuana and meth use one week ago     Sexual activity: Yes     Partners: Male     Birth control/protection: Injection     Comment: depo shot   Other Topics Concern     Parent/sibling w/ CABG, MI or angioplasty before 65F 55M? No   Social History Narrative    9/2023  Lives in [place].  No indoor cats/kittens.  Reports she's in a relationship with Jan \"it's not healthy\".   Ayanna smokes cigarettes.  No indoor cats/kittens.  No concerns about domestic violence.  Ayanna has not been employed outside of the home since 11/2022.  She is taking college classes.       Social Determinants of Health     Financial Resource Strain: Low Risk  (7/14/2024)    Financial Resource Strain      Within the past 12 months, have you or your family members you live with been unable to get utilities (heat, electricity) when it was really needed?: No   Food Insecurity: Low Risk  (7/14/2024)    Food Insecurity      Within the past 12 months, did you worry that your food would run out before you got money to buy more?: No      Within the past 12 months, did the food you bought just not last and you didn t have money to get more?: No   Transportation Needs: Low Risk  (7/14/2024)    Transportation Needs      " Within the past 12 months, has lack of transportation kept you from medical appointments, getting your medicines, non-medical meetings or appointments, work, or from getting things that you need?: No   Physical Activity: Insufficiently Active (7/14/2024)    Exercise Vital Sign      Days of Exercise per Week: 4 days      Minutes of Exercise per Session: 20 min   Stress: No Stress Concern Present (7/14/2024)    [place] [employer] - Occupational Stress Questionnaire      Feeling of Stress : Only a little   Social Connections: Unknown (7/14/2024)    Social Connection and Isolation Panel [NHANES]      Frequency of Communication with Friends and Family: Not on file      Frequency of Social Gatherings with Friends and Family: Twice a week      Attends Pentec[employer]alism Services: Not on file      Active Member of Clubs or Organizations: Not on file      Attends Club or Organization Meetings: Not on file      Marital Status: Not on file   Interpersonal Safety: Low Risk  (5/8/2024)    Interpersonal Safety      Do you feel physically and emotionally safe where you currently live?: Yes      Within the past 12 months, have you been hit, slapped, kicked or otherwise physically hurt by someone?: No      Within the past 12 months, have you been humiliated or emotionally abused in other ways by your partner or ex-partner?: No   Housing Stability: Low Risk  (7/14/2024)    Housing Stability      Do you have housing? : Yes      Are you worried about losing your housing?: No       Current Outpatient Medications   Medication Sig Dispense Refill     azithromycin (ZITHROMAX) 500 MG tablet Take 2 tablets (1,000 mg) by mouth daily 2 tablet 0     Prenatal Vit-Fe Fumarate-FA (PRENATAL MULTIVITAMIN W/IRON) 27-0.8 MG tablet Take 1 tablet by mouth daily 90 tablet 3     Semaglutide-Weight Management (WEGOVY) 0.25 MG/0.5ML pen Inject 0.25 mg Subcutaneous once a week For 4 weeks 2 mL 0     Semaglutide-Weight Management (WEGOVY) 0.5  "MG/0.5ML pen Inject 0.5 mg Subcutaneous once a week After completing 4 weeks of 0.5mg dose 2 mL 0     Semaglutide-Weight Management (WEGOVY) 1 MG/0.5ML pen Inject 1 mg Subcutaneous once a week After completing 4 weeks of 0.5mg dose 2 mL 1     sulfamethoxazole-trimethoprim (BACTRIM DS) 800-160 MG tablet Take 1 tablet by mouth 2 times daily (Patient not taking: Reported on 7/19/2024) 14 tablet 0       Allergies   Allergen Reactions     No Known Allergies        REVIEW OF SYSTEMS:  CONSTITUTIONAL:  NEGATIVE for fever, chills, change in weight, not feeling tired  SKIN:  NEGATIVE for worrisome rashes, no skin lumps, no skin ulcers and no non-healing wounds  EYES:  NEGATIVE for vision changes or irritation.  ENT/MOUTH:  NEGATIVE.  No hearing loss, no hoarseness, no difficulty swallowing.  RESP:  NEGATIVE. No cough or shortness of breath.  CV:  NEGATIVE for chest pain, palpitations or peripheral edema  GI:  NEGATIVE for nausea, abdominal pain, heartburn, or change in bowel habits  :  Negative. No dysuria, no hematuria  MUSCULOSKELETAL:  See HPI above  NEURO:  NEGATIVE . No headaches, no dizziness,  no numbness  ENDOCRINE:  NEGATIVE for temperature intolerance, skin/hair changes  HEME/ALLERGY/IMMUNE:  NEGATIVE for bleeding problems  PSYCHIATRIC:  NEGATIVE. no anxiety, no depression.     Exam:  Vitals: /85   Pulse 91   Resp 18   Ht 1.626 m (5' 4\")   Wt 107.5 kg (237 lb)   LMP 06/03/2024 (Approximate)   BMI 40.68 kg/m    BMI= Body mass index is 40.68 kg/m .  Constitutional:  healthy, alert and no distress  Neuro: Alert and Oriented x 3, no focal defects.  Psych: Affect normal   Respiratory: Breathing not labored.  Cardiovascular: normal peripheral pulses  Lymph: no adenopathy  Skin: No rashes,worrisome lesions or skin problems  She has positive Tinel over the ulnar nerve at the right elbow.  No tenderness at the medial or lateral epicondyles or the olecranon.  She has full range of motion of the elbow.  She had " negative Tinel over the median and ulnar nerves at the wrist.  She has normal  on the right.  On the left she has negative Tinel over the median and ulnar nerves at the wrist and the ulnar nerve at the elbow.  She has positive grind test of the left thumb CMC joint.  This is negative on the right.  No triggering.  Negative Finkelstein.  I do not feel a ganglion cyst of the thumb.  I do feel she has mild subluxation of the CMC joint on the left.    X-ray shows normal right elbow.  Left hand shows thumb carpometacarpal osteoarthritis with mild subluxation.    Assessment:  1.  Left thumb carpometacarpal osteoarthritis.  2. Right cubital tunnel syndrome    Plan:  avoid prolonged flexion of right elbow   We will refer to Occupational Therapy to make night extension brace for elbow.  For thumb, avoid key pinch.  Try meloxicam 15 mg daily.    Again, thank you for allowing me to participate in the care of your patient.        Sincerely,        Rommel Pérez MD

## 2024-07-25 NOTE — PROGRESS NOTES
Ayanna Davidson is a 34 year old female who is seen in consultation at the request of Dr. Michaels for pain in her left hand with gripping and pain in the right forearm and ulnar side of the hand with use.  She often has a sharp shooting pain in the right hand along the ulnar aspect of the forearm down into the hand.  She has a small baby and tends to sleep with the baby in her arm.  She also will often sleep with the right elbow fully flexed.  She has had prior ganglion cyst excision from the base of the left thumb.  She is concerned that this is returned and is causing her pain.  She has a large yard and does weed whacking for an hour at a time.  It hurts to hold onto the equipment.  There is no numbness of the left hand.    Past Medical History:   Diagnosis Date    Cervical high risk HPV (human papillomavirus) test positive 2019    last PAP NIL () with Neg HPV h/o LEEP    Gastroesophageal reflux disease     HSV (herpes simplex virus) infection     1 & 2    Methamphetamine abuse (H)     sober since her  pregnancy    Overdose of antipsychotic 2012    recurrent Bartholin's cyst     TINO III (vulvar intraepithelial neoplasia III)     biopsy +TINO III       Past Surgical History:   Procedure Laterality Date    BIOPSY       SECTION N/A 3/1/2024    Procedure: PRIMARY  SECTION;  Surgeon: Inderjit Olivas MD;  Location: [facility]    CHOLECYSTECTOMY      ENT SURGERY      ESOPHAGOSCOPY, GASTROSCOPY, DUODENOSCOPY (EGD), COMBINED N/A 2023    Procedure: Esophagoscopy, gastroscopy, duodenoscopy (EGD), combined;  Surgeon: Thierno Escobar MD;  Location: [facility]    EXAM UNDER ANESTHESIA PELVIC N/A 2020    Procedure: EXAM UNDER ANESTHESIA, PELVIS, PAP;  Surgeon: Froylan Schwarz MD;  Location: [facility]    EXCISE VULVA WIDE LOCAL Bilateral 2020    Procedure: Right Vulva Wedge Resection and Incision and Drainage Left Vulva;  Surgeon: Froylan Schwarz MD;  Location: [facility]     INCISION AND DRAINAGE ABSCESS PELVIS, COMBINED N/A 02/26/2018    Procedure: COMBINED INCISION AND DRAINAGE ABSCESS PELVIS;  INCISION AND DRAINAGE LABIAL ABSCESS;  Surgeon: Jase Beach MD;  Location: [facility]    INCISION AND DRAINAGE ABSCESS PELVIS, COMBINED N/A 03/05/2019    Procedure: Incision and Drainage Right Labial Abscess;  Surgeon: Jase Beach MD;  Location: [facility]    INCISION AND DRAINAGE LOWER EXTREMITY, COMBINED Right 08/11/2019    Procedure: irrigation and debridement right leg dog bite;  Surgeon: Rommel Pérez MD;  Location: [facility]    LAP ADJUSTABLE GASTRIC BAND      LEEP TX, CERVICAL      MAMMOPLASTY REDUCTION BILATERAL      MARSUPIALIZATION BARTHOLIN CYST N/A 04/29/2019    Procedure: Bartholin's Cyst removal;  Surgeon: Froylan Schwarz MD;  [facility]    MARSUPIALIZATION BARTHOLIN CYST Left 01/29/2020    Procedure: Excision of left bartholin's abscess;  Surgeon: Froylan Schwarz MD;  [facility]    MARSUPIALIZATION BARTHOLIN CYST Right 5/8/2024    Procedure: MARSUPIALIZATION, CYST, BARTHOLIN'S GLAND;  Surgeon: Jase Man MD;  Location: [facility]    ORTHOPEDIC SURGERY         Family History   Problem Relation Age of Onset    Thyroid Disease Mother     Heart Disease Father     Coronary Artery Disease Father     Hypertension Father     Hyperlipidemia Father     Mental Illness Father     Substance Abuse Father     Diabetes Maternal Grandmother     Prostate Cancer Maternal Grandfather     Breast Cancer Paternal Grandmother     Testicular cancer Paternal Grandfather     Depression Half-Sister     Anxiety Disorder Half-Sister     Obesity Sister        Social History     Socioeconomic History    Marital status: Single     Spouse name: Not on file    Number of children: 1    Years of education: Not on file    Highest education level: Not on file   Occupational History    Not on file   Tobacco Use    Smoking status: Some Days     Current packs/day: 0.50      "Average packs/day: 0.5 packs/day for 10.0 years (5.0 ttl pk-yrs)     Types: Cigarettes    Smokeless tobacco: Current    Tobacco comments:     uses E cig with zero nicotine    Vaping Use    Vaping status: Every Day    Substances: no nicotine   Substance and Sexual Activity    Alcohol use: Not Currently     Comment: Reports last use 9/17 and denies use during pregnancy    Drug use: Not Currently     Types: Marijuana, Methamphetamines     Comment: Reports Marijuana and meth use one week ago    Sexual activity: Yes     Partners: Male     Birth control/protection: Injection     Comment: depo shot   Other Topics Concern    Parent/sibling w/ CABG, MI or angioplasty before 65F 55M? No   Social History Narrative    9/2023  Lives in [place].  No indoor cats/kittens.  Reports she's in a relationship with Jan \"it's not healthy\".   Ayanna smokes cigarettes.  No indoor cats/kittens.  No concerns about domestic violence.  Ayanna has not been employed outside of the home since 11/2022.  She is taking college classes.       Social Determinants of Health     Financial Resource Strain: Low Risk  (7/14/2024)    Financial Resource Strain     Within the past 12 months, have you or your family members you live with been unable to get utilities (heat, electricity) when it was really needed?: No   Food Insecurity: Low Risk  (7/14/2024)    Food Insecurity     Within the past 12 months, did you worry that your food would run out before you got money to buy more?: No     Within the past 12 months, did the food you bought just not last and you didn t have money to get more?: No   Transportation Needs: Low Risk  (7/14/2024)    Transportation Needs     Within the past 12 months, has lack of transportation kept you from medical appointments, getting your medicines, non-medical meetings or appointments, work, or from getting things that you need?: No   Physical Activity: Insufficiently Active (7/14/2024)    Exercise Vital Sign     Days of " Exercise per Week: 4 days     Minutes of Exercise per Session: 20 min   Stress: No Stress Concern Present (7/14/2024)    [employer] [place] [employer] - Occupational Stress Questionnaire     Feeling of Stress : Only a little   Social Connections: Unknown (7/14/2024)    Social Connection and Isolation Panel [NHANES]     Frequency of Communication with Friends and Family: Not on file     Frequency of Social Gatherings with Friends and Family: Twice a week     Attends Congregational Services: Not on file     Active Member of Clubs or Organizations: Not on file     Attends Club or Organization Meetings: Not on file     Marital Status: Not on file   Interpersonal Safety: Low Risk  (5/8/2024)    Interpersonal Safety     Do you feel physically and emotionally safe where you currently live?: Yes     Within the past 12 months, have you been hit, slapped, kicked or otherwise physically hurt by someone?: No     Within the past 12 months, have you been humiliated or emotionally abused in other ways by your partner or ex-partner?: No   Housing Stability: Low Risk  (7/14/2024)    Housing Stability     Do you have housing? : Yes     Are you worried about losing your housing?: No       Current Outpatient Medications   Medication Sig Dispense Refill    azithromycin (ZITHROMAX) 500 MG tablet Take 2 tablets (1,000 mg) by mouth daily 2 tablet 0    Prenatal Vit-Fe Fumarate-FA (PRENATAL MULTIVITAMIN W/IRON) 27-0.8 MG tablet Take 1 tablet by mouth daily 90 tablet 3    Semaglutide-Weight Management (WEGOVY) 0.25 MG/0.5ML pen Inject 0.25 mg Subcutaneous once a week For 4 weeks 2 mL 0    Semaglutide-Weight Management (WEGOVY) 0.5 MG/0.5ML pen Inject 0.5 mg Subcutaneous once a week After completing 4 weeks of 0.5mg dose 2 mL 0    Semaglutide-Weight Management (WEGOVY) 1 MG/0.5ML pen Inject 1 mg Subcutaneous once a week After completing 4 weeks of 0.5mg dose 2 mL 1    sulfamethoxazole-trimethoprim (BACTRIM DS) 800-160 MG tablet Take 1  "tablet by mouth 2 times daily (Patient not taking: Reported on 7/19/2024) 14 tablet 0       Allergies   Allergen Reactions    No Known Allergies        REVIEW OF SYSTEMS:  CONSTITUTIONAL:  NEGATIVE for fever, chills, change in weight, not feeling tired  SKIN:  NEGATIVE for worrisome rashes, no skin lumps, no skin ulcers and no non-healing wounds  EYES:  NEGATIVE for vision changes or irritation.  ENT/MOUTH:  NEGATIVE.  No hearing loss, no hoarseness, no difficulty swallowing.  RESP:  NEGATIVE. No cough or shortness of breath.  CV:  NEGATIVE for chest pain, palpitations or peripheral edema  GI:  NEGATIVE for nausea, abdominal pain, heartburn, or change in bowel habits  :  Negative. No dysuria, no hematuria  MUSCULOSKELETAL:  See HPI above  NEURO:  NEGATIVE . No headaches, no dizziness,  no numbness  ENDOCRINE:  NEGATIVE for temperature intolerance, skin/hair changes  HEME/ALLERGY/IMMUNE:  NEGATIVE for bleeding problems  PSYCHIATRIC:  NEGATIVE. no anxiety, no depression.     Exam:  Vitals: /85   Pulse 91   Resp 18   Ht 1.626 m (5' 4\")   Wt 107.5 kg (237 lb)   LMP 06/03/2024 (Approximate)   BMI 40.68 kg/m    BMI= Body mass index is 40.68 kg/m .  Constitutional:  healthy, alert and no distress  Neuro: Alert and Oriented x 3, no focal defects.  Psych: Affect normal   Respiratory: Breathing not labored.  Cardiovascular: normal peripheral pulses  Lymph: no adenopathy  Skin: No rashes,worrisome lesions or skin problems  She has positive Tinel over the ulnar nerve at the right elbow.  No tenderness at the medial or lateral epicondyles or the olecranon.  She has full range of motion of the elbow.  She had negative Tinel over the median and ulnar nerves at the wrist.  She has normal  on the right.  On the left she has negative Tinel over the median and ulnar nerves at the wrist and the ulnar nerve at the elbow.  She has positive grind test of the left thumb CMC joint.  This is negative on the right.  No " triggering.  Negative Finkelstein.  I do not feel a ganglion cyst of the thumb.  I do feel she has mild subluxation of the CMC joint on the left.    X-ray shows normal right elbow.  Left hand shows thumb carpometacarpal osteoarthritis with mild subluxation.    Assessment:  1.  Left thumb carpometacarpal osteoarthritis.  2. Right cubital tunnel syndrome    Plan:  avoid prolonged flexion of right elbow   We will refer to Occupational Therapy to make night extension brace for elbow.  For thumb, avoid key pinch.  Try meloxicam 15 mg daily.

## 2024-07-26 NOTE — TELEPHONE ENCOUNTER
Attempted calling patient regarding refill request. Provider TL believes medication was already send and denied as a duplicate. Need to ensure patient received medication.     Madeline Perez, VF

## 2024-07-29 NOTE — TELEPHONE ENCOUNTER
"Patient called back. She states she finished her azithromycin dose for chlamydia treatment but now she feels like she has a UTI. Burning and frequent urination. Has a mild ache in her lower abdomen. Denies fever.     Patient states she lives too far away to come to [place] to do a UA. She states, \"I have had UTI's before I know what they feel like.\" Patient is requesting for another antibiotic.   "

## 2024-07-30 NOTE — TELEPHONE ENCOUNTER
Pt needs evisit or smilar for UTI treatment and UA/urine culture. myChart message was sen tot pt this AM to request evisit.

## 2024-07-30 NOTE — TELEPHONE ENCOUNTER
Attempted calling patient, no answer, left a voicemail asking for a call back. Patient needs to schedule an evisit, virtual, or in person visit in order to receive any further refills on this medication. I sent a [employer] message and am now closing this encounter.     Madeline Perez,

## 2024-08-01 ENCOUNTER — MYC MEDICAL ADVICE (OUTPATIENT)
Dept: FAMILY MEDICINE | Facility: CLINIC | Age: 34
End: 2024-08-01
Payer: COMMERCIAL

## 2024-08-01 DIAGNOSIS — A74.9 CHLAMYDIA INFECTION: Primary | ICD-10-CM

## 2024-08-01 DIAGNOSIS — R30.0 DYSURIA: ICD-10-CM

## 2024-08-06 ENCOUNTER — LAB (OUTPATIENT)
Dept: LAB | Facility: CLINIC | Age: 34
End: 2024-08-06
Payer: COMMERCIAL

## 2024-08-06 DIAGNOSIS — R30.0 DYSURIA: ICD-10-CM

## 2024-08-06 LAB
ALBUMIN UR-MCNC: NEGATIVE MG/DL
APPEARANCE UR: CLEAR
BILIRUB UR QL STRIP: NEGATIVE
COLOR UR AUTO: YELLOW
GLUCOSE UR STRIP-MCNC: NEGATIVE MG/DL
HGB UR QL STRIP: ABNORMAL
KETONES UR STRIP-MCNC: NEGATIVE MG/DL
LEUKOCYTE ESTERASE UR QL STRIP: ABNORMAL
MUCOUS THREADS #/AREA URNS LPF: PRESENT /LPF
NITRATE UR QL: NEGATIVE
PH UR STRIP: 5 [PH] (ref 5–7)
RBC URINE: 2 /HPF
SP GR UR STRIP: 1.03 (ref 1–1.03)
SQUAMOUS EPITHELIAL: 7 /HPF
UROBILINOGEN UR STRIP-MCNC: NORMAL MG/DL
WBC URINE: 23 /HPF

## 2024-08-06 PROCEDURE — 87086 URINE CULTURE/COLONY COUNT: CPT

## 2024-08-06 PROCEDURE — 81001 URINALYSIS AUTO W/SCOPE: CPT

## 2024-08-06 PROCEDURE — 87088 URINE BACTERIA CULTURE: CPT

## 2024-08-06 PROCEDURE — 87186 SC STD MICRODIL/AGAR DIL: CPT

## 2024-08-06 PROCEDURE — 87591 N.GONORRHOEAE DNA AMP PROB: CPT | Performed by: FAMILY MEDICINE

## 2024-08-06 NOTE — TELEPHONE ENCOUNTER
UA results reviewed and they are equivocal. Meaning we don't know if there's really an infection. There is mucous and a small amoutn of white blood cells which with the epithelial cell si most likely contaminant which can be from the labia. A urine culture has been ordered. If culture positive I will send antibiotics.  Will need ALEXANDR for chlamydia as we've discussed prior but cannot test prior to 4 weeks after treatment due to risk of false positive results.

## 2024-08-07 DIAGNOSIS — L08.9 SKIN INFECTION: Primary | ICD-10-CM

## 2024-08-07 LAB
BACTERIA UR CULT: ABNORMAL
N GONORRHOEA DNA SPEC QL NAA+PROBE: NEGATIVE

## 2024-08-07 RX ORDER — SULFAMETHOXAZOLE/TRIMETHOPRIM 800-160 MG
1 TABLET ORAL 2 TIMES DAILY
Qty: 14 TABLET | Refills: 0 | Status: SHIPPED | OUTPATIENT
Start: 2024-08-07

## 2024-08-07 RX ORDER — FLUCONAZOLE 150 MG/1
150 TABLET ORAL ONCE
Qty: 1 TABLET | Refills: 0 | Status: SHIPPED | OUTPATIENT
Start: 2024-08-07 | End: 2024-08-07

## 2024-08-08 DIAGNOSIS — E66.01 CLASS 2 SEVERE OBESITY WITH SERIOUS COMORBIDITY AND BODY MASS INDEX (BMI) OF 39.0 TO 39.9 IN ADULT, UNSPECIFIED OBESITY TYPE (H): Primary | ICD-10-CM

## 2024-08-08 DIAGNOSIS — E66.812 CLASS 2 SEVERE OBESITY WITH SERIOUS COMORBIDITY AND BODY MASS INDEX (BMI) OF 39.0 TO 39.9 IN ADULT, UNSPECIFIED OBESITY TYPE (H): Primary | ICD-10-CM

## 2024-08-08 RX ORDER — SEMAGLUTIDE 1.7 MG/.75ML
1.7 INJECTION, SOLUTION SUBCUTANEOUS WEEKLY
Qty: 3 ML | Refills: 0 | Status: SHIPPED | OUTPATIENT
Start: 2024-08-08 | End: 2024-08-15

## 2024-08-10 ENCOUNTER — NURSE TRIAGE (OUTPATIENT)
Dept: NURSING | Facility: CLINIC | Age: 34
End: 2024-08-10
Payer: COMMERCIAL

## 2024-08-10 NOTE — TELEPHONE ENCOUNTER
Taking Bactrim for uti.  Head is cloudy. Stated she has some confusion.  Feet tingly x two hours.  Nose bleed earlier. Has stopped, now.  Bad abdominal cramps.  Has had blood in her urine but now is passing blood clots.  Headache.  Per the protocol, I recommended she call 911.  She stated she isn't going to do that but will have her mom drive her to an ER either in [place] or [place].      Reason for Disposition   Difficult to awaken or acting confused (e.g., disoriented, slurred speech)    Additional Information   Negative: [1] SEVERE weakness (i.e., unable to walk or barely able to walk, requires support) AND [2] new-onset or worsening   Negative: [1] Weakness (i.e., paralysis, loss of muscle strength) of the face, arm / hand, or leg / foot on one side of the body AND [2] sudden onset AND [3] present now  (Exception: Bell's palsy suspected [i.e., weakness only on one side of the face, developing over hours to days, no other symptoms].)   Negative: [1] Numbness (i.e., loss of sensation) of the face, arm / hand, or leg / foot on one side of the body AND [2] sudden onset AND [3] present now   Negative: [1] Loss of speech or garbled speech AND [2] sudden onset AND [3] present now    Protocols used: Neurologic Deficit-A-SAQIB ZHU RN [facility]

## 2024-08-15 ENCOUNTER — VIRTUAL VISIT (OUTPATIENT)
Dept: ENDOCRINOLOGY | Facility: CLINIC | Age: 34
End: 2024-08-15
Payer: COMMERCIAL

## 2024-08-15 VITALS — WEIGHT: 235 LBS | BODY MASS INDEX: 40.12 KG/M2 | HEIGHT: 64 IN

## 2024-08-15 DIAGNOSIS — E66.812 CLASS 2 SEVERE OBESITY WITH SERIOUS COMORBIDITY AND BODY MASS INDEX (BMI) OF 39.0 TO 39.9 IN ADULT, UNSPECIFIED OBESITY TYPE (H): Primary | ICD-10-CM

## 2024-08-15 DIAGNOSIS — E66.01 CLASS 2 SEVERE OBESITY WITH SERIOUS COMORBIDITY AND BODY MASS INDEX (BMI) OF 39.0 TO 39.9 IN ADULT, UNSPECIFIED OBESITY TYPE (H): Primary | ICD-10-CM

## 2024-08-15 PROCEDURE — G2211 COMPLEX E/M VISIT ADD ON: HCPCS | Mod: 95

## 2024-08-15 PROCEDURE — 99213 OFFICE O/P EST LOW 20 MIN: CPT | Mod: 95

## 2024-08-15 RX ORDER — SEMAGLUTIDE 1.7 MG/.75ML
1.7 INJECTION, SOLUTION SUBCUTANEOUS WEEKLY
Qty: 3 ML | Refills: 2 | Status: SHIPPED | OUTPATIENT
Start: 2024-08-15

## 2024-08-15 ASSESSMENT — PAIN SCALES - GENERAL: PAINLEVEL: NO PAIN (0)

## 2024-08-15 NOTE — NURSING NOTE
Current patient location: [address] [place] [address]  [place] [address]    Is the patient currently in the state of [place]? YES    Visit mode:VIDEO    If the visit is dropped, the patient can be reconnected by: VIDEO VISIT: Text to cell phone:   Telephone Information:   Mobile [phone]       Will anyone else be joining the visit? NO  (If patient encounters technical issues they should call [phone]    How would you like to obtain your AVS? MyChart    Are changes needed to the allergy or medication list? No    Are refills needed on medications prescribed by this physician? NO    Rooming Documentation:  Questionnaire(s) completed      Reason for visit: RECHECK Shelby Kocher VVF

## 2024-08-15 NOTE — PROGRESS NOTES
Return Medical Weight Management Note     Ayanna Davidson  MRN:  [id number]  :  1990  JEFFREY:  8/15/2024    Dear Obed Santiago MD,    I had the pleasure of seeing your patient Ayanna Davidson. She is a 34 year old female who I am continuing to see for treatment of obesity related to:        3/13/2023    12:17 AM   --   I have the following health issues associated with obesity Infertility    GERD (Reflux)    Stress Incontinence       Assessment & Plan   Problem List Items Addressed This Visit       Class 2 severe obesity with serious comorbidity and body mass index (BMI) of 39.0 to 39.9 in adult, unspecified obesity type (H) - Primary    Relevant Medications    Semaglutide-Weight Management (WEGOVY) 1.7 MG/0.75ML pen    Other Relevant Orders    Parathyroid Hormone Intact    Vitamin D Deficiency    Comprehensive metabolic panel    Lipid panel reflex to direct LDL Fasting      Plan  Okay to start wegovy 1.7mg   Goals we discussed today:   Upping fiber and vegetable intake   Aiming for 90g protein a day   64oz water/ electrolyte drinks (propel)  Cutting sugar drinks (juice, sugar gatorade)   Labs ordered today: vitamin d, pth, cmp, lipids   Follow up with Mounika in 3  months   Dietician appointment as needed moving forward   Keep up the excellent work!     INTERVAL HISTORY:  New re-establish with Marie Berry, 3/15/23  S/p lapband in  by Dr. Perdue in [place]. No complications. Lapband removed 2021 by Dr. Escobar in [place]. Removed due to band slipping and uncontrollable GERD.     Weight prior to lapband - 265lb   Oren - 137lbs   Weight today - 240lbs      Weight was stable around 180-200lbs with the lapband. After removal weight gain has been gradual. Has not been able to lose substancial weight since the band was removed. Wants to lose weight now to help with overall health. Is inverested in surgical options in the future. Will continue with MWM at this time.      Currently taking Victoza 1.2mg,  and is helpful with hunger during the day. Struggles the most with increased hunger and snacking throughout the night, especially when she is up till 4am studying most nights.      Discussed AOMs to help with weight loss:   - Phentermine - previously trialed for 4 months, with no effect on weight.   - Topiramate - previously trial for 4 months, with no effect on weight.   - Naltrexone - can consider in the future.   - GLP-1 - is currently on Victoza 1.2m dose, tolerating well with no side effects. Has been somewhat helpful with hunger, but no effect on weight. Will transition to Wegovy 0.5mg once weekly. Discussed side effects, risks, and benefits. Will monitor GERD symptoms. Transition to Wegovy. Ozempic was denied by insurance. Consider Saxenda.      Follow up visit with Marie Berry, last seen 5/25/23: Was doing well on wegovy, was unsure if she's seen successful weight loss. Discussed bariatric surgery, patient was concerned about permanent nicotine cessation requirements.      New to me 3/21/24:   Became pregnant, stopped wegovy approximately 4 months into pregnancy.   Delivered baby 3/1/24,  named Caleb, both mom and baby are doing well.   Diet controlled gestational diabetes in 3rd semester   Doing well since pregnancy, no longer breast feeding.      Hoping to restart wegovy today, however is not willing at this time to commit to using contraception or starting any form of birth control while taking Wegovy.  Confirms that she is sexually active with her male partner.    We discussed that Wegovy is not established to be safe in pregnancy due to lack of clinical testing, and is therefore not safe to prescribe at this time.  Patient expressed confusion at first, stating that her baby is very healthy despite her having taken Wegovy in the first few months of pregnancy.  We discussed that while this is wonderful that her baby is doing well, 1 scenario does not ensure the safety of this medication for any future  pregnancies.  Patient expressed understanding, will defer starting weight loss medications for now.  Discussed that if patient is willing to utilize appropriate contraception, we can revisit weight loss medications.  Offered to discuss lifestyle adjustments in the meantime, patient declined stating that she is working on diet adjustments via meal plan.     Last seen by me 5/15/24 :   Started birth control (depo shot), hoping to start wegovy today. Has been working on exercising more, reducing portion size, has seen overall weight maintenance.      Had surgery for bartholin's gland abscess last week, recovering well since then. Had follow up with pcp 2 days ago regarding this, no concerns     Today in visit (8/15/24)  7lbs weight loss since last visit, some what discouraged with this amount of weight loss. Discussed strategies to optimize weight loss when increasing to the 1.7mg dose, such as cutting out sugar beverages and increasing daily protein and fiber.   Baby is doing well 5 months old   Diagnosed with chlamydia recently, completed antibiotics. Still has ongoing cramping issues.   Some dizzines lately, wonders if it's related to valacyclovir she started recently for STI concerns. Discussed improving hydration, goal of 64oz water daily.     Wt Readings from Last 5 Encounters:   08/15/24 106.6 kg (235 lb)   07/25/24 107.5 kg (237 lb)   07/19/24 107.6 kg (237 lb 3.2 oz)   05/15/24 109.8 kg (242 lb)   05/08/24 111.1 kg (245 lb)       Anti-obesity medication history    Current:   Wegovy 1 mg- about to start 1.7mg tomorrow. Some loose stool and nausea, but feels overall this is manageable.     Past/Failed/contraindicated:       GERD symptoms: heartburn at baseline, managed with omeprazole 20mg, sometimes will take an extra omeprazole 1-2 times a week if symptoms are worse     Constipation/Diarrhea: bms 2-3 times a day, stools are looser     Recent diet changes: prioritizing chicken, protein. Peanut butter, cottage  "cheese. Fruit cups with no added sugar. Discussed increasing daily fiber to help with bowel movements.     Water- drinking 16 oz 2 bottles of water daily, acknowledges she could drink more     Recent exercise/activity changes: going outside a lot with baby, doing weeding and mulching. Does \"lazy work out\" which is a workout you do from bed.     Recent stressors: baby is teething which makes things stressful. 5 months into pregnancy.     Recent sleep changes: improving, baby is sleeping 6-7 hour stretches. Takes naps with him in the afternoonm     Vitamins/Labs:  vitamin d, pth, cmp, lipids ordered. Taking vitamin d, b12, prenatal mvi. Goes tanning once a week.    Pregnancy: depo shot     CURRENT WEIGHT:   235 lbs 0 oz    Initial Weight (lbs): 240 lbs     Cumulative weight loss (lbs): 5  Weight Loss Percentage: 2.08%        8/10/2024     9:38 AM   Changes and Difficulties   I have made the following changes to my diet since my last visit: 1 candy bar a wk   With regards to my diet, I am still struggling with: feel nauseous and think im hungry n i eat n i feel better but to large of portioner   I have made the following changes to my activity/exercise since my last visit: do daily exercises carry my 17 lb son around alot   With regards to my activity/exercise, I am still struggling with: being uncomfortably fat and not able to move as well as i would like             MEDICATIONS:   Current Outpatient Medications   Medication Sig Dispense Refill    meloxicam (MOBIC) 15 MG tablet Take 1 tablet (15 mg) by mouth daily 30 tablet 11    Prenatal Vit-Fe Fumarate-FA (PRENATAL MULTIVITAMIN W/IRON) 27-0.8 MG tablet Take 1 tablet by mouth daily 90 tablet 3    Semaglutide-Weight Management (WEGOVY) 1.7 MG/0.75ML pen Inject 1.7 mg subcutaneously once a week 3 mL 2    valACYclovir (VALTREX) 1000 mg tablet Take 1 tablet (1,000 mg) by mouth 2 times daily for 10 days 20 tablet 0    azithromycin (ZITHROMAX) 500 MG tablet Take 2 tablets " "(1,000 mg) by mouth daily 2 tablet 0    sulfamethoxazole-trimethoprim (BACTRIM DS) 800-160 MG tablet Take 1 tablet by mouth 2 times daily 14 tablet 0           8/10/2024     9:38 AM   Weight Loss Medication History Reviewed With Patient   Which weight loss medications are you currently taking on a regular basis? Wegovy   If you are not taking a weight loss medication that was prescribed to you, please indicate why: Other   Are you having any side effects from the weight loss medication that we have prescribed you? Yes   If you are having side effects please describe: nauseous head aches at times diarrhea dizzy at times               1/30/2020     3:10 PM   ANTHONY Score (Last Two)   ANTHONY Raw Score 36   Activation Score 75.5   ANTHONY Level 4         PHYSICAL EXAM:  Objective    Ht 1.626 m (5' 4\")   Wt 106.6 kg (235 lb)   LMP 06/03/2024 (Approximate)   BMI 40.34 kg/m      Vitals - Patient Reported  Pain Score: No Pain (0)      Vitals:  No vitals were obtained today due to virtual visit.    GENERAL: alert and no distress  EYES: Eyes grossly normal to inspection.  No discharge or erythema, or obvious scleral/conjunctival abnormalities.  RESP: No audible wheeze, cough, or visible cyanosis.    SKIN: Visible skin clear. No significant rash, abnormal pigmentation or lesions.  NEURO: Cranial nerves grossly intact.  Mentation and speech appropriate for age.  PSYCH: Appropriate affect, tone, and pace of words        Sincerely,    Mounika Ness PA-C      20 minutes spent by me on the date of the encounter doing chart review, history and exam, documentation and further activities per the note    The longitudinal plan of care for the diagnosis(es)/condition(s) as documented were addressed during this visit. Due to the added complexity in care, I will continue to support Ayanna in the subsequent management and with ongoing continuity of care.   "

## 2024-08-15 NOTE — PATIENT INSTRUCTIONS
"Thank you for allowing us the privilege of caring for you. We hope we provided you with the excellent service you deserve.   Please let us know if there is anything else we can do for you so that we can be sure you are completely satisfied with your care experience.    To ensure the quality of our services you may be receiving a patient satisfaction survey from an independent patient satisfaction monitoring company.    The greatest compliment you can give is a \"Likely to Recommend\"    Your visit was with Mounika Ness PA-C today.    Instructions per today's visit:     Jovon Davidson, it was great to visit with you today.  Here is a review of our visit.  If our clinic scheduler is not able to reach you please call [phone] to schedule your next appointments.    Plan  Okay to start wegovy 1.7mg   Goals we discussed today:   Upping fiber and vegetable intake   Aiming for 90g protein a day   64oz water/ electrolyte drinks (propel)  Cutting sugar drinks (juice, sugar gatorade)   Labs ordered today: vitamin d, pth, cmp, lipids   Follow up with Mounika in 3  months   Dietician appointment as needed moving forward   Keep up the excellent work!       Information about Video Visits with [facility]: video visit information  _________________________________________________________________________________________________________________________________________________________  If you are asked by your clinic team to have your blood pressure checked:  [facility] do offer several locations for blood pressure checks. Please follow the below link to schedule an appointment. Scheduling an appointment at the pharmacy for a blood pressure check is now preferred.    Appointment Plus ([email or web address])  _________________________________________________________________________________________________________________________________________________________  Important contact and scheduling information:  Please " call our contact center at [phone] to schedule your next appointments.  To find a lab location near you, please call [phone].  For any nursing questions or concerns call Lexi Brooke LPN at [phone] or Radha Wen RN at [phone]  Please call during clinic hours Monday through Friday 8:00a - 4:00p if you have questions or you can contact us via [employer] at anytime and we will reply during clinic hours.    Lab results will be communicated through My Chart or letter (if My Chart not used). Please call the clinic if you have not received communication after 1 week or if you have any questions.?  Clinic Fax: [phone]    _________________________________________________________________________________________________________________________________________________________  Meal Replacement Products:    Here is the link to our new e-store where you can purchase our meal replacement products    [facility] E-Store  Schedule [email or web address]    The one week starter kit is a great way to sample a variety of products and see what works for you.    If you want more information about the product go to: [email or web address]    If you are an employee or [facility] or [facility] please contact your care team for a 10% estore discount    Free Shipping for orders over $75     Benefits of meal replacements products:    Portion and calorie control  Improved nutrition  Structured eating  Simplified food choices  Avoid contact with trigger foods  _________________________________________________________________________________________________________________________________________________________  Interested in working with a health ?  Health coaches work with you to improve your overall health and wellbeing.  They look at the whole person, and may involve discussion of different areas of life, including, but not limited to the four pillars of health  (sleep, exercise, nutrition, and stress management). Discuss with your care team if you would like to start working a health .  Health Coaching-3 Pack: Schedule by calling [phone]    $99 for three health coaching visits    Visits may be done in person or via phone    Coaching is a partnership between the  and the client; Coaches do not prescribe or diagnose    Coaching helps inspire the client to reach his/her personal goals   _________________________________________________________________________________________________________________________________________________________  24 Week Healthy Lifestyle Plan:    Our mission in the 24-week Healthy Lifestyle Plan is to provide you with individualized care by giving you the tools, education and support you need to lose weight and maintain a healthy lifestyle. In your 24-week journey, you ll be supported by a dedicated weight loss team that includes registered dietitians, medical weight management providers, health coaches, and nurses -- all with special expertise in weight loss -- to help you every step of the way.     Monthly meetings with your registered dietician or medical weight management provider help to review your progress, update your care plan, and make any adjustments needed to ensure success. Between these visits, weekly and bi-weekly health  visits will help you focus on the four pillars of weight loss -- stress, sleep, nutrition, and exercise -- and how you can best adapt each to achieve sustainable weight loss results.    In addition, you will be given exclusive access to online wellbeing classes through [facility].  Your initial visit will be with a medical weight management provider who will help to understand your weight loss goals and ensure this program is the right fit for you. Please let our team know if you are interested in the 24 week plan by sending a message to your care team or calling [phone] to  schedule.  _________________________________________________________________________________________________________________________________________________________  __________  [facility] Get Moving Program  Our team of physical therapists is trained to help you understand and take control of your condition. They will perform a thorough evaluation to determine your ability for activity and develop a customized plan to fit your goals and physical ability.  Scheduling: Unsure if the Get Moving program is right for you? Discuss the program with your medical provider or diabetes educator. You can also call us at [phone] to ask questions or schedule an appointment.   [facility] Get Moving Program  ____________________________________________________________________________________________________________________________________________________________________________   [employer] Diabetes Prevention Program (DPP)  If you have prediabetes and Medicare please contact us via [employer] to learn more about the Diabetes Prevention Program (DPP)  Program Details:   [employer] offers the year-long Diabetes Prevention Program (DPP). The program helps you to make lifestyle changes that prevent or delay type 2 diabetes by supporting healthy eating, increased physical activity, stress reduction and use of coping skills.   On average, previous [facility] DPP cohorts have lost and maintained at least 5% of their starting weight throughout the program and averaged more than 150 minutes of physical activity per week.  Participants meet weekly for one-hour group sessions over sixteen weeks, every other week for the next 8 weeks, and monthly for the last six months.   A year-long maintenance program is also available for participants who complete the first year.   Location & Cost:   During the COVID-19 Public Health Emergency, the program is offered virtually. When in-person classes can resume, they  will be held at [facility].  For people with Medicare, the program is covered in full. A self-pay option will also be available for those with non-Medicare insurance plans.   ______________________________________________________________________________________________________________________________________________________________________________________________________________________________    To work with a Behavioral Health Psychologist:    Call to schedule:    Jose Martin Sullivan - [phone]  Amie Nance - [phone]  Thea Garcia - [phone]  Sandra Terrazas - [phone]   Mariana Espinal PhD (cannot accept Medicare) [phone]        Thank you,   [facility] Comprehensive Weight Management Team

## 2024-08-15 NOTE — PROGRESS NOTES
Virtual Visit Details    Type of service:  Video Visit   Video Start Time:  11:25am  Video End Time:11:46 AM    Originating Location (pt. Location): Home    Distant Location (provider location):  Off-site  Platform used for Video Visit: Roseann

## 2024-08-15 NOTE — LETTER
8/15/2024       RE: Ayanna Davidson  [id number]   [place] [address]     Dear Colleague,    Thank you for referring your patient, Ayanna Davidson, to the [facility] WEIGHT MANAGEMENT [facility]IC [place] at [facility]. Please see a copy of my visit note below.      Return Medical Weight Management Note     Ayanna Davidson  MRN:  [id number]  :  1990  JEFFREY:  8/15/2024    Dear Obed Santiago MD,    I had the pleasure of seeing your patient Ayanna Davidson. She is a 34 year old female who I am continuing to see for treatment of obesity related to:        3/13/2023    12:17 AM   --   I have the following health issues associated with obesity Infertility    GERD (Reflux)    Stress Incontinence       Assessment & Plan  Problem List Items Addressed This Visit       Class 2 severe obesity with serious comorbidity and body mass index (BMI) of 39.0 to 39.9 in adult, unspecified obesity type (H) - Primary    Relevant Medications    Semaglutide-Weight Management (WEGOVY) 1.7 MG/0.75ML pen    Other Relevant Orders    Parathyroid Hormone Intact    Vitamin D Deficiency    Comprehensive metabolic panel    Lipid panel reflex to direct LDL Fasting      Plan  Okay to start wegovy 1.7mg   Goals we discussed today:   Upping fiber and vegetable intake   Aiming for 90g protein a day   64oz water/ electrolyte drinks (propel)  Cutting sugar drinks (juice, sugar gatorade)   Labs ordered today: vitamin d, pth, cmp, lipids   Follow up with Mounika in 3  months   Dietician appointment as needed moving forward   Keep up the excellent work!     INTERVAL HISTORY:  New re-establish with Marie Berry, 3/15/23  S/p lapband in  by Dr. Perdue in [place]. No complications. Lapband removed 2021 by Dr. Escobar in [place]. Removed due to band slipping and uncontrollable GERD.     Weight prior to lapband - 265lb   Oren - 137lbs   Weight today - 240lbs      Weight was stable around  180-200lbs with the lapband. After removal weight gain has been gradual. Has not been able to lose substancial weight since the band was removed. Wants to lose weight now to help with overall health. Is inverested in surgical options in the future. Will continue with MWM at this time.      Currently taking Victoza 1.2mg, and is helpful with hunger during the day. Struggles the most with increased hunger and snacking throughout the night, especially when she is up till 4am studying most nights.      Discussed AOMs to help with weight loss:   - Phentermine - previously trialed for 4 months, with no effect on weight.   - Topiramate - previously trial for 4 months, with no effect on weight.   - Naltrexone - can consider in the future.   - GLP-1 - is currently on Victoza 1.2m dose, tolerating well with no side effects. Has been somewhat helpful with hunger, but no effect on weight. Will transition to Wegovy 0.5mg once weekly. Discussed side effects, risks, and benefits. Will monitor GERD symptoms. Transition to Wegovy. Ozempic was denied by insurance. Consider Saxenda.      Follow up visit with Marie Berry, last seen 5/25/23: Was doing well on wegovy, was unsure if she's seen successful weight loss. Discussed bariatric surgery, patient was concerned about permanent nicotine cessation requirements.      New to me 3/21/24:   Became pregnant, stopped wegovy approximately 4 months into pregnancy.   Delivered baby 3/1/24,  named Caleb, both mom and baby are doing well.   Diet controlled gestational diabetes in 3rd semester   Doing well since pregnancy, no longer breast feeding.      Hoping to restart wegovy today, however is not willing at this time to commit to using contraception or starting any form of birth control while taking Wegovy.  Confirms that she is sexually active with her male partner.    We discussed that Wegovy is not established to be safe in pregnancy due to lack of clinical testing, and is therefore not  safe to prescribe at this time.  Patient expressed confusion at first, stating that her baby is very healthy despite her having taken Wegovy in the first few months of pregnancy.  We discussed that while this is wonderful that her baby is doing well, 1 scenario does not ensure the safety of this medication for any future pregnancies.  Patient expressed understanding, will defer starting weight loss medications for now.  Discussed that if patient is willing to utilize appropriate contraception, we can revisit weight loss medications.  Offered to discuss lifestyle adjustments in the meantime, patient declined stating that she is working on diet adjustments via meal plan.     Last seen by me 5/15/24 :   Started birth control (depo shot), hoping to start wegovy today. Has been working on exercising more, reducing portion size, has seen overall weight maintenance.      Had surgery for bartholin's gland abscess last week, recovering well since then. Had follow up with pcp 2 days ago regarding this, no concerns     Today in visit (8/15/24)  7lbs weight loss since last visit, some what discouraged with this amount of weight loss. Discussed strategies to optimize weight loss when increasing to the 1.7mg dose, such as cutting out sugar beverages and increasing daily protein and fiber.   Baby is doing well 5 months old   Diagnosed with chlamydia recently, completed antibiotics. Still has ongoing cramping issues.   Some dizzines lately, wonders if it's related to valacyclovir she started recently for STI concerns. Discussed improving hydration, goal of 64oz water daily.     Wt Readings from Last 5 Encounters:   08/15/24 106.6 kg (235 lb)   07/25/24 107.5 kg (237 lb)   07/19/24 107.6 kg (237 lb 3.2 oz)   05/15/24 109.8 kg (242 lb)   05/08/24 111.1 kg (245 lb)       Anti-obesity medication history    Current:   Wegovy 1 mg- about to start 1.7mg tomorrow. Some loose stool and nausea, but feels overall this is manageable.  "    Past/Failed/contraindicated:       GERD symptoms: heartburn at baseline, managed with omeprazole 20mg, sometimes will take an extra omeprazole 1-2 times a week if symptoms are worse     Constipation/Diarrhea: bms 2-3 times a day, stools are looser     Recent diet changes: prioritizing chicken, protein. Peanut butter, cottage cheese. Fruit cups with no added sugar. Discussed increasing daily fiber to help with bowel movements.     Water- drinking 16 oz 2 bottles of water daily, acknowledges she could drink more     Recent exercise/activity changes: going outside a lot with baby, doing weeding and mulching. Does \"lazy work out\" which is a workout you do from bed.     Recent stressors: baby is teething which makes things stressful. 5 months into pregnancy.     Recent sleep changes: improving, baby is sleeping 6-7 hour stretches. Takes naps with him in the afternoonm     Vitamins/Labs:  vitamin d, pth, cmp, lipids ordered. Taking vitamin d, b12, prenatal mvi. Goes tanning once a week.    Pregnancy: depo shot     CURRENT WEIGHT:   235 lbs 0 oz    Initial Weight (lbs): 240 lbs     Cumulative weight loss (lbs): 5  Weight Loss Percentage: 2.08%        8/10/2024     9:38 AM   Changes and Difficulties   I have made the following changes to my diet since my last visit: 1 candy bar a wk   With regards to my diet, I am still struggling with: feel nauseous and think im hungry n i eat n i feel better but to large of portioner   I have made the following changes to my activity/exercise since my last visit: do daily exercises carry my 17 lb son around alot   With regards to my activity/exercise, I am still struggling with: being uncomfortably fat and not able to move as well as i would like             MEDICATIONS:   Current Outpatient Medications   Medication Sig Dispense Refill     meloxicam (MOBIC) 15 MG tablet Take 1 tablet (15 mg) by mouth daily 30 tablet 11     Prenatal Vit-Fe Fumarate-FA (PRENATAL MULTIVITAMIN W/IRON) " "27-0.8 MG tablet Take 1 tablet by mouth daily 90 tablet 3     Semaglutide-Weight Management (WEGOVY) 1.7 MG/0.75ML pen Inject 1.7 mg subcutaneously once a week 3 mL 2     valACYclovir (VALTREX) 1000 mg tablet Take 1 tablet (1,000 mg) by mouth 2 times daily for 10 days 20 tablet 0     azithromycin (ZITHROMAX) 500 MG tablet Take 2 tablets (1,000 mg) by mouth daily 2 tablet 0     sulfamethoxazole-trimethoprim (BACTRIM DS) 800-160 MG tablet Take 1 tablet by mouth 2 times daily 14 tablet 0           8/10/2024     9:38 AM   Weight Loss Medication History Reviewed With Patient   Which weight loss medications are you currently taking on a regular basis? Wegovy   If you are not taking a weight loss medication that was prescribed to you, please indicate why: Other   Are you having any side effects from the weight loss medication that we have prescribed you? Yes   If you are having side effects please describe: nauseous head aches at times diarrhea dizzy at times               1/30/2020     3:10 PM   ANTHONY Score (Last Two)   ANTHONY Raw Score 36   Activation Score 75.5   ANTHONY Level 4         PHYSICAL EXAM:  Objective   Ht 1.626 m (5' 4\")   Wt 106.6 kg (235 lb)   LMP 06/03/2024 (Approximate)   BMI 40.34 kg/m      Vitals - Patient Reported  Pain Score: No Pain (0)      Vitals:  No vitals were obtained today due to virtual visit.    GENERAL: alert and no distress  EYES: Eyes grossly normal to inspection.  No discharge or erythema, or obvious scleral/conjunctival abnormalities.  RESP: No audible wheeze, cough, or visible cyanosis.    SKIN: Visible skin clear. No significant rash, abnormal pigmentation or lesions.  NEURO: Cranial nerves grossly intact.  Mentation and speech appropriate for age.  PSYCH: Appropriate affect, tone, and pace of words        Sincerely,    Mounika Ness PA-C      20 minutes spent by me on the date of the encounter doing chart review, history and exam, documentation and further activities per the " note    The longitudinal plan of care for the diagnosis(es)/condition(s) as documented were addressed during this visit. Due to the added complexity in care, I will continue to support Ayanna in the subsequent management and with ongoing continuity of care.     Virtual Visit Details    Type of service:  Video Visit   Video Start Time:  11:25am  Video End Time:11:46 AM    Originating Location (pt. Location): Home    Distant Location (provider location):  Off-site  Platform used for Video Visit: oRseann      Again, thank you for allowing me to participate in the care of your patient.      Sincerely,    Mounika Ness PA-C

## 2024-08-20 ENCOUNTER — THERAPY VISIT (OUTPATIENT)
Dept: OCCUPATIONAL THERAPY | Facility: CLINIC | Age: 34
End: 2024-08-20
Attending: ORTHOPAEDIC SURGERY
Payer: COMMERCIAL

## 2024-08-20 ENCOUNTER — TELEPHONE (OUTPATIENT)
Dept: ENDOCRINOLOGY | Facility: CLINIC | Age: 34
End: 2024-08-20
Payer: COMMERCIAL

## 2024-08-20 DIAGNOSIS — M79.645 THUMB PAIN, LEFT: Primary | ICD-10-CM

## 2024-08-20 DIAGNOSIS — G56.21 CUBITAL TUNNEL SYNDROME ON RIGHT: ICD-10-CM

## 2024-08-20 PROCEDURE — 97112 NEUROMUSCULAR REEDUCATION: CPT | Mod: GO | Performed by: OCCUPATIONAL THERAPIST

## 2024-08-20 PROCEDURE — 97140 MANUAL THERAPY 1/> REGIONS: CPT | Mod: GO | Performed by: OCCUPATIONAL THERAPIST

## 2024-08-20 PROCEDURE — 97165 OT EVAL LOW COMPLEX 30 MIN: CPT | Mod: GO | Performed by: OCCUPATIONAL THERAPIST

## 2024-08-20 PROCEDURE — 97110 THERAPEUTIC EXERCISES: CPT | Mod: GO | Performed by: OCCUPATIONAL THERAPIST

## 2024-08-20 NOTE — TELEPHONE ENCOUNTER
Left Voicemail (1st Attempt) and Sent Mychart (1st Attempt) for the patient to call back and schedule the following:    Appointment type: labs  Provider: Mounika Ness  Return date: Schedule at [facility]  Specialty phone number: [phone]  Additional appointment(s) needed: n/a  Additonal Notes: n/a

## 2024-08-20 NOTE — PROGRESS NOTES
"OCCUPATIONAL THERAPY EVALUATION  Type of Visit: Evaluation              Subjective      Presenting condition or subjective complaint:  Right elbow and L hand/wrist/thumb. Does report \"bad R shoulder\". Pain in the R from elbow to the hand, following ulnar side, occasionally up towards the shoulder. Pain burning and pulling and can cramp on the top of your hand. L is all around the thumb area and ruben hurts.    Date of onset: 07/25/24    Relevant medical history: Arthritis   Dates & types of surgery:      Prior diagnostic imaging/testing results: X-ray     Prior therapy history for the same diagnosis, illness or injury: No      Prior Level of Function  Transfers: Independent  Ambulation: Independent  ADL: Independent  IADL:  independent    Living Environment  Social support: Alone   Type of home: Other   Stairs to enter the home: Yes 6 Is there a railing: Yes     Ramp: No   Stairs inside the home: No       Help at home: None  Equipment owned: Walker; Bath bench     Employment: No    Hobbies/Interests:      Patient goals for therapy:        Objective   ADDITIONAL HISTORY:  Right hand dominant  Patient reports symptoms of pain, stiffness/loss of motion, weakness/loss of strength, numbness, and tingling   [facility]    Functional Outcome Measure:   49/80    PAIN:  R arm -  pain is cramping, pulling, burning  Pain is constant  Bending makes it worse  Nothing makes it better    L - ache, sharp, shooting, stabbing   Pain is intermittent  Grabbing and pinching more painful     POSTURE: Forward Neck Posture and Rounded Forward Shoulders     EDEMA: Mild       SENSATION: WNL throughout all nerve distributions; per patient report       ROM:  WNLs all joints    SPECIAL TESTS: R: - tinels at cubital, + resistance FCU, FCR and ECRB, TTP at MEP    RESISTED TESTING:  wrist flexion on R both + and painful      STRENGTH: nt         Assessment & Plan   CLINICAL IMPRESSIONS  Medical Diagnosis: Cubital tunnel syndrome on right. " L wrist pain.    Treatment Diagnosis: L thumb pain. R arm pain. Weakness.    Impression/Assessment: Pt is a 34 year old female presenting to Occupational Therapy due to pain in the R elbow and L hand.  The following significant findings have been identified: Impaired strength and Pain.  These identified deficits interfere with their ability to perform self care tasks, work tasks, recreational activities, care of others, and meal planning and preparation as compared to previous level of function.     Clinical Decision Making (Complexity):  Assessment of Occupational Performance: 1-3 Performance Deficits  Occupational Performance Limitations: dressing, hygiene and grooming, care of others, health management and maintenance, home establishment and management, meal preparation and cleanup, shopping, sleep, work, and leisure activities  Clinical Decision Making (Complexity): Low complexity    PLAN OF CARE  Treatment Interventions:  Modalities:  US  Therapeutic Exercise:  AROM, AAROM, PROM, Tendon Gliding, Isotonics, Isometrics, and Stabilization  Neuromuscular re-education:  Nerve Gliding, Kinesthetic Training, Proprioceptive Training, Isometrics, and Stabilization  Manual Techniques:  Coordination/Dexterity, Joint mobilization, Myofascial release, and Manual edema mobilization  Orthotic Fabrication:  Static    Long Term Goals   OT Goal 1  Goal Description: Pt will be able to sleep throughout the night with waking from arm pain.  Rationale: In order to maximize safety and independence with ADL/IADLs  Target Date: 11/12/24  OT Goal 2  Goal Description: Pt will have 2/10 pain in the L thumb region with activity.  Rationale: In order to maximize safety and independence with ADL/IADLs  Target Date: 11/12/24      Frequency of Treatment: 1x/wk  Duration of Treatment: 12     Recommended Referrals to Other Professionals:   Education Assessment: Learner/Method: Patient     Risks and benefits of evaluation/treatment have been  explained.   Patient/Family/caregiver agrees with Plan of Care.     Evaluation Time:    OT Eval, Low Complexity Minutes (91748): 18       Signing Clinician: JANESSA Frey [facility]                                                                                   OUTPATIENT OCCUPATIONAL THERAPY      PLAN OF TREATMENT FOR OUTPATIENT REHABILITATION   Patient's Last Name, First Name, Ayanna Stafford YOB: 1990   Provider's Name   [facility]   Medical Record No.  [id number]     Onset Date: 07/25/24 Start of Care Date: 08/20/24     Medical Diagnosis:  Cubital tunnel syndrome on right. L wrist pain.      OT Treatment Diagnosis:  L thumb pain. R arm pain. Weakness. Plan of Treatment  Frequency/Duration:1x/wk/12    Certification date from 08/20/24   To 11/12/24        See note for plan of treatment details and functional goals     Nancy Romo OT                         I CERTIFY THE NEED FOR THESE SERVICES FURNISHED UNDER        THIS PLAN OF TREATMENT AND WHILE UNDER MY CARE     (Physician attestation of this document indicates review and certification of the therapy plan).              Referring Provider:  Rommel Pérez    Initial Assessment  See Epic Evaluation- 08/20/24

## 2024-08-27 ENCOUNTER — THERAPY VISIT (OUTPATIENT)
Dept: OCCUPATIONAL THERAPY | Facility: CLINIC | Age: 34
End: 2024-08-27
Attending: ORTHOPAEDIC SURGERY
Payer: COMMERCIAL

## 2024-08-27 ENCOUNTER — LAB (OUTPATIENT)
Dept: LAB | Facility: CLINIC | Age: 34
End: 2024-08-27
Payer: COMMERCIAL

## 2024-08-27 ENCOUNTER — MYC MEDICAL ADVICE (OUTPATIENT)
Dept: FAMILY MEDICINE | Facility: CLINIC | Age: 34
End: 2024-08-27

## 2024-08-27 DIAGNOSIS — M79.645 THUMB PAIN, LEFT: ICD-10-CM

## 2024-08-27 DIAGNOSIS — N30.00 ACUTE CYSTITIS WITHOUT HEMATURIA: ICD-10-CM

## 2024-08-27 DIAGNOSIS — A74.9 CHLAMYDIA INFECTION: ICD-10-CM

## 2024-08-27 DIAGNOSIS — A74.9 CHLAMYDIA INFECTION: Primary | ICD-10-CM

## 2024-08-27 DIAGNOSIS — G56.21 CUBITAL TUNNEL SYNDROME ON RIGHT: Primary | ICD-10-CM

## 2024-08-27 DIAGNOSIS — R39.15 URINARY URGENCY: ICD-10-CM

## 2024-08-27 DIAGNOSIS — M18.12 PRIMARY OSTEOARTHRITIS OF FIRST CARPOMETACARPAL JOINT OF LEFT HAND: ICD-10-CM

## 2024-08-27 PROCEDURE — 97140 MANUAL THERAPY 1/> REGIONS: CPT | Mod: GO | Performed by: OCCUPATIONAL THERAPIST

## 2024-08-27 PROCEDURE — 97110 THERAPEUTIC EXERCISES: CPT | Mod: GO | Performed by: OCCUPATIONAL THERAPIST

## 2024-08-27 PROCEDURE — 97112 NEUROMUSCULAR REEDUCATION: CPT | Mod: GO | Performed by: OCCUPATIONAL THERAPIST

## 2024-08-27 PROCEDURE — L3913 HFO W/O JOINTS CF: HCPCS | Performed by: OCCUPATIONAL THERAPIST

## 2024-09-16 ENCOUNTER — MYC MEDICAL ADVICE (OUTPATIENT)
Dept: FAMILY MEDICINE | Facility: CLINIC | Age: 34
End: 2024-09-16
Payer: COMMERCIAL

## 2024-09-16 ENCOUNTER — MYC MEDICAL ADVICE (OUTPATIENT)
Dept: ORTHOPEDICS | Facility: CLINIC | Age: 34
End: 2024-09-16
Payer: COMMERCIAL

## 2024-09-16 DIAGNOSIS — A74.9 CHLAMYDIA INFECTION: Primary | ICD-10-CM

## 2024-09-16 DIAGNOSIS — N30.00 ACUTE CYSTITIS WITHOUT HEMATURIA: ICD-10-CM

## 2024-09-16 DIAGNOSIS — M18.12 PRIMARY OSTEOARTHRITIS OF FIRST CARPOMETACARPAL JOINT OF LEFT HAND: Primary | ICD-10-CM

## 2024-09-17 ENCOUNTER — LAB (OUTPATIENT)
Dept: LAB | Facility: CLINIC | Age: 34
End: 2024-09-17
Payer: COMMERCIAL

## 2024-09-17 ENCOUNTER — THERAPY VISIT (OUTPATIENT)
Dept: OCCUPATIONAL THERAPY | Facility: CLINIC | Age: 34
End: 2024-09-17
Attending: ORTHOPAEDIC SURGERY
Payer: COMMERCIAL

## 2024-09-17 DIAGNOSIS — G56.21 CUBITAL TUNNEL SYNDROME ON RIGHT: Primary | ICD-10-CM

## 2024-09-17 DIAGNOSIS — M18.12 PRIMARY OSTEOARTHRITIS OF FIRST CARPOMETACARPAL JOINT OF LEFT HAND: ICD-10-CM

## 2024-09-17 DIAGNOSIS — A74.9 CHLAMYDIA INFECTION: ICD-10-CM

## 2024-09-17 DIAGNOSIS — N30.00 ACUTE CYSTITIS WITHOUT HEMATURIA: Primary | ICD-10-CM

## 2024-09-17 DIAGNOSIS — M79.645 THUMB PAIN, LEFT: ICD-10-CM

## 2024-09-17 LAB
ALBUMIN SERPL BCG-MCNC: 4.6 G/DL (ref 3.5–5.2)
ALBUMIN UR-MCNC: NEGATIVE MG/DL
ALP SERPL-CCNC: 76 U/L (ref 40–150)
ALT SERPL W P-5'-P-CCNC: 27 U/L (ref 0–50)
ANION GAP SERPL CALCULATED.3IONS-SCNC: 9 MMOL/L (ref 7–15)
APPEARANCE UR: CLEAR
AST SERPL W P-5'-P-CCNC: 23 U/L (ref 0–45)
BACTERIA #/AREA URNS HPF: ABNORMAL /HPF
BILIRUB SERPL-MCNC: 0.4 MG/DL
BILIRUB UR QL STRIP: NEGATIVE
BUN SERPL-MCNC: 14.8 MG/DL (ref 6–20)
CALCIUM SERPL-MCNC: 9.9 MG/DL (ref 8.8–10.4)
CHLORIDE SERPL-SCNC: 102 MMOL/L (ref 98–107)
CHOLEST SERPL-MCNC: 218 MG/DL
COLOR UR AUTO: YELLOW
CREAT SERPL-MCNC: 0.77 MG/DL (ref 0.51–0.95)
EGFRCR SERPLBLD CKD-EPI 2021: >90 ML/MIN/1.73M2
FASTING STATUS PATIENT QL REPORTED: YES
FASTING STATUS PATIENT QL REPORTED: YES
GLUCOSE SERPL-MCNC: 94 MG/DL (ref 70–99)
GLUCOSE UR STRIP-MCNC: NEGATIVE MG/DL
HCO3 SERPL-SCNC: 26 MMOL/L (ref 22–29)
HDLC SERPL-MCNC: 53 MG/DL
HGB UR QL STRIP: ABNORMAL
KETONES UR STRIP-MCNC: NEGATIVE MG/DL
LDLC SERPL CALC-MCNC: 110 MG/DL
LEUKOCYTE ESTERASE UR QL STRIP: NEGATIVE
MUCOUS THREADS #/AREA URNS LPF: PRESENT /LPF
NITRATE UR QL: NEGATIVE
NONHDLC SERPL-MCNC: 165 MG/DL
PH UR STRIP: 5 [PH] (ref 5–7)
POTASSIUM SERPL-SCNC: 4.2 MMOL/L (ref 3.4–5.3)
PROT SERPL-MCNC: 7.5 G/DL (ref 6.4–8.3)
PTH-INTACT SERPL-MCNC: 28 PG/ML (ref 15–65)
RBC URINE: <1 /HPF
SODIUM SERPL-SCNC: 137 MMOL/L (ref 135–145)
SP GR UR STRIP: >=1.03 (ref 1–1.03)
SQUAMOUS EPITHELIAL: 1 /HPF
TRIGL SERPL-MCNC: 273 MG/DL
UROBILINOGEN UR STRIP-MCNC: NORMAL MG/DL
VIT D+METAB SERPL-MCNC: 33 NG/ML (ref 20–50)
WBC URINE: 3 /HPF

## 2024-09-17 PROCEDURE — 87086 URINE CULTURE/COLONY COUNT: CPT

## 2024-09-17 PROCEDURE — 81001 URINALYSIS AUTO W/SCOPE: CPT

## 2024-09-17 PROCEDURE — 99000 SPECIMEN HANDLING OFFICE-LAB: CPT | Performed by: PATHOLOGY

## 2024-09-17 PROCEDURE — 82040 ASSAY OF SERUM ALBUMIN: CPT

## 2024-09-17 PROCEDURE — 87491 CHLMYD TRACH DNA AMP PROBE: CPT

## 2024-09-17 PROCEDURE — 82306 VITAMIN D 25 HYDROXY: CPT

## 2024-09-17 PROCEDURE — 97110 THERAPEUTIC EXERCISES: CPT | Mod: GO | Performed by: OCCUPATIONAL THERAPIST

## 2024-09-17 PROCEDURE — 83970 ASSAY OF PARATHORMONE: CPT

## 2024-09-17 PROCEDURE — 97112 NEUROMUSCULAR REEDUCATION: CPT | Mod: GO | Performed by: OCCUPATIONAL THERAPIST

## 2024-09-17 PROCEDURE — 87591 N.GONORRHOEAE DNA AMP PROB: CPT

## 2024-09-17 PROCEDURE — 80061 LIPID PANEL: CPT

## 2024-09-17 PROCEDURE — 97140 MANUAL THERAPY 1/> REGIONS: CPT | Mod: GO | Performed by: OCCUPATIONAL THERAPIST

## 2024-09-17 RX ORDER — CELECOXIB 200 MG/1
200 CAPSULE ORAL DAILY
Qty: 30 CAPSULE | Refills: 11 | Status: SHIPPED | OUTPATIENT
Start: 2024-09-17

## 2024-09-18 LAB
BACTERIA UR CULT: NO GROWTH
C TRACH DNA SPEC QL NAA+PROBE: NEGATIVE
N GONORRHOEA DNA SPEC QL NAA+PROBE: NEGATIVE

## 2024-09-20 ENCOUNTER — MYC MEDICAL ADVICE (OUTPATIENT)
Dept: FAMILY MEDICINE | Facility: CLINIC | Age: 34
End: 2024-09-20
Payer: COMMERCIAL

## 2024-09-23 NOTE — TELEPHONE ENCOUNTER
RN Triage    Patient Contact    Attempt # 1    Was call answered?  No.  Left message on voicemail with information to call me back.    Recommendo message sent    Deya Hurtado RN on 9/23/2024 at 8:36 AM

## 2024-09-23 NOTE — TELEPHONE ENCOUNTER
Patient not wanting triage/return call. Says she is better.     Anthony Barragan RN on 9/23/2024 at 3:56 PM

## 2024-09-23 NOTE — TELEPHONE ENCOUNTER
RN Triage    Patient Contact    Attempt # 2    Was call answered?  No.  Left message on voicemail with information to call me back.    Deya Hurtado RN on 9/23/2024 at 1:49 PM

## 2024-09-25 ENCOUNTER — THERAPY VISIT (OUTPATIENT)
Dept: OCCUPATIONAL THERAPY | Facility: CLINIC | Age: 34
End: 2024-09-25
Attending: ORTHOPAEDIC SURGERY
Payer: COMMERCIAL

## 2024-09-25 DIAGNOSIS — G56.21 CUBITAL TUNNEL SYNDROME ON RIGHT: Primary | ICD-10-CM

## 2024-09-25 DIAGNOSIS — M18.12 PRIMARY OSTEOARTHRITIS OF FIRST CARPOMETACARPAL JOINT OF LEFT HAND: ICD-10-CM

## 2024-09-25 DIAGNOSIS — M79.645 THUMB PAIN, LEFT: ICD-10-CM

## 2024-09-25 PROCEDURE — 97112 NEUROMUSCULAR REEDUCATION: CPT | Mod: GO | Performed by: OCCUPATIONAL THERAPIST

## 2024-09-25 PROCEDURE — 97140 MANUAL THERAPY 1/> REGIONS: CPT | Mod: GO | Performed by: OCCUPATIONAL THERAPIST

## 2024-09-25 PROCEDURE — 97110 THERAPEUTIC EXERCISES: CPT | Mod: GO | Performed by: OCCUPATIONAL THERAPIST

## 2024-09-30 ENCOUNTER — MYC MEDICAL ADVICE (OUTPATIENT)
Dept: FAMILY MEDICINE | Facility: CLINIC | Age: 34
End: 2024-09-30
Payer: COMMERCIAL

## 2024-09-30 DIAGNOSIS — K21.00 GASTROESOPHAGEAL REFLUX DISEASE WITH ESOPHAGITIS WITHOUT HEMORRHAGE: Primary | ICD-10-CM

## 2024-10-07 DIAGNOSIS — A60.04 HERPES SIMPLEX VIRUS (HSV) INFECTION OF VAGINA: ICD-10-CM

## 2024-10-07 RX ORDER — VALACYCLOVIR HYDROCHLORIDE 1 G/1
1000 TABLET, FILM COATED ORAL 2 TIMES DAILY
Qty: 20 TABLET | Refills: 0 | Status: SHIPPED | OUTPATIENT
Start: 2024-10-07

## 2024-10-07 NOTE — TELEPHONE ENCOUNTER
Medication passed protocol, however, refill RN could not approve because  prescription has end date.  Provider, please approve or deny the prescription.

## 2024-10-09 ENCOUNTER — THERAPY VISIT (OUTPATIENT)
Dept: OCCUPATIONAL THERAPY | Facility: CLINIC | Age: 34
End: 2024-10-09
Attending: ORTHOPAEDIC SURGERY
Payer: COMMERCIAL

## 2024-10-09 DIAGNOSIS — M54.2 NECK PAIN: Primary | ICD-10-CM

## 2024-10-09 DIAGNOSIS — G56.21 CUBITAL TUNNEL SYNDROME ON RIGHT: ICD-10-CM

## 2024-10-09 DIAGNOSIS — M18.12 PRIMARY OSTEOARTHRITIS OF FIRST CARPOMETACARPAL JOINT OF LEFT HAND: ICD-10-CM

## 2024-10-09 DIAGNOSIS — M79.645 THUMB PAIN, LEFT: ICD-10-CM

## 2024-10-09 PROCEDURE — 97140 MANUAL THERAPY 1/> REGIONS: CPT | Mod: GO | Performed by: OCCUPATIONAL THERAPIST

## 2024-10-09 PROCEDURE — 97110 THERAPEUTIC EXERCISES: CPT | Mod: GO | Performed by: OCCUPATIONAL THERAPIST

## 2024-10-09 PROCEDURE — 97112 NEUROMUSCULAR REEDUCATION: CPT | Mod: GO | Performed by: OCCUPATIONAL THERAPIST

## 2024-10-09 NOTE — PROGRESS NOTES
DISCHARGE  Reason for Discharge: Patient has met all goals.  Switching to PT for neck and shoulder.    Discharge Plan: Patient to continue home program.    Referring Provider:  Rommel Pérez    UEFI 65/80       10/09/24 0500   Appointment Info   Treating Provider Nancy Romo, OTR/L, CHT   Total/Authorized Visits 12   Visits Used 5   Medical Diagnosis Cubital tunnel syndrome on right. L wrist pain.   OT Tx Diagnosis L thumb pain. R arm pain. Weakness.   Progress Note/Certification   Start Of Care Date 08/20/24   Onset of Illness/Injury or Date of Surgery 07/25/24   Therapy Frequency 1x/wk   Predicted Duration 12   Certification date from 08/20/24   Certification date to 11/12/24   KX Modifier Statement I certify the need for these services furnished under this plan of treatment and while under my care.  (Physician co-signature of this document indicates review and certification of the therapy plan)   Progress Note Due Date 10/15/24   Goals   OT Goals 2   OT Goal 1   Goal Description Pt will be able to sleep throughout the night with waking from arm pain.   Rationale In order to maximize safety and independence with ADL/IADLs   Target Date 11/12/24   OT Goal 2   Goal Description Pt will have 2/10 pain in the L thumb region with activity.   Rationale In order to maximize safety and independence with ADL/IADLs   Target Date 11/12/24   Subjective Report   Subjective Report My elbow hasn't been an issue, not numbness or shooters. My L thumb is still a problem.   Treatment Interventions (OT)   Interventions Neuromuscular Re-education;Therapeutic Procedure/Exercise;Manual Therapy;Orthotics   Neuromuscular Re-education   Neuromuscular Re-ed Minutes (22782) 15   Neuro Re-ed 1 MN and UN glides   Neuro Re-ed 1 - Details supine passively by therapist, bilaterally   Skilled Intervention gliding of the nerves through compression areas to improve neuro re-ed   Patient Response/Progress well   Neuro Re-ed 2 pec  decompression   Neuro Re-ed 2 - Details pec major/minor   Neuromuscular Re-education Neuro Re-ed 2   Therapeutic Procedure/Exercise   Therapeutic Procedure: strength, endurance, ROM, flexibillity minutes (22709) 25   Therapeutic Procedures Ther Proc 2;Ther Proc 3;Ther Proc 4;Ther Proc 5   Ther Proc 1 Flexor/extensor passive stretches   Ther Proc 1 - Details elongated tissues   Ther Proc 2 thumb stability- isotometrics   Ther Proc 2 - Details 1DI and opposition  (trialed RB w/ increased pain and terminated it)   Ther Proc 3 scapular retraction   Ther Proc 3 - Details postural support   Ther Proc 4 lateral cervical PROM   Ther Proc 4 - Details UT and scalene stretch   Ther Proc 5 Wrist isotonics   Ther Proc 5 - Details WE and RD w/ 2# db   Skilled Intervention anatomical knowledge of structures to improve length of RUE tissue and L thumb stability   Patient Response/Progress well   Manual Therapy   Manual Therapy Minutes (16748) 8   Manual Therapy Manual Therapy 2;Manual Therapy 3   Manual Therapy 2 STM & TPR   Manual Therapy 2 - Details L radial wrist and TPR to thenars   Manual Therapy 3 R shoulder infer/poster glides   Manual Therapy 3 - Details JMs   Skilled Intervention lengthening of tissues to provide more fluid motion of structures.   Patient Response/Progress well   Education   Learner/Method Patient   Plan   Home program PTRx   Plan for next session strengthening, tissue elongation, nerve glides   Comments   Comments Pt with ongoing pain in the L thumb, discussed importance of consistency of HEP and wear of orthosis. R elbow without issues of numbness or shooting pain. At this time R pain is all localized in the neck/shoulder and PT referal placed and education of L hand HEP.   Total Session Time   Timed Code Treatment Minutes 48   Total Treatment Time (sum of timed and untimed services) 48

## 2024-10-15 NOTE — ANESTHESIA PREPROCEDURE EVALUATION
Anesthesia Pre-Procedure Evaluation    Patient: Ayanna Davidson   MRN: [id number] : 1990          Preoperative Diagnosis: dog bite right foot and ankle    Procedure(s):  INCISION AND DRAINAGE, FOOT    Past Medical History:   Diagnosis Date     Cervical high risk HPV (human papillomavirus) test positive 2019    last PAP NIL (), remote hx of LEEP     Gastroesophageal reflux disease      Methamphetamine abuse (H)     reports daily use     recurrent Bartholin's cyst      Past Surgical History:   Procedure Laterality Date     CHOLECYSTECTOMY       ENT SURGERY       INCISION AND DRAINAGE ABSCESS PELVIS, COMBINED N/A 2018    Procedure: COMBINED INCISION AND DRAINAGE ABSCESS PELVIS;  INCISION AND DRAINAGE LABIAL ABSCESS;  Surgeon: Jase Beach MD;  Location: [facility] OR     INCISION AND DRAINAGE ABSCESS PELVIS, COMBINED N/A 3/5/2019    Procedure: Incision and Drainage Right Labial Abscess;  Surgeon: Jase Beach MD;  Location: [facility] OR     LAP ADJUSTABLE GASTRIC BAND       LEEP TX, CERVICAL       MAMMOPLASTY REDUCTION BILATERAL       MARSUPIALIZATION BARTHOLIN CYST N/A 2019    Procedure: Bartholin's Cyst removal;  Surgeon: Froylan Schwarz MD;  Location: [facility] OR     ORTHOPEDIC SURGERY         Anesthesia Evaluation     . Pt has had prior anesthetic. Type: General and MAC    No history of anesthetic complications          ROS/MED HX    ENT/Pulmonary:     (+)tobacco use, Current use 0.25 ppd packs/day  , . .    Neurologic:  - neg neurologic ROS     Cardiovascular:     (+) ----. : . . . :. . No previous cardiac testing       METS/Exercise Tolerance:     Hematologic:  - neg hematologic  ROS       Musculoskeletal:  - neg musculoskeletal ROS       GI/Hepatic:     (+) GERD       Renal/Genitourinary:  - ROS Renal section negative   (+) Other Renal/ Genitourinary, bartholins cyst - R labia - presents for I & D      Endo:  - neg endo ROS       Psychiatric: Comment: Polysubstance abuse per last tox  [facility] - Telemetry (Hospital)  Initial Discharge Assessment       Primary Care Provider: Harshad Hernandez MD    Admission Diagnosis: Pre-operative clearance [Z01.818]  Chest pain [R07.9]  Closed comminuted intertrochanteric fracture of right femur, initial encounter [S72.141A]    Admission Date: 10/14/2024  Expected Discharge Date: Per Attending     Transition of Care Barriers: None    Payor: BLUE CROSS BLUE SHIELD / [facility] / Product Type: Commercial /     Extended Emergency Contact Information  Primary Emergency Contact: Ray Coronado   [facility]  Home Phone: [phone]  Mobile Phone: [phone]  Relation: Friend    Discharge Plan A: Home Health  Discharge Plan B: Home with family      [address] [facility]  [address] [facility] [address]  Phone: [phone] Fax: [phone]      Initial Assessment (most recent)       Adult Discharge Assessment - 10/15/24 0949          Discharge Assessment    Assessment Type Discharge Planning Assessment     Confirmed/corrected address, phone number and insurance Yes     Confirmed Demographics Correct on Facesheet     Source of Information patient;family     When was your last doctors appointment? 10/14/24   Harshad Hernandez MD - Internal Medicine    Communicated NATHAN with patient/caregiver Date not available/Unable to determine     Reason For Admission Closed displaced intertrochanteric fracture of right femur     People in Home alone     Facility Arrived From: home     Do you expect to return to your current living situation? Yes     Do you have help at home or someone to help you manage your care at home? Yes     Who are your caregiver(s) and their phone number(s)? Ray Ivonne - friend/ neighbor     Prior to hospitilization cognitive status: Alert/Oriented     Current cognitive status: Alert/Oriented     Walking or Climbing Stairs Difficulty yes      "screen.    (+) psychiatric history anxiety and depression      Infectious Disease:  - neg infectious disease ROS       Malignancy:      - no malignancy   Other: Comment: - HCG   - neg other ROS                      Physical Exam  Normal systems: cardiovascular    Airway   Mallampati: II  TM distance: <3 FB  Neck ROM: full    Dental   (+) missing and chipped    Cardiovascular   Rhythm and rate: regular and normal  (-) no murmur    Pulmonary (+) decreased breath sounds       Other findings: Dog bite right ankle / foot - has received Rabies vaccine.        Lab Results   Component Value Date    WBC 9.5 08/11/2019    HGB 12.5 08/11/2019    HCT 37.3 08/11/2019     08/11/2019     08/11/2019    POTASSIUM 3.8 08/11/2019    CHLORIDE 105 08/11/2019    CO2 27 08/11/2019    BUN 12 08/11/2019    CR 0.76 08/11/2019     (H) 08/11/2019    ANUM 9.1 08/11/2019    ALBUMIN 3.8 04/29/2019    PROTTOTAL 7.4 04/29/2019    ALT 30 04/29/2019    AST 24 04/29/2019    ALKPHOS 69 04/29/2019    BILITOTAL 0.3 04/29/2019    LIPASE 86 11/21/2007    AMYLASE 58 11/21/2007    HCG Negative 08/11/2019       Preop Vitals  BP Readings from Last 3 Encounters:   08/11/19 111/77   05/07/19 119/80   05/07/19 124/84    Pulse Readings from Last 3 Encounters:   08/11/19 91   05/07/19 104   05/07/19 76      Resp Readings from Last 3 Encounters:   08/11/19 18   05/07/19 16   05/01/19 14    SpO2 Readings from Last 3 Encounters:   08/11/19 96%   05/07/19 98%   05/01/19 100%      Temp Readings from Last 1 Encounters:   08/11/19 98  F (36.7  C) (Oral)    Ht Readings from Last 1 Encounters:   05/07/19 1.676 m (5' 6\")      Wt Readings from Last 1 Encounters:   08/11/19 72.6 kg (160 lb)    Estimated body mass index is 25.82 kg/m  as calculated from the following:    Height as of 5/7/19: 1.676 m (5' 6\").    Weight as of this encounter: 72.6 kg (160 lb).       Anesthesia Plan      History & Physical Review  History and physical reviewed and following " Walking or Climbing Stairs ambulation difficulty, requires equipment;transferring difficulty, assistance 1 person     Mobility Management standard walker and rollator     Dressing/Bathing Difficulty yes     Dressing/Bathing bathing difficulty, requires equipment;bathing difficulty, assistance 1 person;dressing difficulty, assistance 1 person     Dressing/Bathing Management shower chair     Home Accessibility wheelchair accessible     Equipment Currently Used at Home raised toilet;shower chair;walker, standard;rollator     Readmission within 30 days? No     Patient currently being followed by outpatient case management? No     Do you currently have service(s) that help you manage your care at home? No     Do you take prescription medications? Yes     Do you have prescription coverage? Yes     Coverage BCBS - Commercial     Do you have any problems affording any of your prescribed medications? No     Is the patient taking medications as prescribed? yes     Who is going to help you get home at discharge? Ray Coronado - friend/ neighbor     How do you get to doctors appointments? family or friend will provide     Are you on dialysis? No     Do you take coumadin? No     Discharge Plan A Home Health     Discharge Plan B Home with family     DME Needed Upon Discharge  wheelchair     Discharge Plan discussed with: Patient;Friend     Name(s) and Number(s) Ray Coronado - friend/ neighbor     Transition of Care Barriers None        Transportation Needs    Has the lack of transportation kept you from medical appointments, meetings, work or from getting things needed for daily living? No                   Anticipated DC dispo: home with family vs home health   Prior Level of Function: modified/ dependent with ADLs - Ray Coronado - friend/ neighbor assists   People in home:  alone, neighbor/ friend - Ray has been staying nights     Comments:  SW met with patient and friend, Ray, at bedside to introduce role and discuss  discharge planning. Patient's friend/ neighbor will be help at home and can provide transport at time of discharge. Confirmed demographics, insurance, and emergency contacts. Upon chart review, it stated Patient's son was SDM, however at bedside - Patient has never been  and has not children. Patient's friend, Ray, has been staying with the Patient at night, however Patient is alone during the day. SW updated Patient's whiteboard with contact information and anticipated DC plan. CM discharge needs depends on hospital progress. SW will continue following to assist with other needs.         examination; no interval change.    ASA Status:  2 emergent.    NPO Status:  Full stomach and waived due to emergency    Plan for General, RSI and ETT with Intravenous and Propofol induction. Maintenance will be TIVA.           Postoperative Care  Postoperative pain management:  IV analgesics.      Consents  Anesthetic plan, risks, benefits and alternatives discussed with:  Patient and Other (See Comment) (Grandmother).  Use of blood products discussed: No .   .                 LEXX Dimas CRNA

## 2024-11-05 ENCOUNTER — HOSPITAL ENCOUNTER (EMERGENCY)
Facility: CLINIC | Age: 34
Discharge: HOME | End: 2024-11-06
Attending: STUDENT IN AN ORGANIZED HEALTH CARE EDUCATION/TRAINING PROGRAM | Admitting: STUDENT IN AN ORGANIZED HEALTH CARE EDUCATION/TRAINING PROGRAM
Payer: COMMERCIAL

## 2024-11-05 DIAGNOSIS — H02.843 PAIN AND SWELLING OF EYELID OF RIGHT EYE: ICD-10-CM

## 2024-11-05 DIAGNOSIS — H02.89 PAIN AND SWELLING OF EYELID OF RIGHT EYE: ICD-10-CM

## 2024-11-05 PROCEDURE — 99284 EMERGENCY DEPT VISIT MOD MDM: CPT | Mod: 25 | Performed by: STUDENT IN AN ORGANIZED HEALTH CARE EDUCATION/TRAINING PROGRAM

## 2024-11-05 PROCEDURE — 250N000009 HC RX 250: Performed by: STUDENT IN AN ORGANIZED HEALTH CARE EDUCATION/TRAINING PROGRAM

## 2024-11-05 PROCEDURE — 99283 EMERGENCY DEPT VISIT LOW MDM: CPT | Performed by: STUDENT IN AN ORGANIZED HEALTH CARE EDUCATION/TRAINING PROGRAM

## 2024-11-05 PROCEDURE — 81025 URINE PREGNANCY TEST: CPT | Performed by: STUDENT IN AN ORGANIZED HEALTH CARE EDUCATION/TRAINING PROGRAM

## 2024-11-05 RX ORDER — TETRACAINE HYDROCHLORIDE 5 MG/ML
1-2 SOLUTION OPHTHALMIC ONCE
Status: COMPLETED | OUTPATIENT
Start: 2024-11-05 | End: 2024-11-05

## 2024-11-05 RX ADMIN — TETRACAINE HYDROCHLORIDE 2 DROP: 5 SOLUTION OPHTHALMIC at 23:57

## 2024-11-05 NOTE — Clinical Note
Ayanna Davidson was seen and treated in our emergency department on 11/5/2024.  She may return to work on 11/11/2024.       If you have any questions or concerns, please don't hesitate to call.      Shilpi Dumont MD

## 2024-11-06 VITALS
SYSTOLIC BLOOD PRESSURE: 145 MMHG | TEMPERATURE: 98.1 F | OXYGEN SATURATION: 96 % | BODY MASS INDEX: 39.95 KG/M2 | HEART RATE: 94 BPM | HEIGHT: 66 IN | WEIGHT: 248.6 LBS | RESPIRATION RATE: 18 BRPM | DIASTOLIC BLOOD PRESSURE: 86 MMHG

## 2024-11-06 LAB — HCG UR QL: NEGATIVE

## 2024-11-06 PROCEDURE — 250N000009 HC RX 250: Performed by: STUDENT IN AN ORGANIZED HEALTH CARE EDUCATION/TRAINING PROGRAM

## 2024-11-06 PROCEDURE — 250N000013 HC RX MED GY IP 250 OP 250 PS 637: Performed by: STUDENT IN AN ORGANIZED HEALTH CARE EDUCATION/TRAINING PROGRAM

## 2024-11-06 RX ORDER — CETIRIZINE HYDROCHLORIDE 10 MG/1
10 TABLET ORAL ONCE
Status: COMPLETED | OUTPATIENT
Start: 2024-11-06 | End: 2024-11-06

## 2024-11-06 RX ORDER — METHYLPREDNISOLONE 4 MG/1
TABLET ORAL
Qty: 21 TABLET | Refills: 0 | Status: SHIPPED | OUTPATIENT
Start: 2024-11-05

## 2024-11-06 RX ORDER — CETIRIZINE HYDROCHLORIDE 10 MG/1
10 TABLET ORAL DAILY
Qty: 30 TABLET | Refills: 0 | Status: SHIPPED | OUTPATIENT
Start: 2024-11-05

## 2024-11-06 RX ORDER — POLYVINYL ALCOHOL 14 MG/ML
1 SOLUTION/ DROPS OPHTHALMIC PRN
Qty: 15 ML | Refills: 0 | Status: SHIPPED | OUTPATIENT
Start: 2024-11-06

## 2024-11-06 RX ORDER — DEXAMETHASONE SODIUM PHOSPHATE 10 MG/ML
10 INJECTION, SOLUTION INTRAMUSCULAR; INTRAVENOUS ONCE
Status: COMPLETED | OUTPATIENT
Start: 2024-11-06 | End: 2024-11-06

## 2024-11-06 RX ORDER — CEPHALEXIN 500 MG/1
500 CAPSULE ORAL 3 TIMES DAILY
Qty: 21 CAPSULE | Refills: 0 | Status: SHIPPED | OUTPATIENT
Start: 2024-11-05 | End: 2024-11-12

## 2024-11-06 RX ADMIN — CETIRIZINE HYDROCHLORIDE 10 MG: 10 TABLET, FILM COATED ORAL at 00:30

## 2024-11-06 RX ADMIN — DEXAMETHASONE SODIUM PHOSPHATE 10 MG: 10 INJECTION, SOLUTION INTRAMUSCULAR; INTRAVENOUS at 00:30

## 2024-11-06 RX ADMIN — CEPHALEXIN 500 MG: 250 CAPSULE ORAL at 00:30

## 2024-11-06 ASSESSMENT — ENCOUNTER SYMPTOMS
EYE PAIN: 1
EYE ITCHING: 1

## 2024-11-06 NOTE — ED PROVIDER NOTES
History     Chief Complaint   Patient presents with    Facial Pain     HPI  Ayanna Davidson is a 34 year old female who presents with right eye discomfort and mild right facial pain.  Is been ongoing for the last 24 hours.  She notes that she was working with some dust and she think she may have gotten in her eye or some irritation to the area which is been bothersome throughout the day and has been associate with some intermittent mild blurriness as well.  She has tried a warm pack as she has had some eyelid swelling thinking this may be a stye or symptoms of infection but no significant proven occurred today.  He is also concerned about missing it.  He is currently on Wegovy and 8 months postpartum.    Allergies:  Allergies   Allergen Reactions    No Known Allergies        Problem List:    Patient Active Problem List    Diagnosis Date Noted    S/P  2024     Priority: Medium    Elevated BP without diagnosis of hypertension 2024     Priority: Medium    Diet controlled gestational diabetes mellitus (GDM) in third trimester 2024     Priority: Medium    Encounter for triage in pregnant patient 10/17/2023     Priority: Medium    PTSD (post-traumatic stress disorder) 10/10/2023     Priority: Medium    Anxiety 03/15/2023     Priority: Medium    Class 2 severe obesity with serious comorbidity and body mass index (BMI) of 39.0 to 39.9 in adult, unspecified obesity type (H) 2022     Priority: Medium    Prolapse of the stomach 2021     Priority: Medium     Formatting of this note might be different from the original. Added automatically from request for surgery [id number]      TINO III (vulvar intraepithelial neoplasia III) 2020     Priority: Medium     Added automatically from request for surgery [id number]      Bartholin's gland abscess 2020     Priority: Medium     Added automatically from request for surgery [id number]      Chronic pain in right shoulder 2020     Priority:  "Medium    Dog bite of right lower leg 09/27/2019     Priority: Medium    Cellulitis of left upper extremity 09/27/2019     Priority: Medium    Current every day smoker 09/27/2019     Priority: Medium    Gastroesophageal reflux disease without esophagitis 09/27/2019     Priority: Medium    Skin infection 09/27/2019     Priority: Medium    Dog bite of left upper extremity 05/07/2019     Priority: Medium    h/o Cervical high risk HPV (human papillomavirus) test positive (now negative) 03/12/2019     Priority: Medium     4/2016 NIL pap. No prior HPV testing.   3/12/19 NIL pap, + HR HPV (not 16 or 18). Plan: cotest in 1 yr  1/29/20 NIL pap, + HR HPV (not 16 or 18). Plan: [place]  2/11/20 [place] bx: neg. Plan: Cotest in 1 yr.   2/18/20 Vulvar biopsy: \"TINO III; severe dysplasia\" possible involvement of margins  3/11/20 Re excision of vulva.   3/23/21 NIL pap, + HR HPV (not 16 or 18). Plan: [facility]  6/22/21 Gyn visit - [place] recommended, Patient refused  6/28/21 GynOnc visit plan - HPV HR+: \"Repeat HPV-based screening in 1 year based upon ASCCP\", Due 3/23/22  5/6/22 Lost to follow up  8/18/22 NIL pap, Neg HPV.  Plan: cotest in 1 year  6/14/23 NIL Pap, Neg HR HPV. Plan: cotest in 1 year and establish with OB/GYN provider, per Dr. Santiago.   7/19/24 NIL pap, Neg HPV. Plan cotest in 5 years      Closed fracture dislocation of hip joint, left, initial encounter (H) 09/28/2018     Priority: Medium    Vulvar abscess 08/16/2017     Priority: Medium    ADD (attention deficit disorder) without hyperactivity 04/18/2014     Priority: Medium    Depressed 05/10/2012     Priority: Medium        Past Medical History:    Past Medical History:   Diagnosis Date    Cervical high risk HPV (human papillomavirus) test positive 03/12/2019    Gastroesophageal reflux disease     HSV (herpes simplex virus) infection     Methamphetamine abuse (H)     Overdose of antipsychotic 05/09/2012    recurrent Bartholin's cyst     TINO III (vulvar intraepithelial " neoplasia III)        Past Surgical History:    Past Surgical History:   Procedure Laterality Date    BIOPSY       SECTION N/A 3/1/2024    Procedure: PRIMARY  SECTION;  Surgeon: Inderjit Olivas MD;  Location: [facility]    CHOLECYSTECTOMY      ENT SURGERY      ESOPHAGOSCOPY, GASTROSCOPY, DUODENOSCOPY (EGD), COMBINED N/A 2023    Procedure: Esophagoscopy, gastroscopy, duodenoscopy (EGD), combined;  Surgeon: Thierno Escobar MD;  Location: [facility]    EXAM UNDER ANESTHESIA PELVIC N/A 2020    Procedure: EXAM UNDER ANESTHESIA, PELVIS, PAP;  Surgeon: Froylan Schwarz MD;  Location: [facility]    EXCISE VULVA WIDE LOCAL Bilateral 2020    Procedure: Right Vulva Wedge Resection and Incision and Drainage Left Vulva;  Surgeon: Froylan Schwarz MD;  Location: [facility]    INCISION AND DRAINAGE ABSCESS PELVIS, COMBINED N/A 2018    Procedure: COMBINED INCISION AND DRAINAGE ABSCESS PELVIS;  INCISION AND DRAINAGE LABIAL ABSCESS;  Surgeon: Jase Beach MD;  Location: [facility]    INCISION AND DRAINAGE ABSCESS PELVIS, COMBINED N/A 2019    Procedure: Incision and Drainage Right Labial Abscess;  Surgeon: Jase Beach MD;  Location: [facility]    INCISION AND DRAINAGE LOWER EXTREMITY, COMBINED Right 2019    Procedure: irrigation and debridement right leg dog bite;  Surgeon: Rommel Pérez MD;  Location: [facility]    LAP ADJUSTABLE GASTRIC BAND      LEEP TX, CERVICAL      MAMMOPLASTY REDUCTION BILATERAL      MARSUPIALIZATION BARTHOLIN CYST N/A 2019    Procedure: Bartholin's Cyst removal;  Surgeon: Froylan Schwarz MD;  Location: [facility]    MARSUPIALIZATION BARTHOLIN CYST Left 2020    Procedure: Excision of left bartholin's abscess;  Surgeon: Froylan Schwarz MD;  Location: [facility]    MARSUPIALIZATION BARTHOLIN CYST Right 2024    Procedure: MARSUPIALIZATION, CYST, BARTHOLIN'S GLAND;  Surgeon: Jase Man MD;  Location: [facility]    ORTHOPEDIC  SURGERY         Family History:    Family History   Problem Relation Age of Onset    Thyroid Disease Mother     Heart Disease Father     Coronary Artery Disease Father     Hypertension Father     Hyperlipidemia Father     Mental Illness Father     Substance Abuse Father     Diabetes Maternal Grandmother     Prostate Cancer Maternal Grandfather     Breast Cancer Paternal Grandmother     Testicular cancer Paternal Grandfather     Depression Half-Sister     Anxiety Disorder Half-Sister     Obesity Sister        Social History:  Marital Status:  Single [1]  Social History     Tobacco Use    Smoking status: Some Days     Current packs/day: 0.50     Average packs/day: 0.5 packs/day for 10.0 years (5.0 ttl pk-yrs)     Types: Cigarettes    Smokeless tobacco: Current    Tobacco comments:     uses E cig with zero nicotine    Vaping Use    Vaping status: Every Day    Substances: no nicotine   Substance Use Topics    Alcohol use: Not Currently     Comment: Reports last use 9/17 and denies use during pregnancy    Drug use: Not Currently     Types: Marijuana, Methamphetamines     Comment: Reports Marijuana and meth use one week ago        Medications:    cephALEXin (KEFLEX) 500 MG capsule  cetirizine (ZYRTEC) 10 MG tablet  methylPREDNISolone (MEDROL DOSEPAK) 4 MG tablet therapy pack  omeprazole (PRILOSEC) 20 MG DR capsule  polyvinyl alcohol (LIQUIFILM TEARS) 1.4 % ophthalmic solution  valACYclovir (VALTREX) 1000 mg tablet  azithromycin (ZITHROMAX) 500 MG tablet  celecoxib (CELEBREX) 200 MG capsule  meloxicam (MOBIC) 15 MG tablet  Prenatal Vit-Fe Fumarate-FA (PRENATAL MULTIVITAMIN W/IRON) 27-0.8 MG tablet  Semaglutide-Weight Management (WEGOVY) 1.7 MG/0.75ML pen  sulfamethoxazole-trimethoprim (BACTRIM DS) 800-160 MG tablet          Review of Systems   HENT:          Right facial discomfort   Eyes:  Positive for pain, itching and visual disturbance.   All other systems reviewed and are negative.      Physical Exam   BP: (!)  "132/100  Pulse: 95  Temp: 98.1  F (36.7  C)  Resp: 18  Height: 167.6 cm (5' 6\")  Weight: 112.8 kg (248 lb 9.6 oz)  SpO2: 97 %      Physical Exam  Vitals and nursing note reviewed.   Constitutional:       General: She is not in acute distress.     Appearance: She is obese. She is not ill-appearing, toxic-appearing or diaphoretic.   HENT:      Head:        Comments: Mild swelling around the upper eyelid.  No significant erythema warmth but mild tenderness on palpation of the right maxillary area as well as the right upper eyelid.  Mild blurriness to the right visual aspect of her vision.  Pupils are equal reactive bilaterally.  No conjunctival erythema.  No signs of hypopyon or hyphema.  Neurological:      Mental Status: She is alert.         ED Course        Procedures                Results for orders placed or performed during the hospital encounter of 11/05/24 (from the past 24 hours)   HCG qualitative urine   Result Value Ref Range    hCG Urine Qualitative Negative Negative       Medications   tetracaine (PONTOCAINE) 0.5 % ophthalmic solution 1-2 drop (2 drops Right Eye $Given by Other 11/5/24 3937)   cetirizine (zyrTEC) tablet 10 mg (10 mg Oral $Given 11/6/24 0030)   dexAMETHasone (PF) (DECADRON) injectable solution used ORALLY 10 mg (10 mg Oral $Given 11/6/24 0030)   cephALEXin (KEFLEX) capsule 500 mg (500 mg Oral $Given 11/6/24 0030)       Assessments & Plan (with Medical Decision Making)     I have reviewed the nursing notes.    I have reviewed the findings, diagnosis, plan and need for follow up with the patient.      Medical Decision Making  Pleasant 34-year-old female presenting with right eye pain and swelling.  She has got some upper eyelid on the right eye swelling with no significant signs of erythema or warmth.  There is some tenderness with palpation over the maxillary aspect of her right side of her face.  There is no facial droop or weakness.  She does have some mild bulging of her tympanic " membranes bilaterally.  No noted fevers or noted headaches.  Intermittent blurry vision to the right side of the right vision.  She notes that she was working with some material that released some dust that did hit her in the eye and she tried scratching today.  After this is when the symptoms started.  She denies any make-up or new topical applied creams.  Vision exam is otherwise benign.  Pupils are clear bilaterally.  Rebound bilaterally equal.  Tetracaine drops with no significant signs of obvious abrasions or foreign bodies and no signs of hypopyon or hyphema's or any signs of drainage or traumatic injury.  No dendritic ulcers or corneal ulcers.  1 area of a possible abrasion to the lower aspect of the right cornea.  Provided patient with Polytrim for eyedrops as well as Keflex for the topical aspect concerning for possible cellulitis that she does she has had facial cellulitis in the past.  Zyrtec Decadron provided for allergic response/allergy aspect of the irritation.  Topical lubricating eyedrops also provided.  Pregnancy test was negative.  Return precaution discussed and provided patient with outpatient [facility] phone number for outpatient follow-up in 2 to 3 days.  Patient is happy with this plan and discharged home with close return precautions and discussion of medications you provided.    Discharge Medication List as of 11/6/2024 12:32 AM        START taking these medications    Details   cephALEXin (KEFLEX) 500 MG capsule Take 1 capsule (500 mg) by mouth 3 times daily for 7 days., Disp-21 capsule, R-0, E-Prescribe      cetirizine (ZYRTEC) 10 MG tablet Take 1 tablet (10 mg) by mouth daily., Disp-30 tablet, R-0, E-Prescribe      methylPREDNISolone (MEDROL DOSEPAK) 4 MG tablet therapy pack Follow Package Directions, Disp-21 tablet, R-0, E-Prescribe      polyvinyl alcohol (LIQUIFILM TEARS) 1.4 % ophthalmic solution Apply 1 drop to eye as needed for dry eyes., Disp-15 mL, R-0, Local Print              Final diagnoses:   Pain and swelling of eyelid of right eye       11/5/2024   [facility] EMERGENCY DEPT       Shilpi Dumont MD  11/06/24 0042

## 2024-11-06 NOTE — ED TRIAGE NOTES
Pain and swelling to right eye lid starting last night. Pt reports dust into eye after cleaning garage yesterday.      Triage Assessment (Adult)       Row Name 11/05/24 [id number]          Triage Assessment    Airway WDL WDL        Respiratory WDL    Respiratory WDL WDL        Skin Circulation/Temperature WDL    Skin Circulation/Temperature WDL WDL        Cardiac WDL    Cardiac WDL WDL        Peripheral/Neurovascular WDL    Peripheral Neurovascular WDL WDL        Cognitive/Neuro/Behavioral WDL    Cognitive/Neuro/Behavioral WDL WDL

## 2024-11-06 NOTE — DISCHARGE INSTRUCTIONS
Please call [phone] which is the [facility] for follow-up in regards to your eye discomfort in the next 2 to 3 days.  We also sent a antihistamine to your pharmacy to be taken daily along with antibiotics and a course of steroids.  With your exam it is difficult to assess if there is any obvious large signs of scratches or any obvious debris that could be removed however no significant signs are present.  At this time we are more concerned that this is likely reaction to an irritant versus cellulitis however these are often difficult to differentiate and a close follow-up with would be crucial in continued management.

## 2024-11-11 DIAGNOSIS — K21.00 GASTROESOPHAGEAL REFLUX DISEASE WITH ESOPHAGITIS WITHOUT HEMORRHAGE: ICD-10-CM

## 2024-11-11 DIAGNOSIS — O09.90 SUPERVISION OF HIGH RISK PREGNANCY, ANTEPARTUM: ICD-10-CM

## 2024-11-12 RX ORDER — PRENATAL VIT/IRON FUM/FOLIC AC 27MG-0.8MG
1 TABLET ORAL DAILY
Qty: 1 TABLET | Refills: 0 | Status: SHIPPED | OUTPATIENT
Start: 2024-11-12 | End: 2024-11-15

## 2024-11-15 RX ORDER — PRENATAL VIT/IRON FUM/FOLIC AC 27MG-0.8MG
1 TABLET ORAL DAILY
Qty: 90 TABLET | Refills: 0 | Status: SHIPPED | OUTPATIENT
Start: 2024-11-15

## 2024-11-15 NOTE — TELEPHONE ENCOUNTER
Script sent on 11/12 was for only #1 tablet. Pharmacy wondering if this should be a greater quantity.     Renetta Mckenzie, CHIARAN, RN

## 2024-11-26 ENCOUNTER — ANCILLARY PROCEDURE (OUTPATIENT)
Dept: CT IMAGING | Facility: CLINIC | Age: 34
End: 2024-11-26
Attending: FAMILY MEDICINE
Payer: COMMERCIAL

## 2024-11-26 DIAGNOSIS — Z82.49 FAMILY HISTORY OF ISCHEMIC HEART DISEASE: ICD-10-CM

## 2024-11-26 LAB — HCG UR QL: NEGATIVE

## 2024-11-26 PROCEDURE — 81025 URINE PREGNANCY TEST: CPT | Performed by: FAMILY MEDICINE

## 2024-11-26 PROCEDURE — 75571 CT HRT W/O DYE W/CA TEST: CPT | Performed by: INTERNAL MEDICINE

## 2024-12-05 ENCOUNTER — THERAPY VISIT (OUTPATIENT)
Dept: PHYSICAL THERAPY | Facility: CLINIC | Age: 34
End: 2024-12-05
Attending: FAMILY MEDICINE
Payer: COMMERCIAL

## 2024-12-05 ENCOUNTER — MYC MEDICAL ADVICE (OUTPATIENT)
Dept: FAMILY MEDICINE | Facility: CLINIC | Age: 34
End: 2024-12-05
Payer: COMMERCIAL

## 2024-12-05 DIAGNOSIS — M54.2 NECK PAIN: ICD-10-CM

## 2024-12-05 DIAGNOSIS — L03.115 CELLULITIS OF RIGHT LOWER EXTREMITY: Primary | ICD-10-CM

## 2024-12-05 PROCEDURE — 97161 PT EVAL LOW COMPLEX 20 MIN: CPT | Mod: GP | Performed by: PHYSICAL THERAPIST

## 2024-12-05 PROCEDURE — 97110 THERAPEUTIC EXERCISES: CPT | Mod: GP | Performed by: PHYSICAL THERAPIST

## 2024-12-05 RX ORDER — SULFAMETHOXAZOLE AND TRIMETHOPRIM 800; 160 MG/1; MG/1
1 TABLET ORAL 2 TIMES DAILY
Qty: 14 TABLET | Refills: 0 | Status: SHIPPED | OUTPATIENT
Start: 2024-12-05

## 2024-12-05 NOTE — PROGRESS NOTES
PHYSICAL THERAPY EVALUATION  Type of Visit: Evaluation          Fall Risk Screen:  Fall screen completed by: PT  Have you fallen 2 or more times in the past year?: No  Have you fallen and had an injury in the past year?: No  Is patient a fall risk?: No  Fall screen comments: slipped on snow    Subjective   Patient reports that her neck and right shoulder have been tight and painful since 2009 , had OT for carpel tunnel and that helped  has exercises but has trouble finding time to be compliant with it . Has 9 month old son and 2 dogs  normal takes care of her son and does a exercise video , has OT exercise had shoulder abd flex/extend UE and with arm at side flex/extend wrist and and AROM for thumb . Does her housework .  Has headaches when she wakes up and sometimes has them mid day , takes a hot shower and that helps . PMH none reported       Presenting condition or subjective complaint: (Patient-Rptd) lifting my child back hurt headaches and i know my weight is a problem  Date of onset: 12/05/23    Relevant medical history: (Patient-Rptd) Arthritis; Foot drop; Migraines or headaches; Numbness or tingling in perianal area; Overweight; Vision problems   Dates & types of surgery: (Patient-Rptd) bunions on both feet riggr ankle and ledt ankle break right ankle tendon torn cartilage scrapped out of right ankle broke my left hop dislocated gallbladder surgery lapband input n removal breast reduction top sinus impacted drilled out n yoshi rebroke    Prior diagnostic imaging/testing results: (Patient-Rptd) X-ray     Prior therapy history for the same diagnosis, illness or injury: (Patient-Rptd) Yes (Patient-Rptd) dont remember ut was at [facility] in 2010 maybe    Prior Level of Function  Transfers: Independent  Ambulation: Independent  ADL: Independent  IADL:  independent     Living Environment  Social support: (Patient-Rptd) Alone   Type of home: (Patient-Rptd) Other   Stairs to enter the home:  (Patient-Rptd) Yes (Patient-Rptd) 5 Is there a railing: (Patient-Rptd) Yes     Ramp: (Patient-Rptd) No   Stairs inside the home: (Patient-Rptd) No       Help at home: (Patient-Rptd) None  Equipment owned: (Patient-Rptd) Walker with wheels; Crutches; Bath bench     Employment: (Patient-Rptd) No  hasn't worked since 2022 has 9 month old little boy   Hobbies/Interests: (Patient-Rptd) raising my child watching tv series my dogs decorating    Patient goals for therapy: (Patient-Rptd) lift my child comfortably be more active    Pain assessment: spadi 32.31 NDI 22     Objective   SHOULDER EVALUATION  PAIN: neck pain 4-9/10 right shoulder pain 2-8/10 headache daily   INTEGUMENTARY (edema, incisions):   POSTURE: rounded head and shoulders   GAIT:   Weightbearing Status:   Assistive Device(s):   Gait Deviations:   BALANCE/PROPRIOCEPTION:   WEIGHTBEARING ALIGNMENT:   ROM:   Laterial flexion left more pain B 45 , rotation right 65 left 70 , flexion 45 , extension 45   STRENGTH: 4-/5 strength in right shoulder flexion , abduction discomfort   FLEXIBILITY:   SPECIAL TESTS: spurling neg   PALPATION: has numb/tingling in elbow or in the 4,5 fingers , or sometimes a base of skull or sometimes on shoulder   Mostly fingers or elbow 2-3 x week normally 10 minutes but up to 1 hour   JOINT MOBILITY:   CERVICAL SCREEN: spurling neg     Assessment & Plan   CLINICAL IMPRESSIONS  Medical Diagnosis: neck pain M54.2    Treatment Diagnosis: right shoulder and neck pain   Impression/Assessment: Patient is a 34 year old female with neck and right shoulder  complaints.  The following significant findings have been identified: Pain, Decreased ROM/flexibility, Decreased strength, Impaired sensation, Impaired muscle performance, and Decreased activity tolerance. These impairments interfere with their ability to perform self care tasks, work tasks, recreational activities, and household chores as compared to previous level of function.     Clinical  Decision Making (Complexity):  Clinical Presentation: Stable/Uncomplicated  Clinical Presentation Rationale: based on medical and personal factors listed in PT evaluation  Clinical Decision Making (Complexity): Low complexity    PLAN OF CARE  Treatment Interventions:  Modalities:  as needed   Interventions: Manual Therapy, Neuromuscular Re-education, Therapeutic Activity, Therapeutic Exercise    Long Term Goals     PT Goal 1  Goal Identifier: 1  Goal Description: instruction in HEP and compliant with it 5 of 7 days to improve quality of life  Target Date: 03/04/25  PT Goal 2  Goal Identifier: 2  Goal Description: patient to have improved tolerance to activity currently NDI 22 spadi 32.31 goal is 10 % improvement  Target Date: 03/04/25  PT Goal 3  Goal Identifier: 3  Goal Description: patient to have overall decrease in pain level currently shoulder 2-8/10 and neck 4-9/10 goal is 10 % improvement  Target Date: 03/04/25      Frequency of Treatment: every other week x 12 weeks  Duration of Treatment:      Recommended Referrals to Other Professionals:   Education Assessment:   Learner/Method: Patient;Listening;Reading;Demonstration;Pictures/Video;No Barriers to Learning    Risks and benefits of evaluation/treatment have been explained.   Patient/Family/caregiver agrees with Plan of Care.     Evaluation Time:     PT Eval, Low Complexity Minutes (65956): 15       Signing Clinician: Brittani Pelayo, PT        [facility]                                                                                   OUTPATIENT PHYSICAL THERAPY      PLAN OF TREATMENT FOR OUTPATIENT REHABILITATION   Patient's Last Name, First Name, Ayanna Stafford YOB: 1990   Provider's Name   [facility]   Medical Record No.  [id number]     Onset Date: 12/05/23  Start of Care Date: 12/05/24     Medical Diagnosis:  neck pain M54.2      PT Treatment Diagnosis:  right shoulder  and neck pain Plan of Treatment  Frequency/Duration: every other week x 12 weeks/      Certification date from 12/05/24 to 03/04/25         See note for plan of treatment details and functional goals     Brittani Pelayo, PT                         I CERTIFY THE NEED FOR THESE SERVICES FURNISHED UNDER        THIS PLAN OF TREATMENT AND WHILE UNDER MY CARE     (Physician attestation of this document indicates review and certification of the therapy plan).              Referring Provider:  Rhianna Michaels    Initial Assessment  See Epic Evaluation- Start of Care Date: 12/05/24

## 2024-12-05 NOTE — TELEPHONE ENCOUNTER
Patient examined today with son's visit. Had infeciton on the upper inner thigh on the right. Will delineate with blaire and continue to follow. Rec antibiotic treatment rx sent for bactrim.

## 2024-12-24 DIAGNOSIS — K21.00 GASTROESOPHAGEAL REFLUX DISEASE WITH ESOPHAGITIS WITHOUT HEMORRHAGE: ICD-10-CM

## 2024-12-31 ENCOUNTER — MYC MEDICAL ADVICE (OUTPATIENT)
Dept: FAMILY MEDICINE | Facility: CLINIC | Age: 34
End: 2024-12-31

## 2024-12-31 ENCOUNTER — MYC MEDICAL ADVICE (OUTPATIENT)
Dept: FAMILY MEDICINE | Facility: CLINIC | Age: 34
End: 2024-12-31
Payer: COMMERCIAL

## 2024-12-31 ENCOUNTER — E-VISIT (OUTPATIENT)
Dept: FAMILY MEDICINE | Facility: CLINIC | Age: 34
End: 2024-12-31
Payer: COMMERCIAL

## 2024-12-31 DIAGNOSIS — N76.1 SUBACUTE VAGINITIS: ICD-10-CM

## 2024-12-31 DIAGNOSIS — N76.0 VAGINITIS AND VULVOVAGINITIS: Primary | ICD-10-CM

## 2024-12-31 DIAGNOSIS — B37.31 CANDIDAL VULVOVAGINITIS: ICD-10-CM

## 2024-12-31 RX ORDER — FLUCONAZOLE 150 MG/1
150 TABLET ORAL ONCE
Qty: 1 TABLET | Refills: 0 | Status: SHIPPED | OUTPATIENT
Start: 2024-12-31 | End: 2024-12-31

## 2024-12-31 RX ORDER — CLOTRIMAZOLE 1 %
1 CREAM WITH APPLICATOR VAGINAL AT BEDTIME
Qty: 45 G | Refills: 0 | Status: SHIPPED | OUTPATIENT
Start: 2024-12-31

## 2024-12-31 RX ORDER — FLUCONAZOLE 150 MG/1
150 TABLET ORAL ONCE
COMMUNITY
Start: 2024-08-08 | End: 2024-12-31

## 2024-12-31 NOTE — PATIENT INSTRUCTIONS
Thank you for choosing us for your care. I have placed an order for a prescription so that you can start treatment. View your full visit summary for details by clicking on the link below. Your pharmacist will able to address any questions you may have about the medication.     If you re not feeling better within 2-3 days, please schedule an appointment.  You can schedule an appointment right here in [facility], or call [phone]  If the visit is for the same symptoms as your eVisit, we ll refund the cost of your eVisit if seen within seven days.    Vaginal Yeast Infection: Care Instructions  Overview     A vaginal yeast infection is the growth of too many yeast cells in the vagina. This is a common problem. Itching, vaginal discharge and irritation, and other symptoms can bother you. But yeast infections don't often cause other health problems.  Some medicines can increase your risk of getting a yeast infection. These include antibiotics, hormones, and steroids. You may also be more likely to get a yeast infection if you are pregnant, have diabetes, douche, or wear tight clothes.  With treatment, most yeast infections get better in a few days.  Follow-up care is a key part of your treatment and safety. Be sure to make and go to all appointments, and call your doctor if you are having problems. It's also a good idea to know your test results and keep a list of the medicines you take.  How can you care for yourself at home?  Take your medicines exactly as prescribed. Call your doctor if you think you are having a problem with your medicine.  Ask your doctor about over-the-counter (OTC) medicines for yeast infections. If you use an OTC treatment, read and follow all instructions on the label.  Don't use tampons while using a vaginal cream or suppository. The tampons can absorb the medicine. Use pads instead.  Wear loose cotton clothing. Don't wear nylon or other fabric that holds body heat and moisture close to the  "skin.  Try sleeping without underwear.  Don't scratch. Relieve itching with a cold pack or a cool bath.  Don't wash your vulva more than once a day. Use plain water or a mild, unscented soap. Air-dry the vulva.  Change out of wet or damp clothes as soon as possible.  If you are using a vaginal medicine, don't have sex until you have finished your treatment. But if you do have sex, don't depend on a condom or diaphragm for birth control. The oil in some vaginal medicines weakens latex.  Don't douche or use powders, sprays, or perfumes in your vagina or on your vulva. These items can change the normal balance of organisms in your vagina.  When should you call for help?   Call your doctor now or seek immediate medical care if:    You have new or increased pain in your vagina or pelvis.   Watch closely for changes in your health, and be sure to contact your doctor if:    You have unexpected vaginal bleeding.     You have a fever.     You are not getting better after 2 days.     Your symptoms come back after you finish your medicines.   Where can you learn more?  Go to https://www.[employer].net/patiented  Enter F639 in the search box to learn more about \"Vaginal Yeast Infection: Care Instructions.\"  Current as of: April 30, 2024  Content Version: 14.3    2024 [employer].   Care instructions adapted under license by your healthcare professional. If you have questions about a medical condition or this instruction, always ask your healthcare professional. [employer] disclaims any warranty or liability for your use of this information.    "

## 2024-12-31 NOTE — TELEPHONE ENCOUNTER
Pt with recent abx usage and now with white vaginal discharge. Rx sent for diflucan and PV clotrimazole.       Provider E-Visit time total (minutes): 5

## 2025-01-01 ENCOUNTER — TELEPHONE (OUTPATIENT)
Dept: FAMILY MEDICINE | Facility: CLINIC | Age: 35
End: 2025-01-01
Payer: COMMERCIAL

## 2025-01-01 DIAGNOSIS — A74.9 CHLAMYDIA INFECTION: Primary | ICD-10-CM

## 2025-01-01 RX ORDER — AZITHROMYCIN 500 MG/1
1000 TABLET, FILM COATED ORAL DAILY
Qty: 2 TABLET | Refills: 0 | Status: SHIPPED | OUTPATIENT
Start: 2025-01-01 | End: 2025-01-02

## 2025-01-02 NOTE — TELEPHONE ENCOUNTER
Pended urine, GC/Chlamydia test. Please review and sign if appropriate then we can help patient schedule lab appointment.

## 2025-01-06 NOTE — TELEPHONE ENCOUNTER
Called and spoke with patient. Advised patient that there are urine tests on file for her that need to be collected in lab. Patient verbalizes understanding. Did offer to help schedule lab appointment at time of call. Patient states she will call herself she needs to reschedule her PT appointment as well. Advised patient to call call back with further questions or concerns.     Rochelle Muñoz RN on 1/6/2025 at 2:48 PM

## 2025-02-20 SDOH — HEALTH STABILITY: PHYSICAL HEALTH: ON AVERAGE, HOW MANY DAYS PER WEEK DO YOU ENGAGE IN MODERATE TO STRENUOUS EXERCISE (LIKE A BRISK WALK)?: 5 DAYS

## 2025-02-20 SDOH — HEALTH STABILITY: PHYSICAL HEALTH: ON AVERAGE, HOW MANY MINUTES DO YOU ENGAGE IN EXERCISE AT THIS LEVEL?: 20 MIN

## 2025-02-20 ASSESSMENT — SOCIAL DETERMINANTS OF HEALTH (SDOH): HOW OFTEN DO YOU GET TOGETHER WITH FRIENDS OR RELATIVES?: THREE TIMES A WEEK

## 2025-02-24 ASSESSMENT — PATIENT HEALTH QUESTIONNAIRE - PHQ9
SUM OF ALL RESPONSES TO PHQ QUESTIONS 1-9: 4
10. IF YOU CHECKED OFF ANY PROBLEMS, HOW DIFFICULT HAVE THESE PROBLEMS MADE IT FOR YOU TO DO YOUR WORK, TAKE CARE OF THINGS AT HOME, OR GET ALONG WITH OTHER PEOPLE: SOMEWHAT DIFFICULT
SUM OF ALL RESPONSES TO PHQ QUESTIONS 1-9: 4

## 2025-02-25 ENCOUNTER — OFFICE VISIT (OUTPATIENT)
Dept: FAMILY MEDICINE | Facility: CLINIC | Age: 35
End: 2025-02-25
Payer: COMMERCIAL

## 2025-02-25 VITALS
SYSTOLIC BLOOD PRESSURE: 128 MMHG | DIASTOLIC BLOOD PRESSURE: 78 MMHG | HEART RATE: 102 BPM | RESPIRATION RATE: 22 BRPM | TEMPERATURE: 97.5 F | HEIGHT: 66 IN | OXYGEN SATURATION: 97 % | WEIGHT: 242.6 LBS | BODY MASS INDEX: 38.99 KG/M2

## 2025-02-25 DIAGNOSIS — Z82.49 FAMILY HISTORY OF ISCHEMIC HEART DISEASE: ICD-10-CM

## 2025-02-25 DIAGNOSIS — R10.33 UMBILICAL PAIN: ICD-10-CM

## 2025-02-25 DIAGNOSIS — F17.200 CURRENT EVERY DAY SMOKER: ICD-10-CM

## 2025-02-25 DIAGNOSIS — F33.1 MODERATE EPISODE OF RECURRENT MAJOR DEPRESSIVE DISORDER (H): ICD-10-CM

## 2025-02-25 DIAGNOSIS — E66.812 CLASS 2 SEVERE OBESITY WITH SERIOUS COMORBIDITY AND BODY MASS INDEX (BMI) OF 39.0 TO 39.9 IN ADULT, UNSPECIFIED OBESITY TYPE (H): ICD-10-CM

## 2025-02-25 DIAGNOSIS — Z00.00 ROUTINE GENERAL MEDICAL EXAMINATION AT A HEALTH CARE FACILITY: Primary | ICD-10-CM

## 2025-02-25 DIAGNOSIS — N76.0 VAGINITIS AND VULVOVAGINITIS: ICD-10-CM

## 2025-02-25 DIAGNOSIS — E78.5 HYPERLIPIDEMIA LDL GOAL <100: ICD-10-CM

## 2025-02-25 DIAGNOSIS — E66.01 CLASS 2 SEVERE OBESITY WITH SERIOUS COMORBIDITY AND BODY MASS INDEX (BMI) OF 39.0 TO 39.9 IN ADULT, UNSPECIFIED OBESITY TYPE (H): ICD-10-CM

## 2025-02-25 PROCEDURE — 87491 CHLMYD TRACH DNA AMP PROBE: CPT | Performed by: FAMILY MEDICINE

## 2025-02-25 ASSESSMENT — PAIN SCALES - GENERAL: PAINLEVEL_OUTOF10: MODERATE PAIN (5)

## 2025-02-25 NOTE — PROGRESS NOTES
Preventive Care Visit  [facility]  Rhianna Michaels MD, Family Medicine  Feb 25, 2025      Assessment & Plan     (Z00.00) Routine general medical examination at a health care facility  (primary encounter diagnosis)  Comment: 34-year-old female in her normal state of health borderline morbid obesity with history of hyperlipidemia and medical comorbidity.  Here today for adult well visit    (F33.1) Moderate episode of recurrent major depressive disorder (H)  Comment: Doing well overall and coping.  Will continue to monitor symptoms.  No SI/HI/AVH but continues to have symptoms    (E66.812,  Z68.39,  E66.01) Class 2 severe obesity with serious comorbidity and body mass index (BMI) of 39.0 to 39.9 in adult, unspecified obesity type (H)  Comment: Refer to bariatric team management.  Patient with a history of Lap-Band prior which was reversed.  She has got borderline morbid obesity today with a BMI of 39.1 but with current medical comorbidities of hyperlipidemia  Plan: Adult Comprehensive Weight Management         Referral    (F17.200) Current every day smoker  Comment: Offered but declined smoking cessation aid    (Z82.49) Family history of ischemic heart disease  Comment: High risk for cardiovascular disease given history of ischemic heart disease in family and personal history of hyperlipidemia    (R10.33) Umbilical pain  Comment: Concerning for incisional hernia based on history of laparoscopy prior  Plan: US Hernia Evaluation    (E78.5) Hyperlipidemia LDL goal <100  Comment: Repeat at 1 year vern    (N76.0) Vaginitis and vulvovaginitis  Comment: Using chlamydia ordered for test of cure from prior  Plan: NEISSERIA GONORRHOEA PCR, CHLAMYDIA TRACHOMATIS        PCR      Patient has been advised of split billing requirements and indicates understanding: Yes    The longitudinal plan of care for the diagnosis(es)/condition(s) as documented were addressed during this visit. Due to the  "added complexity in care, I will continue to support Ayanna in the subsequent management and with ongoing continuity of care.      BMI  Estimated body mass index is 39.18 kg/m  as calculated from the following:    Height as of this encounter: 1.676 m (5' 5.98\").    Weight as of this encounter: 110 kg (242 lb 9.6 oz).   Weight management plan: Patient referred to endocrine and/or weight management specialty Discussed healthy diet and exercise guidelines    Counseling  Appropriate preventive services were addressed with this patient via screening, questionnaire, or discussion as appropriate for fall prevention, nutrition, physical activity, Tobacco-use cessation, social engagement, weight loss and cognition.  Checklist reviewing preventive services available has been given to the patient.  Reviewed patient's diet, addressing concerns and/or questions.   She is at risk for psychosocial distress and has been provided with information to reduce risk.       Follow up with bariatrics team     Colleen Urena is a 34 year old, presenting for the following:  Physical (Wondering if you could listen to sons lungs was in ER with RSV/Lump in abdomen wondering if hernia )        2/25/2025    10:54 AM   Additional Questions   Roomed by Ruthy   Accompanied by mom and son          HPI    34-year-old female with a history of morbid obesity currently on medically managed weight loss with difficulties with GLP-1 tolerating.  She has a history of a Lap-Band in the past and is desirous of referral to bariatric surgery for continued care and possible gastric sleeve.  She does have a history of smoking and has been an everyday smoker for more than a decade.  She is also reporting some labial swelling but no pain.  She has a history of recurrent Bartholin's gland cyst status post resection.  Patient also reporting umbilical pain history of laparoscopy with cholecystectomy and lap band.  Advised we will need ultrasonography and patient " was amenable to this with the plan to refer to general surgery if hernia present    Health Care Directive  Patient does not have a Health Care Directive: Discussed advance care planning with patient; information given to patient to review.      2/20/2025   General Health   How would you rate your overall physical health? (!) FAIR   Feel stress (tense, anxious, or unable to sleep) To some extent   (!) STRESS CONCERN      2/20/2025   Nutrition   Three or more servings of calcium each day? Yes   Diet: I don't know   How many servings of fruit and vegetables per day? (!) 2-3   How many sweetened beverages each day? (!) 3         2/20/2025   Exercise   Days per week of moderate/strenous exercise 5 days   Average minutes spent exercising at this level 20 min         2/20/2025   Social Factors   Frequency of gathering with friends or relatives Three times a week   Worry food won't last until get money to buy more No   Food not last or not have enough money for food? Yes   Do you have housing? (Housing is defined as stable permanent housing and does not include staying ouside in a car, in a tent, in an abandoned building, in an overnight shelter, or couch-surfing.) Yes   Are you worried about losing your housing? No   Lack of transportation? No   Unable to get utilities (heat,electricity)? No   (!) FOOD SECURITY CONCERN PRESENT      2/20/2025   Dental   Dentist two times every year? Yes         7/14/2024   TB Screening   Were you born outside of the [place]? No       Today's PHQ-9 Score:       2/24/2025     7:30 PM   PHQ-9 SCORE   PHQ-9 Total Score MyChart 4 (Minimal depression)   PHQ-9 Total Score 4        Patient-reported         2/20/2025   Substance Use   If I could quit smoking, I would Somewhat disagree   I want to quit somking, worry about health affects Neutral   Willing to make a plan to quit smoking Completely disagree   Willing to cut down before quitting Completely disagree   Alcohol more than 3/day or more than 7/wk  No   Do you use any other substances recreationally? (!) DECLINE     Social History     Tobacco Use    Smoking status: Former     Current packs/day: 0.50     Average packs/day: 0.5 packs/day for 10.0 years (5.0 ttl pk-yrs)     Types: Cigarettes    Smokeless tobacco: Current    Tobacco comments:     uses E cig with zero nicotine    Vaping Use    Vaping status: Every Day    Substances: no nicotine   Substance Use Topics    Alcohol use: Not Currently     Comment: Reports last use 9/17 and denies use during pregnancy    Drug use: Not Currently     Types: Marijuana, Methamphetamines     Comment: Reports Marijuana and meth use one week ago           8/11/2022   LAST FHS-7 RESULTS   1st degree relative breast or ovarian cancer Yes   Any relative bilateral breast cancer Unknown   Any male have breast cancer No   Any ONE woman have BOTH breast AND ovarian cancer Yes   Any woman with breast cancer before 50yrs No   2 or more relatives with breast AND/OR ovarian cancer Yes   2 or more relatives with breast AND/OR bowel cancer Yes        Mammogram Screening - Patient under 40 years of age: Routine Mammogram Screening not recommended.         2/20/2025   STI Screening   New sexual partner(s) since last STI/HIV test? No     History of abnormal Pap smear: No - age 30- 64 PAP with HPV every 5 years recommended        Latest Ref Rng & Units 7/19/2024     1:25 PM 6/14/2023     3:07 PM 8/18/2022    12:05 PM   PAP / HPV   PAP  Negative for Intraepithelial Lesion or Malignancy (NILM)  Negative for Intraepithelial Lesion or Malignancy (NILM)  Negative for Intraepithelial Lesion or Malignancy (NILM)    HPV 16 DNA Negative Negative  Negative  Negative    HPV 18 DNA Negative Negative  Negative  Negative    Other HR HPV Negative Negative  Negative  Negative            2/20/2025   Contraception/Family Planning   Questions about contraception or family planning No        Reviewed and updated as needed this visit by Provider                     Past Medical History:   Diagnosis Date    Cervical high risk HPV (human papillomavirus) test positive 2019    last PAP NIL () with Neg HPV h/o LEEP    Gastroesophageal reflux disease     HSV (herpes simplex virus) infection     1 & 2    Methamphetamine abuse (H)     sober since her  pregnancy    Overdose of antipsychotic 2012    recurrent Bartholin's cyst     TINO III (vulvar intraepithelial neoplasia III)     biopsy +TINO III     Past Surgical History:   Procedure Laterality Date    BIOPSY       SECTION N/A 3/1/2024    Procedure: PRIMARY  SECTION;  Surgeon: Inderjit Olivas MD;  [facility]    CHOLECYSTECTOMY      ENT SURGERY      ESOPHAGOSCOPY, GASTROSCOPY, DUODENOSCOPY (EGD), COMBINED N/A 2023    Procedure: Esophagoscopy, gastroscopy, duodenoscopy (EGD), combined;  Surgeon: Thierno Escobar MD;  Location: [facility]    EXAM UNDER ANESTHESIA PELVIC N/A 2020    Procedure: EXAM UNDER ANESTHESIA, PELVIS, PAP;  Surgeon: Froylan Schwarz MD;  [facility]    EXCISE VULVA WIDE LOCAL Bilateral 2020    Procedure: Right Vulva Wedge Resection and Incision and Drainage Left Vulva;  Surgeon: Froylan Schwarz MD;  [facility]    INCISION AND DRAINAGE ABSCESS PELVIS, COMBINED N/A 2018    Procedure: COMBINED INCISION AND DRAINAGE ABSCESS PELVIS;  INCISION AND DRAINAGE LABIAL ABSCESS;  Surgeon: Jase Beach MD;  [facility]    INCISION AND DRAINAGE ABSCESS PELVIS, COMBINED N/A 2019    Procedure: Incision and Drainage Right Labial Abscess;  Surgeon: Jase Beach MD;  [facility]    INCISION AND DRAINAGE LOWER EXTREMITY, COMBINED Right 2019    Procedure: irrigation and debridement right leg dog bite;  Surgeon: Rommel Pérez MD;  [facility]    LAP ADJUSTABLE GASTRIC BAND      LEEP TX, CERVICAL      MAMMOPLASTY REDUCTION BILATERAL      MARSUPIALIZATION BARTHOLIN CYST N/A 2019    Procedure:  "Bartholin's Cyst removal;  Surgeon: Froylan Schwarz MD;  [facility]    MARSUPIALIZATION BARTHOLIN CYST Left 2020    Procedure: Excision of left bartholin's abscess;  Surgeon: Froylan Schwarz MD;  [facility]    MARSUPIALIZATION BARTHOLIN CYST Right 2024    Procedure: MARSUPIALIZATION, CYST, BARTHOLIN'S GLAND;  Surgeon: Jase Man MD;  [facility]    ORTHOPEDIC SURGERY       OB History    Para Term  AB Living   1 1 1 0 0 1   SAB IAB Ectopic Multiple Live Births   0 0 0 0 1      # Outcome Date GA Lbr Ky/2nd Weight Sex Type Anes PTL Lv   1 Term 24 40w1d  4.085 kg (9 lb 0.1 oz) M CS-LTranv Spinal N MARCOS      Name: Caleb Davidson      Apgar1: 7  Apgar5: 9      Obstetric Comments   Unsure LMP.    Est EDC 2024 based on pos home UPT and early pregnancy symptoms.  Plans to parent  and unsure about father of the baby's involvement.         Review of systems negative     Objective    Exam  /78   Pulse 102   Temp 97.5  F (36.4  C) (Temporal)   Resp 22   Ht 1.676 m (5' 5.98\")   Wt 110 kg (242 lb 9.6 oz)   LMP  (LMP Unknown)   SpO2 97%   BMI 39.18 kg/m     Estimated body mass index is 39.18 kg/m  as calculated from the following:    Height as of this encounter: 1.676 m (5' 5.98\").    Weight as of this encounter: 110 kg (242 lb 9.6 oz).    Physical Exam  Constitutional:       Appearance: Normal appearance.   HENT:      Head: Normocephalic.      Right Ear: Tympanic membrane normal.      Left Ear: Tympanic membrane normal.      Mouth/Throat:      Mouth: Mucous membranes are moist.   Eyes:      General: No scleral icterus.     Pupils: Pupils are equal, round, and reactive to light.   Cardiovascular:      Rate and Rhythm: Normal rate and regular rhythm.      Pulses: Normal pulses.      Heart sounds: No murmur heard.  Pulmonary:      Effort: Pulmonary effort is normal. No respiratory distress.      Breath sounds: Normal breath sounds. No stridor. " No wheezing, rhonchi or rales.   Abdominal:      General: Abdomen is flat. There is no distension.      Palpations: Abdomen is soft.   Genitourinary:         Comments: Suspected bartholin's gland cyst nontender to palpation. Not currently draining. No surrounding erythema. Palpates 3x2 cm  Skin:     General: Skin is warm and dry.      Capillary Refill: Capillary refill takes less than 2 seconds.   Neurological:      Mental Status: She is alert and oriented to person, place, and time. Mental status is at baseline.   Psychiatric:         Mood and Affect: Mood normal.         Behavior: Behavior normal.         Thought Content: Thought content normal.         Judgment: Judgment normal.               Signed Electronically by: Rhianna Michaels MD    Answers submitted by the patient for this visit:  Patient Health Questionnaire (Submitted on 2/24/2025)  If you checked off any problems, how difficult have these problems made it for you to do your work, take care of things at home, or get along with other people?: Somewhat difficult  PHQ9 TOTAL SCORE: 4

## 2025-02-26 DIAGNOSIS — K21.00 GASTROESOPHAGEAL REFLUX DISEASE WITH ESOPHAGITIS WITHOUT HEMORRHAGE: ICD-10-CM

## 2025-02-26 LAB
C TRACH DNA SPEC QL NAA+PROBE: NEGATIVE
N GONORRHOEA DNA SPEC QL NAA+PROBE: NEGATIVE
SPECIMEN TYPE: NORMAL
SPECIMEN TYPE: NORMAL

## 2025-02-26 RX ORDER — OMEPRAZOLE 20 MG/1
20 CAPSULE, DELAYED RELEASE ORAL DAILY
Qty: 90 CAPSULE | Refills: 0 | Status: SHIPPED | OUTPATIENT
Start: 2025-02-26

## 2025-03-06 ENCOUNTER — LAB (OUTPATIENT)
Dept: LAB | Facility: CLINIC | Age: 35
End: 2025-03-06
Payer: COMMERCIAL

## 2025-03-06 DIAGNOSIS — N92.6 IRREGULAR PERIODS: ICD-10-CM

## 2025-03-06 DIAGNOSIS — K42.9 UMBILICAL HERNIA WITHOUT OBSTRUCTION AND WITHOUT GANGRENE: Primary | ICD-10-CM

## 2025-03-06 LAB — HCG UR QL: NEGATIVE

## 2025-03-10 DIAGNOSIS — O09.90 SUPERVISION OF HIGH RISK PREGNANCY, ANTEPARTUM: ICD-10-CM

## 2025-03-10 RX ORDER — PRENATAL VIT/IRON FUM/FOLIC AC 27MG-0.8MG
1 TABLET ORAL DAILY
Qty: 90 TABLET | Refills: 0 | Status: SHIPPED | OUTPATIENT
Start: 2025-03-10

## 2025-03-11 ENCOUNTER — MYC MEDICAL ADVICE (OUTPATIENT)
Dept: ENDOCRINOLOGY | Facility: CLINIC | Age: 35
End: 2025-03-11
Payer: COMMERCIAL

## 2025-03-11 ENCOUNTER — TELEPHONE (OUTPATIENT)
Dept: ENDOCRINOLOGY | Facility: CLINIC | Age: 35
End: 2025-03-11
Payer: COMMERCIAL

## 2025-03-11 DIAGNOSIS — E66.812 CLASS 2 SEVERE OBESITY WITH SERIOUS COMORBIDITY AND BODY MASS INDEX (BMI) OF 39.0 TO 39.9 IN ADULT, UNSPECIFIED OBESITY TYPE (H): Primary | ICD-10-CM

## 2025-03-11 DIAGNOSIS — E66.01 CLASS 2 SEVERE OBESITY WITH SERIOUS COMORBIDITY AND BODY MASS INDEX (BMI) OF 39.0 TO 39.9 IN ADULT, UNSPECIFIED OBESITY TYPE (H): Primary | ICD-10-CM

## 2025-03-11 NOTE — TELEPHONE ENCOUNTER
Left Voicemail (1st Attempt) and Sent Mychart (1st Attempt) for the patient to call back and reschedule the following:    Appointment type: Return Weight Management  Appointment mode: Virtual Visit  Provider(s): Catherine Velazquez NP, Marie Belcher PA-C, or Solange Houston PA-C  Date: 3/18/25  Specialty phone number: [phone]    Additonal Notes: OK to use a 30 minute [place] slot per Patty; please note this in the Appt Notes

## 2025-03-12 ENCOUNTER — OFFICE VISIT (OUTPATIENT)
Dept: SURGERY | Facility: CLINIC | Age: 35
End: 2025-03-12
Attending: FAMILY MEDICINE
Payer: COMMERCIAL

## 2025-03-12 VITALS
DIASTOLIC BLOOD PRESSURE: 78 MMHG | SYSTOLIC BLOOD PRESSURE: 128 MMHG | HEIGHT: 66 IN | WEIGHT: 241.5 LBS | TEMPERATURE: 97.6 F | HEART RATE: 100 BPM | OXYGEN SATURATION: 99 % | BODY MASS INDEX: 38.81 KG/M2

## 2025-03-12 DIAGNOSIS — K42.9 UMBILICAL HERNIA WITHOUT OBSTRUCTION AND WITHOUT GANGRENE: ICD-10-CM

## 2025-03-12 PROCEDURE — 1125F AMNT PAIN NOTED PAIN PRSNT: CPT | Performed by: SURGERY

## 2025-03-12 PROCEDURE — 3078F DIAST BP <80 MM HG: CPT | Performed by: SURGERY

## 2025-03-12 PROCEDURE — 99244 OFF/OP CNSLTJ NEW/EST MOD 40: CPT | Performed by: SURGERY

## 2025-03-12 PROCEDURE — 3074F SYST BP LT 130 MM HG: CPT | Performed by: SURGERY

## 2025-03-12 ASSESSMENT — PAIN SCALES - GENERAL: PAINLEVEL_OUTOF10: MILD PAIN (2)

## 2025-03-12 NOTE — PROGRESS NOTES
Patient seen in consultation for umbilical hernia by Rhianna Michaels    HPI:  Patient is a 34 year old female with umbilical pain since most recent pregnancy about a year ago and ultrasound recently confirming small umbilical hernia.  She denies any obstructive symptoms.  She has had a laparoscopic cholecystectomy and lap band status post reversal in the past.  She is a smoker.  She has been unable to lose weight since delivery.  She is not a diabetic but did have gestational diabetes.  She is interested in reconsultation with weight loss surgery for potential sleeve.  She has no plans for future pregnancies however she still could get pregnant.    Review Of Systems    Skin: negative  Ears/Nose/Throat: negative  Respiratory: No shortness of breath, dyspnea on exertion, cough, or hemoptysis  Cardiovascular: negative  Gastrointestinal: negative  Genitourinary: negative  Musculoskeletal: negative  Neurologic: negative  Hematologic/Lymphatic/Immunologic: negative  Endocrine: negative      Past Medical History:   Diagnosis Date    Cervical high risk HPV (human papillomavirus) test positive 2019    last PAP NIL () with Neg HPV h/o LEEP    Gastroesophageal reflux disease     HSV (herpes simplex virus) infection     1 & 2    Methamphetamine abuse (H)     sober since her  pregnancy    Overdose of antipsychotic 2012    recurrent Bartholin's cyst     TINO III (vulvar intraepithelial neoplasia III)     biopsy +TINO III       Past Surgical History:   Procedure Laterality Date    BIOPSY       SECTION N/A 3/1/2024    Procedure: PRIMARY  SECTION;  Surgeon: Inderjit Olivas MD;  Location:  L[facility]D    CHOLECYSTECTOMY      ENT SURGERY      ESOPHAGOSCOPY, GASTROSCOPY, DUODENOSCOPY (EGD), COMBINED N/A 2023    Procedure: Esophagoscopy, gastroscopy, duodenoscopy (EGD), combined;  Surgeon: Thierno Escobar MD;  Location: [facility]    EXAM UNDER ANESTHESIA PELVIC N/A 2020    Procedure: EXAM  UNDER ANESTHESIA, PELVIS, PAP;  Surgeon: Froylan Schwarz MD;  [facility]    EXCISE VULVA WIDE LOCAL Bilateral 03/11/2020    Procedure: Right Vulva Wedge Resection and Incision and Drainage Left Vulva;  Surgeon: Froylan Schwarz MD;  [facility]    INCISION AND DRAINAGE ABSCESS PELVIS, COMBINED N/A 02/26/2018    Procedure: COMBINED INCISION AND DRAINAGE ABSCESS PELVIS;  INCISION AND DRAINAGE LABIAL ABSCESS;  Surgeon: Jase Beach MD;  [facility]    INCISION AND DRAINAGE ABSCESS PELVIS, COMBINED N/A 03/05/2019    Procedure: Incision and Drainage Right Labial Abscess;  Surgeon: Jase Beach MD;  [facility]    INCISION AND DRAINAGE LOWER EXTREMITY, COMBINED Right 08/11/2019    Procedure: irrigation and debridement right leg dog bite;  Surgeon: Rommel Pérez MD;  [facility]    LAP ADJUSTABLE GASTRIC BAND      LEEP TX, CERVICAL      MAMMOPLASTY REDUCTION BILATERAL      MARSUPIALIZATION BARTHOLIN CYST N/A 04/29/2019    Procedure: Bartholin's Cyst removal;  Surgeon: Froylan Schwarz MD;  [facility]    MARSUPIALIZATION BARTHOLIN CYST Left 01/29/2020    Procedure: Excision of left bartholin's abscess;  Surgeon: Froylan Schwarz MD;  [facility]    MARSUPIALIZATION BARTHOLIN CYST Right 5/8/2024    Procedure: MARSUPIALIZATION, CYST, BARTHOLIN'S GLAND;  Surgeon: Jase Man MD;  [facility]    ORTHOPEDIC SURGERY         Family History   Problem Relation Age of Onset    Thyroid Disease Mother     Heart Disease Father     Coronary Artery Disease Father     Hypertension Father     Hyperlipidemia Father     Mental Illness Father     Substance Abuse Father     Diabetes Maternal Grandmother     Prostate Cancer Maternal Grandfather     Breast Cancer Paternal Grandmother     Testicular cancer Paternal Grandfather     Depression Half-Sister     Anxiety Disorder Half-Sister     Obesity Sister        Social History     Socioeconomic History     "Marital status: Single     Spouse name: Not on file    Number of children: 1    Years of education: Not on file    Highest education level: Not on file   Occupational History    Not on file   Tobacco Use    Smoking status: Former     Current packs/day: 0.50     Average packs/day: 0.5 packs/day for 10.0 years (5.0 ttl pk-yrs)     Types: Cigarettes    Smokeless tobacco: Current    Tobacco comments:     uses E cig with zero nicotine    Vaping Use    Vaping status: Every Day    Substances: no nicotine   Substance and Sexual Activity    Alcohol use: Not Currently     Comment: Reports last use 9/17 and denies use during pregnancy    Drug use: Not Currently     Types: Marijuana, Methamphetamines     Comment: Reports Marijuana and meth use one week ago    Sexual activity: Yes     Partners: Male     Birth control/protection: Injection     Comment: depo shot   Other Topics Concern    Parent/sibling w/ CABG, MI or angioplasty before 65F 55M? No   Social History Narrative    9/2023  Lives in [place].  No indoor cats/kittens.  Reports she's in a relationship with Jan \"it's not healthy\".   Ayanna smokes cigarettes.  No indoor cats/kittens.  No concerns about domestic violence.  Ayanna has not been employed outside of the home since 11/2022.  She is taking college classes.       Social Drivers of Health     Financial Resource Strain: Low Risk  (2/20/2025)    Financial Resource Strain     Within the past 12 months, have you or your family members you live with been unable to get utilities (heat, electricity) when it was really needed?: No   Food Insecurity: High Risk (2/20/2025)    Food Insecurity     Within the past 12 months, did you worry that your food would run out before you got money to buy more?: Yes     Within the past 12 months, did the food you bought just not last and you didn t have money to get more?: No   Transportation Needs: Low Risk  (2/20/2025)    Transportation Needs     Within the past 12 months, has lack of " transportation kept you from medical appointments, getting your medicines, non-medical meetings or appointments, work, or from getting things that you need?: No   Physical Activity: Insufficiently Active (2/20/2025)    Exercise Vital Sign     Days of Exercise per Week: 5 days     Minutes of Exercise per Session: 20 min   Stress: Stress Concern Present (2/20/2025)    [place] [employer] - Occupational Stress Questionnaire     Feeling of Stress : To some extent   Social Connections: Unknown (2/20/2025)    Social Connection and Isolation Panel [NHANES]     Frequency of Communication with Friends and Family: Not on file     Frequency of Social Gatherings with Friends and Family: Three times a week     Attends Shinto Services: Not on file     Active Member of Clubs or Organizations: Not on file     Attends Club or Organization Meetings: Not on file     Marital Status: Not on file   Interpersonal Safety: Not At Risk (2/1/2025)    [facility] from [facility] and [facility]    Intimate Partner Violence     Are you in a relationship where you are physically hurt, threatened and/or made to feel afraid?: No   Housing Stability: Low Risk  (2/20/2025)    Housing Stability     Do you have housing? : Yes     Are you worried about losing your housing?: No       Current Outpatient Medications   Medication Sig Dispense Refill    celecoxib (CELEBREX) 200 MG capsule Take 1 capsule (200 mg) by mouth daily. 30 capsule 11    omeprazole (PRILOSEC) 20 MG DR capsule Take 1 capsule by mouth once daily 90 capsule 0    Prenatal Vit-Fe Fumarate-FA (PRENATAL MULTIVITAMIN W/IRON) 27-0.8 MG tablet Take 1 tablet by mouth daily. 90 tablet 0    tirzepatide-Weight Management (ZEPBOUND) 5 MG/0.5ML prefilled pen Inject 0.5 mLs (5 mg) subcutaneously every 7 days. After completing 4 weeks of taking the 2.5mg dose 2 mL 2    valACYclovir (VALTREX) 1000 mg tablet Take 1 tablet (1,000 mg) by mouth 2 times daily. (Patient not taking:  "Reported on 3/12/2025) 20 tablet 0       Medications and history reviewed    Physical exam:  Vitals: /78   Pulse 100   Temp 97.6  F (36.4  C) (Temporal)   Ht 1.676 m (5' 6\")   Wt 109.5 kg (241 lb 8 oz)   LMP  (LMP Unknown)   SpO2 99%   BMI 38.98 kg/m    BMI= Body mass index is 38.98 kg/m .    Constitutional: alert, non-distressed   Head: normo-cephalic, atraumatic   Cardiovascular: RRR  Respiratory: equal chest rise, good respiratory effort, no audible wheeze  Gastrointestinal: Soft, tender supraumbilical hernia better palpated in the standing position.  No skin changes.  Likely lipoma in the right mid abdomen which is tender to palpation.  No skin changes  : deferred  Musculoskeletal: moves all extremities   Skin: no suspicious lesions or rashes   Psychiatric: mentation appears normal, affect appropriate   Patient able to get up on table without difficulty.    Labs show:  No results found for this or any previous visit (from the past 24 hours).    Imaging shows:  Recent Results (from the past 744 hours)   US Hernia Evaluation    Narrative    EXAM: US HERNIA EVALUATION  LOCATION: [facility]  DATE: 3/6/2025    INDICATION: umbilical pain hx of lap band and hx of cholecystectomy  COMPARISON: None.    TECHNIQUE: Transabdominal ultrasound of the groin during rest and Valsalva.    FINDINGS: Ultrasound of the periumbilical region is performed. Fat-containing hernia with neck measuring 1.2 cm. The hernia enlarges with Valsalva.      Impression    IMPRESSION: Fat-containing periumbilical hernia with 1.2 cm neck.          Assessment:     ICD-10-CM    1. Umbilical hernia without obstruction and without gangrene  K42.9 Adult Gen Surg  Referral        Plan: We discussed hernia and its natural course.  We also discussed the risk of recurrence given her ongoing smoking and abdominal obesity.  She understands that should she want the best outcome with lowest risk of recurrence " or complications she should quit smoking and continue to lose weight.  At this time the hernia has not currently limiting her ability to do her job or her normal daily activities.  I recommend smoking cessation and continued effort at weight loss before considering elective repair.  Should the hernia become larger or more symptomatic we could consider repair despite her risk factors.  We also discussed how they could potentially fix a small supraumbilical hernia defect at the time of a weight loss surgery.    30 minutes spent by me on the date of the encounter doing chart review, history and exam, documentation and further activities per the note    Kevin Sanders, DO

## 2025-03-12 NOTE — LETTER
3/12/2025      Ayanna Davidson  [address]  [place] [address]      Dear Colleague,    Thank you for referring your patient, Ayanna Davidson, to the [facility]. Please see a copy of my visit note below.    Patient seen in consultation for umbilical hernia by Rhianna Michaels    HPI:  Patient is a 34 year old female with umbilical pain since most recent pregnancy about a year ago and ultrasound recently confirming small umbilical hernia.  She denies any obstructive symptoms.  She has had a laparoscopic cholecystectomy and lap band status post reversal in the past.  She is a smoker.  She has been unable to lose weight since delivery.  She is not a diabetic but did have gestational diabetes.  She is interested in reconsultation with weight loss surgery for potential sleeve.  She has no plans for future pregnancies however she still could get pregnant.    Review Of Systems    Skin: negative  Ears/Nose/Throat: negative  Respiratory: No shortness of breath, dyspnea on exertion, cough, or hemoptysis  Cardiovascular: negative  Gastrointestinal: negative  Genitourinary: negative  Musculoskeletal: negative  Neurologic: negative  Hematologic/Lymphatic/Immunologic: negative  Endocrine: negative      Past Medical History:   Diagnosis Date     Cervical high risk HPV (human papillomavirus) test positive 2019    last PAP NIL () with Neg HPV h/o LEEP     Gastroesophageal reflux disease      HSV (herpes simplex virus) infection     1 & 2     Methamphetamine abuse (H)     sober since her  pregnancy     Overdose of antipsychotic 2012     recurrent Bartholin's cyst      TINO III (vulvar intraepithelial neoplasia III)     biopsy +TINO III       Past Surgical History:   Procedure Laterality Date     BIOPSY        SECTION N/A 3/1/2024    Procedure: PRIMARY  SECTION;  Surgeon: Inderjit Olivas MD;  Location:  [facility]     CHOLECYSTECTOMY       ENT SURGERY       ESOPHAGOSCOPY,  GASTROSCOPY, DUODENOSCOPY (EGD), COMBINED N/A 4/12/2023    Procedure: Esophagoscopy, gastroscopy, duodenoscopy (EGD), combined;  Surgeon: Thierno Escobar MD;  Location: [facility]     EXAM UNDER ANESTHESIA PELVIC N/A 01/29/2020    Procedure: EXAM UNDER ANESTHESIA, PELVIS, PAP;  Surgeon: Froylan Schwarz MD;  Location: [facility]     EXCISE VULVA WIDE LOCAL Bilateral 03/11/2020    Procedure: Right Vulva Wedge Resection and Incision and Drainage Left Vulva;  Surgeon: Froylan Schwarz MD;  Location: [facility]     INCISION AND DRAINAGE ABSCESS PELVIS, COMBINED N/A 02/26/2018    Procedure: COMBINED INCISION AND DRAINAGE ABSCESS PELVIS;  INCISION AND DRAINAGE LABIAL ABSCESS;  Surgeon: Jase Beach MD;  Location: [facility]     INCISION AND DRAINAGE ABSCESS PELVIS, COMBINED N/A 03/05/2019    Procedure: Incision and Drainage Right Labial Abscess;  Surgeon: Jase Beach MD;  Location: [facility]     INCISION AND DRAINAGE LOWER EXTREMITY, COMBINED Right 08/11/2019    Procedure: irrigation and debridement right leg dog bite;  Surgeon: Rommel Pérez MD;  Location: [facility]     LAP ADJUSTABLE GASTRIC BAND       LEEP TX, CERVICAL       MAMMOPLASTY REDUCTION BILATERAL       MARSUPIALIZATION BARTHOLIN CYST N/A 04/29/2019    Procedure: Bartholin's Cyst removal;  Surgeon: Froylan Schwarz MD;  Location[facility]     MARSUPIALIZATION BARTHOLIN CYST Left 01/29/2020    Procedure: Excision of left bartholin's abscess;  Surgeon: Froylan Schwarz MD;  [facility]     MARSUPIALIZATION BARTHOLIN CYST Right 5/8/2024    Procedure: MARSUPIALIZATION, CYST, BARTHOLIN'S GLAND;  Surgeon: Jase Man MD;  Location: [facility]     ORTHOPEDIC SURGERY         Family History   Problem Relation Age of Onset     Thyroid Disease Mother      Heart Disease Father      Coronary Artery Disease Father      Hypertension Father      Hyperlipidemia Father      Mental Illness Father      Substance Abuse Father      Diabetes Maternal  "Grandmother      Prostate Cancer Maternal Grandfather      Breast Cancer Paternal Grandmother      Testicular cancer Paternal Grandfather      Depression Half-Sister      Anxiety Disorder Half-Sister      Obesity Sister        Social History     Socioeconomic History     Marital status: Single     Spouse name: Not on file     Number of children: 1     Years of education: Not on file     Highest education level: Not on file   Occupational History     Not on file   Tobacco Use     Smoking status: Former     Current packs/day: 0.50     Average packs/day: 0.5 packs/day for 10.0 years (5.0 ttl pk-yrs)     Types: Cigarettes     Smokeless tobacco: Current     Tobacco comments:     uses E cig with zero nicotine    Vaping Use     Vaping status: Every Day     Substances: no nicotine   Substance and Sexual Activity     Alcohol use: Not Currently     Comment: Reports last use 9/17 and denies use during pregnancy     Drug use: Not Currently     Types: Marijuana, Methamphetamines     Comment: Reports Marijuana and meth use one week ago     Sexual activity: Yes     Partners: Male     Birth control/protection: Injection     Comment: depo shot   Other Topics Concern     Parent/sibling w/ CABG, MI or angioplasty before 65F 55M? No   Social History Narrative    9/2023  Lives in [place].  No indoor cats/kittens.  Reports she's in a relationship with Jan \"it's not healthy\".   Ayanna smokes cigarettes.  No indoor cats/kittens.  No concerns about domestic violence.  Ayanna has not been employed outside of the home since 11/2022.  She is taking college classes.       Social Drivers of Health     Financial Resource Strain: Low Risk  (2/20/2025)    Financial Resource Strain      Within the past 12 months, have you or your family members you live with been unable to get utilities (heat, electricity) when it was really needed?: No   Food Insecurity: High Risk (2/20/2025)    Food Insecurity      Within the past 12 months, did you worry that " your food would run out before you got money to buy more?: Yes      Within the past 12 months, did the food you bought just not last and you didn t have money to get more?: No   Transportation Needs: Low Risk  (2/20/2025)    Transportation Needs      Within the past 12 months, has lack of transportation kept you from medical appointments, getting your medicines, non-medical meetings or appointments, work, or from getting things that you need?: No   Physical Activity: Insufficiently Active (2/20/2025)    Exercise Vital Sign      Days of Exercise per Week: 5 days      Minutes of Exercise per Session: 20 min   Stress: Stress Concern Present (2/20/2025)    [employer] [place] [employer] - Occupational Stress Questionnaire      Feeling of Stress : To some extent   Social Connections: Unknown (2/20/2025)    Social Connection and Isolation Panel [NHANES]      Frequency of Communication with Friends and Family: Not on file      Frequency of Social Gatherings with Friends and Family: Three times a week      Attends Oriental orthodox Services: Not on file      Active Member of Clubs or Organizations: Not on file      Attends Club or Organization Meetings: Not on file      Marital Status: Not on file   Interpersonal Safety: Not At Risk (2/1/2025)    [facility] and [facility]    Intimate Partner Violence      Are you in a relationship where you are physically hurt, threatened and/or made to feel afraid?: No   Housing Stability: Low Risk  (2/20/2025)    Housing Stability      Do you have housing? : Yes      Are you worried about losing your housing?: No       Current Outpatient Medications   Medication Sig Dispense Refill     celecoxib (CELEBREX) 200 MG capsule Take 1 capsule (200 mg) by mouth daily. 30 capsule 11     omeprazole (PRILOSEC) 20 MG DR capsule Take 1 capsule by mouth once daily 90 capsule 0     Prenatal Vit-Fe Fumarate-FA (PRENATAL MULTIVITAMIN W/IRON) 27-0.8 MG tablet Take 1 tablet by mouth  "daily. 90 tablet 0     tirzepatide-Weight Management (ZEPBOUND) 5 MG/0.5ML prefilled pen Inject 0.5 mLs (5 mg) subcutaneously every 7 days. After completing 4 weeks of taking the 2.5mg dose 2 mL 2     valACYclovir (VALTREX) 1000 mg tablet Take 1 tablet (1,000 mg) by mouth 2 times daily. (Patient not taking: Reported on 3/12/2025) 20 tablet 0       Medications and history reviewed    Physical exam:  Vitals: /78   Pulse 100   Temp 97.6  F (36.4  C) (Temporal)   Ht 1.676 m (5' 6\")   Wt 109.5 kg (241 lb 8 oz)   LMP  (LMP Unknown)   SpO2 99%   BMI 38.98 kg/m    BMI= Body mass index is 38.98 kg/m .    Constitutional: alert, non-distressed   Head: normo-cephalic, atraumatic   Cardiovascular: RRR  Respiratory: equal chest rise, good respiratory effort, no audible wheeze  Gastrointestinal: Soft, tender supraumbilical hernia better palpated in the standing position.  No skin changes.  Likely lipoma in the right mid abdomen which is tender to palpation.  No skin changes  : deferred  Musculoskeletal: moves all extremities   Skin: no suspicious lesions or rashes   Psychiatric: mentation appears normal, affect appropriate   Patient able to get up on table without difficulty.    Labs show:  No results found for this or any previous visit (from the past 24 hours).    Imaging shows:  Recent Results (from the past 744 hours)   US Hernia Evaluation    Narrative    EXAM: US HERNIA EVALUATION  LOCATION: [facility]  DATE: 3/6/2025    INDICATION: umbilical pain hx of lap band and hx of cholecystectomy  COMPARISON: None.    TECHNIQUE: Transabdominal ultrasound of the groin during rest and Valsalva.    FINDINGS: Ultrasound of the periumbilical region is performed. Fat-containing hernia with neck measuring 1.2 cm. The hernia enlarges with Valsalva.      Impression    IMPRESSION: Fat-containing periumbilical hernia with 1.2 cm neck.          Assessment:     ICD-10-CM    1. Umbilical hernia " without obstruction and without gangrene  K42.9 Adult Gen Surg  Referral        Plan: We discussed hernia and its natural course.  We also discussed the risk of recurrence given her ongoing smoking and abdominal obesity.  She understands that should she want the best outcome with lowest risk of recurrence or complications she should quit smoking and continue to lose weight.  At this time the hernia has not currently limiting her ability to do her job or her normal daily activities.  I recommend smoking cessation and continued effort at weight loss before considering elective repair.  Should the hernia become larger or more symptomatic we could consider repair despite her risk factors.  We also discussed how they could potentially fix a small supraumbilical hernia defect at the time of a weight loss surgery.    30 minutes spent by me on the date of the encounter doing chart review, history and exam, documentation and further activities per the note    Kevin Sanders,       Again, thank you for allowing me to participate in the care of your patient.        Sincerely,        Kevin Sanders DO    Electronically signed

## 2025-03-24 ENCOUNTER — MYC MEDICAL ADVICE (OUTPATIENT)
Dept: FAMILY MEDICINE | Facility: CLINIC | Age: 35
End: 2025-03-24
Payer: COMMERCIAL

## 2025-04-03 ENCOUNTER — VIRTUAL VISIT (OUTPATIENT)
Dept: ENDOCRINOLOGY | Facility: CLINIC | Age: 35
End: 2025-04-03
Payer: COMMERCIAL

## 2025-04-03 ENCOUNTER — MYC MEDICAL ADVICE (OUTPATIENT)
Dept: FAMILY MEDICINE | Facility: CLINIC | Age: 35
End: 2025-04-03

## 2025-04-03 VITALS — WEIGHT: 238 LBS | BODY MASS INDEX: 38.25 KG/M2 | HEIGHT: 66 IN

## 2025-04-03 DIAGNOSIS — E66.812 CLASS 2 SEVERE OBESITY WITH SERIOUS COMORBIDITY AND BODY MASS INDEX (BMI) OF 39.0 TO 39.9 IN ADULT, UNSPECIFIED OBESITY TYPE (H): Primary | ICD-10-CM

## 2025-04-03 DIAGNOSIS — E66.01 CLASS 2 SEVERE OBESITY WITH SERIOUS COMORBIDITY AND BODY MASS INDEX (BMI) OF 39.0 TO 39.9 IN ADULT, UNSPECIFIED OBESITY TYPE (H): Primary | ICD-10-CM

## 2025-04-03 ASSESSMENT — PAIN SCALES - GENERAL: PAINLEVEL_OUTOF10: NO PAIN (0)

## 2025-04-03 NOTE — LETTER
4/3/2025       RE: Ayanna Davidson  [id number]   [place] [address]     Dear Colleague,    Thank you for referring your patient, Ayanna Davidson, to the [facility] [place] at [facility]. Please see a copy of my visit note below.    Virtual Visit Details    Type of service:  Video Visit   Video Start Time: 10:03AM  Video End Time:10:22AM    Originating Location (pt. Location): Home    Distant Location (provider location):  On-site  Platform used for Video Visit: [facility] Medical Weight Management Note     Ayanna Davidson  MRN:  [id number]  :  1990  JEFFREY:  4/3/2025    Dear Rhianna Michaels MD,    I had the pleasure of seeing your patient Ayanna Davidson. She is a 34 year old female who I am continuing to see for treatment of obesity related to:        3/13/2023    12:17 AM   --   I have the following health issues associated with obesity Infertility    GERD (Reflux)    Stress Incontinence       Assessment & Plan  Problem List Items Addressed This Visit    None  Visit Diagnoses       Class 2 severe obesity with serious comorbidity and body mass index (BMI) of 39.0 to 39.9 in adult, unspecified obesity type (H)    -  Primary    Relevant Medications    tirzepatide-Weight Management (ZEPBOUND) 10 MG/0.5ML prefilled pen           Increase Zepbound 10mg once weekly. Reach out if dose increase is needed prior to next visit  Reach out to PCP about depo or other birth control options   Marie Berry or Mounika in 4 months         INTERVAL HISTORY:  S/p lapband in  by Dr. Perdue in [place]. No complications.   Lapband removed 2021 by Dr. Escobar in [place] due to GERD and band slipping   Weight prior to lapband - 265lb   Oren - 137lbs   Reestablished cared - 3/15/2023 - Transitioned from Victoza to Wegovy 0.5mg  EGD for continued GERD symptoms and possible bariatric surgery in the future.    Positive pregnancy test,  "stopped Wegovy around 4months into pregnancy. Delivered 3/1/2024  Returned to clinic 3/21/2024 and see by Mounika Ness PA-C - decided to hold on starting medications   Started Wegovy at visit on 5/15/2024 with Mounika Ness PA-C. Transitioned to Zepbound due to side effects of nausea on Wegovy that were not tolerable.        Since we had last met, has had a baby. He is now around 1 year olds. Doing well overall.       Anti-obesity medication history    Current:   Zepbound 7.5mg - has been on this dose for 3 weeks. No side effects. Does think it has been helpful, but does not think this there has been a big change with dose increase.     Starting weight of 250lbs     Past/Failed/contraindicated:   Wegovy - side effects of nasuea and fatigue     Recent diet changes: Eating 3-4 snacks with 1 meal daily. Decrease in hunger and portion sizes. Eats with toddler. Drinking 32oz water. 1 mountain dew daily. Propel at times.   Snacks - crackers, fruit, cheese,   Meal - meatballs, fish, crab, chicken + vegetables     Recent exercise/activity changes: has been limited due to recent hip pain. Has a \"wiggle board\", which has been helpful. Might be needing left hand surgery - has been dropping things and having a lot of pain.     Recent stressors: high stress levels. Has a good support system.     Recent sleep changes: overall doing ok. At time up with son.     Vitamins/Labs: Reviewed labs     Umbilical hernia - will not be fixing at this time- high risk of reoccurrence due to weight and nicotine use. At times has pain.     Depo - unsure when her next one needs to be. Most recent pregnancy test was negative - 3/6/35    CURRENT WEIGHT:   238 lbs 0 oz    Initial Weight (lbs): 240 lbs     Cumulative weight loss (lbs): 2  Weight Loss Percentage: 0.83%    Wt Readings from Last 5 Encounters:   04/03/25 108 kg (238 lb)   03/12/25 109.5 kg (241 lb 8 oz)   02/25/25 110 kg (242 lb 9.6 oz)   11/27/24 113.4 kg (250 lb)   11/05/24 112.8 " "kg (248 lb 9.6 oz)             3/29/2025     6:38 PM   Changes and Difficulties   I have made the following changes to my diet since my last visit: have been eating more fruit and vegetables   With regards to my diet, I am still struggling with: late night eating   I have made the following changes to my activity/exercise since my last visit: always on the move after my 1 yr old  and do 10 to 20 minutes 4 to 5 days a week sometimes twice a day   With regards to my activity/exercise, I am still struggling with: my left hand gives out alot i drop stuff not meaning to ans have burning n discomfort also umbilical hernia bothers me pretty bad when im lifting my son alot also recently reinjured my left hip         MEDICATIONS:   Current Outpatient Medications   Medication Sig Dispense Refill     tirzepatide-Weight Management (ZEPBOUND) 10 MG/0.5ML prefilled pen Inject 0.5 mLs (10 mg) subcutaneously every 7 days. 2 mL 3     tirzepatide-Weight Management (ZEPBOUND) 7.5 MG/0.5ML prefilled pen Inject 0.5 mLs (7.5 mg) subcutaneously every 7 days. 2 mL 1     valACYclovir (VALTREX) 1000 mg tablet Take 1 tablet (1,000 mg) by mouth 2 times daily. (Patient not taking: Reported on 3/12/2025) 20 tablet 0     celecoxib (CELEBREX) 200 MG capsule Take 1 capsule (200 mg) by mouth daily. 30 capsule 11     omeprazole (PRILOSEC) 20 MG DR capsule Take 1 capsule by mouth once daily 90 capsule 0     Prenatal Vit-Fe Fumarate-FA (PRENATAL MULTIVITAMIN W/IRON) 27-0.8 MG tablet Take 1 tablet by mouth daily. 90 tablet 0           3/29/2025     6:38 PM   Weight Loss Medication History Reviewed With Patient   Are you having any side effects from the weight loss medication that we have prescribed you? No         Objective   Ht 1.676 m (5' 6\")   Wt 108 kg (238 lb)   LMP  (LMP Unknown)   BMI 38.41 kg/m    Vitals - Patient Reported  Pain Score: No Pain (0)      Vitals:  No vitals were obtained today due to virtual visit.      PHYSICAL EXAM:  GENERAL: " alert and no distress  EYES: Eyes grossly normal to inspection.  No discharge or erythema, or obvious scleral/conjunctival abnormalities.  RESP: No audible wheeze, cough, or visible cyanosis.    SKIN: Visible skin clear. No significant rash, abnormal pigmentation or lesions.  NEURO: Cranial nerves grossly intact.  Mentation and speech appropriate for age.  PSYCH: Appropriate affect, tone, and pace of words        Sincerely,    Marie Belcher PA-C      34 minutes spent by me on the date of the encounter doing chart review, history and exam, documentation and further activities per the note    The longitudinal plan of care for the diagnosis(es)/condition(s) as documented were addressed during this visit. Due to the added complexity in care, I will continue to support Ayanna in the subsequent management and with ongoing continuity of care.      Again, thank you for allowing me to participate in the care of your patient.      Sincerely,    Marie Belcher PA-C

## 2025-04-03 NOTE — PROGRESS NOTES
"Virtual Visit Details    Type of service:  Video Visit   Video Start Time: {video visit start/end time for provider to select:389875}  Video End Time:{video visit start/end time for provider to select:478385}    Originating Location (pt. Location): {video visit patient location:951077::\"Home\"}  {PROVIDER LOCATION On-site should be selected for visits conducted from your clinic location or adjoining [facility] hospital, academic office, or other nearby [facility] building. Off-site should be selected for all other provider locations, including home:193455}  Distant Location (provider location):  {virtual location provider:029309}  Platform used for Video Visit: {Virtual Visit Platforms:693059::\"fake company 2[employer]0\"}    "

## 2025-04-03 NOTE — PROGRESS NOTES
Virtual Visit Details    Type of service:  Video Visit   Video Start Time: 10:03AM  Video End Time:10:22AM    Originating Location (pt. Location): Home    Distant Location (provider location):  On-site  Platform used for Video Visit: [facility] Medical Weight Management Note     Ayanna Davidson  MRN:  [id number]  :  1990  JEFFREY:  4/3/2025    Dear Rhianna Michaels MD,    I had the pleasure of seeing your patient Ayanna Davidson. She is a 34 year old female who I am continuing to see for treatment of obesity related to:        3/13/2023    12:17 AM   --   I have the following health issues associated with obesity Infertility    GERD (Reflux)    Stress Incontinence       Assessment & Plan   Problem List Items Addressed This Visit    None  Visit Diagnoses       Class 2 severe obesity with serious comorbidity and body mass index (BMI) of 39.0 to 39.9 in adult, unspecified obesity type (H)    -  Primary    Relevant Medications    tirzepatide-Weight Management (ZEPBOUND) 10 MG/0.5ML prefilled pen           Increase Zepbound 10mg once weekly. Reach out if dose increase is needed prior to next visit  Reach out to PCP about depo or other birth control options   Marie Berry or Mounika in 4 months         INTERVAL HISTORY:  S/p lapband in  by Dr. Perdue in [place]. No complications.   Lapband removed 2021 by Dr. Escobar in [place] due to GERD and band slipping   Weight prior to lapband - 265lb   Oren - 137lbs   Reestablished cared - 3/15/2023 - Transitioned from Victoza to Wegovy 0.5mg  EGD for continued GERD symptoms and possible bariatric surgery in the future.    Positive pregnancy test, stopped Wegovy around 4months into pregnancy. Delivered 3/1/2024  Returned to clinic 3/21/2024 and see by Mounika Ness PA-C - decided to hold on starting medications   Started Wegovy at visit on 5/15/2024 with Mounika Ness PA-C. Transitioned to Zepbound due to side effects of nausea on Wegovy that were not  "tolerable.        Since we had last met, has had a baby. He is now around 1 year olds. Doing well overall.       Anti-obesity medication history    Current:   Zepbound 7.5mg - has been on this dose for 3 weeks. No side effects. Does think it has been helpful, but does not think this there has been a big change with dose increase.     Starting weight of 250lbs     Past/Failed/contraindicated:   Wegovy - side effects of nasuea and fatigue     Recent diet changes: Eating 3-4 snacks with 1 meal daily. Decrease in hunger and portion sizes. Eats with toddler. Drinking 32oz water. 1 mountain dew daily. Propel at times.   Snacks - crackers, fruit, cheese,   Meal - meatballs, fish, crab, chicken + vegetables     Recent exercise/activity changes: has been limited due to recent hip pain. Has a \"wiggle board\", which has been helpful. Might be needing left hand surgery - has been dropping things and having a lot of pain.     Recent stressors: high stress levels. Has a good support system.     Recent sleep changes: overall doing ok. At time up with son.     Vitamins/Labs: Reviewed labs     Umbilical hernia - will not be fixing at this time- high risk of reoccurrence due to weight and nicotine use. At times has pain.     Depo - unsure when her next one needs to be. Most recent pregnancy test was negative - 3/6/35    CURRENT WEIGHT:   238 lbs 0 oz    Initial Weight (lbs): 240 lbs     Cumulative weight loss (lbs): 2  Weight Loss Percentage: 0.83%    Wt Readings from Last 5 Encounters:   04/03/25 108 kg (238 lb)   03/12/25 109.5 kg (241 lb 8 oz)   02/25/25 110 kg (242 lb 9.6 oz)   11/27/24 113.4 kg (250 lb)   11/05/24 112.8 kg (248 lb 9.6 oz)             3/29/2025     6:38 PM   Changes and Difficulties   I have made the following changes to my diet since my last visit: have been eating more fruit and vegetables   With regards to my diet, I am still struggling with: late night eating   I have made the following changes to my " "activity/exercise since my last visit: always on the move after my 1 yr old  and do 10 to 20 minutes 4 to 5 days a week sometimes twice a day   With regards to my activity/exercise, I am still struggling with: my left hand gives out alot i drop stuff not meaning to ans have burning n discomfort also umbilical hernia bothers me pretty bad when im lifting my son matthew also recently reinjured my left hip         MEDICATIONS:   Current Outpatient Medications   Medication Sig Dispense Refill    tirzepatide-Weight Management (ZEPBOUND) 10 MG/0.5ML prefilled pen Inject 0.5 mLs (10 mg) subcutaneously every 7 days. 2 mL 3    tirzepatide-Weight Management (ZEPBOUND) 7.5 MG/0.5ML prefilled pen Inject 0.5 mLs (7.5 mg) subcutaneously every 7 days. 2 mL 1    valACYclovir (VALTREX) 1000 mg tablet Take 1 tablet (1,000 mg) by mouth 2 times daily. (Patient not taking: Reported on 3/12/2025) 20 tablet 0    celecoxib (CELEBREX) 200 MG capsule Take 1 capsule (200 mg) by mouth daily. 30 capsule 11    omeprazole (PRILOSEC) 20 MG DR capsule Take 1 capsule by mouth once daily 90 capsule 0    Prenatal Vit-Fe Fumarate-FA (PRENATAL MULTIVITAMIN W/IRON) 27-0.8 MG tablet Take 1 tablet by mouth daily. 90 tablet 0           3/29/2025     6:38 PM   Weight Loss Medication History Reviewed With Patient   Are you having any side effects from the weight loss medication that we have prescribed you? No         Objective    Ht 1.676 m (5' 6\")   Wt 108 kg (238 lb)   LMP  (LMP Unknown)   BMI 38.41 kg/m    Vitals - Patient Reported  Pain Score: No Pain (0)      Vitals:  No vitals were obtained today due to virtual visit.      PHYSICAL EXAM:  GENERAL: alert and no distress  EYES: Eyes grossly normal to inspection.  No discharge or erythema, or obvious scleral/conjunctival abnormalities.  RESP: No audible wheeze, cough, or visible cyanosis.    SKIN: Visible skin clear. No significant rash, abnormal pigmentation or lesions.  NEURO: Cranial nerves grossly " intact.  Mentation and speech appropriate for age.  PSYCH: Appropriate affect, tone, and pace of words        Sincerely,    Marie Belcher PA-C      34 minutes spent by me on the date of the encounter doing chart review, history and exam, documentation and further activities per the note    The longitudinal plan of care for the diagnosis(es)/condition(s) as documented were addressed during this visit. Due to the added complexity in care, I will continue to support Ayanna in the subsequent management and with ongoing continuity of care.

## 2025-04-03 NOTE — NURSING NOTE
Current patient location: [address] [place] [address]  [place] [address]    Is the patient currently in the state of [place]? YES    Visit mode: VIDEO    If the visit is dropped, the patient can be reconnected by:VIDEO VISIT: Text to cell phone:   Telephone Information:   Mobile [phone]       Will anyone else be joining the visit? NO  (If patient encounters technical issues they should call [phone]    Are changes needed to the allergy or medication list? No    Are refills needed on medications prescribed by this physician? NO    Rooming Documentation:  Questionnaire(s) completed    Reason for visit: RECHECK    Arsenio STEVENSF

## 2025-04-04 NOTE — PATIENT INSTRUCTIONS
Visit Plan:     Increase Zepbound 10mg once weekly. Reach out if dose increase is needed prior to next visit  Reach out to PCP about depo or other birth control options   Marie Berry or Mounika in 4 months

## 2025-04-07 ENCOUNTER — MYC MEDICAL ADVICE (OUTPATIENT)
Dept: FAMILY MEDICINE | Facility: CLINIC | Age: 35
End: 2025-04-07
Payer: COMMERCIAL

## 2025-04-07 DIAGNOSIS — N75.1 BARTHOLIN'S GLAND ABSCESS: Primary | ICD-10-CM

## 2025-04-07 DIAGNOSIS — R21 RASH: ICD-10-CM

## 2025-04-08 RX ORDER — TRAMADOL HYDROCHLORIDE 50 MG/1
50 TABLET ORAL EVERY 6 HOURS PRN
Qty: 10 TABLET | Refills: 0 | Status: SHIPPED | OUTPATIENT
Start: 2025-04-08 | End: 2025-04-09

## 2025-04-09 ENCOUNTER — OFFICE VISIT (OUTPATIENT)
Dept: FAMILY MEDICINE | Facility: CLINIC | Age: 35
End: 2025-04-09
Payer: COMMERCIAL

## 2025-04-09 VITALS
BODY MASS INDEX: 38.7 KG/M2 | WEIGHT: 240.8 LBS | SYSTOLIC BLOOD PRESSURE: 136 MMHG | HEART RATE: 107 BPM | RESPIRATION RATE: 18 BRPM | OXYGEN SATURATION: 96 % | DIASTOLIC BLOOD PRESSURE: 86 MMHG | HEIGHT: 66 IN | TEMPERATURE: 97.5 F

## 2025-04-09 DIAGNOSIS — R21 RASH: ICD-10-CM

## 2025-04-09 DIAGNOSIS — N75.1 BARTHOLIN'S GLAND ABSCESS: Primary | ICD-10-CM

## 2025-04-09 PROCEDURE — 99214 OFFICE O/P EST MOD 30 MIN: CPT | Performed by: FAMILY MEDICINE

## 2025-04-09 PROCEDURE — 3079F DIAST BP 80-89 MM HG: CPT | Performed by: FAMILY MEDICINE

## 2025-04-09 PROCEDURE — 1125F AMNT PAIN NOTED PAIN PRSNT: CPT | Performed by: FAMILY MEDICINE

## 2025-04-09 PROCEDURE — 3075F SYST BP GE 130 - 139MM HG: CPT | Performed by: FAMILY MEDICINE

## 2025-04-09 RX ORDER — CLOTRIMAZOLE AND BETAMETHASONE DIPROPIONATE 10; .64 MG/G; MG/G
CREAM TOPICAL 2 TIMES DAILY
Qty: 15 G | Refills: 1 | Status: SHIPPED | OUTPATIENT
Start: 2025-04-09 | End: 2025-04-09

## 2025-04-09 RX ORDER — CLOTRIMAZOLE AND BETAMETHASONE DIPROPIONATE 10; .64 MG/G; MG/G
CREAM TOPICAL 2 TIMES DAILY
Qty: 15 G | Refills: 1 | Status: SHIPPED | OUTPATIENT
Start: 2025-04-09

## 2025-04-09 RX ORDER — TRAMADOL HYDROCHLORIDE 50 MG/1
50 TABLET ORAL EVERY 6 HOURS PRN
Qty: 10 TABLET | Refills: 0 | Status: SHIPPED | OUTPATIENT
Start: 2025-04-09 | End: 2025-04-09

## 2025-04-09 RX ORDER — TRAMADOL HYDROCHLORIDE 50 MG/1
50 TABLET ORAL EVERY 6 HOURS PRN
Qty: 10 TABLET | Refills: 0 | Status: SHIPPED | OUTPATIENT
Start: 2025-04-09

## 2025-04-09 ASSESSMENT — PAIN SCALES - GENERAL: PAINLEVEL_OUTOF10: SEVERE PAIN (7)

## 2025-04-09 NOTE — TELEPHONE ENCOUNTER
Pended medication for patient preferred pharmacy. Please review and sign if appropriate/advise. Thank you

## 2025-04-09 NOTE — PROGRESS NOTES
Assessment & Plan     (N75.1) Bartholin's gland abscess  (primary encounter diagnosis)  Comment: Patient declined Word catheter today and declined any incision and drainage today.  She has been placed on Bactrim given her history of MRSA and worsening infection prior and patient was amenable to treatment for pain as well.  We did discuss strict ER precautions for any worsening symptoms and she was accepting of a referral for OB/GYN.  She had previously had her Bartholin's gland removed per her report and it was recommended that she be reassessed at some point in the future as it appeared as though there was still redundant tissue on the right.  Would appreciate OB/GYN input in this case as patient has had recurrent issues and desperately wants evaluation for possible excision again  Plan: Ob/Gyn  Referral, DISCONTINUED:         traMADol (ULTRAM) 50 MG tablet    (R21) Rash  Comment: Patient with tinea on her neck today.  Rx sent for cream  Plan: DISCONTINUED: clotrimazole-betamethasone         (LOTRISONE) 1-0.05 % external cream                  Colleen Urena is a 34 year old, presenting for the following health issues:  Derm Problem (Cyst on right side of lips and vagina/Has rash on left side of neck that comes and goes, has used cream to treat, itchy and red )        4/9/2025    11:07 AM   Additional Questions   Roomed by Ruthy     History of Present Illness       Reason for visit:  Bartholin cyst    She eats 2-3 servings of fruits and vegetables daily.She consumes 3 sweetened beverage(s) daily.She exercises with enough effort to increase her heart rate 20 to 29 minutes per day.  She exercises with enough effort to increase her heart rate 5 days per week.   She is taking medications regularly.          Rash  Onset/Duration: 3 months ago  Description  Location: left side of neck   Character: blotchy, raised, red  Itching: moderate  Intensity:  moderate  Progression of Symptoms:  waxing and  "waning  Accompanying signs and symptoms:   Fever: No  Body aches or joint pain: YES  Sore throat symptoms: YES  Recent cold symptoms: sore throat  History:           Previous episodes of similar rash: None  New exposures:  None and medication -zepbound, and bactrim  Recent travel: No  Exposure to similar rash: No  Precipitating or alleviating factors: hydrocortisone   Therapies tried and outcome: hydrocortisone cream -  helps but comes back      Patient is a 34-year-old female here today for evaluation of a Bartholin's gland cyst.  She has a history of recurrent Bartholin's gland cyst requiring removal of her Bartholin's gland on the left and supposed removal on the right as well.  Since this operation patient has had recurrent issues on the right with and was told at one point that she had redundant tissue that was told they are underneath the skin.  She was told that they could not operate on it until it was inflamed again.  Over the last week she has been having worsening symptoms of raised irritated and inflamed tissue on the right labia consistent with a Bartholin's gland cyst yet again.  She denies any fever or chills.  She has exquisite tenderness and pain and has a history of MRSA infection at that same site.  She also has a history of vulvar dysplasia.  We discussed doing a referral to OB/GYN and patient was amenable to that.  She was offered a Word catheter today  but declined that adamantly today.    Patient also with a rash on her left neck into her shoulder region consistent with tinea      Objective    /86   Pulse 107   Temp 97.5  F (36.4  C) (Temporal)   Resp 18   Ht 1.676 m (5' 5.98\")   Wt 109.2 kg (240 lb 12.8 oz)   LMP 03/10/2025 (Approximate)   SpO2 96%   BMI 38.88 kg/m    Body mass index is 38.88 kg/m .  Physical Exam  Constitutional:       General: She is not in acute distress.     Appearance: She is obese. She is not ill-appearing, toxic-appearing or diaphoretic.   Cardiovascular: "      Rate and Rhythm: Normal rate.      Pulses: Normal pulses.   Pulmonary:      Effort: Pulmonary effort is normal. No respiratory distress.   Abdominal:      General: Abdomen is flat.      Palpations: Abdomen is soft.   Genitourinary:         Comments: 2x3 cm fluctuant fluid collection on the right labia that is tender to palpation with surrounding erythema without induration.   Skin:     General: Skin is warm and dry.      Capillary Refill: Capillary refill takes less than 2 seconds.             Comments: Tinea corporis on the left neck   Neurological:      Mental Status: She is alert. Mental status is at baseline.   Psychiatric:         Behavior: Behavior normal.         Thought Content: Thought content normal.         Judgment: Judgment normal.                Signed Electronically by: Rhianna Michaels MD

## 2025-04-15 ENCOUNTER — HOSPITAL ENCOUNTER (EMERGENCY)
Facility: CLINIC | Age: 35
Discharge: HOME | End: 2025-04-15
Attending: STUDENT IN AN ORGANIZED HEALTH CARE EDUCATION/TRAINING PROGRAM
Payer: COMMERCIAL

## 2025-04-15 ENCOUNTER — APPOINTMENT (OUTPATIENT)
Dept: CT IMAGING | Facility: CLINIC | Age: 35
End: 2025-04-15
Attending: STUDENT IN AN ORGANIZED HEALTH CARE EDUCATION/TRAINING PROGRAM
Payer: COMMERCIAL

## 2025-04-15 VITALS
BODY MASS INDEX: 38.57 KG/M2 | RESPIRATION RATE: 20 BRPM | OXYGEN SATURATION: 99 % | HEART RATE: 101 BPM | SYSTOLIC BLOOD PRESSURE: 145 MMHG | DIASTOLIC BLOOD PRESSURE: 91 MMHG | WEIGHT: 240 LBS | HEIGHT: 66 IN | TEMPERATURE: 97.8 F

## 2025-04-15 DIAGNOSIS — N75.1 BARTHOLIN'S GLAND ABSCESS: ICD-10-CM

## 2025-04-15 LAB
ALBUMIN SERPL BCG-MCNC: 4.6 G/DL (ref 3.5–5.2)
ALP SERPL-CCNC: 81 U/L (ref 40–150)
ALT SERPL W P-5'-P-CCNC: 29 U/L (ref 0–50)
ANION GAP SERPL CALCULATED.3IONS-SCNC: 10 MMOL/L (ref 7–15)
AST SERPL W P-5'-P-CCNC: 24 U/L (ref 0–45)
BASOPHILS # BLD AUTO: 0 10E3/UL (ref 0–0.2)
BASOPHILS NFR BLD AUTO: 1 %
BILIRUB SERPL-MCNC: 0.2 MG/DL
BUN SERPL-MCNC: 18.1 MG/DL (ref 6–20)
CALCIUM SERPL-MCNC: 10.3 MG/DL (ref 8.8–10.4)
CHLORIDE SERPL-SCNC: 102 MMOL/L (ref 98–107)
CREAT SERPL-MCNC: 0.84 MG/DL (ref 0.51–0.95)
EGFRCR SERPLBLD CKD-EPI 2021: >90 ML/MIN/1.73M2
EOSINOPHIL # BLD AUTO: 0.2 10E3/UL (ref 0–0.7)
EOSINOPHIL NFR BLD AUTO: 2 %
ERYTHROCYTE [DISTWIDTH] IN BLOOD BY AUTOMATED COUNT: 13.2 % (ref 10–15)
GLUCOSE SERPL-MCNC: 95 MG/DL (ref 70–99)
HCG SERPL QL: NEGATIVE
HCO3 SERPL-SCNC: 25 MMOL/L (ref 22–29)
HCT VFR BLD AUTO: 43.9 % (ref 35–47)
HGB BLD-MCNC: 15.3 G/DL (ref 11.7–15.7)
IMM GRANULOCYTES # BLD: 0 10E3/UL
IMM GRANULOCYTES NFR BLD: 0 %
LYMPHOCYTES # BLD AUTO: 1.2 10E3/UL (ref 0.8–5.3)
LYMPHOCYTES NFR BLD AUTO: 15 %
MCH RBC QN AUTO: 31.7 PG (ref 26.5–33)
MCHC RBC AUTO-ENTMCNC: 34.9 G/DL (ref 31.5–36.5)
MCV RBC AUTO: 91 FL (ref 78–100)
MONOCYTES # BLD AUTO: 0.4 10E3/UL (ref 0–1.3)
MONOCYTES NFR BLD AUTO: 5 %
NEUTROPHILS # BLD AUTO: 6.2 10E3/UL (ref 1.6–8.3)
NEUTROPHILS NFR BLD AUTO: 77 %
NRBC # BLD AUTO: 0 10E3/UL
NRBC BLD AUTO-RTO: 0 /100
PLATELET # BLD AUTO: 368 10E3/UL (ref 150–450)
POTASSIUM SERPL-SCNC: 4.3 MMOL/L (ref 3.4–5.3)
PROT SERPL-MCNC: 7.9 G/DL (ref 6.4–8.3)
RBC # BLD AUTO: 4.82 10E6/UL (ref 3.8–5.2)
SODIUM SERPL-SCNC: 137 MMOL/L (ref 135–145)
WBC # BLD AUTO: 8.1 10E3/UL (ref 4–11)

## 2025-04-15 PROCEDURE — 250N000011 HC RX IP 250 OP 636: Performed by: STUDENT IN AN ORGANIZED HEALTH CARE EDUCATION/TRAINING PROGRAM

## 2025-04-15 PROCEDURE — 82040 ASSAY OF SERUM ALBUMIN: CPT | Performed by: STUDENT IN AN ORGANIZED HEALTH CARE EDUCATION/TRAINING PROGRAM

## 2025-04-15 PROCEDURE — 250N000009 HC RX 250: Performed by: STUDENT IN AN ORGANIZED HEALTH CARE EDUCATION/TRAINING PROGRAM

## 2025-04-15 PROCEDURE — 74177 CT ABD & PELVIS W/CONTRAST: CPT

## 2025-04-15 PROCEDURE — 85041 AUTOMATED RBC COUNT: CPT | Performed by: STUDENT IN AN ORGANIZED HEALTH CARE EDUCATION/TRAINING PROGRAM

## 2025-04-15 PROCEDURE — 84703 CHORIONIC GONADOTROPIN ASSAY: CPT | Performed by: STUDENT IN AN ORGANIZED HEALTH CARE EDUCATION/TRAINING PROGRAM

## 2025-04-15 PROCEDURE — 85004 AUTOMATED DIFF WBC COUNT: CPT | Performed by: STUDENT IN AN ORGANIZED HEALTH CARE EDUCATION/TRAINING PROGRAM

## 2025-04-15 PROCEDURE — 36415 COLL VENOUS BLD VENIPUNCTURE: CPT | Performed by: STUDENT IN AN ORGANIZED HEALTH CARE EDUCATION/TRAINING PROGRAM

## 2025-04-15 PROCEDURE — 99284 EMERGENCY DEPT VISIT MOD MDM: CPT | Performed by: STUDENT IN AN ORGANIZED HEALTH CARE EDUCATION/TRAINING PROGRAM

## 2025-04-15 PROCEDURE — 99285 EMERGENCY DEPT VISIT HI MDM: CPT | Mod: 25

## 2025-04-15 PROCEDURE — 82310 ASSAY OF CALCIUM: CPT | Performed by: STUDENT IN AN ORGANIZED HEALTH CARE EDUCATION/TRAINING PROGRAM

## 2025-04-15 PROCEDURE — 87040 BLOOD CULTURE FOR BACTERIA: CPT | Performed by: STUDENT IN AN ORGANIZED HEALTH CARE EDUCATION/TRAINING PROGRAM

## 2025-04-15 RX ORDER — LIDOCAINE HYDROCHLORIDE 20 MG/ML
JELLY TOPICAL ONCE
Status: COMPLETED | OUTPATIENT
Start: 2025-04-15 | End: 2025-04-15

## 2025-04-15 RX ORDER — AZITHROMYCIN 500 MG/1
500 TABLET, FILM COATED ORAL DAILY
COMMUNITY
Start: 2025-01-02

## 2025-04-15 RX ORDER — IOPAMIDOL 755 MG/ML
500 INJECTION, SOLUTION INTRAVASCULAR ONCE
Status: COMPLETED | OUTPATIENT
Start: 2025-04-15 | End: 2025-04-15

## 2025-04-15 RX ORDER — AMOXICILLIN 875 MG/1
875 TABLET, COATED ORAL 2 TIMES DAILY
COMMUNITY
Start: 2025-03-05

## 2025-04-15 RX ORDER — CEPHALEXIN 500 MG/1
500 CAPSULE ORAL 2 TIMES DAILY
COMMUNITY

## 2025-04-15 RX ORDER — IBUPROFEN 800 MG/1
800 TABLET, FILM COATED ORAL EVERY 8 HOURS PRN
COMMUNITY

## 2025-04-15 RX ORDER — OXYCODONE AND ACETAMINOPHEN 5; 325 MG/1; MG/1
1 TABLET ORAL EVERY 6 HOURS PRN
COMMUNITY

## 2025-04-15 RX ADMIN — SODIUM CHLORIDE 100 ML: 9 INJECTION, SOLUTION INTRAVENOUS at 15:58

## 2025-04-15 RX ADMIN — LIDOCAINE HYDROCHLORIDE: 20 JELLY TOPICAL at 15:21

## 2025-04-15 RX ADMIN — IOPAMIDOL 100 ML: 755 INJECTION, SOLUTION INTRAVENOUS at 15:58

## 2025-04-15 ASSESSMENT — ACTIVITIES OF DAILY LIVING (ADL)
ADLS_ACUITY_SCORE: 48
ADLS_ACUITY_SCORE: 48

## 2025-04-15 NOTE — DISCHARGE INSTRUCTIONS
We offered several times to drain this Bartholin gland abscess for you.  Your blood work is reassuring today.  We discussed my initial interpretation of the CT scan however the radiologist did not get a chance to officially read the report prior to you leaving the department.  We will have you follow-up as soon as possible with your GYN service.  Feel free to come back to the ER for reevaluation and further treatment of this that we discussed.  There is no great alternative treatments for this Bartholin gland abscess besides drainage.    Please call your primary doctor in the morning to discuss your ER visit, make sure you are doing well, and to follow up on any incidental findings. Please come back to the ER for re evaluation if you feel like things are getting worse or have other concerns.

## 2025-04-15 NOTE — ED NOTES
Writer in for discharge vitals and instructions. She has already had her IV taken out by other staff. She took her papers from writer. Left without discharge vitals. Dr. Ackerman notified. Blanca Chang RN

## 2025-04-15 NOTE — ED NOTES
Updated patient with delay for CT scan due to needing results for pregnancy test. Verbalizes understanding. Blanca Chang RN

## 2025-04-15 NOTE — ED PROVIDER NOTES
History     Chief Complaint   Patient presents with    Vaginal Pain     HPI  Ayanna Davidson is a 34 year old female who presents the emergency department for a known Bartholin gland abscess that has been present over the past 1 to 2 weeks.  It has been getting bigger.  The beginning more painful.  She has been seen twice now, today is the third time, she has had various positions offered to drain it for her however she has been declining and currently on Bactrim and Keflex.  If she is waiting for drainage until her established gynecologist can do this for her as she has had many Bartholin gland abscesses in the past.  The last time was over a year ago.  Denies any particular vaginal discharge.  Denies any systemic symptoms.  She has been using lidocaine jelly along with recently prescribed opiates along with Tylenol and Motrin.    Allergies:  Allergies   Allergen Reactions    No Known Allergies        Problem List:    Patient Active Problem List    Diagnosis Date Noted    Moderate episode of recurrent major depressive disorder (H) 2025     Priority: Medium    S/P  2024     Priority: Medium    Elevated BP without diagnosis of hypertension 2024     Priority: Medium    Diet controlled gestational diabetes mellitus (GDM) in third trimester 2024     Priority: Medium    Encounter for triage in pregnant patient 10/17/2023     Priority: Medium    PTSD (post-traumatic stress disorder) 10/10/2023     Priority: Medium    Anxiety 03/15/2023     Priority: Medium    Prolapse of the stomach 2021     Priority: Medium     Formatting of this note might be different from the original. Added automatically from request for surgery [id number]      TINO III (vulvar intraepithelial neoplasia III) 2020     Priority: Medium     Added automatically from request for surgery [id number]      Bartholin's gland abscess 2020     Priority: Medium     Added automatically from request for surgery [id number]    "   Chronic pain in right shoulder 01/21/2020     Priority: Medium    Dog bite of right lower leg 09/27/2019     Priority: Medium    Cellulitis of left upper extremity 09/27/2019     Priority: Medium    Current every day smoker 09/27/2019     Priority: Medium    Gastroesophageal reflux disease without esophagitis 09/27/2019     Priority: Medium    Skin infection 09/27/2019     Priority: Medium    Dog bite of left upper extremity 05/07/2019     Priority: Medium    h/o Cervical high risk HPV (human papillomavirus) test positive (now negative) 03/12/2019     Priority: Medium     4/2016 NIL pap. No prior HPV testing.   3/12/19 NIL pap, + HR HPV (not 16 or 18). Plan: cotest in 1 yr  1/29/20 NIL pap, + HR HPV (not 16 or 18). Plan: [facility]  2/11/20 [facility] bx: neg. Plan: Cotest in 1 yr.   2/18/20 Vulvar biopsy: \"TINO III; severe dysplasia\" possible involvement of margins  3/11/20 Re excision of vulva.   3/23/21 NIL pap, + HR HPV (not 16 or 18). Plan: [facility]  6/22/21 Gyn visit - [facility] recommended, Patient refused  6/28/21 GynOnc visit plan - HPV HR+: \"Repeat HPV-based screening in 1 year based upon ASCCP\", Due 3/23/22  5/6/22 Lost to follow up  8/18/22 NIL pap, Neg HPV.  Plan: cotest in 1 year  6/14/23 NIL Pap, Neg HR HPV. Plan: cotest in 1 year and establish with OB/GYN provider, per Dr. Santiago.   7/19/24 NIL pap, Neg HPV. Plan cotest in 5 years      Closed fracture dislocation of hip joint, left, initial encounter (H) 09/28/2018     Priority: Medium    Vulvar abscess 08/16/2017     Priority: Medium    ADD (attention deficit disorder) without hyperactivity 04/18/2014     Priority: Medium    Depressed 05/10/2012     Priority: Medium        Past Medical History:    Past Medical History:   Diagnosis Date    Cervical high risk HPV (human papillomavirus) test positive 03/12/2019    Gastroesophageal reflux disease     HSV (herpes simplex virus) infection     Methamphetamine abuse (H)     Overdose of antipsychotic 05/09/2012    " recurrent Bartholin's cyst     TINO III (vulvar intraepithelial neoplasia III)        Past Surgical History:    Past Surgical History:   Procedure Laterality Date    BIOPSY       SECTION N/A 3/1/2024    Procedure: PRIMARY  SECTION;  Surgeon: Inderjit lOivas MD;  Location: [facility]    CHOLECYSTECTOMY      ENT SURGERY      ESOPHAGOSCOPY, GASTROSCOPY, DUODENOSCOPY (EGD), COMBINED N/A 2023    Procedure: Esophagoscopy, gastroscopy, duodenoscopy (EGD), combined;  Surgeon: Thierno Escobar MD;  Location: [facility]    EXAM UNDER ANESTHESIA PELVIC N/A 2020    Procedure: EXAM UNDER ANESTHESIA, PELVIS, PAP;  Surgeon: Froylan Schwarz MD;  Location: [facility]    EXCISE VULVA WIDE LOCAL Bilateral 2020    Procedure: Right Vulva Wedge Resection and Incision and Drainage Left Vulva;  Surgeon: Froylan Schwarz MD;  Location: [facility]    INCISION AND DRAINAGE ABSCESS PELVIS, COMBINED N/A 2018    Procedure: COMBINED INCISION AND DRAINAGE ABSCESS PELVIS;  INCISION AND DRAINAGE LABIAL ABSCESS;  Surgeon: Jase Beach MD;  Location: [facility]    INCISION AND DRAINAGE ABSCESS PELVIS, COMBINED N/A 2019    Procedure: Incision and Drainage Right Labial Abscess;  Surgeon: Jase Beach MD;  Location: [facility]    INCISION AND DRAINAGE LOWER EXTREMITY, COMBINED Right 2019    Procedure: irrigation and debridement right leg dog bite;  Surgeon: Rommel Pérez MD;  Location: [facility]    LAP ADJUSTABLE GASTRIC BAND      LEEP TX, CERVICAL      MAMMOPLASTY REDUCTION BILATERAL      MARSUPIALIZATION BARTHOLIN CYST N/A 2019    Procedure: Bartholin's Cyst removal;  Surgeon: Froylan Schwarz MD;  Location: [facility]    MARSUPIALIZATION BARTHOLIN CYST Left 2020    Procedure: Excision of left bartholin's abscess;  Surgeon: Froylan Schwarz MD;  Location: [facility]    MARSUPIALIZATION BARTHOLIN CYST Right 2024    Procedure: MARSUPIALIZATION, CYST, BARTHOLIN'S GLAND;  Surgeon:  Jase Man MD;  Location: [facility]    ORTHOPEDIC SURGERY         Family History:    Family History   Problem Relation Age of Onset    Thyroid Disease Mother     Heart Disease Father     Coronary Artery Disease Father     Hypertension Father     Hyperlipidemia Father     Mental Illness Father     Substance Abuse Father     Diabetes Maternal Grandmother     Prostate Cancer Maternal Grandfather     Breast Cancer Paternal Grandmother     Testicular cancer Paternal Grandfather     Depression Half-Sister     Anxiety Disorder Half-Sister     Obesity Sister        Social History:  Marital Status:  Single [1]  Social History     Tobacco Use    Smoking status: Former     Current packs/day: 0.50     Average packs/day: 0.5 packs/day for 10.0 years (5.0 ttl pk-yrs)     Types: Cigarettes    Smokeless tobacco: Former    Tobacco comments:     uses E cig with zero nicotine    Vaping Use    Vaping status: Every Day    Substances: no nicotine   Substance Use Topics    Alcohol use: Not Currently     Comment: Reports last use 9/17 and denies use during pregnancy    Drug use: Not Currently     Types: Marijuana, Methamphetamines     Comment: Reports Marijuana and meth use one week ago        Medications:    amoxicillin (AMOXIL) 875 MG tablet  azithromycin (ZITHROMAX) 500 MG tablet  ibuprofen (ADVIL/MOTRIN) 800 MG tablet  valACYclovir (VALTREX) 1000 mg tablet  celecoxib (CELEBREX) 200 MG capsule  cephALEXin (KEFLEX) 500 MG capsule  clotrimazole-betamethasone (LOTRISONE) 1-0.05 % external cream  norgestimate-ethinyl estradiol (ORTHO-CYCLEN) 0.25-35 MG-MCG tablet  omeprazole (PRILOSEC) 20 MG DR capsule  oxyCODONE-acetaminophen (PERCOCET) 5-325 MG tablet  Prenatal Vit-Fe Fumarate-FA (PRENATAL MULTIVITAMIN W/IRON) 27-0.8 MG tablet  sulfamethoxazole-trimethoprim (BACTRIM DS) 800-160 MG tablet  tirzepatide-Weight Management (ZEPBOUND) 10 MG/0.5ML prefilled pen  tirzepatide-Weight Management (ZEPBOUND) 7.5 MG/0.5ML prefilled  "pen  traMADol (ULTRAM) 50 MG tablet          Review of Systems  Gen: No fevers, no unintentional weight changes  Head and neck: No headache, no neck pain  Eye: No eye pain, no vision changes  Respiratory: No shortness of breath, no cough  Cardiovascular: No chest pain, no palpitations  Abdominal: No vomiting, no constipation, no diarrhea  Urinary: No dysuria, no hematuria  Musculoskeletal: No joint redness, no trauma  Neurologic: No confusion, no weakness, no sensation changes  Psychiatric: No suicidal ideation, no homicidal ideation    Physical Exam   BP: (!) 145/91  Pulse: 101  Temp: 97.8  F (36.6  C)  Resp: 20  Height: 167.6 cm (5' 6\")  Weight: 108.9 kg (240 lb)  SpO2: 99 %      Physical Exam  General: Alert, awake, no distress  Head: Normocephalic, atraumatic  Eyes: Grossly intact extraocular movements, conjunctiva clear, pupils equal   Mouth: Mucous membranes moist  Neck: Supple, grossly full range of motion, no observable masses  Respiratory: No distress,  clear to auscultation bilaterally  Cardiac: S1 and S2 cardiac sounds appreciated, normal rate, no edema  Abdominal: Soft, not distended, no tenderness appreciated  Neurologic: Normal level of consciousness, speech is clear and appropriate, moving all extremities grossly normal and symmetric  Skin: No gross rashes or lesions  Psych: Normal mood and appropriate for situation  Pelvic exam: With female chaperone external vaginal exam shows swelling and tenderness to the right vulvar region consistent with Bartholin gland, no vaginal discharge, no external lesions, no crepitus, no inflammatory changes in the legs, perineal area or the suprapubic region      ED Course        Procedures                  Results for orders placed or performed during the hospital encounter of 04/15/25 (from the past 24 hours)   CBC with platelets differential    Narrative    The following orders were created for panel order CBC with platelets differential.  Procedure                  "              Abnormality         Status                     ---------                               -----------         ------                     CBC with platelets and ...[[id number]]                      Final result                 Please view results for these tests on the individual orders.   Comprehensive metabolic panel   Result Value Ref Range    Sodium 137 135 - 145 mmol/L    Potassium 4.3 3.4 - 5.3 mmol/L    Carbon Dioxide (CO2) 25 22 - 29 mmol/L    Anion Gap 10 7 - 15 mmol/L    Urea Nitrogen 18.1 6.0 - 20.0 mg/dL    Creatinine 0.84 0.51 - 0.95 mg/dL    GFR Estimate >90 >60 mL/min/1.73m2    Calcium 10.3 8.8 - 10.4 mg/dL    Chloride 102 98 - 107 mmol/L    Glucose 95 70 - 99 mg/dL    Alkaline Phosphatase 81 40 - 150 U/L    AST 24 0 - 45 U/L    ALT 29 0 - 50 U/L    Protein Total 7.9 6.4 - 8.3 g/dL    Albumin 4.6 3.5 - 5.2 g/dL    Bilirubin Total 0.2 <=1.2 mg/dL   CBC with platelets and differential   Result Value Ref Range    WBC Count 8.1 4.0 - 11.0 10e3/uL    RBC Count 4.82 3.80 - 5.20 10e6/uL    Hemoglobin 15.3 11.7 - 15.7 g/dL    Hematocrit 43.9 35.0 - 47.0 %    MCV 91 78 - 100 fL    MCH 31.7 26.5 - 33.0 pg    MCHC 34.9 31.5 - 36.5 g/dL    RDW 13.2 10.0 - 15.0 %    Platelet Count 368 150 - 450 10e3/uL    % Neutrophils 77 %    % Lymphocytes 15 %    % Monocytes 5 %    % Eosinophils 2 %    % Basophils 1 %    % Immature Granulocytes 0 %    NRBCs per 100 WBC 0 <1 /100    Absolute Neutrophils 6.2 1.6 - 8.3 10e3/uL    Absolute Lymphocytes 1.2 0.8 - 5.3 10e3/uL    Absolute Monocytes 0.4 0.0 - 1.3 10e3/uL    Absolute Eosinophils 0.2 0.0 - 0.7 10e3/uL    Absolute Basophils 0.0 0.0 - 0.2 10e3/uL    Absolute Immature Granulocytes 0.0 <=0.4 10e3/uL    Absolute NRBCs 0.0 10e3/uL   HCG qualitative   Result Value Ref Range    hCG Serum Qualitative Negative Negative       Medications   lidocaine (XYLOCAINE) 2 % external gel ( Topical $Given 4/15/25 1090)   iopamidol (ISOVUE-370) solution 500 mL (100 mLs Intravenous  $Given 4/15/25 1956)   sodium chloride 0.9 % bag for CT scan flush (100 mLs Intravenous $Given 4/15/25 8245)       Assessments & Plan (with Medical Decision Making)   Vital signs are reassuring.  Reviewed her previous EMR and recent visits for this Bartholin gland.  We did offer to perform incision and drainage with Word catheter and offer analgesic at the same time however she declines this and is adamant that she is not letting anybody intervene on this besides her gynecologist, has a visit with them 7 days from now.  Requests a refill on lidocaine jelly.  We discussed appropriate treatment plans of breath and bone abscess and risks and complications of not performing the drainage.  She is curious in the extent of this, does not have any systemic symptoms but we can perform imaging to rule out deep space tracking.  Clinically she does not have any evidence of Mildred's gangrene    Blood work is reassuring.  Blood cultures pending.  She states she feels better after lidocaine jelly.  When she got back from CT she notified us that she would like to leave the department today.  Again prior to discharge I offered to perform an incision and drainage for this and offer analgesic during the process.  She states she will not let anyone cut into this besides her GYN team.  She is already on antibiotics, she will continue these.  She has analgesic medications at home.  We did have an extensive educational conversation about Bartholin gland abscess in general, typical treatment plans and discussed that incision and drainage is likely the best treatment plan going forward, antibiotics alone will unlikely heal this.  She is encouraged to follow-up with her GYN team as soon as possible.  If she changes her mind about incision and drainage she is welcome to come back to the ER.  We discussed that we do not have the official radiology interpretation of the CT scan, on my initial evaluation of the CT images it does not appear to  have gross cellulitis or an abscess that tracks deeper in the abdomen or pelvis.    Discussed todays ER visit and current agreed upon treatment plan. Education provided and all questions answered. Instructed to follow up with pcp to discuss ER visit, make sure they are doing well, and to follow up on any incidental findings. Encouraged to come back to the ER for re evaluation if worsening or any other concerns.    I have reviewed the nursing notes.    I have reviewed the findings, diagnosis, plan and need for follow up with the patient.          New Prescriptions    No medications on file       Final diagnoses:   Bartholin's gland abscess       4/15/2025   [facility] EMERGENCY DEPT

## 2025-04-15 NOTE — ED TRIAGE NOTES
"      Patient presents with concerns for bartholins cyst. She is here requesting a refill of the lidocaine gel she was givenby Dr. Michaels. She reports having an appointment with surgeon 4/22/2025. She is declining I&D of the cyst unless \"I'm put under.\"Blanca Chang RN   "

## 2025-04-17 ENCOUNTER — NURSE TRIAGE (OUTPATIENT)
Dept: FAMILY MEDICINE | Facility: CLINIC | Age: 35
End: 2025-04-17
Payer: COMMERCIAL

## 2025-04-17 ENCOUNTER — TELEPHONE (OUTPATIENT)
Dept: ENDOCRINOLOGY | Facility: CLINIC | Age: 35
End: 2025-04-17
Payer: COMMERCIAL

## 2025-04-17 DIAGNOSIS — N75.1 BARTHOLIN'S GLAND ABSCESS: Primary | ICD-10-CM

## 2025-04-17 LAB
BACTERIA BLD CULT: NORMAL
BACTERIA BLD CULT: NORMAL

## 2025-04-17 RX ORDER — LIDOCAINE 50 MG/G
OINTMENT TOPICAL PRN
Qty: 50 G | Refills: 3 | Status: SHIPPED | OUTPATIENT
Start: 2025-04-17

## 2025-04-17 NOTE — TELEPHONE ENCOUNTER
Its fine to start here in FM if she'd like but I do think if she wants to see sports medicine and they can see sooner that's fine.

## 2025-04-17 NOTE — TELEPHONE ENCOUNTER
Nurse Triage SBAR    Is this a 2nd Level Triage? NO    Situation: Patient states both her knees are numb for 1 week. Numbness is constant.      Background: patient fell on both her knees 2 months ago    Assessment: She states her legs are not numb.    Both feet are also numb, this comes and goes.    Her legs are not weak. No swelling in her legs, she states the top of her feet are a little swollen. She states she is having trouble walking due to her vaginal pain.    No redness. She has a rash on her left upper thigh for 2 days.    Protocol Recommended Disposition:   See in Office Within 3 Days    Recommendation: per protocol patient should be seen within 3 days. Please advise if she should be seen in clinic or with ortho?     Routed to provider    Michell Nye RN on 4/17/2025 at 1:00 PM          Reason for Disposition   Numbness or tingling on both sides of body and is a new symptom lasting > 24 hours    Additional Information   Negative: SEVERE weakness (e.g., unable to walk or barely able to walk, requires support) and new-onset or getting worse   Negative: Difficult to awaken or acting confused (e.g., disoriented, slurred speech)   Negative: Sudden onset of weakness of the face, arm or leg on one side of the body and present now (Exception: Bell's palsy suspected: weakness on one side of the face developing over hours to days, with no other symptoms.)   Negative: Sudden onset of numbness of the face, arm or leg on one side of the body and present now   Negative: Sudden onset of loss of speech or garbled speech and present now   Negative: Sounds like a life-threatening emergency to the triager   Negative: Confusion, disorientation, or hallucinations is main symptom   Negative: Dizziness is main symptom   Negative: Followed a head injury within last 3 days   Negative: Headache (with neurologic deficit)   Negative: Unable to urinate (or only a few drops) and bladder feels very full   Negative: Loss of bladder or  bowel control (urine or bowel incontinence; wetting self, leaking stool) of new-onset   Negative: Back pain with numbness (loss of sensation) in groin or rectal area   Negative: Neurologic deficit that was brief (now gone), ANY of the following: * Weakness of the face, arm, or leg on one side of the body * Numbness of the face, arm, or leg on one side of the body * Loss of speech or garbled speech   Negative: Patient sounds very sick or weak to the triager   Negative: Neurologic deficit of gradual onset (e.g., days to weeks), ANY of the following: * Weakness of the face, arm, or leg on one side of the body* Numbness of the face, arm, or leg on one side of the body* Loss of speech or garbled speech   Negative: [place] palsy suspected (i.e., weakness only one side of the face, developing over hours to days, no other symptoms)   Negative: Tingling (e.g., pins and needles) of the face, arm or leg on one side of the body, that is present now  (Exceptions: Chronic or recurrent symptom lasting > 4 weeks; or from known cause, such as: bumped elbow, carpal tunnel, pinched nerve.)   Negative: Neck pain (with neurologic deficit)   Negative: Back pain (with neurologic deficit)   Negative: Patient wants to be seen   Negative: Loss of speech or garbled speech is a chronic symptom (recurrent or ongoing problem lasting > 4 weeks)   Negative: Weakness of arm or leg is a chronic symptom (recurrent or ongoing problem lasting > 4 weeks)   Negative: Numbness or tingling in one or both hands is a chronic symptom (recurrent or ongoing problem lasting > 4 weeks)   Negative: Numbness or tingling in one or both feet is a chronic symptom (recurrent or ongoing problem lasting > 4 weeks)    Protocols used: Neurologic Deficit-A-OH

## 2025-04-17 NOTE — TELEPHONE ENCOUNTER
Called patient and notified her of provider response, per below note.     Patient is requesting a refill on the lidocaine prescription she got in the ED. She uses [facility] in [place]. She is also requesting for PCP to break down her CT results into layman's terms.     Nancy Fuentes, CHIARAN, RN

## 2025-04-17 NOTE — TELEPHONE ENCOUNTER
Left Voicemail (1st Attempt) and Sent Mychart (1st Attempt) for the patient to call back and schedule the following:    Appointment type: [facility]  Provider: Mounika Ness  Return date: August 2025  Specialty phone number: [phone]  Additional appointment(s) needed: n/a  Additonal Notes: n/a

## 2025-04-18 ENCOUNTER — HOSPITAL ENCOUNTER (EMERGENCY)
Facility: CLINIC | Age: 35
Discharge: HOME | End: 2025-04-18
Payer: COMMERCIAL

## 2025-04-18 VITALS
HEART RATE: 97 BPM | RESPIRATION RATE: 18 BRPM | DIASTOLIC BLOOD PRESSURE: 90 MMHG | TEMPERATURE: 98.6 F | OXYGEN SATURATION: 100 % | SYSTOLIC BLOOD PRESSURE: 131 MMHG

## 2025-04-18 DIAGNOSIS — N75.1 ABSCESS OF RIGHT BARTHOLIN'S GLAND: ICD-10-CM

## 2025-04-18 PROCEDURE — 250N000009 HC RX 250

## 2025-04-18 PROCEDURE — 99283 EMERGENCY DEPT VISIT LOW MDM: CPT

## 2025-04-18 RX ORDER — LIDOCAINE 40 MG/G
CREAM TOPICAL EVERY 4 HOURS PRN
Qty: 30 G | Refills: 0 | Status: SHIPPED | OUTPATIENT
Start: 2025-04-18

## 2025-04-18 RX ORDER — LIDOCAINE HYDROCHLORIDE 20 MG/ML
JELLY TOPICAL ONCE
Status: COMPLETED | OUTPATIENT
Start: 2025-04-18 | End: 2025-04-18

## 2025-04-18 RX ADMIN — LIDOCAINE HYDROCHLORIDE: 20 JELLY TOPICAL at 12:08

## 2025-04-18 ASSESSMENT — COLUMBIA-SUICIDE SEVERITY RATING SCALE - C-SSRS
2. HAVE YOU ACTUALLY HAD ANY THOUGHTS OF KILLING YOURSELF IN THE PAST MONTH?: NO
6. HAVE YOU EVER DONE ANYTHING, STARTED TO DO ANYTHING, OR PREPARED TO DO ANYTHING TO END YOUR LIFE?: NO
1. IN THE PAST MONTH, HAVE YOU WISHED YOU WERE DEAD OR WISHED YOU COULD GO TO SLEEP AND NOT WAKE UP?: NO

## 2025-04-18 ASSESSMENT — ENCOUNTER SYMPTOMS
ABDOMINAL PAIN: 0
SHORTNESS OF BREATH: 0
COUGH: 0
FEVER: 0

## 2025-04-18 ASSESSMENT — ACTIVITIES OF DAILY LIVING (ADL): ADLS_ACUITY_SCORE: 48

## 2025-04-18 NOTE — ED NOTES
"Pt requesting \"a couple tubes of numbing gel to go\" [employer] and wants to leave. Explained to patient I cannot order med if pt is going to leave without being seen but will update provider to see what appropriate steps can be taken.   "

## 2025-04-18 NOTE — ED PROVIDER NOTES
History     Chief Complaint   Patient presents with    Cyst     HPI  Ayanna Davidson is a 34 year old female who female with Bartholin cyst and abscess. Has history of similar events. Seen's over the last week for this and does have surgery scheduled on the  in [place].  She presents today wanting a Uro-Jet which was given to her at her last visit to help with the pain.  She states she does not want to be seen does not want any other intervention and would just like the Uro-Jet to help with the pain management until she has surgery in a few days. No other pain or complaints. Denies fevers or chills or nausea or vomiting. Denies vaginal bleeding. Denies concern for sexually transmitted disease.      Allergies:  Allergies   Allergen Reactions    No Known Allergies        Problem List:    Patient Active Problem List    Diagnosis Date Noted    Moderate episode of recurrent major depressive disorder (H) 2025     Priority: Medium    S/P  2024     Priority: Medium    Elevated BP without diagnosis of hypertension 2024     Priority: Medium    Diet controlled gestational diabetes mellitus (GDM) in third trimester 2024     Priority: Medium    Encounter for triage in pregnant patient 10/17/2023     Priority: Medium    PTSD (post-traumatic stress disorder) 10/10/2023     Priority: Medium    Anxiety 03/15/2023     Priority: Medium    Prolapse of the stomach 2021     Priority: Medium     Formatting of this note might be different from the original. Added automatically from request for surgery [id number]      TINO III (vulvar intraepithelial neoplasia III) 2020     Priority: Medium     Added automatically from request for surgery [id number]      Bartholin's gland abscess 2020     Priority: Medium     Added automatically from request for surgery [id number]      Chronic pain in right shoulder 2020     Priority: Medium    Dog bite of right lower leg 2019     Priority:  "Medium    Cellulitis of left upper extremity 09/27/2019     Priority: Medium    Current every day smoker 09/27/2019     Priority: Medium    Gastroesophageal reflux disease without esophagitis 09/27/2019     Priority: Medium    Skin infection 09/27/2019     Priority: Medium    Dog bite of left upper extremity 05/07/2019     Priority: Medium    h/o Cervical high risk HPV (human papillomavirus) test positive (now negative) 03/12/2019     Priority: Medium     4/2016 NIL pap. No prior HPV testing.   3/12/19 NIL pap, + HR HPV (not 16 or 18). Plan: cotest in 1 yr  1/29/20 NIL pap, + HR HPV (not 16 or 18). Plan: [place]  2/11/20 [place] bx: neg. Plan: Cotest in 1 yr.   2/18/20 Vulvar biopsy: \"TINO III; severe dysplasia\" possible involvement of margins  3/11/20 Re excision of vulva.   3/23/21 NIL pap, + HR HPV (not 16 or 18). Plan: colp  6/22/21 Gyn visit - [place] recommended, Patient refused  6/28/21 GynOnc visit plan - HPV HR+: \"Repeat HPV-based screening in 1 year based upon ASCCP\", Due 3/23/22  5/6/22 Lost to follow up  8/18/22 NIL pap, Neg HPV.  Plan: cotest in 1 year  6/14/23 NIL Pap, Neg HR HPV. Plan: cotest in 1 year and establish with OB/GYN provider, per Dr. Santiago.   7/19/24 NIL pap, Neg HPV. Plan cotest in 5 years      Closed fracture dislocation of hip joint, left, initial encounter (H) 09/28/2018     Priority: Medium    Vulvar abscess 08/16/2017     Priority: Medium    ADD (attention deficit disorder) without hyperactivity 04/18/2014     Priority: Medium    Depressed 05/10/2012     Priority: Medium        Past Medical History:    Past Medical History:   Diagnosis Date    Cervical high risk HPV (human papillomavirus) test positive 03/12/2019    Gastroesophageal reflux disease     HSV (herpes simplex virus) infection     Methamphetamine abuse (H)     Overdose of antipsychotic 05/09/2012    recurrent Bartholin's cyst     TINO III (vulvar intraepithelial neoplasia III)        Past Surgical History:    Past Surgical " History:   Procedure Laterality Date    BIOPSY       SECTION N/A 3/1/2024    Procedure: PRIMARY  SECTION;  Surgeon: Inderjit Olivas MD;  [facility]    CHOLECYSTECTOMY      ENT SURGERY      ESOPHAGOSCOPY, GASTROSCOPY, DUODENOSCOPY (EGD), COMBINED N/A 2023    Procedure: Esophagoscopy, gastroscopy, duodenoscopy (EGD), combined;  Surgeon: Thierno Escobar MD;  [facility]: [facility]    EXAM UNDER ANESTHESIA PELVIC N/A 2020    Procedure: EXAM UNDER ANESTHESIA, PELVIS, PAP;  Surgeon: Froylan Schwarz MD;  [facility]    EXCISE VULVA WIDE LOCAL Bilateral 2020    Procedure: Right Vulva Wedge Resection and Incision and Drainage Left Vulva;  Surgeon: Froylan Schwarz MD;  [facility]    INCISION AND DRAINAGE ABSCESS PELVIS, COMBINED N/A 2018    Procedure: COMBINED INCISION AND DRAINAGE ABSCESS PELVIS;  INCISION AND DRAINAGE LABIAL ABSCESS;  Surgeon: Jase Beach MD;  [facility]    INCISION AND DRAINAGE ABSCESS PELVIS, COMBINED N/A 2019    Procedure: Incision and Drainage Right Labial Abscess;  Surgeon: Jase Beach MD;  [facility]    INCISION AND DRAINAGE LOWER EXTREMITY, COMBINED Right 2019    Procedure: irrigation and debridement right leg dog bite;  Surgeon: Rommel Pérez MD;  [facility]    LAP ADJUSTABLE GASTRIC BAND      LEEP TX, CERVICAL      MAMMOPLASTY REDUCTION BILATERAL      MARSUPIALIZATION BARTHOLIN CYST N/A 2019    Procedure: Bartholin's Cyst removal;  Surgeon: Froylan Schwarz MD;  [facility]    MARSUPIALIZATION BARTHOLIN CYST Left 2020    Procedure: Excision of left bartholin's abscess;  Surgeon: Froylan Schwarz MD;  [facility]    MARSUPIALIZATION BARTHOLIN CYST Right 2024    Procedure: MARSUPIALIZATION, CYST, BARTHOLIN'S GLAND;  Surgeon: Jase Man MD;  [facility]: [facility]    ORTHOPEDIC SURGERY         Family History:    Family History   Problem  Relation Age of Onset    Thyroid Disease Mother     Heart Disease Father     Coronary Artery Disease Father     Hypertension Father     Hyperlipidemia Father     Mental Illness Father     Substance Abuse Father     Diabetes Maternal Grandmother     Prostate Cancer Maternal Grandfather     Breast Cancer Paternal Grandmother     Testicular cancer Paternal Grandfather     Depression Half-Sister     Anxiety Disorder Half-Sister     Obesity Sister        Social History:  Marital Status:  Single [1]  Social History     Tobacco Use    Smoking status: Former     Current packs/day: 0.50     Average packs/day: 0.5 packs/day for 10.0 years (5.0 ttl pk-yrs)     Types: Cigarettes    Smokeless tobacco: Former    Tobacco comments:     uses E cig with zero nicotine    Vaping Use    Vaping status: Every Day    Substances: no nicotine   Substance Use Topics    Alcohol use: Not Currently     Comment: Reports last use 9/17 and denies use during pregnancy    Drug use: Not Currently     Types: Marijuana, Methamphetamines     Comment: Reports Marijuana and meth use one week ago        Medications:    lidocaine (LMX4) 4 % external cream  valACYclovir (VALTREX) 1000 mg tablet  amoxicillin (AMOXIL) 875 MG tablet  azithromycin (ZITHROMAX) 500 MG tablet  celecoxib (CELEBREX) 200 MG capsule  cephALEXin (KEFLEX) 500 MG capsule  clotrimazole-betamethasone (LOTRISONE) 1-0.05 % external cream  ibuprofen (ADVIL/MOTRIN) 800 MG tablet  lidocaine (XYLOCAINE) 5 % external ointment  norgestimate-ethinyl estradiol (ORTHO-CYCLEN) 0.25-35 MG-MCG tablet  omeprazole (PRILOSEC) 20 MG DR capsule  oxyCODONE-acetaminophen (PERCOCET) 5-325 MG tablet  Prenatal Vit-Fe Fumarate-FA (PRENATAL MULTIVITAMIN W/IRON) 27-0.8 MG tablet  sulfamethoxazole-trimethoprim (BACTRIM DS) 800-160 MG tablet  tirzepatide-Weight Management (ZEPBOUND) 10 MG/0.5ML prefilled pen  tirzepatide-Weight Management (ZEPBOUND) 7.5 MG/0.5ML prefilled pen  traMADol (ULTRAM) 50 MG  tablet          Review of Systems   Constitutional:  Negative for fever.   Respiratory:  Negative for cough and shortness of breath.    Cardiovascular:  Negative for chest pain.   Gastrointestinal:  Negative for abdominal pain.   Genitourinary:  Positive for vaginal pain.   Skin:  Negative for rash.   All other systems reviewed and are negative.      Physical Exam   BP: 131/90  Pulse: 97  Temp: 98.6  F (37  C)  Resp: 18  SpO2: 100 %      Physical Exam  Constitutional:       General: She is not in acute distress.     Appearance: Normal appearance. She is not diaphoretic.   HENT:      Head: Atraumatic.      Mouth/Throat:      Mouth: Mucous membranes are moist.   Eyes:      General: No scleral icterus.     Conjunctiva/sclera: Conjunctivae normal.   Cardiovascular:      Rate and Rhythm: Normal rate.      Heart sounds: Normal heart sounds.   Pulmonary:      Effort: No respiratory distress.      Breath sounds: Normal breath sounds.   Abdominal:      General: Abdomen is flat.   Genitourinary:     Comments: Bartholin abscess-right  Musculoskeletal:      Cervical back: Neck supple.   Skin:     General: Skin is warm.      Findings: No rash.   Neurological:      Mental Status: She is alert.         ED Course        Procedures                  No results found for this or any previous visit (from the past 24 hours).    Medications   lidocaine (XYLOCAINE) 2 % external gel ( Topical $Given 4/18/25 1203)       Assessments & Plan (with Medical Decision Making)  Ayanna Davidson is a 34 y.o. female who presents with Bartholin cyst and abscess to the right lower labia region. No surrounding secondary complication. No indication for imaging. Afebrile and nontoxic-appearing.  She has surgery planned in [place] for an send incision and drainage planned on 21 April. She is seeing any other procedure or intervention here in the emergency department and would just like Uro-Jet and lidocaine scription to help with pain management until  her procedure.  She is being discharged in stable condition and will follow-up with [facility] on 21 April for her surgical procedure.           Discharge Medication List as of 4/18/2025 12:23 PM        START taking these medications    Details   lidocaine (LMX4) 4 % external cream Apply topically every 4 hours as needed for mild pain.Disp-30 g, [id number]             Final diagnoses:   Abscess of right Bartholin's gland       4/18/2025   [facility] EMERGENCY DEPT       Navya Clifford APRN CNP  04/18/25 1227

## 2025-04-18 NOTE — ED TRIAGE NOTES
Patient presents with cyst that she wishes to have opened. States that she has an appointment with the surgeon on Tuesday but that it is too far away.

## 2025-04-20 LAB
BACTERIA BLD CULT: NO GROWTH
BACTERIA BLD CULT: NO GROWTH

## 2025-04-22 ENCOUNTER — TRANSFERRED RECORDS (OUTPATIENT)
Dept: HEALTH INFORMATION MANAGEMENT | Facility: CLINIC | Age: 35
End: 2025-04-22

## 2025-04-22 ENCOUNTER — OFFICE VISIT (OUTPATIENT)
Dept: OBGYN | Facility: CLINIC | Age: 35
End: 2025-04-22
Payer: COMMERCIAL

## 2025-04-22 VITALS — HEART RATE: 102 BPM | SYSTOLIC BLOOD PRESSURE: 117 MMHG | OXYGEN SATURATION: 96 % | DIASTOLIC BLOOD PRESSURE: 86 MMHG

## 2025-04-22 DIAGNOSIS — N75.1 BARTHOLIN'S GLAND ABSCESS: ICD-10-CM

## 2025-04-22 PROCEDURE — 3079F DIAST BP 80-89 MM HG: CPT | Performed by: OBSTETRICS & GYNECOLOGY

## 2025-04-22 PROCEDURE — 99204 OFFICE O/P NEW MOD 45 MIN: CPT | Performed by: OBSTETRICS & GYNECOLOGY

## 2025-04-22 PROCEDURE — 3074F SYST BP LT 130 MM HG: CPT | Performed by: OBSTETRICS & GYNECOLOGY

## 2025-04-22 NOTE — PROGRESS NOTES
Ayanna is a 34 year old female, .  She presents today with history of Bartholin Gland Cyst, and she wants surgery today.  She has surgery scheduled at [facility] in 2 days with Dr. Schwarz.  She reports she has had the left Bartholin gland removed by Dr. Schwarz.  She has had recurrent right sided gland abscess/cyst enlargement.  She reports she has had the I&D of the right side multiple times.  She has been to the ER three times and to Urgent Care this morning.   It was advised she have the I&D with Word Catheter placement, but she has refused multiple times, in the past couple of weeks.   She has received Ultram for this and she reports using the last tablet last evening.  Lidocaine jelly has helped her also.  She was given this yesterday and she reports using it all.   She has also received antibiotics, Bactrim and Keflex.  She was given Keflex for 14 days, 4 days ago.     Her labs include the following:    She had blood cultures x 2 1 week ago and both are negative upon review.    The other labs have also been reviewed:  Component      Latest Ref Rng 2025  11:46 AM 4/15/2025  3:09 PM 4/15/2025  3:23 PM   WBC      4.0 - 11.0 10e3/uL  8.1     RBC Count      3.80 - 5.20 10e6/uL  4.82     Hemoglobin      11.7 - 15.7 g/dL  15.3     Hematocrit      35.0 - 47.0 %  43.9     MCV      78 - 100 fL  91     MCH      26.5 - 33.0 pg  31.7     MCHC      31.5 - 36.5 g/dL  34.9     RDW      10.0 - 15.0 %  13.2     Platelet Count      150 - 450 10e3/uL  368     % Neutrophils      %  77     % Lymphocytes      %  15     % Monocytes      %  5     % Eosinophils      %  2     % Basophils      %  1     % Immature Granulocytes      %  0     NRBC/W      <1 /100  0     Absolute Neutrophil      1.6 - 8.3 10e3/uL  6.2     Absolute Lymphocytes      0.8 - 5.3 10e3/uL  1.2     Absolute Monocytes      0.0 - 1.3 10e3/uL  0.4     Absolute Eosinophils      0.0 - 0.7 10e3/uL  0.2     Absolute Basophils      0.0 - 0.2 10e3/uL  0.0     Absolute  Immature Granulocytes      <=0.4 10e3/uL  0.0     Absolute NRBCs      10e3/uL  0.0     Sodium      135 - 145 mmol/L  137     Potassium      3.4 - 5.3 mmol/L  4.3     Carbon Dioxide (CO2)      22 - 29 mmol/L  25     Anion Gap      7 - 15 mmol/L  10     Urea Nitrogen      6.0 - 20.0 mg/dL  18.1     Creatinine      0.51 - 0.95 mg/dL  0.84     GFR Estimate      >60 mL/min/1.73m2  >90     Calcium      8.8 - 10.4 mg/dL  10.3     Chloride      98 - 107 mmol/L  102     Glucose      70 - 99 mg/dL  95     Alkaline Phosphatase      40 - 150 U/L  81     AST      0 - 45 U/L  24     ALT      0 - 50 U/L  29     Protein Total      6.4 - 8.3 g/dL  7.9     Albumin      3.5 - 5.2 g/dL  4.6     Bilirubin Total      <=1.2 mg/dL  0.2     N Gonorrhea PCR      Negative  Negative      Neisseria gonorrhoeae Specimen Source Urine, Voided      Chlamydia Trachomatis PCR      Negative  Negative      Chlamydia trachomatis Specimen Source Urine, Voided      HCG Qualitative Serum      Negative    Negative          Past Medical History:   Diagnosis Date    Cervical high risk HPV (human papillomavirus) test positive 2019    last PAP NIL () with Neg HPV h/o LEEP    Gastroesophageal reflux disease     HSV (herpes simplex virus) infection     1 & 2    Methamphetamine abuse (H)     sober since her  pregnancy    Obese     Overdose of antipsychotic 2012    recurrent Bartholin's cyst     TINO III (vulvar intraepithelial neoplasia III)     biopsy +TINO III     Past Surgical History:   Procedure Laterality Date    BIOPSY       SECTION N/A 3/1/2024    Procedure: PRIMARY  SECTION;  Surgeon: Inderjit Olivas MD;  Location:  L+D    CHOLECYSTECTOMY      ENT SURGERY      ESOPHAGOSCOPY, GASTROSCOPY, DUODENOSCOPY (EGD), COMBINED N/A 2023    Procedure: Esophagoscopy, gastroscopy, duodenoscopy (EGD), combined;  Surgeon: Thierno Escobar MD;  [facility]: [facility]    EXAM UNDER ANESTHESIA PELVIC N/A 2020    Procedure:  EXAM UNDER ANESTHESIA, PELVIS, PAP;  Surgeon: Froylan Schwarz MD;  [facility]    EXCISE VULVA WIDE LOCAL Bilateral 03/11/2020    Procedure: Right Vulva Wedge Resection and Incision and Drainage Left Vulva;  Surgeon: Froylan Schwarz MD;  [facility]    INCISION AND DRAINAGE ABSCESS PELVIS, COMBINED N/A 02/26/2018    Procedure: COMBINED INCISION AND DRAINAGE ABSCESS PELVIS;  INCISION AND DRAINAGE LABIAL ABSCESS;  Surgeon: Jase Beach MD;  [facility]    INCISION AND DRAINAGE ABSCESS PELVIS, COMBINED N/A 03/05/2019    Procedure: Incision and Drainage Right Labial Abscess;  Surgeon: Jase Beach MD;  [facility]    INCISION AND DRAINAGE LOWER EXTREMITY, COMBINED Right 08/11/2019    Procedure: irrigation and debridement right leg dog bite;  Surgeon: Rommel Pérez MD;  Location[facility]    LAP ADJUSTABLE GASTRIC BAND      LEEP TX, CERVICAL      MAMMOPLASTY REDUCTION BILATERAL      MARSUPIALIZATION BARTHOLIN CYST N/A 04/29/2019    Procedure: Bartholin's Cyst removal;  Surgeon: Froylan Schwarz MD;  [facility]    MARSUPIALIZATION BARTHOLIN CYST Left 01/29/2020    Procedure: Excision of left bartholin's abscess;  Surgeon: Froylan Schwarz MD;  [facility]    MARSUPIALIZATION BARTHOLIN CYST Right 5/8/2024    Procedure: MARSUPIALIZATION, CYST, BARTHOLIN'S GLAND;  Surgeon: Jase Man MD;  Location: [facility]    ORTHOPEDIC SURGERY          Allergies   Allergen Reactions    No Known Allergies        Current Outpatient Medications   Medication Sig Dispense Refill    amoxicillin (AMOXIL) 875 MG tablet Take 875 mg by mouth 2 times daily.      celecoxib (CELEBREX) 200 MG capsule Take 1 capsule (200 mg) by mouth daily. 30 capsule 11    cephALEXin (KEFLEX) 500 MG capsule Take 500 mg by mouth 2 times daily.      clotrimazole-betamethasone (LOTRISONE) 1-0.05 % external cream Apply topically 2 times daily. 15 g 1    ibuprofen (ADVIL/MOTRIN) 800 MG tablet Take  800 mg by mouth every 8 hours as needed.      lidocaine (XYLOCAINE) 5 % external ointment Apply topically as needed for moderate pain. 50 g 1    norgestimate-ethinyl estradiol (ORTHO-CYCLEN) 0.25-35 MG-MCG tablet Take 1 tablet by mouth daily. 84 tablet 3    omeprazole (PRILOSEC) 20 MG DR capsule Take 1 capsule by mouth once daily 90 capsule 0    Prenatal Vit-Fe Fumarate-FA (PRENATAL MULTIVITAMIN W/IRON) 27-0.8 MG tablet Take 1 tablet by mouth daily. 90 tablet 0    sulfamethoxazole-trimethoprim (BACTRIM DS) 800-160 MG tablet Take 1 tablet by mouth 2 times daily. 28 tablet 0    tirzepatide-Weight Management (ZEPBOUND) 10 MG/0.5ML prefilled pen Inject 0.5 mLs (10 mg) subcutaneously every 7 days. 2 mL 3    traMADol (ULTRAM) 50 MG tablet Take 1 tablet (50 mg) by mouth every 6 hours as needed for severe pain. 10 tablet 0    valACYclovir (VALTREX) 1000 mg tablet Take 1 tablet (1,000 mg) by mouth 2 times daily. (Patient not taking: Reported on 3/12/2025) 20 tablet 0    azithromycin (ZITHROMAX) 500 MG tablet Take 500 mg by mouth daily. (Patient not taking: Reported on 4/22/2025)      lidocaine (LMX4) 4 % external cream Apply topically every 4 hours as needed for mild pain. 30 g 0    oxyCODONE-acetaminophen (PERCOCET) 5-325 MG tablet Take 1 tablet by mouth every 6 hours as needed. (Patient not taking: Reported on 4/22/2025)      tirzepatide-Weight Management (ZEPBOUND) 7.5 MG/0.5ML prefilled pen Inject 0.5 mLs (7.5 mg) subcutaneously every 7 days. 2 mL 1     Social History     Socioeconomic History    Marital status: Single     Spouse name: Not on file    Number of children: 1    Years of education: Not on file    Highest education level: Not on file   Occupational History    Not on file   Tobacco Use    Smoking status: Former     Current packs/day: 0.50     Average packs/day: 0.5 packs/day for 10.0 years (5.0 ttl pk-yrs)     Types: Cigarettes    Smokeless tobacco: Former    Tobacco comments:     uses E cig with zero nicotine  "   Vaping Use    Vaping status: Every Day    Substances: no nicotine   Substance and Sexual Activity    Alcohol use: Not Currently     Comment: Reports last use 9/17 and denies use during pregnancy    Drug use: Not Currently     Types: Marijuana, Methamphetamines     Comment: Reports Marijuana and meth use one week ago    Sexual activity: Yes     Partners: Male     Birth control/protection: Injection     Comment: depo shot   Other Topics Concern    Parent/sibling w/ CABG, MI or angioplasty before 65F 55M? No   Social History Narrative    9/2023  Lives in [place].  No indoor cats/kittens.  Reports she's in a relationship with Jan \"it's not healthy\".   Ayanna smokes cigarettes.  No indoor cats/kittens.  No concerns about domestic violence.  Ayanna has not been employed outside of the home since 11/2022.  She is taking college classes.       Social Drivers of Health     Financial Resource Strain: Low Risk  (2/20/2025)    Financial Resource Strain     Within the past 12 months, have you or your family members you live with been unable to get utilities (heat, electricity) when it was really needed?: No   Food Insecurity: High Risk (2/20/2025)    Food Insecurity     Within the past 12 months, did you worry that your food would run out before you got money to buy more?: Yes     Within the past 12 months, did the food you bought just not last and you didn t have money to get more?: No   Transportation Needs: Low Risk  (2/20/2025)    Transportation Needs     Within the past 12 months, has lack of transportation kept you from medical appointments, getting your medicines, non-medical meetings or appointments, work, or from getting things that you need?: No   Physical Activity: Insufficiently Active (2/20/2025)    Exercise Vital Sign     Days of Exercise per Week: 5 days     Minutes of Exercise per Session: 20 min   Stress: Stress Concern Present (2/20/2025)    [employer] - Occupational Stress " Questionnaire     Feeling of Stress : To some extent   Social Connections: Unknown (2/20/2025)    Social Connection and Isolation Panel [NHANES]     Frequency of Communication with Friends and Family: Not on file     Frequency of Social Gatherings with Friends and Family: Three times a week     Attends Jewish Services: Not on file     Active Member of Clubs or Organizations: Not on file     Attends Club or Organization Meetings: Not on file     Marital Status: Not on file   Interpersonal Safety: Not At Risk (4/10/2025)    Received from [facility]     Humiliation, Afraid, Rape, and Kick questionnaire     Fear of Current or Ex-Partner: No     Emotionally Abused: No     Physically Abused: No     Sexually Abused: No   Housing Stability: Low Risk  (2/20/2025)    Housing Stability     Do you have housing? : Yes     Are you worried about losing your housing?: No       Family History   Problem Relation Age of Onset    Thyroid Disease Mother     Heart Disease Father     Coronary Artery Disease Father     Hypertension Father     Hyperlipidemia Father     Mental Illness Father     Substance Abuse Father     Diabetes Maternal Grandmother     Prostate Cancer Maternal Grandfather     Breast Cancer Paternal Grandmother     Testicular cancer Paternal Grandfather     Depression Half-Sister     Anxiety Disorder Half-Sister     Obesity Sister        Review of Systems:  10 point ROS of systems including Constitutional, Eyes, Respiratory, Cardiovascular, Gastroenterology, Genitourinary, Integumentary, Muscularskeletal, Psychiatric were all negative except for pertinent positives noted in my HPI and in the PMH.      Exam  /86 (BP Location: Right arm, Cuff Size: Adult Large)   Pulse 102   LMP 03/10/2025 (Approximate)   SpO2 96%   General:  WNWD female, NAD  Alert  Oriented x 3  Gait:  Normal  Skin:  Normal skin turgor  HEENT:  NC/AT, EOMI  Abdomen:  Non-tender, non-distended.  Vulva: No external lesions, normal hair  distribution, no adenopathy  BUS:  enlarged right bartholin gland, tender to palpation, appearance suggests 3 prior incision and drainage attempts.    Urethral meatus:  No masses noted  Perianal:  No masses noted  Extremities:  No clubbing, no cyanosis and no edema.      Assessment  Recurrent right Bartholin Gland Abscess/Cyst      Plan  She has refused the I&D with Word catheter placement 3 times in the past week.  We reviewed the Marsupialization of the cyst previously and discussed the difference with the I&D and excision.  I don't see an appointment yesterday for surgery, but she had an appointment for earlier today that she canceled yesterday.  The cancel note states she has an earlier appointment.  She has also reached out to Dr. Schwarz to schedule surgery.  I saw that she was scheduled through [facility], and I called the OR to confirm that she indeed had been scheduled, and it was scheduled today for 04/24/2025.  I am not able to schedule her for surgery today.  She will need to go through the ER if she desires to do this today, and there is no guarantee she would be able to have it done today, if she does go to the ER.    She does not have a preop for the surgery in 2 days.  I took her to the  to help her schedule a pre-op.  She did not schedule this.    I gave her a tube of lidocaine jelly that she may use for topical therapy.  She wanted another prescription for ultram as she took her last one yesterday.  She will need to obtain this from her PCP.        Total time preparing to see patient with reviewing prior encounters including ER visits and clinic visits and labs, face to face time, coordinating care and documentation, on the same calendar date:  45 minutes     Yunier Casillas MD

## 2025-04-22 NOTE — PATIENT INSTRUCTIONS
If you have any questions regarding your visit, Please contact your care team.    To Schedule an Appointment 24/7  Call: [phone]  Women [facility]  TELEPHONE NUMBER   Yunier Casillas M.D.    So-Medical Assistant    Teri Cavazos-Surgery Scheduler  Khadijah- Tuesday-[place]                   7:30 a.m.-3:30 p.m.  Wednesday-[place]             8:00 a.m.-4:30 p.m.  Thursday-[place]     8:00 a.m.-4:00 p.m.  Friday-[place]                       7:30 a.m.-1:00 p.m. [facility]  [id number] [place] [address].  [place] [address] [phone] Phone  [phone] Fax    Imaging Scheduling all locations  [phone]      [facility]  [address]  [place], [place] [address] [phone]    [facility]  [address] [place] [address] [phone] ask for Women's Clinic     **Surgeries** Our Surgery Schedulers will contact you to schedule. If you do not receive a call within 3 business days, please call [phone].    Urgent Care locations:  [facility] Monday-Friday                 10 am - 8 pm  Saturday and Sunday        9 am - 5 pm   [phone]   If you need a medication refill, please contact your pharmacy. Please allow 3 business days for your refill to be completed.  As always, Thank you for trusting us with your healthcare needs!  If you have any questions regarding your visit, Please contact your care team.    [employer]: [phone]    To Schedule an Appointment 24/7  Call: [phone]    see additional instructions from your care team below

## 2025-04-22 NOTE — OR NURSING
Pt stated she is still trying to get in for a pre op. She will call us back once she gets one scheduled as this is a hard stop prior to having the scheduled procedure. I did give her basic pre op info, arrival and NPO information. Pt stated understanding.   Dr. Medrano was called re LD of [facility] 4/20 and stated okay to proceed if clear liquid 24 hours prior to OR.

## 2025-04-23 ENCOUNTER — ANESTHESIA EVENT (OUTPATIENT)
Dept: SURGERY | Facility: CLINIC | Age: 35
End: 2025-04-23
Payer: COMMERCIAL

## 2025-04-23 RX ORDER — TRAMADOL HYDROCHLORIDE 50 MG/1
50 TABLET ORAL EVERY 6 HOURS PRN
Qty: 10 TABLET | Refills: 0 | Status: SHIPPED | OUTPATIENT
Start: 2025-04-23

## 2025-04-23 NOTE — PROGRESS NOTES
I received a call back from the patient after leaving 2 messages asking for confirmation about her pre-op physical. The patient stated that she did not get a pre-op physical but has seen many doctors recently. The patient did not confirm her ride to/from the procedure.

## 2025-04-23 NOTE — OR NURSING
Patient states she had a tele health appointment 4/22/2025 for her pre-op physical.  She also stated her mother will bring her, take her home, and stay with her after her procedure.

## 2025-04-24 ENCOUNTER — HOSPITAL ENCOUNTER (OUTPATIENT)
Facility: CLINIC | Age: 35
Discharge: HOME | End: 2025-04-24
Attending: OBSTETRICS & GYNECOLOGY | Admitting: OBSTETRICS & GYNECOLOGY
Payer: COMMERCIAL

## 2025-04-24 ENCOUNTER — ANESTHESIA (OUTPATIENT)
Dept: SURGERY | Facility: CLINIC | Age: 35
End: 2025-04-24
Payer: COMMERCIAL

## 2025-04-24 ENCOUNTER — HOSPITAL ENCOUNTER (EMERGENCY)
Facility: CLINIC | Age: 35
Discharge: HOME | End: 2025-04-24
Attending: STUDENT IN AN ORGANIZED HEALTH CARE EDUCATION/TRAINING PROGRAM
Payer: COMMERCIAL

## 2025-04-24 VITALS
TEMPERATURE: 97.8 F | RESPIRATION RATE: 18 BRPM | BODY MASS INDEX: 39.77 KG/M2 | OXYGEN SATURATION: 96 % | HEART RATE: 93 BPM | DIASTOLIC BLOOD PRESSURE: 73 MMHG | HEIGHT: 66 IN | WEIGHT: 247.5 LBS | SYSTOLIC BLOOD PRESSURE: 116 MMHG

## 2025-04-24 VITALS
SYSTOLIC BLOOD PRESSURE: 142 MMHG | HEIGHT: 65 IN | HEART RATE: 91 BPM | OXYGEN SATURATION: 98 % | DIASTOLIC BLOOD PRESSURE: 76 MMHG | BODY MASS INDEX: 40.82 KG/M2 | WEIGHT: 245 LBS | TEMPERATURE: 97.3 F | RESPIRATION RATE: 33 BRPM

## 2025-04-24 DIAGNOSIS — N99.820 POSTOPERATIVE VAGINAL BLEEDING FOLLOWING GENITOURINARY PROCEDURE: ICD-10-CM

## 2025-04-24 LAB
ABO + RH BLD: NORMAL
ANION GAP SERPL CALCULATED.3IONS-SCNC: 10 MMOL/L (ref 7–15)
BASOPHILS # BLD AUTO: 0 10E3/UL (ref 0–0.2)
BASOPHILS NFR BLD AUTO: 0 %
BLD GP AB SCN SERPL QL: NEGATIVE
BUN SERPL-MCNC: 13.2 MG/DL (ref 6–20)
CALCIUM SERPL-MCNC: 9 MG/DL (ref 8.8–10.4)
CHLORIDE SERPL-SCNC: 101 MMOL/L (ref 98–107)
CREAT SERPL-MCNC: 0.67 MG/DL (ref 0.51–0.95)
EGFRCR SERPLBLD CKD-EPI 2021: >90 ML/MIN/1.73M2
EOSINOPHIL # BLD AUTO: 0 10E3/UL (ref 0–0.7)
EOSINOPHIL NFR BLD AUTO: 0 %
ERYTHROCYTE [DISTWIDTH] IN BLOOD BY AUTOMATED COUNT: 13.5 % (ref 10–15)
GLUCOSE SERPL-MCNC: 133 MG/DL (ref 70–99)
HCG UR QL: NEGATIVE
HCO3 SERPL-SCNC: 24 MMOL/L (ref 22–29)
HCT VFR BLD AUTO: 38.4 % (ref 35–47)
HGB BLD-MCNC: 13.1 G/DL (ref 11.7–15.7)
IMM GRANULOCYTES # BLD: 0 10E3/UL
IMM GRANULOCYTES NFR BLD: 0 %
LYMPHOCYTES # BLD AUTO: 0.5 10E3/UL (ref 0.8–5.3)
LYMPHOCYTES NFR BLD AUTO: 6 %
MCH RBC QN AUTO: 31.6 PG (ref 26.5–33)
MCHC RBC AUTO-ENTMCNC: 34.1 G/DL (ref 31.5–36.5)
MCV RBC AUTO: 93 FL (ref 78–100)
MONOCYTES # BLD AUTO: 0.1 10E3/UL (ref 0–1.3)
MONOCYTES NFR BLD AUTO: 1 %
NEUTROPHILS # BLD AUTO: 8.8 10E3/UL (ref 1.6–8.3)
NEUTROPHILS NFR BLD AUTO: 93 %
NRBC # BLD AUTO: 0 10E3/UL
NRBC BLD AUTO-RTO: 0 /100
PLATELET # BLD AUTO: 309 10E3/UL (ref 150–450)
POTASSIUM SERPL-SCNC: 4.3 MMOL/L (ref 3.4–5.3)
RBC # BLD AUTO: 4.15 10E6/UL (ref 3.8–5.2)
SODIUM SERPL-SCNC: 135 MMOL/L (ref 135–145)
SPECIMEN EXP DATE BLD: NORMAL
WBC # BLD AUTO: 9.4 10E3/UL (ref 4–11)

## 2025-04-24 PROCEDURE — 710N000012 HC RECOVERY PHASE 2, PER MINUTE: Performed by: OBSTETRICS & GYNECOLOGY

## 2025-04-24 PROCEDURE — 710N000010 HC RECOVERY PHASE 1, LEVEL 2, PER MIN: Performed by: OBSTETRICS & GYNECOLOGY

## 2025-04-24 PROCEDURE — 250N000013 HC RX MED GY IP 250 OP 250 PS 637: Performed by: OBSTETRICS & GYNECOLOGY

## 2025-04-24 PROCEDURE — 370N000017 HC ANESTHESIA TECHNICAL FEE, PER MIN: Performed by: OBSTETRICS & GYNECOLOGY

## 2025-04-24 PROCEDURE — 81025 URINE PREGNANCY TEST: CPT | Performed by: OBSTETRICS & GYNECOLOGY

## 2025-04-24 PROCEDURE — 250N000011 HC RX IP 250 OP 636: Performed by: OBSTETRICS & GYNECOLOGY

## 2025-04-24 PROCEDURE — 250N000009 HC RX 250: Performed by: NURSE ANESTHETIST, CERTIFIED REGISTERED

## 2025-04-24 PROCEDURE — 99283 EMERGENCY DEPT VISIT LOW MDM: CPT

## 2025-04-24 PROCEDURE — 86900 BLOOD TYPING SEROLOGIC ABO: CPT | Performed by: STUDENT IN AN ORGANIZED HEALTH CARE EDUCATION/TRAINING PROGRAM

## 2025-04-24 PROCEDURE — 258N000003 HC RX IP 258 OP 636: Performed by: NURSE ANESTHETIST, CERTIFIED REGISTERED

## 2025-04-24 PROCEDURE — 250N000013 HC RX MED GY IP 250 OP 250 PS 637: Performed by: ANESTHESIOLOGY

## 2025-04-24 PROCEDURE — 80048 BASIC METABOLIC PNL TOTAL CA: CPT | Performed by: STUDENT IN AN ORGANIZED HEALTH CARE EDUCATION/TRAINING PROGRAM

## 2025-04-24 PROCEDURE — 250N000011 HC RX IP 250 OP 636: Mod: JZ | Performed by: NURSE ANESTHETIST, CERTIFIED REGISTERED

## 2025-04-24 PROCEDURE — 36415 COLL VENOUS BLD VENIPUNCTURE: CPT | Performed by: STUDENT IN AN ORGANIZED HEALTH CARE EDUCATION/TRAINING PROGRAM

## 2025-04-24 PROCEDURE — 360N000075 HC SURGERY LEVEL 2, PER MIN: Performed by: OBSTETRICS & GYNECOLOGY

## 2025-04-24 PROCEDURE — 258N000003 HC RX IP 258 OP 636: Performed by: ANESTHESIOLOGY

## 2025-04-24 PROCEDURE — 272N000001 HC OR GENERAL SUPPLY STERILE: Performed by: OBSTETRICS & GYNECOLOGY

## 2025-04-24 PROCEDURE — 250N000011 HC RX IP 250 OP 636: Performed by: STUDENT IN AN ORGANIZED HEALTH CARE EDUCATION/TRAINING PROGRAM

## 2025-04-24 PROCEDURE — 999N000141 HC STATISTIC PRE-PROCEDURE NURSING ASSESSMENT: Performed by: OBSTETRICS & GYNECOLOGY

## 2025-04-24 PROCEDURE — 85004 AUTOMATED DIFF WBC COUNT: CPT | Performed by: STUDENT IN AN ORGANIZED HEALTH CARE EDUCATION/TRAINING PROGRAM

## 2025-04-24 PROCEDURE — 88304 TISSUE EXAM BY PATHOLOGIST: CPT | Mod: TC | Performed by: OBSTETRICS & GYNECOLOGY

## 2025-04-24 PROCEDURE — 88304 TISSUE EXAM BY PATHOLOGIST: CPT | Mod: 26 | Performed by: PATHOLOGY

## 2025-04-24 PROCEDURE — 250N000009 HC RX 250: Performed by: OBSTETRICS & GYNECOLOGY

## 2025-04-24 RX ORDER — LIDOCAINE HYDROCHLORIDE 20 MG/ML
INJECTION, SOLUTION INFILTRATION; PERINEURAL
Status: DISCONTINUED
Start: 2025-04-24 | End: 2025-04-24 | Stop reason: HOSPADM

## 2025-04-24 RX ORDER — OXYCODONE HYDROCHLORIDE 5 MG/1
10 TABLET ORAL
Status: DISCONTINUED | OUTPATIENT
Start: 2025-04-24 | End: 2025-04-24 | Stop reason: HOSPADM

## 2025-04-24 RX ORDER — LANOLIN ALCOHOL/MO/W.PET/CERES
1200 CREAM (GRAM) TOPICAL DAILY
COMMUNITY

## 2025-04-24 RX ORDER — NALOXONE HYDROCHLORIDE 0.4 MG/ML
0.1 INJECTION, SOLUTION INTRAMUSCULAR; INTRAVENOUS; SUBCUTANEOUS
Status: DISCONTINUED | OUTPATIENT
Start: 2025-04-24 | End: 2025-04-24 | Stop reason: HOSPADM

## 2025-04-24 RX ORDER — HYDROMORPHONE HCL IN WATER/PF 6 MG/30 ML
0.4 PATIENT CONTROLLED ANALGESIA SYRINGE INTRAVENOUS EVERY 5 MIN PRN
Status: DISCONTINUED | OUTPATIENT
Start: 2025-04-24 | End: 2025-04-24 | Stop reason: HOSPADM

## 2025-04-24 RX ORDER — GINSENG 100 MG
CAPSULE ORAL 2 TIMES DAILY
Status: DISCONTINUED | OUTPATIENT
Start: 2025-04-24 | End: 2025-04-24 | Stop reason: HOSPADM

## 2025-04-24 RX ORDER — ONDANSETRON 4 MG/1
4 TABLET, ORALLY DISINTEGRATING ORAL EVERY 30 MIN PRN
Status: DISCONTINUED | OUTPATIENT
Start: 2025-04-24 | End: 2025-04-24 | Stop reason: HOSPADM

## 2025-04-24 RX ORDER — HYDROMORPHONE HYDROCHLORIDE 1 MG/ML
0.5 INJECTION, SOLUTION INTRAMUSCULAR; INTRAVENOUS; SUBCUTANEOUS ONCE
Status: DISCONTINUED | OUTPATIENT
Start: 2025-04-24 | End: 2025-04-24 | Stop reason: HOSPADM

## 2025-04-24 RX ORDER — LIDOCAINE HYDROCHLORIDE 20 MG/ML
INJECTION, SOLUTION INFILTRATION; PERINEURAL PRN
Status: DISCONTINUED | OUTPATIENT
Start: 2025-04-24 | End: 2025-04-24 | Stop reason: HOSPADM

## 2025-04-24 RX ORDER — ACETAMINOPHEN 325 MG/1
975 TABLET ORAL ONCE
Status: DISCONTINUED | OUTPATIENT
Start: 2025-04-24 | End: 2025-04-24 | Stop reason: HOSPADM

## 2025-04-24 RX ORDER — ONDANSETRON 2 MG/ML
4 INJECTION INTRAMUSCULAR; INTRAVENOUS EVERY 30 MIN PRN
Status: DISCONTINUED | OUTPATIENT
Start: 2025-04-24 | End: 2025-04-24 | Stop reason: HOSPADM

## 2025-04-24 RX ORDER — ONDANSETRON 4 MG/1
8 TABLET, ORALLY DISINTEGRATING ORAL ONCE
Status: COMPLETED | OUTPATIENT
Start: 2025-04-24 | End: 2025-04-24

## 2025-04-24 RX ORDER — MAGNESIUM HYDROXIDE 1200 MG/15ML
LIQUID ORAL PRN
Status: DISCONTINUED | OUTPATIENT
Start: 2025-04-24 | End: 2025-04-24 | Stop reason: HOSPADM

## 2025-04-24 RX ORDER — OXYCODONE HYDROCHLORIDE 5 MG/1
5 TABLET ORAL
Status: COMPLETED | OUTPATIENT
Start: 2025-04-24 | End: 2025-04-24

## 2025-04-24 RX ORDER — CEFAZOLIN SODIUM/WATER 2 G/20 ML
2 SYRINGE (ML) INTRAVENOUS SEE ADMIN INSTRUCTIONS
Status: DISCONTINUED | OUTPATIENT
Start: 2025-04-24 | End: 2025-04-24 | Stop reason: HOSPADM

## 2025-04-24 RX ORDER — CEFAZOLIN SODIUM/WATER 2 G/20 ML
2 SYRINGE (ML) INTRAVENOUS
Status: COMPLETED | OUTPATIENT
Start: 2025-04-24 | End: 2025-04-24

## 2025-04-24 RX ORDER — LIDOCAINE 40 MG/G
CREAM TOPICAL
Status: DISCONTINUED | OUTPATIENT
Start: 2025-04-24 | End: 2025-04-24 | Stop reason: HOSPADM

## 2025-04-24 RX ORDER — ACETAMINOPHEN 325 MG/1
975 TABLET ORAL ONCE
Status: COMPLETED | OUTPATIENT
Start: 2025-04-24 | End: 2025-04-24

## 2025-04-24 RX ORDER — ASCORBIC ACID 500 MG
500 TABLET ORAL DAILY
COMMUNITY

## 2025-04-24 RX ORDER — SODIUM CHLORIDE, SODIUM LACTATE, POTASSIUM CHLORIDE, CALCIUM CHLORIDE 600; 310; 30; 20 MG/100ML; MG/100ML; MG/100ML; MG/100ML
INJECTION, SOLUTION INTRAVENOUS CONTINUOUS
Status: DISCONTINUED | OUTPATIENT
Start: 2025-04-24 | End: 2025-04-24 | Stop reason: HOSPADM

## 2025-04-24 RX ORDER — FENTANYL CITRATE 50 UG/ML
50 INJECTION, SOLUTION INTRAMUSCULAR; INTRAVENOUS EVERY 5 MIN PRN
Status: DISCONTINUED | OUTPATIENT
Start: 2025-04-24 | End: 2025-04-24 | Stop reason: HOSPADM

## 2025-04-24 RX ORDER — PROPOFOL 10 MG/ML
INJECTION, EMULSION INTRAVENOUS PRN
Status: DISCONTINUED | OUTPATIENT
Start: 2025-04-24 | End: 2025-04-24

## 2025-04-24 RX ORDER — FENTANYL CITRATE 50 UG/ML
INJECTION, SOLUTION INTRAMUSCULAR; INTRAVENOUS PRN
Status: DISCONTINUED | OUTPATIENT
Start: 2025-04-24 | End: 2025-04-24

## 2025-04-24 RX ORDER — LIDOCAINE HYDROCHLORIDE 10 MG/ML
INJECTION, SOLUTION INFILTRATION; PERINEURAL PRN
Status: DISCONTINUED | OUTPATIENT
Start: 2025-04-24 | End: 2025-04-24

## 2025-04-24 RX ORDER — MICONAZOLE NITRATE 2 %
10 CREAM WITH APPLICATOR VAGINAL DAILY
COMMUNITY

## 2025-04-24 RX ORDER — FENTANYL CITRATE 50 UG/ML
25 INJECTION, SOLUTION INTRAMUSCULAR; INTRAVENOUS EVERY 5 MIN PRN
Status: DISCONTINUED | OUTPATIENT
Start: 2025-04-24 | End: 2025-04-24 | Stop reason: HOSPADM

## 2025-04-24 RX ORDER — DIMENHYDRINATE 50 MG/ML
INJECTION, SOLUTION INTRAMUSCULAR; INTRAVENOUS PRN
Status: DISCONTINUED | OUTPATIENT
Start: 2025-04-24 | End: 2025-04-24

## 2025-04-24 RX ORDER — PROPOFOL 10 MG/ML
INJECTION, EMULSION INTRAVENOUS CONTINUOUS PRN
Status: DISCONTINUED | OUTPATIENT
Start: 2025-04-24 | End: 2025-04-24

## 2025-04-24 RX ORDER — ONDANSETRON 2 MG/ML
INJECTION INTRAMUSCULAR; INTRAVENOUS PRN
Status: DISCONTINUED | OUTPATIENT
Start: 2025-04-24 | End: 2025-04-24

## 2025-04-24 RX ORDER — IBUPROFEN 400 MG/1
800 TABLET, FILM COATED ORAL ONCE
Status: DISCONTINUED | OUTPATIENT
Start: 2025-04-24 | End: 2025-04-24 | Stop reason: HOSPADM

## 2025-04-24 RX ORDER — GINSENG 100 MG
CAPSULE ORAL PRN
Status: DISCONTINUED | OUTPATIENT
Start: 2025-04-24 | End: 2025-04-24 | Stop reason: HOSPADM

## 2025-04-24 RX ORDER — DEXAMETHASONE SODIUM PHOSPHATE 4 MG/ML
4 INJECTION, SOLUTION INTRA-ARTICULAR; INTRALESIONAL; INTRAMUSCULAR; INTRAVENOUS; SOFT TISSUE
Status: DISCONTINUED | OUTPATIENT
Start: 2025-04-24 | End: 2025-04-24 | Stop reason: HOSPADM

## 2025-04-24 RX ORDER — DEXAMETHASONE SODIUM PHOSPHATE 10 MG/ML
4 INJECTION, SOLUTION INTRAMUSCULAR; INTRAVENOUS
Status: DISCONTINUED | OUTPATIENT
Start: 2025-04-24 | End: 2025-04-24 | Stop reason: HOSPADM

## 2025-04-24 RX ORDER — KETAMINE HYDROCHLORIDE 10 MG/ML
INJECTION INTRAMUSCULAR; INTRAVENOUS PRN
Status: DISCONTINUED | OUTPATIENT
Start: 2025-04-24 | End: 2025-04-24

## 2025-04-24 RX ORDER — HYDROMORPHONE HCL IN WATER/PF 6 MG/30 ML
0.2 PATIENT CONTROLLED ANALGESIA SYRINGE INTRAVENOUS EVERY 5 MIN PRN
Status: DISCONTINUED | OUTPATIENT
Start: 2025-04-24 | End: 2025-04-24 | Stop reason: HOSPADM

## 2025-04-24 RX ADMIN — PROPOFOL 50 MG: 10 INJECTION, EMULSION INTRAVENOUS at 12:20

## 2025-04-24 RX ADMIN — DEXMEDETOMIDINE HYDROCHLORIDE 12 MCG: 100 INJECTION, SOLUTION INTRAVENOUS at 12:41

## 2025-04-24 RX ADMIN — PROPOFOL 150 MCG/KG/MIN: 10 INJECTION, EMULSION INTRAVENOUS at 12:19

## 2025-04-24 RX ADMIN — HYDROMORPHONE HYDROCHLORIDE 0.5 MG: 1 INJECTION, SOLUTION INTRAMUSCULAR; INTRAVENOUS; SUBCUTANEOUS at 13:03

## 2025-04-24 RX ADMIN — Medication 2 G: at 12:14

## 2025-04-24 RX ADMIN — SODIUM CHLORIDE, SODIUM LACTATE, POTASSIUM CHLORIDE, AND CALCIUM CHLORIDE: .6; .31; .03; .02 INJECTION, SOLUTION INTRAVENOUS at 11:26

## 2025-04-24 RX ADMIN — KETAMINE HYDROCHLORIDE 20 MG: 10 INJECTION INTRAMUSCULAR; INTRAVENOUS at 12:43

## 2025-04-24 RX ADMIN — DEXMEDETOMIDINE HYDROCHLORIDE 12 MCG: 100 INJECTION, SOLUTION INTRAVENOUS at 12:59

## 2025-04-24 RX ADMIN — DIMENHYDRINATE 50 MG: 50 INJECTION, SOLUTION INTRAMUSCULAR; INTRAVENOUS at 13:35

## 2025-04-24 RX ADMIN — PROPOFOL 50 MG: 10 INJECTION, EMULSION INTRAVENOUS at 12:39

## 2025-04-24 RX ADMIN — MIDAZOLAM 2 MG: 1 INJECTION INTRAMUSCULAR; INTRAVENOUS at 12:15

## 2025-04-24 RX ADMIN — FENTANYL CITRATE 100 MCG: 50 INJECTION INTRAMUSCULAR; INTRAVENOUS at 12:48

## 2025-04-24 RX ADMIN — ONDANSETRON 4 MG: 2 INJECTION INTRAMUSCULAR; INTRAVENOUS at 12:29

## 2025-04-24 RX ADMIN — ACETAMINOPHEN 975 MG: 325 TABLET, FILM COATED ORAL at 11:25

## 2025-04-24 RX ADMIN — ONDANSETRON 8 MG: 4 TABLET, ORALLY DISINTEGRATING ORAL at 19:48

## 2025-04-24 RX ADMIN — LIDOCAINE HYDROCHLORIDE 5 ML: 10 INJECTION, SOLUTION INFILTRATION; PERINEURAL at 12:19

## 2025-04-24 RX ADMIN — SODIUM CHLORIDE, SODIUM LACTATE, POTASSIUM CHLORIDE, AND CALCIUM CHLORIDE: .6; .31; .03; .02 INJECTION, SOLUTION INTRAVENOUS at 13:39

## 2025-04-24 RX ADMIN — DEXMEDETOMIDINE HYDROCHLORIDE 16 MCG: 100 INJECTION, SOLUTION INTRAVENOUS at 12:23

## 2025-04-24 RX ADMIN — HYDROMORPHONE HYDROCHLORIDE 0.5 MG: 1 INJECTION, SOLUTION INTRAMUSCULAR; INTRAVENOUS; SUBCUTANEOUS at 13:05

## 2025-04-24 RX ADMIN — KETAMINE HYDROCHLORIDE 30 MG: 10 INJECTION INTRAMUSCULAR; INTRAVENOUS at 12:40

## 2025-04-24 RX ADMIN — OXYCODONE 5 MG: 5 TABLET ORAL at 15:20

## 2025-04-24 RX ADMIN — FENTANYL CITRATE 100 MCG: 50 INJECTION INTRAMUSCULAR; INTRAVENOUS at 12:21

## 2025-04-24 ASSESSMENT — ACTIVITIES OF DAILY LIVING (ADL)
ADLS_ACUITY_SCORE: 48
ADLS_ACUITY_SCORE: 39

## 2025-04-24 ASSESSMENT — COLUMBIA-SUICIDE SEVERITY RATING SCALE - C-SSRS
1. IN THE PAST MONTH, HAVE YOU WISHED YOU WERE DEAD OR WISHED YOU COULD GO TO SLEEP AND NOT WAKE UP?: NO
2. HAVE YOU ACTUALLY HAD ANY THOUGHTS OF KILLING YOURSELF IN THE PAST MONTH?: NO
6. HAVE YOU EVER DONE ANYTHING, STARTED TO DO ANYTHING, OR PREPARED TO DO ANYTHING TO END YOUR LIFE?: YES

## 2025-04-24 ASSESSMENT — LIFESTYLE VARIABLES: TOBACCO_USE: 1

## 2025-04-24 ASSESSMENT — COPD QUESTIONNAIRES: COPD: 0

## 2025-04-24 NOTE — ANESTHESIA CARE TRANSFER NOTE
Patient: Ayanna Davidson    Procedure: Procedure(s):  BARTHOLIN GLAND EXCISION, RIGHT       Diagnosis: Bartholin's gland abscess [N75.1]  Diagnosis Additional Information: No value filed.    Anesthesia Type:   MAC     Note:                    Patient transferred to: PACU    Handoff Report: Identifed the Patient, Identified the Reponsible Provider, Reviewed the pertinent medical history, Discussed the surgical course, Reviewed Intra-OP anesthesia mangement and issues during anesthesia, Set expectations for post-procedure period and Allowed opportunity for questions and acknowledgement of understanding      Vitals:  Vitals Value Taken Time   /83 04/24/25 1437   Temp 36.2  C (97.2  F) 04/24/25 1407   Pulse 90 04/24/25 1437   Resp 33 04/24/25 1437   SpO2 96 % 04/24/25 1437       Electronically Signed By: Alex Mccall MD  April 24, 2025  3:42 PM

## 2025-04-24 NOTE — ED TRIAGE NOTES
"Pt presents with vaginal bleeding after right bartholin gland excision. Pt reports saturating 4 maternity pads in the last 2 hours. Pt reports \"tearing pain\". Pt reports passing multiple blood clots. ABCs intact, GCS 15.      Triage Assessment (Adult)       Row Name 04/24/25 3656          Triage Assessment    Airway WDL WDL        Respiratory WDL    Respiratory WDL WDL        Skin Circulation/Temperature WDL    Skin Circulation/Temperature WDL WDL        Cardiac WDL    Cardiac WDL WDL        Peripheral/Neurovascular WDL    Peripheral Neurovascular WDL WDL        Cognitive/Neuro/Behavioral WDL    Cognitive/Neuro/Behavioral WDL WDL        [place] Coma Scale    Best Eye Response 4-->(E4) spontaneous     Best Motor Response 6-->(M6) obeys commands     Best Verbal Response 5-->(V5) oriented     [place] Coma Scale Score 15                     "

## 2025-04-24 NOTE — ANESTHESIA POSTPROCEDURE EVALUATION
Patient: Ayanna Davidson    Procedure: Procedure(s):  BARTHOLIN GLAND EXCISION, RIGHT       Anesthesia Type:  MAC    Note:     Postop Pain Control: Uneventful            Sign Out: Well controlled pain   PONV: No   Neuro/Psych: Uneventful            Sign Out: Acceptable/Baseline neuro status   Airway/Respiratory: Uneventful            Sign Out: Acceptable/Baseline resp. status   CV/Hemodynamics: Uneventful            Sign Out: Acceptable CV status; No obvious hypovolemia; No obvious fluid overload   Other NRE:    DID A NON-ROUTINE EVENT OCCUR? No           Last vitals:  Vitals Value Taken Time   /83 04/24/25 1437   Temp 36.2  C (97.2  F) 04/24/25 1407   Pulse 90 04/24/25 1437   Resp 33 04/24/25 1437   SpO2 96 % 04/24/25 1437       Electronically Signed By: Alex Mccall MD  April 24, 2025  3:42 PM

## 2025-04-24 NOTE — ANESTHESIA PREPROCEDURE EVALUATION
Anesthesia Pre-Procedure Evaluation    Patient: Ayanna Davidson   MRN: [id number] : 1990        Procedure : Procedure(s):  BARTHOLIN GLAND EXCISION, RIGHT          Past Medical History:   Diagnosis Date    Cervical high risk HPV (human papillomavirus) test positive 2019    last PAP NIL () with Neg HPV h/o LEEP    Gastroesophageal reflux disease     HSV (herpes simplex virus) infection     1 & 2    Methamphetamine abuse (H)     sober since her  pregnancy    Obese     Overdose of antipsychotic 2012    recurrent Bartholin's cyst     TINO III (vulvar intraepithelial neoplasia III)     biopsy +TINO III      Past Surgical History:   Procedure Laterality Date    BIOPSY       SECTION N/A 3/1/2024    Procedure: PRIMARY  SECTION;  Surgeon: Inderjit Olivas MD;  Location: [facility]    CHOLECYSTECTOMY      ENT SURGERY      ESOPHAGOSCOPY, GASTROSCOPY, DUODENOSCOPY (EGD), COMBINED N/A 2023    Procedure: Esophagoscopy, gastroscopy, duodenoscopy (EGD), combined;  Surgeon: Thierno Escobar MD;  Location: [facility]    EXAM UNDER ANESTHESIA PELVIC N/A 2020    Procedure: EXAM UNDER ANESTHESIA, PELVIS, PAP;  Surgeon: Froylan Schwarz MD;  Location: [facility] OR    EXCISE VULVA WIDE LOCAL Bilateral 2020    Procedure: Right Vulva Wedge Resection and Incision and Drainage Left Vulva;  Surgeon: Froylan Schwarz MD;  Location: [facility]    INCISION AND DRAINAGE ABSCESS PELVIS, COMBINED N/A 2018    Procedure: COMBINED INCISION AND DRAINAGE ABSCESS PELVIS;  INCISION AND DRAINAGE LABIAL ABSCESS;  Surgeon: Jase Beach MD;  Location: [facility]    INCISION AND DRAINAGE ABSCESS PELVIS, COMBINED N/A 2019    Procedure: Incision and Drainage Right Labial Abscess;  Surgeon: Jase Beach MD;  Location: [facility]    INCISION AND DRAINAGE LOWER EXTREMITY, COMBINED Right 2019    Procedure: irrigation and debridement right leg dog bite;  Surgeon: Rommel Pérez MD;   Location: [facility] OR    LAP ADJUSTABLE GASTRIC BAND      LEEP TX, CERVICAL      MAMMOPLASTY REDUCTION BILATERAL      MARSUPIALIZATION BARTHOLIN CYST N/A 04/29/2019    Procedure: Bartholin's Cyst removal;  Surgeon: Froylan Schwarz MD;  Location: [facility]    MARSUPIALIZATION BARTHOLIN CYST Left 01/29/2020    Procedure: Excision of left bartholin's abscess;  Surgeon: Froylan Schwarz MD;  Location: [facility]    MARSUPIALIZATION BARTHOLIN CYST Right 5/8/2024    Procedure: MARSUPIALIZATION, CYST, BARTHOLIN'S GLAND;  Surgeon: Jase Man MD;  Location: [facility] OR    ORTHOPEDIC SURGERY        Allergies   Allergen Reactions    No Known Allergies       Social History     Tobacco Use    Smoking status: Former     Current packs/day: 0.50     Average packs/day: 0.5 packs/day for 10.0 years (5.0 ttl pk-yrs)     Types: Cigarettes    Smokeless tobacco: Former    Tobacco comments:     uses E cig with zero nicotine    Substance Use Topics    Alcohol use: Not Currently     Comment: Reports last use 9/17 and denies use during pregnancy      Wt Readings from Last 1 Encounters:   04/15/25 108.9 kg (240 lb)        Anesthesia Evaluation   Pt has had prior anesthetic. Type of anesthetic: Notes history of one aspiration though had ate cheetos prior to that event per the patient.    No history of anesthetic complications       ROS/MED HX  ENT/Pulmonary:     (+)                tobacco use,                     (-) asthma, COPD and sleep apnea   Neurologic:  - neg neurologic ROS     Cardiovascular:  - neg cardiovascular ROS   (+) Dyslipidemia - -   -  - -                                      METS/Exercise Tolerance: >4 METS    Hematologic:  - neg hematologic  ROS     Musculoskeletal:  - neg musculoskeletal ROS     GI/Hepatic:     (+) GERD,                   Renal/Genitourinary:  - neg Renal ROS     Endo: Comment: Hx gestational diabetes    (+)               Obesity,    (-) Type I DM, Type II DM and thyroid disease  "  Psychiatric/Substance Use:     (+) psychiatric history (PTSD) depression, anxiety and other (comment)   Recreational drug usage: Meth and Cannabis (Last use of illicit drugs over one month ago (cannabis)).    Infectious Disease:  - neg infectious disease ROS     Malignancy:  - neg malignancy ROS     Other:  - neg other ROS          Physical Exam    Airway  airway exam normal      Mallampati: II   TM distance: > 3 FB   Neck ROM: full   Mouth opening: > 3 cm    Respiratory Devices and Support         Dental       (+) Multiple visibly decayed, broken teeth      Cardiovascular   cardiovascular exam normal          Pulmonary   pulmonary exam normal                OUTSIDE LABS:  CBC:   Lab Results   Component Value Date    WBC 8.1 04/15/2025    WBC 10.6 05/08/2024    HGB 15.3 04/15/2025    HGB 14.1 05/08/2024    HCT 43.9 04/15/2025    HCT 41.9 05/08/2024     04/15/2025     05/08/2024     BMP:   Lab Results   Component Value Date     04/15/2025     09/17/2024    POTASSIUM 4.3 04/15/2025    POTASSIUM 4.2 09/17/2024    CHLORIDE 102 04/15/2025    CHLORIDE 102 09/17/2024    CO2 25 04/15/2025    CO2 26 09/17/2024    BUN 18.1 04/15/2025    BUN 14.8 09/17/2024    CR 0.84 04/15/2025    CR 0.77 09/17/2024    GLC 95 04/15/2025    GLC 94 09/17/2024     COAGS: No results found for: \"PTT\", \"INR\", \"FIBR\"  POC:   Lab Results   Component Value Date    HCG Negative 03/06/2025    HCGS Negative 04/15/2025     HEPATIC:   Lab Results   Component Value Date    ALBUMIN 4.6 04/15/2025    PROTTOTAL 7.9 04/15/2025    ALT 29 04/15/2025    AST 24 04/15/2025    ALKPHOS 81 04/15/2025    BILITOTAL 0.2 04/15/2025     OTHER:   Lab Results   Component Value Date    LACT 1.2 09/27/2019    A1C 5.1 06/07/2024    ANUM 10.3 04/15/2025    LIPASE 86 11/21/2007    AMYLASE 58 11/21/2007    TSH 1.85 08/18/2022    CRP 19.0 (H) 09/27/2019    SED 9 04/26/2020       Anesthesia Plan    ASA Status:  2    NPO Status:  NPO Appropriate  " "  Anesthesia Type: MAC.   Induction: Intravenous.           Consents    Anesthesia Plan(s) and associated risks, benefits, and realistic alternatives discussed. Questions answered and patient/representative(s) expressed understanding.     - Discussed:     - Discussed with:  Patient            Postoperative Care    Pain management: IV analgesics, Oral pain medications.   PONV prophylaxis: Ondansetron (or other 5HT-3)     Comments:               Alex Mccall MD    Clinically Significant Risk Factors Present on Admission                             # Obesity: Estimated body mass index is 38.74 kg/m  as calculated from the following:    Height as of 4/15/25: 1.676 m (5' 6\").    Weight as of 4/15/25: 108.9 kg (240 lb).                "

## 2025-04-24 NOTE — OR NURSING
Dr Schwarz updated that patient voided again and small pink urine noted in toilet and pad was saturated about 30-40% at this time and appears to be less amount than initial amount when patient came into Phase 2. Dr Schwarz reiterated to inform patient to keep pressure on the surgical area and to notify his office in the event of increased pain, increased bleeding, or nay other notable changes after discharge.   Writer updated patient prior to discharge to the above and encouraged her to call their office with any concerns.  Pt and her mother reported to understand information presented and had no other questions or concerns at this time.  Avelina Da Silva RN

## 2025-04-24 NOTE — OP NOTE
Operative Note   Name: Ayanna Davidson  MRN: [id number]  : 1990  Date of Surgery: 2025    Pre-operative Diagnosis: Right Bartholin gland abscess, recurrent    Post-operative Diagnosis: Same  Procedure(s): Right Bartholin gland excision     Surgeon: Froylan Schwarz MD    Anesthesia: MAC  EBL: 50 mL     Specimens: Right Bartholin gland  Complications: None apparent.  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 Left Gland previously excised with no reoccurrence on that side. She now desires gland excision on the right. RBA reviewed in detail, desires to proceed.      Procedure:  The patient was brought to the operating room, where she was placed in the dorsal lithotomy position in yellow fin stirups, underwent MAC anesthesia without difficulty. Universal protocol was carried out. A red kayla  catheter was used to drain the bladder. Exam under anesthesia revealed the above noted findings. A vertical incision was made at the introitus into the gland appx 3.5 cm long at the mid right vulva. The abscess was copiously irrigated. A lone star retractor was placed to retract the vulva and vaginal mucosa. Combination of blunt dissection, traction, and cautery were used to free the gland entirely from the underlying tissue. The tissue was again irrigated. The vulvar defect was then closed in multiple layers with a combination of interrupted and running suture of 2-0 and 3-0 vicryl, recreating normal vulvar anatomy and completely closing the defect. Superficial bleeding was controlled with cautery and suture. Bacitracin was applied to the cleansed closure and pressure was applied with short vaginal packing at the introitus and an ABD pad on the vulva.   The patient was awakened in the OR and taken to recovery in stable condition. Sponge, lap and needle counts were correct x 2. The patient received 2 grams of ancef  prior to the procedure.     Froylan Schwarz MD  04/24/2025, 2:01 PM

## 2025-04-24 NOTE — DISCHARGE INSTRUCTIONS
Oxycodone 5 mg was given at 1520 on 4/24/2025    After Anesthesia (Sleep Medicine)  What should I do after anesthesia?  You should rest and relax for the next 24 hours. Avoid risky or difficult (strenuous) activity. A responsible adult should stay with you overnight.  Don't drive or use any heavy equipment for 24 hours. Even if you feel normal, your reactions may be affected by the sleep medicine given to you.  Don't drink alcohol or make any important decisions for 24 hours.  Slowly get back to your regular diet, as you feel able.  How should I expect to feel?  It's normal to feel dizzy, light-headed, or faint for up to a full day after anesthesia or while taking pain medicine. If this happens:   Sit down for a few minutes before standing.  Have someone help you when you get up to walk or use the bathroom.  If you have nausea (feel sick to your stomach) or vomit (throw up):   Drink clear liquids (such as apple juice, ginger ale, broth, or 7UP) until you feel better.  If you feel sick to your stomach, or you keep vomiting for 24 hours, please call the doctor.  What else should I know?  You might have a dry mouth, sore throat, muscle aches, or trouble sleeping. These should go away after 24 hours.  Please contact your doctor if you have any other symptoms that concern you, such as fever, pain, bleeding, fluid drainage, swelling, or headache, or if it's been over 8 to 10 hours and you still aren't able to pee (urinate).  If you have a history of sleep apnea, it's very important to use your CPAP machine for the next 24 hours when you nap or sleep.   For informational purposes only. Not to replace the advice of your health care provider. Copyright   2023 [employer]. All rights reserved. Clinically reviewed by Behzad Proctor MD. [employer] [id number] - REV 09/23.

## 2025-04-24 NOTE — OR NURSING
"Patient was very uncooperative during her PACU care upon waking up and into Phase 2 of care. Upon awaking patient voiced that she had to void and writer offered a bedpan and Purewick options, to which pt replied \"I'm not using a fucking bedpan you fucking bitch\". Upon arrival into Phase 2 of care patient was unwilling to wait for help and told writer to get out her way and that she was only going to use the patient bathroom. Writer did ask for additional staff help to assist patient to the bathroom but patient voiced numerous times that our \"policies were stupid and I think I know how I feel because I've had surgery before\". Writer did try to explain that we have safety protocols to ensure that she remains safe during her care, but patient was noncompliant and uncooperative, insisting to walk to bathroom on her own. Pt remained irritable and short during the entire time in Phase 2. At the end of her care patient did apologize for being rude to writer and staff. Pt discharged home with mother with no other issues to note.    Avelina Da Silva RN  "

## 2025-04-24 NOTE — PROVIDER NOTIFICATION
Dr. Schwarz update due to patients packing falling out when she was voiding. Patient still having some slight bleeding. Per Dr. Schwarz, place pad on vulva to help stop any bleeding.

## 2025-04-25 NOTE — ED PROVIDER NOTES
"EMERGENCY DEPARTMENT ENCOUNTER      NAME: Ayanna Dvaidson  AGE: 34 year old female  YOB: 1990  MRN: [id number]  EVALUATION DATE & TIME: 2025  6:47 PM    PCP: Rhianna Michaels    ED PROVIDER: Scooter Hill M.D.      Chief Complaint   Patient presents with    Vaginal Bleeding         FINAL IMPRESSION:  1. Postoperative vaginal bleeding following genitourinary procedure          ED COURSE & MEDICAL DECISION MAKIN:06 PM I met the patient and performed my initial interview and exam. Discussed workup in the emergency department, management of symptoms, and likely disposition.      Pertinent Labs & Imaging studies reviewed. (See chart for details)  34 year old female presents to the Emergency Department for evaluation of ***    At the conclusion of the encounter I discussed the results of all of the tests and the disposition. The questions were answered. The patient or family acknowledged understanding and was agreeable with the care plan.              Medical Decision Making  {DID YOU REMEMBER TO DOCUMENT...?:256517}  {ADMIT VS D/C:829164}    MIPS (CTPE, Dental pain, [place], Sinusitis, Asthma/COPD, Head Trauma): {ECC MIPS DOCUMENTATION:194964}    SEPSIS: {Sepsis/Stemi/Stroke:090489::\"None\"}                This patient involved a high degree of complexity in medical decision making, as significant risks were present and assessed. Recent encounters & results in medical record reviewed by me.***     All workup (i.e. any EKG/labs/imaging as per charting below) reviewed and independently interpreted by me. See respective sections for details.      *** minutes of critical care time     MEDICATIONS GIVEN IN THE EMERGENCY:  Medications   HYDROmorphone (PF) (DILAUDID) injection 0.5 mg ( Intravenous Canceled Entry 25)   ondansetron (ZOFRAN ODT) ODT tab 8 mg (8 mg Oral $Given 25)       NEW PRESCRIPTIONS STARTED AT TODAY'S ER VISIT  Discharge Medication List as of 2025  7:46 PM    " "         =================================================================    HPI    Patient information was obtained from: Patient, mom    Use of : N/A         Ayanna Davidson is a 34 year old female with a pertinent history of s/p right bartholin gland excision who presents for evaluation of vaginal bleeding.     The patient had a right bartholin gland excision today and was sent home at 4 PM. She went to  Tramadol at the pharmacy but about 1 hour after she was sent home, she developed vaginal bleeding. She reports that she has bled through 3 big pads in 2 hours. Patient was told by her surgeon today to be seen in the ED if she had \"excessive bleeding\" post surgery. She endorses pain at the surgical site but denies any other concerns at this time.       REVIEW OF SYSTEMS   Review of Systems   Genitourinary:  Positive for vaginal bleeding.   All other systems reviewed and are negative.       PAST MEDICAL HISTORY:  Past Medical History:   Diagnosis Date    Cervical high risk HPV (human papillomavirus) test positive 2019    last PAP NIL () with Neg HPV h/o LEEP    Gastroesophageal reflux disease     HSV (herpes simplex virus) infection     1 & 2    Methamphetamine abuse (H)     sober since her  pregnancy    Obese     Overdose of antipsychotic 2012    recurrent Bartholin's cyst     TINO III (vulvar intraepithelial neoplasia III)     biopsy +TINO III       PAST SURGICAL HISTORY:  Past Surgical History:   Procedure Laterality Date    BIOPSY       SECTION N/A 3/1/2024    Procedure: PRIMARY  SECTION;  Surgeon: Inderjit Olivas MD;  Location: [facility]    CHOLECYSTECTOMY      ENT SURGERY      ESOPHAGOSCOPY, GASTROSCOPY, DUODENOSCOPY (EGD), COMBINED N/A 2023    Procedure: Esophagoscopy, gastroscopy, duodenoscopy (EGD), combined;  Surgeon: Thierno Escobar MD;  Location: [facility]    EXAM UNDER ANESTHESIA PELVIC N/A 2020    Procedure: EXAM UNDER ANESTHESIA, " PELVIS, PAP;  Surgeon: Froylan Schwarz MD;  Location: [facility]    EXCISE VULVA WIDE LOCAL Bilateral 03/11/2020    Procedure: Right Vulva Wedge Resection and Incision and Drainage Left Vulva;  Surgeon: Froylan Schwarz MD;  Location: [facility]    INCISION AND DRAINAGE ABSCESS PELVIS, COMBINED N/A 02/26/2018    Procedure: COMBINED INCISION AND DRAINAGE ABSCESS PELVIS;  INCISION AND DRAINAGE LABIAL ABSCESS;  Surgeon: Jase Beach MD;  Location: [facility]    INCISION AND DRAINAGE ABSCESS PELVIS, COMBINED N/A 03/05/2019    Procedure: Incision and Drainage Right Labial Abscess;  Surgeon: Jase Beach MD;  Location: [facility]    INCISION AND DRAINAGE LOWER EXTREMITY, COMBINED Right 08/11/2019    Procedure: irrigation and debridement right leg dog bite;  Surgeon: Rommel Pérez MD;  Location: [facility]    LAP ADJUSTABLE GASTRIC BAND      LEEP TX, CERVICAL      MAMMOPLASTY REDUCTION BILATERAL      MARSUPIALIZATION BARTHOLIN CYST N/A 04/29/2019    Procedure: Bartholin's Cyst removal;  Surgeon: Froylan Schwarz MD;  [facility]    MARSUPIALIZATION BARTHOLIN CYST Left 01/29/2020    Procedure: Excision of left bartholin's abscess;  Surgeon: Froylan Schwarz MD;  Location[facility]    MARSUPIALIZATION BARTHOLIN CYST Right 5/8/2024    Procedure: MARSUPIALIZATION, CYST, BARTHOLIN'S GLAND;  Surgeon: Jase Man MD;  Location: [facility]    ORTHOPEDIC SURGERY             CURRENT MEDICATIONS:    valACYclovir (VALTREX) 1000 mg tablet  amoxicillin (AMOXIL) 875 MG tablet  Ashwagandha (ASHWAGANDHA GUMMIES) 500 MG CHEW  azithromycin (ZITHROMAX) 500 MG tablet  celecoxib (CELEBREX) 200 MG capsule  cephALEXin (KEFLEX) 500 MG capsule  Cholecalciferol (CVS D3) 10 MCG (400 UNIT) CAPS  clotrimazole-betamethasone (LOTRISONE) 1-0.05 % external cream  cyanocobalamin (VITAMIN B-12) 1000 MCG tablet  ibuprofen (ADVIL/MOTRIN) 800 MG tablet  lidocaine (LMX4) 4 % external cream  lidocaine (XYLOCAINE) 5 % external  ointment  norgestimate-ethinyl estradiol (ORTHO-CYCLEN) 0.25-35 MG-MCG tablet  omeprazole (PRILOSEC) 20 MG DR capsule  oxyCODONE-acetaminophen (PERCOCET) 5-325 MG tablet  Prenatal Vit-Fe Fumarate-FA (PRENATAL MULTIVITAMIN W/IRON) 27-0.8 MG tablet  sulfamethoxazole-trimethoprim (BACTRIM DS) 800-160 MG tablet  tirzepatide-Weight Management (ZEPBOUND) 10 MG/0.5ML prefilled pen  traMADol (ULTRAM) 50 MG tablet  vitamin C (ASCORBIC ACID) 500 MG tablet        ALLERGIES:  Allergies   Allergen Reactions    No Known Allergies        FAMILY HISTORY:  Family History   Problem Relation Age of Onset    Thyroid Disease Mother     Heart Disease Father     Coronary Artery Disease Father     Hypertension Father     Hyperlipidemia Father     Mental Illness Father     Substance Abuse Father     Diabetes Maternal Grandmother     Prostate Cancer Maternal Grandfather     Breast Cancer Paternal Grandmother     Testicular cancer Paternal Grandfather     Depression Half-Sister     Anxiety Disorder Half-Sister     Obesity Sister        SOCIAL HISTORY:   Social History     Socioeconomic History    Marital status: Single     Spouse name: None    Number of children: 1    Years of education: None    Highest education level: None   Tobacco Use    Smoking status: Former     Current packs/day: 0.50     Average packs/day: 0.5 packs/day for 10.0 years (5.0 ttl pk-yrs)     Types: Cigarettes    Smokeless tobacco: Former    Tobacco comments:     uses E cig with zero nicotine    Vaping Use    Vaping status: Every Day    Substances: no nicotine   Substance and Sexual Activity    Alcohol use: Not Currently     Comment: Reports last use 9/17 and denies use during pregnancy    Drug use: Not Currently     Types: Marijuana, Methamphetamines     Comment: Reports Marijuana and meth use one week ago    Sexual activity: Yes     Partners: Male     Birth control/protection: Injection     Comment: depo shot   Other Topics Concern    Parent/sibling w/ CABG, MI or  "angioplasty before 65F 55M? No   Social History Narrative    9/2023  Lives in [place].  No indoor cats/kittens.  Reports she's in a relationship with Jan \"it's not healthy\".   Ayanna smokes cigarettes.  No indoor cats/kittens.  No concerns about domestic violence.  Ayanna has not been employed outside of the home since 11/2022.  She is taking college classes.       Social Drivers of Health     Financial Resource Strain: Low Risk  (2/20/2025)    Financial Resource Strain     Within the past 12 months, have you or your family members you live with been unable to get utilities (heat, electricity) when it was really needed?: No   Food Insecurity: High Risk (2/20/2025)    Food Insecurity     Within the past 12 months, did you worry that your food would run out before you got money to buy more?: Yes     Within the past 12 months, did the food you bought just not last and you didn t have money to get more?: No   Transportation Needs: Low Risk  (2/20/2025)    Transportation Needs     Within the past 12 months, has lack of transportation kept you from medical appointments, getting your medicines, non-medical meetings or appointments, work, or from getting things that you need?: No   Physical Activity: Insufficiently Active (2/20/2025)    Exercise Vital Sign     Days of Exercise per Week: 5 days     Minutes of Exercise per Session: 20 min   Stress: Stress Concern Present (2/20/2025)    [employer] - Occupational Stress Questionnaire     Feeling of Stress : To some extent   Social Connections: Unknown (2/20/2025)    Social Connection and Isolation Panel [NHANES]     Frequency of Social Gatherings with Friends and Family: Three times a week   Interpersonal Safety: Low Risk  (4/24/2025)    Interpersonal Safety     Do you feel physically and emotionally safe where you currently live?: Yes     Within the past 12 months, have you been hit, slapped, kicked or otherwise physically hurt by someone?: No " "    Within the past 12 months, have you been humiliated or emotionally abused in other ways by your partner or ex-partner?: No   Housing Stability: Low Risk  (2/20/2025)    Housing Stability     Do you have housing? : Yes     Are you worried about losing your housing?: No       VITALS:  /73   Pulse 93   Temp 97.8  F (36.6  C) (Oral)   Resp 18   Ht 1.676 m (5' 6\")   Wt 112.3 kg (247 lb 8 oz)   LMP 03/10/2025 (Approximate)   SpO2 96%   BMI 39.95 kg/m        PHYSICAL EXAM    Constitutional: Well developed, Well nourished, NAD, GCS 15  HENT: Normocephalic, Atraumatic, Bilateral external ears normal, Oropharynx normal, mucous membranes moist, Nose normal. Neck-  Normal range of motion, No tenderness, Supple, No stridor.  Eyes: PERRL, EOMI, Conjunctiva normal, No discharge.   Respiratory: Normal breath sounds, No respiratory distress, No wheezing, Speaks full sentences easily. No cough.  Cardiovascular: Normal heart rate, Regular rhythm, No murmurs, No rubs, No gallops. Chest wall nontender.  GI:Soft, No tenderness, No masses, No flank tenderness. No rebound or guarding.   : Chaperone ***   Musculoskeletal: 2+ DP pulses. No edema.No cyanosis, No clubbing. Good range of motion in all major joints. No tenderness to palpation or major deformities noted.   Integument: Warm, Dry, No erythema, No rash. No petechiae.   Neurologic: Alert & oriented x 3,  CN 3-12 intact Normal motor function, Normal sensory function, No focal deficits noted. Normal gait. Normal finger to nose bilaterally  Psychiatric: Affect normal, Judgment normal, Mood normal. Cooperative.          LAB:  All pertinent labs reviewed and interpreted.  Labs Ordered and Resulted from Time of ED Arrival to Time of ED Departure   BASIC METABOLIC PANEL - Abnormal       Result Value    Sodium 135      Potassium 4.3      Chloride 101      Carbon Dioxide (CO2) 24      Anion Gap 10      Urea Nitrogen 13.2      Creatinine 0.67      GFR Estimate >90      " "Calcium 9.0      Glucose 133 (*)    CBC WITH PLATELETS AND DIFFERENTIAL - Abnormal    WBC Count 9.4      RBC Count 4.15      Hemoglobin 13.1      Hematocrit 38.4      MCV 93      MCH 31.6      MCHC 34.1      RDW 13.5      Platelet Count 309      % Neutrophils 93      % Lymphocytes 6      % Monocytes 1      % Eosinophils 0      % Basophils 0      % Immature Granulocytes 0      NRBCs per 100 WBC 0      Absolute Neutrophils 8.8 (*)     Absolute Lymphocytes 0.5 (*)     Absolute Monocytes 0.1      Absolute Eosinophils 0.0      Absolute Basophils 0.0      Absolute Immature Granulocytes 0.0      Absolute NRBCs 0.0     TYPE AND SCREEN, ADULT    ABO/RH(D) O POS      Antibody Screen Negative      SPECIMEN EXPIRATION DATE [id number]     ABO/RH TYPE AND SCREEN       RADIOLOGY:  Reviewed all pertinent imaging. Please see official radiology report.  No orders to display       EKG:    Performed at: ***    Impression: ***    Rate: ***  Rhythm: ***  Axis: ***  TN Interval: ***  QRS Interval: ***  QTc Interval: ***  ST Changes: ***  Comparison: ***    I have independently reviewed and interpreted the EKG(s) documented above.    PROCEDURES:   ***    [employer] [facility] Documentation:   CMS Diagnoses: {Sepsis/Septic Shock/Stemi/Stroke:947796::\"None\"}               I, Regis Fisher, am serving as a scribe to document services personally performed by Dr. Scooter Hill based on my observation and the provider's statements to me. I, Scooter Hill MD attest that Regis Fisher is acting in a scribe capacity, has observed my performance of the services and has documented them in accordance with my direction.    Scooter Hill M.D.  Emergency Medicine  [facility] EMERGENCY ROOM  [address] [id number] [phone]  Dept: [phone]    " report.  No orders to display               I, Regis Fisher, am serving as a scribe to document services personally performed by Dr. Scooter Hill based on my observation and the provider's statements to me. I, Scooter Hill MD attest that Regis Fisher is acting in a scribe capacity, has observed my performance of the services and has documented them in accordance with my direction.    Scooter Hill M.D.  Emergency Medicine  [facility] EMERGENCY ROOM  [address] [address]  [phone]  Dept: [phone]       Scooter Hill MD  04/29/25 2053

## 2025-04-25 NOTE — ED NOTES
"Pt requesting discharge \"as soon as able\" after pain meds, she is agreeable with PO to dispo sooner and also requesting zofran; pt and parent reassured that we will get them going as quickly as possible, MD updated and will adjust to PO meds.  "

## 2025-04-25 NOTE — DISCHARGE INSTRUCTIONS
We spoke with  and we are okay with discharge home.  He recommended you continue with pads and that there will be some bleeding throughout the night.  Things are reassuring here.  Give his office a call in the morning if you continue to have some blood.   Resident

## 2025-04-28 ENCOUNTER — PATIENT OUTREACH (OUTPATIENT)
Dept: CARE COORDINATION | Facility: CLINIC | Age: 35
End: 2025-04-28
Payer: COMMERCIAL

## 2025-04-28 LAB
PATH REPORT.COMMENTS IMP SPEC: NORMAL
PATH REPORT.COMMENTS IMP SPEC: NORMAL
PATH REPORT.FINAL DX SPEC: NORMAL
PATH REPORT.GROSS SPEC: NORMAL
PATH REPORT.MICROSCOPIC SPEC OTHER STN: NORMAL
PATH REPORT.RELEVANT HX SPEC: NORMAL
PHOTO IMAGE: NORMAL

## 2025-04-28 NOTE — LETTER
Ayanna Davidson  [address]  [place] [address]    Dear Ayanna Davidson,      I am a team member within the Connected Care Resource Center with [facility]. I recently tried to reach you to ensure you were doing well following a recent visit within our health system. I also wanted to take this chance to introduce Clinic Care Coordination.     Below is a description of Clinic Care Coordination and how this team can further assist you:       The Clinic Care Coordination team is made up of a Registered Nurse, , Financial Resource Worker, and a Community Health Worker who understand and can help navigate the health care system. The goal of clinic care coordination is to help you manage your health, improve access to care, and achieve optimal health outcomes. They work alongside your provider to assist you in determining your health and social needs, obtain health care and community resources, and provide you with necessary information and education. Clinic Care Coordination can work with you through any barriers and develop a care plan that helps coordinate and strengthen the relationship between you and your care team.    If you wish to connect with the Clinic Care Coordination Team, please let your [facility] Primary Care Provider or Clinic Care Team know and they can place a referral. The Clinic Care Coordination team will then reach out by phone to further support you.    We are focused on providing you with the highest-quality healthcare experience possible.    Sincerely,   Your care team with [facility]'s [address] [place] [address] ([phone]).

## 2025-04-28 NOTE — PROGRESS NOTES
[facility] Care Resource Center Contact  [facility]/Voicemail     Clinical Data: Care Coordination ED-sourced Outreach-     Outreach attempted x 2.  Left message on patient's voicemail, providing [facility]'s 24/7 scheduling and nurse triage phone number [phone] ([phone]) for questions/concerns and/or to schedule an appt with an [facility] provider.      Care Coordination introduction letter with explanation of Clinic Care Coordination services sent to patient via [employer]. Clinic Care Coordination services remain available via referral if needed.    Plan: [facility] will do no further outreaches at this time.       GISELL Hoffmann  Connected Care Resource [place], [facility]    *Connected Care Resource Team does NOT follow patient ongoing. Referrals are identified based on internal discharge reports and the outreach is to ensure patient has an understanding of their discharge instructions.

## 2025-05-09 ENCOUNTER — TRANSFERRED RECORDS (OUTPATIENT)
Dept: HEALTH INFORMATION MANAGEMENT | Facility: CLINIC | Age: 35
End: 2025-05-09
Payer: COMMERCIAL

## 2025-05-09 ENCOUNTER — HOSPITAL ENCOUNTER (OUTPATIENT)
Facility: CLINIC | Age: 35
End: 2025-05-09
Attending: OBSTETRICS & GYNECOLOGY | Admitting: OBSTETRICS & GYNECOLOGY
Payer: COMMERCIAL

## 2025-05-28 ENCOUNTER — OFFICE VISIT (OUTPATIENT)
Dept: INTERNAL MEDICINE | Facility: CLINIC | Age: 35
End: 2025-05-28
Payer: COMMERCIAL

## 2025-05-28 ENCOUNTER — RESULTS FOLLOW-UP (OUTPATIENT)
Dept: FAMILY MEDICINE | Facility: CLINIC | Age: 35
End: 2025-05-28

## 2025-05-28 VITALS
RESPIRATION RATE: 18 BRPM | HEIGHT: 65 IN | SYSTOLIC BLOOD PRESSURE: 115 MMHG | BODY MASS INDEX: 39.18 KG/M2 | HEART RATE: 113 BPM | TEMPERATURE: 97.3 F | DIASTOLIC BLOOD PRESSURE: 85 MMHG | WEIGHT: 235.2 LBS | OXYGEN SATURATION: 99 %

## 2025-05-28 DIAGNOSIS — N75.0 INFECTED CYST OF BARTHOLIN'S GLAND DUCT: ICD-10-CM

## 2025-05-28 DIAGNOSIS — Z01.818 PRE-OP EXAM: Primary | ICD-10-CM

## 2025-05-28 LAB
ANION GAP SERPL CALCULATED.3IONS-SCNC: 12 MMOL/L (ref 7–15)
BUN SERPL-MCNC: 10.2 MG/DL (ref 6–20)
CALCIUM SERPL-MCNC: 9.6 MG/DL (ref 8.8–10.4)
CHLORIDE SERPL-SCNC: 102 MMOL/L (ref 98–107)
CREAT SERPL-MCNC: 0.69 MG/DL (ref 0.51–0.95)
EGFRCR SERPLBLD CKD-EPI 2021: >90 ML/MIN/1.73M2
ERYTHROCYTE [DISTWIDTH] IN BLOOD BY AUTOMATED COUNT: 13.2 % (ref 10–15)
GLUCOSE SERPL-MCNC: 91 MG/DL (ref 70–99)
HCO3 SERPL-SCNC: 23 MMOL/L (ref 22–29)
HCT VFR BLD AUTO: 42.2 % (ref 35–47)
HGB BLD-MCNC: 14.4 G/DL (ref 11.7–15.7)
MCH RBC QN AUTO: 31.5 PG (ref 26.5–33)
MCHC RBC AUTO-ENTMCNC: 34.1 G/DL (ref 31.5–36.5)
MCV RBC AUTO: 92 FL (ref 78–100)
PLATELET # BLD AUTO: 291 10E3/UL (ref 150–450)
POTASSIUM SERPL-SCNC: 3.9 MMOL/L (ref 3.4–5.3)
RBC # BLD AUTO: 4.57 10E6/UL (ref 3.8–5.2)
SODIUM SERPL-SCNC: 137 MMOL/L (ref 135–145)
WBC # BLD AUTO: 7.3 10E3/UL (ref 4–11)

## 2025-05-28 PROCEDURE — 1125F AMNT PAIN NOTED PAIN PRSNT: CPT | Performed by: INTERNAL MEDICINE

## 2025-05-28 PROCEDURE — 3079F DIAST BP 80-89 MM HG: CPT | Performed by: INTERNAL MEDICINE

## 2025-05-28 PROCEDURE — 85027 COMPLETE CBC AUTOMATED: CPT | Performed by: INTERNAL MEDICINE

## 2025-05-28 PROCEDURE — 3074F SYST BP LT 130 MM HG: CPT | Performed by: INTERNAL MEDICINE

## 2025-05-28 PROCEDURE — 80048 BASIC METABOLIC PNL TOTAL CA: CPT | Performed by: INTERNAL MEDICINE

## 2025-05-28 PROCEDURE — 36415 COLL VENOUS BLD VENIPUNCTURE: CPT | Performed by: INTERNAL MEDICINE

## 2025-05-28 PROCEDURE — 99213 OFFICE O/P EST LOW 20 MIN: CPT | Performed by: INTERNAL MEDICINE

## 2025-05-28 ASSESSMENT — PAIN SCALES - GENERAL: PAINLEVEL_OUTOF10: MODERATE PAIN (6)

## 2025-05-28 NOTE — PROGRESS NOTES
Preoperative Evaluation  [address] [address]  Phone: [phone]  Primary Provider: Rhianna Michaels MD  Pre-op Performing Provider: Vickey Myers MD  May 28, 2025           5/23/2025   Surgical Information   What procedure is being done? Right Vulva Revision   Facility or Hospital where procedure/surgery will be performed: [facility]    Who is doing the procedure / surgery? Froylan Schwarz MD     Date of surgery / procedure: 6/10/2025   Time of surgery / procedure: 10:45 AM    Where do you plan to recover after surgery? at home with family     Fax number for surgical facility: Note does not need to be faxed, will be available electronically in Epic.    Assessment & Plan     The proposed surgical procedure is considered INTERMEDIATE risk.    Problem List Items Addressed This Visit    None  Visit Diagnoses         Pre-op exam    -  Primary    Relevant Orders    Basic metabolic panel  (Ca, Cl, CO2, Creat, Gluc, K, Na, BUN)    CBC with platelets      Infected cyst of Bartholin's gland duct        Relevant Orders    Basic metabolic panel  (Ca, Cl, CO2, Creat, Gluc, K, Na, BUN)    CBC with platelets                    - No identified additional risk factors other than previously addressed    Antiplatelet or Anticoagulation Medication Instructions      Additional Medication Instructions   - GLP-1 Injectable (exenitide, liraglutide, semaglutide, dulaglutide, etc.): DO NOT TAKE 7 days before surgery     Recommendation  Approval given to proceed with proposed procedure, without further diagnostic evaluation.        Colleen Urena is a 34 year old, presenting for the following:  Pre-Op Exam          5/28/2025     1:03 PM   Additional Questions   Roomed by Lana         5/28/2025     1:03 PM   Patient Reported Additional Medications   Patient reports taking the following new medications D3, B12, Magnesium, Calcium,  Adithyachay     HPI: infected scarred bartholin gland, needs repeat surgery to correct or repair rip, chronic issue.           5/23/2025   Pre-Op Questionnaire   Have you ever had a heart attack or stroke? No   Have you ever had surgery on your heart or blood vessels, such as a stent placement, a coronary artery bypass, or surgery on an artery in your head, neck, heart, or legs? No   Do you have chest pain with activity? No   Do you have a history of heart failure? No   Do you currently have a cold, bronchitis or symptoms of other infection? No   Do you have a cough, shortness of breath, or wheezing? No   Do you or anyone in your family have previous history of blood clots? (!) UNKNOWN    Do you or does anyone in your family have a serious bleeding problem such as prolonged bleeding following surgeries or cuts? (!) UNKNOWN    Have you ever had problems with anemia or been told to take iron pills? (!) YES    Have you had any abnormal blood loss such as black, tarry or bloody stools, or abnormal vaginal bleeding? (!) UNKNOWN menstrual bleeding    Have you ever had a blood transfusion? No   Are you willing to have a blood transfusion if it is medically needed before, during, or after your surgery? Yes   Have you or any of your relatives ever had problems with anesthesia? (!) YES    Do you have sleep apnea, excessive snoring or daytime drowsiness? No   Do you have any artifical heart valves or other implanted medical devices like a pacemaker, defibrillator, or continuous glucose monitor? No   Do you have artificial joints? No   Are you allergic to latex? No     Advance Care Planning  Discussed advance care planning with patient; informed AVS has link to [facility].    Preoperative Review of    reviewed - nothing daily, last time at procedure.       Status of Chronic Conditions:  See problem list for active medical problems.  Problems all longstanding and stable, except as noted/documented.  See ROS for  "pertinent symptoms related to these conditions.    Patient Active Problem List    Diagnosis Date Noted    Moderate episode of recurrent major depressive disorder (H) 2025     Priority: Medium    S/P  2024     Priority: Medium    Elevated BP without diagnosis of hypertension 2024     Priority: Medium    Diet controlled gestational diabetes mellitus (GDM) in third trimester 2024     Priority: Medium    Encounter for triage in pregnant patient 10/17/2023     Priority: Medium    PTSD (post-traumatic stress disorder) 10/10/2023     Priority: Medium    Anxiety 03/15/2023     Priority: Medium    Prolapse of the stomach 2021     Priority: Medium     Formatting of this note might be different from the original. Added automatically from request for surgery [id number]      TINO III (vulvar intraepithelial neoplasia III) 2020     Priority: Medium     Added automatically from request for surgery [id number]      Bartholin's gland abscess 2020     Priority: Medium     Added automatically from request for surgery [id number]      Chronic pain in right shoulder 2020     Priority: Medium    Dog bite of right lower leg 2019     Priority: Medium    Cellulitis of left upper extremity 2019     Priority: Medium    Current every day smoker 2019     Priority: Medium    Gastroesophageal reflux disease without esophagitis 2019     Priority: Medium    Skin infection 2019     Priority: Medium    Dog bite of left upper extremity 2019     Priority: Medium    h/o Cervical high risk HPV (human papillomavirus) test positive (now negative) 2019     Priority: Medium     2016 NIL pap. No prior HPV testing.   3/12/19 NIL pap, + HR HPV (not 16 or 18). Plan: cotest in 1 yr  20 NIL pap, + HR HPV (not 16 or 18). Plan: [place]  20 [place] bx: neg. Plan: Cotest in 1 yr.   20 Vulvar biopsy: \"TINO III; severe dysplasia\" possible involvement of margins  3/11/20 Re " "excision of vulva.   3/23/21 NIL pap, + HR HPV (not 16 or 18). Plan: colp  21 Gyn visit - [place] recommended, Patient refused  21 GynOnc visit plan - HPV HR+: \"Repeat HPV-based screening in 1 year based upon ASCCP\", Due 3/23/22  5/6/22 Lost to follow up  22 NIL pap, Neg HPV.  Plan: cotest in 1 year  23 NIL Pap, Neg HR HPV. Plan: cotest in 1 year and establish with OB/GYN provider, per Dr. Santiago.   24 NIL pap, Neg HPV. Plan cotest in 5 years      Closed fracture dislocation of hip joint, left, initial encounter (H) 2018     Priority: Medium    Vulvar abscess 2017     Priority: Medium    ADD (attention deficit disorder) without hyperactivity 2014     Priority: Medium    Depressed 05/10/2012     Priority: Medium      Past Medical History:   Diagnosis Date    Arthritis     Cervical high risk HPV (human papillomavirus) test positive 2019    last PAP NIL () with Neg HPV h/o LEEP    Esophageal reflux     Gastroesophageal reflux disease     HSV (herpes simplex virus) infection     1 & 2    Methamphetamine abuse (H)     sober since her  pregnancy    Obese     Overdose of antipsychotic 2012    recurrent Bartholin's cyst     TINO III (vulvar intraepithelial neoplasia III)     biopsy +TINO III     Past Surgical History:   Procedure Laterality Date    BIOPSY       SECTION N/A 3/1/2024    Procedure: PRIMARY  SECTION;  Surgeon: Inderjit Olivas MD;  Location: [facility]    CHOLECYSTECTOMY      ENT SURGERY      ESOPHAGOSCOPY, GASTROSCOPY, DUODENOSCOPY (EGD), COMBINED N/A 2023    Procedure: Esophagoscopy, gastroscopy, duodenoscopy (EGD), combined;  Surgeon: Thierno Escobar MD;  Location: [facility]    EXAM UNDER ANESTHESIA PELVIC N/A 2020    Procedure: EXAM UNDER ANESTHESIA, PELVIS, PAP;  Surgeon: Froylan Schwarz MD;  Location: [facility]    EXCISE VULVA WIDE LOCAL Bilateral 2020    Procedure: Right Vulva Wedge Resection and Incision " and Drainage Left Vulva;  Surgeon: Froylan Schwarz MD;  Location: [facility]    INCISION AND DRAINAGE ABSCESS PELVIS, COMBINED N/A 02/26/2018    Procedure: COMBINED INCISION AND DRAINAGE ABSCESS PELVIS;  INCISION AND DRAINAGE LABIAL ABSCESS;  Surgeon: Jase Beach MD;  Location: [facility]    INCISION AND DRAINAGE ABSCESS PELVIS, COMBINED N/A 03/05/2019    Procedure: Incision and Drainage Right Labial Abscess;  Surgeon: Jase Beach MD;  Location: [facility]    INCISION AND DRAINAGE LOWER EXTREMITY, COMBINED Right 08/11/2019    Procedure: irrigation and debridement right leg dog bite;  Surgeon: Rommel Pérez MD;  Location: [facility]    LAP ADJUSTABLE GASTRIC BAND      LEEP TX, CERVICAL      MAMMOPLASTY REDUCTION BILATERAL      MARSUPIALIZATION BARTHOLIN CYST N/A 04/29/2019    Procedure: Bartholin's Cyst removal;  Surgeon: Froylan Schwarz MD;  [facility]: [facility]    MARSUPIALIZATION BARTHOLIN CYST Left 01/29/2020    Procedure: Excision of left bartholin's abscess;  Surgeon: Froylan Schwarz MD;  Location: [facility]    MARSUPIALIZATION BARTHOLIN CYST Right 5/8/2024    Procedure: MARSUPIALIZATION, CYST, BARTHOLIN'S GLAND;  Surgeon: Jase Man MD;  Location: [facility]    MARSUPIALIZATION BARTHOLIN CYST Right 4/24/2025    Procedure: BARTHOLIN GLAND EXCISION, RIGHT;  Surgeon: Froylan Schwarz MD;  Location: [facility] OR    ORTHOPEDIC SURGERY       Current Outpatient Medications   Medication Sig Dispense Refill    Ashwagandha (ASHWAGANDHA GUMMIES) 500 MG CHEW Take 500 mg by mouth daily.      celecoxib (CELEBREX) 200 MG capsule Take 1 capsule (200 mg) by mouth daily. 30 capsule 11    Cholecalciferol ([employer] D3) 10 MCG (400 UNIT) CAPS Take 10 mcg by mouth daily.      clotrimazole-betamethasone (LOTRISONE) 1-0.05 % external cream Apply topically 2 times daily. 15 g 1    lidocaine (XYLOCAINE) 5 % external ointment Apply topically as needed for moderate pain. 50 g 1    omeprazole (PRILOSEC) 20 MG  DR capsule Take 1 capsule by mouth once daily 90 capsule 0    Prenatal Vit-Fe Fumarate-FA (PRENATAL MULTIVITAMIN W/IRON) 27-0.8 MG tablet Take 1 tablet by mouth daily. 90 tablet 0    tirzepatide-Weight Management (ZEPBOUND) 10 MG/0.5ML prefilled pen Inject 0.5 mLs (10 mg) subcutaneously every 7 days. 2 mL 3    valACYclovir (VALTREX) 1000 mg tablet Take 1 tablet (1,000 mg) by mouth 2 times daily. (Patient not taking: Reported on 3/12/2025) 20 tablet 0    vitamin C (ASCORBIC ACID) 500 MG tablet Take 500 mg by mouth daily.      amoxicillin (AMOXIL) 875 MG tablet Take 875 mg by mouth 2 times daily.      azithromycin (ZITHROMAX) 500 MG tablet Take 500 mg by mouth daily.      cephALEXin (KEFLEX) 500 MG capsule Take 500 mg by mouth 2 times daily.      cyanocobalamin (VITAMIN B-12) 1000 MCG tablet Take 1,200 mcg by mouth daily. QD      ibuprofen (ADVIL/MOTRIN) 800 MG tablet Take 800 mg by mouth every 8 hours as needed.      lidocaine (LMX4) 4 % external cream Apply topically every 4 hours as needed for mild pain. 30 g 0    norgestimate-ethinyl estradiol (ORTHO-CYCLEN) 0.25-35 MG-MCG tablet Take 1 tablet by mouth daily. 84 tablet 3    oxyCODONE-acetaminophen (PERCOCET) 5-325 MG tablet Take 1 tablet by mouth every 6 hours as needed.      sulfamethoxazole-trimethoprim (BACTRIM DS) 800-160 MG tablet Take 1 tablet by mouth 2 times daily. 28 tablet 0    traMADol (ULTRAM) 50 MG tablet Take 1 tablet (50 mg) by mouth every 6 hours as needed for severe pain. 10 tablet 0       Allergies   Allergen Reactions    No Known Allergies         Social History     Tobacco Use    Smoking status: Former     Current packs/day: 0.50     Average packs/day: 0.5 packs/day for 10.0 years (5.0 ttl pk-yrs)     Types: Cigarettes    Smokeless tobacco: Former    Tobacco comments:     uses E cig with zero nicotine    Substance Use Topics    Alcohol use: Not Currently     Comment: Reports last use 9/17 and denies use during pregnancy       History   Drug Use  "Unknown     Comment: Reports Marijuana and meth use one week ago             Review of Systems  CONSTITUTIONAL: NEGATIVE for fever, chills, change in weight  ENT/MOUTH: NEGATIVE for ear, mouth and throat problems  RESP: NEGATIVE for significant cough or SOB  CV: NEGATIVE for chest pain, palpitations or peripheral edema    Objective    /85 (BP Location: Left arm, Patient Position: Sitting, Cuff Size: Adult Large)   Pulse 113   Temp 97.3  F (36.3  C) (Temporal)   Resp 18   Ht 1.645 m (5' 4.76\")   Wt 106.7 kg (235 lb 3.2 oz)   LMP 04/27/2025 (Approximate)   SpO2 99%   BMI 39.43 kg/m     Estimated body mass index is 39.43 kg/m  as calculated from the following:    Height as of this encounter: 1.645 m (5' 4.76\").    Weight as of this encounter: 106.7 kg (235 lb 3.2 oz).  Physical Exam  GENERAL: alert and no distress  NECK: no adenopathy, no asymmetry, masses, or scars  RESP: lungs clear to auscultation - no rales, rhonchi or wheezes  CV: regular rate and rhythm, normal S1 S2, no S3 or S4, no murmur, click or rub, no peripheral edema  MS: no gross musculoskeletal defects noted, no edema    Recent Labs   Lab Test 04/24/25  1902 04/15/25  1509 09/17/24  1336 06/07/24  0903   HGB 13.1 15.3  --   --     368  --   --     137   < > 138   POTASSIUM 4.3 4.3   < > 3.7   CR 0.67 0.84   < > 0.85   A1C  --   --   --  5.1    < > = values in this interval not displayed.        Diagnostics  Labs pending at this time.  Results will be reviewed when available.   No EKG required, no history of coronary heart disease, significant arrhythmia, peripheral arterial disease or other structural heart disease.    Revised Cardiac Risk Index (RCRI)  The patient has the following serious cardiovascular risks for perioperative complications:   - No serious cardiac risks = 0 points     RCRI Interpretation: 0 points: Class I (very low risk - 0.4% complication rate)     Not smoking since April surgery.    Signed Electronically " by: Vickey Myers MD  A copy of this evaluation report is provided to the requesting physician.

## 2025-06-09 ENCOUNTER — ANESTHESIA EVENT (OUTPATIENT)
Dept: SURGERY | Facility: CLINIC | Age: 35
End: 2025-06-09
Payer: COMMERCIAL

## 2025-06-10 ENCOUNTER — ANESTHESIA (OUTPATIENT)
Dept: SURGERY | Facility: CLINIC | Age: 35
End: 2025-06-10
Payer: COMMERCIAL

## 2025-06-10 ENCOUNTER — HOSPITAL ENCOUNTER (OUTPATIENT)
Facility: CLINIC | Age: 35
Discharge: HOME | End: 2025-06-10
Attending: OBSTETRICS & GYNECOLOGY | Admitting: OBSTETRICS & GYNECOLOGY
Payer: COMMERCIAL

## 2025-06-10 VITALS
SYSTOLIC BLOOD PRESSURE: 149 MMHG | TEMPERATURE: 97 F | RESPIRATION RATE: 16 BRPM | OXYGEN SATURATION: 97 % | HEART RATE: 98 BPM | DIASTOLIC BLOOD PRESSURE: 97 MMHG

## 2025-06-10 DIAGNOSIS — N76.4 ABSCESS OF VULVA: ICD-10-CM

## 2025-06-10 DIAGNOSIS — G89.18 POST-OP PAIN: Primary | ICD-10-CM

## 2025-06-10 LAB — HCG UR QL: NEGATIVE

## 2025-06-10 PROCEDURE — 250N000013 HC RX MED GY IP 250 OP 250 PS 637: Performed by: OBSTETRICS & GYNECOLOGY

## 2025-06-10 PROCEDURE — 710N000012 HC RECOVERY PHASE 2, PER MINUTE: Performed by: OBSTETRICS & GYNECOLOGY

## 2025-06-10 PROCEDURE — 250N000009 HC RX 250: Performed by: OBSTETRICS & GYNECOLOGY

## 2025-06-10 PROCEDURE — 250N000011 HC RX IP 250 OP 636: Performed by: OBSTETRICS & GYNECOLOGY

## 2025-06-10 PROCEDURE — 250N000011 HC RX IP 250 OP 636: Mod: JZ | Performed by: OBSTETRICS & GYNECOLOGY

## 2025-06-10 PROCEDURE — 258N000003 HC RX IP 258 OP 636: Performed by: ANESTHESIOLOGY

## 2025-06-10 PROCEDURE — 250N000025 HC SEVOFLURANE, PER MIN: Performed by: OBSTETRICS & GYNECOLOGY

## 2025-06-10 PROCEDURE — 999N000141 HC STATISTIC PRE-PROCEDURE NURSING ASSESSMENT: Performed by: OBSTETRICS & GYNECOLOGY

## 2025-06-10 PROCEDURE — 81025 URINE PREGNANCY TEST: CPT | Performed by: OBSTETRICS & GYNECOLOGY

## 2025-06-10 PROCEDURE — 250N000011 HC RX IP 250 OP 636: Performed by: NURSE ANESTHETIST, CERTIFIED REGISTERED

## 2025-06-10 PROCEDURE — 710N000010 HC RECOVERY PHASE 1, LEVEL 2, PER MIN: Performed by: OBSTETRICS & GYNECOLOGY

## 2025-06-10 PROCEDURE — 272N000001 HC OR GENERAL SUPPLY STERILE: Performed by: OBSTETRICS & GYNECOLOGY

## 2025-06-10 PROCEDURE — 258N000003 HC RX IP 258 OP 636: Performed by: NURSE ANESTHETIST, CERTIFIED REGISTERED

## 2025-06-10 PROCEDURE — 250N000009 HC RX 250: Performed by: NURSE ANESTHETIST, CERTIFIED REGISTERED

## 2025-06-10 PROCEDURE — 370N000017 HC ANESTHESIA TECHNICAL FEE, PER MIN: Performed by: OBSTETRICS & GYNECOLOGY

## 2025-06-10 PROCEDURE — 360N000075 HC SURGERY LEVEL 2, PER MIN: Performed by: OBSTETRICS & GYNECOLOGY

## 2025-06-10 RX ORDER — ONDANSETRON 2 MG/ML
4 INJECTION INTRAMUSCULAR; INTRAVENOUS EVERY 30 MIN PRN
Status: DISCONTINUED | OUTPATIENT
Start: 2025-06-10 | End: 2025-06-10 | Stop reason: HOSPADM

## 2025-06-10 RX ORDER — OXYCODONE HYDROCHLORIDE 5 MG/1
10 TABLET ORAL
Status: DISCONTINUED | OUTPATIENT
Start: 2025-06-10 | End: 2025-06-10 | Stop reason: HOSPADM

## 2025-06-10 RX ORDER — LIDOCAINE 40 MG/G
CREAM TOPICAL
Status: DISCONTINUED | OUTPATIENT
Start: 2025-06-10 | End: 2025-06-10 | Stop reason: HOSPADM

## 2025-06-10 RX ORDER — CEFAZOLIN SODIUM/WATER 2 G/20 ML
2 SYRINGE (ML) INTRAVENOUS SEE ADMIN INSTRUCTIONS
Status: DISCONTINUED | OUTPATIENT
Start: 2025-06-10 | End: 2025-06-10 | Stop reason: HOSPADM

## 2025-06-10 RX ORDER — IBUPROFEN 400 MG/1
800 TABLET, FILM COATED ORAL ONCE
Status: DISCONTINUED | OUTPATIENT
Start: 2025-06-10 | End: 2025-06-10 | Stop reason: HOSPADM

## 2025-06-10 RX ORDER — FENTANYL CITRATE 50 UG/ML
25 INJECTION, SOLUTION INTRAMUSCULAR; INTRAVENOUS EVERY 5 MIN PRN
Status: DISCONTINUED | OUTPATIENT
Start: 2025-06-10 | End: 2025-06-10 | Stop reason: HOSPADM

## 2025-06-10 RX ORDER — METRONIDAZOLE 500 MG/100ML
500 INJECTION, SOLUTION INTRAVENOUS
Status: COMPLETED | OUTPATIENT
Start: 2025-06-10 | End: 2025-06-10

## 2025-06-10 RX ORDER — PROPOFOL 10 MG/ML
INJECTION, EMULSION INTRAVENOUS PRN
Status: DISCONTINUED | OUTPATIENT
Start: 2025-06-10 | End: 2025-06-10

## 2025-06-10 RX ORDER — BUPIVACAINE HYDROCHLORIDE AND EPINEPHRINE 2.5; 5 MG/ML; UG/ML
INJECTION, SOLUTION EPIDURAL; INFILTRATION; INTRACAUDAL; PERINEURAL
Status: DISCONTINUED
Start: 2025-06-10 | End: 2025-06-10 | Stop reason: HOSPADM

## 2025-06-10 RX ORDER — ACETAMINOPHEN 325 MG/1
975 TABLET ORAL ONCE
Status: COMPLETED | OUTPATIENT
Start: 2025-06-10 | End: 2025-06-10

## 2025-06-10 RX ORDER — FENTANYL CITRATE 50 UG/ML
50 INJECTION, SOLUTION INTRAMUSCULAR; INTRAVENOUS EVERY 5 MIN PRN
Status: DISCONTINUED | OUTPATIENT
Start: 2025-06-10 | End: 2025-06-10 | Stop reason: HOSPADM

## 2025-06-10 RX ORDER — HYDROMORPHONE HCL IN WATER/PF 6 MG/30 ML
0.4 PATIENT CONTROLLED ANALGESIA SYRINGE INTRAVENOUS EVERY 5 MIN PRN
Status: DISCONTINUED | OUTPATIENT
Start: 2025-06-10 | End: 2025-06-10 | Stop reason: HOSPADM

## 2025-06-10 RX ORDER — TRAMADOL HYDROCHLORIDE 50 MG/1
50 TABLET ORAL EVERY 6 HOURS PRN
Qty: 15 TABLET | Refills: 0 | Status: SHIPPED | OUTPATIENT
Start: 2025-06-10 | End: 2025-06-14

## 2025-06-10 RX ORDER — ONDANSETRON 4 MG/1
4 TABLET, ORALLY DISINTEGRATING ORAL EVERY 30 MIN PRN
Status: DISCONTINUED | OUTPATIENT
Start: 2025-06-10 | End: 2025-06-10 | Stop reason: HOSPADM

## 2025-06-10 RX ORDER — SODIUM CHLORIDE, SODIUM LACTATE, POTASSIUM CHLORIDE, CALCIUM CHLORIDE 600; 310; 30; 20 MG/100ML; MG/100ML; MG/100ML; MG/100ML
INJECTION, SOLUTION INTRAVENOUS CONTINUOUS
Status: DISCONTINUED | OUTPATIENT
Start: 2025-06-10 | End: 2025-06-10 | Stop reason: HOSPADM

## 2025-06-10 RX ORDER — DEXAMETHASONE SODIUM PHOSPHATE 4 MG/ML
4 INJECTION, SOLUTION INTRA-ARTICULAR; INTRALESIONAL; INTRAMUSCULAR; INTRAVENOUS; SOFT TISSUE
Status: DISCONTINUED | OUTPATIENT
Start: 2025-06-10 | End: 2025-06-10 | Stop reason: HOSPADM

## 2025-06-10 RX ORDER — GLYCERIN AND PROPYLENE GLYCOL 3; 10 MG/ML; MG/ML
1 SOLUTION/ DROPS OPHTHALMIC 2 TIMES DAILY PRN
COMMUNITY
Start: 2024-11-06

## 2025-06-10 RX ORDER — CELECOXIB 200 MG/1
200 CAPSULE ORAL DAILY
COMMUNITY
Start: 2025-06-06

## 2025-06-10 RX ORDER — NALOXONE HYDROCHLORIDE 0.4 MG/ML
0.1 INJECTION, SOLUTION INTRAMUSCULAR; INTRAVENOUS; SUBCUTANEOUS
Status: DISCONTINUED | OUTPATIENT
Start: 2025-06-10 | End: 2025-06-10 | Stop reason: HOSPADM

## 2025-06-10 RX ORDER — DEXAMETHASONE SODIUM PHOSPHATE 10 MG/ML
4 INJECTION, SOLUTION INTRAMUSCULAR; INTRAVENOUS
Status: DISCONTINUED | OUTPATIENT
Start: 2025-06-10 | End: 2025-06-10 | Stop reason: HOSPADM

## 2025-06-10 RX ORDER — CEFAZOLIN SODIUM/WATER 2 G/20 ML
2 SYRINGE (ML) INTRAVENOUS
Status: DISCONTINUED | OUTPATIENT
Start: 2025-06-10 | End: 2025-06-10 | Stop reason: HOSPADM

## 2025-06-10 RX ORDER — ACETAMINOPHEN 325 MG/1
975 TABLET ORAL ONCE
Status: DISCONTINUED | OUTPATIENT
Start: 2025-06-10 | End: 2025-06-10 | Stop reason: HOSPADM

## 2025-06-10 RX ORDER — HYDROMORPHONE HCL IN WATER/PF 6 MG/30 ML
0.2 PATIENT CONTROLLED ANALGESIA SYRINGE INTRAVENOUS EVERY 5 MIN PRN
Status: DISCONTINUED | OUTPATIENT
Start: 2025-06-10 | End: 2025-06-10 | Stop reason: HOSPADM

## 2025-06-10 RX ORDER — BUPIVACAINE HCL/EPINEPHRINE 0.25-.0005
VIAL (ML) INJECTION PRN
Status: DISCONTINUED | OUTPATIENT
Start: 2025-06-10 | End: 2025-06-10 | Stop reason: HOSPADM

## 2025-06-10 RX ORDER — OXYCODONE HYDROCHLORIDE 5 MG/1
5 TABLET ORAL
Status: DISCONTINUED | OUTPATIENT
Start: 2025-06-10 | End: 2025-06-10 | Stop reason: HOSPADM

## 2025-06-10 RX ADMIN — SODIUM CHLORIDE, SODIUM LACTATE, POTASSIUM CHLORIDE, AND CALCIUM CHLORIDE: .6; .31; .03; .02 INJECTION, SOLUTION INTRAVENOUS at 10:12

## 2025-06-10 RX ADMIN — DEXMEDETOMIDINE HYDROCHLORIDE 16 MCG: 100 INJECTION, SOLUTION INTRAVENOUS at 12:07

## 2025-06-10 RX ADMIN — Medication 2 G: at 11:41

## 2025-06-10 RX ADMIN — ACETAMINOPHEN 975 MG: 325 TABLET ORAL at 09:50

## 2025-06-10 RX ADMIN — HYDROMORPHONE HYDROCHLORIDE 1 MG: 1 INJECTION, SOLUTION INTRAMUSCULAR; INTRAVENOUS; SUBCUTANEOUS at 11:59

## 2025-06-10 RX ADMIN — PROPOFOL 200 MG: 10 INJECTION, EMULSION INTRAVENOUS at 11:50

## 2025-06-10 RX ADMIN — DEXMEDETOMIDINE HYDROCHLORIDE 16 MCG: 100 INJECTION, SOLUTION INTRAVENOUS at 11:37

## 2025-06-10 RX ADMIN — DEXMEDETOMIDINE HYDROCHLORIDE 8 MCG: 100 INJECTION, SOLUTION INTRAVENOUS at 12:37

## 2025-06-10 RX ADMIN — METRONIDAZOLE 500 MG: 500 INJECTION, SOLUTION INTRAVENOUS at 10:27

## 2025-06-10 RX ADMIN — MIDAZOLAM 2 MG: 1 INJECTION INTRAMUSCULAR; INTRAVENOUS at 11:26

## 2025-06-10 RX ADMIN — PROPOFOL 250 MCG/KG/MIN: 10 INJECTION, EMULSION INTRAVENOUS at 11:53

## 2025-06-10 ASSESSMENT — ACTIVITIES OF DAILY LIVING (ADL)
ADLS_ACUITY_SCORE: 22
ADLS_ACUITY_SCORE: 39

## 2025-06-10 ASSESSMENT — LIFESTYLE VARIABLES: TOBACCO_USE: 1

## 2025-06-10 NOTE — ANESTHESIA CARE TRANSFER NOTE
Patient: Ayanna Davidson    Procedure: Procedure(s):  RIGHT VULVA  EXCISION REVISION       Diagnosis: History of surgical removal of Bartholin s gland cyst [Z98.890, Z87.42]  Diagnosis Additional Information: No value filed.    Anesthesia Type:   General     Note:    Oropharynx: spontaneously breathing  Level of Consciousness: drowsy  Oxygen Supplementation: room air    Independent Airway: airway patency satisfactory and stable  Dentition: dentition unchanged  Vital Signs Stable: post-procedure vital signs reviewed and stable  Report to RN Given: handoff report given  Patient transferred to: PACU  Comments: Patient VERY combative while waking up, extubated self. Became wild when attempting to place O2 mask on face. Sats high 80,'s to low 90's. ETT suctioned for thick yellow secretions  Handoff Report: Identifed the Patient, Identified the Reponsible Provider, Reviewed the pertinent medical history, Discussed the surgical course, Reviewed Intra-OP anesthesia mangement and issues during anesthesia, Set expectations for post-procedure period and Allowed opportunity for questions and acknowledgement of understanding      Vitals:  Vitals Value Taken Time   /73 06/10/25 12:46   Temp 36.4  C (97.5  F) 06/10/25 12:46   Pulse 98 06/10/25 12:46   Resp 20 06/10/25 12:46   SpO2 91 % 06/10/25 12:46       Electronically Signed By: LEXX WOOTEN CRNA  Lolita 10, 2025  12:48 PM

## 2025-06-10 NOTE — OP NOTE
Operative Note   Name: Ayanna Davidson  MRN: [id number]  : 1990  Date of Surgery: 06/10/2025    Pre-operative Diagnosis:   Prior right Bartholin gland excision () with superficial would dehiscence of the right labia majora     Post-operative Diagnosis: Same  Procedure(s): Right vulvar revision     Surgeon: Froylan Schwarz MD    Anesthesia: GETA  EBL: 5 mL     Specimens: None  Complications: None apparent.  Findings: Superficial (5mm deep) scar separation at the right mid labia majora perpendicular to the labia, with otherwise well healed wound, no deep tissue findings, no s/s infection, no granulation tissue, normal vagina. WONG under anesthesia with cough.     Indications:  Superficial would defect as above, patient requested revision. RBA reviewed, consented to proceed.      Procedure:  The patient was brought to the operating room, where she was placed in the dorsal lithotomy position in yellow fin stirups, underwent general endotracheal anesthesia without difficulty. Universal protocol was carried out. A straight catheter was used to drain the bladder. One Allis clamp was placed on the right vulvar scar and used for traction. 10 cc of 0.25% marcaine with epi was used to infiltrate the area of repair. The scar was resected with a scalpel appx 1.5cm wide x 1.5cm deep by 5cm long, and the defect with then closed with 3-0 vicryl in three layers. Good hemostasis was noted at the end of the case.   The patient was extubated, awakened in the OR and taken to recovery in stable condition. Sponge, lap and needle counts were correct x 2. The patient received 2 grams of ancef and 500mg Flagyl prior to the procedure.       Froylan Schwarz MD  06/10/2025, 12:29 PM

## 2025-06-10 NOTE — ANESTHESIA PROCEDURE NOTES
Airway       Patient location during procedure: OR       Procedure Start/Stop Times: 6/10/2025 11:52 AM  Staff -        Anesthesiologist:  Allen Waldron MD       CRNA: Kathleen Steinberg APRN CRNA       Performed By: CRNA  Consent for Airway        Urgency: elective  Indications and Patient Condition       Indications for airway management: more-procedural and airway protection       Induction type:intravenous       Mask difficulty assessment: 2 - vent by mask + OA or adjuvant +/- NMBA    Final Airway Details       Final airway type: endotracheal airway       Successful airway: ETT - single  Endotracheal Airway Details        ETT size (mm): 7.0       Cuffed: yes       Successful intubation technique: video laryngoscopy       VL Blade Size: Glidescope 3       Grade View of Cords: 1       Adjucts: stylet       Position: Right       Measured from: lips       Secured at (cm): 22       Bite block used: None    Post intubation assessment        Placement verified by: capnometry, equal breath sounds and chest rise        Number of attempts at approach: 1       Number of other approaches attempted: 0       Secured with: commercial tube de oliveira and tape       Ease of procedure: easy       Dentition: Intact and Unchanged    Medication(s) Administered   Medication Administration Time: 6/10/2025 11:52 AM

## 2025-06-10 NOTE — PROVIDER NOTIFICATION
Dr. Schwarz here to see pt.  Pt refusing to have responsible adult here, or to go over written discharge instructions, cursing at staff.    Dr. Schwarz reviewed the most important instructions personally with pt.  Per MD pt has had procedure several times, is aware of at home care.  Per Dr. Schwarz, ok to discharge at this time, instructions handed to pt.    Pt did accept paper/written instructions, which included last dose of Tylenol information.  Pt took script for Antibiotic and Tramadol.    Discharged via wheelchair to home.

## 2025-06-10 NOTE — H&P
I have reviewed the preoperative H&P and met with the patient, no updates.    Froylan Schwarz MD  OB/GYN  Lolita 10, 2025, 10:14 AM

## 2025-06-10 NOTE — ANESTHESIA PREPROCEDURE EVALUATION
Anesthesia Pre-Procedure Evaluation    Patient: Ayanna Davidson   MRN: [id number] : 1990          Procedure : Procedure(s):  RIGHT VULVA  EXCISION REVISION         Past Medical History:   Diagnosis Date    Arthritis     Cervical high risk HPV (human papillomavirus) test positive 2019    last PAP NIL () with Neg HPV h/o LEEP    Esophageal reflux     Gastroesophageal reflux disease     HSV (herpes simplex virus) infection     1 & 2    Methamphetamine abuse (H)     sober since her  pregnancy    Obese     Overdose of antipsychotic 2012    PONV (postoperative nausea and vomiting)     recurrent Bartholin's cyst     TINO III (vulvar intraepithelial neoplasia III)     biopsy +TINO III      Past Surgical History:   Procedure Laterality Date    BIOPSY       SECTION N/A 3/1/2024    Procedure: PRIMARY  SECTION;  Surgeon: Inderjit Olivas MD;  Location: [facility]    CHOLECYSTECTOMY      ENT SURGERY      ESOPHAGOSCOPY, GASTROSCOPY, DUODENOSCOPY (EGD), COMBINED N/A 2023    Procedure: Esophagoscopy, gastroscopy, duodenoscopy (EGD), combined;  Surgeon: Thierno Escobar MD;  Location: [facility]    EXAM UNDER ANESTHESIA PELVIC N/A 2020    Procedure: EXAM UNDER ANESTHESIA, PELVIS, PAP;  Surgeon: Froylan Schwarz MD;  Location: [facility]    EXCISE VULVA WIDE LOCAL Bilateral 2020    Procedure: Right Vulva Wedge Resection and Incision and Drainage Left Vulva;  Surgeon: Froylan Schwarz MD;  Location: [facility]    INCISION AND DRAINAGE ABSCESS PELVIS, COMBINED N/A 2018    Procedure: COMBINED INCISION AND DRAINAGE ABSCESS PELVIS;  INCISION AND DRAINAGE LABIAL ABSCESS;  Surgeon: Jase Beach MD;  Location: [facility]    INCISION AND DRAINAGE ABSCESS PELVIS, COMBINED N/A 2019    Procedure: Incision and Drainage Right Labial Abscess;  Surgeon: Jase Beach MD;  Location: [facility]    INCISION AND DRAINAGE LOWER EXTREMITY, COMBINED Right 2019    Procedure:  irrigation and debridement right leg dog bite;  Surgeon: Rommel Pérez MD;  Location: [facility]    LAP ADJUSTABLE GASTRIC BAND      LEEP TX, CERVICAL      MAMMOPLASTY REDUCTION BILATERAL      MARSUPIALIZATION BARTHOLIN CYST N/A 04/29/2019    Procedure: Bartholin's Cyst removal;  Surgeon: Froylan Schwarz MD;  Location: [facility]    MARSUPIALIZATION BARTHOLIN CYST Left 01/29/2020    Procedure: Excision of left bartholin's abscess;  Surgeon: Froylan Schwarz MD;  Location: [facility]    MARSUPIALIZATION BARTHOLIN CYST Right 5/8/2024    Procedure: MARSUPIALIZATION, CYST, BARTHOLIN'S GLAND;  Surgeon: Jase Man MD;  Location: [facility]    MARSUPIALIZATION BARTHOLIN CYST Right 4/24/2025    Procedure: BARTHOLIN GLAND EXCISION, RIGHT;  Surgeon: Froylan Schwarz MD;  Location: [facility]    ORTHOPEDIC SURGERY        Allergies   Allergen Reactions    No Known Allergies       Social History     Tobacco Use    Smoking status: Former     Current packs/day: 0.50     Average packs/day: 0.5 packs/day for 10.0 years (5.0 ttl pk-yrs)     Types: Cigarettes    Smokeless tobacco: Former    Tobacco comments:     uses E cig with zero nicotine    Substance Use Topics    Alcohol use: Yes     Comment: occasional      Wt Readings from Last 1 Encounters:   05/28/25 106.7 kg (235 lb 3.2 oz)        Anesthesia Evaluation   Pt has had prior anesthetic.     History of anesthetic complications  - PONV.  Aspiration during MAC,.    ROS/MED HX  ENT/Pulmonary:     (+)                tobacco use, Past use,                       Neurologic:  - neg neurologic ROS     Cardiovascular:  - neg cardiovascular ROS     METS/Exercise Tolerance: >4 METS    Hematologic:  - neg hematologic  ROS     Musculoskeletal:  - neg musculoskeletal ROS     GI/Hepatic: Comment: H/o gastric banding that was removed due to stomach prolapse    (+) GERD, Symptomatic,                  Renal/Genitourinary:  - neg Renal ROS     Endo: Comment: H/o  "gestational DM    (+)               Obesity (BMI 39),       Psychiatric/Substance Use:     (+) psychiatric history (PTSD, ADD) depression and anxiety       Infectious Disease:     (+)   MRSA,         Malignancy:       Other:              Physical Exam  Airway  Mallampati: II  TM distance: >3 FB  Neck ROM: full    Cardiovascular - normal exam  Rhythm: regular  Rate: normal rate     Dental Comments: Some chipped, partial upper denture removed      Pulmonary - normal examBreath sounds clear to auscultation        Neurological - normal exam  She appears awake, alert and oriented x3.    Other Findings       OUTSIDE LABS:  CBC:   Lab Results   Component Value Date    WBC 7.3 05/28/2025    WBC 9.4 04/24/2025    HGB 14.4 05/28/2025    HGB 13.1 04/24/2025    HCT 42.2 05/28/2025    HCT 38.4 04/24/2025     05/28/2025     04/24/2025     BMP:   Lab Results   Component Value Date     05/28/2025     04/24/2025    POTASSIUM 3.9 05/28/2025    POTASSIUM 4.3 04/24/2025    CHLORIDE 102 05/28/2025    CHLORIDE 101 04/24/2025    CO2 23 05/28/2025    CO2 24 04/24/2025    BUN 10.2 05/28/2025    BUN 13.2 04/24/2025    CR 0.69 05/28/2025    CR 0.67 04/24/2025    GLC 91 05/28/2025     (H) 04/24/2025     COAGS: No results found for: \"PTT\", \"INR\", \"FIBR\"  POC:   Lab Results   Component Value Date    HCG Negative 06/10/2025    HCGS Negative 04/15/2025     HEPATIC:   Lab Results   Component Value Date    ALBUMIN 4.6 04/15/2025    PROTTOTAL 7.9 04/15/2025    ALT 29 04/15/2025    AST 24 04/15/2025    ALKPHOS 81 04/15/2025    BILITOTAL 0.2 04/15/2025     OTHER:   Lab Results   Component Value Date    LACT 1.2 09/27/2019    A1C 5.1 06/07/2024    ANUM 9.6 05/28/2025    LIPASE 86 11/21/2007    AMYLASE 58 11/21/2007    TSH 1.85 08/18/2022    CRP 19.0 (H) 09/27/2019    SED 9 04/26/2020       Anesthesia Plan    ASA Status:  3      NPO Status: NPO Appropriate   Anesthesia Type: General.  Airway: oral.  Induction: intravenous, " "rapid sequence.  Maintenance: TIVA.   Techniques and Equipment:     - Airway:  Planned airway equipment includes video laryngoscope.     - Monitoring Plan: standard ASA monitoring     Consents    Anesthesia Plan(s) and associated risks, benefits, and realistic alternatives discussed. Questions answered and patient/representative(s) expressed understanding.     - Discussed:     - Discussed with:  Patient        - Pt is DNR/DNI Status: no DNR          Postoperative Care    Pain management: multimodal analgesia.     Comments:    Other Comments: H/o severe GERD and h/o aspiration during MAC.    Glidescope,  RSI  TIVA               Allen Waldron MD    I have reviewed the pertinent notes and labs in the chart from the past 30 days and (re)examined the patient.  Any updates or changes from those notes are reflected in this note.    Clinically Significant Risk Factors Present on Admission                             # Obesity: Estimated body mass index is 39.43 kg/m  as calculated from the following:    Height as of 5/28/25: 1.645 m (5' 4.76\").    Weight as of 5/28/25: 106.7 kg (235 lb 3.2 oz).                    "

## 2025-06-10 NOTE — ANESTHESIA POSTPROCEDURE EVALUATION
Patient: Ayanna Davidson    Procedure: Procedure(s):  RIGHT VULVA  EXCISION REVISION       Anesthesia Type:  General    Note:  Disposition: Outpatient   Postop Pain Control: Uneventful            Sign Out: Well controlled pain   PONV: No   Neuro/Psych: Uneventful            Sign Out: Acceptable/Baseline neuro status   Airway/Respiratory: Uneventful            Sign Out: Acceptable/Baseline resp. status   CV/Hemodynamics: Uneventful            Sign Out: Acceptable CV status; No obvious hypovolemia; No obvious fluid overload   Other NRE: NONE   DID A NON-ROUTINE EVENT OCCUR? No           Last vitals:  Vitals Value Taken Time   /70 06/10/25 13:10   Temp 36.5  C (97.7  F) 06/10/25 13:10   Pulse 92 06/10/25 13:10   Resp 18 06/10/25 13:10   SpO2 98 % 06/10/25 13:10       Electronically Signed By: Allen Waldron MD  Lolita 10, 2025  4:49 PM

## 2025-06-12 ENCOUNTER — TELEPHONE (OUTPATIENT)
Dept: ENDOCRINOLOGY | Facility: CLINIC | Age: 35
End: 2025-06-12
Payer: COMMERCIAL

## 2025-06-12 NOTE — LETTER
"2025    To: Kushal    RE: Ayanna Davidson  [address] [address]  [place] [address]  : 1990  MRN: [id number]    To Whom It May Concern,    I am writing on behalf of my patient, Ayanna Davidson  to document the medical necessity of Zepbound for the treatment of Obesityvsoverweight: Obesity (BMI at least 30 kg/m2). This letter provides information about the patient's medical history and diagnosis and a statement summarizing my treatment rationale.     Summary of Patient History and Diagnosis  Ayanna Davidson is a 34 year old female with a diagnosis of Obesityvsoverweight: Obesity (BMI at least 30 kg/m2).     Estimated body mass index is 39.43 kg/m  as calculated from the following:    Height as of 25: 1.645 m (5' 4.76\").    Weight as of 25: 106.7 kg (235 lb 3.2 oz).    Wt Readings from Last 4 Encounters:   25 106.7 kg (235 lb 3.2 oz)   25 112.3 kg (247 lb 8 oz)   25 111.1 kg (245 lb)   04/15/25 108.9 kg (240 lb)     Treatment Rationale  Despite lifestyle/health improvements with a calorie-restricted diet nutrition changes, exercise improvements/modifications, and behavioral modification strategies for at least 12 months, the patient has been unable to achieve significant and sustainable weight loss.     Previous attempts with the below medications were unsuccessful due to intolerable side effects and/or are contraindicated:   Medication Name Reason for discontinuation or why contraindicated/unsafe to patient    Victoza/Liraglutide    Wegovy/Semaglutide Side effects-nausea   Requiring a trial of Saxenda would wasteful, as patient has already tried and failed Victoza (same active ingredient in Saxenda) and Wegovy, which is shown to be more effective for weight loss than Saxenda. Furthermore, Saxenda is once daily and on national shortage. Obtaining Saxenda for a consistent trial would be very difficult, leading to further delays. We are requesting approval for Zepbound.  "     Zepbound (tirzepatide) is an FDA-approved medication for chronic weight management in adults with a BMI of 30 or higher or a BMI of 27 or higher with weight-related comorbidity. The patient's BMI qualifies them for Zepbound, which has shown remarkable efficacy and a favorable safety profile. Patient has no history of pancreatitis. The patient has no personal or family history of medullary thyroid carcinoma or MEN2. Zepbound is the only medication in its class as a Glucagon-like peptide-1 (GLP-1) and glucose-dependent insulinotropic polypeptide (GIP) agonist being used for weight management.     The patient's unsuccessful weight management attempts and previous medication failures highlight the need for Zepbound as a medically necessary treatment option. Denying coverage would hinder the patient's progress and increase the risk of obesity-related health conditions.    I kindly request that you review Ayanna's case and reconsider coverage for Zepbound as an integral part of their obesity treatment plan.     Duration  12 months     Summary  In summary, Zepbound is medically necessary for this patient s medical condition. Please call my office at [phone] if I can provide you with any additional information to approve my request. I look forward to receiving your timely response and approval of this request.      Sincerely,    Mounika Ness PA-C

## 2025-06-12 NOTE — TELEPHONE ENCOUNTER
PA Initiation    Medication: ZEPBOUND 10 MG/0.5ML SC SOAJ  Insurance Company: [facility] - Phone [phone] Fax [phone]  Pharmacy Filling the Rx: [facility] [address] [place] [address] [id number] [facility]  Filling Pharmacy Phone: [phone]  Filling Pharmacy Fax: [phone]  Start Date: 6/12/2025    Key: [id number]

## 2025-06-13 NOTE — TELEPHONE ENCOUNTER
Medication Appeal Initiation    Medication: ZEPBOUND 10 MG/0.5ML SC SOAJ  Appeal Start Date:  6/13/2025  Insurance Company: [employer]  Insurance Phone: [phone]  Insurance Fax: [phone]  Comments:

## 2025-06-13 NOTE — TELEPHONE ENCOUNTER
PRIOR AUTHORIZATION DENIED    Medication: ZEPBOUND 10 MG/0.5ML SC SOAJ  Insurance Company: [employer] - Phone [phone] Fax [phone]  Denial Date: 6/13/2025  Denial Reason(s):   Appeal Information:  [phone] opt 5 opt 5 fax [phone]  Patient Notified:     Ins requires pt to try Saxenda per formulary.  How do you wish to proceed

## 2025-06-13 NOTE — TELEPHONE ENCOUNTER
Medication Appeal Request    Please initiate an appeal for the requested medication: ZEPBOUND 10 MG/0.5ML SC SOAJ    Has a letter of medical necessity been completed in EPIC?   yes    Any additional lab values/information to include?     Would you like to include any research articles?               If yes please include the hyperlink(s) below or fax to    [phone] for Specialty/Retail               [phone] for Infusion/Clinic Administered.                Include the patients name and MRN on the fax cover sheet.

## 2025-06-24 ENCOUNTER — APPOINTMENT (OUTPATIENT)
Dept: GENERAL RADIOLOGY | Facility: CLINIC | Age: 35
End: 2025-06-24
Attending: STUDENT IN AN ORGANIZED HEALTH CARE EDUCATION/TRAINING PROGRAM
Payer: COMMERCIAL

## 2025-06-24 ENCOUNTER — HOSPITAL ENCOUNTER (EMERGENCY)
Facility: CLINIC | Age: 35
Discharge: HOME | End: 2025-06-24
Attending: STUDENT IN AN ORGANIZED HEALTH CARE EDUCATION/TRAINING PROGRAM | Admitting: STUDENT IN AN ORGANIZED HEALTH CARE EDUCATION/TRAINING PROGRAM
Payer: COMMERCIAL

## 2025-06-24 VITALS
TEMPERATURE: 98.1 F | OXYGEN SATURATION: 95 % | RESPIRATION RATE: 18 BRPM | HEIGHT: 66 IN | HEART RATE: 106 BPM | BODY MASS INDEX: 36.16 KG/M2 | DIASTOLIC BLOOD PRESSURE: 92 MMHG | SYSTOLIC BLOOD PRESSURE: 127 MMHG | WEIGHT: 225 LBS

## 2025-06-24 DIAGNOSIS — M54.41 ACUTE RIGHT-SIDED LOW BACK PAIN WITH RIGHT-SIDED SCIATICA: ICD-10-CM

## 2025-06-24 DIAGNOSIS — M53.3 SI (SACROILIAC) JOINT DYSFUNCTION: ICD-10-CM

## 2025-06-24 PROCEDURE — 72100 X-RAY EXAM L-S SPINE 2/3 VWS: CPT

## 2025-06-24 PROCEDURE — 72170 X-RAY EXAM OF PELVIS: CPT

## 2025-06-24 PROCEDURE — 99284 EMERGENCY DEPT VISIT MOD MDM: CPT

## 2025-06-24 RX ORDER — PREDNISONE 20 MG/1
TABLET ORAL
Qty: 10 TABLET | Refills: 0 | Status: SHIPPED | OUTPATIENT
Start: 2025-06-24

## 2025-06-24 RX ORDER — TRAMADOL HYDROCHLORIDE 50 MG/1
50 TABLET ORAL EVERY 6 HOURS PRN
Qty: 10 TABLET | Refills: 0 | Status: SHIPPED | OUTPATIENT
Start: 2025-06-24 | End: 2025-06-27

## 2025-06-24 ASSESSMENT — COLUMBIA-SUICIDE SEVERITY RATING SCALE - C-SSRS
1. IN THE PAST MONTH, HAVE YOU WISHED YOU WERE DEAD OR WISHED YOU COULD GO TO SLEEP AND NOT WAKE UP?: NO
6. HAVE YOU EVER DONE ANYTHING, STARTED TO DO ANYTHING, OR PREPARED TO DO ANYTHING TO END YOUR LIFE?: NO
2. HAVE YOU ACTUALLY HAD ANY THOUGHTS OF KILLING YOURSELF IN THE PAST MONTH?: NO

## 2025-06-24 ASSESSMENT — ACTIVITIES OF DAILY LIVING (ADL)
ADLS_ACUITY_SCORE: 48
ADLS_ACUITY_SCORE: 48

## 2025-06-24 NOTE — ED PROVIDER NOTES
"  History     Chief Complaint   Patient presents with    Back Pain     HPI  Ayanna Davidson is a 34 year old female with history of anxiety, chronic pain, obesity who presents for evaluation of back/leg discomfort.  Patient recently had a Bartholin gland cyst excised and then underwent revision of the wound on 6/10.  The procedure was completed without issue, but patient has had some new right lower back pain ever since.  She does have a young child and does frequent bending/lifting, but she denies any new trauma to the area.  Patient states that she was in an accident years ago and had a possible hip fracture at that time.  She expresses concern that this may have \"migrated\" and is somehow related to her current episode of pain.  The discomfort radiates into both sides but is mostly present in the right lower back.  It does occasionally radiate down the leg.  No previous history of sciatica or any back surgeries.  Patient denies having any new abdominal or vaginal pain, vomiting, changes in bowel or urinary habits, incontinence, focal weakness or numbness of the legs, other complaints today.    Allergies:  Allergies   Allergen Reactions    No Known Allergies        Problem List:    Patient Active Problem List    Diagnosis Date Noted    Moderate episode of recurrent major depressive disorder (H) 2025     Priority: Medium    S/P  2024     Priority: Medium    Elevated BP without diagnosis of hypertension 2024     Priority: Medium    Diet controlled gestational diabetes mellitus (GDM) in third trimester 2024     Priority: Medium    Encounter for triage in pregnant patient 10/17/2023     Priority: Medium    PTSD (post-traumatic stress disorder) 10/10/2023     Priority: Medium    Anxiety 03/15/2023     Priority: Medium    Prolapse of the stomach 2021     Priority: Medium     Formatting of this note might be different from the original. Added automatically from request for surgery " "[id number]      TINO III (vulvar intraepithelial neoplasia III) 02/26/2020     Priority: Medium     Added automatically from request for surgery [id number]      Bartholin's gland abscess 01/28/2020     Priority: Medium     Added automatically from request for surgery [id number]      Chronic pain in right shoulder 01/21/2020     Priority: Medium    Dog bite of right lower leg 09/27/2019     Priority: Medium    Cellulitis of left upper extremity 09/27/2019     Priority: Medium    Current every day smoker 09/27/2019     Priority: Medium    Gastroesophageal reflux disease without esophagitis 09/27/2019     Priority: Medium    Skin infection 09/27/2019     Priority: Medium    Dog bite of left upper extremity 05/07/2019     Priority: Medium    h/o Cervical high risk HPV (human papillomavirus) test positive (now negative) 03/12/2019     Priority: Medium     4/2016 NIL pap. No prior HPV testing.   3/12/19 NIL pap, + HR HPV (not 16 or 18). Plan: cotest in 1 yr  1/29/20 NIL pap, + HR HPV (not 16 or 18). Plan: [facility]  2/11/20 [facility] bx: neg. Plan: Cotest in 1 yr.   2/18/20 Vulvar biopsy: \"TINO III; severe dysplasia\" possible involvement of margins  3/11/20 Re excision of vulva.   3/23/21 NIL pap, + HR HPV (not 16 or 18). Plan: [facility]  6/22/21 Gyn visit - [facility] recommended, Patient refused  6/28/21 GynOnc visit plan - HPV HR+: \"Repeat HPV-based screening in 1 year based upon ASCCP\", Due 3/23/22  5/6/22 Lost to follow up  8/18/22 NIL pap, Neg HPV.  Plan: cotest in 1 year  6/14/23 NIL Pap, Neg HR HPV. Plan: cotest in 1 year and establish with OB/GYN provider, per Dr. Santiago.   7/19/24 NIL pap, Neg HPV. Plan cotest in 5 years      Closed fracture dislocation of hip joint, left, initial encounter (H) 09/28/2018     Priority: Medium    Vulvar abscess 08/16/2017     Priority: Medium    ADD (attention deficit disorder) without hyperactivity 04/18/2014     Priority: Medium    Depressed 05/10/2012     Priority: Medium        Past Medical History:  "   Past Medical History:   Diagnosis Date    Arthritis     Cervical high risk HPV (human papillomavirus) test positive 2019    Esophageal reflux     Gastroesophageal reflux disease     HSV (herpes simplex virus) infection     Methamphetamine abuse (H)     Obese     Overdose of antipsychotic 2012    PONV (postoperative nausea and vomiting)     recurrent Bartholin's cyst     TINO III (vulvar intraepithelial neoplasia III)        Past Surgical History:    Past Surgical History:   Procedure Laterality Date    BIOPSY       SECTION N/A 3/1/2024    Procedure: PRIMARY  SECTION;  Surgeon: Inderjit Olivas MD;  Location: [facility]    CHOLECYSTECTOMY      ENT SURGERY      ESOPHAGOSCOPY, GASTROSCOPY, DUODENOSCOPY (EGD), COMBINED N/A 2023    Procedure: Esophagoscopy, gastroscopy, duodenoscopy (EGD), combined;  Surgeon: Thierno Escobar MD;  Location: [facility]    EXAM UNDER ANESTHESIA PELVIC N/A 2020    Procedure: EXAM UNDER ANESTHESIA, PELVIS, PAP;  Surgeon: Froylan Schwarz MD;  Location: [facility]    EXCISE LESION VULVA Right 6/10/2025    Procedure: RIGHT VULVA  EXCISION REVISION;  Surgeon: Froylan Schwarz MD;  Location: [facility]    EXCISE VULVA WIDE LOCAL Bilateral 2020    Procedure: Right Vulva Wedge Resection and Incision and Drainage Left Vulva;  Surgeon: Froylan Schwarz MD;  Location: [facility]    INCISION AND DRAINAGE ABSCESS PELVIS, COMBINED N/A 2018    Procedure: COMBINED INCISION AND DRAINAGE ABSCESS PELVIS;  INCISION AND DRAINAGE LABIAL ABSCESS;  Surgeon: Jase Beach MD;  Location: [facility]    INCISION AND DRAINAGE ABSCESS PELVIS, COMBINED N/A 2019    Procedure: Incision and Drainage Right Labial Abscess;  Surgeon: Jase Beach MD;  Location: [facility]    INCISION AND DRAINAGE LOWER EXTREMITY, COMBINED Right 2019    Procedure: irrigation and debridement right leg dog bite;  Surgeon: Rommel Pérez MD;  Location: [facility]     LAP ADJUSTABLE GASTRIC BAND      LEEP TX, CERVICAL      MAMMOPLASTY REDUCTION BILATERAL      MARSUPIALIZATION BARTHOLIN CYST N/A 04/29/2019    Procedure: Bartholin's Cyst removal;  Surgeon: Frolyan Schwarz MD;  Location: [facility]    MARSUPIALIZATION BARTHOLIN CYST Left 01/29/2020    Procedure: Excision of left bartholin's abscess;  Surgeon: Froylan Schwarz MD;  Location: [facility]    MARSUPIALIZATION BARTHOLIN CYST Right 5/8/2024    Procedure: MARSUPIALIZATION, CYST, BARTHOLIN'S GLAND;  Surgeon: Jase Man MD;  Location: [facility]    MARSUPIALIZATION BARTHOLIN CYST Right 4/24/2025    Procedure: BARTHOLIN GLAND EXCISION, RIGHT;  Surgeon: Froylan Schwarz MD;  Location: [facility] OR    ORTHOPEDIC SURGERY         Family History:    Family History   Problem Relation Age of Onset    Thyroid Disease Mother     Heart Disease Father     Coronary Artery Disease Father     Hypertension Father     Hyperlipidemia Father     Mental Illness Father     Substance Abuse Father     Diabetes Maternal Grandmother     Prostate Cancer Maternal Grandfather     Breast Cancer Paternal Grandmother     Testicular cancer Paternal Grandfather     Depression Half-Sister     Anxiety Disorder Half-Sister     Obesity Sister        Social History:  Marital Status:  Single [1]  Social History     Tobacco Use    Smoking status: Former     Current packs/day: 0.50     Average packs/day: 0.5 packs/day for 10.0 years (5.0 ttl pk-yrs)     Types: Cigarettes    Smokeless tobacco: Former    Tobacco comments:     uses E cig with zero nicotine    Vaping Use    Vaping status: Every Day    Substances: no nicotine   Substance Use Topics    Alcohol use: Yes     Comment: occasional    Drug use: Not Currently     Types: Marijuana, Methamphetamines     Comment: Reports Marijuana and meth, last 9/2023        Medications:    predniSONE (DELTASONE) 20 MG tablet  traMADol (ULTRAM) 50 MG tablet  Ashwagandha (ASHWAGANDHA GUMMIES) 500 MG  "CHEW  celecoxib (CELEBREX) 200 MG capsule  Cholecalciferol ([employer] D3) 10 MCG (400 UNIT) CAPS  clotrimazole-betamethasone (LOTRISONE) 1-0.05 % external cream  EQ ARTIFICIAL TEARS 1-0.3 % SOLN  lidocaine (XYLOCAINE) 5 % external ointment  norgestimate-ethinyl estradiol (ORTHO-CYCLEN) 0.25-35 MG-MCG tablet  omeprazole (PRILOSEC) 20 MG DR capsule  Prenatal Vit-Fe Fumarate-FA (PRENATAL MULTIVITAMIN W/IRON) 27-0.8 MG tablet  tirzepatide-Weight Management (ZEPBOUND) 10 MG/0.5ML prefilled pen  vitamin C (ASCORBIC ACID) 500 MG tablet      Review of Systems   All other systems reviewed and are negative.  See HPI.    Physical Exam   BP: (!) 138/102  Pulse: 107  Temp: 98.1  F (36.7  C)  Resp: 15  Height: 167.6 cm (5' 6\")  Weight: 102.1 kg (225 lb)  SpO2: 99 %      Physical Exam  Vitals and nursing note reviewed.   Constitutional:       General: She is not in acute distress.     Appearance: Normal appearance. She is not ill-appearing or diaphoretic.      Comments: Anxious.  No acute distress.   HENT:      Head: Atraumatic.      Mouth/Throat:      Mouth: Mucous membranes are moist.   Eyes:      General: No scleral icterus.     Conjunctiva/sclera: Conjunctivae normal.   Cardiovascular:      Rate and Rhythm: Normal rate and regular rhythm.      Pulses: Normal pulses.      Heart sounds: Normal heart sounds.   Pulmonary:      Effort: Pulmonary effort is normal. No respiratory distress.      Breath sounds: Normal breath sounds.   Abdominal:      General: Abdomen is flat.      Tenderness: There is no abdominal tenderness. There is no guarding or rebound.   Musculoskeletal:         General: Tenderness present. Normal range of motion.      Cervical back: Normal range of motion and neck supple.      Comments: There is tenderness near the right SI region.  No midline spinal tenderness, though she does have right lower paraspinal tenderness as well.  No hip tenderness.  Range of motion to the hip is normal.  She has mild SLR.   Skin:     " General: Skin is warm.      Capillary Refill: Capillary refill takes less than 2 seconds.      Coloration: Skin is not pale.      Findings: No erythema or rash.   Neurological:      General: No focal deficit present.      Mental Status: She is alert and oriented to person, place, and time.      Sensory: No sensory deficit.      Motor: No weakness.      Coordination: Coordination normal.      Gait: Gait normal.   Psychiatric:      Comments: Anxious.       ED Course        Procedures            Results for orders placed or performed during the hospital encounter of 06/24/25 (from the past 24 hours)   Lumbar spine XR, 2-3 views    Narrative    EXAM: XR LUMBAR SPINE 2/3 VIEWS  LOCATION: [facility]  DATE: 6/24/2025    INDICATION: Right lower back pain with sciatica posteriorly laterally down the leg.  COMPARISON: None.      Impression    IMPRESSION: S-shaped thoracolumbar curvature. Grade 2 anterolisthesis of L5 on S1 due to bilateral L5 pars defects. Trace grade 1 anterolisthesis of L3 on L4. Vertebral body heights are maintained. Multilevel disc space height loss, greatest/severe at   L5-S1. Multilevel endplate osteophyte formation and lower lumbar predominant facet arthropathy. Bilateral sacroiliac joint degenerative changes.   XR Pelvis 1/2 Views    Narrative    EXAM: XR PELVIS 1/2 VIEWS  LOCATION: [facility]  DATE: 6/24/2025    INDICATION: Right SI pain  COMPARISON: None.      Impression    IMPRESSION: Right SI joint negative. There may be mild degenerative change of the left SI joint, however the joint is suboptimally profiled. No erosions. Normal hip joint spaces and alignment. No fracture. Curvature lumbar spine       Medications - No data to display    Assessments & Plan (with Medical Decision Making)     I have reviewed the nursing notes.    I have reviewed the findings, diagnosis, plan and need for follow up with the patient.  Medical Decision  Jolene Davidson is a 34 year old female with history of anxiety, chronic pain, obesity who presents for evaluation of back/leg discomfort.  Borderline tachycardic on arrival, though she does appear anxious.  Otherwise nontoxic and in no acute distress.  She has some reproducible tenderness to the right SI region and paraspinal lumbar back, but no midline tenderness or deformity, no hip tenderness.  Presentation seems most consistent with SI dysfunction or lumbar radiculopathy.  Given that she has not had any trauma I do not see any indication for advanced imaging.  However, patient does have some SI/bony tenderness and she was insistent on obtaining updated images today, so x-rays were ordered.  This confirmed the presence of SI degenerative changes.  She also has anterolisthesis of L5 on S1 and L3 on L4.  There is multilevel disc space height loss that is most severe at L5-S1 along with multilevel endplate osteophyte formation.  I think these changes collectively are contributing to her pain.  We will discharge home on steroids.  Patient is already on prescribed NSAIDs, so I will extend her previous course of tramadol for a few days to help with acute pain relief.  Patient will follow-up in clinic and agrees to return sooner for any new or acutely worsening symptoms.    Discharge Medication List as of 6/24/2025  3:17 PM        START taking these medications    Details   predniSONE (DELTASONE) 20 MG tablet Take two tablets (= 40mg) each day for 5 (five) days, Disp-10 tablet, R-0, E-Prescribe      traMADol (ULTRAM) 50 MG tablet Take 1 tablet (50 mg) by mouth every 6 hours as needed for severe pain., Disp-10 tablet, R-0, E-Prescribe           Final diagnoses:   SI (sacroiliac) joint dysfunction   Acute right-sided low back pain with right-sided sciatica     6/24/2025   [facility] EMERGENCY DEPT       Anthony Pendleton MD  06/25/25 0051

## 2025-06-24 NOTE — Clinical Note
Ayanna Davidson was seen and treated in our emergency department on 6/24/2025.  She may return to work on 06/26/2025.       If you have any questions or concerns, please don't hesitate to call.      Anthony Pendleton MD

## 2025-06-24 NOTE — ED TRIAGE NOTES
States had surgery 6/10 on her vaginal area and since surgery has had right lower back pain.  Pain is getting worse, has tried chiropractor and massage with no relief. Pt is concerned as she thinks a bone chip may be causing the discomfort

## 2025-06-24 NOTE — DISCHARGE INSTRUCTIONS
I think your symptoms are due to irritation to the nerves in your lower back/SI region.  Fortunately your x-rays did not show any fractures or severe abnormalities.    Continue taking anti-inflammatory medications like Tylenol.  I also wrote you a prescription for a steroid (prednisone) that should treating the inflammation within the first few days.  Take this until gone.  Use tramadol only as needed.    Follow-up with your primary doctor for reevaluation within the next few weeks.  If the pain continues you may need to have formal physical therapy.    Come back to the emergency department the meantime for any new or acutely worsening symptoms, particularly incontinence of stool/urine, inability to move your leg.

## 2025-06-26 ENCOUNTER — PATIENT OUTREACH (OUTPATIENT)
Dept: CARE COORDINATION | Facility: CLINIC | Age: 35
End: 2025-06-26
Payer: COMMERCIAL

## 2025-06-26 NOTE — PROGRESS NOTES
[facility] Resource Center Contact  [facility]/Voicemail     Clinical Data: Care Coordination ED-sourced Outreach-     Outreach attempted x 2.  Left message on patient's voicemail, providing [facility]'s 24/7 scheduling and nurse triage phone number [phone] ([phone]) for questions/concerns and/or to schedule an appt with an [facility] provider.      Care Coordination introduction letter with explanation of Clinic Care Coordination services sent to patient via [employer]. Clinic Care Coordination services remain available via referral if needed.    Plan: [facility] will do no further outreaches at this time.       Mahsa Oscar MA  Connected Care Resource Center, [facility]    *Connected Care Resource Team does NOT follow patient ongoing. Referrals are identified based on internal discharge reports and the outreach is to ensure patient has an understanding of their discharge instructions.

## 2025-06-26 NOTE — LETTER
Ayanna Davidson  [address] [facility]  [place] [address]    Dear Ayanna Davidson,      I am a team member within the Connected Care Resource Center with [facility]. I recently tried to reach you to ensure you were doing well following a recent visit within our health system. I also wanted to take this chance to introduce Clinic Care Coordination.     Below is a description of Clinic Care Coordination and how this team can further assist you:       The Clinic Care Coordination team is made up of a Registered Nurse, , Financial Resource Worker, and a Community Health Worker who understand and can help navigate the health care system. The goal of clinic care coordination is to help you manage your health, improve access to care, and achieve optimal health outcomes. They work alongside your provider to assist you in determining your health and social needs, obtain health care and community resources, and provide you with necessary information and education. Clinic Care Coordination can work with you through any barriers and develop a care plan that helps coordinate and strengthen the relationship between you and your care team.    If you wish to connect with the Clinic Care Coordination Team, please let your [facility] Primary Care Provider or Clinic Care Team know and they can place a referral. The Clinic Care Coordination team will then reach out by phone to further support you.    We are focused on providing you with the highest-quality healthcare experience possible.    Sincerely,   Your care team with [facility] [address] [place] [address] ([phone]).    Mahsa Oscar, [facility]  Care Coordination

## 2025-06-30 ENCOUNTER — OFFICE VISIT (OUTPATIENT)
Dept: FAMILY MEDICINE | Facility: CLINIC | Age: 35
End: 2025-06-30
Payer: COMMERCIAL

## 2025-06-30 VITALS
TEMPERATURE: 97.3 F | SYSTOLIC BLOOD PRESSURE: 140 MMHG | DIASTOLIC BLOOD PRESSURE: 94 MMHG | HEIGHT: 66 IN | HEART RATE: 102 BPM | RESPIRATION RATE: 18 BRPM | WEIGHT: 238 LBS | OXYGEN SATURATION: 100 % | BODY MASS INDEX: 38.25 KG/M2

## 2025-06-30 DIAGNOSIS — M54.41 ACUTE RIGHT-SIDED LOW BACK PAIN WITH RIGHT-SIDED SCIATICA: Primary | ICD-10-CM

## 2025-06-30 DIAGNOSIS — R93.5 ABNORMAL CT OF THE ABDOMEN: ICD-10-CM

## 2025-06-30 DIAGNOSIS — N92.6 IRREGULAR PERIODS: ICD-10-CM

## 2025-06-30 PROCEDURE — 3080F DIAST BP >= 90 MM HG: CPT | Performed by: NURSE PRACTITIONER

## 2025-06-30 PROCEDURE — G2211 COMPLEX E/M VISIT ADD ON: HCPCS | Performed by: NURSE PRACTITIONER

## 2025-06-30 PROCEDURE — 99214 OFFICE O/P EST MOD 30 MIN: CPT | Performed by: NURSE PRACTITIONER

## 2025-06-30 PROCEDURE — 3077F SYST BP >= 140 MM HG: CPT | Performed by: NURSE PRACTITIONER

## 2025-06-30 PROCEDURE — 1125F AMNT PAIN NOTED PAIN PRSNT: CPT | Performed by: NURSE PRACTITIONER

## 2025-06-30 RX ORDER — PREDNISONE 10 MG/1
TABLET ORAL
Qty: 31 TABLET | Refills: 0 | Status: SHIPPED | OUTPATIENT
Start: 2025-06-30 | End: 2025-06-30

## 2025-06-30 RX ORDER — NORGESTIMATE AND ETHINYL ESTRADIOL 0.25-0.035
1 KIT ORAL DAILY
COMMUNITY
Start: 2025-06-30

## 2025-06-30 RX ORDER — PREDNISONE 10 MG/1
TABLET ORAL
Qty: 31 TABLET | Refills: 0 | Status: SHIPPED | OUTPATIENT
Start: 2025-06-30 | End: 2025-07-14

## 2025-06-30 RX ORDER — CYCLOBENZAPRINE HCL 5 MG
5-10 TABLET ORAL 3 TIMES DAILY PRN
Qty: 30 TABLET | Refills: 1 | Status: SHIPPED | OUTPATIENT
Start: 2025-06-30

## 2025-06-30 ASSESSMENT — PAIN SCALES - GENERAL: PAINLEVEL_OUTOF10: SEVERE PAIN (8)

## 2025-06-30 NOTE — PROGRESS NOTES
Assessment & Plan     Acute right-sided low back pain with right-sided sciatica  Acute on chronic right sided back pain with sciatica symptoms as well. She has had most improvement with prednisone burst. Will start longer prednisone taper at this point, add muscle relaxants as well. Discussed ongoing stretching and range of motion exercises. She would like to hold off on physical therapy referral and continue with her chiropractor. Given the ongoing nature of her symptoms, will place MRI orders for additional evaluation. Continue with NSAIDs as prescribed, heat/ice application and topical analgesics as needed. Red flag symptoms needing more emergent follow-up were reviewed.   - cyclobenzaprine (FLEXERIL) 5 MG tablet; Take 1-2 tablets (5-10 mg) by mouth 3 times daily as needed for muscle spasms.  - MR Lumbar Spine w/o Contrast; Future  - predniSONE (DELTASONE) 10 MG tablet; Take 4 tablets (40 mg) by mouth daily for 3 days, THEN 3 tablets (30 mg) daily for 3 days, THEN 2 tablets (20 mg) daily for 3 days, THEN 1 tablet (10 mg) daily for 3 days, THEN 0.5 tablets (5 mg) daily for 2 days.    Abnormal CT of the abdomen  Ayanna discussed concern of lesions on the liver that was found on a CT scan from April, 2025. Results were reviewed, Geographic hypo attenuation of the hepatic parenchyma was noted with suggestion for liver MRI. This has not yet been completed as she has had several other medical concerns in the past several months including vulvar surgery x2. MRI follow-up was ordered today.   - MR Liver wo & w Contrast; Future    Irregular periods  - norgestimate-ethinyl estradiol (ORTHO-CYCLEN) 0.25-35 MG-MCG tablet; Take 1 tablet by mouth daily.    MED REC REQUIRED  Post Medication Reconciliation Status: patient was not discharged from an inpatient facility or TCU    I explained my diagnostic considerations and recommendations to the patient, who voiced understanding and agreement with the assessment and treatment  plan. All questions were answered to patient's apparent satisfaction. We discussed potential side effects of any prescribed or recommended therapies, as well as expectations for response to treatments and importance of lifestyle measures that may improve symptoms. Patient was advised to contact our office if there are new symptoms or no improvement or worsening of conditions or symptoms.    The longitudinal plan of care for the diagnosis(es)/condition(s) as documented were addressed during this visit. Due to the added complexity in care, I will continue to support Ayanna in the subsequent management and with ongoing continuity of care.      Colleen Urena is a 34 year old, presenting for the following health issues:  Follow Up      6/30/2025     3:17 PM   Additional Questions   Roomed by Tammy FRYE        ED/UC Followup:    Facility:  [facility]  Date of visit: 6/24/2025  Reason for visit: SI joint dysfunction & right side low back pain  Current Status: had a little drop getting off pontoon and heard a little 'pop', having some numbness    Ayanna presents today for ED follow-up. She has been having ongoing right sided low back pain, radiating into her buttock and down her right leg. This has been ongoing for the past several months, worse over the past 2-3 weeks. The pain is constant, worse with movement and activity. She reports numbness down her right leg, specifically on the lateral side of the leg. She was seen in the ED on 6/24/25. She had imaging completed with evidence of anterolisthesis, multilevel disc space height loss and endplate osteophyte formation as well as bilateral SI joint degenerative changes. At that time she was started on Prednisone and Tramadol. She notes the prednisone was very helpful for her, however the pain returned when she stopped the medication. She has been taking Ibuprofen 800mg twice daily and Celebrex. She has also tried baths and ice application.  She has been seeing chiropractor and massage therapist with minimal improvement. She has been doing home stretches from her chiropractor. She denies any red flag symptoms, saddle numbness, bowel or bladder incontinence.     Patient Active Problem List   Diagnosis    Depressed    Vulvar abscess    h/o Cervical high risk HPV (human papillomavirus) test positive (now negative)    Dog bite of left upper extremity    Dog bite of right lower leg    Cellulitis of left upper extremity    Current every day smoker    Gastroesophageal reflux disease without esophagitis    Skin infection    Bartholin's gland abscess    TINO III (vulvar intraepithelial neoplasia III)    Anxiety    ADD (attention deficit disorder) without hyperactivity    Chronic pain in right shoulder    Closed fracture dislocation of hip joint, left, initial encounter (H)    Prolapse of the stomach    PTSD (post-traumatic stress disorder)    Encounter for triage in pregnant patient    Diet controlled gestational diabetes mellitus (GDM) in third trimester    Elevated BP without diagnosis of hypertension    S/P     Moderate episode of recurrent major depressive disorder (H)     '  Current Outpatient Medications   Medication Sig Dispense Refill    Ashwagandha (ASHWAGANDHA GUMMIES) 500 MG CHEW Take 500 mg by mouth daily.      celecoxib (CELEBREX) 200 MG capsule Take 200 mg by mouth daily.      Cholecalciferol ([employer] D3) 10 MCG (400 UNIT) CAPS Take 10 mcg by mouth daily.      EQ ARTIFICIAL TEARS 1-0.3 % SOLN Place 1 drop into both eyes 2 times daily as needed.      lidocaine (XYLOCAINE) 5 % external ointment Apply topically as needed for moderate pain. 50 g 1    omeprazole (PRILOSEC) 20 MG DR capsule Take 1 capsule by mouth once daily 90 capsule 0    predniSONE (DELTASONE) 20 MG tablet Take two tablets (= 40mg) each day for 5 (five) days 10 tablet 0    Prenatal Vit-Fe Fumarate-FA (PRENATAL MULTIVITAMIN W/IRON) 27-0.8 MG tablet Take 1 tablet by mouth daily. 90  tablet 0    tirzepatide-Weight Management (ZEPBOUND) 10 MG/0.5ML prefilled pen Inject 0.5 mLs (10 mg) subcutaneously every 7 days. 2 mL 3    vitamin C (ASCORBIC ACID) 500 MG tablet Take 500 mg by mouth daily.      clotrimazole-betamethasone (LOTRISONE) 1-0.05 % external cream Apply topically 2 times daily. 15 g 1    norgestimate-ethinyl estradiol (ORTHO-CYCLEN) 0.25-35 MG-MCG tablet Take 1 tablet by mouth daily. 84 tablet 3     No current facility-administered medications for this visit.             Review of Systems  Constitutional, HEENT, cardiovascular, pulmonary, gi and gu systems are negative, except as otherwise noted.      Objective    LMP 05/22/2025 (Approximate)   There is no height or weight on file to calculate BMI.  Physical Exam  Vitals reviewed.   Constitutional:       General: She is not in acute distress.     Appearance: Normal appearance.   Eyes:      Extraocular Movements: Extraocular movements intact.      Conjunctiva/sclera: Conjunctivae normal.   Pulmonary:      Effort: Pulmonary effort is normal.   Musculoskeletal:      Lumbar back: Spasms and tenderness present. No swelling, edema, deformity, signs of trauma or bony tenderness. Decreased range of motion. Negative right straight leg raise test and negative left straight leg raise test.   Skin:     General: Skin is warm and dry.   Neurological:      Mental Status: She is alert and oriented to person, place, and time. Mental status is at baseline.   Psychiatric:         Mood and Affect: Mood normal.         Behavior: Behavior normal.            XR Pelvis 1/2 Views  Result Date: 6/24/2025  EXAM: XR PELVIS 1/2 VIEWS LOCATION: [facility] DATE: 6/24/2025 INDICATION: Right SI pain COMPARISON: None.     IMPRESSION: Right SI joint negative. There may be mild degenerative change of the left SI joint, however the joint is suboptimally profiled. No erosions. Normal hip joint spaces and alignment. No fracture. Curvature  lumbar spine    Lumbar spine XR, 2-3 views  Result Date: 6/24/2025  EXAM: XR LUMBAR SPINE 2/3 VIEWS LOCATION: [facility] DATE: 6/24/2025 INDICATION: Right lower back pain with sciatica posteriorly laterally down the leg. COMPARISON: None.     IMPRESSION: S-shaped thoracolumbar curvature. Grade 2 anterolisthesis of L5 on S1 due to bilateral L5 pars defects. Trace grade 1 anterolisthesis of L3 on L4. Vertebral body heights are maintained. Multilevel disc space height loss, greatest/severe at L5-S1. Multilevel endplate osteophyte formation and lower lumbar predominant facet arthropathy. Bilateral sacroiliac joint degenerative changes.        Signed Electronically by: Navya Busch NP

## 2025-07-01 NOTE — TELEPHONE ENCOUNTER
MEDICATION APPEAL APPROVED    Medication: ZEPBOUND 10 MG/0.5ML SC SOAJ  Authorization Effective Date: 6/17/2025  Authorization Expiration Date: 12/18/2025  Approved Dose/Quantity: 2  Reference #: Key: [id number]   [employer]: [employer]  Expected CoPay: $       CoPay Card Available: No  Financial Assistance Needed:    Filling Pharmacy: [facility] [address] [place] [address] [id number]  Patient Notified: y  Comments:

## 2025-07-07 DIAGNOSIS — E66.812 CLASS 2 SEVERE OBESITY WITH SERIOUS COMORBIDITY AND BODY MASS INDEX (BMI) OF 39.0 TO 39.9 IN ADULT, UNSPECIFIED OBESITY TYPE (H): Primary | ICD-10-CM

## 2025-07-07 DIAGNOSIS — E66.01 CLASS 2 SEVERE OBESITY WITH SERIOUS COMORBIDITY AND BODY MASS INDEX (BMI) OF 39.0 TO 39.9 IN ADULT, UNSPECIFIED OBESITY TYPE (H): Primary | ICD-10-CM

## 2025-07-14 ENCOUNTER — RESULTS FOLLOW-UP (OUTPATIENT)
Dept: FAMILY MEDICINE | Facility: CLINIC | Age: 35
End: 2025-07-14
Payer: COMMERCIAL

## 2025-07-14 ENCOUNTER — MEDICAL CORRESPONDENCE (OUTPATIENT)
Dept: HEALTH INFORMATION MANAGEMENT | Facility: CLINIC | Age: 35
End: 2025-07-14

## 2025-07-14 DIAGNOSIS — K21.00 GASTROESOPHAGEAL REFLUX DISEASE WITH ESOPHAGITIS WITHOUT HEMORRHAGE: ICD-10-CM

## 2025-07-14 RX ORDER — OMEPRAZOLE 20 MG/1
20 CAPSULE, DELAYED RELEASE ORAL DAILY
Qty: 90 CAPSULE | Refills: 0 | Status: SHIPPED | OUTPATIENT
Start: 2025-07-14

## 2025-07-17 ENCOUNTER — HOSPITAL ENCOUNTER (EMERGENCY)
Facility: CLINIC | Age: 35
Discharge: HOME | End: 2025-07-18
Attending: FAMILY MEDICINE | Admitting: FAMILY MEDICINE
Payer: COMMERCIAL

## 2025-07-17 ENCOUNTER — TELEPHONE (OUTPATIENT)
Dept: ORTHOPEDICS | Facility: CLINIC | Age: 35
End: 2025-07-17

## 2025-07-17 VITALS
SYSTOLIC BLOOD PRESSURE: 154 MMHG | HEART RATE: 115 BPM | RESPIRATION RATE: 18 BRPM | TEMPERATURE: 98.1 F | BODY MASS INDEX: 39.01 KG/M2 | OXYGEN SATURATION: 100 % | DIASTOLIC BLOOD PRESSURE: 103 MMHG | WEIGHT: 241.6 LBS

## 2025-07-17 DIAGNOSIS — L02.411 ABSCESS OF AXILLA, RIGHT: ICD-10-CM

## 2025-07-17 DIAGNOSIS — M84.454A PATHOLOGICAL FRACTURE OF PELVIS, UNSPECIFIED PATHOLOGICAL CAUSE, INITIAL ENCOUNTER: Primary | ICD-10-CM

## 2025-07-17 DIAGNOSIS — L03.313 CELLULITIS OF CHEST WALL: ICD-10-CM

## 2025-07-17 PROCEDURE — 99284 EMERGENCY DEPT VISIT MOD MDM: CPT | Mod: 25 | Performed by: FAMILY MEDICINE

## 2025-07-17 PROCEDURE — 99284 EMERGENCY DEPT VISIT MOD MDM: CPT | Performed by: FAMILY MEDICINE

## 2025-07-17 ASSESSMENT — COLUMBIA-SUICIDE SEVERITY RATING SCALE - C-SSRS
6. HAVE YOU EVER DONE ANYTHING, STARTED TO DO ANYTHING, OR PREPARED TO DO ANYTHING TO END YOUR LIFE?: NO
2. HAVE YOU ACTUALLY HAD ANY THOUGHTS OF KILLING YOURSELF IN THE PAST MONTH?: NO
1. IN THE PAST MONTH, HAVE YOU WISHED YOU WERE DEAD OR WISHED YOU COULD GO TO SLEEP AND NOT WAKE UP?: NO

## 2025-07-17 ASSESSMENT — ACTIVITIES OF DAILY LIVING (ADL): ADLS_ACUITY_SCORE: 48

## 2025-07-17 NOTE — TELEPHONE ENCOUNTER
Writer called spoke with patient on the phone. When triaging potential sacral fracture with provider in clinic. It was recommended to get Pelvis MRI Stat to assess pelvic region identify cause of concern and to further triage to the appropriate provider. Pt agreed to completing MRI. Pt did state was going to the ED to have arm evaluated. They are going to see if they will complete the MRI there. Writer will also have the clinic coordinators call to assist with scheduling.     April Cevallos LPN

## 2025-07-18 ENCOUNTER — APPOINTMENT (OUTPATIENT)
Dept: ULTRASOUND IMAGING | Facility: CLINIC | Age: 35
End: 2025-07-18
Attending: FAMILY MEDICINE
Payer: COMMERCIAL

## 2025-07-18 LAB
BASOPHILS # BLD AUTO: 0 10E3/UL (ref 0–0.2)
BASOPHILS NFR BLD AUTO: 0 %
CRP SERPL-MCNC: 25.93 MG/L
EOSINOPHIL # BLD AUTO: 0.1 10E3/UL (ref 0–0.7)
EOSINOPHIL NFR BLD AUTO: 1 %
ERYTHROCYTE [DISTWIDTH] IN BLOOD BY AUTOMATED COUNT: 13.1 % (ref 10–15)
HCT VFR BLD AUTO: 41 % (ref 35–47)
HGB BLD-MCNC: 13.9 G/DL (ref 11.7–15.7)
IMM GRANULOCYTES # BLD: 0.1 10E3/UL
IMM GRANULOCYTES NFR BLD: 1 %
LYMPHOCYTES # BLD AUTO: 2.9 10E3/UL (ref 0.8–5.3)
LYMPHOCYTES NFR BLD AUTO: 18 %
MCH RBC QN AUTO: 31.3 PG (ref 26.5–33)
MCHC RBC AUTO-ENTMCNC: 33.9 G/DL (ref 31.5–36.5)
MCV RBC AUTO: 92 FL (ref 78–100)
MONOCYTES # BLD AUTO: 0.7 10E3/UL (ref 0–1.3)
MONOCYTES NFR BLD AUTO: 4 %
NEUTROPHILS # BLD AUTO: 12.1 10E3/UL (ref 1.6–8.3)
NEUTROPHILS NFR BLD AUTO: 76 %
NRBC # BLD AUTO: 0 10E3/UL
NRBC BLD AUTO-RTO: 0 /100
PLATELET # BLD AUTO: 275 10E3/UL (ref 150–450)
RBC # BLD AUTO: 4.44 10E6/UL (ref 3.8–5.2)
WBC # BLD AUTO: 15.9 10E3/UL (ref 4–11)

## 2025-07-18 PROCEDURE — 86140 C-REACTIVE PROTEIN: CPT | Performed by: FAMILY MEDICINE

## 2025-07-18 PROCEDURE — 85025 COMPLETE CBC W/AUTO DIFF WBC: CPT | Performed by: FAMILY MEDICINE

## 2025-07-18 PROCEDURE — 76642 ULTRASOUND BREAST LIMITED: CPT | Mod: RT

## 2025-07-18 PROCEDURE — 36415 COLL VENOUS BLD VENIPUNCTURE: CPT | Performed by: FAMILY MEDICINE

## 2025-07-18 RX ORDER — TRAMADOL HYDROCHLORIDE 50 MG/1
50 TABLET ORAL EVERY 6 HOURS PRN
Qty: 6 TABLET | Refills: 0 | Status: SHIPPED | OUTPATIENT
Start: 2025-07-18

## 2025-07-18 RX ORDER — CEPHALEXIN 500 MG/1
500 CAPSULE ORAL 4 TIMES DAILY
Qty: 28 CAPSULE | Refills: 0 | Status: SHIPPED | OUTPATIENT
Start: 2025-07-18 | End: 2025-07-25

## 2025-07-18 RX ORDER — SULFAMETHOXAZOLE AND TRIMETHOPRIM 800; 160 MG/1; MG/1
1 TABLET ORAL 2 TIMES DAILY
Qty: 14 TABLET | Refills: 0 | Status: SHIPPED | OUTPATIENT
Start: 2025-07-18 | End: 2025-07-25

## 2025-07-18 ASSESSMENT — ENCOUNTER SYMPTOMS
PSYCHIATRIC NEGATIVE: 1
GASTROINTESTINAL NEGATIVE: 1
COLOR CHANGE: 1
RESPIRATORY NEGATIVE: 1
FEVER: 0
CHILLS: 0
APPETITE CHANGE: 0
CARDIOVASCULAR NEGATIVE: 1
NEUROLOGICAL NEGATIVE: 1

## 2025-07-18 ASSESSMENT — ACTIVITIES OF DAILY LIVING (ADL): ADLS_ACUITY_SCORE: 48

## 2025-07-18 NOTE — ED TRIAGE NOTES
Pt presents with concerns of a possible abscess under right armpit into her chest.  Pt states that yesterday afternoon she got out about a cup of pus from the area yesterday.  Pt states that the pain goes into the chest area from the mass.     Pt refuses to be weighed.     Triage Assessment (Adult)       Row Name 07/17/25 [id number]          Triage Assessment    Airway WDL WDL        Respiratory WDL    Respiratory WDL WDL        Skin Circulation/Temperature WDL    Skin Circulation/Temperature WDL WDL        Cardiac WDL    Cardiac WDL WDL        Peripheral/Neurovascular WDL    Peripheral Neurovascular WDL WDL        Cognitive/Neuro/Behavioral WDL    Cognitive/Neuro/Behavioral WDL WDL

## 2025-07-18 NOTE — TELEPHONE ENCOUNTER
Writer left a  for patient requesting a call back to see where she would like to have her MRI done. If we have the correct address in her chart there is no [facility] location near her.     Lisa Dumont LPN

## 2025-07-18 NOTE — TELEPHONE ENCOUNTER
Left Voicemail (1st Attempt) and Sent Mychart (1st Attempt) for the patient to call back and schedule the following:    Appointment type: STAT MRI  Provider: Renetta Lara  Return date: ASAP  Specialty phone number: [phone]

## 2025-07-18 NOTE — ED PROVIDER NOTES
History     Chief Complaint   Patient presents with    Wound Check     HPI  Ayanna Davidson is a 35 year old female who presents emergency room today secondary concerns of redness to the area of her right axilla area to her right breast.  Patient states that she has a tendency to get abscess formations and has had a mole over her body in the past.  She stated over the last several days she has noted increased swelling and redness to this area.  She stated the area started draining yesterday and she was able to milk out about a couple worth of drainage from the right axilla area.  She states that she has noticed increased redness over the skin of the area with increased pain into the right breast.  Patient suspected mother abscess and is questioning the need for a course of oral antibiotics which usually has been used to treat her prior episodes of similar symptoms.  She denies fever or chills symptoms currently.  Denies any nausea or vomiting.    Allergies:  Allergies   Allergen Reactions    No Known Allergies        Problem List:    Patient Active Problem List    Diagnosis Date Noted    Moderate episode of recurrent major depressive disorder (H) 2025     Priority: Medium    S/P  2024     Priority: Medium    Elevated BP without diagnosis of hypertension 2024     Priority: Medium    Diet controlled gestational diabetes mellitus (GDM) in third trimester 2024     Priority: Medium    Encounter for triage in pregnant patient 10/17/2023     Priority: Medium    PTSD (post-traumatic stress disorder) 10/10/2023     Priority: Medium    Anxiety 03/15/2023     Priority: Medium    Prolapse of the stomach 2021     Priority: Medium     Formatting of this note might be different from the original. Added automatically from request for surgery [id number]      TINO III (vulvar intraepithelial neoplasia III) 2020     Priority: Medium     Added automatically from request for surgery [id number]       "Bartholin's gland abscess 01/28/2020     Priority: Medium     Added automatically from request for surgery [id number]      Chronic pain in right shoulder 01/21/2020     Priority: Medium    Dog bite of right lower leg 09/27/2019     Priority: Medium    Cellulitis of left upper extremity 09/27/2019     Priority: Medium    Current every day smoker 09/27/2019     Priority: Medium    Gastroesophageal reflux disease without esophagitis 09/27/2019     Priority: Medium    Skin infection 09/27/2019     Priority: Medium    Dog bite of left upper extremity 05/07/2019     Priority: Medium    h/o Cervical high risk HPV (human papillomavirus) test positive (now negative) 03/12/2019     Priority: Medium     4/2016 NIL pap. No prior HPV testing.   3/12/19 NIL pap, + HR HPV (not 16 or 18). Plan: cotest in 1 yr  1/29/20 NIL pap, + HR HPV (not 16 or 18). Plan: [place]  2/11/20 [place] bx: neg. Plan: Cotest in 1 yr.   2/18/20 Vulvar biopsy: \"TINO III; severe dysplasia\" possible involvement of margins  3/11/20 Re excision of vulva.   3/23/21 NIL pap, + HR HPV (not 16 or 18). Plan: colp  6/22/21 Gyn visit - [place] recommended, Patient refused  6/28/21 GynOnc visit plan - HPV HR+: \"Repeat HPV-based screening in 1 year based upon ASCCP\", Due 3/23/22  5/6/22 Lost to follow up  8/18/22 NIL pap, Neg HPV.  Plan: cotest in 1 year  6/14/23 NIL Pap, Neg HR HPV. Plan: cotest in 1 year and establish with OB/GYN provider, per Dr. Santiago.   7/19/24 NIL pap, Neg HPV. Plan cotest in 5 years      Closed fracture dislocation of hip joint, left, initial encounter (H) 09/28/2018     Priority: Medium    Vulvar abscess 08/16/2017     Priority: Medium    ADD (attention deficit disorder) without hyperactivity 04/18/2014     Priority: Medium    Depressed 05/10/2012     Priority: Medium        Past Medical History:    Past Medical History:   Diagnosis Date    Arthritis     Cervical high risk HPV (human papillomavirus) test positive 03/12/2019    Esophageal reflux     " Gastroesophageal reflux disease     HSV (herpes simplex virus) infection     Methamphetamine abuse (H)     Obese     Overdose of antipsychotic 2012    PONV (postoperative nausea and vomiting)     recurrent Bartholin's cyst     TINO III (vulvar intraepithelial neoplasia III)        Past Surgical History:    Past Surgical History:   Procedure Laterality Date    BIOPSY       SECTION N/A 3/1/2024    Procedure: PRIMARY  SECTION;  Surgeon: Inderjit Olivas MD;  Location: [facility]    CHOLECYSTECTOMY      ENT SURGERY      ESOPHAGOSCOPY, GASTROSCOPY, DUODENOSCOPY (EGD), COMBINED N/A 2023    Procedure: Esophagoscopy, gastroscopy, duodenoscopy (EGD), combined;  Surgeon: Thierno Escobar MD;  Location: [facility]    EXAM UNDER ANESTHESIA PELVIC N/A 2020    Procedure: EXAM UNDER ANESTHESIA, PELVIS, PAP;  Surgeon: Froylan Schwarz MD;  Location: [facility]    EXCISE LESION VULVA Right 6/10/2025    Procedure: RIGHT VULVA  EXCISION REVISION;  Surgeon: Froylan Schwarz MD;  Location: [facility]    EXCISE VULVA WIDE LOCAL Bilateral 2020    Procedure: Right Vulva Wedge Resection and Incision and Drainage Left Vulva;  Surgeon: Froylan Schwarz MD;  Location: [facility]    INCISION AND DRAINAGE ABSCESS PELVIS, COMBINED N/A 2018    Procedure: COMBINED INCISION AND DRAINAGE ABSCESS PELVIS;  INCISION AND DRAINAGE LABIAL ABSCESS;  Surgeon: Jase Beach MD;  Location: [facility]    INCISION AND DRAINAGE ABSCESS PELVIS, COMBINED N/A 2019    Procedure: Incision and Drainage Right Labial Abscess;  Surgeon: Jase Beach MD;  Location: [facility]    INCISION AND DRAINAGE LOWER EXTREMITY, COMBINED Right 2019    Procedure: irrigation and debridement right leg dog bite;  Surgeon: Rommel Pérez MD;  Location: [facility]    LAP ADJUSTABLE GASTRIC BAND      LEEP TX, CERVICAL      MAMMOPLASTY REDUCTION BILATERAL      MARSUPIALIZATION BARTHOLIN CYST N/A 2019    Procedure:  Bartholin's Cyst removal;  Surgeon: Froylan Schwarz MD;  Location: [facility]    MARSUPIALIZATION BARTHOLIN CYST Left 01/29/2020    Procedure: Excision of left bartholin's abscess;  Surgeon: Froylan Schwarz MD;  Location: [facility]    MARSUPIALIZATION BARTHOLIN CYST Right 5/8/2024    Procedure: MARSUPIALIZATION, CYST, BARTHOLIN'S GLAND;  Surgeon: Jase Man MD;  Location: [facility]    MARSUPIALIZATION BARTHOLIN CYST Right 4/24/2025    Procedure: BARTHOLIN GLAND EXCISION, RIGHT;  Surgeon: Froylan Schwarz MD;  Location: [facility] OR    ORTHOPEDIC SURGERY         Family History:    Family History   Problem Relation Age of Onset    Thyroid Disease Mother     Heart Disease Father     Coronary Artery Disease Father     Hypertension Father     Hyperlipidemia Father     Mental Illness Father     Substance Abuse Father     Diabetes Maternal Grandmother     Prostate Cancer Maternal Grandfather     Breast Cancer Paternal Grandmother     Testicular cancer Paternal Grandfather     Depression Half-Sister     Anxiety Disorder Half-Sister     Obesity Sister        Social History:  Marital Status:  Single [1]  Social History     Tobacco Use    Smoking status: Former     Current packs/day: 0.50     Average packs/day: 0.5 packs/day for 10.0 years (5.0 ttl pk-yrs)     Types: Cigarettes    Smokeless tobacco: Former    Tobacco comments:     uses E cig with zero nicotine    Vaping Use    Vaping status: Every Day    Substances: no nicotine   Substance Use Topics    Alcohol use: Yes     Comment: occasional    Drug use: Not Currently     Types: Marijuana, Methamphetamines     Comment: Reports Marijuana and meth, last 9/2023        Medications:    cephALEXin (KEFLEX) 500 MG capsule  sulfamethoxazole-trimethoprim (BACTRIM DS) 800-160 MG tablet  traMADol (ULTRAM) 50 MG tablet  Ashwagandha (ASHWAGANDHA GUMMIES) 500 MG CHEW  celecoxib (CELEBREX) 200 MG capsule  Cholecalciferol ([employer] D3) 10 MCG (400 UNIT)  CAPS  cyclobenzaprine (FLEXERIL) 5 MG tablet  EQ ARTIFICIAL TEARS 1-0.3 % SOLN  lidocaine (XYLOCAINE) 5 % external ointment  norgestimate-ethinyl estradiol (ORTHO-CYCLEN) 0.25-35 MG-MCG tablet  omeprazole (PRILOSEC) 20 MG DR capsule  Prenatal Vit-Fe Fumarate-FA (PRENATAL MULTIVITAMIN W/IRON) 27-0.8 MG tablet  tirzepatide-Weight Management (ZEPBOUND) 10 MG/0.5ML prefilled pen  tirzepatide-Weight Management (ZEPBOUND) 12.5 MG/0.5ML prefilled pen  vitamin C (ASCORBIC ACID) 500 MG tablet          Review of Systems   Constitutional:  Negative for appetite change, chills and fever.   HENT: Negative.     Respiratory: Negative.     Cardiovascular: Negative.    Gastrointestinal: Negative.    Skin:  Positive for color change and rash (Right anterior chest into the right axilla.).   Neurological: Negative.    Psychiatric/Behavioral: Negative.     All other systems reviewed and are negative.    Physical Exam   BP: (!) 154/103  Pulse: 115  Temp: 98.1  F (36.7  C)  Resp: 18  Weight:  (refused)  SpO2: 100 %      Physical Exam  Vitals and nursing note reviewed.   Constitutional:       Appearance: She is not toxic-appearing.   HENT:      Head: Normocephalic and atraumatic.      Mouth/Throat:      Mouth: Mucous membranes are moist.   Eyes:      General: No scleral icterus.     Conjunctiva/sclera: Conjunctivae normal.      Pupils: Pupils are equal, round, and reactive to light.   Cardiovascular:      Rate and Rhythm: Tachycardia present.      Pulses: Normal pulses.   Pulmonary:      Effort: Pulmonary effort is normal. No respiratory distress.      Comments: Examination of the chest wall reveals evidence of a erythema extending from the lateral aspect of the right breast into the right axilla.  There is some induration over this area as well.  Patient is morbidly obese making subcutaneous exam with palpation difficult.  No obvious area of fluctuance palpated.  Ultrasound ordered.    Chest:      Chest wall: Tenderness present.    Abdominal:      Tenderness: There is no abdominal tenderness.   Musculoskeletal:      Cervical back: Normal range of motion and neck supple.   Skin:     Capillary Refill: Capillary refill takes less than 2 seconds.      Findings: Erythema present.   Neurological:      General: No focal deficit present.      Mental Status: She is alert and oriented to person, place, and time.   Psychiatric:         Mood and Affect: Mood normal.         Behavior: Behavior normal.         ED Course        Procedures              Critical Care time:  none     None         Recent Results (from the past 24 hours)   CBC with platelets differential    Narrative    The following orders were created for panel order CBC with platelets differential.  Procedure                               Abnormality         Status                     ---------                               -----------         ------                     CBC with platelets and ...[[id number]]  Abnormal            Final result                 Please view results for these tests on the individual orders.   CRP inflammation   Result Value Ref Range    CRP Inflammation 25.93 (H) <5.00 mg/L   CBC with platelets and differential   Result Value Ref Range    WBC Count 15.9 (H) 4.0 - 11.0 10e3/uL    RBC Count 4.44 3.80 - 5.20 10e6/uL    Hemoglobin 13.9 11.7 - 15.7 g/dL    Hematocrit 41.0 35.0 - 47.0 %    MCV 92 78 - 100 fL    MCH 31.3 26.5 - 33.0 pg    MCHC 33.9 31.5 - 36.5 g/dL    RDW 13.1 10.0 - 15.0 %    Platelet Count 275 150 - 450 10e3/uL    % Neutrophils 76 %    % Lymphocytes 18 %    % Monocytes 4 %    % Eosinophils 1 %    % Basophils 0 %    % Immature Granulocytes 1 %    NRBCs per 100 WBC 0 <1 /100    Absolute Neutrophils 12.1 (H) 1.6 - 8.3 10e3/uL    Absolute Lymphocytes 2.9 0.8 - 5.3 10e3/uL    Absolute Monocytes 0.7 0.0 - 1.3 10e3/uL    Absolute Eosinophils 0.1 0.0 - 0.7 10e3/uL    Absolute Basophils 0.0 0.0 - 0.2 10e3/uL    Absolute Immature Granulocytes 0.1 <=0.4 10e3/uL     Absolute NRBCs 0.0 10e3/uL   US Breast Right Limited 1-3 Quadrants    Narrative    Preliminary report. Please place in breast reading list.    EXAM: ULTRASOUND BREAST UNILATERAL RIGHT LIMITED  LOCATION: [facility]  DATE: 7/18/2025    INDICATION: Question right axilla chest wall mass abscess.    COMPARISON: None      Impression    IMPRESSION:     There is a complex fluid collection in the right axilla measuring 1.5 x 1.6 x 1.1 cm, which may be sterile or infected. There is surrounding subcutaneous edema.    Targeted sonographic evaluation of the right breast from the 8:00 to 10:00 position 10 cm from the nipple in the area of redness demonstrates no focal sonographic abnormality.      Preliminary report read by Dr. Rommel Escamilla MD  Date: 7/18/2025       Medications - No data to display    Assessments & Plan (with Medical Decision Making)  45-year-old to the ER with concerns of increased redness to the skin of the right anterior chest laterally.  This is associated with a draining area of the right axilla with a large amount of drainage relief from the site yesterday by the patient.  No significant suggestion that involves the breast tissue itself.  Patient with history of recurrent abscesses in the past.  Asked if she has had a history for MRSA and she states that she did have MRSA in the past.  Patient initiated on a course of antibiotic in the form of cephalexin and Bactrim DS.  Patient wanted the prescription sent to her pharmacy.  Patient told to return for increase or worsening of symptoms if noted.  Also encouraged follow-up in our clinic in 2 to 3 days for recheck and evaluation.     I have reviewed the nursing notes.    I have reviewed the findings, diagnosis, plan and need for follow up with the patient.           Discharge Medication List as of 7/18/2025  1:55 AM        START taking these medications    Details   cephALEXin (KEFLEX) 500 MG capsule Take 1 capsule (500  mg) by mouth 4 times daily for 7 days., Disp-28 capsule, R-0, E-Prescribe      sulfamethoxazole-trimethoprim (BACTRIM DS) 800-160 MG tablet Take 1 tablet by mouth 2 times daily for 7 days., Disp-14 tablet, R-0, E-Prescribe      traMADol (ULTRAM) 50 MG tablet Take 1 tablet (50 mg) by mouth every 6 hours as needed for severe pain., Disp-6 tablet, R-0, E-Prescribe                  I verbally discussed the findings of the evaluation today in the ER. I have verbally discussed with Ayanna the suggested treatment(s) as described in the discharge instructions and handouts. I have prescribed the above listed medications and instructed her on appropriate use of these medications.      I have verbally suggested she follow-up in her clinic or return to the ER for increased symptoms. See the follow-up recommendations documented  in the after visit summary in this visit's EPIC chart.      Disclaimer: This note consists of words and symbols derived from keyboarding and dictation using voice recognition software.  As a result, there may be errors that have gone undetected.  Please consider this when interpreting information found in this note.    Final diagnoses:   Cellulitis of chest wall   Abscess of axilla, right       7/17/2025   [facility] EMERGENCY DEPT       Rommel Pa,   07/18/25 0640

## 2025-07-20 ENCOUNTER — MYC MEDICAL ADVICE (OUTPATIENT)
Dept: FAMILY MEDICINE | Facility: CLINIC | Age: 35
End: 2025-07-20
Payer: COMMERCIAL

## 2025-07-20 DIAGNOSIS — N92.6 IRREGULAR PERIODS: Primary | ICD-10-CM

## 2025-07-21 ENCOUNTER — HOSPITAL ENCOUNTER (OUTPATIENT)
Dept: MRI IMAGING | Facility: CLINIC | Age: 35
Discharge: HOME | End: 2025-07-21
Attending: PHYSICIAN ASSISTANT | Admitting: PHYSICIAN ASSISTANT
Payer: COMMERCIAL

## 2025-07-21 ENCOUNTER — MYC MEDICAL ADVICE (OUTPATIENT)
Dept: FAMILY MEDICINE | Facility: CLINIC | Age: 35
End: 2025-07-21
Payer: COMMERCIAL

## 2025-07-21 DIAGNOSIS — M84.454A PATHOLOGICAL FRACTURE OF PELVIS, UNSPECIFIED PATHOLOGICAL CAUSE, INITIAL ENCOUNTER: ICD-10-CM

## 2025-07-21 PROCEDURE — A9585 GADOBUTROL INJECTION: HCPCS | Performed by: PHYSICIAN ASSISTANT

## 2025-07-21 PROCEDURE — 72197 MRI PELVIS W/O & W/DYE: CPT

## 2025-07-21 PROCEDURE — 255N000002 HC RX 255 OP 636: Performed by: PHYSICIAN ASSISTANT

## 2025-07-21 PROCEDURE — 72197 MRI PELVIS W/O & W/DYE: CPT | Mod: 26 | Performed by: RADIOLOGY

## 2025-07-21 RX ORDER — NORGESTIMATE AND ETHINYL ESTRADIOL 0.25-0.035
1 KIT ORAL DAILY
Qty: 84 TABLET | Refills: 4 | Status: SHIPPED | OUTPATIENT
Start: 2025-07-21

## 2025-07-21 RX ORDER — GADOBUTROL 604.72 MG/ML
10 INJECTION INTRAVENOUS ONCE
Status: COMPLETED | OUTPATIENT
Start: 2025-07-21 | End: 2025-07-21

## 2025-07-21 RX ADMIN — GADOBUTROL 10 ML: 604.72 INJECTION INTRAVENOUS at 14:40

## 2025-07-21 NOTE — TELEPHONE ENCOUNTER
Patient requesting refill of birth control, that was patient previously requested removal/historical.      Anthony Barragan RN on 7/21/2025 at 9:46 AM

## 2025-07-22 NOTE — TELEPHONE ENCOUNTER
DIAGNOSIS: sacral fracture    APPOINTMENT DATE: 07/25/25   NOTES STATUS DETAILS   OFFICE NOTE from other specialist Internal 06/30/25: Navya Busch NP   DISCHARGE REPORT from the ER Internal 06/24/25: [facility] ED   MEDICATION LIST Internal    LABS     MRI PACS 07/21/25: Pelvic  07/10/25: L-Spine  07/10/25: Liver   CT SCAN PACS 04/15/25: Abd Pel  09/29/18: Pelvis   XRAYS (IMAGES & REPORTS) PACS 06/24/25: Pelvis  06/24/25: L-Spine

## 2025-07-22 NOTE — TELEPHONE ENCOUNTER
SHIRA left offering an appointment with Dr. Modi Friday 7/25 at 9:45. Requested a call back to see if that works for her.     Lisa Dumont LPN

## 2025-07-23 ENCOUNTER — PATIENT OUTREACH (OUTPATIENT)
Dept: CARE COORDINATION | Facility: CLINIC | Age: 35
End: 2025-07-23
Payer: COMMERCIAL

## 2025-07-24 ENCOUNTER — PATIENT OUTREACH (OUTPATIENT)
Dept: CARE COORDINATION | Facility: CLINIC | Age: 35
End: 2025-07-24
Payer: COMMERCIAL

## 2025-07-24 NOTE — PROGRESS NOTES
[facility] Contact  [facility]/Voicemail     Clinical Data: Care Coordination ED-sourced Outreach-     Outreach attempted x 2.  Left message on patient's voicemail, providing [facility]'s 24/7 scheduling and nurse triage phone number [phone] ([phone]) for questions/concerns and/or to schedule an appt with an [facility] provider.      Care Coordination introduction letter with explanation of Clinic Care Coordination services sent to patient via [employer]. Clinic Care Coordination services remain available via referral if needed.    Plan: [facility] will do no further outreaches at this time.       GISELL Hoffmann  Connected Care Resource [place], [facility]    *Connected Care Resource Team does NOT follow patient ongoing. Referrals are identified based on internal discharge reports and the outreach is to ensure patient has an understanding of their discharge instructions.

## 2025-07-24 NOTE — LETTER
Ayanna Davidson  [address]  [place] [address]    Dear Ayanna Davidson,      I am a team member within the Conne[facility] Center with [facility]. I recently tried to reach you to ensure you were doing well following a recent visit within our health system. I also wanted to take this chance to introduce Clinic Care Coordination.     Below is a description of Clinic Care Coordination and how this team can further assist you:       The Clinic Care Coordination team is made up of a Registered Nurse, , Financial Resource Worker, and a Community Health Worker who understand and can help navigate the health care system. The goal of clinic care coordination is to help you manage your health, improve access to care, and achieve optimal health outcomes. They work alongside your provider to assist you in determining your health and social needs, obtain health care and community resources, and provide you with necessary information and education. Clinic Care Coordination can work with you through any barriers and develop a care plan that helps coordinate and strengthen the relationship between you and your care team.    If you wish to connect with the Clinic Care Coordination Team, please let your [facility] Primary Care Provider or Clinic Care Team know and they can place a referral. The Clinic Care Coordination team will then reach out by phone to further support you.    We are focused on providing you with the highest-quality healthcare experience possible.    Sincerely,   Your care team with [facility] [address] [place] [address] ([phone]).

## 2025-07-25 ENCOUNTER — PRE VISIT (OUTPATIENT)
Dept: ORTHOPEDICS | Facility: CLINIC | Age: 35
End: 2025-07-25

## 2025-07-28 ENCOUNTER — MYC MEDICAL ADVICE (OUTPATIENT)
Dept: FAMILY MEDICINE | Facility: CLINIC | Age: 35
End: 2025-07-28

## 2025-07-28 ENCOUNTER — OFFICE VISIT (OUTPATIENT)
Dept: FAMILY MEDICINE | Facility: CLINIC | Age: 35
End: 2025-07-28
Payer: COMMERCIAL

## 2025-07-28 VITALS
SYSTOLIC BLOOD PRESSURE: 106 MMHG | RESPIRATION RATE: 16 BRPM | HEIGHT: 65 IN | OXYGEN SATURATION: 100 % | TEMPERATURE: 97.6 F | BODY MASS INDEX: 38.89 KG/M2 | DIASTOLIC BLOOD PRESSURE: 74 MMHG | HEART RATE: 105 BPM | WEIGHT: 233.4 LBS

## 2025-07-28 DIAGNOSIS — R10.84 ABDOMINAL PAIN, GENERALIZED: ICD-10-CM

## 2025-07-28 DIAGNOSIS — N63.31 LUMP OF AXILLARY TAIL OF RIGHT BREAST: ICD-10-CM

## 2025-07-28 DIAGNOSIS — S32.10XA CLOSED FRACTURE OF SACRUM, UNSPECIFIED PORTION OF SACRUM, INITIAL ENCOUNTER (H): ICD-10-CM

## 2025-07-28 DIAGNOSIS — M54.41 ACUTE RIGHT-SIDED LOW BACK PAIN WITH RIGHT-SIDED SCIATICA: Primary | ICD-10-CM

## 2025-07-28 DIAGNOSIS — G57.21 ANTERIOR FEMORAL CUTANEOUS NEUROPATHY OF RIGHT LOWER EXTREMITY: ICD-10-CM

## 2025-07-28 PROCEDURE — 3074F SYST BP LT 130 MM HG: CPT | Performed by: FAMILY MEDICINE

## 2025-07-28 PROCEDURE — 3078F DIAST BP <80 MM HG: CPT | Performed by: FAMILY MEDICINE

## 2025-07-28 PROCEDURE — 99214 OFFICE O/P EST MOD 30 MIN: CPT | Performed by: FAMILY MEDICINE

## 2025-07-28 PROCEDURE — 1125F AMNT PAIN NOTED PAIN PRSNT: CPT | Performed by: FAMILY MEDICINE

## 2025-07-28 PROCEDURE — G2211 COMPLEX E/M VISIT ADD ON: HCPCS | Performed by: FAMILY MEDICINE

## 2025-07-28 RX ORDER — DICYCLOMINE HYDROCHLORIDE 10 MG/1
10 CAPSULE ORAL 4 TIMES DAILY PRN
Qty: 60 CAPSULE | Refills: 3 | Status: SHIPPED | OUTPATIENT
Start: 2025-07-28

## 2025-07-28 ASSESSMENT — PAIN SCALES - GENERAL: PAINLEVEL_OUTOF10: SEVERE PAIN (7)

## 2025-07-28 NOTE — PROGRESS NOTES
Assessment & Plan     (M54.41) Acute right-sided low back pain with right-sided sciatica  (primary encounter diagnosis)  Comment: Patient with continued low back pain and now with paresthesias worse on the right than the left.  We did discuss referral to neurology for possible EMG and continued management as well as referral to physical therapy and patient was amenable to both.    (S32.10XA) Closed fracture of sacrum, unspecified portion of sacrum, initial encounter (H)  Comment: Follow-up with DEXA scan.  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  Refer to neurology for possible EMG  Plan: Adult Neurology  Referral      (R10.84) Abdominal pain, generalized  Comment: Based on symptoms more like waxing and waning bloating.  Trial Bentyl to see if helpful  Plan: dicyclomine (BENTYL) 10 MG capsule            (N63.31) Lump of axillary tail of right breast  Comment: Patient with a lump of the axillary tail on the right breast imaging ordered  Plan: MA Diagnostic Right w/ [place], US Breast Right         Limited 1-3 Quadrants            The longitudinal plan of care for the diagnosis(es)/condition(s) as documented were addressed during this visit. Due to the added complexity in care, I will continue to support Ayanna in the subsequent management and with ongoing continuity of care.      Post Medication Reconciliation Status:     Follow-up       Subjective   Ayanna is a 35 year old, presenting for the following health issues:  Back Pain, Abdominal Pain, and RECHECK        6/30/2025     3:17 PM   Additional Questions   Roomed by Tammy FRYE      Patient is a 35-year-old female with a history of  "multiple issues and back most recently with back and abdominal pain and decreased sensation of the lower extremities specifically on the right.  She underwent a Bartholin's gland cyst resection and had multiple procedures for that.  She started having low back pain and was diagnosed with a sacral fracture which was presumed to be related to intraoperative positioning.  No clear lytic lesion for the bones.  She has had advanced imaging which showed edema but was otherwise benign appearing.  We did discuss obtaining a DEXA scan and patient was amenable to that      Objective    /74   Pulse 105   Temp 97.6  F (36.4  C) (Temporal)   Resp 16   Ht 1.643 m (5' 4.69\")   Wt 105.9 kg (233 lb 6.4 oz)   LMP 07/26/2025 (Exact Date)   SpO2 100%   BMI 39.22 kg/m    Body mass index is 39.22 kg/m .  Physical Exam  Constitutional:       General: She is not in acute distress.     Appearance: She is not ill-appearing, toxic-appearing or diaphoretic.   HENT:      Mouth/Throat:      Mouth: Mucous membranes are moist.   Eyes:      General: No scleral icterus.     Pupils: Pupils are equal, round, and reactive to light.   Cardiovascular:      Rate and Rhythm: Normal rate and regular rhythm.      Pulses: Normal pulses.   Pulmonary:      Effort: Pulmonary effort is normal. No respiratory distress.      Breath sounds: Normal breath sounds. No stridor. No wheezing, rhonchi or rales.   Chest:          Comments: Lump axillary tail right breast  Skin:     General: Skin is warm and dry.      Capillary Refill: Capillary refill takes less than 2 seconds.   Neurological:      Mental Status: She is alert and oriented to person, place, and time. Mental status is at baseline.   Psychiatric:         Mood and Affect: Mood normal.         Behavior: Behavior normal.         Thought Content: Thought content normal.         Judgment: Judgment normal.                    Signed Electronically by: Rhianna Michaels MD    "

## 2025-07-28 NOTE — PROGRESS NOTES
"  {PROVIDER CHARTING PREFERENCE:257844}    Colleen Urena is a 35 year old, presenting for the following health issues:  Back Pain, Abdominal Pain, and RECHECK      7/28/2025    10:50 AM   Additional Questions   Roomed by Andrew Nelson     HPI      {MA/LPN/RN Pre-Provider Visit Orders- hCG/UA/Strep (Optional):749579}  {SUPERLIST (Optional):641328}  {additonal problems for provider to add (Optional):156864}    {ROS Picklists (Optional):018264}      Objective    /74   Pulse 105   Temp 97.6  F (36.4  C) (Temporal)   Resp 16   Ht 1.643 m (5' 4.69\")   Wt 105.9 kg (233 lb 6.4 oz)   LMP 07/26/2025 (Exact Date)   SpO2 100%   BMI 39.22 kg/m    Body mass index is 39.22 kg/m .  Physical Exam   {Exam List (Optional):608688}    {Diagnostic Test Results (Optional):373210}        Signed Electronically by: Rhianna Michaels MD  {Email feedback regarding this note to [email or web address]   :540809}  "

## 2025-07-29 ENCOUNTER — PATIENT OUTREACH (OUTPATIENT)
Dept: CARE COORDINATION | Facility: CLINIC | Age: 35
End: 2025-07-29
Payer: COMMERCIAL

## 2025-08-04 ENCOUNTER — HOSPITAL ENCOUNTER (OUTPATIENT)
Dept: BONE DENSITY | Facility: CLINIC | Age: 35
End: 2025-08-04
Attending: FAMILY MEDICINE
Payer: COMMERCIAL

## 2025-08-04 DIAGNOSIS — S32.10XA CLOSED FRACTURE OF SACRUM, UNSPECIFIED PORTION OF SACRUM, INITIAL ENCOUNTER (H): ICD-10-CM

## 2025-08-04 PROCEDURE — 77080 DXA BONE DENSITY AXIAL: CPT

## 2025-08-05 ENCOUNTER — VIRTUAL VISIT (OUTPATIENT)
Dept: ENDOCRINOLOGY | Facility: CLINIC | Age: 35
End: 2025-08-05
Payer: COMMERCIAL

## 2025-08-05 VITALS — BODY MASS INDEX: 38.15 KG/M2 | HEIGHT: 65 IN | WEIGHT: 229 LBS

## 2025-08-05 DIAGNOSIS — E66.01 CLASS 2 SEVERE OBESITY WITH SERIOUS COMORBIDITY AND BODY MASS INDEX (BMI) OF 39.0 TO 39.9 IN ADULT, UNSPECIFIED OBESITY TYPE (H): ICD-10-CM

## 2025-08-05 DIAGNOSIS — E66.812 CLASS 2 SEVERE OBESITY WITH SERIOUS COMORBIDITY AND BODY MASS INDEX (BMI) OF 39.0 TO 39.9 IN ADULT, UNSPECIFIED OBESITY TYPE (H): ICD-10-CM

## 2025-08-05 PROCEDURE — 98004 SYNCH AUDIO-VIDEO EST SF 10: CPT

## 2025-08-05 PROCEDURE — G2211 COMPLEX E/M VISIT ADD ON: HCPCS | Mod: 95

## 2025-08-05 PROCEDURE — 1125F AMNT PAIN NOTED PAIN PRSNT: CPT | Mod: 95

## 2025-08-05 ASSESSMENT — PAIN SCALES - GENERAL: PAINLEVEL_OUTOF10: SEVERE PAIN (8)

## 2025-08-12 ENCOUNTER — MYC MEDICAL ADVICE (OUTPATIENT)
Dept: FAMILY MEDICINE | Facility: CLINIC | Age: 35
End: 2025-08-12
Payer: COMMERCIAL

## 2025-08-12 DIAGNOSIS — M54.41 ACUTE RIGHT-SIDED LOW BACK PAIN WITH RIGHT-SIDED SCIATICA: Primary | ICD-10-CM

## 2025-08-12 DIAGNOSIS — S32.10XA CLOSED FRACTURE OF SACRUM, UNSPECIFIED PORTION OF SACRUM, INITIAL ENCOUNTER (H): ICD-10-CM

## 2025-08-12 DIAGNOSIS — G57.21 ANTERIOR FEMORAL CUTANEOUS NEUROPATHY OF RIGHT LOWER EXTREMITY: ICD-10-CM

## 2025-08-13 ENCOUNTER — RESULTS FOLLOW-UP (OUTPATIENT)
Dept: FAMILY MEDICINE | Facility: CLINIC | Age: 35
End: 2025-08-13

## 2025-08-13 ENCOUNTER — HOSPITAL ENCOUNTER (OUTPATIENT)
Dept: ULTRASOUND IMAGING | Facility: CLINIC | Age: 35
Discharge: HOME | End: 2025-08-13
Attending: FAMILY MEDICINE
Payer: COMMERCIAL

## 2025-08-13 DIAGNOSIS — N63.31 LUMP OF AXILLARY TAIL OF RIGHT BREAST: Primary | ICD-10-CM

## 2025-08-13 DIAGNOSIS — N63.31 LUMP OF AXILLARY TAIL OF RIGHT BREAST: ICD-10-CM

## 2025-08-13 PROCEDURE — 76882 US LMTD JT/FCL EVL NVASC XTR: CPT | Mod: RT

## 2025-08-22 ENCOUNTER — MYC MEDICAL ADVICE (OUTPATIENT)
Dept: FAMILY MEDICINE | Facility: CLINIC | Age: 35
End: 2025-08-22
Payer: COMMERCIAL

## 2025-09-02 ENCOUNTER — MYC MEDICAL ADVICE (OUTPATIENT)
Dept: FAMILY MEDICINE | Facility: CLINIC | Age: 35
End: 2025-09-02
Payer: COMMERCIAL

## 2025-09-03 ENCOUNTER — THERAPY VISIT (OUTPATIENT)
Dept: PHYSICAL THERAPY | Facility: CLINIC | Age: 35
End: 2025-09-03
Attending: FAMILY MEDICINE
Payer: COMMERCIAL

## 2025-09-03 DIAGNOSIS — G57.21 ANTERIOR FEMORAL CUTANEOUS NEUROPATHY OF RIGHT LOWER EXTREMITY: ICD-10-CM

## 2025-09-03 DIAGNOSIS — S32.10XA CLOSED FRACTURE OF SACRUM, UNSPECIFIED PORTION OF SACRUM, INITIAL ENCOUNTER (H): ICD-10-CM

## 2025-09-03 DIAGNOSIS — M54.41 ACUTE RIGHT-SIDED LOW BACK PAIN WITH RIGHT-SIDED SCIATICA: ICD-10-CM

## 2025-09-03 PROCEDURE — 97161 PT EVAL LOW COMPLEX 20 MIN: CPT | Mod: GP | Performed by: PHYSICAL THERAPIST

## 2025-09-03 PROCEDURE — 97110 THERAPEUTIC EXERCISES: CPT | Mod: GP | Performed by: PHYSICAL THERAPIST

## (undated) DEVICE — GLOVE PROTEXIS W/NEU-THERA 7.5  2D73TE75

## (undated) DEVICE — SOL WATER IRRIG 1000ML BOTTLE 2F7114

## (undated) DEVICE — Device

## (undated) DEVICE — SOL NACL 0.9% IRRIG 1000ML BOTTLE 07138-09

## (undated) DEVICE — DRSG KERLIX 4 1/2"X4YDS ROLL 6730

## (undated) DEVICE — PREP POVIDONE IODINE SOLUTION 10% 120ML

## (undated) DEVICE — PAD PERI INDIV WRAP 11" 2022

## (undated) DEVICE — SUTURE VICRYL+ 2-0 27IN SH UND VCP417H

## (undated) DEVICE — SU VICRYL 3-0 SH 27" UND J416H

## (undated) DEVICE — DRAPE GYN/UROLOGY FLUID POUCH TUR 29455

## (undated) DEVICE — SUCTION TIP YANKAUER W/O VENT K86

## (undated) DEVICE — GLOVE BIOGEL 7.5 LATEX

## (undated) DEVICE — PREP SCRUB SOL EXIDINE 4% CHG 4OZ 29002-404

## (undated) DEVICE — BASIN SET MINOR DISP

## (undated) DEVICE — RETR ELASTIC STAYS LONE STAR SHARP 5MM 8/PACK 3311-8G

## (undated) DEVICE — SOL WATER IRRIG 1000ML BOTTLE 07139-09

## (undated) DEVICE — ESU PENCIL SMOKE EVAC W/ROCKER SWITCH 0703-047-000

## (undated) DEVICE — ELECTRODE PATIENT RETURN ADULT L10 FT 2 PLATE CORD 0855C

## (undated) DEVICE — PREP CHLORAPREP 26ML TINTED ORANGE  260815

## (undated) DEVICE — PACK MINOR PROCEDURE CUSTOM

## (undated) DEVICE — GLOVE PROTEXIS W/NEU-THERA 8.0  2D73TE80

## (undated) DEVICE — SUTURE VICRYL+ 2 27IN CP UNDYED VCP195H

## (undated) DEVICE — SPONGE RAY-TEC 4X4" 7317

## (undated) DEVICE — RETR PANNICULUS TRAXI FOR PT POSITIONING PRS-1030

## (undated) DEVICE — PREP SKIN SCRUB TRAY 4461A

## (undated) DEVICE — GOWN XLG DISP 9545

## (undated) DEVICE — BLADE KNIFE SURG 11 371111

## (undated) DEVICE — DRSG TEGADERM 2 3/8X2 3/4" 1624W

## (undated) DEVICE — PREP DYNA-HEX 4% CHG SCRUB 4OZ BOTTLE MDS098710

## (undated) DEVICE — SYR 10ML FINGER CONTROL W/O NDL 309695

## (undated) DEVICE — DRSG GAUZE 4X4" 3033

## (undated) DEVICE — SPONGE LAP 8X4IN 12 PLY RADOPQ DERMACEA STRL BLU WHT

## (undated) DEVICE — CATH INTERMITTENT CLEAN-CATH FEMALE 14FR 6" VINYL LF 420614

## (undated) DEVICE — BLADE KNIFE SURG 15 371115

## (undated) DEVICE — GLOVE BIOGEL PI ULTRATOUCH G SZ 8.0 42180

## (undated) DEVICE — NEEDLE HYPO 25X1 SAFETY 305916

## (undated) DEVICE — GOWN IMPERVIOUS BREATHABLE 2XL/XLONG

## (undated) DEVICE — LABEL MEDICATION SYSTEM  3304

## (undated) DEVICE — GLOVE ESTEEM BLUE W/NEU-THERA 8.0  2D73PB80

## (undated) DEVICE — PAD PERI INDIV WRAP 11" 2022A

## (undated) DEVICE — SPONGE KITNER DISSECTING 7102*

## (undated) DEVICE — GLOVE BIOGEL PI MICRO SZ 6.5 48565

## (undated) DEVICE — PACK LITHOTOMY CUSTOM

## (undated) DEVICE — TUBING SUCTION 12"X1/4" N612

## (undated) DEVICE — RETR RING LONE STAR 14.1X14.1CM 3307G

## (undated) DEVICE — CUSTOM PACK PERI GYN SMA5BPGHEA

## (undated) DEVICE — ESU PENCIL W/HOLSTER

## (undated) DEVICE — SOL NACL 0.9% IRRIG 1000ML BOTTLE 2F7124

## (undated) DEVICE — DRSG TELFA 3X8" 1238

## (undated) DEVICE — SUCTION MANIFOLD NEPTUNE 2 SYS 4 PORT 0702-020-000

## (undated) DEVICE — CATH TRAY FOLEY 16FR DRAINAGE BAG STATLOCK 899916

## (undated) DEVICE — PREP DURAPREP 26ML APL 8630

## (undated) DEVICE — NDL COUNTER 10CT

## (undated) DEVICE — GLOVE BIOGEL PI MICRO INDICATOR UNDERGLOVE SZ 7.0 48970

## (undated) DEVICE — ESU GROUND PAD UNIVERSAL W/O CORD

## (undated) DEVICE — SUCTION TIP YANKAUER W/O VENT BULBOUS TIP

## (undated) DEVICE — SYR BULB IRRIG DOVER 60 ML LATEX FREE 67000

## (undated) DEVICE — GLOVE BIOGEL PI INDICATOR 8.0 LF 41680

## (undated) DEVICE — SUCTION MANIFOLD NEPTUNE 2 SYS 1 PORT 702-025-000

## (undated) DEVICE — NDL ECLIPSE 25GA 1.5"

## (undated) DEVICE — SU MONOCRYL 0 CT-1 36" UND Y946H

## (undated) DEVICE — PACK EXTREMITY LOWER STD (ARTHROSCOPY) 29180

## (undated) DEVICE — ADH SKIN CLOSURE PREMIERPRO EXOFIN 1.0ML 3470

## (undated) DEVICE — PACK VAG HYST

## (undated) DEVICE — DRSG ADAPTIC 3X3" 6112

## (undated) DEVICE — LINEN TOWEL PACK X5 5464

## (undated) DEVICE — SU VICRYL 4-0 PS-2 27" UND J426H

## (undated) DEVICE — STPL SKIN SUBCUTICULAR INSORB 2025

## (undated) DEVICE — PACKING IODOFORM STRIP 1/2" 7832

## (undated) DEVICE — SU VICRYL+ 3-0 27IN SH UND VCP416H

## (undated) DEVICE — GLOVE PROTEXIS BLUE W/NEU-THERA 8.0  2D73EB80

## (undated) DEVICE — SYR EAR BULB 2OZ

## (undated) DEVICE — CATH FOLEY 16FR 5ML LUBRICATH LATEX 0165L16

## (undated) DEVICE — PREP POVIDONE IODINE SCRUB 7.5% 120ML

## (undated) DEVICE — LUBRICATING JELLY 4.25OZ

## (undated) DEVICE — SU PLAIN 2-0 CT 27" 853H

## (undated) DEVICE — PACK SET-UP STD 9102

## (undated) DEVICE — ESU ELEC NDL 1" COATED/INSULATED E1465

## (undated) DEVICE — DRAPE STERI INCISE 1050

## (undated) DEVICE — NDL COUNTER 20CT 31142493

## (undated) DEVICE — PACKING VAG 2 X 15 XRAY DETECT 32-1359

## (undated) DEVICE — SU MONOCRYL 4-0 PS-2 18" UND Y496G

## (undated) DEVICE — PACK BASIC SET-UP 9101

## (undated) DEVICE — TUBING SUCTION MEDI-VAC 1/4"X20' N620A

## (undated) DEVICE — STOCKING SLEEVE COMPRESSION CALF MED

## (undated) DEVICE — PACK C-SECTION

## (undated) RX ORDER — ALBUTEROL SULFATE 90 UG/1
AEROSOL, METERED RESPIRATORY (INHALATION)
Status: DISPENSED
Start: 2020-01-29

## (undated) RX ORDER — PHENYLEPHRINE HCL IN 0.9% NACL 1 MG/10 ML
SYRINGE (ML) INTRAVENOUS
Status: DISPENSED
Start: 2019-03-05

## (undated) RX ORDER — LIDOCAINE HYDROCHLORIDE 10 MG/ML
INJECTION, SOLUTION EPIDURAL; INFILTRATION; INTRACAUDAL; PERINEURAL
Status: DISPENSED
Start: 2025-04-24

## (undated) RX ORDER — PROPOFOL 10 MG/ML
INJECTION, EMULSION INTRAVENOUS
Status: DISPENSED
Start: 2020-03-11

## (undated) RX ORDER — FENTANYL CITRATE 50 UG/ML
INJECTION, SOLUTION INTRAMUSCULAR; INTRAVENOUS
Status: DISPENSED
Start: 2025-06-10

## (undated) RX ORDER — FENTANYL CITRATE 50 UG/ML
INJECTION, SOLUTION INTRAMUSCULAR; INTRAVENOUS
Status: DISPENSED
Start: 2020-01-29

## (undated) RX ORDER — ALBUTEROL SULFATE 90 UG/1
AEROSOL, METERED RESPIRATORY (INHALATION)
Status: DISPENSED
Start: 2018-02-26

## (undated) RX ORDER — DEXAMETHASONE SODIUM PHOSPHATE 10 MG/ML
INJECTION, EMULSION INTRAMUSCULAR; INTRAVENOUS
Status: DISPENSED
Start: 2025-06-10

## (undated) RX ORDER — FENTANYL CITRATE 50 UG/ML
INJECTION, SOLUTION INTRAMUSCULAR; INTRAVENOUS
Status: DISPENSED
Start: 2019-04-29

## (undated) RX ORDER — PROPOFOL 10 MG/ML
INJECTION, EMULSION INTRAVENOUS
Status: DISPENSED
Start: 2019-03-05

## (undated) RX ORDER — PROPOFOL 10 MG/ML
INJECTION, EMULSION INTRAVENOUS
Status: DISPENSED
Start: 2018-02-26

## (undated) RX ORDER — OXYTOCIN 10 [USP'U]/ML
INJECTION, SOLUTION INTRAMUSCULAR; INTRAVENOUS
Status: DISPENSED
Start: 2024-03-01

## (undated) RX ORDER — LIDOCAINE HYDROCHLORIDE 20 MG/ML
INJECTION, SOLUTION EPIDURAL; INFILTRATION; INTRACAUDAL; PERINEURAL
Status: DISPENSED
Start: 2019-04-29

## (undated) RX ORDER — PROPOFOL 10 MG/ML
INJECTION, EMULSION INTRAVENOUS
Status: DISPENSED
Start: 2024-05-08

## (undated) RX ORDER — PROPOFOL 10 MG/ML
INJECTION, EMULSION INTRAVENOUS
Status: DISPENSED
Start: 2025-04-24

## (undated) RX ORDER — DEXAMETHASONE SODIUM PHOSPHATE 10 MG/ML
INJECTION, SOLUTION INTRAMUSCULAR; INTRAVENOUS
Status: DISPENSED
Start: 2024-05-08

## (undated) RX ORDER — DEXAMETHASONE SODIUM PHOSPHATE 10 MG/ML
INJECTION, SOLUTION INTRAMUSCULAR; INTRAVENOUS
Status: DISPENSED
Start: 2020-01-29

## (undated) RX ORDER — BUPIVACAINE HYDROCHLORIDE 2.5 MG/ML
INJECTION, SOLUTION EPIDURAL; INFILTRATION; INTRACAUDAL
Status: DISPENSED
Start: 2024-03-01

## (undated) RX ORDER — DIPHENHYDRAMINE HYDROCHLORIDE 50 MG/ML
INJECTION, SOLUTION INTRAMUSCULAR; INTRAVENOUS
Status: DISPENSED
Start: 2025-04-24

## (undated) RX ORDER — EPINEPHRINE 1 MG/ML
INJECTION, SOLUTION INTRAMUSCULAR; SUBCUTANEOUS
Status: DISPENSED
Start: 2024-05-08

## (undated) RX ORDER — FENTANYL CITRATE 50 UG/ML
INJECTION, SOLUTION INTRAMUSCULAR; INTRAVENOUS
Status: DISPENSED
Start: 2025-04-24

## (undated) RX ORDER — OXYTOCIN/0.9 % SODIUM CHLORIDE 30/500 ML
PLASTIC BAG, INJECTION (ML) INTRAVENOUS
Status: DISPENSED
Start: 2024-03-01

## (undated) RX ORDER — BUPIVACAINE HYDROCHLORIDE AND EPINEPHRINE 5; 5 MG/ML; UG/ML
INJECTION, SOLUTION EPIDURAL; INTRACAUDAL; PERINEURAL
Status: DISPENSED
Start: 2019-04-29

## (undated) RX ORDER — KETOROLAC TROMETHAMINE 30 MG/ML
INJECTION, SOLUTION INTRAMUSCULAR; INTRAVENOUS
Status: DISPENSED
Start: 2019-04-29

## (undated) RX ORDER — HYDROMORPHONE HYDROCHLORIDE 1 MG/ML
INJECTION, SOLUTION INTRAMUSCULAR; INTRAVENOUS; SUBCUTANEOUS
Status: DISPENSED
Start: 2019-04-29

## (undated) RX ORDER — PROPOFOL 10 MG/ML
INJECTION, EMULSION INTRAVENOUS
Status: DISPENSED
Start: 2025-06-10

## (undated) RX ORDER — FENTANYL CITRATE 50 UG/ML
INJECTION, SOLUTION INTRAMUSCULAR; INTRAVENOUS
Status: DISPENSED
Start: 2023-09-18

## (undated) RX ORDER — PROPOFOL 10 MG/ML
INJECTION, EMULSION INTRAVENOUS
Status: DISPENSED
Start: 2020-01-29

## (undated) RX ORDER — BUPIVACAINE HYDROCHLORIDE 2.5 MG/ML
INJECTION, SOLUTION EPIDURAL; INFILTRATION; INTRACAUDAL
Status: DISPENSED
Start: 2024-05-08

## (undated) RX ORDER — LIDOCAINE HYDROCHLORIDE 10 MG/ML
INJECTION, SOLUTION EPIDURAL; INFILTRATION; INTRACAUDAL; PERINEURAL
Status: DISPENSED
Start: 2024-05-08

## (undated) RX ORDER — DEXAMETHASONE SODIUM PHOSPHATE 10 MG/ML
INJECTION, SOLUTION INTRAMUSCULAR; INTRAVENOUS
Status: DISPENSED
Start: 2019-04-29

## (undated) RX ORDER — ONDANSETRON 2 MG/ML
INJECTION INTRAMUSCULAR; INTRAVENOUS
Status: DISPENSED
Start: 2024-05-08

## (undated) RX ORDER — ONDANSETRON 2 MG/ML
INJECTION INTRAMUSCULAR; INTRAVENOUS
Status: DISPENSED
Start: 2019-04-29

## (undated) RX ORDER — PROPOFOL 10 MG/ML
INJECTION, EMULSION INTRAVENOUS
Status: DISPENSED
Start: 2019-04-29

## (undated) RX ORDER — FENTANYL CITRATE 50 UG/ML
INJECTION, SOLUTION INTRAMUSCULAR; INTRAVENOUS
Status: DISPENSED
Start: 2024-05-08

## (undated) RX ORDER — ONDANSETRON 2 MG/ML
INJECTION INTRAMUSCULAR; INTRAVENOUS
Status: DISPENSED
Start: 2025-06-10

## (undated) RX ORDER — KETOROLAC TROMETHAMINE 30 MG/ML
INJECTION, SOLUTION INTRAMUSCULAR; INTRAVENOUS
Status: DISPENSED
Start: 2020-01-29

## (undated) RX ORDER — FENTANYL CITRATE 50 UG/ML
INJECTION, SOLUTION INTRAMUSCULAR; INTRAVENOUS
Status: DISPENSED
Start: 2020-03-11

## (undated) RX ORDER — LIDOCAINE HYDROCHLORIDE AND EPINEPHRINE 10; 10 MG/ML; UG/ML
INJECTION, SOLUTION INFILTRATION; PERINEURAL
Status: DISPENSED
Start: 2020-01-29

## (undated) RX ORDER — GINSENG 100 MG
CAPSULE ORAL
Status: DISPENSED
Start: 2020-01-29

## (undated) RX ORDER — HYDROMORPHONE HYDROCHLORIDE 1 MG/ML
INJECTION, SOLUTION INTRAMUSCULAR; INTRAVENOUS; SUBCUTANEOUS
Status: DISPENSED
Start: 2020-01-29

## (undated) RX ORDER — FENTANYL CITRATE 50 UG/ML
INJECTION, SOLUTION INTRAMUSCULAR; INTRAVENOUS
Status: DISPENSED
Start: 2019-08-11

## (undated) RX ORDER — LIDOCAINE HYDROCHLORIDE 10 MG/ML
INJECTION, SOLUTION EPIDURAL; INFILTRATION; INTRACAUDAL; PERINEURAL
Status: DISPENSED
Start: 2025-06-10

## (undated) RX ORDER — DEXMEDETOMIDINE HYDROCHLORIDE 100 UG/ML
INJECTION, SOLUTION INTRAVENOUS
Status: DISPENSED
Start: 2020-03-11

## (undated) RX ORDER — DIPHENHYDRAMINE HYDROCHLORIDE 50 MG/ML
INJECTION INTRAMUSCULAR; INTRAVENOUS
Status: DISPENSED
Start: 2020-03-11

## (undated) RX ORDER — BUPIVACAINE HYDROCHLORIDE 2.5 MG/ML
INJECTION, SOLUTION EPIDURAL; INFILTRATION; INTRACAUDAL
Status: DISPENSED
Start: 2023-09-18

## (undated) RX ORDER — ESMOLOL HYDROCHLORIDE 10 MG/ML
INJECTION INTRAVENOUS
Status: DISPENSED
Start: 2019-04-29

## (undated) RX ORDER — LIDOCAINE HYDROCHLORIDE 20 MG/ML
INJECTION, SOLUTION EPIDURAL; INFILTRATION; INTRACAUDAL; PERINEURAL
Status: DISPENSED
Start: 2019-08-11

## (undated) RX ORDER — LIDOCAINE HYDROCHLORIDE 20 MG/ML
INJECTION, SOLUTION EPIDURAL; INFILTRATION; INTRACAUDAL; PERINEURAL
Status: DISPENSED
Start: 2019-03-05

## (undated) RX ORDER — LIDOCAINE HYDROCHLORIDE 20 MG/ML
INJECTION, SOLUTION EPIDURAL; INFILTRATION; INTRACAUDAL; PERINEURAL
Status: DISPENSED
Start: 2020-03-11

## (undated) RX ORDER — DIMENHYDRINATE 50 MG/ML
INJECTION, SOLUTION INTRAMUSCULAR; INTRAVENOUS
Status: DISPENSED
Start: 2020-01-29

## (undated) RX ORDER — BUPIVACAINE HYDROCHLORIDE 2.5 MG/ML
INJECTION, SOLUTION EPIDURAL; INFILTRATION; INTRACAUDAL
Status: DISPENSED
Start: 2018-02-26

## (undated) RX ORDER — ONDANSETRON 2 MG/ML
INJECTION INTRAMUSCULAR; INTRAVENOUS
Status: DISPENSED
Start: 2025-04-24

## (undated) RX ORDER — DEXMEDETOMIDINE HYDROCHLORIDE 100 UG/ML
INJECTION, SOLUTION INTRAVENOUS
Status: DISPENSED
Start: 2019-08-11

## (undated) RX ORDER — FENTANYL CITRATE 50 UG/ML
INJECTION, SOLUTION INTRAMUSCULAR; INTRAVENOUS
Status: DISPENSED
Start: 2018-02-26

## (undated) RX ORDER — CEFAZOLIN SODIUM 1 G/3ML
INJECTION, POWDER, FOR SOLUTION INTRAMUSCULAR; INTRAVENOUS
Status: DISPENSED
Start: 2020-03-11

## (undated) RX ORDER — DEXAMETHASONE SODIUM PHOSPHATE 10 MG/ML
INJECTION INTRAMUSCULAR; INTRAVENOUS
Status: DISPENSED
Start: 2018-02-26

## (undated) RX ORDER — PROPOFOL 10 MG/ML
INJECTION, EMULSION INTRAVENOUS
Status: DISPENSED
Start: 2019-08-11

## (undated) RX ORDER — DEXAMETHASONE SODIUM PHOSPHATE 10 MG/ML
INJECTION, SOLUTION INTRAMUSCULAR; INTRAVENOUS
Status: DISPENSED
Start: 2019-08-11

## (undated) RX ORDER — DEXAMETHASONE SODIUM PHOSPHATE 10 MG/ML
INJECTION, SOLUTION INTRAMUSCULAR; INTRAVENOUS
Status: DISPENSED
Start: 2024-03-01

## (undated) RX ORDER — ONDANSETRON 2 MG/ML
INJECTION INTRAMUSCULAR; INTRAVENOUS
Status: DISPENSED
Start: 2020-03-11

## (undated) RX ORDER — SODIUM CHLORIDE FOR INHALATION 0.9 %
VIAL, NEBULIZER (ML) INHALATION
Status: DISPENSED
Start: 2019-03-05

## (undated) RX ORDER — LIDOCAINE HYDROCHLORIDE 20 MG/ML
INJECTION, SOLUTION EPIDURAL; INFILTRATION; INTRACAUDAL; PERINEURAL
Status: DISPENSED
Start: 2020-01-29

## (undated) RX ORDER — BUPIVACAINE HYDROCHLORIDE 2.5 MG/ML
INJECTION, SOLUTION EPIDURAL; INFILTRATION; INTRACAUDAL
Status: DISPENSED
Start: 2019-03-05

## (undated) RX ORDER — MORPHINE SULFATE 1 MG/ML
INJECTION, SOLUTION EPIDURAL; INTRATHECAL; INTRAVENOUS
Status: DISPENSED
Start: 2024-03-01

## (undated) RX ORDER — ONDANSETRON 2 MG/ML
INJECTION INTRAMUSCULAR; INTRAVENOUS
Status: DISPENSED
Start: 2024-03-01

## (undated) RX ORDER — PHENYLEPHRINE HCL IN 0.9% NACL 1 MG/10 ML
SYRINGE (ML) INTRAVENOUS
Status: DISPENSED
Start: 2020-03-11

## (undated) RX ORDER — EPHEDRINE SULFATE 50 MG/ML
INJECTION, SOLUTION INTRAMUSCULAR; INTRAVENOUS; SUBCUTANEOUS
Status: DISPENSED
Start: 2019-03-05

## (undated) RX ORDER — DIPHENHYDRAMINE HYDROCHLORIDE 50 MG/ML
INJECTION INTRAMUSCULAR; INTRAVENOUS
Status: DISPENSED
Start: 2023-04-12

## (undated) RX ORDER — FENTANYL CITRATE 50 UG/ML
INJECTION, SOLUTION INTRAMUSCULAR; INTRAVENOUS
Status: DISPENSED
Start: 2019-03-05

## (undated) RX ORDER — ONDANSETRON 2 MG/ML
INJECTION INTRAMUSCULAR; INTRAVENOUS
Status: DISPENSED
Start: 2020-01-29

## (undated) RX ORDER — ONDANSETRON 2 MG/ML
INJECTION INTRAMUSCULAR; INTRAVENOUS
Status: DISPENSED
Start: 2019-08-11

## (undated) RX ORDER — FENTANYL CITRATE 50 UG/ML
INJECTION, SOLUTION INTRAMUSCULAR; INTRAVENOUS
Status: DISPENSED
Start: 2023-04-12

## (undated) RX ORDER — ONDANSETRON 2 MG/ML
INJECTION INTRAMUSCULAR; INTRAVENOUS
Status: DISPENSED
Start: 2018-02-26

## (undated) RX ORDER — DEXAMETHASONE SODIUM PHOSPHATE 10 MG/ML
INJECTION, SOLUTION INTRAMUSCULAR; INTRAVENOUS
Status: DISPENSED
Start: 2020-03-11

## (undated) RX ORDER — LIDOCAINE HYDROCHLORIDE 20 MG/ML
INJECTION, SOLUTION EPIDURAL; INFILTRATION; INTRACAUDAL; PERINEURAL
Status: DISPENSED
Start: 2018-02-26

## (undated) RX ORDER — ALBUTEROL SULFATE 0.83 MG/ML
SOLUTION RESPIRATORY (INHALATION)
Status: DISPENSED
Start: 2018-02-26